# Patient Record
Sex: FEMALE | Race: WHITE | Employment: FULL TIME | URBAN - NONMETROPOLITAN AREA
[De-identification: names, ages, dates, MRNs, and addresses within clinical notes are randomized per-mention and may not be internally consistent; named-entity substitution may affect disease eponyms.]

---

## 2017-01-02 ENCOUNTER — OFFICE VISIT (OUTPATIENT)
Dept: FAMILY MEDICINE | Facility: CLINIC | Age: 32
End: 2017-01-02
Payer: COMMERCIAL

## 2017-01-02 ENCOUNTER — TELEPHONE (OUTPATIENT)
Dept: FAMILY MEDICINE | Facility: CLINIC | Age: 32
End: 2017-01-02

## 2017-01-02 VITALS
SYSTOLIC BLOOD PRESSURE: 114 MMHG | WEIGHT: 188 LBS | HEIGHT: 66 IN | HEART RATE: 95 BPM | DIASTOLIC BLOOD PRESSURE: 66 MMHG | RESPIRATION RATE: 18 BRPM | OXYGEN SATURATION: 97 % | TEMPERATURE: 97.3 F | BODY MASS INDEX: 30.22 KG/M2

## 2017-01-02 DIAGNOSIS — R30.0 DYSURIA: Primary | ICD-10-CM

## 2017-01-02 LAB
ALBUMIN UR-MCNC: NEGATIVE MG/DL
APPEARANCE UR: CLEAR
BACTERIA #/AREA URNS HPF: ABNORMAL /HPF
BILIRUB UR QL STRIP: NEGATIVE
COLOR UR AUTO: YELLOW
GLUCOSE UR STRIP-MCNC: NEGATIVE MG/DL
HGB UR QL STRIP: ABNORMAL
KETONES UR STRIP-MCNC: 15 MG/DL
LEUKOCYTE ESTERASE UR QL STRIP: NEGATIVE
NITRATE UR QL: NEGATIVE
NON-SQ EPI CELLS #/AREA URNS LPF: ABNORMAL /LPF
PH UR STRIP: 6.5 PH (ref 5–7)
RBC #/AREA URNS AUTO: ABNORMAL /HPF (ref 0–2)
SP GR UR STRIP: 1.02 (ref 1–1.03)
URN SPEC COLLECT METH UR: ABNORMAL
UROBILINOGEN UR STRIP-ACNC: 0.2 EU/DL (ref 0.2–1)
WBC #/AREA URNS AUTO: ABNORMAL /HPF (ref 0–2)

## 2017-01-02 PROCEDURE — 99213 OFFICE O/P EST LOW 20 MIN: CPT | Performed by: NURSE PRACTITIONER

## 2017-01-02 PROCEDURE — 81001 URINALYSIS AUTO W/SCOPE: CPT | Performed by: NURSE PRACTITIONER

## 2017-01-02 RX ORDER — CIPROFLOXACIN 250 MG/1
250 TABLET, FILM COATED ORAL 2 TIMES DAILY
Qty: 6 TABLET | Refills: 0 | Status: SHIPPED | OUTPATIENT
Start: 2017-01-02 | End: 2017-01-11

## 2017-01-02 ASSESSMENT — PAIN SCALES - GENERAL: PAINLEVEL: SEVERE PAIN (6)

## 2017-01-02 NOTE — PROGRESS NOTES
"  SUBJECTIVE:                                                    Sirena Dietrich is a 31 year old female who presents to clinic today for the following health issues:      URINARY TRACT SYMPTOMS      Duration: 3 weeks    Description  Dysuria, frequency    Intensity:  moderate    Accompanying signs and symptoms:  Fever/chills: no   Flank pain no   Nausea and vomiting: no   Vaginal symptoms: burning, patient recently treated with Azo 3 days and also took diflucan  Abdominal/Pelvic Pain: YES    History  History of frequent UTI's: no   History of kidney stones: YES  Sexually Active: no   Possibility of pregnancy: No    Precipitating or alleviating factors: None    Therapies tried and outcome: Azo and increased fluids    Outcome: not helpful   Intermittently has similar symptoms.  History of chronic pelvic pain but feels that this is totally unrelated.  Not sexually active currently      Problem list and histories reviewed & adjusted, as indicated.  Additional history: as documented    Problem list, Medication list, Allergies, and Medical/Social/Surgical histories reviewed in [facility] and updated as appropriate.    ROS:  Constitutional, HEENT, cardiovascular, pulmonary, gi and gu systems are negative, except as otherwise noted.    OBJECTIVE:                                                    /66 mmHg  Pulse 95  Temp(Src) 97.3  F (36.3  C) (Tympanic)  Resp 18  Ht 5' 6.25\" (1.683 m)  Wt 188 lb (85.276 kg)  BMI 30.11 kg/m2  SpO2 97%  LMP  (LMP Unknown)  Breastfeeding? No  Body mass index is 30.11 kg/(m^2).  GENERAL: healthy, alert and no distress  EYES: Eyes grossly normal to inspection and conjunctivae and sclerae normal  HENT: ear canals and TM's normal, nose and mouth without ulcers or lesions  MS: no gross musculoskeletal defects noted, no edema    Diagnostic Test Results:  Results for orders placed or performed in visit on 01/02/17   *UA reflex to Microscopic and Culture ([facility] " (except [place] and [place])   Result Value Ref Range    Color Urine Yellow     Appearance Urine Clear     Glucose Urine Negative NEG mg/dL    Bilirubin Urine Negative NEG    Ketones Urine 15 (A) NEG mg/dL    Specific Gravity Urine 1.025 1.003 - 1.035    Blood Urine Trace (A) NEG    pH Urine 6.5 5.0 - 7.0 pH    Protein Albumin Urine Negative NEG mg/dL    Urobilinogen Urine 0.2 0.2 - 1.0 EU/dL    Nitrite Urine Negative NEG    Leukocyte Esterase Urine Negative NEG    Source Midstream Urine    Urine Microscopic   Result Value Ref Range    WBC Urine O - 2 0 - 2 /HPF    RBC Urine 2-5 (A) 0 - 2 /HPF    Squamous Epithelial /LPF Urine Many (A) FEW /LPF    Bacteria Urine Few (A) NEG /HPF     *Note: Due to a large number of results and/or encounters for the requested time period, some results have not been displayed. A complete set of results can be found in Results Review.        ASSESSMENT/PLAN:                                                      1. Dysuria  Will wait for UC results  - *UA reflex to Microscopic and Culture ([facility] (except [place] and [place])  - Urine Microscopic  - Cipro 250 mg bid for 3 days    Patient Instructions   Your clinic record indicates that you are due for:   Pap and physical exam      Start taking the Cipro 250 mg twice a day for 3 days.    We'll contact you with the results of your urine culture.    Increase your fluids.    Continue taking the Azo        Yeny Amor, MALDONADO, APRN [facility]

## 2017-01-02 NOTE — MR AVS SNAPSHOT
After Visit Summary   1/2/2017    Sirena Dietrich    MRN: [id number]           Patient Information     Date Of Birth          1985        Visit Information        Provider Department      1/2/2017 1:40 PM Yeny Amor APRN CNP [facility]        Today's Diagnoses     Dysuria    -  1       Care Instructions    Your clinic record indicates that you are due for:   Pap and physical exam      Start taking the Cipro 250 mg twice a day for 3 days.    We'll contact you with the results of your urine culture.    Increase your fluids.    Continue taking the Azo        Follow-ups after your visit        Your next 10 appointments already scheduled     Jan 03, 2017  9:00 AM   New Visit with Chrissie Hyde PA-C   [facility] ([facility]    [address] [facility] [address]   [phone]            Jan 30, 2017  1:00 PM   New Visit with Tay Watts MD   [facility] ([facility])    [address] [facility] [address] [facility] [address]   [phone]            Feb 01, 2017 12:45 PM   Return Visit with Lisa Helton MD   [facility] ([facility])    [facility] [address]  [address]  [facility] [address] [phone]              Who to contact     If you have questions or need follow up information about today's clinic visit or your schedule please contact [facility] directly at [phone].  Normal or non-critical lab and imaging results will be communicated to you by MyChart, letter or phone within 4 business days after the clinic has received the results. If you do not hear from us within 7 days, please contact the clinic through MyChart or phone. If you have a critical or abnormal lab result, we will notify you by phone as soon as possible.  Submit refill requests through [employer] or call  "your pharmacy and they will forward the refill request to us. Please allow 3 business days for your refill to be completed.          Additional Information About Your Visit        [email or web address] Medical Information     Local[employer] lets you send messages to your doctor, view your test results, renew your prescriptions, schedule appointments and more. To sign up, go to [email or web address] . Click on \"Log in\" on the left side of the screen, which will take you to the Welcome page. Then click on \"Sign up Now\" on the right side of the page.     You will be asked to enter the access code listed below, as well as some personal information. Please follow the directions to create your username and password.     Your access code is: [email or web address]  Expires: 2017  2:31 PM     Your access code will  in 90 days. If you need help or a new code, please call your [place] clinic or [phone].        Your Vitals Were     Pulse Temperature Respirations    95 97.3  F (36.3  C) (Tympanic) 18    Height BMI (Body Mass Index) Pulse Oximetry    5' 6.25\" (1.683 m) 30.11 kg/m2 97%    Last Period Breastfeeding?       (LMP Unknown) No        Blood Pressure from Last 3 Encounters:   17 114/66   16 138/88   12/15/16 136/75    Weight from Last 3 Encounters:   17 188 lb (85.276 kg)   16 188 lb 3.2 oz (85.367 kg)   12/15/16 187 lb (84.823 kg)              We Performed the Following     *UA reflex to Microscopic and Culture ([facility] (except [place] and [place])     Urine Microscopic          Today's Medication Changes          These changes are accurate as of: 17  2:31 PM.  If you have any questions, ask your nurse or doctor.               Start taking these medicines.        Dose/Directions    ciprofloxacin 250 MG tablet   Commonly known as:  CIPRO   Used for:  Dysuria   Started by:  Yeny Amor APRN CNP        Dose:  250 mg   Take 1 tablet (250 mg) by mouth 2 times daily "   Quantity:  6 tablet   Refills:  0            Where to get your medicines      These medications [place] [facility] [place] [address] [facility] [address]     Phone:  [phone]    - ciprofloxacin 250 MG tablet             Primary Care Provider Office Phone # Fax #    DIPESH Marino [phone]       [facility] [address]  [facility] [address]        Goals        Patient-Stated    I will attend counseling appointment     Goal Comments - Note edited  11/25/2015  1:35 PM by Shi Armstrong LSW    As of today's date 11/25/2015 goal is met at 76 - 100%.   Goal Status:  Complete - will keep to maintain for  A few month  As of today's date 7/28/2015 goal is met at 26 - 50%.   Goal Status:  Active          Thank you!     Thank you for choosing [facility]  for your care. Our goal is always to provide you with excellent care. Hearing back from our patients is one way we can continue to improve our services. Please take a few minutes to complete the written survey that you may receive in the mail after your visit with us. Thank you!             Your Updated Medication List - Protect others around you: Learn how to safely use, store and throw away your medicines at www.disposemymeds.org.          This list is accurate as of: 1/2/17  2:31 PM.  Always use your most recent med list.                   Brand Name Dispense Instructions for use    ciprofloxacin 250 MG tablet    CIPRO    6 tablet    Take 1 tablet (250 mg) by mouth 2 times daily       fluconazole 150 MG tablet    DIFLUCAN    8 tablet    Take 1 tablet (150 mg) by mouth every 3 days X 4 doses then once a week x 1 month       fluticasone 50 MCG/ACT spray    FLONASE    1 g    Spray 1-2 sprays into both nostrils daily       gabapentin 600 MG tablet    NEURONTIN    150 tablet    Take 1 tablet (600 mg) by mouth 3 times daily Take 1200 mg three times a day-  morning, mid day and evening.       lamoTRIgine 200 MG tablet    LaMICtal    31 tablet    Take 1 tablet (200 mg) by mouth daily       LATUDA 20 MG Tabs tablet   Generic drug:  lurasidone      Take 10 mg by mouth daily       levonorgestrel-ethinyl estradiol 0.1-20 MG-MCG per tablet    EDWARD HARTMAN LESSINA    28 tablet    Take 1 tablet by mouth daily       lidocaine (Anorectal) 5 % Crea     45 g    NEEDS 5% TOPICAL LIDOCAINE OINTMENT 35.44 gm tube.. Apply TID as needed for comfort. Disp #1 Refill X 1 year.       lidocaine 5 % Patch    LIDODERM    30 patch    Apply up to 3 patches to painful area at once for up to 12 h within a 24 h period.  Remove after 12 hours.       lidocaine HCl 3 % cream     453.6 g    Apply topically 3 times daily       LORazepam 1 MG tablet    ATIVAN    20 tablet    Take 1.5 tablets (1.5 mg) by mouth daily as needed for anxiety       omeprazole 40 MG capsule    priLOSEC    90 capsule    Take 1 capsule (40 mg) by mouth daily Take 30-60 minutes before a meal.       ondansetron 4 MG ODT tab    ZOFRAN ODT    7 tablet    Take 1 tablet (4 mg) by mouth every 8 hours as needed for nausea       oxyCODONE-acetaminophen 5-325 MG per tablet    PERCOCET    15 tablet    1-2 tabs twice daily as needed for pelvic pain.       phenylephrine-cocoa butter 0.25-88.44 % per suppository    PREPARATION H    24 suppository    Place 1 suppository rectally 4 times daily as needed for hemorrhoids or itching       * SEROQUEL 200 MG tablet   Generic drug:  QUEtiapine      Take by mouth daily       * QUEtiapine 50 MG tablet    SEROQUEL    30 tablet    Take 1 tablet (50 mg) by mouth nightly as needed       SUMAtriptan 6 MG/0.5ML pen injector kit    IMITREX STATDOSE    3 kit    Inject 0.5 mLs (6 mg) Subcutaneous at onset of headache for migraine       * Notice:  This list has 2 medication(s) that are the same as other medications prescribed for you. Read the directions carefully, and ask your doctor or other care provider to  review them with you.

## 2017-01-02 NOTE — PATIENT INSTRUCTIONS
Your clinic record indicates that you are due for:   Pap and physical exam      Start taking the Cipro 250 mg twice a day for 3 days.    We'll contact you with the results of your urine culture.    Increase your fluids.    Continue taking the Azo

## 2017-01-02 NOTE — TELEPHONE ENCOUNTER
Reason for call:  Patient reporting a symptom    Symptom or request: Pt has Bacteria in her Urine. Continues to have pain when voiding    Duration (how long have symptoms been present): 2 weeks    Have you been treated for this before? Yes    Phone Number patient can be reached at:  Home number on file [phone] (home)    Best Time:  Any Time    Can we leave a detailed message on this number:  YES    Call taken on 1/2/2017 at 9:59 AM by Monse Shafer

## 2017-01-02 NOTE — NURSING NOTE
"Chief Complaint   Patient presents with     UTI       Initial /66 mmHg  Pulse 95  Temp(Src) 97.3  F (36.3  C) (Tympanic)  Resp 18  Ht 5' 6.25\" (1.683 m)  Wt 188 lb (85.276 kg)  BMI 30.11 kg/m2  SpO2 97%  LMP  (LMP Unknown)  Breastfeeding? No Estimated body mass index is 30.11 kg/(m^2) as calculated from the following:    Height as of this encounter: 5' 6.25\" (1.683 m).    Weight as of this encounter: 188 lb (85.276 kg).  BP completed using cuff size: regular  "

## 2017-01-02 NOTE — TELEPHONE ENCOUNTER
S-(situation): Pt still having ongoing painful urination.    B-(background): Pt seen on 12-15-16 for dysuria and also again on 12-20-16, prescribed Diflucan on 12-15-16. Did not complete the UA as instructed at visit on 12-20-16.     A-(assessment): Pt describes painful urination, no fever, no blood in urine, frequency. No flank pain but having difficulty relaxing  pelvic muscles to void. Used a OTC yeast infection treatment as she is red in the groin area.     R-(recommendations): Instructed to be seen, pt scheduled appointment with Paulina Amor today at 1:40 pm for evaluation. Adithya

## 2017-01-11 ENCOUNTER — OFFICE VISIT (OUTPATIENT)
Dept: FAMILY MEDICINE | Facility: CLINIC | Age: 32
End: 2017-01-11
Payer: COMMERCIAL

## 2017-01-11 VITALS
WEIGHT: 186 LBS | TEMPERATURE: 97.3 F | DIASTOLIC BLOOD PRESSURE: 78 MMHG | BODY MASS INDEX: 29.79 KG/M2 | SYSTOLIC BLOOD PRESSURE: 139 MMHG | HEART RATE: 110 BPM

## 2017-01-11 DIAGNOSIS — M25.551 HIP PAIN, RIGHT: ICD-10-CM

## 2017-01-11 DIAGNOSIS — F31.64 SEVERE BIPOLAR I DISORDER, MOST RECENT EPISODE MIXED, WITH PSYCHOTIC FEATURES (H): Primary | ICD-10-CM

## 2017-01-11 DIAGNOSIS — R11.0 NAUSEA: ICD-10-CM

## 2017-01-11 PROCEDURE — 99214 OFFICE O/P EST MOD 30 MIN: CPT | Performed by: NURSE PRACTITIONER

## 2017-01-11 RX ORDER — ONDANSETRON 4 MG/1
4 TABLET, ORALLY DISINTEGRATING ORAL EVERY 8 HOURS PRN
Qty: 7 TABLET | Refills: 0 | Status: SHIPPED | OUTPATIENT
Start: 2017-01-11 | End: 2017-01-27

## 2017-01-11 RX ORDER — LAMOTRIGINE 200 MG/1
200 TABLET ORAL DAILY
Qty: 31 TABLET | Refills: 0 | Status: SHIPPED | OUTPATIENT
Start: 2017-01-11 | End: 2017-07-27

## 2017-01-11 RX ORDER — QUETIAPINE FUMARATE 50 MG/1
50-150 TABLET, FILM COATED ORAL AT BEDTIME
Qty: 90 TABLET | Refills: 0 | Status: SHIPPED | OUTPATIENT
Start: 2017-01-11 | End: 2017-02-06

## 2017-01-11 RX ORDER — LORAZEPAM 1 MG/1
1.5 TABLET ORAL DAILY PRN
Qty: 20 TABLET | Refills: 0 | Status: SHIPPED | OUTPATIENT
Start: 2017-01-11 | End: 2017-09-21

## 2017-01-11 RX ORDER — GABAPENTIN 600 MG/1
TABLET ORAL
Qty: 150 TABLET | Refills: 0 | COMMUNITY
Start: 2017-01-11 | End: 2017-03-22
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
## 2017-01-11 NOTE — Clinical Note
[facility]  [address] [facility] [address]  Phone: [phone]    January 11, 2017    Sirena Dietrich  [address]     [facility] [address]          TO WHOM IT MAY CONCERNS,    Appointment were as followed:     Sirena Dietrich:   11/23/2016  12/06/2016  12/15/2016  12/20/2016  01/02/2017    Nelson Dietrich:   11/26/2016 12/08/2016 12/13/2016 12/21/2016    Yehuda Purvis:   12/05/2016 12/20/2016    Aga Dietrich:   12/20/2016        Sincerely,      Shi TRIPLETTP-BC / sb

## 2017-01-11 NOTE — NURSING NOTE
"Initial /78 mmHg  Pulse 110  Temp(Src) 97.3  F (36.3  C) (Tympanic)  Wt 186 lb (84.369 kg)  LMP  (LMP Unknown) Estimated body mass index is 29.79 kg/(m^2) as calculated from the following:    Height as of 1/2/17: 5' 6.25\" (1.683 m).    Weight as of this encounter: 186 lb (84.369 kg). .      "

## 2017-01-11 NOTE — Clinical Note
[facility]  [address] [place] [address]  Phone: [phone]     January 11, 2017      Sirena Dietrich  [address]   [address] [place] [address]              To whom it may concern,        Sirena Dietrich was seen in our clinic today for medical illness.   At this point contact with psychiatry team is difficult.   This note is to serve as a temporary guidance in regard to Sirena Dietrich working.  At this point with ongoing mental health issues and treatment plan .. ( DBT once a week, DBT therapy once a week, psychotherapy weekly and monthly case management meetings) would recommend that Sirena Dietrich is not able to work.   Anticipate return to work or school as recommended by her psychiatry team.         Sincerely,        Shi Martinez [facility]  [phone]  [address] [place] [id number]

## 2017-01-20 ENCOUNTER — HOSPITAL ENCOUNTER (EMERGENCY)
Facility: CLINIC | Age: 32
Discharge: HOME | End: 2017-01-20
Attending: EMERGENCY MEDICINE | Admitting: EMERGENCY MEDICINE
Payer: COMMERCIAL

## 2017-01-20 VITALS
OXYGEN SATURATION: 96 % | BODY MASS INDEX: 28.93 KG/M2 | SYSTOLIC BLOOD PRESSURE: 126 MMHG | DIASTOLIC BLOOD PRESSURE: 83 MMHG | HEIGHT: 66 IN | WEIGHT: 180 LBS | HEART RATE: 115 BPM | TEMPERATURE: 97.9 F | RESPIRATION RATE: 16 BRPM

## 2017-01-20 DIAGNOSIS — R11.2 NON-INTRACTABLE VOMITING WITH NAUSEA, UNSPECIFIED VOMITING TYPE: ICD-10-CM

## 2017-01-20 DIAGNOSIS — K52.9 GASTROENTERITIS: ICD-10-CM

## 2017-01-20 DIAGNOSIS — E86.0 DEHYDRATION: ICD-10-CM

## 2017-01-20 LAB
ALBUMIN SERPL-MCNC: 3.7 G/DL (ref 3.4–5)
ALP SERPL-CCNC: 67 U/L (ref 40–150)
ALT SERPL W P-5'-P-CCNC: 57 U/L (ref 0–50)
ANION GAP SERPL CALCULATED.3IONS-SCNC: 8 MMOL/L (ref 3–14)
AST SERPL W P-5'-P-CCNC: 34 U/L (ref 0–45)
BASOPHILS # BLD AUTO: 0 10E9/L (ref 0–0.2)
BASOPHILS NFR BLD AUTO: 0.7 %
BILIRUB SERPL-MCNC: 0.3 MG/DL (ref 0.2–1.3)
BUN SERPL-MCNC: 9 MG/DL (ref 7–30)
CALCIUM SERPL-MCNC: 8.3 MG/DL (ref 8.5–10.1)
CHLORIDE SERPL-SCNC: 106 MMOL/L (ref 94–109)
CO2 SERPL-SCNC: 27 MMOL/L (ref 20–32)
CREAT SERPL-MCNC: 0.71 MG/DL (ref 0.52–1.04)
DIFFERENTIAL METHOD BLD: NORMAL
EOSINOPHIL # BLD AUTO: 0.3 10E9/L (ref 0–0.7)
EOSINOPHIL NFR BLD AUTO: 4.9 %
ERYTHROCYTE [DISTWIDTH] IN BLOOD BY AUTOMATED COUNT: 11.6 % (ref 10–15)
GFR SERPL CREATININE-BSD FRML MDRD: ABNORMAL ML/MIN/1.7M2
GLUCOSE SERPL-MCNC: 98 MG/DL (ref 70–99)
HCG SERPL QL: NEGATIVE
HCT VFR BLD AUTO: 42 % (ref 35–47)
HGB BLD-MCNC: 14.5 G/DL (ref 11.7–15.7)
IMM GRANULOCYTES # BLD: 0 10E9/L (ref 0–0.4)
IMM GRANULOCYTES NFR BLD: 0.2 %
LYMPHOCYTES # BLD AUTO: 2.6 10E9/L (ref 0.8–5.3)
LYMPHOCYTES NFR BLD AUTO: 45.6 %
MCH RBC QN AUTO: 30.3 PG (ref 26.5–33)
MCHC RBC AUTO-ENTMCNC: 34.5 G/DL (ref 31.5–36.5)
MCV RBC AUTO: 88 FL (ref 78–100)
MONOCYTES # BLD AUTO: 0.7 10E9/L (ref 0–1.3)
MONOCYTES NFR BLD AUTO: 12.4 %
NEUTROPHILS # BLD AUTO: 2.1 10E9/L (ref 1.6–8.3)
NEUTROPHILS NFR BLD AUTO: 36.2 %
PLATELET # BLD AUTO: 289 10E9/L (ref 150–450)
POTASSIUM SERPL-SCNC: 3.3 MMOL/L (ref 3.4–5.3)
PROT SERPL-MCNC: 7.2 G/DL (ref 6.8–8.8)
RBC # BLD AUTO: 4.78 10E12/L (ref 3.8–5.2)
SODIUM SERPL-SCNC: 141 MMOL/L (ref 133–144)
WBC # BLD AUTO: 5.7 10E9/L (ref 4–11)

## 2017-01-20 PROCEDURE — 85025 COMPLETE CBC W/AUTO DIFF WBC: CPT | Performed by: EMERGENCY MEDICINE

## 2017-01-20 PROCEDURE — 99284 EMERGENCY DEPT VISIT MOD MDM: CPT | Mod: 25

## 2017-01-20 PROCEDURE — 80053 COMPREHEN METABOLIC PANEL: CPT | Performed by: EMERGENCY MEDICINE

## 2017-01-20 PROCEDURE — 25000132 ZZH RX MED GY IP 250 OP 250 PS 637: Performed by: EMERGENCY MEDICINE

## 2017-01-20 PROCEDURE — 25800025 ZZH RX 258: Performed by: EMERGENCY MEDICINE

## 2017-01-20 PROCEDURE — 84703 CHORIONIC GONADOTROPIN ASSAY: CPT | Performed by: EMERGENCY MEDICINE

## 2017-01-20 PROCEDURE — 96361 HYDRATE IV INFUSION ADD-ON: CPT

## 2017-01-20 PROCEDURE — 25000125 ZZHC RX 250: Performed by: EMERGENCY MEDICINE

## 2017-01-20 PROCEDURE — 99284 EMERGENCY DEPT VISIT MOD MDM: CPT | Performed by: EMERGENCY MEDICINE

## 2017-01-20 PROCEDURE — 96374 THER/PROPH/DIAG INJ IV PUSH: CPT

## 2017-01-20 PROCEDURE — 25000128 H RX IP 250 OP 636: Performed by: EMERGENCY MEDICINE

## 2017-01-20 RX ORDER — SODIUM CHLORIDE 9 MG/ML
1000 INJECTION, SOLUTION INTRAVENOUS CONTINUOUS
Status: DISCONTINUED | OUTPATIENT
Start: 2017-01-20 | End: 2017-01-20 | Stop reason: HOSPADM

## 2017-01-20 RX ORDER — ONDANSETRON 4 MG/1
4 TABLET, ORALLY DISINTEGRATING ORAL EVERY 8 HOURS PRN
Qty: 10 TABLET | Refills: 0 | Status: SHIPPED | OUTPATIENT
Start: 2017-01-20 | End: 2017-01-27

## 2017-01-20 RX ORDER — ONDANSETRON 2 MG/ML
4 INJECTION INTRAMUSCULAR; INTRAVENOUS ONCE
Status: COMPLETED | OUTPATIENT
Start: 2017-01-20 | End: 2017-01-20

## 2017-01-20 RX ADMIN — ONDANSETRON 4 MG: 2 INJECTION INTRAMUSCULAR; INTRAVENOUS at 17:34

## 2017-01-20 RX ADMIN — SODIUM CHLORIDE, POTASSIUM CHLORIDE, SODIUM LACTATE AND CALCIUM CHLORIDE 1000 ML: 600; 310; 30; 20 INJECTION, SOLUTION INTRAVENOUS at 18:26

## 2017-01-20 RX ADMIN — LIDOCAINE HYDROCHLORIDE 30 ML: 20 SOLUTION ORAL; TOPICAL at 18:25

## 2017-01-20 RX ADMIN — SODIUM CHLORIDE 1000 ML: 9 INJECTION, SOLUTION INTRAVENOUS at 17:34

## 2017-01-20 NOTE — ED AVS SNAPSHOT
" [facility]    [address] [facility] [address]    Phone:  [phone]    Fax:  [phone]                                       Sirena Dietrich   MRN: [id number]    Department:  [facility]   Date of Visit:  1/20/2017           Patient Information     Date Of Birth          1985        Your diagnoses for this visit were:     Gastroenteritis     Non-intractable vomiting with nausea, unspecified vomiting type     Dehydration        You were seen by Ivan Bradshaw MD.      Follow-up Information     Schedule an appointment as soon as possible for a visit with Shi Martinez APRN CNP.    Specialty:  Family Practice    Why:  For re-evaluation if symptoms not resolving over the next 2 days.    Contact information:    [facility]  [address] [facility] [id number] [phone]          Follow up with [facility].    Specialty:  EMERGENCY MEDICINE    Why:  If symptoms worsen, or new problems or concerns.    Contact information:    [address] [place] [id number] [phone]    Additional information:    The medical center is located at   [address] [place] [address] (between [facility] and   [place] [address] in [place], [place] north   of [place]).        Discharge Instructions          * FOOD POISONING or VIRAL GASTROENTERITIS (6yr-Adult)  FOOD POISONING may occur from 6 to 24 hours after eating food that has spoiled and lasts up to1-2 days. VIRAL GASTRO-ENTERITIS is commonly known as the \"stomach flu\" and may last 2-7 days. Symptoms of both illnesses may include vomiting, diarrhea, fever, abdominal cramping. Antibiotics are not effective, but simple home treatment will be helpful.  HOME CARE:    If symptoms are severe, rest at home for the next 24 hours.    Avoid tobacco and alcohol. These may worsen your symptoms.    If medicines for diarrhea (low dose of Immodium: one tablet a day for an adult) or " vomiting were prescribed, take only as directed.   During the first 12 to 24 hours follow the diet below:    DRINKS: Sport drinks like Gatorade, soft drinks without caffeine; ginger ale, mineral water (plain or flavored), decaffeinated tea and coffee.    SOUPS: Clear broth, consommé and bouillon    DESSERTS: Plain gelatin (Jell-O), popsicles and fruit juice bars.  During the next 24 hours you may add the following to the above:    Hot cereal, plain toast, bread, rolls, crackers    Plain noodles, rice, mashed potatoes, chicken noodle or rice soup    Unsweetened canned fruit (avoid pineapple), bananas    Limit fat intake to less than 15 grams per day by avoiding margarine, butter, oils, mayonnaise, sauces, gravies, fried foods, peanut butter, meat, poultry and fish.    Limit fiber; avoid raw or cooked vegetables, fresh fruits (except bananas) and bran cereals.    Limit caffeine and chocolate. No spices or seasonings except salt.  Slowly go back to a normal diet as you feel better and your symptoms lessen.  FOLLOW UP with your doctor as advised if you are not better in 2 days. If a stool (diarrhea) sample was taken, you may call in 2 days (or as directed) for the results.  GET PROMPT MEDICAL ATTENTION if any of the following occur:    Increasing abdominal pain or constant pain in one spot    Continued vomiting (unable to keep liquids down)    Frequent diarrhea (more than 5 times a day)    Blood in vomit or stool (black or red color)    Unable to take in fluids at all    No urine output for 12 hours or extreme thirst    Weakness, dizziness, fainting    Drowsiness, confusion, stiff neck or seizure    Fever over 101.0  F (38.3  C) for more than 3 days    New rash    1010-1535 [facility], [address] [place] [address], [place] [address]. All rights reserved. This information is not intended as a substitute for professional medical care. Always follow your healthcare professional's instructions.      Diet for Vomiting or  Diarrhea (Adult)    If your symptoms return or get worse after eating certain foods listed below, you should stop eating them until your symptoms ease and you feel better.  Once the vomiting stops, then follow the steps below.   During the first 12 to 24 hours  During the first 12 to 24 hours, follow this diet:    Beverages. Plain water, sport drinks like electrolyte solutions, soft drinks without caffeine, mineral water (plain or flavored), clear fruit juices, and decaffeinated tea and coffee.    Soups. Clear broth, consommé, and bouillon.    Desserts. Plain gelatin, popsicles, and fruit juice bars. As you feel better, you may add 6 to 8 ounces of yogurt per day. If you have diarrhea, don't have foods or beverages that contain sugar, high-fructose corn syrup, or sugar alcohols.  During the next 24 hours  During the next 24 hours you may add the following to the above:    Hot cereal, plain toast, bread, rolls, and crackers    Plain noodles, rice, mashed potatoes, and chicken noodle or rice soup    Unsweetened canned fruit (but not pineapple) and bananas  Don't have more than 15 grams of fat a day. Do this by staying away from margarine, butter, oils, mayonnaise, sauces, gravies, fried foods, peanut butter, meat, poultry, and fish.  Don't eat much fiber. Stay away from raw or cooked vegetables, fresh fruits (except bananas), and bran cereals.  Limit how much caffeine and chocolate you have. Do not use any spices or seasonings except salt.  During the next 24 hours  Gradually go back to your normal diet, as you feel better and your symptoms ease.    9507-8833 [employer]. [address] [place] [address], [place] [address]. All rights reserved. This information is not intended as a substitute for professional medical care. Always follow your healthcare professional's instructions.          Treating Diarrhea  Diarrhea occurs when you have loose, watery, or frequent bowel movements. It is a common problem with many  causes. Most cases of diarrhea clear up on their own. But certain cases may need treatment. Be sure to see your health care provider if your symptoms do not improve within a few days.    Getting Relief  Treatment of diarrhea depends on its cause. Diarrhea caused by bacterial or parasite infection is often treated with antibiotics. Diarrhea caused by other factors, such as a stomach virus, often improves with simple home treatment. The tips below may also help relieve your symptoms.    Drink plenty of fluids. This helps prevent too much fluid loss (dehydration). Water, clear soups, and electrolyte solutions are good choices. Avoid alcohol, coffee, tea, and milk. These can make symptoms worse.    Suck on ice chips if drinking makes you queasy.    Return to your normal diet slowly. You may want to eat bland foods at first, such as rice and toast. Also, you may need to avoid certain foods for a while, such as dairy products. These can make symptoms worse. Ask your health care provider if there are any other foods you should avoid.    If you were prescribed antibiotics, take them as directed.    Do not take anti-diarrhea medications without asking your health care provider first.  Call Your Health Care Provider If You Have:    Fever of 102 F (38.0 C) or higher    Severe pain    Worsening diarrhea or diarrhea for more than 2 days    Bloody vomit or stool    Signs of dehydration (dizziness, dry mouth and tongue, rapid pulse, dark urine)    6603-6616 [employer]. [address] [place] [address]. All rights reserved. This information is not intended as a substitute for professional medical care. Always follow your healthcare professional's instructions.          Future Appointments        Provider Department Dept Phone Center    1/30/2017 1:00 PM Tay Watts MD [facility] PHYSICIAN HEART AT [facility] [phone] [facility]    2/1/2017 12:45 PM Lisa Helton  MD [facility] [phone] [facility]      24 Hour Appointment Hotline       To make an appointment at any [facility], call [phone] ([phone]). If you don't have a family doctor or clinic, we will help you find one. [facility] are conveniently located to serve the needs of you and your family.          ED Discharge Orders     Clostridium difficile toxin B PCR       Stool Specimen            Enteric Bacteria and Virus Panel by CE Stool                    Review of your medicines      Our records show that you are taking the medicines listed below. If these are incorrect, please call your family doctor or clinic.        Dose / Directions Last dose taken    fluticasone 50 MCG/ACT spray   Commonly known as:  FLONASE   Dose:  1-2 spray   Quantity:  1 g        Spray 1-2 sprays into both nostrils daily   Refills:  5        gabapentin 600 MG tablet   Commonly known as:  NEURONTIN   Quantity:  150 tablet        600 mg in Am, Take 1200 mg mid day and evening.   Refills:  0        lamoTRIgine 200 MG tablet   Commonly known as:  LaMICtal   Dose:  200 mg   Quantity:  31 tablet        Take 1 tablet (200 mg) by mouth daily   Refills:  0        LATUDA 20 MG Tabs tablet   Dose:  10 mg   Generic drug:  lurasidone        Take 10 mg by mouth daily   Refills:  0        lidocaine (Anorectal) 5 % Crea   Quantity:  45 g        NEEDS 5% TOPICAL LIDOCAINE OINTMENT 35.44 gm tube.. Apply TID as needed for comfort. Disp #1 Refill X 1 year.   Refills:  1        lidocaine 5 % Patch   Commonly known as:  LIDODERM   Quantity:  30 patch        Apply up to 3 patches to painful area at once for up to 12 h within a 24 h period.  Remove after 12 hours.   Refills:  0        lidocaine HCl 3 % cream   Quantity:  453.6 g        Apply topically 3 times daily   Refills:  0        LORazepam 1 MG tablet   Commonly known as:  ATIVAN   Dose:  1.5 mg   Quantity:  20 tablet        Take 1.5 tablets (1.5 mg) by mouth  daily as needed for anxiety   Refills:  0        omeprazole 40 MG capsule   Commonly known as:  priLOSEC   Dose:  40 mg   Quantity:  90 capsule        Take 1 capsule (40 mg) by mouth daily Take 30-60 minutes before a meal.   Refills:  0        phenylephrine-cocoa butter 0.25-88.44 % per suppository   Commonly known as:  PREPARATION H   Dose:  1 suppository   Quantity:  24 suppository        Place 1 suppository rectally 4 times daily as needed for hemorrhoids or itching   Refills:  0        QUEtiapine 50 MG tablet   Commonly known as:  SEROQUEL   Dose:   mg   Quantity:  90 tablet        Take 1-3 tablets ( mg) by mouth At Bedtime   Refills:  0        SUMAtriptan 6 MG/0.5ML pen injector kit   Commonly known as:  IMITREX STATDOSE   Dose:  6 mg   Quantity:  3 kit        Inject 0.5 mLs (6 mg) Subcutaneous at onset of headache for migraine   Refills:  3          ASK your doctor about these medications        Dose / Directions Last dose taken    * ondansetron 4 MG ODT tab   Commonly known as:  ZOFRAN ODT   Dose:  4 mg   What changed:  Another medication with the same name was added. Make sure you understand how and when to take each.   Quantity:  7 tablet   Ask about: Which instructions should I use?        Take 1 tablet (4 mg) by mouth every 8 hours as needed for nausea   Refills:  0        * ondansetron 4 MG ODT tab   Commonly known as:  ZOFRAN ODT   Dose:  4 mg   What changed:  You were already taking a medication with the same name, and this prescription was added. Make sure you understand how and when to take each.   Quantity:  10 tablet   Ask about: Which instructions should I use?        Take 1 tablet (4 mg) by mouth every 8 hours as needed for nausea   Refills:  0        * Notice:  This list has 2 medication(s) that are the same as other medications prescribed for you. Read the directions carefully, and ask your doctor or other care provider to review them with you.            Prescriptions were sent or  printed at these locations (1 Prescription)                   [facility] [place] - [address] [facility]   [address] [facility], [place] [address]    Telephone:  [phone]   Fax:  [phone]   Hours:                  E-Prescribed (1 of 1)         ondansetron (ZOFRAN ODT) 4 MG ODT tab                Procedures and tests performed during your visit     CBC with platelets differential    Comprehensive metabolic panel    HCG qualitative pregnancy (blood)      Orders Needing Specimen Collection     None      Pending Results     No orders found from 1/19/2017 to 1/21/2017.            Pending Culture Results     No orders found from 1/19/2017 to 1/21/2017.       Test Results from your hospital stay           1/20/2017  6:01 PM - Interface, Flexilab Results      Component Results     Component Value Ref Range & Units Status    Sodium 141 133 - 144 mmol/L Final    Potassium 3.3 (L) 3.4 - 5.3 mmol/L Final    Chloride 106 94 - 109 mmol/L Final    Carbon Dioxide 27 20 - 32 mmol/L Final    Anion Gap 8 3 - 14 mmol/L Final    Glucose 98 70 - 99 mg/dL Final    Urea Nitrogen 9 7 - 30 mg/dL Final    Creatinine 0.71 0.52 - 1.04 mg/dL Final    GFR Estimate >90  Non  GFR Calc   >60 mL/min/1.7m2 Final    GFR Estimate If Black >90   GFR Calc   >60 mL/min/1.7m2 Final    Calcium 8.3 (L) 8.5 - 10.1 mg/dL Final    Bilirubin Total 0.3 0.2 - 1.3 mg/dL Final    Albumin 3.7 3.4 - 5.0 g/dL Final    Protein Total 7.2 6.8 - 8.8 g/dL Final    Alkaline Phosphatase 67 40 - 150 U/L Final    ALT 57 (H) 0 - 50 U/L Final    AST 34 0 - 45 U/L Final         1/20/2017  5:45 PM - Interface, Flexilab Results      Component Results     Component Value Ref Range & Units Status    WBC 5.7 4.0 - 11.0 10e9/L Final    RBC Count 4.78 3.8 - 5.2 10e12/L Final    Hemoglobin 14.5 11.7 - 15.7 g/dL Final    Hematocrit 42.0 35.0 - 47.0 % Final    MCV 88 78 - 100 fl Final    MCH 30.3 26.5 - 33.0 pg Final    MCHC 34.5 31.5 -  "36.5 g/dL Final    RDW 11.6 10.0 - 15.0 % Final    Platelet Count 289 150 - 450 10e9/L Final    Diff Method Automated Method  Final    % Neutrophils 36.2 % Final    % Lymphocytes 45.6 % Final    % Monocytes 12.4 % Final    % Eosinophils 4.9 % Final    % Basophils 0.7 % Final    % Immature Granulocytes 0.2 % Final    Absolute Neutrophil 2.1 1.6 - 8.3 10e9/L Final    Absolute Lymphocytes 2.6 0.8 - 5.3 10e9/L Final    Absolute Monocytes 0.7 0.0 - 1.3 10e9/L Final    Absolute Eosinophils 0.3 0.0 - 0.7 10e9/L Final    Absolute Basophils 0.0 0.0 - 0.2 10e9/L Final    Abs Immature Granulocytes 0.0 0 - 0.4 10e9/L Final         2017  6:28 PM - Interface, Flexilab Results      Component Results     Component Value Ref Range & Units Status    HCG Qualitative Serum Negative NEG Final                Thank you for choosing [facility]       Thank you for choosing [facility] for your care. Our goal is always to provide you with excellent care. Hearing back from our patients is one way we can continue to improve our services. Please take a few minutes to complete the written survey that you may receive in the mail after you visit with us. Thank you!        [employer] Information     [employer] lets you send messages to your doctor, view your test results, renew your prescriptions, schedule appointments and more. To sign up, go to [email or web address] . Click on \"Log in\" on the left side of the screen, which will take you to the Welcome page. Then click on \"Sign up Now\" on the right side of the page.     You will be asked to enter the access code listed below, as well as some personal information. Please follow the directions to create your username and password.     Your access code is: [email or web address]  Expires: 2017  2:31 PM     Your access code will  in 90 days. If you need help or a new code, please call your [facility] clinic or [phone].        Care EveryWhere ID     This is your Care EveryWhere ID. This could be used by " other organizations to access your [facility] medical records  [id number]        After Visit Summary       This is your record. Keep this with you and show to your community pharmacist(s) and doctor(s) at your next visit.

## 2017-01-20 NOTE — ED AVS SNAPSHOT
[facility] Department    [address] [facility] [address]    Phone:  [phone]    Fax:  [phone]                                       Sirena Dietrich   MRN: [id number]    Department:  [facility] Department   Date of Visit:  1/20/2017           After Visit Summary Signature Page     I have received my discharge instructions, and my questions have been answered. I have discussed any challenges I see with this plan with the nurse or doctor.    ..........................................................................................................................................  Patient/Patient Representative Signature      ..........................................................................................................................................  Patient Representative Print Name and Relationship to Patient    ..................................................               ................................................  Date                                            Time    ..........................................................................................................................................  Reviewed by Signature/Title    ...................................................              ..............................................  Date                                                            Time
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
## 2017-01-20 NOTE — ED NOTES
patient reports n/v/d for 3 days. Vomiting up to 7 times per day and diarrhea up to 10 times per day. Frequency of v/d is slowing down today. Pt currently nauseated. Denies bloody stools or abd pain. Bowel sounds normoactive. Pt recently on cipro for UTI 2 weeks ago

## 2017-01-21 NOTE — ED NOTES
Pt up to bathroom, given water to try for oral intake. Reports feeling better, nausea improved, heartburn gone

## 2017-01-21 NOTE — DISCHARGE INSTRUCTIONS
"   * FOOD POISONING or VIRAL GASTROENTERITIS (6yr-Adult)  FOOD POISONING may occur from 6 to 24 hours after eating food that has spoiled and lasts up to1-2 days. VIRAL GASTRO-ENTERITIS is commonly known as the \"stomach flu\" and may last 2-7 days. Symptoms of both illnesses may include vomiting, diarrhea, fever, abdominal cramping. Antibiotics are not effective, but simple home treatment will be helpful.  HOME CARE:    If symptoms are severe, rest at home for the next 24 hours.    Avoid tobacco and alcohol. These may worsen your symptoms.    If medicines for diarrhea (low dose of Immodium: one tablet a day for an adult) or vomiting were prescribed, take only as directed.   During the first 12 to 24 hours follow the diet below:    DRINKS: Sport drinks like Gatorade, soft drinks without caffeine; ginger ale, mineral water (plain or flavored), decaffeinated tea and coffee.    SOUPS: Clear broth, consommé and bouillon    DESSERTS: Plain gelatin (Jell-O), popsicles and fruit juice bars.  During the next 24 hours you may add the following to the above:    Hot cereal, plain toast, bread, rolls, crackers    Plain noodles, rice, mashed potatoes, chicken noodle or rice soup    Unsweetened canned fruit (avoid pineapple), bananas    Limit fat intake to less than 15 grams per day by avoiding margarine, butter, oils, mayonnaise, sauces, gravies, fried foods, peanut butter, meat, poultry and fish.    Limit fiber; avoid raw or cooked vegetables, fresh fruits (except bananas) and bran cereals.    Limit caffeine and chocolate. No spices or seasonings except salt.  Slowly go back to a normal diet as you feel better and your symptoms lessen.  FOLLOW UP with your doctor as advised if you are not better in 2 days. If a stool (diarrhea) sample was taken, you may call in 2 days (or as directed) for the results.  GET PROMPT MEDICAL ATTENTION if any of the following occur:    Increasing abdominal pain or constant pain in one spot    Continued " vomiting (unable to keep liquids down)    Frequent diarrhea (more than 5 times a day)    Blood in vomit or stool (black or red color)    Unable to take in fluids at all    No urine output for 12 hours or extreme thirst    Weakness, dizziness, fainting    Drowsiness, confusion, stiff neck or seizure    Fever over 101.0  F (38.3  C) for more than 3 days    New rash    7422-8728 [employer] [address], [place] [address]. All rights reserved. This information is not intended as a substitute for professional medical care. Always follow your healthcare professional's instructions.      Diet for Vomiting or Diarrhea (Adult)    If your symptoms return or get worse after eating certain foods listed below, you should stop eating them until your symptoms ease and you feel better.  Once the vomiting stops, then follow the steps below.   During the first 12 to 24 hours  During the first 12 to 24 hours, follow this diet:    Beverages. Plain water, sport drinks like electrolyte solutions, soft drinks without caffeine, mineral water (plain or flavored), clear fruit juices, and decaffeinated tea and coffee.    Soups. Clear broth, consommé, and bouillon.    Desserts. Plain gelatin, popsicles, and fruit juice bars. As you feel better, you may add 6 to 8 ounces of yogurt per day. If you have diarrhea, don't have foods or beverages that contain sugar, high-fructose corn syrup, or sugar alcohols.  During the next 24 hours  During the next 24 hours you may add the following to the above:    Hot cereal, plain toast, bread, rolls, and crackers    Plain noodles, rice, mashed potatoes, and chicken noodle or rice soup    Unsweetened canned fruit (but not pineapple) and bananas  Don't have more than 15 grams of fat a day. Do this by staying away from margarine, butter, oils, mayonnaise, sauces, gravies, fried foods, peanut butter, meat, poultry, and fish.  Don't eat much fiber. Stay away from raw or cooked vegetables, fresh  fruits (except bananas), and bran cereals.  Limit how much caffeine and chocolate you have. Do not use any spices or seasonings except salt.  During the next 24 hours  Gradually go back to your normal diet, as you feel better and your symptoms ease.    7365-7740 [employer]. [address] [place] [id number]. All rights reserved. This information is not intended as a substitute for professional medical care. Always follow your healthcare professional's instructions.          Treating Diarrhea  Diarrhea occurs when you have loose, watery, or frequent bowel movements. It is a common problem with many causes. Most cases of diarrhea clear up on their own. But certain cases may need treatment. Be sure to see your health care provider if your symptoms do not improve within a few days.    Getting Relief  Treatment of diarrhea depends on its cause. Diarrhea caused by bacterial or parasite infection is often treated with antibiotics. Diarrhea caused by other factors, such as a stomach virus, often improves with simple home treatment. The tips below may also help relieve your symptoms.    Drink plenty of fluids. This helps prevent too much fluid loss (dehydration). Water, clear soups, and electrolyte solutions are good choices. Avoid alcohol, coffee, tea, and milk. These can make symptoms worse.    Suck on ice chips if drinking makes you queasy.    Return to your normal diet slowly. You may want to eat bland foods at first, such as rice and toast. Also, you may need to avoid certain foods for a while, such as dairy products. These can make symptoms worse. Ask your health care provider if there are any other foods you should avoid.    If you were prescribed antibiotics, take them as directed.    Do not take anti-diarrhea medications without asking your health care provider first.  Call Your Health Care Provider If You Have:    Fever of 102 F (38.0 C) or higher    Severe pain    Worsening diarrhea or  diarrhea for more than 2 days    Bloody vomit or stool    Signs of dehydration (dizziness, dry mouth and tongue, rapid pulse, dark urine)    [id number] [employer]. [address] [place] [address], [place] [address]. All rights reserved. This information is not intended as a substitute for professional medical care. Always follow your healthcare professional's instructions.

## 2017-01-27 ENCOUNTER — OFFICE VISIT (OUTPATIENT)
Dept: FAMILY MEDICINE | Facility: CLINIC | Age: 32
End: 2017-01-27
Payer: COMMERCIAL

## 2017-01-27 VITALS
TEMPERATURE: 97 F | BODY MASS INDEX: 30.2 KG/M2 | DIASTOLIC BLOOD PRESSURE: 80 MMHG | SYSTOLIC BLOOD PRESSURE: 120 MMHG | RESPIRATION RATE: 18 BRPM | WEIGHT: 187 LBS | HEART RATE: 95 BPM

## 2017-01-27 DIAGNOSIS — R11.0 NAUSEA: Primary | ICD-10-CM

## 2017-01-27 LAB
ALBUMIN SERPL-MCNC: 4 G/DL (ref 3.4–5)
ALP SERPL-CCNC: 64 U/L (ref 40–150)
ALT SERPL W P-5'-P-CCNC: 57 U/L (ref 0–50)
ANION GAP SERPL CALCULATED.3IONS-SCNC: 8 MMOL/L (ref 3–14)
AST SERPL W P-5'-P-CCNC: 22 U/L (ref 0–45)
BASOPHILS # BLD AUTO: 0 10E9/L (ref 0–0.2)
BASOPHILS NFR BLD AUTO: 0.4 %
BILIRUB SERPL-MCNC: 0.3 MG/DL (ref 0.2–1.3)
BUN SERPL-MCNC: 12 MG/DL (ref 7–30)
CALCIUM SERPL-MCNC: 8.7 MG/DL (ref 8.5–10.1)
CHLORIDE SERPL-SCNC: 101 MMOL/L (ref 94–109)
CO2 SERPL-SCNC: 26 MMOL/L (ref 20–32)
CREAT SERPL-MCNC: 0.7 MG/DL (ref 0.52–1.04)
DIFFERENTIAL METHOD BLD: ABNORMAL
EOSINOPHIL # BLD AUTO: 0.4 10E9/L (ref 0–0.7)
EOSINOPHIL NFR BLD AUTO: 3.3 %
ERYTHROCYTE [DISTWIDTH] IN BLOOD BY AUTOMATED COUNT: 11.5 % (ref 10–15)
GFR SERPL CREATININE-BSD FRML MDRD: ABNORMAL ML/MIN/1.7M2
GLUCOSE SERPL-MCNC: 79 MG/DL (ref 70–99)
HCT VFR BLD AUTO: 42 % (ref 35–47)
HGB BLD-MCNC: 14.5 G/DL (ref 11.7–15.7)
LYMPHOCYTES # BLD AUTO: 4.1 10E9/L (ref 0.8–5.3)
LYMPHOCYTES NFR BLD AUTO: 37.2 %
MCH RBC QN AUTO: 30.6 PG (ref 26.5–33)
MCHC RBC AUTO-ENTMCNC: 34.5 G/DL (ref 31.5–36.5)
MCV RBC AUTO: 89 FL (ref 78–100)
MONOCYTES # BLD AUTO: 0.6 10E9/L (ref 0–1.3)
MONOCYTES NFR BLD AUTO: 5 %
NEUTROPHILS # BLD AUTO: 6 10E9/L (ref 1.6–8.3)
NEUTROPHILS NFR BLD AUTO: 54.1 %
PLATELET # BLD AUTO: 376 10E9/L (ref 150–450)
POTASSIUM SERPL-SCNC: 4.2 MMOL/L (ref 3.4–5.3)
PROT SERPL-MCNC: 7.1 G/DL (ref 6.8–8.8)
RBC # BLD AUTO: 4.74 10E12/L (ref 3.8–5.2)
SODIUM SERPL-SCNC: 135 MMOL/L (ref 133–144)
WBC # BLD AUTO: 11.1 10E9/L (ref 4–11)

## 2017-01-27 PROCEDURE — 80053 COMPREHEN METABOLIC PANEL: CPT | Performed by: NURSE PRACTITIONER

## 2017-01-27 PROCEDURE — 85025 COMPLETE CBC W/AUTO DIFF WBC: CPT | Performed by: NURSE PRACTITIONER

## 2017-01-27 PROCEDURE — 36415 COLL VENOUS BLD VENIPUNCTURE: CPT | Performed by: NURSE PRACTITIONER

## 2017-01-27 PROCEDURE — 99213 OFFICE O/P EST LOW 20 MIN: CPT | Performed by: NURSE PRACTITIONER

## 2017-01-27 RX ORDER — PROMETHAZINE HYDROCHLORIDE 12.5 MG/1
12.5-25 TABLET ORAL EVERY 6 HOURS PRN
Qty: 56 TABLET | Refills: 0 | Status: SHIPPED | OUTPATIENT
Start: 2017-01-27 | End: 2017-08-23

## 2017-01-27 ASSESSMENT — PAIN SCALES - GENERAL: PAINLEVEL: EXTREME PAIN (8)

## 2017-01-27 NOTE — MR AVS SNAPSHOT
After Visit Summary   1/27/2017    Sirena Dietrich    MRN: [id number]           Patient Information     Date Of Birth          1985        Visit Information        Provider Department      1/27/2017 11:20 AM Yeny Amor APRN CNP [facility]        Today's Diagnoses     Nausea    -  1       Care Instructions    Your clinic record indicates that you are due for:   Pap and physical exam              Follow-ups after your visit        Your next 10 appointments already scheduled     Jan 30, 2017  1:00 PM   New Visit with Tay Watts MD   [facility] ([facility])    [address] [facility] [address]   [phone]            Feb 01, 2017 12:45 PM   Return Visit with Lisa Helton MD   [facility] ([facility])    [facility] [address]  [address]  [facility] [id number] [phone]              Who to contact     If you have questions or need follow up information about today's clinic visit or your schedule please contact [facility] directly at [phone].  Normal or non-critical lab and imaging results will be communicated to you by MyChart, letter or phone within 4 business days after the clinic has received the results. If you do not hear from us within 7 days, please contact the clinic through MyChart or phone. If you have a critical or abnormal lab result, we will notify you by phone as soon as possible.  Submit refill requests through [employer] or call your pharmacy and they will forward the refill request to us. Please allow 3 business days for your refill to be completed.          Additional Information About Your Visit        MyChart Information     [employer] lets you send messages to your doctor, view your test results, renew your prescriptions, schedule appointments and more. To sign up, go to  "[email or web address] . Click on \"Log in\" on the left side of the screen, which will take you to the Welcome page. Then click on \"Sign up Now\" on the right side of the page.     You will be asked to enter the access code listed below, as well as some personal information. Please follow the directions to create your username and password.     Your access code is: [email or web address]  Expires: 2017  2:31 PM     Your access code will  in 90 days. If you need help or a new code, please call your [place] clinic or [phone].        Care EveryWhere ID     This is your Care EveryWhere ID. This could be used by other organizations to access your [place] medical records  [id number]        Your Vitals Were     Last Period Breastfeeding?                (LMP Unknown) No           Blood Pressure from Last 3 Encounters:   17 126/83   17 139/78   17 114/66    Weight from Last 3 Encounters:   17 180 lb (81.647 kg)   17 186 lb (84.369 kg)   17 188 lb (85.276 kg)              We Performed the Following     CBC with platelets differential     Comprehensive metabolic panel (BMP + Alb, Alk Phos, ALT, AST, Total. Bili, TP)          Today's Medication Changes          These changes are accurate as of: 17 12:38 PM.  If you have any questions, ask your nurse or doctor.               Start taking these medicines.        Dose/Directions    promethazine 12.5 MG tablet   Commonly known as:  PHENERGAN   Used for:  Nausea   Started by:  Yeny Amor APRN CNP        Dose:  12.5-25 mg   Take 1-2 tablets (12.5-25 mg) by mouth every 6 hours as needed for nausea   Quantity:  56 tablet   Refills:  0         Stop taking these medicines if you haven't already. Please contact your care team if you have questions.     ondansetron 4 MG ODT tab   Commonly known as:  ZOFRAN ODT   Stopped by:  Yeny Amor APRN CNP                Where to get your medicines      These medications were sent to " [facility] [place] - [place], [place] - [address]  [address] [address]     Phone:  [phone]    - promethazine 12.5 MG tablet             Primary Care Provider Office Phone # Fax #    Shi Cece JuanDIPESH SHERRELL [phone]       [facility] [address] [facility] [address]        Goals        Patient-Stated    I will attend counseling appointment     Goal Comments - Note edited  11/25/2015  1:35 PM by Shi Armstrong LSW    As of today's date 11/25/2015 goal is met at 76 - 100%.   Goal Status:  Complete - will keep to maintain for  A few month  As of today's date 7/28/2015 goal is met at 26 - 50%.   Goal Status:  Active          Thank you!     Thank you for choosing [facility]  for your care. Our goal is always to provide you with excellent care. Hearing back from our patients is one way we can continue to improve our services. Please take a few minutes to complete the written survey that you may receive in the mail after your visit with us. Thank you!             Your Updated Medication List - Protect others around you: Learn how to safely use, store and throw away your medicines at www.disposemymeds.org.          This list is accurate as of: 1/27/17 12:38 PM.  Always use your most recent med list.                   Brand Name Dispense Instructions for use    fluticasone 50 MCG/ACT spray    FLONASE    1 g    Spray 1-2 sprays into both nostrils daily       gabapentin 600 MG tablet    NEURONTIN    150 tablet    600 mg in Am, Take 1200 mg mid day and evening.       lamoTRIgine 200 MG tablet    LaMICtal    31 tablet    Take 1 tablet (200 mg) by mouth daily       LATUDA 20 MG Tabs tablet   Generic drug:  lurasidone      Take 10 mg by mouth daily       lidocaine (Anorectal) 5 % Crea     45 g    NEEDS 5% TOPICAL LIDOCAINE OINTMENT 35.44 gm tube.. Apply TID as needed for comfort. Disp #1 Refill X 1 year.       lidocaine 5 % Patch    LIDODERM     30 patch    Apply up to 3 patches to painful area at once for up to 12 h within a 24 h period.  Remove after 12 hours.       lidocaine HCl 3 % cream     453.6 g    Apply topically 3 times daily       LORazepam 1 MG tablet    ATIVAN    20 tablet    Take 1.5 tablets (1.5 mg) by mouth daily as needed for anxiety       omeprazole 40 MG capsule    priLOSEC    90 capsule    Take 1 capsule (40 mg) by mouth daily Take 30-60 minutes before a meal.       phenylephrine-cocoa butter 0.25-88.44 % per suppository    PREPARATION H    24 suppository    Place 1 suppository rectally 4 times daily as needed for hemorrhoids or itching       promethazine 12.5 MG tablet    PHENERGAN    56 tablet    Take 1-2 tablets (12.5-25 mg) by mouth every 6 hours as needed for nausea       QUEtiapine 50 MG tablet    SEROQUEL    90 tablet    Take 1-3 tablets ( mg) by mouth At Bedtime       SUMAtriptan 6 MG/0.5ML pen injector kit    IMITREX STATDOSE    3 kit    Inject 0.5 mLs (6 mg) Subcutaneous at onset of headache for migraine

## 2017-01-27 NOTE — PROGRESS NOTES
SUBJECTIVE:                                                    Sirena Dietrich is a 31 year old female who presents to clinic today for the following health issues:  ED/UC Followup:    Facility:  [facility]  Date of visit: 1/20/2017  Reason for visit: gastroenteritis, dehydration  ALT slightly elevated  2 weeks ago had flu sx of diarrhea and vomiting x 5 days, the last week ha and nausea  Current Status: continued headache and nausea  Zofran has not helped        HA, no fever, nausea present contin but at times spikes worse.   Sleeps with side and supports abd with pillew  Vomiting and diarrhea before ED visit with [facility].  Able to keep meds down.     Problem list and histories reviewed & adjusted, as indicated.  Additional history: as documented    Labs reviewed in EPIC  Problem list, Medication list, Allergies, and Medical/Social/Surgical histories reviewed in [facility] and updated as appropriate.    ROS:  Constitutional, HEENT, cardiovascular, pulmonary, gi and gu systems are negative, except as otherwise noted.    OBJECTIVE:                                                    /80 mmHg  Pulse 95  Temp(Src) 97  F (36.1  C) (Tympanic)  Resp 18  Wt 187 lb (84.823 kg)  LMP  (LMP Unknown)  Breastfeeding? No  Body mass index is 30.2 kg/(m^2).  GENERAL: healthy, alert and no distress  NECK: no adenopathy, no asymmetry, masses, or scars and thyroid normal to palpation  RESP: lungs clear to auscultation - no rales, rhonchi or wheezes  CV: regular rate and rhythm, normal S1 S2, no S3 or S4, no murmur, click or rub, no peripheral edema and peripheral pulses strong  ABDOMEN: soft, nontender, no hepatosplenomegaly, no masses and bowel sounds normal  MS: no gross musculoskeletal defects noted, no edema    Diagnostic Test Results:  Results for orders placed or performed in visit on 01/27/17   Comprehensive metabolic panel (BMP + Alb, Alk Phos, ALT, AST, Total. Bili, TP)   Result Value Ref Range    Sodium 135 133 - 144 mmol/L     Potassium 4.2 3.4 - 5.3 mmol/L    Chloride 101 94 - 109 mmol/L    Carbon Dioxide 26 20 - 32 mmol/L    Anion Gap 8 3 - 14 mmol/L    Glucose 79 70 - 99 mg/dL    Urea Nitrogen 12 7 - 30 mg/dL    Creatinine 0.70 0.52 - 1.04 mg/dL    GFR Estimate >90  Non  GFR Calc   >60 mL/min/1.7m2    GFR Estimate If Black >90   GFR Calc   >60 mL/min/1.7m2    Calcium 8.7 8.5 - 10.1 mg/dL    Bilirubin Total 0.3 0.2 - 1.3 mg/dL    Albumin 4.0 3.4 - 5.0 g/dL    Protein Total 7.1 6.8 - 8.8 g/dL    Alkaline Phosphatase 64 40 - 150 U/L    ALT 57 (H) 0 - 50 U/L    AST 22 0 - 45 U/L   CBC with platelets differential   Result Value Ref Range    WBC 11.1 (H) 4.0 - 11.0 10e9/L    RBC Count 4.74 3.8 - 5.2 10e12/L    Hemoglobin 14.5 11.7 - 15.7 g/dL    Hematocrit 42.0 35.0 - 47.0 %    MCV 89 78 - 100 fl    MCH 30.6 26.5 - 33.0 pg    MCHC 34.5 31.5 - 36.5 g/dL    RDW 11.5 10.0 - 15.0 %    Platelet Count 376 150 - 450 10e9/L    Diff Method Automated Method     % Neutrophils 54.1 %    % Lymphocytes 37.2 %    % Monocytes 5.0 %    % Eosinophils 3.3 %    % Basophils 0.4 %    Absolute Neutrophil 6.0 1.6 - 8.3 10e9/L    Absolute Lymphocytes 4.1 0.8 - 5.3 10e9/L    Absolute Monocytes 0.6 0.0 - 1.3 10e9/L    Absolute Eosinophils 0.4 0.0 - 0.7 10e9/L    Absolute Basophils 0.0 0.0 - 0.2 10e9/L     *Note: Due to a large number of results and/or encounters for the requested time period, some results have not been displayed. A complete set of results can be found in Results Review.        ASSESSMENT/PLAN:                                                        1. Nausea  Will check for dehydration, change antiemetic  - promethazine (PHENERGAN) 12.5 MG tablet; Take 1-2 tablets (12.5-25 mg) by mouth every 6 hours as needed for nausea  Dispense: 56 tablet; Refill: 0  - Comprehensive metabolic panel (BMP + Alb, Alk Phos, ALT, AST, Total. Bili, TP)  - CBC with platelets differential    Patient Instructions   We'll call you with the  results of your labs.    Stop the Zofran and start Phenergan 12.5 - 25 mg every 6 hours as needed.    Push fluids to 64 ounces a day        Your clinic record indicates that you are due for:   Pap and physical exam              Yeny Amor NP, APRN [facility]

## 2017-01-27 NOTE — NURSING NOTE
"Chief Complaint   Patient presents with     Headache     Nausea       Initial /80 mmHg  Pulse 95  Temp(Src) 97  F (36.1  C) (Tympanic)  Resp 18  Wt 187 lb (84.823 kg)  LMP  (LMP Unknown)  Breastfeeding? No Estimated body mass index is 30.2 kg/(m^2) as calculated from the following:    Height as of 1/20/17: 5' 6\" (1.676 m).    Weight as of this encounter: 187 lb (84.823 kg).  BP completed using cuff size: regular  "

## 2017-01-27 NOTE — PATIENT INSTRUCTIONS
We'll call you with the results of your labs.    Stop the Zofran and start Phenergan 12.5 - 25 mg every 6 hours as needed.    Push fluids to 64 ounces a day        Your clinic record indicates that you are due for:   Pap and physical exam

## 2017-01-30 ENCOUNTER — OFFICE VISIT (OUTPATIENT)
Dept: CARDIOLOGY | Facility: CLINIC | Age: 32
End: 2017-01-30
Payer: COMMERCIAL

## 2017-01-30 VITALS
WEIGHT: 188.2 LBS | DIASTOLIC BLOOD PRESSURE: 82 MMHG | BODY MASS INDEX: 30.39 KG/M2 | SYSTOLIC BLOOD PRESSURE: 139 MMHG | OXYGEN SATURATION: 96 % | HEART RATE: 109 BPM

## 2017-01-30 DIAGNOSIS — R00.0 SINUS TACHYCARDIA: Primary | ICD-10-CM

## 2017-01-30 PROCEDURE — 99203 OFFICE O/P NEW LOW 30 MIN: CPT | Performed by: INTERNAL MEDICINE

## 2017-01-30 NOTE — Clinical Note
1/30/2017    DIPESH Marino [facility]   [address] [place] [facility] [address]    RE: Sirena Dietrich       Dear Colleague,    I had the pleasure of seeing Sirena Dietrich in the [facility].    CURRENT MEDICATIONS:  Current Outpatient Prescriptions   Medication Sig Dispense Refill     promethazine (PHENERGAN) 12.5 MG tablet Take 1-2 tablets (12.5-25 mg) by mouth every 6 hours as needed for nausea 56 tablet 0     lamoTRIgine (LAMICTAL) 200 MG tablet Take 1 tablet (200 mg) by mouth daily 31 tablet 0     LORazepam (ATIVAN) 1 MG tablet Take 1.5 tablets (1.5 mg) by mouth daily as needed for anxiety 20 tablet 0     QUEtiapine (SEROQUEL) 50 MG tablet Take 1-3 tablets ( mg) by mouth At Bedtime 90 tablet 0     gabapentin (NEURONTIN) 600 MG tablet 600 mg in Am, Take 1200 mg mid day and evening. 150 tablet 0     lidocaine HCl 3 % cream Apply topically 3 times daily 453.6 g 0     fluticasone (FLONASE) 50 MCG/ACT nasal spray Spray 1-2 sprays into both nostrils daily 1 g 5     phenylephrine-cocoa butter (PREPARATION H) 0.25-88.44 % suppository Place 1 suppository rectally 4 times daily as needed for hemorrhoids or itching 24 suppository 0     lurasidone (LATUDA) 20 MG TABS tablet Take 10 mg by mouth daily       SUMAtriptan (IMITREX STATDOSE) 6 MG/0.5ML pen injector kit Inject 0.5 mLs (6 mg) Subcutaneous at onset of headache for migraine 3 kit 3     omeprazole (PRILOSEC) 40 MG capsule Take 1 capsule (40 mg) by mouth daily Take 30-60 minutes before a meal. 90 capsule 0     lidocaine (LIDODERM) 5 % patch Apply up to 3 patches to painful area at once for up to 12 h within a 24 h period.  Remove after 12 hours. 30 patch 0       ALLERGIES     Allergies   Allergen Reactions     Vicodin [Hydrocodone-Acetaminophen] Other (See Comments)     Severe irritability.  Neither vicodin nor percocet seem to provide relief     Amoxicillin Swelling     As a child--facial swelling and  redness     Bactrim Itching and Rash     Erythro [Erythromycin] Other (See Comments) and Swelling     As a child--facial swelling       PAST MEDICAL HISTORY:  Past Medical History   Diagnosis Date     Other abnormal heart sounds      as a child     Papanicolaou smear of cervix with low grade squamous intraepithelial lesion (LGSIL) 3/2008     colpo negative     Hypothyroidism 3/5/2010     3/10: pt reports fatigue, TSH wnl however Free T4 low. Started on levothyroxine 50mcg 6/10: TSH and FT4 normal off medications.  Remained normal       PTSD (post-traumatic stress disorder) 2013     Related to       Urinary incontinence 2012     kegels-cough/sneeze and urgency.  Nocturia-a few.        Migraine headache 2012     Bipolar I disorder, most recent episode (or current) mixed, severe, specified as with psychotic behavior 2013     ATTN DEFICIT W HYPERACT 12/15/2005     Off medication (strattera)      Agoraphobia with panic disorder 2013     Uterus, adenomyosis 12/15/2014     Hysterectomy 2014      Pelvic abscess in female 1/10/2015       PAST SURGICAL HISTORY:  Past Surgical History   Procedure Laterality Date     Tonsillectomy       C induced abortn by d&c       C induced abortn by d&c  3/2009     C induced abortn by d&c  10/2008     Laparoscopy diagnostic (gyn)  10/16/2012     Procedure: LAPAROSCOPY DIAGNOSTIC (GYN);  Diagnostic Laparoscopy, Excision of Bilateral Paratubal Cysts;  Surgeon: La Guzmán MD;  Location: [facility]     Cystoscopy       Laparoscopic salpingectomy  2014     Procedure: LAPAROSCOPIC SALPINGECTOMY;  Laparoscopic Bilateral Salpingectomy, Removal Of Perineal Skin Tag;  Surgeon: Lisa Helton MD;  [facility]     Excise lesion perineal  2014     Procedure: EXCISE LESION PERINEAL;;  Surgeon: Lisa Helton MD;  [facility]     Laparoscopic hysterectomy total N/A 12/3/2014     Procedure: LAPAROSCOPIC  HYSTERECTOMY TOTAL;  Surgeon: Caroline Grossman MD;  [facility]: [facility]     Cystoscopy N/A 12/3/2014     Procedure: CYSTOSCOPY;  Surgeon: Caroline Grossman MD;  [facility]     Dilation and curettage N/A 1/5/2015     Procedure: DILATION AND CURETTAGE;  Surgeon: Caroline Grossman MD;  [facility]     Anesthesia out of or mri N/A 1/12/2015     Procedure: ANESTHESIA OUT OF OR MRI;  Surgeon: Generic Anesthesia Provider;  [facility]: [facility]     Esophagoscopy, gastroscopy, duodenoscopy (egd), combined N/A 8/25/2016     Procedure: COMBINED ESOPHAGOSCOPY, GASTROSCOPY, DUODENOSCOPY (EGD);  Surgeon: Tommie Clancy MD;  Location: [facility]       Social History:  Social History   Substance Use Topics     Smoking status: Former Smoker -- 0.50 packs/day     Types: Cigarettes, Cigars     Smokeless tobacco: Never Used      Comment: rare use     Alcohol Use: No       FAMILY HISTORY:  Family History   Problem Relation Age of Onset     Alcohol/Drug Mother      drugs     Depression Mother      HEART DISEASE Mother      ?     Alcohol/Drug Father      drugs     Depression Father      Hypertension Maternal Grandmother      CANCER Maternal Grandmother      cervical     Depression Maternal Grandmother      HEART DISEASE Maternal Grandmother      Hypertension Brother      Bipolar Disorder Other      Bipolar Disorder Other      HEART DISEASE Other      stents, clogged arteries       Review of Systems:  Skin:  Negative       Eyes:  Negative      ENT:  Negative      Respiratory:  Negative       Cardiovascular:  Negative      Gastroenterology: Positive for heartburn    Genitourinary:  Negative      Musculoskeletal:  Negative      Neurologic:  Negative      Psychiatric:  Positive for anxiety;depression;sleep disturbances    Heme/Lymph/Imm:  Negative      Endocrine:  Negative              CARDIOLOGY CONSULT    REASON FOR CONSULT: tachycardia    PRIMARY CARE PHYSICIAN:  Shi Martinez    HISTORY OF PRESENT ILLNESS:  31-year-old female seen  for follow-up of chest pain and tachycardia.  She has a history of bipolar disorder and chronic pelvic pain, for which she was placed on Lupron in .  She subsequently developed chest discomfort and palpitations.  Holter monitor showed average heart rate of 106 with sinus tachycardia.  Stress test was ordered, but never performed.  She now presents with similar complaints.  She was started on Seroquel about 6 months ago and has gained about 20 pounds.  She states since then she has noted her heart beating faster.  She will check her pulse and it is usually above 100, even while at rest.  She also has an occasional pain or heaviness in her chest that usually comes on with exertion, but sometimes comes on at rest.  She denies any lightheadedness, dizziness, or syncope.  She has no lower extremity edema.   She denies any excessive caffeine or other stimulants.  There is no family history of any cardiac issues.    PAST MEDICAL HISTORY:  Past Medical History   Diagnosis Date     Other abnormal heart sounds      as a child     Papanicolaou smear of cervix with low grade squamous intraepithelial lesion (LGSIL) 3/2008     colpo negative     Hypothyroidism 3/5/2010     3/10: pt reports fatigue, TSH wnl however Free T4 low. Started on levothyroxine 50mcg 6/10: TSH and FT4 normal off medications.  Remained normal       PTSD (post-traumatic stress disorder) 2013     Related to       Urinary incontinence 2012     kegels-cough/sneeze and urgency.  Nocturia-a few.        Migraine headache 2012     Bipolar I disorder, most recent episode (or current) mixed, severe, specified as with psychotic behavior 2013     ATTN DEFICIT W HYPERACT 12/15/2005     Off medication (strattera)      Agoraphobia with panic disorder 2013     Uterus, adenomyosis 12/15/2014     Hysterectomy 2014      Pelvic abscess in female 1/10/2015       MEDICATIONS:  Current Outpatient Prescriptions   Medication      promethazine (PHENERGAN) 12.5 MG tablet     lamoTRIgine (LAMICTAL) 200 MG tablet     LORazepam (ATIVAN) 1 MG tablet     QUEtiapine (SEROQUEL) 50 MG tablet     gabapentin (NEURONTIN) 600 MG tablet     lidocaine HCl 3 % cream     fluticasone (FLONASE) 50 MCG/ACT nasal spray     phenylephrine-cocoa butter (PREPARATION H) 0.25-88.44 % suppository     lurasidone (LATUDA) 20 MG TABS tablet     SUMAtriptan (IMITREX STATDOSE) 6 MG/0.5ML pen injector kit     omeprazole (PRILOSEC) 40 MG capsule     lidocaine (LIDODERM) 5 % patch     No current facility-administered medications for this visit.       ALLERGIES:  Allergies   Allergen Reactions     Vicodin [Hydrocodone-Acetaminophen] Other (See Comments)     Severe irritability.  Neither vicodin nor percocet seem to provide relief     Amoxicillin Swelling     As a child--facial swelling and redness     Bactrim Itching and Rash     Erythro [Erythromycin] Other (See Comments) and Swelling     As a child--facial swelling       SOCIAL HISTORY:  I have reviewed this patient's social history and updated it with pertinent information if needed. Sirena Dietrich  reports that she has quit smoking. Her smoking use included Cigarettes and Cigars. She smoked 0.50 packs per day. She has never used smokeless tobacco. She reports that she does not drink alcohol or use illicit drugs.    FAMILY HISTORY:  I have reviewed this patient's family history and updated it with pertinent information if needed.   Family History   Problem Relation Age of Onset     Alcohol/Drug Mother      drugs     Depression Mother      HEART DISEASE Mother      ?     Alcohol/Drug Father      drugs     Depression Father      Hypertension Maternal Grandmother      CANCER Maternal Grandmother      cervical     Depression Maternal Grandmother      HEART DISEASE Maternal Grandmother      Hypertension Brother      Bipolar Disorder Other      Bipolar Disorder Other      HEART DISEASE Other      stents, clogged arteries        REVIEW OF SYSTEMS:  Constitutional:  No weight loss, fever, chills, weakness or fatigue.  HEENT:  Eyes:  No visual loss, blurred vision, double vision or yellow sclerae. No hearing loss, sneezing, congestion, runny nose or sore throat.  Skin:  No rash or itching.  Cardiovascular: per HPI  Respiratory: per HPI  GI:  No anorexia, nausea, vomiting or diarrhea. No abdominal pain or blood.  :  No dysurea, hematuria  Neurologic:  No headache, dizziness, syncope, paralysis, ataxia, numbness or tingling in the extremities. No change in bowel or bladder control.  Musculoskeletal:  No muscle, back pain, joint pain or stiffness.  Hematologic:  No anemia, bleeding or bruising.  Lymphatics:  No enlarged nodes. No history of splenectomy.  Psychiatric:  No history of depression or anxiety.  Endocrine:  No reports of sweating, cold or heat intolerance. No polyuria or polydipsia.  Allergies:  No history of asthma, hives, eczema or rhinitis.    PHYSICAL EXAM:      BP: 139/82 mmHg Pulse: 109     SpO2: 96 %      Vital Signs with Ranges  Pulse:  [109] 109  BP: (139)/(82) 139/82 mmHg  SpO2:  [96 %] 96 %  188 lbs 3.2 oz    Constitutional: awake, alert, no distress  Eyes: PERRL, sclera nonicteric  ENT: trachea midline  Respiratory: Lungs clear bilaterally  Cardiovascular: Regular rate and rhythm, no murmurs, heart rate about 90 during exam  GI: nondistended, nontender, bowel sounds present  Lymph/Hematologic: no lymphadenopathy  Skin: dry, no rash  Musculoskeletal: good muscle tone, strength 5/5 in upper and lower extremities  Neurologic: no focal deficits  Neuropsychiatric: appropriate affact    DATA:  Labs:   January 27, 2017: Potassium 4.2, creatinine 0.7, hemoglobin 14.5    ASSESSMENT:  31-year-old female seen for evaluation of tachycardia.  Her symptoms are almost identical to those in 2014 when she was found to have a mild sinus tachycardia on Holter monitor, thought to be medication related.  There is a 1-11% incidence of  tachycardia while taking Seroquel.  Suspect that this may be the culprit.  In review of her chart, heart rate has typically ranged from  in 2015 in 2016, this seems to be an increase more recently.    Echocardiogram will be done to rule out any underlying cardiomyopathy.  However suspect this is more medication related.  She will discuss this with her physician later this week.    Otherwise there is no evidence of anemia, infection, or dehydration.  Usually sinus tachycardia is provoked by some noncardiac cause.    RECOMMENDATIONS:  1.  Sinus tachycardia, suspect medication induced from Seroquel  - Echocardiogram to rule out cardiomyopathy  - Discussed medications with her primary physician later this week    No further workup if echo is normal.  Follow-up as needed if symptoms worsen.    Tay Watts MD  Cardiology - [facility]  Pager:  [phone]  Text Page  January 30, 2017    Thank you for allowing me to participate in the care of your patient.    Sincerely,     Tay Watts MD     [facility]

## 2017-01-30 NOTE — PROGRESS NOTES
CURRENT MEDICATIONS:  Current Outpatient Prescriptions   Medication Sig Dispense Refill     promethazine (PHENERGAN) 12.5 MG tablet Take 1-2 tablets (12.5-25 mg) by mouth every 6 hours as needed for nausea 56 tablet 0     lamoTRIgine (LAMICTAL) 200 MG tablet Take 1 tablet (200 mg) by mouth daily 31 tablet 0     LORazepam (ATIVAN) 1 MG tablet Take 1.5 tablets (1.5 mg) by mouth daily as needed for anxiety 20 tablet 0     QUEtiapine (SEROQUEL) 50 MG tablet Take 1-3 tablets ( mg) by mouth At Bedtime 90 tablet 0     gabapentin (NEURONTIN) 600 MG tablet 600 mg in Am, Take 1200 mg mid day and evening. 150 tablet 0     lidocaine HCl 3 % cream Apply topically 3 times daily 453.6 g 0     fluticasone (FLONASE) 50 MCG/ACT nasal spray Spray 1-2 sprays into both nostrils daily 1 g 5     phenylephrine-cocoa butter (PREPARATION H) 0.25-88.44 % suppository Place 1 suppository rectally 4 times daily as needed for hemorrhoids or itching 24 suppository 0     lurasidone (LATUDA) 20 MG TABS tablet Take 10 mg by mouth daily       SUMAtriptan (IMITREX STATDOSE) 6 MG/0.5ML pen injector kit Inject 0.5 mLs (6 mg) Subcutaneous at onset of headache for migraine 3 kit 3     omeprazole (PRILOSEC) 40 MG capsule Take 1 capsule (40 mg) by mouth daily Take 30-60 minutes before a meal. 90 capsule 0     lidocaine (LIDODERM) 5 % patch Apply up to 3 patches to painful area at once for up to 12 h within a 24 h period.  Remove after 12 hours. 30 patch 0       ALLERGIES     Allergies   Allergen Reactions     Vicodin [Hydrocodone-Acetaminophen] Other (See Comments)     Severe irritability.  Neither vicodin nor percocet seem to provide relief     Amoxicillin Swelling     As a child--facial swelling and redness     Bactrim Itching and Rash     Erythro [Erythromycin] Other (See Comments) and Swelling     As a child--facial swelling       PAST MEDICAL HISTORY:  Past Medical History   Diagnosis Date     Other abnormal heart sounds      as a child      Papanicolaou smear of cervix with low grade squamous intraepithelial lesion (LGSIL) 3/2008     colpo negative     Hypothyroidism 3/5/2010     3/10: pt reports fatigue, TSH wnl however Free T4 low. Started on levothyroxine 50mcg 6/10: TSH and FT4 normal off medications.  Remained normal       PTSD (post-traumatic stress disorder) 2013     Related to       Urinary incontinence 2012     kegels-cough/sneeze and urgency.  Nocturia-a few.        Migraine headache 2012     Bipolar I disorder, most recent episode (or current) mixed, severe, specified as with psychotic behavior 2013     ATTN DEFICIT W HYPERACT 12/15/2005     Off medication (strattera)      Agoraphobia with panic disorder 2013     Uterus, adenomyosis 12/15/2014     Hysterectomy 2014      Pelvic abscess in female 1/10/2015       PAST SURGICAL HISTORY:  Past Surgical History   Procedure Laterality Date     Tonsillectomy       C induced abortn by d&c       C induced abortn by d&c  3/2009     C induced abortn by d&c  10/2008     Laparoscopy diagnostic (gyn)  10/16/2012     Procedure: LAPAROSCOPY DIAGNOSTIC (GYN);  Diagnostic Laparoscopy, Excision of Bilateral Paratubal Cysts;  Surgeon: La Guzmán MD;  Location: [facility]     Cystoscopy       Laparoscopic salpingectomy  2014     Procedure: LAPAROSCOPIC SALPINGECTOMY;  Laparoscopic Bilateral Salpingectomy, Removal Of Perineal Skin Tag;  Surgeon: Lisa Helton MD;  [facility]     Excise lesion perineal  2014     Procedure: EXCISE LESION PERINEAL;;  Surgeon: Lisa Helton MD;  [facility]     Laparoscopic hysterectomy total N/A 12/3/2014     Procedure: LAPAROSCOPIC HYSTERECTOMY TOTAL;  Surgeon: Caroline Grossman MD;  [facility]: [facility]     Cystoscopy N/A 12/3/2014     Procedure: CYSTOSCOPY;  Surgeon: Caroline Grossman MD;  [facility]: [facility]     Dilation and curettage N/A 2015     Procedure: DILATION AND CURETTAGE;   Surgeon: Caroline Grossman MD;  [facility]     Anesthesia out of or mri N/A 1/12/2015     Procedure: ANESTHESIA OUT OF OR MRI;  Surgeon: Generic Anesthesia Provider;  [facility]: [facility]     Esophagoscopy, gastroscopy, duodenoscopy (egd), combined N/A 8/25/2016     Procedure: COMBINED ESOPHAGOSCOPY, GASTROSCOPY, DUODENOSCOPY (EGD);  Surgeon: Tommie Clancy MD;  [facility]       Social History:  Social History   Substance Use Topics     Smoking status: Former Smoker -- 0.50 packs/day     Types: Cigarettes, Cigars     Smokeless tobacco: Never Used      Comment: rare use     Alcohol Use: No       FAMILY HISTORY:  Family History   Problem Relation Age of Onset     Alcohol/Drug Mother      drugs     Depression Mother      HEART DISEASE Mother      ?     Alcohol/Drug Father      drugs     Depression Father      Hypertension Maternal Grandmother      CANCER Maternal Grandmother      cervical     Depression Maternal Grandmother      HEART DISEASE Maternal Grandmother      Hypertension Brother      Bipolar Disorder Other      Bipolar Disorder Other      HEART DISEASE Other      stents, clogged arteries       Review of Systems:  Skin:  Negative       Eyes:  Negative      ENT:  Negative      Respiratory:  Negative       Cardiovascular:  Negative      Gastroenterology: Positive for heartburn    Genitourinary:  Negative      Musculoskeletal:  Negative      Neurologic:  Negative      Psychiatric:  Positive for anxiety;depression;sleep disturbances    Heme/Lymph/Imm:  Negative      Endocrine:  Negative

## 2017-01-30 NOTE — PROGRESS NOTES
CARDIOLOGY CONSULT    REASON FOR CONSULT: tachycardia    PRIMARY CARE PHYSICIAN:  Shi Martinez    HISTORY OF PRESENT ILLNESS:  31-year-old female seen for follow-up of chest pain and tachycardia.  She has a history of bipolar disorder and chronic pelvic pain, for which she was placed on Lupron in .  She subsequently developed chest discomfort and palpitations.  Holter monitor showed average heart rate of 106 with sinus tachycardia.  Stress test was ordered, but never performed.  She now presents with similar complaints.  She was started on Seroquel about 6 months ago and has gained about 20 pounds.  She states since then she has noted her heart beating faster.  She will check her pulse and it is usually above 100, even while at rest.  She also has an occasional pain or heaviness in her chest that usually comes on with exertion, but sometimes comes on at rest.  She denies any lightheadedness, dizziness, or syncope.  She has no lower extremity edema.   She denies any excessive caffeine or other stimulants.  There is no family history of any cardiac issues.    PAST MEDICAL HISTORY:  Past Medical History   Diagnosis Date     Other abnormal heart sounds      as a child     Papanicolaou smear of cervix with low grade squamous intraepithelial lesion (LGSIL) 3/2008     colpo negative     Hypothyroidism 3/5/2010     3/10: pt reports fatigue, TSH wnl however Free T4 low. Started on levothyroxine 50mcg 6/10: TSH and FT4 normal off medications.  Remained normal       PTSD (post-traumatic stress disorder) 2013     Related to       Urinary incontinence 2012     kegels-cough/sneeze and urgency.  Nocturia-a few.        Migraine headache 2012     Bipolar I disorder, most recent episode (or current) mixed, severe, specified as with psychotic behavior 2013     ATTN DEFICIT W HYPERACT 12/15/2005     Off medication (strattera)      Agoraphobia with panic disorder 2013     Uterus,  adenomyosis 12/15/2014     Hysterectomy 12/2014      Pelvic abscess in female 1/10/2015       MEDICATIONS:  Current Outpatient Prescriptions   Medication     promethazine (PHENERGAN) 12.5 MG tablet     lamoTRIgine (LAMICTAL) 200 MG tablet     LORazepam (ATIVAN) 1 MG tablet     QUEtiapine (SEROQUEL) 50 MG tablet     gabapentin (NEURONTIN) 600 MG tablet     lidocaine HCl 3 % cream     fluticasone (FLONASE) 50 MCG/ACT nasal spray     phenylephrine-cocoa butter (PREPARATION H) 0.25-88.44 % suppository     lurasidone (LATUDA) 20 MG TABS tablet     SUMAtriptan (IMITREX STATDOSE) 6 MG/0.5ML pen injector kit     omeprazole (PRILOSEC) 40 MG capsule     lidocaine (LIDODERM) 5 % patch     No current facility-administered medications for this visit.       ALLERGIES:  Allergies   Allergen Reactions     Vicodin [Hydrocodone-Acetaminophen] Other (See Comments)     Severe irritability.  Neither vicodin nor percocet seem to provide relief     Amoxicillin Swelling     As a child--facial swelling and redness     Bactrim Itching and Rash     Erythro [Erythromycin] Other (See Comments) and Swelling     As a child--facial swelling       SOCIAL HISTORY:  I have reviewed this patient's social history and updated it with pertinent information if needed. Sirena Dietrich  reports that she has quit smoking. Her smoking use included Cigarettes and Cigars. She smoked 0.50 packs per day. She has never used smokeless tobacco. She reports that she does not drink alcohol or use illicit drugs.    FAMILY HISTORY:  I have reviewed this patient's family history and updated it with pertinent information if needed.   Family History   Problem Relation Age of Onset     Alcohol/Drug Mother      drugs     Depression Mother      HEART DISEASE Mother      ?     Alcohol/Drug Father      drugs     Depression Father      Hypertension Maternal Grandmother      CANCER Maternal Grandmother      cervical     Depression Maternal Grandmother      HEART DISEASE Maternal  Grandmother      Hypertension Brother      Bipolar Disorder Other      Bipolar Disorder Other      HEART DISEASE Other      stents, clogged arteries       REVIEW OF SYSTEMS:  Constitutional:  No weight loss, fever, chills, weakness or fatigue.  HEENT:  Eyes:  No visual loss, blurred vision, double vision or yellow sclerae. No hearing loss, sneezing, congestion, runny nose or sore throat.  Skin:  No rash or itching.  Cardiovascular: per HPI  Respiratory: per HPI  GI:  No anorexia, nausea, vomiting or diarrhea. No abdominal pain or blood.  :  No dysurea, hematuria  Neurologic:  No headache, dizziness, syncope, paralysis, ataxia, numbness or tingling in the extremities. No change in bowel or bladder control.  Musculoskeletal:  No muscle, back pain, joint pain or stiffness.  Hematologic:  No anemia, bleeding or bruising.  Lymphatics:  No enlarged nodes. No history of splenectomy.  Psychiatric:  No history of depression or anxiety.  Endocrine:  No reports of sweating, cold or heat intolerance. No polyuria or polydipsia.  Allergies:  No history of asthma, hives, eczema or rhinitis.    PHYSICAL EXAM:      BP: 139/82 mmHg Pulse: 109     SpO2: 96 %      Vital Signs with Ranges  Pulse:  [109] 109  BP: (139)/(82) 139/82 mmHg  SpO2:  [96 %] 96 %  188 lbs 3.2 oz    Constitutional: awake, alert, no distress  Eyes: PERRL, sclera nonicteric  ENT: trachea midline  Respiratory: Lungs clear bilaterally  Cardiovascular: Regular rate and rhythm, no murmurs, heart rate about 90 during exam  GI: nondistended, nontender, bowel sounds present  Lymph/Hematologic: no lymphadenopathy  Skin: dry, no rash  Musculoskeletal: good muscle tone, strength 5/5 in upper and lower extremities  Neurologic: no focal deficits  Neuropsychiatric: appropriate affact    DATA:  Labs:   January 27, 2017: Potassium 4.2, creatinine 0.7, hemoglobin 14.5    ASSESSMENT:  31-year-old female seen for evaluation of tachycardia.  Her symptoms are almost identical to  those in 2014 when she was found to have a mild sinus tachycardia on Holter monitor, thought to be medication related.  There is a 1-11% incidence of tachycardia while taking Seroquel.  Suspect that this may be the culprit.  In review of her chart, heart rate has typically ranged from  in 2015 in 2016, this seems to be an increase more recently.    Echocardiogram will be done to rule out any underlying cardiomyopathy.  However suspect this is more medication related.  She will discuss this with her physician later this week.    Otherwise there is no evidence of anemia, infection, or dehydration.  Usually sinus tachycardia is provoked by some noncardiac cause.    RECOMMENDATIONS:  1.  Sinus tachycardia, suspect medication induced from Seroquel  - Echocardiogram to rule out cardiomyopathy  - Discussed medications with her primary physician later this week    No further workup if echo is normal.  Follow-up as needed if symptoms worsen.    Tay Watts MD  Cardiology - [facility]  Pager:  [phone]  Text Page  January 30, 2017

## 2017-02-04 ENCOUNTER — OFFICE VISIT (OUTPATIENT)
Dept: URGENT CARE | Facility: URGENT CARE | Age: 32
End: 2017-02-04
Payer: COMMERCIAL

## 2017-02-04 VITALS
OXYGEN SATURATION: 97 % | SYSTOLIC BLOOD PRESSURE: 140 MMHG | TEMPERATURE: 100 F | DIASTOLIC BLOOD PRESSURE: 88 MMHG | HEART RATE: 133 BPM

## 2017-02-04 DIAGNOSIS — R50.9 FEVER, UNSPECIFIED FEVER CAUSE: ICD-10-CM

## 2017-02-04 DIAGNOSIS — B34.9 VIRAL SYNDROME: Primary | ICD-10-CM

## 2017-02-04 DIAGNOSIS — R05.9 COUGH: ICD-10-CM

## 2017-02-04 LAB
FLUAV+FLUBV AG SPEC QL: NORMAL
FLUAV+FLUBV AG SPEC QL: NORMAL
SPECIMEN SOURCE: NORMAL

## 2017-02-04 PROCEDURE — 87804 INFLUENZA ASSAY W/OPTIC: CPT | Performed by: NURSE PRACTITIONER

## 2017-02-04 PROCEDURE — 99213 OFFICE O/P EST LOW 20 MIN: CPT

## 2017-02-04 RX ORDER — BENZONATATE 200 MG/1
200 CAPSULE ORAL 3 TIMES DAILY PRN
Qty: 30 CAPSULE | Refills: 0 | Status: SHIPPED | OUTPATIENT
Start: 2017-02-04 | End: 2017-03-08
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
## 2017-02-04 NOTE — MR AVS SNAPSHOT
"              After Visit Summary   2/4/2017    Sirena Dietrich    MRN: [id number]           Patient Information     Date Of Birth          1985        Visit Information        Provider Department      2/4/2017 9:20 AM SHYANN SAME DAY PROVIDER [facility]        Today's Diagnoses     Viral syndrome    -  1     Cough         Fever, unspecified fever cause           Care Instructions    Increase rest and fluids. Tylenol and/or Ibuprofen for comfort. Cool mist vaporizer. If your symptoms worsen or do not resolve follow up with your primary care provider in 1 week and sooner if needed.      Mucinex and Simply Saline nasal spray can help.  Indications for emergent return to emergency department discussed with patient, who verbalized good understanding and agreement.  Patient understands the limitations of today's evaluation.           Viral Syndrome (Adult)  A viral illness may cause a number of symptoms. The symptoms depend on the part of the body that the virus affects. If it settles in the nose, throat, and lungs, it may cause cough, sore throat, congestion, and sometimes headache. If it settles in the stomach and intestinal tract, it may cause vomiting and diarrhea. Sometimes it causes vague symptoms like \"aching all over,\" feeling tired, loss of appetite, or fever.  A viral illness usually lasts 1 to 2 weeks, but sometimes it lasts longer. In some cases, a more serious infection can look like a viral syndrome in the first few days of the illness. You may need another exam and additional tests to know the difference. Watch for the warning signs listed below.  Home care  Follow these guidelines for taking care of yourself at home:    If symptoms are severe, rest at home for the first 2 to 3 days.    Stay away from cigarette smoke - both your smoke and the smoke from others.    You may use over-the-counter medication acetaminophen or ibuprofen for fever, muscle aching, and headache, " unless another medicine was prescribed for this. If you have chronic liver or kidney disease or ever had a stomach ulcer or GI bleeding, talk with your doctor before using these medicines . No one who is younger than 18 and ill with a fever should take aspirin. It may cause severe disease or death liver damage .    Your appetite may be poor, so a light diet is fine. Avoid dehydration by drinking 8 to 12 8-ounce glasses of fluids each day. This may include water; orange juice; lemonade; apple, grape, and cranberry juice; clear fruit drinks; electrolyte replacement and sports drinks; and decaffeinated teas and coffee. If you have been diagnosed with a kidney disease, ask your doctor how much and what types of fluids you should drink to prevent dehydration. If you have kidney disease, drinking too much fluid can cause it build up in the your body and be dangerous to your health.    Over-the-counter remedies won't shorten the length of the illness but may be helpful for cough, sore throat; and nasal and sinus congestion. Don't use decongestants if you have high blood pressure.  Follow-up care  Follow up with your health care provider if you do not improve over the next week.  When to seek medical advice  Call your healthcare provider right away if any of these occur:    Cough with lots of colored sputum (mucus) or blood in your sputum    Chest pain, shortness of breath, wheezing, or difficulty breathing    Severe headache; face, neck, or ear pain    Severe, constant pain in the lower right side of your belly (abdominal)    Continued vomiting (can t keep liquids down)    Frequent diarrhea (more than 5 times a day); blood (red or black color) or mucus in diarrhea    Feeling weak, dizzy, or like you are going to faint    Extreme thirst    Fever of 100.4 F (38 C) or higher, or as directed by your healthcare provider  Call 911  Get emergency medical care if any of the following occur:    Convulsion    Feeling weak, dizzy, or  like you are going to faint    Chest pain, shortness of breath, wheezing, or difficulty breathing    8560-7695 [employer]. [address] [place] [address], [place] [address]. All rights reserved. This information is not intended as a substitute for professional medical care. Always follow your healthcare professional's instructions.              Follow-ups after your visit        Follow-up notes from your care team     See patient instructions section of the AVS Return in about 1 week (around 2/11/2017), or if symptoms worsen or fail to improve, for Follow up with your primary care provider.      Your next 10 appointments already scheduled     Feb 06, 2017  9:45 AM   Ech Complete with CRISTY   [facility] ([facility])    [address] [facility] [address] [phone]           1.  Please bring or wear a comfortable two-piece outfit. 2.  You may eat, drink and take your normal medicines. 3.  For any questions that cannot be answered, please contact the ordering physician              Who to contact     If you have questions or need follow up information about today's clinic visit or your schedule please contact [facility] directly at [phone].  Normal or non-critical lab and imaging results will be communicated to you by MyChart, letter or phone within 4 business days after the clinic has received the results. If you do not hear from us within 7 days, please contact the clinic through MyChart or phone. If you have a critical or abnormal lab result, we will notify you by phone as soon as possible.  Submit refill requests through [employer] or call your pharmacy and they will forward the refill request to us. Please allow 3 business days for your refill to be completed.          Additional Information About Your Visit        [address] Information     [employer] lets you send messages to your doctor, view your test results, renew your  "prescriptions, schedule appointments and more. To sign up, go to [email or web address] . Click on \"Log in\" on the left side of the screen, which will take you to the Welcome page. Then click on \"Sign up Now\" on the right side of the page.     You will be asked to enter the access code listed below, as well as some personal information. Please follow the directions to create your username and password.     Your access code is: [email or web address]  Expires: 2017  2:31 PM     Your access code will  in 90 days. If you need help or a new code, please call your [facility] clinic or [phone].        Care EveryWhere ID     This is your Care EveryWhere ID. This could be used by other organizations to access your [facility] medical records  [id number]        Your Vitals Were     Pulse Temperature Pulse Oximetry Last Period          133 100  F (37.8  C) (Tympanic) 97% (LMP Unknown)         Blood Pressure from Last 3 Encounters:   17 140/88   17 139/82   17 120/80    Weight from Last 3 Encounters:   17 188 lb 3.2 oz (85.367 kg)   17 187 lb (84.823 kg)   17 180 lb (81.647 kg)              We Performed the Following     Influenza A/B antigen          Today's Medication Changes          These changes are accurate as of: 17 10:54 AM.  If you have any questions, ask your nurse or doctor.               Start taking these medicines.        Dose/Directions    benzonatate 200 MG capsule   Commonly known as:  TESSALON   Used for:  Cough        Dose:  200 mg   Take 1 capsule (200 mg) by mouth 3 times daily as needed for cough   Quantity:  30 capsule   Refills:  0            Where to get your medicines      These medications were sent to [facility] [address] [address]     Phone:  [phone]    - benzonatate 200 MG capsule             Primary Care Provider Office Phone # Fax #    DIPESH Marino CNP " [phone]       [facility] [address] [place] [facility] [address]        Goals        Patient-Stated    I will attend counseling appointment     Goal Comments - Note edited  11/25/2015  1:35 PM by Shi Armstrong LSW    As of today's date 11/25/2015 goal is met at 76 - 100%.   Goal Status:  Complete - will keep to maintain for  A few month  As of today's date 7/28/2015 goal is met at 26 - 50%.   Goal Status:  Active          Thank you!     Thank you for choosing [facility]  for your care. Our goal is always to provide you with excellent care. Hearing back from our patients is one way we can continue to improve our services. Please take a few minutes to complete the written survey that you may receive in the mail after your visit with us. Thank you!             Your Updated Medication List - Protect others around you: Learn how to safely use, store and throw away your medicines at www.disposemymeds.org.          This list is accurate as of: 2/4/17 10:54 AM.  Always use your most recent med list.                   Brand Name Dispense Instructions for use    benzonatate 200 MG capsule    TESSALON    30 capsule    Take 1 capsule (200 mg) by mouth 3 times daily as needed for cough       fluticasone 50 MCG/ACT spray    FLONASE    1 g    Spray 1-2 sprays into both nostrils daily       gabapentin 600 MG tablet    NEURONTIN    150 tablet    600 mg in Am, Take 1200 mg mid day and evening.       lamoTRIgine 200 MG tablet    LaMICtal    31 tablet    Take 1 tablet (200 mg) by mouth daily       LATUDA 20 MG Tabs tablet   Generic drug:  lurasidone      Take 10 mg by mouth daily       lidocaine 5 % Patch    LIDODERM    30 patch    Apply up to 3 patches to painful area at once for up to 12 h within a 24 h period.  Remove after 12 hours.       lidocaine HCl 3 % cream     453.6 g    Apply topically 3 times daily       LORazepam 1 MG tablet    ATIVAN    20 tablet    Take 1.5  tablets (1.5 mg) by mouth daily as needed for anxiety       omeprazole 40 MG capsule    priLOSEC    90 capsule    Take 1 capsule (40 mg) by mouth daily Take 30-60 minutes before a meal.       phenylephrine-cocoa butter 0.25-88.44 % per suppository    PREPARATION H    24 suppository    Place 1 suppository rectally 4 times daily as needed for hemorrhoids or itching       promethazine 12.5 MG tablet    PHENERGAN    56 tablet    Take 1-2 tablets (12.5-25 mg) by mouth every 6 hours as needed for nausea       QUEtiapine 50 MG tablet    SEROQUEL    90 tablet    Take 1-3 tablets ( mg) by mouth At Bedtime       SUMAtriptan 6 MG/0.5ML pen injector kit    IMITREX STATDOSE    3 kit    Inject 0.5 mLs (6 mg) Subcutaneous at onset of headache for migraine

## 2017-02-04 NOTE — NURSING NOTE
"Chief Complaint   Patient presents with     Fever     /88 mmHg  Pulse 133  Temp(Src) 100  F (37.8  C) (Tympanic)  SpO2 97%  LMP  (LMP Unknown) Estimated body mass index is 30.39 kg/(m^2) as calculated from the following:    Height as of 1/20/17: 5' 6\" (1.676 m).    Weight as of 1/30/17: 188 lb 3.2 oz (85.367 kg).  bp completed using cuff size: regular      "

## 2017-02-04 NOTE — PATIENT INSTRUCTIONS
"Increase rest and fluids. Tylenol and/or Ibuprofen for comfort. Cool mist vaporizer. If your symptoms worsen or do not resolve follow up with your primary care provider in 1 week and sooner if needed.      Mucinex and Simply Saline nasal spray can help.  Indications for emergent return to emergency department discussed with patient, who verbalized good understanding and agreement.  Patient understands the limitations of today's evaluation.           Viral Syndrome (Adult)  A viral illness may cause a number of symptoms. The symptoms depend on the part of the body that the virus affects. If it settles in the nose, throat, and lungs, it may cause cough, sore throat, congestion, and sometimes headache. If it settles in the stomach and intestinal tract, it may cause vomiting and diarrhea. Sometimes it causes vague symptoms like \"aching all over,\" feeling tired, loss of appetite, or fever.  A viral illness usually lasts 1 to 2 weeks, but sometimes it lasts longer. In some cases, a more serious infection can look like a viral syndrome in the first few days of the illness. You may need another exam and additional tests to know the difference. Watch for the warning signs listed below.  Home care  Follow these guidelines for taking care of yourself at home:    If symptoms are severe, rest at home for the first 2 to 3 days.    Stay away from cigarette smoke - both your smoke and the smoke from others.    You may use over-the-counter medication acetaminophen or ibuprofen for fever, muscle aching, and headache, unless another medicine was prescribed for this. If you have chronic liver or kidney disease or ever had a stomach ulcer or GI bleeding, talk with your doctor before using these medicines . No one who is younger than 18 and ill with a fever should take aspirin. It may cause severe disease or death liver damage .    Your appetite may be poor, so a light diet is fine. Avoid dehydration by drinking 8 to 12 8-ounce glasses " of fluids each day. This may include water; orange juice; lemonade; apple, grape, and cranberry juice; clear fruit drinks; electrolyte replacement and sports drinks; and decaffeinated teas and coffee. If you have been diagnosed with a kidney disease, ask your doctor how much and what types of fluids you should drink to prevent dehydration. If you have kidney disease, drinking too much fluid can cause it build up in the your body and be dangerous to your health.    Over-the-counter remedies won't shorten the length of the illness but may be helpful for cough, sore throat; and nasal and sinus congestion. Don't use decongestants if you have high blood pressure.  Follow-up care  Follow up with your health care provider if you do not improve over the next week.  When to seek medical advice  Call your healthcare provider right away if any of these occur:    Cough with lots of colored sputum (mucus) or blood in your sputum    Chest pain, shortness of breath, wheezing, or difficulty breathing    Severe headache; face, neck, or ear pain    Severe, constant pain in the lower right side of your belly (abdominal)    Continued vomiting (can t keep liquids down)    Frequent diarrhea (more than 5 times a day); blood (red or black color) or mucus in diarrhea    Feeling weak, dizzy, or like you are going to faint    Extreme thirst    Fever of 100.4 F (38 C) or higher, or as directed by your healthcare provider  Call 911  Get emergency medical care if any of the following occur:    Convulsion    Feeling weak, dizzy, or like you are going to faint    Chest pain, shortness of breath, wheezing, or difficulty breathing    3022-8033 [employer]. [address] [place] [address], [place] [address]. All rights reserved. This information is not intended as a substitute for professional medical care. Always follow your healthcare professional's instructions.

## 2017-02-06 ENCOUNTER — TELEPHONE (OUTPATIENT)
Dept: FAMILY MEDICINE | Facility: CLINIC | Age: 32
End: 2017-02-06

## 2017-02-06 DIAGNOSIS — F31.64 SEVERE BIPOLAR I DISORDER, MOST RECENT EPISODE MIXED, WITH PSYCHOTIC FEATURES (H): Primary | ICD-10-CM

## 2017-02-06 RX ORDER — QUETIAPINE FUMARATE 50 MG/1
50-150 TABLET, FILM COATED ORAL AT BEDTIME
Qty: 21 TABLET | Refills: 0 | Status: SHIPPED
Start: 2017-02-06 | End: 2017-11-14

## 2017-02-06 NOTE — TELEPHONE ENCOUNTER
Out of her Medication Seroquel. Pt is taking 3-4 at night vs 1-3  Pt does have appt with Mental Health Wed 2/8/17 at [facility] and will be out. They are not responding to her request.  Monse [place] Sec

## 2017-02-06 NOTE — TELEPHONE ENCOUNTER
Quetiapine 50mg      Last Written Prescription Date: 1/11/17  Last Fill Quantity: 90,  # refills: 0   Last Office Visit with [facility], [facility] or [facility] prescribing provider: 2/04/17    [facility]  [phone] OR [place] [phone]  Fax [phone]

## 2017-02-06 NOTE — LETTER
[facility]  [address]  [facility] [address]  Phone: [phone]        February 13, 2017      Sirena S Karlo                                                                                                                                [address]     [facility] [address]            Dear Ms. Dietrich,    We are concerned about your health care.  We recently provided you with a medication refill.  Many medications require routine follow-up with your Doctor.      Your Last RX for SeroqueI we have on file was for 1-3 tablets a night and should get her through to the appt with psychiatry however if she is taking more then refill needed.   Ok to refill 1 time for 1 week. #21 sent in RX.   Further refills to come from psychiatry team.      Thanks hSi Martinez [facility]

## 2017-02-06 NOTE — TELEPHONE ENCOUNTER
Pt called, states she has been taking 4 Seroquel nightly for the past 2 months. Pt needing refills. Please advise. Salima Lawrence RN

## 2017-02-07 NOTE — TELEPHONE ENCOUNTER
Last RX I have on file was for 1-3 a night and should get her through to the appt with psychiatry however if she is taking more then refill needed.   Ok to refill x 1 week. #21 sent in RX.   Further refills to come from psychiatry team.  Thanks Shi Martinez [facility]

## 2017-02-22 ENCOUNTER — OFFICE VISIT (OUTPATIENT)
Dept: FAMILY MEDICINE | Facility: CLINIC | Age: 32
End: 2017-02-22
Payer: COMMERCIAL

## 2017-02-22 ENCOUNTER — TELEPHONE (OUTPATIENT)
Dept: FAMILY MEDICINE | Facility: CLINIC | Age: 32
End: 2017-02-22

## 2017-02-22 VITALS
HEART RATE: 97 BPM | DIASTOLIC BLOOD PRESSURE: 80 MMHG | WEIGHT: 180 LBS | SYSTOLIC BLOOD PRESSURE: 122 MMHG | TEMPERATURE: 99.3 F | BODY MASS INDEX: 29.05 KG/M2 | OXYGEN SATURATION: 98 %

## 2017-02-22 DIAGNOSIS — Z11.3 SCREEN FOR STD (SEXUALLY TRANSMITTED DISEASE): Primary | ICD-10-CM

## 2017-02-22 DIAGNOSIS — B37.31 YEAST INFECTION OF THE VAGINA: ICD-10-CM

## 2017-02-22 LAB
MICRO REPORT STATUS: ABNORMAL
SPECIMEN SOURCE: ABNORMAL
WET PREP SPEC: ABNORMAL

## 2017-02-22 PROCEDURE — 99212 OFFICE O/P EST SF 10 MIN: CPT | Performed by: NURSE PRACTITIONER

## 2017-02-22 PROCEDURE — 87491 CHLMYD TRACH DNA AMP PROBE: CPT | Performed by: NURSE PRACTITIONER

## 2017-02-22 PROCEDURE — 87210 SMEAR WET MOUNT SALINE/INK: CPT | Performed by: NURSE PRACTITIONER

## 2017-02-22 PROCEDURE — 87102 FUNGUS ISOLATION CULTURE: CPT | Performed by: NURSE PRACTITIONER

## 2017-02-22 PROCEDURE — 87591 N.GONORRHOEAE DNA AMP PROB: CPT | Performed by: NURSE PRACTITIONER

## 2017-02-22 RX ORDER — FLUCONAZOLE 150 MG/1
TABLET ORAL
Qty: 8 TABLET | Refills: 0 | Status: SHIPPED | OUTPATIENT
Start: 2017-02-22 | End: 2017-03-22

## 2017-02-22 NOTE — TELEPHONE ENCOUNTER
Kelly Melendez from [facility]   Calling to discuss with Shi Martinez NP to add Propranolol 10 mg to  As pt is having Anxiety and increased Pulse.   Please Advise.

## 2017-02-22 NOTE — MR AVS SNAPSHOT
After Visit Summary   2/22/2017    Sirena Dietrich    MRN: [id number]           Patient Information     Date Of Birth          1985        Visit Information        Provider Department      2/22/2017 2:20 PM Yeny Amor APRN CNP [facility]        Today's Diagnoses     Screen for STD (sexually transmitted disease)    -  1    Yeast infection of the vagina          Care Instructions    We will call you with the We will call you with the results of your labs.        Follow-ups after your visit        Who to contact     If you have questions or need follow up information about today's clinic visit or your schedule please contact [facility] directly at [phone].  Normal or non-critical lab and imaging results will be communicated to you by MyChart, letter or phone within 4 business days after the clinic has received the results. If you do not hear from us within 7 days, please contact the clinic through MyChart or phone. If you have a critical or abnormal lab result, we will notify you by phone as soon as possible.  Submit refill requests through [employer] or call your pharmacy and they will forward the refill request to us. Please allow 3 business days for your refill to be completed.          Additional Information About Your Visit        MyChart Information     [employer] gives you secure access to your electronic health record. If you see a primary care provider, you can also send messages to your care team and make appointments. If you have questions, please call your primary care clinic.  If you do not have a primary care provider, please call [phone] and they will assist you.        Care EveryWhere ID     This is your Care EveryWhere ID. This could be used by other organizations to access your [facility] medical records  [id number]        Your Vitals Were     Pulse Temperature Last Period Pulse Oximetry BMI (Body Mass Index)       97 99.3  F (37.4  C)  (Tympanic) (LMP Unknown) 98% 29.05 kg/m2        Blood Pressure from Last 3 Encounters:   02/22/17 122/80   02/04/17 140/88   01/30/17 139/82    Weight from Last 3 Encounters:   02/22/17 180 lb (81.6 kg)   01/30/17 188 lb 3.2 oz (85.4 kg)   01/27/17 187 lb (84.8 kg)              We Performed the Following     Chlamydia trachomatis PCR     Neisseria gonorrhoeae PCR     Wet prep          Today's Medication Changes          These changes are accurate as of: 2/22/17  3:03 PM.  If you have any questions, ask your nurse or doctor.               Start taking these medicines.        Dose/Directions    fluconazole 150 MG tablet   Commonly known as:  DIFLUCAN   Used for:  Yeast infection of the vagina   Started by:  Yeny Amro APRN CNP        Take 150 mg every 3 days for 4 doses then one tab every week for a month   Quantity:  8 tablet   Refills:  0            Where to get your medicines      These medications were sent to [address] [address]     Phone:  [phone]     fluconazole 150 MG tablet                Primary Care Provider Office Phone # Fax #    DIPESH Marino [phone]       [facility] [address] [place] [address]        Goals        General    I will attend counseling appointment (pt-stated)     Notes - Note edited  11/25/2015  1:35 PM by Shi Armstrong LSW    As of today's date 11/25/2015 goal is met at 76 - 100%.   Goal Status:  Complete - will keep to maintain for  A few month  As of today's date 7/28/2015 goal is met at 26 - 50%.   Goal Status:  Active          Thank you!     Thank you for choosing [facility]  for your care. Our goal is always to provide you with excellent care. Hearing back from our patients is one way we can continue to improve our services. Please take a few minutes to complete the written survey that you may receive in the mail  after your visit with us. Thank you!             Your Updated Medication List - Protect others around you: Learn how to safely use, store and throw away your medicines at www.disposemymeds.org.          This list is accurate as of: 2/22/17  3:03 PM.  Always use your most recent med list.                   Brand Name Dispense Instructions for use    benzonatate 200 MG capsule    TESSALON    30 capsule    Take 1 capsule (200 mg) by mouth 3 times daily as needed for cough       fluconazole 150 MG tablet    DIFLUCAN    8 tablet    Take 150 mg every 3 days for 4 doses then one tab every week for a month       fluticasone 50 MCG/ACT spray    FLONASE    1 g    Spray 1-2 sprays into both nostrils daily       gabapentin 600 MG tablet    NEURONTIN    150 tablet    600 mg in Am, Take 1200 mg mid day and evening.       lamoTRIgine 200 MG tablet    LaMICtal    31 tablet    Take 1 tablet (200 mg) by mouth daily       LATUDA 20 MG Tabs tablet   Generic drug:  lurasidone      Take 10 mg by mouth daily       lidocaine 5 % Patch    LIDODERM    30 patch    Apply up to 3 patches to painful area at once for up to 12 h within a 24 h period.  Remove after 12 hours.       lidocaine HCl 3 % cream     453.6 g    Apply topically 3 times daily       LORazepam 1 MG tablet    ATIVAN    20 tablet    Take 1.5 tablets (1.5 mg) by mouth daily as needed for anxiety       omeprazole 40 MG capsule    priLOSEC    90 capsule    Take 1 capsule (40 mg) by mouth daily Take 30-60 minutes before a meal.       phenylephrine-cocoa butter 0.25-88.44 % per suppository    PREPARATION H    24 suppository    Place 1 suppository rectally 4 times daily as needed for hemorrhoids or itching       promethazine 12.5 MG tablet    PHENERGAN    56 tablet    Take 1-2 tablets (12.5-25 mg) by mouth every 6 hours as needed for nausea       QUEtiapine 50 MG tablet    SEROQUEL    21 tablet    Take 1-3 tablets ( mg) by mouth At Bedtime       SUMAtriptan 6 MG/0.5ML pen  injector kit    IMITREX STATDOSE    3 kit    Inject 0.5 mLs (6 mg) Subcutaneous at onset of headache for migraine

## 2017-02-22 NOTE — PROGRESS NOTES
SUBJECTIVE:                                                    Sirena Dietrich is a 31 year old female who presents to clinic today for the following health issues:      Vaginal Symptoms      Duration: 2 days    Description  vaginal discharge - creamy/clear and odor    Intensity:  moderate    Accompanying signs and symptoms (fever/dysuria/abdominal or back pain): None    History  Sexually active: yes, single partner, contraception - none - hysterectomy. New partner.  Possibility of pregnancy: No  Recent antibiotic use: no     Precipitating or alleviating factors: None    Therapies tried and outcome: none   Outcome:      Malodorous and creamy discharge. Not curdled. No recent bladder infections. New boyfriend, is sexually active. Wants to make sure she doesn't have an STD. One male partner.      Problem list and histories reviewed & adjusted, as indicated.  Additional history: as documented      ROS:  Constitutional, HEENT, cardiovascular, pulmonary, gi and gu systems are negative, except as otherwise noted.    OBJECTIVE:                                                    /80  Pulse 97  Temp 99.3  F (37.4  C) (Tympanic)  Wt 180 lb (81.6 kg)  LMP  (LMP Unknown)  SpO2 98%  BMI 29.05 kg/m2  Body mass index is 29.05 kg/(m^2).  GENERAL: healthy, alert and no distress   (female): deferred    Diagnostic Test Results:  Results for orders placed or performed in visit on 02/22/17   Wet prep   Result Value Ref Range    Specimen Description Vagina     Wet Prep (A)      No Trichomonas seen No clue cells seen Rare Yeast seen    Micro Report Status FINAL 02/22/2017    Chlamydia trachomatis PCR   Result Value Ref Range    Specimen Description Vagina     Chlamydia Trachomatis PCR  NEG     Negative   Negative for C. trachomatis rRNA by transcription mediated amplification.   A negative result by transcription mediated amplification does not preclude the   presence of C. trachomatis infection because results are dependent on  proper   and adequate collection, absence of inhibitors, and sufficient rRNA to be   detected.     Neisseria gonorrhoeae PCR   Result Value Ref Range    Specimen Descrip Vagina     N Gonorrhea PCR  NEG     Negative   Negative for N. gonorrhoeae rRNA by transcription mediated amplification.   A negative result by transcription mediated amplification does not preclude the   presence of N. gonorrhoeae infection because results are dependent on proper   and adequate collection, absence of inhibitors, and sufficient rRNA to be   detected.     Yeast culture   Result Value Ref Range    Specimen Description Vagina     Culture Micro No yeast isolated     Micro Report Status FINAL 02/27/2017      *Note: Due to a large number of results and/or encounters for the requested time period, some results have not been displayed. A complete set of results can be found in Results Review.        ASSESSMENT/PLAN:                                                        1. Screen for STD (sexually transmitted disease)  New sexual partner  - Chlamydia trachomatis PCR  - Neisseria gonorrhoeae PCR    2. Yeast infection of the vagina  Symptomatic. Rare yeast seen. Will treat  - wet prep  - fluconazole (DIFLUCAN) 150 MG tablet; Take 150 mg every 3 days for 4 doses then one tab every week for a month  Dispense: 8 tablet; Refill: 0    Patient Instructions   We will call you with the results of your labs.      Yeny Amor, MALDONADO, APRN [facility]

## 2017-02-22 NOTE — TELEPHONE ENCOUNTER
Needs to get ECHO done as directed by Cardiology  Happy to see her to discuss when this is completed.  Thanks Shi Martinez P-BC

## 2017-02-22 NOTE — NURSING NOTE
"Chief Complaint   Patient presents with     Vaginal Problem       Initial /80  Pulse 97  Temp 99.3  F (37.4  C) (Tympanic)  Wt 180 lb (81.6 kg)  LMP  (LMP Unknown)  SpO2 98%  BMI 29.05 kg/m2 Estimated body mass index is 29.05 kg/(m^2) as calculated from the following:    Height as of 1/20/17: 5' 6\" (1.676 m).    Weight as of this encounter: 180 lb (81.6 kg).  Medication Reconciliation: complete    Health Maintenance that is potentially due pending provider review:  Pap Smear    n/a      "

## 2017-02-23 LAB
C TRACH DNA SPEC QL NAA+PROBE: NORMAL
N GONORRHOEA DNA SPEC QL NAA+PROBE: NORMAL
SPECIMEN SOURCE: NORMAL
SPECIMEN SOURCE: NORMAL

## 2017-02-23 NOTE — TELEPHONE ENCOUNTER
Counseling services and pt updated. Pt states she will be uninsured as of March 1. Advised to call and schedule echo asap. Salima Lawrence RN

## 2017-02-27 LAB
MICRO REPORT STATUS: NORMAL
SPECIMEN SOURCE: NORMAL
YEAST SPEC QL CULT: NORMAL

## 2017-03-08 ENCOUNTER — OFFICE VISIT (OUTPATIENT)
Dept: FAMILY MEDICINE | Facility: CLINIC | Age: 32
End: 2017-03-08
Payer: MEDICAID

## 2017-03-08 VITALS
SYSTOLIC BLOOD PRESSURE: 138 MMHG | HEART RATE: 88 BPM | WEIGHT: 176 LBS | BODY MASS INDEX: 28.28 KG/M2 | TEMPERATURE: 98.6 F | HEIGHT: 66 IN | DIASTOLIC BLOOD PRESSURE: 78 MMHG

## 2017-03-08 DIAGNOSIS — R82.998 URINARY CRYSTALS: ICD-10-CM

## 2017-03-08 DIAGNOSIS — N76.0 BACTERIAL VAGINOSIS: ICD-10-CM

## 2017-03-08 DIAGNOSIS — R30.0 DYSURIA: Primary | ICD-10-CM

## 2017-03-08 DIAGNOSIS — B96.89 BACTERIAL VAGINOSIS: ICD-10-CM

## 2017-03-08 DIAGNOSIS — N89.8 VAGINAL DISCHARGE: ICD-10-CM

## 2017-03-08 LAB
ALBUMIN UR-MCNC: NEGATIVE MG/DL
AMORPH CRY #/AREA URNS HPF: ABNORMAL /HPF
APPEARANCE UR: CLEAR
BACTERIA #/AREA URNS HPF: ABNORMAL /HPF
BILIRUB UR QL STRIP: NEGATIVE
COLOR UR AUTO: YELLOW
GLUCOSE UR STRIP-MCNC: NEGATIVE MG/DL
HGB UR QL STRIP: ABNORMAL
KETONES UR STRIP-MCNC: NEGATIVE MG/DL
LEUKOCYTE ESTERASE UR QL STRIP: NEGATIVE
MICRO REPORT STATUS: ABNORMAL
NITRATE UR QL: NEGATIVE
NON-SQ EPI CELLS #/AREA URNS LPF: ABNORMAL /LPF
PH UR STRIP: 6.5 PH (ref 5–7)
RBC #/AREA URNS AUTO: ABNORMAL /HPF (ref 0–2)
SP GR UR STRIP: 1.02 (ref 1–1.03)
SPECIMEN SOURCE: ABNORMAL
URN SPEC COLLECT METH UR: ABNORMAL
UROBILINOGEN UR STRIP-ACNC: 0.2 EU/DL (ref 0.2–1)
WBC #/AREA URNS AUTO: ABNORMAL /HPF (ref 0–2)
WET PREP SPEC: ABNORMAL

## 2017-03-08 PROCEDURE — 87086 URINE CULTURE/COLONY COUNT: CPT | Performed by: NURSE PRACTITIONER

## 2017-03-08 PROCEDURE — 81001 URINALYSIS AUTO W/SCOPE: CPT | Performed by: NURSE PRACTITIONER

## 2017-03-08 PROCEDURE — 87210 SMEAR WET MOUNT SALINE/INK: CPT | Performed by: NURSE PRACTITIONER

## 2017-03-08 PROCEDURE — 99214 OFFICE O/P EST MOD 30 MIN: CPT | Performed by: NURSE PRACTITIONER

## 2017-03-08 RX ORDER — METRONIDAZOLE 7.5 MG/G
1 GEL VAGINAL AT BEDTIME
Qty: 70 G | Refills: 0 | Status: SHIPPED | OUTPATIENT
Start: 2017-03-08 | End: 2017-03-13

## 2017-03-08 NOTE — PROGRESS NOTES
These results were discussed with patient at office visit.   BV- Metronidazole  Urinalysis- crystals  MICHAEL Hernandez

## 2017-03-08 NOTE — MR AVS SNAPSHOT
After Visit Summary   3/8/2017    Sirena Dietrich    MRN: [id number]           Patient Information     Date Of Birth          1985        Visit Information        Provider Department      3/8/2017 4:20 PM Lindsey Loomis, CNP [facility]        Today's Diagnoses     Dysuria    -  1    Vaginal discharge        Bacterial vaginosis        Urinary crystals          Care Instructions    Results for orders placed or performed in visit on 03/08/17   *UA reflex to Microscopic and Culture ([facility] and [facility] (except [place] and [place])   Result Value Ref Range    Color Urine Yellow     Appearance Urine Clear     Glucose Urine Negative NEG mg/dL    Bilirubin Urine Negative NEG    Ketones Urine Negative NEG mg/dL    Specific Gravity Urine 1.025 1.003 - 1.035    Blood Urine Trace (A) NEG    pH Urine 6.5 5.0 - 7.0 pH    Protein Albumin Urine Negative NEG mg/dL    Urobilinogen Urine 0.2 0.2 - 1.0 EU/dL    Nitrite Urine Negative NEG    Leukocyte Esterase Urine Negative NEG    Source Midstream Urine    Urine Microscopic   Result Value Ref Range    WBC Urine O - 2 0 - 2 /HPF    RBC Urine 2-5 (A) 0 - 2 /HPF    Squamous Epithelial /LPF Urine Few FEW /LPF    Bacteria Urine Few (A) NEG /HPF    Amorphous Crystals Moderate (A) NEG /HPF   Wet prep   Result Value Ref Range    Specimen Description Vagina     Wet Prep (A)      No Trichomonas seen  No yeast seen  Many Clue cells seen      Micro Report Status FINAL 03/08/2017      *Note: Due to a large number of results and/or encounters for the requested time period, some results have not been displayed. A complete set of results can be found in Results Review.       BV   Take Metronidazole vaginally for 5 days  Refrain from sex for the next 7 days    Urine  No bacterial. Crystals noted. This could be an indication of kidney stones.  Strain your urine and bring in any crystals found for lab to identify  Push fluids  No pop,  caffeine    Follow-up with your PCP next week    Follow-up with OBGYN this month- discuss urinary incontinence, chronic yeast infections      Bacterial Vaginosis    You have a vaginal infection called bacterial vaginosis (BV). Both good and bad bacteria are present in a healthy vagina. BV occurs when these bacteria get out of balance. The number of bad bacteria increase. And the number of good bacteria decrease.  BV may or may not cause symptoms. If symptoms do occur, they can include:    Thin, gray, milky-white, or sometimes green discharge    Unpleasant odor or  fishy  smell    Itching, burning, or pain in or around the vagina  It is not known what causes BV, but certain factors can make the problem more likely. This can include:    Douching    Having sex with a new partner    Having sex with more than one partner  BV will sometimes go away on its own. But treatment is usually recommended. This is because untreated BV can increase the risk of more serious health problems such as:    Pelvic inflammatory disease (PID)     delivery (giving birth to a baby early if you re pregnant)    HIV and certain other sexually transmitted diseases (STDs)    Infection after surgery on the reproductive organs  Home care  General care    BV is most often treated with medicines called antibiotics. These may be given as pills or as a vaginal cream. If antibiotics are prescribed, be sure to use them exactly as directed. Also, be sure to complete all of the medicine, even if your symptoms go away.    Avoid douching or having sex during treatment.    If you have sex with a female partner, ask your healthcare provider if she should also be treated.  Prevention    Limit or avoid douching.    Avoid having sex. If you do have sex, then take steps to lower your risk:    Use condoms when having sex.    Limit the number of partners you have sex with.  Follow-up care  Follow up with your healthcare provider, or as advised.  When to seek  medical advice  Call your healthcare provider right away if:    You have a fever of 100.4 F (38 C) or higher, or as directed by your provider.    Your symptoms worsen, or they don t go away within a few days of starting treatment.    You have new pain in the lower belly or pelvic region.    You have side effects that bother you or a reaction to the pills or cream you re prescribed.    You or any partners you have sex with have new symptoms, such as a rash, joint pain, or sores.    8523-5975 [employer]. [address] [place] [address]. All rights reserved. This information is not intended as a substitute for professional medical care. Always follow your healthcare professional's instructions.        Kidney Stones: Your Evaluation  A medical evaluation helps your doctor find out what s causing your symptoms. A health history and physical exam may reveal signs of a stone and perhaps why it formed. Diagnostic studies can confirm the presence of a stone and locate it. Detailed metabolic tests of your blood, urine, and the stone may also be done. These test results help your doctor recommend treatment.    Diagnostic tests  These tests confirm your doctor s diagnosis. They can detect infection or reveal the image of a stone. You may not need all of these tests.    Urinalysis looks for blood in the urine (hematuria), one sign of a stone. Pus in the urine (pyuria) suggests a stone or infection. This pH of the urine is also tested, which indicates if your urine is acidic or alkaline. Alkaline urine promotes formation of struvite (infection) stones.    A urine culture can reveal a urinary tract infection. Test results appear 24 hours to 48 hours after the urine sample is collected.    Blood tests can confirm a kidney infection. They show any abnormalities in the white cell count of your blood.    A KUB (kidney, ureter, bladder) X-ray provides a view of your kidneys, ureters, and bladder. Some stones  "are dense enough to show up on X-rays, and some stones can't be seen. An IVP (intravenous pyelogram) X-ray uses dye to locate your stone. It also shows whether there is a blockage in that kidney. This test may not be used if you have kidney failure.    Ultrasound uses sound waves to create a picture of the kidney. This helps show stones and reveal blockages in the urinary tract.    A CT (computed tomography) scan takes multiple X-rays of the urinary tract. This helps locate and identify a stone. A CT scan can be done without dye. This makes it safe for people who have dye allergies or kidney disease. The CT scan with dye is now the \"gold standard\" test to diagnose kidney stones.   Metabolic tests  If you have a stone, your doctor may order 1 or more metabolic tests. These tests may show why you formed a stone. And they show what kind of stone you may form in the future. This helps your doctor plan your treatment and prevention program.    Stone analysis shows your stone s chemical makeup, if the stone is retrieved. This may suggest the cause of your stone.    Blood tests measure your levels of stone-related chemicals, such as calcium. They may also find potential causes, such as gout or hyperparathyroidism.     A 24-hour urine sample shows the levels of stone-related chemicals in your urine. You collect all your urine for 24 hours in a special container.    Provocative tests show how your kidneys handle chemicals, such as calcium. The tests monitor your response to low- or high-calcium diets.    8938-5395 [employer]. [address] [place] [address], [place] [address]. All rights reserved. This information is not intended as a substitute for professional medical care. Always follow your healthcare professional's instructions.        Understanding Kidney Stones  Your kidneys are bean-shaped organs. They help filter extra salts, waste, and water from your body. You need to drink enough water every day to help " flush the extra salts into your urine.     What are kidney stones?  Kidney stones are made up of chemical crystals that separate out from urine. These crystals clump together to make  stones.  They form in the calyx of the kidney. They may stay in the kidney or move into the urinary tract.   Why kidney stones form  Kidneys form stones for many reasons. If you don t drink enough water, for instance, you won t have enough urine to dilute chemicals. Then the chemicals may form crystals, which can develop into stones:    Fluid loss (dehydration) can concentrate urine, causing stones to form.    Certain foods contain large amounts of the chemicals that sometimes crystallize into stones. Eating foods that contain a lot of meat or salt can lead to more kidney stones.    Kidney infections foster stones by slowing urine flow or changing the acid balance of your urine.    Family history. If family members have had kidney stones, you re more likely to have them, too.    Deficiencies of certain substances in the urine that help protect you from forming stones can also increase the formation of stones.  Where stones form  Stones begin in the cup-shaped part of the kidney (calyx). Some stay in the calyx and grow. Others move into the kidney pelvis or into the ureter. There they can lodge, block the flow of urine, and cause pain.  Symptoms  Many stones cause sudden and severe pain and bloody urine. Others cause nausea or frequent, burning urination. Symptoms often depend on your stone s size and location. Fever may indicate a serious infection. Call your doctor right away if you develop a fever.    7234-8896 [employer]. [address] [place] [address], [place] [address]. All rights reserved. This information is not intended as a substitute for professional medical care. Always follow your healthcare professional's instructions.              Follow-ups after your visit        Your next 10 appointments already scheduled      Apr 03, 2017  3:00 PM CDT   Ech Complete with CRISTY   [facility] ([facility])    [address] [facility] [address]  [facility] [address]   [phone]           1.  Please bring or wear a comfortable two-piece outfit. 2.  You may eat, drink and take your normal medicines. 3.  For any questions that cannot be answered, please contact the ordering physician            Apr 20, 2017 10:00 AM CDT   PHYSICAL with La Guzmán MD   [facility]    [address] [facility] [address]   [phone]              Who to contact     If you have questions or need follow up information about today's clinic visit or your schedule please contact [facility] directly at [phone].  Normal or non-critical lab and imaging results will be communicated to you by MyChart, letter or phone within 4 business days after the clinic has received the results. If you do not hear from us within 7 days, please contact the clinic through [employer] or phone. If you have a critical or abnormal lab result, we will notify you by phone as soon as possible.  Submit refill requests through [employer] or call your pharmacy and they will forward the refill request to us. Please allow 3 business days for your refill to be completed.          Additional Information About Your Visit        MyChart Information     [employer] gives you secure access to your electronic health record. If you see a primary care provider, you can also send messages to your care team and make appointments. If you have questions, please call your primary care clinic.  If you do not have a primary care provider, please call [phone] and they will assist you.        Care EveryWhere ID     This is your Care EveryWhere ID. This could be used by other organizations to access your [place] medical records  [id number]        Your Vitals Were     Pulse Temperature Height Last  "Period BMI (Body Mass Index)       88 98.6  F (37  C) (Tympanic) 5' 6\" (1.676 m) (LMP Unknown) 28.41 kg/m2        Blood Pressure from Last 3 Encounters:   03/08/17 148/84   02/22/17 122/80   02/04/17 140/88    Weight from Last 3 Encounters:   03/08/17 176 lb (79.8 kg)   02/22/17 180 lb (81.6 kg)   01/30/17 188 lb 3.2 oz (85.4 kg)              We Performed the Following     *UA reflex to Microscopic and Culture ([facility] and [facility] (except [place] and [place])     Urine Culture Aerobic Bacterial     Urine Microscopic     Wet prep          Today's Medication Changes          These changes are accurate as of: 3/8/17  5:15 PM.  If you have any questions, ask your nurse or doctor.               Start taking these medicines.        Dose/Directions    metroNIDAZOLE 0.75 % vaginal gel   Commonly known as:  METROGEL   Used for:  Bacterial vaginosis   Started by:  Lindsey Loomis CNP        Dose:  1 applicator   Place 1 applicator (5 g) vaginally At Bedtime for 5 days   Quantity:  70 g   Refills:  0            Where to get your medicines      These medications were sent to [facility] [address] [place] [address] [address]  [address] [place] [address]     Phone:  [phone]     metroNIDAZOLE 0.75 % vaginal gel                Primary Care Provider Office Phone # Fax #    DIPSEH Marino [phone]       [facility] [address] [place] [address]        Goals        General    I will attend counseling appointment (pt-stated)     Notes - Note edited  11/25/2015  1:35 PM by Shi Armstrong LSW    As of today's date 11/25/2015 goal is met at 76 - 100%.   Goal Status:  Complete - will keep to maintain for  A few month  As of today's date 7/28/2015 goal is met at 26 - 50%.   Goal Status:  Active          Thank you!     Thank you for choosing [facility]  for your care. Our goal is always to provide you with " excellent care. Hearing back from our patients is one way we can continue to improve our services. Please take a few minutes to complete the written survey that you may receive in the mail after your visit with us. Thank you!             Your Updated Medication List - Protect others around you: Learn how to safely use, store and throw away your medicines at www.disposemymeds.org.          This list is accurate as of: 3/8/17  5:15 PM.  Always use your most recent med list.                   Brand Name Dispense Instructions for use    fluconazole 150 MG tablet    DIFLUCAN    8 tablet    Take 150 mg every 3 days for 4 doses then one tab every week for a month       fluticasone 50 MCG/ACT spray    FLONASE    1 g    Spray 1-2 sprays into both nostrils daily       gabapentin 600 MG tablet    NEURONTIN    150 tablet    600 mg in Am, Take 1200 mg mid day and evening.       lamoTRIgine 200 MG tablet    LaMICtal    31 tablet    Take 1 tablet (200 mg) by mouth daily       LATUDA 20 MG Tabs tablet   Generic drug:  lurasidone      Take 10 mg by mouth daily       lidocaine 5 % Patch    LIDODERM    30 patch    Apply up to 3 patches to painful area at once for up to 12 h within a 24 h period.  Remove after 12 hours.       lidocaine HCl 3 % cream     453.6 g    Apply topically 3 times daily       LORazepam 1 MG tablet    ATIVAN    20 tablet    Take 1.5 tablets (1.5 mg) by mouth daily as needed for anxiety       metroNIDAZOLE 0.75 % vaginal gel    METROGEL    70 g    Place 1 applicator (5 g) vaginally At Bedtime for 5 days       omeprazole 40 MG capsule    priLOSEC    90 capsule    Take 1 capsule (40 mg) by mouth daily Take 30-60 minutes before a meal.       phenylephrine-cocoa butter 0.25-88.44 % per suppository    PREPARATION H    24 suppository    Place 1 suppository rectally 4 times daily as needed for hemorrhoids or itching       promethazine 12.5 MG tablet    PHENERGAN    56 tablet    Take 1-2 tablets (12.5-25 mg) by mouth  every 6 hours as needed for nausea       QUEtiapine 50 MG tablet    SEROQUEL    21 tablet    Take 1-3 tablets ( mg) by mouth At Bedtime       SUMAtriptan 6 MG/0.5ML pen injector kit    IMITREX STATDOSE    3 kit    Inject 0.5 mLs (6 mg) Subcutaneous at onset of headache for migraine

## 2017-03-08 NOTE — PATIENT INSTRUCTIONS
Results for orders placed or performed in visit on 03/08/17   *UA reflex to Microscopic and Culture ([facility], [place] and [facility] (except [place] and [place])   Result Value Ref Range    Color Urine Yellow     Appearance Urine Clear     Glucose Urine Negative NEG mg/dL    Bilirubin Urine Negative NEG    Ketones Urine Negative NEG mg/dL    Specific Gravity Urine 1.025 1.003 - 1.035    Blood Urine Trace (A) NEG    pH Urine 6.5 5.0 - 7.0 pH    Protein Albumin Urine Negative NEG mg/dL    Urobilinogen Urine 0.2 0.2 - 1.0 EU/dL    Nitrite Urine Negative NEG    Leukocyte Esterase Urine Negative NEG    Source Midstream Urine    Urine Microscopic   Result Value Ref Range    WBC Urine O - 2 0 - 2 /HPF    RBC Urine 2-5 (A) 0 - 2 /HPF    Squamous Epithelial /LPF Urine Few FEW /LPF    Bacteria Urine Few (A) NEG /HPF    Amorphous Crystals Moderate (A) NEG /HPF   Wet prep   Result Value Ref Range    Specimen Description Vagina     Wet Prep (A)      No Trichomonas seen  No yeast seen  Many Clue cells seen      Micro Report Status FINAL 03/08/2017      *Note: Due to a large number of results and/or encounters for the requested time period, some results have not been displayed. A complete set of results can be found in Results Review.       BV   Take Metronidazole vaginally for 5 days  Refrain from sex for the next 7 days    Urine  No bacterial. Crystals noted. This could be an indication of kidney stones.  Strain your urine and bring in any crystals found for lab to identify  Push fluids  No pop, caffeine    Follow-up with your PCP next week    Follow-up with OBGYN this month- discuss urinary incontinence, chronic yeast infections      Bacterial Vaginosis    You have a vaginal infection called bacterial vaginosis (BV). Both good and bad bacteria are present in a healthy vagina. BV occurs when these bacteria get out of balance. The number of bad bacteria increase. And the number of good bacteria decrease.  BV may or may  not cause symptoms. If symptoms do occur, they can include:    Thin, gray, milky-white, or sometimes green discharge    Unpleasant odor or  fishy  smell    Itching, burning, or pain in or around the vagina  It is not known what causes BV, but certain factors can make the problem more likely. This can include:    Douching    Having sex with a new partner    Having sex with more than one partner  BV will sometimes go away on its own. But treatment is usually recommended. This is because untreated BV can increase the risk of more serious health problems such as:    Pelvic inflammatory disease (PID)     delivery (giving birth to a baby early if you re pregnant)    HIV and certain other sexually transmitted diseases (STDs)    Infection after surgery on the reproductive organs  Home care  General care    BV is most often treated with medicines called antibiotics. These may be given as pills or as a vaginal cream. If antibiotics are prescribed, be sure to use them exactly as directed. Also, be sure to complete all of the medicine, even if your symptoms go away.    Avoid douching or having sex during treatment.    If you have sex with a female partner, ask your healthcare provider if she should also be treated.  Prevention    Limit or avoid douching.    Avoid having sex. If you do have sex, then take steps to lower your risk:    Use condoms when having sex.    Limit the number of partners you have sex with.  Follow-up care  Follow up with your healthcare provider, or as advised.  When to seek medical advice  Call your healthcare provider right away if:    You have a fever of 100.4 F (38 C) or higher, or as directed by your provider.    Your symptoms worsen, or they don t go away within a few days of starting treatment.    You have new pain in the lower belly or pelvic region.    You have side effects that bother you or a reaction to the pills or cream you re prescribed.    You or any partners you have sex with have  new symptoms, such as a rash, joint pain, or sores.    4880-0267 [employer]. [address] [place] [address], [place] [address]. All rights reserved. This information is not intended as a substitute for professional medical care. Always follow your healthcare professional's instructions.        Kidney Stones: Your Evaluation  A medical evaluation helps your doctor find out what s causing your symptoms. A health history and physical exam may reveal signs of a stone and perhaps why it formed. Diagnostic studies can confirm the presence of a stone and locate it. Detailed metabolic tests of your blood, urine, and the stone may also be done. These test results help your doctor recommend treatment.    Diagnostic tests  These tests confirm your doctor s diagnosis. They can detect infection or reveal the image of a stone. You may not need all of these tests.    Urinalysis looks for blood in the urine (hematuria), one sign of a stone. Pus in the urine (pyuria) suggests a stone or infection. This pH of the urine is also tested, which indicates if your urine is acidic or alkaline. Alkaline urine promotes formation of struvite (infection) stones.    A urine culture can reveal a urinary tract infection. Test results appear 24 hours to 48 hours after the urine sample is collected.    Blood tests can confirm a kidney infection. They show any abnormalities in the white cell count of your blood.    A KUB (kidney, ureter, bladder) X-ray provides a view of your kidneys, ureters, and bladder. Some stones are dense enough to show up on X-rays, and some stones can't be seen. An IVP (intravenous pyelogram) X-ray uses dye to locate your stone. It also shows whether there is a blockage in that kidney. This test may not be used if you have kidney failure.    Ultrasound uses sound waves to create a picture of the kidney. This helps show stones and reveal blockages in the urinary tract.    A CT (computed tomography) scan takes multiple  "X-rays of the urinary tract. This helps locate and identify a stone. A CT scan can be done without dye. This makes it safe for people who have dye allergies or kidney disease. The CT scan with dye is now the \"gold standard\" test to diagnose kidney stones.   Metabolic tests  If you have a stone, your doctor may order 1 or more metabolic tests. These tests may show why you formed a stone. And they show what kind of stone you may form in the future. This helps your doctor plan your treatment and prevention program.    Stone analysis shows your stone s chemical makeup, if the stone is retrieved. This may suggest the cause of your stone.    Blood tests measure your levels of stone-related chemicals, such as calcium. They may also find potential causes, such as gout or hyperparathyroidism.     A 24-hour urine sample shows the levels of stone-related chemicals in your urine. You collect all your urine for 24 hours in a special container.    Provocative tests show how your kidneys handle chemicals, such as calcium. The tests monitor your response to low- or high-calcium diets.    5598-8406 [employer]. [address] [place] [address]. All rights reserved. This information is not intended as a substitute for professional medical care. Always follow your healthcare professional's instructions.        Understanding Kidney Stones  Your kidneys are bean-shaped organs. They help filter extra salts, waste, and water from your body. You need to drink enough water every day to help flush the extra salts into your urine.     What are kidney stones?  Kidney stones are made up of chemical crystals that separate out from urine. These crystals clump together to make  stones.  They form in the calyx of the kidney. They may stay in the kidney or move into the urinary tract.   Why kidney stones form  Kidneys form stones for many reasons. If you don t drink enough water, for instance, you won t have enough urine to " dilute chemicals. Then the chemicals may form crystals, which can develop into stones:    Fluid loss (dehydration) can concentrate urine, causing stones to form.    Certain foods contain large amounts of the chemicals that sometimes crystallize into stones. Eating foods that contain a lot of meat or salt can lead to more kidney stones.    Kidney infections foster stones by slowing urine flow or changing the acid balance of your urine.    Family history. If family members have had kidney stones, you re more likely to have them, too.    Deficiencies of certain substances in the urine that help protect you from forming stones can also increase the formation of stones.  Where stones form  Stones begin in the cup-shaped part of the kidney (calyx). Some stay in the calyx and grow. Others move into the kidney pelvis or into the ureter. There they can lodge, block the flow of urine, and cause pain.  Symptoms  Many stones cause sudden and severe pain and bloody urine. Others cause nausea or frequent, burning urination. Symptoms often depend on your stone s size and location. Fever may indicate a serious infection. Call your doctor right away if you develop a fever.    [id number] [employer]. [address] [place] [address]. All rights reserved. This information is not intended as a substitute for professional medical care. Always follow your healthcare professional's instructions.
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
## 2017-03-08 NOTE — NURSING NOTE
"Chief Complaint   Patient presents with     Urinary Problem       Initial /84  Pulse 88  Temp 98.6  F (37  C) (Tympanic)  Wt 176 lb (79.8 kg)  LMP  (LMP Unknown)  BMI 28.41 kg/m2 Estimated body mass index is 28.41 kg/(m^2) as calculated from the following:    Height as of 1/20/17: 5' 6\" (1.676 m).    Weight as of this encounter: 176 lb (79.8 kg).  Medication Reconciliation: complete  "

## 2017-03-10 LAB
BACTERIA SPEC CULT: NORMAL
MICRO REPORT STATUS: NORMAL
SPECIMEN SOURCE: NORMAL

## 2017-03-10 NOTE — PROGRESS NOTES
Dear Sirena,  Urine culture shows mixed urogenital raina- dirty catch. No treatment.    Please contact our clinic via phone or My Chart if you have any questions or concerns.  Thanks,  MICHAEL Hernandez

## 2017-03-22 ENCOUNTER — OFFICE VISIT (OUTPATIENT)
Dept: FAMILY MEDICINE | Facility: CLINIC | Age: 32
End: 2017-03-22
Payer: MEDICAID

## 2017-03-22 VITALS
BODY MASS INDEX: 27.92 KG/M2 | WEIGHT: 173 LBS | DIASTOLIC BLOOD PRESSURE: 61 MMHG | TEMPERATURE: 98.1 F | SYSTOLIC BLOOD PRESSURE: 110 MMHG | HEART RATE: 90 BPM

## 2017-03-22 DIAGNOSIS — R10.2 PELVIC PAIN IN FEMALE: ICD-10-CM

## 2017-03-22 DIAGNOSIS — T78.40XS: ICD-10-CM

## 2017-03-22 DIAGNOSIS — K59.03 DRUG-INDUCED CONSTIPATION: ICD-10-CM

## 2017-03-22 DIAGNOSIS — G89.4 CHRONIC PAIN SYNDROME: Primary | ICD-10-CM
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
## 2017-03-22 PROCEDURE — 99214 OFFICE O/P EST MOD 30 MIN: CPT | Performed by: NURSE PRACTITIONER

## 2017-03-22 RX ORDER — POLYETHYLENE GLYCOL 3350 17 G/17G
1 POWDER, FOR SOLUTION ORAL DAILY
Qty: 510 G | Refills: 1 | COMMUNITY
Start: 2017-03-22 | End: 2017-08-23

## 2017-03-22 RX ORDER — ATROPA BELLADONNA AND OPIUM 16.2; 3 MG/1; MG/1
30 SUPPOSITORY RECTAL EVERY 8 HOURS PRN
Refills: 0 | COMMUNITY
Start: 2017-03-22 | End: 2017-05-10

## 2017-03-22 RX ORDER — CIPROFLOXACIN 500 MG/1
500 TABLET, FILM COATED ORAL 2 TIMES DAILY
Qty: 14 TABLET | Refills: 0 | Status: SHIPPED | OUTPATIENT
Start: 2017-03-22 | End: 2017-05-10

## 2017-03-22 RX ORDER — GABAPENTIN 600 MG/1
TABLET ORAL
Qty: 90 TABLET | Refills: 0 | COMMUNITY
Start: 2017-03-22 | End: 2017-09-21

## 2017-03-22 NOTE — PATIENT INSTRUCTIONS
Abdominal Pain  Abdominal pain is pain in the stomach or intestinal area. Everyone has this pain from time to time. In many cases it goes away on its own. But abdominal pain can sometimes be due to a serious problem, such as appendicitis. So it s important to know when to seek help.  Causes of abdominal pain  There are many possible causes of abdominal pain. Common causes in adults include:    Constipation, diarrhea, or gas    GERD (gastroesophageal reflux disease) movement of stomach acid into the esophagus, also known as acid reflux or heartburn    Peptic ulcer (a sore in the lining of the stomach or small intestine)    Inflammation of the gallbladder, liver, or pancreas    Gallstones or kidney stones    Appendicitis     Obstruction of the intestines     Hernia (bulging of an internal organ through a muscle or other tissue)    Urinary tract infections    In women, menstrual cramps, fibroids, or endometriosis of the uterus    Inflammation or infection of the intestines  Diagnosing the cause of abdominal pain  Your health care provider will examine you to help find the cause of your pain. If needed, tests will be ordered. Because abdominal pain has so many possible causes, it can be hard to discover the reason for the pain. Giving details about your pain can help. Be ready to tell your health care provider where and when you feel the pain and what makes it better or worse. Also mention whether you have other symptoms such as fever, tiredness, nausea, vomiting, or changes in bathroom habits.  Treating abdominal pain  Certain causes of pain, such as appendicitis or a bowel obstruction, need emergency treatment. Other problems can be treated with rest, fluids, or medications. Your health care provider can give you specific instructions for treatment or self-care based on the cause of your pain.  If you have vomiting or diarrhea, sip water or other clear fluids. When you are ready to eat solid foods again, start with  small amounts of easy-to-digest, low-fat foods, such as applesauce, toast, or crackers.   When to call the doctor  Call 911 or go to the hospital right away if you:    Can t pass stool and are vomiting    Are vomiting blood or have black, tarry diarrhea    Also have chest, neck, or shoulder pain    Feel like you are about to pass out    Have pain in your shoulder blades with nausea    Have sudden, excruciating abdominal pain    Have new, severe pain unlike any you have felt before    Have a belly that is rigid, hard, and tender to touch  Call your doctor if you have:    Pain for more than 5 days    Bloating for more than 2 days    Diarrhea for more than 5 days    Fever of 101 F (38.3 C) or higher    Pain that continues to worsen    Unexplained weight loss    Continued lack of appetite    Blood in the stool  How to prevent abdominal pain  Here are some tips to help prevent abdominal pain:    Eat smaller amounts of food at one time.    Avoid greasy, fried, or other high-fat foods.    Avoid foods that give you gas.    Exercise regularly.    Drink plenty of fluids.  To help prevent symptoms of gastroesophageal reflux disease (GERD):    Quit smoking.    Reduce alcohol and certain foods that increase stomach acid.     Lose excess weight.    Finish eating at least 2 hours before you go to bed or lie down.    Elevate the head of your bed.    5065-1862 [employer]. [address] [place] [address], [place] [id number]. All rights reserved. This information is not intended as a substitute for professional medical care. Always follow your healthcare professional's instructions.        Chronic Pain  Pain serves an important role. It lets you know something is wrong that needs your attention. When the body heals, pain normally goes away.  When pain lasts longer than six months, it is called  chronic  pain. This is pain that is present even after the body has healed. Chronic pain can cause mood problems and get in the way of  your relationships and your daily life.  A number of conditions can cause chronic pain. Some of the more common include:    Previous surgery    An old injury    Infection    Diseases such as diabetes    Nerve damage    Back injury    Arthritis    Migraine or other headaches    Fibromyalgia    Cancer  Depression and stress can make chronic pain symptoms worse. In some cases, a cause for the pain cannot be found.   Treatment  Treatment can greatly reduce pain. In many cases, pain can become less severe, occur less often, and interfere less with your daily life. Chronic pain is often treated with a combination of medicines, therapies, and lifestyle changes. You will work closely with your healthcare provider to find a treatment plan that works best for you.    Ask your healthcare provider for a referral to a pain management specialty center. These can provide the most recent and proven pain management strategies, along with emotional support and comprehensive services.    Several different types of medicines may be prescribed for chronic pain. Work with your healthcare provider to develop a medicine plan that helps manage your pain.    Physical therapy can be very effective in helping reduce certain types of chronic pain.    Occupational therapy teaches you how to do routine tasks of daily living in ways that lessen your discomfort.    Psychological therapy can help you cope better with stress and pain.    Other therapies such as meditation, yoga, biofeedback, massage, and acupuncture can also help manage chronic pain.    Changing certain habits can help reduce chronic pain. They include:    Eating healthy    Developing an exercise routine    Getting enough sleep at night    Stopping smoking and limiting alcohol use    Losing excess weight  Follow-up care  Follow up with your healthcare provider as advised. Let your healthcare provider know if your current treatment plan is working or if changes are  needed.  Resources  For more information, contact:    [employer] [employer] www.achenet.org    American Chronic [employer] Association www.theacpa.org 400-063-3362    8588-7212 [employer]. [address] [place] [address], [place] [address]. All rights reserved. This information is not intended as a substitute for professional medical care. Always follow your healthcare professional's instructions.        Pelvic Pain, Uncertain Cause    Pelvic pain is pain felt in the lowest part of the belly (abdomen) and between the hipbones. The pain may be acute. This means it occurred suddenly and recently. Or the pain may be chronic. This means it has occurred for 6 months or longer.  There are many possible causes of pelvic pain. The pain may be due to a problem in the female reproductive system (pictured here). Or, it may be due to a problem in the digestive, urinary, or musculoskeletal systems.  Based on your visit today, the exact cause of your pelvic pain is not certain. Your condition does not appear to be serious at this time. But it is important for you to keep watching for any new symptoms or worsening of your condition.  General care  Your healthcare provider may advise a number of ways to help manage your pain. These can include:    Taking over-the-counter pain medicine. Stronger pain medicine may also be prescribed, if needed.    Applying heat to the pelvic area. Use a heating pad or a hot pack. Taking a hot bath may also help.    Getting plenty of rest.    Making certain lifestyle changes. These can include practicing good posture and getting regular exercise. (Studies have shown that these changes help reduce pelvic pain in some women.)    Seeing a physical therapist or pain specialist. These healthcare providers can discuss other ways to manage pain with you.  Follow-up care  Follow up with your healthcare provider, or as advised.   When to seek medical advice  Call your healthcare provider right  away if any of the following occur:    Fever of 100.4 F or higher, or as directed by your healthcare provider    Pain worsens or you have sudden, severe pain or new pain    Nausea, vomiting, sweating, or restlessness    Dizziness or fainting    Unusual vaginal discharge    Abnormal vaginal bleeding (especially bleeding after menopause)    0812-2556 [employer]. [address] [place] [address]. All rights reserved. This information is not intended as a substitute for professional medical care. Always follow your healthcare professional's instructions.

## 2017-03-22 NOTE — MR AVS SNAPSHOT
After Visit Summary   3/22/2017    Sirena Dietrich    MRN: [id number]           Patient Information     Date Of Birth          1985        Visit Information        Provider Department      3/22/2017 10:00 AM Shi Martinez APRN [facility]        Today's Diagnoses     Chronic pain syndrome    -  1    Drug-induced constipation        Pelvic pain in female        Scratched by cat, initial encounter        Allergic drug reaction, sequela          Care Instructions      Abdominal Pain  Abdominal pain is pain in the stomach or intestinal area. Everyone has this pain from time to time. In many cases it goes away on its own. But abdominal pain can sometimes be due to a serious problem, such as appendicitis. So it s important to know when to seek help.  Causes of abdominal pain  There are many possible causes of abdominal pain. Common causes in adults include:    Constipation, diarrhea, or gas    GERD (gastroesophageal reflux disease) movement of stomach acid into the esophagus, also known as acid reflux or heartburn    Peptic ulcer (a sore in the lining of the stomach or small intestine)    Inflammation of the gallbladder, liver, or pancreas    Gallstones or kidney stones    Appendicitis     Obstruction of the intestines     Hernia (bulging of an internal organ through a muscle or other tissue)    Urinary tract infections    In women, menstrual cramps, fibroids, or endometriosis of the uterus    Inflammation or infection of the intestines  Diagnosing the cause of abdominal pain  Your health care provider will examine you to help find the cause of your pain. If needed, tests will be ordered. Because abdominal pain has so many possible causes, it can be hard to discover the reason for the pain. Giving details about your pain can help. Be ready to tell your health care provider where and when you feel the pain and what makes it better or worse. Also mention whether you have other  symptoms such as fever, tiredness, nausea, vomiting, or changes in bathroom habits.  Treating abdominal pain  Certain causes of pain, such as appendicitis or a bowel obstruction, need emergency treatment. Other problems can be treated with rest, fluids, or medications. Your health care provider can give you specific instructions for treatment or self-care based on the cause of your pain.  If you have vomiting or diarrhea, sip water or other clear fluids. When you are ready to eat solid foods again, start with small amounts of easy-to-digest, low-fat foods, such as applesauce, toast, or crackers.   When to call the doctor  Call 911 or go to the hospital right away if you:    Can t pass stool and are vomiting    Are vomiting blood or have black, tarry diarrhea    Also have chest, neck, or shoulder pain    Feel like you are about to pass out    Have pain in your shoulder blades with nausea    Have sudden, excruciating abdominal pain    Have new, severe pain unlike any you have felt before    Have a belly that is rigid, hard, and tender to touch  Call your doctor if you have:    Pain for more than 5 days    Bloating for more than 2 days    Diarrhea for more than 5 days    Fever of 101 F (38.3 C) or higher    Pain that continues to worsen    Unexplained weight loss    Continued lack of appetite    Blood in the stool  How to prevent abdominal pain  Here are some tips to help prevent abdominal pain:    Eat smaller amounts of food at one time.    Avoid greasy, fried, or other high-fat foods.    Avoid foods that give you gas.    Exercise regularly.    Drink plenty of fluids.  To help prevent symptoms of gastroesophageal reflux disease (GERD):    Quit smoking.    Reduce alcohol and certain foods that increase stomach acid.     Lose excess weight.    Finish eating at least 2 hours before you go to bed or lie down.    Elevate the head of your bed.    6294-1742 [employer]. [address] [place] [address], [place]  [address]. All rights reserved. This information is not intended as a substitute for professional medical care. Always follow your healthcare professional's instructions.        Chronic Pain  Pain serves an important role. It lets you know something is wrong that needs your attention. When the body heals, pain normally goes away.  When pain lasts longer than six months, it is called  chronic  pain. This is pain that is present even after the body has healed. Chronic pain can cause mood problems and get in the way of your relationships and your daily life.  A number of conditions can cause chronic pain. Some of the more common include:    Previous surgery    An old injury    Infection    Diseases such as diabetes    Nerve damage    Back injury    Arthritis    Migraine or other headaches    Fibromyalgia    Cancer  Depression and stress can make chronic pain symptoms worse. In some cases, a cause for the pain cannot be found.   Treatment  Treatment can greatly reduce pain. In many cases, pain can become less severe, occur less often, and interfere less with your daily life. Chronic pain is often treated with a combination of medicines, therapies, and lifestyle changes. You will work closely with your healthcare provider to find a treatment plan that works best for you.    Ask your healthcare provider for a referral to a pain management specialty center. These can provide the most recent and proven pain management strategies, along with emotional support and comprehensive services.    Several different types of medicines may be prescribed for chronic pain. Work with your healthcare provider to develop a medicine plan that helps manage your pain.    Physical therapy can be very effective in helping reduce certain types of chronic pain.    Occupational therapy teaches you how to do routine tasks of daily living in ways that lessen your discomfort.    Psychological therapy can help you cope better with stress and  pain.    Other therapies such as meditation, yoga, biofeedback, massage, and acupuncture can also help manage chronic pain.    Changing certain habits can help reduce chronic pain. They include:    Eating healthy    Developing an exercise routine    Getting enough sleep at night    Stopping smoking and limiting alcohol use    Losing excess weight  Follow-up care  Follow up with your healthcare provider as advised. Let your healthcare provider know if your current treatment plan is working or if changes are needed.  Resources  For more information, contact:    American [place] for [employer] Society www.achenet.org    American Chronic Pain Association www.theacpa.org 811-459-2424    7275-3063 [employer]. [address] [place] [address]. All rights reserved. This information is not intended as a substitute for professional medical care. Always follow your healthcare professional's instructions.        Pelvic Pain, Uncertain Cause    Pelvic pain is pain felt in the lowest part of the belly (abdomen) and between the hipbones. The pain may be acute. This means it occurred suddenly and recently. Or the pain may be chronic. This means it has occurred for 6 months or longer.  There are many possible causes of pelvic pain. The pain may be due to a problem in the female reproductive system (pictured here). Or, it may be due to a problem in the digestive, urinary, or musculoskeletal systems.  Based on your visit today, the exact cause of your pelvic pain is not certain. Your condition does not appear to be serious at this time. But it is important for you to keep watching for any new symptoms or worsening of your condition.  General care  Your healthcare provider may advise a number of ways to help manage your pain. These can include:    Taking over-the-counter pain medicine. Stronger pain medicine may also be prescribed, if needed.    Applying heat to the pelvic area. Use a heating pad or a hot pack.  Taking a hot bath may also help.    Getting plenty of rest.    Making certain lifestyle changes. These can include practicing good posture and getting regular exercise. (Studies have shown that these changes help reduce pelvic pain in some women.)    Seeing a physical therapist or pain specialist. These healthcare providers can discuss other ways to manage pain with you.  Follow-up care  Follow up with your healthcare provider, or as advised.   When to seek medical advice  Call your healthcare provider right away if any of the following occur:    Fever of 100.4 F or higher, or as directed by your healthcare provider    Pain worsens or you have sudden, severe pain or new pain    Nausea, vomiting, sweating, or restlessness    Dizziness or fainting    Unusual vaginal discharge    Abnormal vaginal bleeding (especially bleeding after menopause)    2748-7053 [employer]. [address] [place] [address]. All rights reserved. This information is not intended as a substitute for professional medical care. Always follow your healthcare professional's instructions.              Follow-ups after your visit        Additional Services     ALLERGY/ASTHMA ADULT REFERRAL       Your provider has referred you to: LAUREN: [facility] [phone] [email or web address]    Please be aware that coverage of these services is subject to the terms and limitations of your health insurance plan.  Call member services at your health plan with any benefit or coverage questions.      Please bring the following with you to your appointment:    (1) Any X-Rays, CTs or MRIs which have been performed.  Contact the facility where they were done to arrange for  prior to your scheduled appointment.    (2) List of current medications  (3) This referral request   (4) Any documents/labs given to you for this referral                  Your next 10 appointments already scheduled     Apr 03,  2017  3:00 PM CDT   Ech Complete with CRISTY   [facility] ([facility])    [address]  [facility] [address] [phone]           1.  Please bring or wear a comfortable two-piece outfit. 2.  You may eat, drink and take your normal medicines. 3.  For any questions that cannot be answered, please contact the ordering physician            Apr 20, 2017 10:00 AM CDT   PHYSICAL with La Guzmán MD   [facility] ([facility]    [address] [facility] [address]   [phone]              Who to contact     If you have questions or need follow up information about today's clinic visit or your schedule please contact [facility] directly at [phone].  Normal or non-critical lab and imaging results will be communicated to you by MyChart, letter or phone within 4 business days after the clinic has received the results. If you do not hear from us within 7 days, please contact the clinic through [employer] or phone. If you have a critical or abnormal lab result, we will notify you by phone as soon as possible.  Submit refill requests through [employer] or call your pharmacy and they will forward the refill request to us. Please allow 3 business days for your refill to be completed.          Additional Information About Your Visit        MyChart Information     [employer] gives you secure access to your electronic health record. If you see a primary care provider, you can also send messages to your care team and make appointments. If you have questions, please call your primary care clinic.  If you do not have a primary care provider, please call [phone] and they will assist you.        Care EveryWhere ID     This is your Care EveryWhere ID. This could be used by other organizations to access your [place] medical records  [id number]        Your Vitals Were     Pulse Temperature Last Period BMI (Body  Mass Index)          90 98.1  F (36.7  C) (Tympanic) (LMP Unknown) 27.92 kg/m2         Blood Pressure from Last 3 Encounters:   03/22/17 110/61   03/08/17 138/78   02/22/17 122/80    Weight from Last 3 Encounters:   03/22/17 173 lb (78.5 kg)   03/08/17 176 lb (79.8 kg)   02/22/17 180 lb (81.6 kg)              We Performed the Following     ALLERGY/ASTHMA ADULT REFERRAL          Today's Medication Changes          These changes are accurate as of: 3/22/17 11:02 AM.  If you have any questions, ask your nurse or doctor.               Start taking these medicines.        Dose/Directions    ciprofloxacin 500 MG tablet   Commonly known as:  CIPRO   Used for:  Scratched by cat, initial encounter   Started by:  Shi Martinez APRN CNP        Dose:  500 mg   Take 1 tablet (500 mg) by mouth 2 times daily   Quantity:  14 tablet   Refills:  0         These medicines have changed or have updated prescriptions.        Dose/Directions    gabapentin 600 MG tablet   Commonly known as:  NEURONTIN   This may have changed:  additional instructions   Changed by:  Shi Martinez APRN CNP        600 mg in evening.   Quantity:  90 tablet   Refills:  0            Where to get your medicines      These medications were sent to [facility] [address] [address]     Phone:  [phone]     ciprofloxacin 500 MG tablet                Primary Care Provider Office Phone # Fax #    DIPESH Marino [phone]       [facility] [address] [address]        Goals        General    I will attend counseling appointment (pt-stated)     Notes - Note edited  11/25/2015  1:35 PM by Shi Armstrong LSW    As of today's date 11/25/2015 goal is met at 76 - 100%.   Goal Status:  Complete - will keep to maintain for  A few month  As of today's date 7/28/2015 goal is met at 26 - 50%.   Goal Status:  Active          Thank  you!     Thank you for choosing [facility]  for your care. Our goal is always to provide you with excellent care. Hearing back from our patients is one way we can continue to improve our services. Please take a few minutes to complete the written survey that you may receive in the mail after your visit with us. Thank you!             Your Updated Medication List - Protect others around you: Learn how to safely use, store and throw away your medicines at www.disposemymeds.org.          This list is accurate as of: 3/22/17 11:02 AM.  Always use your most recent med list.                   Brand Name Dispense Instructions for use    belladonna-opium 16.2-30 MG per suppository    B&O SUPPRETTES     Place 1 suppository (30 mg) rectally every 8 hours as needed for moderate pain       ciprofloxacin 500 MG tablet    CIPRO    14 tablet    Take 1 tablet (500 mg) by mouth 2 times daily       fluticasone 50 MCG/ACT spray    FLONASE    1 g    Spray 1-2 sprays into both nostrils daily       gabapentin 600 MG tablet    NEURONTIN    90 tablet    600 mg in evening.       lamoTRIgine 200 MG tablet    LaMICtal    31 tablet    Take 1 tablet (200 mg) by mouth daily       lidocaine 5 % Patch    LIDODERM    30 patch    Apply up to 3 patches to painful area at once for up to 12 h within a 24 h period.  Remove after 12 hours.       lidocaine HCl 3 % cream     453.6 g    Apply topically 3 times daily       LORazepam 1 MG tablet    ATIVAN    20 tablet    Take 1.5 tablets (1.5 mg) by mouth daily as needed for anxiety       MIRALAX powder   Generic drug:  polyethylene glycol     510 g    Take 17 g (1 capful) by mouth daily       omeprazole 40 MG capsule    priLOSEC    90 capsule    Take 1 capsule (40 mg) by mouth daily Take 30-60 minutes before a meal.       phenylephrine-cocoa butter 0.25-88.44 % per suppository    PREPARATION H    24 suppository    Place 1 suppository rectally 4 times daily as needed for hemorrhoids or itching        promethazine 12.5 MG tablet    PHENERGAN    56 tablet    Take 1-2 tablets (12.5-25 mg) by mouth every 6 hours as needed for nausea       QUEtiapine 50 MG tablet    SEROQUEL    21 tablet    Take 1-3 tablets ( mg) by mouth At Bedtime       SUMAtriptan 6 MG/0.5ML pen injector kit    IMITREX STATDOSE    3 kit    Inject 0.5 mLs (6 mg) Subcutaneous at onset of headache for migraine

## 2017-03-22 NOTE — LETTER
[facility]  [address] [place] [address]  Phone: [phone]     March 22, 2017      Sirena Dietrich  [address]   [address] [address]              To whom it may concern,        Sirena Dietrich was seen in our clinic today for medical illness.  Anticipate return to work or school by 3/23/2017.       Sincerely,        Shi Martinez [facility]  [phone]  [address] [place] [address]

## 2017-03-22 NOTE — NURSING NOTE
"Chief Complaint   Patient presents with     Abdominal Pain     Pelvic Pain       Initial /61 (BP Location: Right arm, Cuff Size: Adult Regular)  Pulse 90  Temp 98.1  F (36.7  C) (Tympanic)  Wt 173 lb (78.5 kg)  LMP  (LMP Unknown)  BMI 27.92 kg/m2 Estimated body mass index is 27.92 kg/(m^2) as calculated from the following:    Height as of 3/8/17: 5' 6\" (1.676 m).    Weight as of this encounter: 173 lb (78.5 kg).  Medication Reconciliation: complete    Health Maintenance that is potentially due pending provider review:  Pap Smear- done yesterday    N/a  Josue Tobin M.A.        "

## 2017-03-22 NOTE — PROGRESS NOTES
SUBJECTIVE:                                                    Sirena Dietrich is a 31 year old female who presents to clinic today for the following health issues:    ABDOMINAL PAIN     Onset: couple weeks     Description:   Character: Sharp, Dull ache, Cramping and throbbing   Location: pelvic region  Radiation: Back    Intensity: moderate, 6/10    Progression of Symptoms:  same    Accompanying Signs & Symptoms:  Fever/Chills?: no   Gas/Bloating: no   Nausea: no   Vomitting: no   Diarrhea?: no   Constipation:no   Dysuria or Hematuria: no    History:   Trauma: YES- surgery that went wrong. Some nerve damage  Previous similar pain: no    Previous tests done: none    Precipitating factors:   Does the pain change with:     Food: no      BM: no     Urination: no     Alleviating factors:none       Therapies Tried and outcome: heating pad, ice, rest.  Takes Gabapentin daily, lidocaine patches    LMP:  not applicable     [facility] [employer] [facility] [place]  Records reviewed.  Wet prep negative UA negative.   Abdomen tender.  Pelvic pain   Need doctors note for missing work today.  Working at a laundry    Cat scratch left thumb yesterday. Red today  Patient states her mental health and been very stable. She has not been able to follow through with therapy or psychiatry appointments this month due to no insurance. She continues to take her medications as prescribed. Her medication list is reviewed and updated today.      -------------------------------------    Problem list and histories reviewed & adjusted, as indicated.  Additional history: as documented    Labs reviewed in EPIC    Reviewed and updated as needed this visit by clinical staff  Tobacco  Allergies  Med Hx  Surg Hx  Fam Hx  Soc Hx      Reviewed and updated as needed this visit by Provider         ROS:   ROS: 10 point ROS neg other than the symptoms noted above in the HPI.      OBJECTIVE:                                                    /61 (BP Location:  Right arm, Cuff Size: Adult Regular)  Pulse 90  Temp 98.1  F (36.7  C) (Tympanic)  Wt 173 lb (78.5 kg)  LMP  (LMP Unknown)  BMI 27.92 kg/m2  Body mass index is 27.92 kg/(m^2).   GENERAL: healthy, alert, well nourished, well hydrated, no distress  HENT: ear canals- normal; TMs- normal; Nose- normal; Mouth- no ulcers, no lesions  NECK: no tenderness, no adenopathy, no asymmetry, no masses, no stiffness; thyroid- normal to palpation  RESP: lungs clear to auscultation - no rales, no rhonchi, no wheezes  CV: regular rates and rhythm, normal S1 S2, no S3 or S4 and no murmur, no click or rub -  ABDOMEN: soft, mild diffuse lower abdominal tenderness, no  hepatosplenomegaly, no masses, normal bowel sounds    Diagnostic test results:  Results for orders placed or performed in visit on 03/08/17   *UA reflex to Microscopic and Culture ([facility] and [facility] (except [place] and [place])   Result Value Ref Range    Color Urine Yellow     Appearance Urine Clear     Glucose Urine Negative NEG mg/dL    Bilirubin Urine Negative NEG    Ketones Urine Negative NEG mg/dL    Specific Gravity Urine 1.025 1.003 - 1.035    Blood Urine Trace (A) NEG    pH Urine 6.5 5.0 - 7.0 pH    Protein Albumin Urine Negative NEG mg/dL    Urobilinogen Urine 0.2 0.2 - 1.0 EU/dL    Nitrite Urine Negative NEG    Leukocyte Esterase Urine Negative NEG    Source Midstream Urine    Urine Microscopic   Result Value Ref Range    WBC Urine O - 2 0 - 2 /HPF    RBC Urine 2-5 (A) 0 - 2 /HPF    Squamous Epithelial /LPF Urine Few FEW /LPF    Bacteria Urine Few (A) NEG /HPF    Amorphous Crystals Moderate (A) NEG /HPF   Wet prep   Result Value Ref Range    Specimen Description Vagina     Wet Prep (A)      No Trichomonas seen  No yeast seen  Many Clue cells seen      Micro Report Status FINAL 03/08/2017    Urine Culture Aerobic Bacterial   Result Value Ref Range    Specimen Description Midstream Urine     Culture Micro       50,000 to 100,000  colonies/mL mixed urogenital raina    Micro Report Status FINAL 03/10/2017      *Note: Due to a large number of results and/or encounters for the requested time period, some results have not been displayed. A complete set of results can be found in Results Review.        ASSESSMENT/PLAN:                                                    1. Drug-induced constipation  Improved  Continue  - polyethylene glycol (MIRALAX) powder; Take 17 g (1 capful) by mouth daily  Dispense: 510 g; Refill: 1    2. Chronic pain syndrome  3. Pelvic pain in female  Plan of care as outlined by her GYN  Dayami p.r.n.  Continue probiotics. Continue gluten-free  Continue constipation prevention strategies    4. Scratched by cat, initial encounter  Keep the wound clean and dry. Watch for ongoing worsening of infection  Begin oral antibiotics  - ciprofloxacin (CIPRO) 500 MG tablet; Take 1 tablet (500 mg) by mouth 2 times daily  Dispense: 14 tablet; Refill: 0    5. Allergic drug reaction, sequela  Multiple antibiotic allergies questionable  Allergy consult recommended for testing  - ALLERGY/ASTHMA ADULT REFERRAL      Follow up with Provider - psychiatry and psychotherapy as previously directed  GI consultation with ongoing abdominal pain  Call or return to the clinic with any worsening of symptoms or no resolution. Patient/Parent verbalized understanding and is in agreement. Medication side effects reviewed.   Current Outpatient Prescriptions   Medication Sig Dispense Refill     gabapentin (NEURONTIN) 600 MG tablet 600 mg in evening. 90 tablet 0     belladonna-opium (B&O SUPPRETTES) 16.2-30 MG per suppository Place 1 suppository (30 mg) rectally every 8 hours as needed for moderate pain  0     polyethylene glycol (MIRALAX) powder Take 17 g (1 capful) by mouth daily 510 g 1     ciprofloxacin (CIPRO) 500 MG tablet Take 1 tablet (500 mg) by mouth 2 times daily 14 tablet 0     QUEtiapine (SEROQUEL) 50 MG tablet Take 1-3 tablets ( mg) by  mouth At Bedtime 21 tablet 0     promethazine (PHENERGAN) 12.5 MG tablet Take 1-2 tablets (12.5-25 mg) by mouth every 6 hours as needed for nausea 56 tablet 0     lamoTRIgine (LAMICTAL) 200 MG tablet Take 1 tablet (200 mg) by mouth daily 31 tablet 0     LORazepam (ATIVAN) 1 MG tablet Take 1.5 tablets (1.5 mg) by mouth daily as needed for anxiety 20 tablet 0     lidocaine HCl 3 % cream Apply topically 3 times daily 453.6 g 0     fluticasone (FLONASE) 50 MCG/ACT nasal spray Spray 1-2 sprays into both nostrils daily 1 g 5     phenylephrine-cocoa butter (PREPARATION H) 0.25-88.44 % suppository Place 1 suppository rectally 4 times daily as needed for hemorrhoids or itching 24 suppository 0     SUMAtriptan (IMITREX STATDOSE) 6 MG/0.5ML pen injector kit Inject 0.5 mLs (6 mg) Subcutaneous at onset of headache for migraine 3 kit 3     omeprazole (PRILOSEC) 40 MG capsule Take 1 capsule (40 mg) by mouth daily Take 30-60 minutes before a meal. 90 capsule 0     lidocaine (LIDODERM) 5 % patch Apply up to 3 patches to painful area at once for up to 12 h within a 24 h period.  Remove after 12 hours. 30 patch 0     [DISCONTINUED] gabapentin (NEURONTIN) 600 MG tablet 600 mg in Am, Take 1200 mg mid day and evening. 150 tablet 0        See Patient Instructions    DIPESH Marino [facility]

## 2017-05-01 ENCOUNTER — OFFICE VISIT (OUTPATIENT)
Dept: FAMILY MEDICINE | Facility: CLINIC | Age: 32
End: 2017-05-01
Payer: COMMERCIAL

## 2017-05-01 VITALS
WEIGHT: 164.8 LBS | BODY MASS INDEX: 26.48 KG/M2 | TEMPERATURE: 98.5 F | HEART RATE: 78 BPM | HEIGHT: 66 IN | SYSTOLIC BLOOD PRESSURE: 118 MMHG | DIASTOLIC BLOOD PRESSURE: 76 MMHG

## 2017-05-01 DIAGNOSIS — G89.29 OTHER CHRONIC PAIN: Primary | ICD-10-CM

## 2017-05-01 DIAGNOSIS — N89.8 VAGINAL DISCHARGE: ICD-10-CM

## 2017-05-01 LAB
MICRO REPORT STATUS: ABNORMAL
SPECIMEN SOURCE: ABNORMAL
WET PREP SPEC: ABNORMAL

## 2017-05-01 PROCEDURE — 99213 OFFICE O/P EST LOW 20 MIN: CPT | Performed by: FAMILY MEDICINE

## 2017-05-01 PROCEDURE — 87210 SMEAR WET MOUNT SALINE/INK: CPT | Performed by: FAMILY MEDICINE

## 2017-05-01 RX ORDER — HYDROCODONE BITARTRATE AND ACETAMINOPHEN 5; 325 MG/1; MG/1
TABLET ORAL
Qty: 15 TABLET | Refills: 0 | Status: SHIPPED | OUTPATIENT
Start: 2017-05-01 | End: 2017-08-23

## 2017-05-01 NOTE — NURSING NOTE
"Chief Complaint   Patient presents with     Pelvic Pain       Initial /76  Pulse 78  Temp 98.5  F (36.9  C) (Tympanic)  Ht 5' 6\" (1.676 m)  Wt 164 lb 12.8 oz (74.8 kg)  LMP  (LMP Unknown)  BMI 26.6 kg/m2 Estimated body mass index is 26.6 kg/(m^2) as calculated from the following:    Height as of this encounter: 5' 6\" (1.676 m).    Weight as of this encounter: 164 lb 12.8 oz (74.8 kg).  Medication Reconciliation: complete   Phyllis Guerrero CMA      "

## 2017-05-01 NOTE — MR AVS SNAPSHOT
After Visit Summary   5/1/2017    Sirena Dietrich    MRN: [id number]           Patient Information     Date Of Birth          1985        Visit Information        Provider Department      5/1/2017 4:20 PM Fantasma Kirkland MD [facility]        Today's Diagnoses     Other chronic pain    -  1    Vaginal discharge           Follow-ups after your visit        Your next 10 appointments already scheduled     May 10, 2017  3:40 PM CDT   Office Visit with DIPESH Marino CNP   [facility] ([facility])    [address] [facility] [address]   [phone]           Bring a current list of meds and any records pertaining to this visit.  For Physicals, please bring immunization records and any forms needing to be filled out.  Please arrive 10 minutes early to complete paperwork.            May 31, 2017  3:00 PM CDT   Office Visit with La Guzmán MD   [facility]    [address] [facility] [address]   [phone]           Bring a current list of meds and any records pertaining to this visit.  For Physicals, please bring immunization records and any forms needing to be filled out.  Please arrive 10 minutes early to complete paperwork.              Who to contact     If you have questions or need follow up information about today's clinic visit or your schedule please contact [facility] directly at [phone].  Normal or non-critical lab and imaging results will be communicated to you by MyChart, letter or phone within 4 business days after the clinic has received the results. If you do not hear from us within 7 days, please contact the clinic through MyChart or phone. If you have a critical or abnormal lab result, we will notify you by phone as soon as possible.  Submit refill requests through [facility] or call your pharmacy and they will forward the refill  "request to us. Please allow 3 business days for your refill to be completed.          Additional Information About Your Visit        CE Info Systemshart Information     [facility] gives you secure access to your electronic health record. If you see a primary care provider, you can also send messages to your care team and make appointments. If you have questions, please call your primary care clinic.  If you do not have a primary care provider, please call [phone] and they will assist you.        Care EveryWhere ID     This is your Care EveryWhere ID. This could be used by other organizations to access your [place] medical records  [id number]        Your Vitals Were     Pulse Temperature Height Last Period BMI (Body Mass Index)       78 98.5  F (36.9  C) (Tympanic) 5' 6\" (1.676 m) (LMP Unknown) 26.6 kg/m2        Blood Pressure from Last 3 Encounters:   05/01/17 118/76   03/22/17 110/61   03/08/17 138/78    Weight from Last 3 Encounters:   05/01/17 164 lb 12.8 oz (74.8 kg)   03/22/17 173 lb (78.5 kg)   03/08/17 176 lb (79.8 kg)              We Performed the Following     Wet prep          Today's Medication Changes          These changes are accurate as of: 5/1/17  5:05 PM.  If you have any questions, ask your nurse or doctor.               Start taking these medicines.        Dose/Directions    HYDROcodone-acetaminophen 5-325 MG per tablet   Commonly known as:  NORCO   Used for:  Other chronic pain   Started by:  Fantasma Kirkland MD        1-2 TABS TWICE DAILY IF NEEDED   Quantity:  15 tablet   Refills:  0            Where to get your medicines      Some of these will need a paper prescription and others can be bought over the counter.  Ask your nurse if you have questions.     Bring a paper prescription for each of these medications     HYDROcodone-acetaminophen 5-325 MG per tablet                Primary Care Provider Office Phone # Fax #    DIPESH Marino [facility] [phone]       [facility] " [facility] [address] [place] [address]        Goals        General    I will attend counseling appointment (pt-stated)     Notes - Note edited  11/25/2015  1:35 PM by Shi Armstrong LSW    As of today's date 11/25/2015 goal is met at 76 - 100%.   Goal Status:  Complete - will keep to maintain for  A few month  As of today's date 7/28/2015 goal is met at 26 - 50%.   Goal Status:  Active          Thank you!     Thank you for choosing [facility]  for your care. Our goal is always to provide you with excellent care. Hearing back from our patients is one way we can continue to improve our services. Please take a few minutes to complete the written survey that you may receive in the mail after your visit with us. Thank you!             Your Updated Medication List - Protect others around you: Learn how to safely use, store and throw away your medicines at www.disposemymeds.org.          This list is accurate as of: 5/1/17  5:05 PM.  Always use your most recent med list.                   Brand Name Dispense Instructions for use    belladonna-opium 16.2-30 MG per suppository    B&O SUPPRETTES     Place 1 suppository (30 mg) rectally every 8 hours as needed for moderate pain       ciprofloxacin 500 MG tablet    CIPRO    14 tablet    Take 1 tablet (500 mg) by mouth 2 times daily       fluticasone 50 MCG/ACT spray    FLONASE    1 g    Spray 1-2 sprays into both nostrils daily       gabapentin 600 MG tablet    NEURONTIN    90 tablet    600 mg in evening.       HYDROcodone-acetaminophen 5-325 MG per tablet    NORCO    15 tablet    1-2 TABS TWICE DAILY IF NEEDED       lamoTRIgine 200 MG tablet    LaMICtal    31 tablet    Take 1 tablet (200 mg) by mouth daily       lidocaine 5 % Patch    LIDODERM    30 patch    Apply up to 3 patches to painful area at once for up to 12 h within a 24 h period.  Remove after 12 hours.       lidocaine HCl 3 % cream     453.6 g    Apply topically 3 times daily       LORazepam 1 MG  tablet    ATIVAN    20 tablet    Take 1.5 tablets (1.5 mg) by mouth daily as needed for anxiety       MIRALAX powder   Generic drug:  polyethylene glycol     510 g    Take 17 g (1 capful) by mouth daily       omeprazole 40 MG capsule    priLOSEC    90 capsule    Take 1 capsule (40 mg) by mouth daily Take 30-60 minutes before a meal.       phenylephrine-cocoa butter 0.25-88.44 % per suppository    PREPARATION H    24 suppository    Place 1 suppository rectally 4 times daily as needed for hemorrhoids or itching       promethazine 12.5 MG tablet    PHENERGAN    56 tablet    Take 1-2 tablets (12.5-25 mg) by mouth every 6 hours as needed for nausea       QUEtiapine 50 MG tablet    SEROQUEL    21 tablet    Take 1-3 tablets ( mg) by mouth At Bedtime       SUMAtriptan 6 MG/0.5ML pen injector kit    IMITREX STATDOSE    3 kit    Inject 0.5 mLs (6 mg) Subcutaneous at onset of headache for migraine

## 2017-05-01 NOTE — PROGRESS NOTES
"  SUBJECTIVE:                                                    Sirena Dietrich is a 31 year old female who presents to clinic today for the following health issues:    ABDOMINAL PAIN     Onset: 2 WEEKS AGO    Description:   Character: Dull ache and Cramping  Location: pelvic region  Radiation: TO VAGINAL AREA    Intensity: 8/10    Progression of Symptoms:  worsening    Accompanying Signs & Symptoms:  Fever/Chills?: no   Gas/Bloating: no   Nausea: no   Vomitting: no   Diarrhea?: no   Constipation:no   Dysuria or Hematuria: no    History:   Trauma: no   Previous similar pain: YES- PATIENT HAS HISTORY OF PELVIC PAIN   Previous tests done: x-ray    Precipitating factors:   Does the pain change with:     Food: no      BM: no     Urination: no     Alleviating factors:  NONE    Therapies Tried and outcome: IBUPROFEN, VICODIN FROM WHEN SHE HAD HERE TEETH PULLED    LMP:  not applicable        S: Sirena Dietrich is a 31 year old female with recurrent pelvic pain.  Same as before.  Longstanding issue.      Occasional opioids have helped.      Some discharge too.  Frequent yeast/BV in past.  No new sexual contacts or new behaviors.  \"just happened to worsen\"   A few days of it.  Would like to do wet prep    Problem list, med list, additional histories reviewed and updated, as indicated.      O:/76  Pulse 78  Temp 98.5  F (36.9  C) (Tympanic)  Ht 5' 6\" (1.676 m)  Wt 164 lb 12.8 oz (74.8 kg)  LMP  (LMP Unknown)  BMI 26.6 kg/m2  GEN: Alert and oriented, in no acute distress    Wet prep rare yeast.  O/w neg.      A: pelvic pain, chronic with flare       Vaginal discharge    P: 15 vicodin given.  Ran plan by her primary, Shi Martinez, she agreed occasional small amount of opioids is reasonable here.     She has a diflucan at home.  Will take one.        "

## 2017-05-01 NOTE — LETTER
[facility]  [address] [facility] [address]  [phone]    May 1, 2017        Sirena Dietrich  [address]     [facility] [address]          To whom it may concern:    This patient missed school 5/1/2017 due to a clinic visit.     Please contact me for questions or concerns.        Sincerely,        Fantasma Kirkland MD/avril

## 2017-05-02 ENCOUNTER — TELEPHONE (OUTPATIENT)
Dept: FAMILY MEDICINE | Facility: CLINIC | Age: 32
End: 2017-05-02

## 2017-05-02 DIAGNOSIS — B37.31 YEAST INFECTION OF THE VAGINA: Primary | ICD-10-CM

## 2017-05-02 RX ORDER — FLUCONAZOLE 150 MG/1
150 TABLET ORAL ONCE
Qty: 2 TABLET | Refills: 0 | Status: SHIPPED | OUTPATIENT
Start: 2017-05-02 | End: 2017-05-02

## 2017-05-02 NOTE — TELEPHONE ENCOUNTER
See note below, wet prep was done yesterday at [facility], informed Dr Kirkland yesterday she has diflucan at home.  Dipti Cruz RN    Wet Prep (Abnormal) 05/01/2017  5:07 PM 63   No Trichomonas seen No clue cells seen Rare Yeast seen   Micro Report Status 05/01/2017  5:07 PM 63   FINAL 05/01/2017

## 2017-05-02 NOTE — TELEPHONE ENCOUNTER
Pt was into see ABY Kirkland 5/1/17 for Vaginal / Yeast Infection  Pt said she would like to be treated as she has a Vaginal Odor. Please Advise.  Monse [facility]

## 2017-05-10 ENCOUNTER — OFFICE VISIT (OUTPATIENT)
Dept: FAMILY MEDICINE | Facility: CLINIC | Age: 32
End: 2017-05-10
Payer: COMMERCIAL

## 2017-05-10 VITALS
TEMPERATURE: 97.3 F | HEART RATE: 90 BPM | BODY MASS INDEX: 26.47 KG/M2 | SYSTOLIC BLOOD PRESSURE: 126 MMHG | DIASTOLIC BLOOD PRESSURE: 72 MMHG | OXYGEN SATURATION: 99 % | WEIGHT: 164 LBS

## 2017-05-10 DIAGNOSIS — B37.2 CUTANEOUS CANDIDIASIS: ICD-10-CM

## 2017-05-10 DIAGNOSIS — K59.09 CHRONIC CONSTIPATION: ICD-10-CM

## 2017-05-10 DIAGNOSIS — N89.8 VAGINAL ITCHING: Primary | ICD-10-CM

## 2017-05-10 DIAGNOSIS — N64.4 BREAST PAIN, LEFT: ICD-10-CM

## 2017-05-10 LAB
MICRO REPORT STATUS: NORMAL
SPECIMEN SOURCE: NORMAL
WET PREP SPEC: NORMAL

## 2017-05-10 PROCEDURE — 87210 SMEAR WET MOUNT SALINE/INK: CPT | Performed by: NURSE PRACTITIONER

## 2017-05-10 PROCEDURE — 99214 OFFICE O/P EST MOD 30 MIN: CPT | Performed by: NURSE PRACTITIONER

## 2017-05-10 RX ORDER — POLYETHYLENE GLYCOL 3350 17 G/17G
1 POWDER, FOR SOLUTION ORAL DAILY
Qty: 510 G | Refills: 1 | Status: SHIPPED | OUTPATIENT
Start: 2017-05-10 | End: 2017-08-23

## 2017-05-10 RX ORDER — CALCIUM POLYCARBOPHIL 625 MG 625 MG/1
2 TABLET ORAL DAILY
Qty: 60 TABLET | Refills: 2 | Status: SHIPPED | OUTPATIENT
Start: 2017-05-10 | End: 2017-08-23

## 2017-05-10 RX ORDER — NYSTATIN 100000 [USP'U]/G
POWDER TOPICAL 3 TIMES DAILY PRN
Qty: 30 G | Refills: 1 | Status: SHIPPED | OUTPATIENT
Start: 2017-05-10 | End: 2017-11-14

## 2017-05-10 NOTE — MR AVS SNAPSHOT
After Visit Summary   5/10/2017    Sirena Dietrich    MRN: [id number]           Patient Information     Date Of Birth          1985        Visit Information        Provider Department      5/10/2017 3:40 PM Shi Martinez APRN [facility]        Today's Diagnoses     Vaginal itching    -  1    Breast pain, left        Cutaneous candidiasis        Chronic constipation          Care Instructions      Constipation (Adult)  Constipation means that you have bowel movements that are less frequent than usual. Stools often become very hard and difficult to pass.  Constipation is very common. At some point in life it affects almost everyone. Since everyone's bowel habits are different, what is constipation to one person may not be to another. Your healthcare provider may do tests to diagnose constipation. It depends on what he or she finds when evaluating you.    Symptoms of constipation include:    Abdominal pain    Bloating    Vomiting    Painful bowel movements    Itching, swelling, bleeding, or pain around the anus  Causes  Constipation can have many causes. These include:    Diet low in fiber    Too much dairy    Not drinking enough liquids    Lack of exercise or physical activity. This is especially true for older adults.    Changes in lifestyle or daily routine, including pregnancy, aging, work, and travel    Frequent use or misuse of laxatives    Ignoring the urge to have a bowel movement or delaying it until later    Medicines, such as certain prescription pain medicines, iron supplements, antacids, certain antidepressants, and calcium supplements    Diseases like irritable bowel syndrome, bowel obstructions, stroke, diabetes, thyroid disease, Parkinson disease, hemorrhoids, and colon cancer  Complications  Potential complications of constipation can include:    Hemorrhoids    Rectal bleeding from hemorrhoids or anal fissures (skin tears)    Hernias    Dependency on  laxatives    Chronic constipation    Fecal impaction    Bowel obstruction or perforation  Home care  All treatment should be done after talking with your healthcare provider. This is especially true if you have another medical problems, are taking prescription medicines, or are an older adult. Treatment most often involves lifestyle changes. You may also need medicines. Your healthcare provider will tell you which will work best for you. Follow the advice below to help avoid this problem in the future.  Lifestyle changes  These lifestyle changes can help prevent constipation:    Diet. Eat a high-fiber diet, with fresh fruit and vegetables, and reduce dairy intake, meats, and processed foods    Fluids. It's important to get enough fluids each day. Drink plenty of water when you eat more fiber. If you are on diet that limits the amount of fluid you can have, talk about this with your healthcare provider.    Regular exercise. Check with your healthcare provider first.  Medications  Take any medicines as directed. Some laxatives are safe to use only every now and then. Others can be taken on a regular basis. Talk with your doctor or pharmacist if you have questions.  Prescription pain medicines can cause constipation. If you are taking this kind of medicine, ask your healthcare provider if you should also take a stool softener.  Medicines you may take to treat constipation include:    Fiber supplements    Stool softeners    Laxatives    Enemas    Rectal suppositories  Follow-up care  Follow up with your healthcare provider if symptoms don't get better in the next few days. You may need to have more tests or see a specialist.  Call 911  Call 911 if any of these occur:    Trouble breathing    Stiff, rigid abdomen that is severely painful to touch    Confusion    Fainting or loss of consciousness    Rapid heart rate    Chest pain  When to seek medical advice  Call your healthcare provider right away if any of these  occur:    Fever over 100.4 F (38 C)    Failure to resume normal bowel movements    Pain in your abdomen or back gets worse    Nausea or vomiting    Swelling in your abdomen    Blood in the stool    Black, tarry stool    Involuntary weight loss    Weakness    [id number] [employer]. [address] [place] [address]78. All rights reserved. This information is not intended as a substitute for professional medical care. Always follow your healthcare professional's instructions.              Follow-ups after your visit        Your next 10 appointments already scheduled     May 31, 2017  3:00 PM CDT   Office Visit with La Guzmán MD   [facility] ([facility]    [address] [place] [address] [phone]           Bring a current list of meds and any records pertaining to this visit.  For Physicals, please bring immunization records and any forms needing to be filled out.  Please arrive 10 minutes early to complete paperwork.              Future tests that were ordered for you today     Open Future Orders        Priority Expected Expires Ordered    US Breast Left Complete 4 Quadrants Routine  5/10/2018 5/10/2017    MA Diagnostic Digital Bilateral Routine  5/10/2018 5/10/2017            Who to contact     If you have questions or need follow up information about today's clinic visit or your schedule please contact [facility] directly at [phone].  Normal or non-critical lab and imaging results will be communicated to you by MyChart, letter or phone within 4 business days after the clinic has received the results. If you do not hear from us within 7 days, please contact the clinic through MyChart or phone. If you have a critical or abnormal lab result, we will notify you by phone as soon as possible.  Submit refill requests through [employer] or call your pharmacy and they will forward the refill request to us. Please  allow 3 business days for your refill to be completed.          Additional Information About Your Visit        MyChart Information     [employer] gives you secure access to your electronic health record. If you see a primary care provider, you can also send messages to your care team and make appointments. If you have questions, please call your primary care clinic.  If you do not have a primary care provider, please call [phone] and they will assist you.        Care EveryWhere ID     This is your Care EveryWhere ID. This could be used by other organizations to access your [facility] medical records  [id number]        Your Vitals Were     Pulse Temperature Last Period Pulse Oximetry BMI (Body Mass Index)       90 97.3  F (36.3  C) (Tympanic) (LMP Unknown) 99% 26.47 kg/m2        Blood Pressure from Last 3 Encounters:   05/10/17 126/72   05/01/17 118/76   03/22/17 110/61    Weight from Last 3 Encounters:   05/10/17 164 lb (74.4 kg)   05/01/17 164 lb 12.8 oz (74.8 kg)   03/22/17 173 lb (78.5 kg)              We Performed the Following     Wet prep          Today's Medication Changes          These changes are accurate as of: 5/10/17  4:40 PM.  If you have any questions, ask your nurse or doctor.               Start taking these medicines.        Dose/Directions    calcium polycarbophil 625 MG tablet   Commonly known as:  FIBERCON   Used for:  Chronic constipation   Started by:  Shi Martinez APRN CNP        Dose:  2 tablet   Take 2 tablets (1,250 mg) by mouth daily   Quantity:  60 tablet   Refills:  2       nystatin 122083 UNIT/GM Powd   Commonly known as:  MYCOSTATIN   Used for:  Cutaneous candidiasis   Started by:  Shi Martinez APRN CNP        Apply topically 3 times daily as needed   Quantity:  30 g   Refills:  1         These medicines have changed or have updated prescriptions.        Dose/Directions    * MIRALAX powder   This may have changed:  Another medication with the same name was  added. Make sure you understand how and when to take each.   Used for:  Drug-induced constipation   Generic drug:  polyethylene glycol   Changed by:  Shi Martinez APRN CNP        Dose:  1 capful   Take 17 g (1 capful) by mouth daily   Quantity:  510 g   Refills:  1       * polyethylene glycol powder   Commonly known as:  MIRALAX   This may have changed:  You were already taking a medication with the same name, and this prescription was added. Make sure you understand how and when to take each.   Used for:  Chronic constipation   Changed by:  Shi Martinez APRN CNP        Dose:  1 capful   Take 17 g (1 capful) by mouth daily   Quantity:  510 g   Refills:  1       * Notice:  This list has 2 medication(s) that are the same as other medications prescribed for you. Read the directions carefully, and ask your doctor or other care provider to review them with you.      Stop taking these medicines if you haven't already. Please contact your care team if you have questions.     belladonna-opium 16.2-30 MG per suppository   Commonly known as:  B&O SUPPRETTES   Stopped by:  Shi Martinez APRN CNP           ciprofloxacin 500 MG tablet   Commonly known as:  CIPRO   Stopped by:  Shi Martinez APRN CNP                Where to get your medicines      These medications were sent to [facility] [address] [address]     Phone:  [phone]     calcium polycarbophil 625 MG tablet    nystatin 161791 UNIT/GM Powd    polyethylene glycol powder                Primary Care Provider Office Phone # Fax #    DIPESH Marino [phone]       [facility] [address] [place] [address]        Goals        General    I will attend counseling appointment (pt-stated)     Notes - Note edited  11/25/2015  1:35 PM by Shi Armstrong LSW    As of today's date 11/25/2015 goal is met at 76 - 100%.    Goal Status:  Complete - will keep to maintain for  A few month  As of today's date 7/28/2015 goal is met at 26 - 50%.   Goal Status:  Active          Thank you!     Thank you for choosing [facility]  for your care. Our goal is always to provide you with excellent care. Hearing back from our patients is one way we can continue to improve our services. Please take a few minutes to complete the written survey that you may receive in the mail after your visit with us. Thank you!             Your Updated Medication List - Protect others around you: Learn how to safely use, store and throw away your medicines at www.disposemymeds.org.          This list is accurate as of: 5/10/17  4:40 PM.  Always use your most recent med list.                   Brand Name Dispense Instructions for use    calcium polycarbophil 625 MG tablet    FIBERCON    60 tablet    Take 2 tablets (1,250 mg) by mouth daily       fluticasone 50 MCG/ACT spray    FLONASE    1 g    Spray 1-2 sprays into both nostrils daily       gabapentin 600 MG tablet    NEURONTIN    90 tablet    600 mg in evening.       HYDROcodone-acetaminophen 5-325 MG per tablet    NORCO    15 tablet    1-2 TABS TWICE DAILY IF NEEDED       lamoTRIgine 200 MG tablet    LaMICtal    31 tablet    Take 1 tablet (200 mg) by mouth daily       lidocaine 5 % Patch    LIDODERM    30 patch    Apply up to 3 patches to painful area at once for up to 12 h within a 24 h period.  Remove after 12 hours.       lidocaine HCl 3 % cream     453.6 g    Apply topically 3 times daily       LORazepam 1 MG tablet    ATIVAN    20 tablet    Take 1.5 tablets (1.5 mg) by mouth daily as needed for anxiety       * MIRALAX powder   Generic drug:  polyethylene glycol     510 g    Take 17 g (1 capful) by mouth daily       * polyethylene glycol powder    MIRALAX    510 g    Take 17 g (1 capful) by mouth daily       nystatin 795708 UNIT/GM Powd    MYCOSTATIN    30 g    Apply topically 3 times daily as  needed       omeprazole 40 MG capsule    priLOSEC    90 capsule    Take 1 capsule (40 mg) by mouth daily Take 30-60 minutes before a meal.       phenylephrine-cocoa butter 0.25-88.44 % per suppository    PREPARATION H    24 suppository    Place 1 suppository rectally 4 times daily as needed for hemorrhoids or itching       promethazine 12.5 MG tablet    PHENERGAN    56 tablet    Take 1-2 tablets (12.5-25 mg) by mouth every 6 hours as needed for nausea       QUEtiapine 50 MG tablet    SEROQUEL    21 tablet    Take 1-3 tablets ( mg) by mouth At Bedtime       SUMAtriptan 6 MG/0.5ML pen injector kit    IMITREX STATDOSE    3 kit    Inject 0.5 mLs (6 mg) Subcutaneous at onset of headache for migraine       * Notice:  This list has 2 medication(s) that are the same as other medications prescribed for you. Read the directions carefully, and ask your doctor or other care provider to review them with you.

## 2017-05-10 NOTE — PROGRESS NOTES
SUBJECTIVE:                                                    Sirena Dietrich is a 31 year old female who presents to clinic today for the following health issues:  Patient comes into the clinic today for left-sided breast pain. She is having some itching and drainage in her left inverted nipple area  Intermittent problems with vaginal itching and vaginal pain. Intermittent chronic pelvic pain  Chronic ongoing problems with constipation  No recent migraines per patient report  Working full-time.  States mental health is more stable. Tolerating current medications without difficulty    Depression Followup    Status since last visit: Stable     See PHQ-9 for current symptoms.  Other associated symptoms: None    Complicating factors:   Significant life event:  New job going great    Current substance abuse:  None  Anxiety or Panic symptoms:  Yes     PHQ-9  English PHQ-9   Any Language            Amount of exercise or physical activity: None    Problems taking medications regularly: No    Medication side effects: none    Diet: regular (no restrictions)      -------------------------------------    Problem list and histories reviewed & adjusted, as indicated.  Additional history: as documented    Labs reviewed in EPIC    Reviewed and updated as needed this visit by clinical staff  Tobacco  Allergies       Reviewed and updated as needed this visit by Provider  Tobacco  Allergies         ROS:   ROS: 10 point ROS neg other than the symptoms noted above in the HPI.      OBJECTIVE:                                                    /72  Pulse 90  Temp 97.3  F (36.3  C) (Tympanic)  Wt 164 lb (74.4 kg)  LMP  (LMP Unknown)  SpO2 99%  BMI 26.47 kg/m2  Body mass index is 26.47 kg/(m^2).   GENERAL: healthy, alert, well nourished, well hydrated, no distress  HENT: ear canals- normal; TMs- normal; Nose- normal; Mouth- no ulcers, no lesions  NECK: no tenderness, no adenopathy, no asymmetry, no masses, no stiffness;  thyroid- normal to palpation  Breasts: Fibrocystic changes left upper outer quadrant. Otherwise breasts are normal without suspicious masses, skin changes or axillary nodes, inverted nipple on the left. No drainage today.   RESP: lungs clear to auscultation - no rales, no rhonchi, no wheezes  CV: regular rates and rhythm, normal S1 S2, no S3 or S4 and no murmur, no click or rub -  ABDOMEN: soft, no tenderness, no  hepatosplenomegaly, no masses, normal bowel sounds  MS: extremities- no gross deformities noted, no edema  SKIN: no suspicious lesions, no rashes  NEURO: strength and tone- normal, sensory exam- grossly normal, mentation- intact, speech- normal, reflexes- symmetric    Diagnostic test results:  Results for orders placed or performed in visit on 05/10/17   Wet prep   Result Value Ref Range    Specimen Description Vagina     Wet Prep       No Trichomonas seen No clue cells seen No yeast seen    Micro Report Status FINAL 05/10/2017      *Note: Due to a large number of results and/or encounters for the requested time period, some results have not been displayed. A complete set of results can be found in Results Review.        ASSESSMENT/PLAN:                                                    1. Vaginal itching  Wet prep negative vaginal as needed  - Wet prep    2. Breast pain, left  - US Breast Left Complete 4 Quadrants; Future  - MA Diagnostic Digital Bilateral; Future    3. Cutaneous candidiasis  - nystatin (MYCOSTATIN) 214660 UNIT/GM POWD; Apply topically 3 times daily as needed  Dispense: 30 g; Refill: 1    4. Chronic constipation  - polyethylene glycol (MIRALAX) powder; Take 17 g (1 capful) by mouth daily  Dispense: 510 g; Refill: 1  - calcium polycarbophil (FIBERCON) 625 MG tablet; Take 2 tablets (1,250 mg) by mouth daily  Dispense: 60 tablet; Refill: 2      Follow up with Provider - Call or return to the clinic with any worsening of symptoms or no resolution. Patient/Parent verbalized understanding and  is in agreement. Medication side effects reviewed.   Current Outpatient Prescriptions   Medication Sig Dispense Refill     nystatin (MYCOSTATIN) 433233 UNIT/GM POWD Apply topically 3 times daily as needed 30 g 1     polyethylene glycol (MIRALAX) powder Take 17 g (1 capful) by mouth daily 510 g 1     calcium polycarbophil (FIBERCON) 625 MG tablet Take 2 tablets (1,250 mg) by mouth daily 60 tablet 2     HYDROcodone-acetaminophen (NORCO) 5-325 MG per tablet 1-2 TABS TWICE DAILY IF NEEDED 15 tablet 0     gabapentin (NEURONTIN) 600 MG tablet 600 mg in evening. 90 tablet 0     polyethylene glycol (MIRALAX) powder Take 17 g (1 capful) by mouth daily 510 g 1     QUEtiapine (SEROQUEL) 50 MG tablet Take 1-3 tablets ( mg) by mouth At Bedtime 21 tablet 0     promethazine (PHENERGAN) 12.5 MG tablet Take 1-2 tablets (12.5-25 mg) by mouth every 6 hours as needed for nausea 56 tablet 0     lamoTRIgine (LAMICTAL) 200 MG tablet Take 1 tablet (200 mg) by mouth daily 31 tablet 0     LORazepam (ATIVAN) 1 MG tablet Take 1.5 tablets (1.5 mg) by mouth daily as needed for anxiety 20 tablet 0     lidocaine HCl 3 % cream Apply topically 3 times daily 453.6 g 0     fluticasone (FLONASE) 50 MCG/ACT nasal spray Spray 1-2 sprays into both nostrils daily 1 g 5     phenylephrine-cocoa butter (PREPARATION H) 0.25-88.44 % suppository Place 1 suppository rectally 4 times daily as needed for hemorrhoids or itching 24 suppository 0     SUMAtriptan (IMITREX STATDOSE) 6 MG/0.5ML pen injector kit Inject 0.5 mLs (6 mg) Subcutaneous at onset of headache for migraine 3 kit 3     omeprazole (PRILOSEC) 40 MG capsule Take 1 capsule (40 mg) by mouth daily Take 30-60 minutes before a meal. 90 capsule 0     lidocaine (LIDODERM) 5 % patch Apply up to 3 patches to painful area at once for up to 12 h within a 24 h period.  Remove after 12 hours. 30 patch 0        See Patient Instructions    [facility]

## 2017-05-10 NOTE — PATIENT INSTRUCTIONS

## 2017-05-16 ENCOUNTER — OFFICE VISIT (OUTPATIENT)
Dept: URGENT CARE | Facility: URGENT CARE | Age: 32
End: 2017-05-16
Payer: COMMERCIAL

## 2017-05-16 VITALS
TEMPERATURE: 98.2 F | RESPIRATION RATE: 20 BRPM | DIASTOLIC BLOOD PRESSURE: 86 MMHG | OXYGEN SATURATION: 98 % | SYSTOLIC BLOOD PRESSURE: 131 MMHG | HEART RATE: 88 BPM

## 2017-05-16 DIAGNOSIS — B96.89 BV (BACTERIAL VAGINOSIS): ICD-10-CM

## 2017-05-16 DIAGNOSIS — R10.2 VAGINAL PAIN: Primary | ICD-10-CM

## 2017-05-16 DIAGNOSIS — N76.0 BV (BACTERIAL VAGINOSIS): ICD-10-CM

## 2017-05-16 LAB
MICRO REPORT STATUS: ABNORMAL
SPECIMEN SOURCE: ABNORMAL
WET PREP SPEC: ABNORMAL

## 2017-05-16 PROCEDURE — 87591 N.GONORRHOEAE DNA AMP PROB: CPT | Performed by: NURSE PRACTITIONER

## 2017-05-16 PROCEDURE — 87210 SMEAR WET MOUNT SALINE/INK: CPT | Performed by: NURSE PRACTITIONER

## 2017-05-16 PROCEDURE — 87491 CHLMYD TRACH DNA AMP PROBE: CPT | Performed by: NURSE PRACTITIONER

## 2017-05-16 PROCEDURE — 99213 OFFICE O/P EST LOW 20 MIN: CPT | Performed by: NURSE PRACTITIONER

## 2017-05-16 RX ORDER — METRONIDAZOLE 500 MG/1
500 TABLET ORAL 2 TIMES DAILY
Qty: 14 TABLET | Refills: 0 | Status: SHIPPED | OUTPATIENT
Start: 2017-05-16 | End: 2017-05-23

## 2017-05-16 NOTE — MR AVS SNAPSHOT
After Visit Summary   2017    Sirena Dietrich    MRN: [id number]           Patient Information     Date Of Birth          1985        Visit Information        Provider Department      2017 7:10 PM Cynthia Martin APRN [facility]        Today's Diagnoses     Vaginal pain    -  1    BV (bacterial vaginosis)          Care Instructions    Take meds as directed.    Follow up with your primary care provider as needed should symptoms worsen or not resolve.        Indications for emergent return to emergency department discussed with patient, who verbalized good understanding and agreement.  Patient understands the limitations of today's evaluation.         Bacterial Vaginosis    You have a vaginal infection called bacterial vaginosis (BV). Both good and bad bacteria are present in a healthy vagina. BV occurs when these bacteria get out of balance. The number of bad bacteria increase. And the number of good bacteria decrease.  BV may or may not cause symptoms. If symptoms do occur, they can include:    Thin, gray, milky-white, or sometimes green discharge    Unpleasant odor or  fishy  smell    Itching, burning, or pain in or around the vagina  It is not known what causes BV, but certain factors can make the problem more likely. This can include:    Douching    Having sex with a new partner    Having sex with more than one partner  BV will sometimes go away on its own. But treatment is usually recommended. This is because untreated BV can increase the risk of more serious health problems such as:    Pelvic inflammatory disease (PID)     delivery (giving birth to a baby early if you re pregnant)    HIV and certain other sexually transmitted diseases (STDs)    Infection after surgery on the reproductive organs  Home care  General care    BV is most often treated with medicines called antibiotics. These may be given as pills or as a vaginal  cream. If antibiotics are prescribed, be sure to use them exactly as directed. Also, be sure to complete all of the medicine, even if your symptoms go away.    Avoid douching or having sex during treatment.    If you have sex with a female partner, ask your healthcare provider if she should also be treated.  Prevention    Limit or avoid douching.    Avoid having sex. If you do have sex, then take steps to lower your risk:    Use condoms when having sex.    Limit the number of partners you have sex with.  Follow-up care  Follow up with your healthcare provider, or as advised.  When to seek medical advice  Call your healthcare provider right away if:    You have a fever of 100.4 F (38 C) or higher, or as directed by your provider.    Your symptoms worsen, or they don t go away within a few days of starting treatment.    You have new pain in the lower belly or pelvic region.    You have side effects that bother you or a reaction to the pills or cream you re prescribed.    You or any partners you have sex with have new symptoms, such as a rash, joint pain, or sores.    9738-9235 [employer]. [address] [place] [address]. All rights reserved. This information is not intended as a substitute for professional medical care. Always follow your healthcare professional's instructions.          Vaginal Infection: Bacterial Vaginosis  Both good and bad bacteria are present in a healthy vagina. Bacterial vaginosis (BV) occurs when these bacteria get out of balance. The numbers of good bacteria decrease. This allows the numbers of bad bacteria to increase and cause BV. In most cases, BV is not a serious problem. This sheet tells you more about BV and how it can be treated.  Causes of Bacterial Vaginosis  The cause of BV is not clear. Douching may lead to it. Having sex with a new partner or more than 1 partner makes it more likely.  Symptoms of Bacterial Vaginosis  Symptoms of BV vary for each woman.  Some women have few symptoms or none at all. If symptoms are present, they can include:    Thin, milky white or gray discharge    Unpleasant  fishy  odor    Irritation, itching, and burning at opening of vagina.    Burning or irritation with sex or urination  Diagnosing Bacterial Vaginosis  Your health care provider will ask about your symptoms and health history. He or she will also perform a pelvic exam. This is an exam of your vagina and cervix. A sample of vaginal fluid or discharge may be taken. This sample is checked for signs of BV.  Treating Bacterial Vaginosis  BV is often treated with antibiotics. They may be given in oral pill form or as a vaginal cream. To use these medications:    Be sure to take all of your medication, even if your symptoms go away.    If you re taking antibiotic pills, do not drink alcohol until you re finished with all of your medication.    If you re using vaginal cream, apply it as directed. Be aware that the cream may make condoms and diaphragms less effective.    Call your health care provider if symptoms do not go away within 4 days of starting treatment. Also call if you have a reaction to the medication.  Why Treatment Matters  Even if you have no symptoms or your symptoms are mild, BV should be treated. Untreated BV can lead to health problems such as:    Increased risk of  delivery if you re pregnant.    Increased risk of complications after surgery on the reproductive organs.    Possible increased risk of pelvic inflammatory disease (PID).     6064-0735 [employer]. [address] [place] [address]. All rights reserved. This information is not intended as a substitute for professional medical care. Always follow your healthcare professional's instructions.              Follow-ups after your visit        Follow-up notes from your care team     See patient instructions section of the AVS Return if symptoms worsen or fail to improve, for Follow up  with your primary care provider.      Your next 10 appointments already scheduled     May 31, 2017  3:00 PM CDT   [facility]    [address] [facility] [place] [id number] [phone]           Bring a current list of meds and any records pertaining to this visit.  For Physicals, please bring immunization records and any forms needing to be filled out.  Please arrive 10 minutes early to complete paperwork.              Who to contact     If you have questions or need follow up information about today's clinic visit or your schedule please contact [facility] directly at [phone].  Normal or non-critical lab and imaging results will be communicated to you by Custorahart, letter or phone within 4 business days after the clinic has received the results. If you do not hear from us within 7 days, please contact the clinic through [employer] Factory[employer] or phone. If you have a critical or abnormal lab result, we will notify you by phone as soon as possible.  Submit refill requests through [employer] or call your pharmacy and they will forward the refill request to us. Please allow 3 business days for your refill to be completed.          Additional Information About Your Visit        CustoraharUrbanSitter Information     [employer] gives you secure access to your electronic health record. If you see a primary care provider, you can also send messages to your care team and make appointments. If you have questions, please call your primary care clinic.  If you do not have a primary care provider, please call [phone] and they will assist you.        Care EveryWhere ID     This is your Care EveryWhere ID. This could be used by other organizations to access your [place] medical records  [id number]        Your Vitals Were     Pulse Temperature Respirations Last Period Pulse Oximetry       88 98.2  F (36.8  C) (Tympanic) 20  (LMP Unknown) 98%        Blood Pressure from Last 3 Encounters:   05/16/17 131/86   05/10/17 126/72   05/01/17 118/76    Weight from Last 3 Encounters:   05/10/17 164 lb (74.4 kg)   05/01/17 164 lb 12.8 oz (74.8 kg)   03/22/17 173 lb (78.5 kg)              We Performed the Following     Chlamydia trachomatis PCR     Neisseria gonorrhoeae PCR     Wet prep          Today's Medication Changes          These changes are accurate as of: 5/16/17  8:43 PM.  If you have any questions, ask your nurse or doctor.               Start taking these medicines.        Dose/Directions    metroNIDAZOLE 500 MG tablet   Commonly known as:  FLAGYL   Used for:  BV (bacterial vaginosis)   Started by:  Cynthia Martin APRN CNP        Dose:  500 mg   Take 1 tablet (500 mg) by mouth 2 times daily for 7 days   Quantity:  14 tablet   Refills:  0            Where to get your medicines      These medications were sent to [facility] [address] [address]     Phone:  [phone]     metroNIDAZOLE 500 MG tablet                Primary Care Provider Office Phone # Fax #    DIPESH Marino [phone]       [address] [address]        Goals        General    I will attend counseling appointment (pt-stated)     Notes - Note edited  11/25/2015  1:35 PM by Shi Armstrong LSW    As of today's date 11/25/2015 goal is met at 76 - 100%.   Goal Status:  Complete - will keep to maintain for  A few month  As of today's date 7/28/2015 goal is met at 26 - 50%.   Goal Status:  Active          Thank you!     Thank you for choosing [facility]  for your care. Our goal is always to provide you with excellent care. Hearing back from our patients is one way we can continue to improve our services. Please take a few minutes to complete the written survey that you may receive in the mail after  your visit with us. Thank you!             Your Updated Medication List - Protect others around you: Learn how to safely use, store and throw away your medicines at www.disposemymeds.org.          This list is accurate as of: 5/16/17  8:43 PM.  Always use your most recent med list.                   Brand Name Dispense Instructions for use    calcium polycarbophil 625 MG tablet    FIBERCON    60 tablet    Take 2 tablets (1,250 mg) by mouth daily       fluticasone 50 MCG/ACT spray    FLONASE    1 g    Spray 1-2 sprays into both nostrils daily       gabapentin 600 MG tablet    NEURONTIN    90 tablet    600 mg in evening.       HYDROcodone-acetaminophen 5-325 MG per tablet    NORCO    15 tablet    1-2 TABS TWICE DAILY IF NEEDED       lamoTRIgine 200 MG tablet    LaMICtal    31 tablet    Take 1 tablet (200 mg) by mouth daily       lidocaine 5 % Patch    LIDODERM    30 patch    Apply up to 3 patches to painful area at once for up to 12 h within a 24 h period.  Remove after 12 hours.       lidocaine HCl 3 % cream     453.6 g    Apply topically 3 times daily       LORazepam 1 MG tablet    ATIVAN    20 tablet    Take 1.5 tablets (1.5 mg) by mouth daily as needed for anxiety       metroNIDAZOLE 500 MG tablet    FLAGYL    14 tablet    Take 1 tablet (500 mg) by mouth 2 times daily for 7 days       * MIRALAX powder   Generic drug:  polyethylene glycol     510 g    Take 17 g (1 capful) by mouth daily       * polyethylene glycol powder    MIRALAX    510 g    Take 17 g (1 capful) by mouth daily       nystatin 829129 UNIT/GM Powd    MYCOSTATIN    30 g    Apply topically 3 times daily as needed       omeprazole 40 MG capsule    priLOSEC    90 capsule    Take 1 capsule (40 mg) by mouth daily Take 30-60 minutes before a meal.       phenylephrine-cocoa butter 0.25-88.44 % per suppository    PREPARATION H    24 suppository    Place 1 suppository rectally 4 times daily as needed for hemorrhoids or itching       promethazine 12.5 MG  tablet    PHENERGAN    56 tablet    Take 1-2 tablets (12.5-25 mg) by mouth every 6 hours as needed for nausea       QUEtiapine 50 MG tablet    SEROQUEL    21 tablet    Take 1-3 tablets ( mg) by mouth At Bedtime       SUMAtriptan 6 MG/0.5ML pen injector kit    IMITREX STATDOSE    3 kit    Inject 0.5 mLs (6 mg) Subcutaneous at onset of headache for migraine       * Notice:  This list has 2 medication(s) that are the same as other medications prescribed for you. Read the directions carefully, and ask your doctor or other care provider to review them with you.
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
## 2017-05-17 NOTE — PATIENT INSTRUCTIONS
Take meds as directed.    Follow up with your primary care provider as needed should symptoms worsen or not resolve.        Indications for emergent return to emergency department discussed with patient, who verbalized good understanding and agreement.  Patient understands the limitations of today's evaluation.         Bacterial Vaginosis    You have a vaginal infection called bacterial vaginosis (BV). Both good and bad bacteria are present in a healthy vagina. BV occurs when these bacteria get out of balance. The number of bad bacteria increase. And the number of good bacteria decrease.  BV may or may not cause symptoms. If symptoms do occur, they can include:    Thin, gray, milky-white, or sometimes green discharge    Unpleasant odor or  fishy  smell    Itching, burning, or pain in or around the vagina  It is not known what causes BV, but certain factors can make the problem more likely. This can include:    Douching    Having sex with a new partner    Having sex with more than one partner  BV will sometimes go away on its own. But treatment is usually recommended. This is because untreated BV can increase the risk of more serious health problems such as:    Pelvic inflammatory disease (PID)     delivery (giving birth to a baby early if you re pregnant)    HIV and certain other sexually transmitted diseases (STDs)    Infection after surgery on the reproductive organs  Home care  General care    BV is most often treated with medicines called antibiotics. These may be given as pills or as a vaginal cream. If antibiotics are prescribed, be sure to use them exactly as directed. Also, be sure to complete all of the medicine, even if your symptoms go away.    Avoid douching or having sex during treatment.    If you have sex with a female partner, ask your healthcare provider if she should also be treated.  Prevention    Limit or avoid douching.    Avoid having sex. If you do have sex, then take steps to lower  your risk:    Use condoms when having sex.    Limit the number of partners you have sex with.  Follow-up care  Follow up with your healthcare provider, or as advised.  When to seek medical advice  Call your healthcare provider right away if:    You have a fever of 100.4 F (38 C) or higher, or as directed by your provider.    Your symptoms worsen, or they don t go away within a few days of starting treatment.    You have new pain in the lower belly or pelvic region.    You have side effects that bother you or a reaction to the pills or cream you re prescribed.    You or any partners you have sex with have new symptoms, such as a rash, joint pain, or sores.    8712-1004 [employer]. [address] [place] [address], [place] [address]. All rights reserved. This information is not intended as a substitute for professional medical care. Always follow your healthcare professional's instructions.          Vaginal Infection: Bacterial Vaginosis  Both good and bad bacteria are present in a healthy vagina. Bacterial vaginosis (BV) occurs when these bacteria get out of balance. The numbers of good bacteria decrease. This allows the numbers of bad bacteria to increase and cause BV. In most cases, BV is not a serious problem. This sheet tells you more about BV and how it can be treated.  Causes of Bacterial Vaginosis  The cause of BV is not clear. Douching may lead to it. Having sex with a new partner or more than 1 partner makes it more likely.  Symptoms of Bacterial Vaginosis  Symptoms of BV vary for each woman. Some women have few symptoms or none at all. If symptoms are present, they can include:    Thin, milky white or gray discharge    Unpleasant  fishy  odor    Irritation, itching, and burning at opening of vagina.    Burning or irritation with sex or urination  Diagnosing Bacterial Vaginosis  Your health care provider will ask about your symptoms and health history. He or she will also perform a pelvic exam. This is  an exam of your vagina and cervix. A sample of vaginal fluid or discharge may be taken. This sample is checked for signs of BV.  Treating Bacterial Vaginosis  BV is often treated with antibiotics. They may be given in oral pill form or as a vaginal cream. To use these medications:    Be sure to take all of your medication, even if your symptoms go away.    If you re taking antibiotic pills, do not drink alcohol until you re finished with all of your medication.    If you re using vaginal cream, apply it as directed. Be aware that the cream may make condoms and diaphragms less effective.    Call your health care provider if symptoms do not go away within 4 days of starting treatment. Also call if you have a reaction to the medication.  Why Treatment Matters  Even if you have no symptoms or your symptoms are mild, BV should be treated. Untreated BV can lead to health problems such as:    Increased risk of  delivery if you re pregnant.    Increased risk of complications after surgery on the reproductive organs.    Possible increased risk of pelvic inflammatory disease (PID).     1208-3297 [employer]. [address] [place] [address], [place] [address]. All rights reserved. This information is not intended as a substitute for professional medical care. Always follow your healthcare professional's instructions.

## 2017-06-09 ENCOUNTER — HOSPITAL ENCOUNTER (EMERGENCY)
Facility: CLINIC | Age: 32
Discharge: HOME | End: 2017-06-09
Attending: EMERGENCY MEDICINE | Admitting: EMERGENCY MEDICINE
Payer: COMMERCIAL

## 2017-06-09 VITALS
OXYGEN SATURATION: 100 % | RESPIRATION RATE: 16 BRPM | DIASTOLIC BLOOD PRESSURE: 88 MMHG | HEIGHT: 66 IN | WEIGHT: 154 LBS | HEART RATE: 89 BPM | SYSTOLIC BLOOD PRESSURE: 134 MMHG | TEMPERATURE: 97.9 F | BODY MASS INDEX: 24.75 KG/M2

## 2017-06-09 DIAGNOSIS — R42 DIZZINESS: ICD-10-CM

## 2017-06-09 LAB
ALBUMIN UR-MCNC: 10 MG/DL
ANION GAP SERPL CALCULATED.3IONS-SCNC: 8 MMOL/L (ref 3–14)
APPEARANCE UR: ABNORMAL
BACTERIA #/AREA URNS HPF: ABNORMAL /HPF
BASOPHILS # BLD AUTO: 0 10E9/L (ref 0–0.2)
BASOPHILS NFR BLD AUTO: 0.5 %
BILIRUB UR QL STRIP: NEGATIVE
BUN SERPL-MCNC: 13 MG/DL (ref 7–30)
CALCIUM SERPL-MCNC: 8.9 MG/DL (ref 8.5–10.1)
CHLORIDE SERPL-SCNC: 105 MMOL/L (ref 94–109)
CO2 SERPL-SCNC: 26 MMOL/L (ref 20–32)
COLOR UR AUTO: YELLOW
CREAT SERPL-MCNC: 0.72 MG/DL (ref 0.52–1.04)
DIFFERENTIAL METHOD BLD: NORMAL
EOSINOPHIL # BLD AUTO: 0.2 10E9/L (ref 0–0.7)
EOSINOPHIL NFR BLD AUTO: 2.4 %
ERYTHROCYTE [DISTWIDTH] IN BLOOD BY AUTOMATED COUNT: 11.9 % (ref 10–15)
GFR SERPL CREATININE-BSD FRML MDRD: NORMAL ML/MIN/1.7M2
GLUCOSE SERPL-MCNC: 95 MG/DL (ref 70–99)
GLUCOSE UR STRIP-MCNC: NEGATIVE MG/DL
HCT VFR BLD AUTO: 39.2 % (ref 35–47)
HGB BLD-MCNC: 13.6 G/DL (ref 11.7–15.7)
HGB UR QL STRIP: ABNORMAL
IMM GRANULOCYTES # BLD: 0 10E9/L (ref 0–0.4)
IMM GRANULOCYTES NFR BLD: 0.1 %
KETONES UR STRIP-MCNC: NEGATIVE MG/DL
LEUKOCYTE ESTERASE UR QL STRIP: NEGATIVE
LYMPHOCYTES # BLD AUTO: 2.9 10E9/L (ref 0.8–5.3)
LYMPHOCYTES NFR BLD AUTO: 35.4 %
MCH RBC QN AUTO: 30.6 PG (ref 26.5–33)
MCHC RBC AUTO-ENTMCNC: 34.7 G/DL (ref 31.5–36.5)
MCV RBC AUTO: 88 FL (ref 78–100)
MONOCYTES # BLD AUTO: 0.6 10E9/L (ref 0–1.3)
MONOCYTES NFR BLD AUTO: 7.6 %
MUCOUS THREADS #/AREA URNS LPF: PRESENT /LPF
NEUTROPHILS # BLD AUTO: 4.5 10E9/L (ref 1.6–8.3)
NEUTROPHILS NFR BLD AUTO: 54 %
NITRATE UR QL: NEGATIVE
PH UR STRIP: 6 PH (ref 5–7)
PLATELET # BLD AUTO: 311 10E9/L (ref 150–450)
POTASSIUM SERPL-SCNC: 3.5 MMOL/L (ref 3.4–5.3)
RBC # BLD AUTO: 4.45 10E12/L (ref 3.8–5.2)
RBC #/AREA URNS AUTO: 4 /HPF (ref 0–2)
SODIUM SERPL-SCNC: 139 MMOL/L (ref 133–144)
SP GR UR STRIP: 1.02 (ref 1–1.03)
SQUAMOUS #/AREA URNS AUTO: 10 /HPF (ref 0–1)
URN SPEC COLLECT METH UR: ABNORMAL
UROBILINOGEN UR STRIP-MCNC: NORMAL MG/DL (ref 0–2)
WBC # BLD AUTO: 8.3 10E9/L (ref 4–11)
WBC #/AREA URNS AUTO: 2 /HPF (ref 0–2)

## 2017-06-09 PROCEDURE — 93010 ELECTROCARDIOGRAM REPORT: CPT | Performed by: EMERGENCY MEDICINE

## 2017-06-09 PROCEDURE — 80048 BASIC METABOLIC PNL TOTAL CA: CPT | Performed by: EMERGENCY MEDICINE

## 2017-06-09 PROCEDURE — 25000128 H RX IP 250 OP 636: Performed by: EMERGENCY MEDICINE

## 2017-06-09 PROCEDURE — 93005 ELECTROCARDIOGRAM TRACING: CPT

## 2017-06-09 PROCEDURE — 81001 URINALYSIS AUTO W/SCOPE: CPT | Performed by: EMERGENCY MEDICINE

## 2017-06-09 PROCEDURE — 96374 THER/PROPH/DIAG INJ IV PUSH: CPT

## 2017-06-09 PROCEDURE — 85025 COMPLETE CBC W/AUTO DIFF WBC: CPT | Performed by: EMERGENCY MEDICINE

## 2017-06-09 PROCEDURE — 96361 HYDRATE IV INFUSION ADD-ON: CPT | Mod: 59

## 2017-06-09 PROCEDURE — 99284 EMERGENCY DEPT VISIT MOD MDM: CPT | Mod: 25 | Performed by: EMERGENCY MEDICINE

## 2017-06-09 PROCEDURE — 96375 TX/PRO/DX INJ NEW DRUG ADDON: CPT

## 2017-06-09 PROCEDURE — 99284 EMERGENCY DEPT VISIT MOD MDM: CPT | Mod: 25

## 2017-06-09 RX ORDER — SODIUM CHLORIDE 9 MG/ML
1000 INJECTION, SOLUTION INTRAVENOUS CONTINUOUS
Status: DISCONTINUED | OUTPATIENT
Start: 2017-06-09 | End: 2017-06-09 | Stop reason: HOSPADM

## 2017-06-09 RX ORDER — ONDANSETRON 2 MG/ML
4 INJECTION INTRAMUSCULAR; INTRAVENOUS ONCE
Status: COMPLETED | OUTPATIENT
Start: 2017-06-09 | End: 2017-06-09

## 2017-06-09 RX ORDER — KETOROLAC TROMETHAMINE 30 MG/ML
30 INJECTION, SOLUTION INTRAMUSCULAR; INTRAVENOUS ONCE
Status: COMPLETED | OUTPATIENT
Start: 2017-06-09 | End: 2017-06-09

## 2017-06-09 RX ORDER — ONDANSETRON 4 MG/1
8 TABLET, ORALLY DISINTEGRATING ORAL EVERY 8 HOURS PRN
Qty: 10 TABLET | Refills: 0 | Status: SHIPPED | OUTPATIENT
Start: 2017-06-09 | End: 2017-06-12

## 2017-06-09 RX ADMIN — SODIUM CHLORIDE 1000 ML: 9 INJECTION, SOLUTION INTRAVENOUS at 17:57

## 2017-06-09 RX ADMIN — ONDANSETRON 4 MG: 2 INJECTION INTRAMUSCULAR; INTRAVENOUS at 17:59

## 2017-06-09 RX ADMIN — KETOROLAC TROMETHAMINE 30 MG: 30 INJECTION, SOLUTION INTRAMUSCULAR at 19:58

## 2017-06-09 ASSESSMENT — ENCOUNTER SYMPTOMS
FACIAL ASYMMETRY: 0
SORE THROAT: 0
CHILLS: 0
PALPITATIONS: 0
NAUSEA: 0
CONFUSION: 0
FATIGUE: 1
CHEST TIGHTNESS: 0
SHORTNESS OF BREATH: 0
FACIAL SWELLING: 0
WEAKNESS: 0
LIGHT-HEADEDNESS: 1
BACK PAIN: 0
WHEEZING: 0
FEVER: 0
STRIDOR: 0
ABDOMINAL PAIN: 0
NUMBNESS: 0
ACTIVITY CHANGE: 1
DYSURIA: 0
DIZZINESS: 1
HEADACHES: 0
NECK PAIN: 0
VOMITING: 0
APPETITE CHANGE: 1

## 2017-06-09 NOTE — ED NOTES
Pt presents to ED with concerns of dehydration. Pt notes feeling nauseated, no vomiting. Pt denies pain. Pt feels light headed. Pt reports this has been getting worse for a few days now. She works in a hot environment (laundry).

## 2017-06-09 NOTE — ED AVS SNAPSHOT
[facility]    [address] [facility] [address]    Phone:  [phone]    Fax:  [phone]                                       Sirena Dietrich   MRN: [id number]    Department:  [facility]   Date of Visit:  6/9/2017           Patient Information     Date Of Birth          1985        Your diagnoses for this visit were:     Dizziness probable heat exposure       You were seen by Regino Ozuna MD.      Follow-up Information     Follow up with Shi Martinez APRN CNP.    Specialty:  Family Practice    Why:  As needed    Contact information:    [facility]  [address] [facility] [id number] [phone]          Follow up with [facility].    Specialty:  EMERGENCY MEDICINE    Why:  If symptoms worsen    Contact information:    [address] [place] [phone]    Additional information:    The medical center is located at   [address] [facility] (between [address] in [place], [place] north   of [place]).        Discharge Instructions       Return if symptoms worsen or new symptoms develop.  Drink plenty of fluids.  Follow up with her primary care physician next available.  If increased fevers chills, nausea vomiting chest pain or other symptoms present please return for further evaluation and care.    24 Hour Appointment Hotline       To make an appointment at any [facility], call [phone] ([phone]). If you don't have a family doctor or clinic, we will help you find one. [facility] clinics are conveniently located to serve the needs of you and your family.             Review of your medicines      START taking        Dose / Directions Last dose taken    ondansetron 4 MG ODT tab   Commonly known as:  ZOFRAN ODT   Dose:  8 mg   Quantity:  10 tablet        Take 2 tablets (8 mg) by mouth every 8 hours as needed   Refills:  0          Our  records show that you are taking the medicines listed below. If these are incorrect, please call your family doctor or clinic.        Dose / Directions Last dose taken    calcium polycarbophil 625 MG tablet   Commonly known as:  FIBERCON   Dose:  2 tablet   Quantity:  60 tablet        Take 2 tablets (1,250 mg) by mouth daily   Refills:  2        fluticasone 50 MCG/ACT spray   Commonly known as:  FLONASE   Dose:  1-2 spray   Quantity:  1 g        Spray 1-2 sprays into both nostrils daily   Refills:  5        gabapentin 600 MG tablet   Commonly known as:  NEURONTIN   Quantity:  90 tablet        600 mg in evening.   Refills:  0        HYDROcodone-acetaminophen 5-325 MG per tablet   Commonly known as:  NORCO   Quantity:  15 tablet        1-2 TABS TWICE DAILY IF NEEDED   Refills:  0        lamoTRIgine 200 MG tablet   Commonly known as:  LaMICtal   Dose:  200 mg   Quantity:  31 tablet        Take 1 tablet (200 mg) by mouth daily   Refills:  0        lidocaine 5 % Patch   Commonly known as:  LIDODERM   Quantity:  30 patch        Apply up to 3 patches to painful area at once for up to 12 h within a 24 h period.  Remove after 12 hours.   Refills:  0        lidocaine HCl 3 % cream   Quantity:  453.6 g        Apply topically 3 times daily   Refills:  0        LORazepam 1 MG tablet   Commonly known as:  ATIVAN   Dose:  1.5 mg   Quantity:  20 tablet        Take 1.5 tablets (1.5 mg) by mouth daily as needed for anxiety   Refills:  0        * MIRALAX powder   Dose:  1 capful   Quantity:  510 g   Generic drug:  polyethylene glycol        Take 17 g (1 capful) by mouth daily   Refills:  1        * polyethylene glycol powder   Commonly known as:  MIRALAX   Dose:  1 capful   Quantity:  510 g        Take 17 g (1 capful) by mouth daily   Refills:  1        nystatin 013426 UNIT/GM Powd   Commonly known as:  MYCOSTATIN   Quantity:  30 g        Apply topically 3 times daily as needed   Refills:  1        omeprazole 40 MG capsule   Commonly  known as:  priLOSEC   Dose:  40 mg   Quantity:  90 capsule        Take 1 capsule (40 mg) by mouth daily Take 30-60 minutes before a meal.   Refills:  0        phenylephrine-cocoa butter 0.25-88.44 % per suppository   Commonly known as:  PREPARATION H   Dose:  1 suppository   Quantity:  24 suppository        Place 1 suppository rectally 4 times daily as needed for hemorrhoids or itching   Refills:  0        promethazine 12.5 MG tablet   Commonly known as:  PHENERGAN   Dose:  12.5-25 mg   Quantity:  56 tablet        Take 1-2 tablets (12.5-25 mg) by mouth every 6 hours as needed for nausea   Refills:  0        QUEtiapine 50 MG tablet   Commonly known as:  SEROQUEL   Dose:   mg   Quantity:  21 tablet        Take 1-3 tablets ( mg) by mouth At Bedtime   Refills:  0        SUMAtriptan 6 MG/0.5ML pen injector kit   Commonly known as:  IMITREX STATDOSE   Dose:  6 mg   Quantity:  3 kit        Inject 0.5 mLs (6 mg) Subcutaneous at onset of headache for migraine   Refills:  3        * Notice:  This list has 2 medication(s) that are the same as other medications prescribed for you. Read the directions carefully, and ask your doctor or other care provider to review them with you.            Prescriptions were sent or printed at these locations (1 Prescription)                   Other Prescriptions                Printed at Department/Unit printer (1 of 1)         ondansetron (ZOFRAN ODT) 4 MG ODT tab                Procedures and tests performed during your visit     Basic metabolic panel    CBC with platelets, differential    EKG 12 lead    UA reflex to Microscopic      Orders Needing Specimen Collection     None      Pending Results     No orders found from 6/7/2017 to 6/10/2017.            Pending Culture Results     No orders found from 6/7/2017 to 6/10/2017.            Pending Results Instructions     If you had any lab results that were not finalized at the time of your Discharge, you can call the ED Lab Result  RN at [phone]. You will be contacted by this team for any positive Lab results or changes in treatment. The nurses are available 7 days a week from 10A to 6:30P.  You can leave a message 24 hours per day and they will return your call.        Test Results From Your Hospital Stay        6/9/2017  6:06 PM      Component Results     Component Value Ref Range & Units Status    WBC 8.3 4.0 - 11.0 10e9/L Final    RBC Count 4.45 3.8 - 5.2 10e12/L Final    Hemoglobin 13.6 11.7 - 15.7 g/dL Final    Hematocrit 39.2 35.0 - 47.0 % Final    MCV 88 78 - 100 fl Final    MCH 30.6 26.5 - 33.0 pg Final    MCHC 34.7 31.5 - 36.5 g/dL Final    RDW 11.9 10.0 - 15.0 % Final    Platelet Count 311 150 - 450 10e9/L Final    Diff Method Automated Method  Final    % Neutrophils 54.0 % Final    % Lymphocytes 35.4 % Final    % Monocytes 7.6 % Final    % Eosinophils 2.4 % Final    % Basophils 0.5 % Final    % Immature Granulocytes 0.1 % Final    Absolute Neutrophil 4.5 1.6 - 8.3 10e9/L Final    Absolute Lymphocytes 2.9 0.8 - 5.3 10e9/L Final    Absolute Monocytes 0.6 0.0 - 1.3 10e9/L Final    Absolute Eosinophils 0.2 0.0 - 0.7 10e9/L Final    Absolute Basophils 0.0 0.0 - 0.2 10e9/L Final    Abs Immature Granulocytes 0.0 0 - 0.4 10e9/L Final         6/9/2017  6:25 PM      Component Results     Component Value Ref Range & Units Status    Sodium 139 133 - 144 mmol/L Final    Potassium 3.5 3.4 - 5.3 mmol/L Final    Chloride 105 94 - 109 mmol/L Final    Carbon Dioxide 26 20 - 32 mmol/L Final    Anion Gap 8 3 - 14 mmol/L Final    Glucose 95 70 - 99 mg/dL Final    Urea Nitrogen 13 7 - 30 mg/dL Final    Creatinine 0.72 0.52 - 1.04 mg/dL Final    GFR Estimate >90  Non  GFR Calc   >60 mL/min/1.7m2 Final    GFR Estimate If Black >90   GFR Calc   >60 mL/min/1.7m2 Final    Calcium 8.9 8.5 - 10.1 mg/dL Final         6/9/2017  8:09 PM      Component Results     Component Value Ref Range & Units Status    Color Urine Yellow   Final    Appearance Urine Slightly Cloudy  Final    Glucose Urine Negative NEG mg/dL Final    Bilirubin Urine Negative NEG Final    Ketones Urine Negative NEG mg/dL Final    Specific Gravity Urine 1.020 1.003 - 1.035 Final    Blood Urine Trace (A) NEG Final    pH Urine 6.0 5.0 - 7.0 pH Final    Protein Albumin Urine 10 (A) NEG mg/dL Final    Urobilinogen mg/dL Normal 0.0 - 2.0 mg/dL Final    Nitrite Urine Negative NEG Final    Leukocyte Esterase Urine Negative NEG Final    Source Midstream Urine  Final    RBC Urine 4 (H) 0 - 2 /HPF Final    WBC Urine 2 0 - 2 /HPF Final    Bacteria Urine Few (A) NEG /HPF Final    Squamous Epithelial /HPF Urine 10 (H) 0 - 1 /HPF Final    Mucous Urine Present (A) NEG /LPF Final                Thank you for choosing [facility]       Thank you for choosing [facility] for your care. Our goal is always to provide you with excellent care. Hearing back from our patients is one way we can continue to improve our services. Please take a few minutes to complete the written survey that you may receive in the mail after you visit with us. Thank you!        [employer] Information     [employer] gives you secure access to your electronic health record. If you see a primary care provider, you can also send messages to your care team and make appointments. If you have questions, please call your primary care clinic.  If you do not have a primary care provider, please call [phone] and they will assist you.        Care EveryWhere ID     This is your Care EveryWhere ID. This could be used by other organizations to access your [facility] medical records  [id number]        After Visit Summary       This is your record. Keep this with you and show to your community pharmacist(s) and doctor(s) at your next visit.

## 2017-06-09 NOTE — ED AVS SNAPSHOT
[facility]    [address] [facility] [address]    Phone:  [phone]    Fax:  [phone]                                       Sirena Dietrich   MRN: [id number]    Department:  [facility]   Date of Visit:  6/9/2017           After Visit Summary Signature Page     I have received my discharge instructions, and my questions have been answered. I have discussed any challenges I see with this plan with the nurse or doctor.    ..........................................................................................................................................  Patient/Patient Representative Signature      ..........................................................................................................................................  Patient Representative Print Name and Relationship to Patient    ..................................................               ................................................  Date                                            Time    ..........................................................................................................................................  Reviewed by Signature/Title    ...................................................              ..............................................  Date                                                            Time

## 2017-06-09 NOTE — ED PROVIDER NOTES
"  History     Chief Complaint   Patient presents with     Dizziness     Pt here with children - states, \"I work inside and it was hot\" - c/o feeling dizzy - denies any n/v/d     HPI  Sirena Dietrich is a 31 year old female who presents to emergency department complaining of lightheadedness and dizziness.  Patient's symptoms have been going on for a couple of days.  She works in a [employer] and is very hot in the rooms.  She has been nauseated over the past couple days and has not been drinking much.  She states neither she or the room is spinning but she just feels a little off balance.  When she closes her eyes it worsens his symptoms.  She has not vomited.  Denies any abdominal pain or diarrhea.  She has not had any chest pain or shortness of breath.  She denies any fevers or chills.  She has not had any focal numbness weakness in any extremity.  She denies any urinary symptoms.  Patient has had a hysterectomy and there is no chance of pregnancy.  She has not fallen.  She denies a headache or visual changes at this time.  Past Medical History:   Diagnosis Date     Agoraphobia with panic disorder 2013     ATTN DEFICIT W HYPERACT 12/15/2005    Off medication (strattera)      Bipolar I disorder, most recent episode (or current) mixed, severe, specified as with psychotic behavior 2013     Hypothyroidism 3/5/2010    3/10: pt reports fatigue, TSH wnl however Free T4 low. Started on levothyroxine 50mcg 6/10: TSH and FT4 normal off medications.  Remained normal       Migraine headache 2012     Other abnormal heart sounds     as a child     Papanicolaou smear of cervix with low grade squamous intraepithelial lesion (LGSIL) 3/2008    colpo negative     Pelvic abscess in female 1/10/2015     PTSD (post-traumatic stress disorder) 2013    Related to       Urinary incontinence 2012    kegels-cough/sneeze and urgency.  Nocturia-a few.        Uterus, adenomyosis 12/15/2014    Hysterectomy " 12/2014      Social History     Social History     Marital status:      Spouse name: N/A     Number of children: 2     Years of education: N/A     Occupational History      Unemployed     Social History Main Topics     Smoking status: Current Every Day Smoker     Packs/day: 0.50     Types: Cigarettes     Smokeless tobacco: Never Used      Comment: rare use     Alcohol use Yes      Comment: rare      Drug use: Not on file     Sexual activity: Yes     Partners: Male     Birth control/ protection: Surgical, Female Surgical      Comment: salpingectomy     Other Topics Concern      Service No     Blood Transfusions No     Caffeine Concern No     Occupational Exposure No     Hobby Hazards No     Sleep Concern No     Stress Concern No     Weight Concern No     Special Diet No     Back Care No     Exercise No     Bike Helmet No     Seat Belt Yes     Self-Exams No     Parent/Sibling W/ Cabg, Mi Or Angioplasty Before 65f 55m? No     Social History Narrative       No current facility-administered medications on file prior to encounter.   Current Outpatient Prescriptions on File Prior to Encounter:  nystatin (MYCOSTATIN) 609991 UNIT/GM POWD Apply topically 3 times daily as needed   polyethylene glycol (MIRALAX) powder Take 17 g (1 capful) by mouth daily   calcium polycarbophil (FIBERCON) 625 MG tablet Take 2 tablets (1,250 mg) by mouth daily   HYDROcodone-acetaminophen (NORCO) 5-325 MG per tablet 1-2 TABS TWICE DAILY IF NEEDED   gabapentin (NEURONTIN) 600 MG tablet 600 mg in evening.   polyethylene glycol (MIRALAX) powder Take 17 g (1 capful) by mouth daily   QUEtiapine (SEROQUEL) 50 MG tablet Take 1-3 tablets ( mg) by mouth At Bedtime   promethazine (PHENERGAN) 12.5 MG tablet Take 1-2 tablets (12.5-25 mg) by mouth every 6 hours as needed for nausea   lamoTRIgine (LAMICTAL) 200 MG tablet Take 1 tablet (200 mg) by mouth daily   LORazepam (ATIVAN) 1 MG tablet Take 1.5 tablets (1.5 mg) by mouth daily as  "needed for anxiety   lidocaine HCl 3 % cream Apply topically 3 times daily   fluticasone (FLONASE) 50 MCG/ACT nasal spray Spray 1-2 sprays into both nostrils daily   phenylephrine-cocoa butter (PREPARATION H) 0.25-88.44 % suppository Place 1 suppository rectally 4 times daily as needed for hemorrhoids or itching   SUMAtriptan (IMITREX STATDOSE) 6 MG/0.5ML pen injector kit Inject 0.5 mLs (6 mg) Subcutaneous at onset of headache for migraine   omeprazole (PRILOSEC) 40 MG capsule Take 1 capsule (40 mg) by mouth daily Take 30-60 minutes before a meal.   lidocaine (LIDODERM) 5 % patch Apply up to 3 patches to painful area at once for up to 12 h within a 24 h period.  Remove after 12 hours.     I have reviewed the Medications, Allergies, Past Medical and Surgical History, and Social History in the Epic system.      Review of Systems   Constitutional: Positive for activity change, appetite change and fatigue. Negative for chills and fever.   HENT: Negative for congestion, facial swelling and sore throat.    Eyes: Negative for visual disturbance.   Respiratory: Negative for chest tightness, shortness of breath, wheezing and stridor.    Cardiovascular: Negative for chest pain, palpitations and leg swelling.   Gastrointestinal: Negative for abdominal pain, nausea and vomiting.   Genitourinary: Negative for decreased urine volume and dysuria.   Musculoskeletal: Negative for back pain and neck pain.   Skin: Negative for rash.   Neurological: Positive for dizziness and light-headedness. Negative for syncope, facial asymmetry, weakness, numbness and headaches.   Psychiatric/Behavioral: Negative for confusion.       Physical Exam   BP: 130/88  Pulse: 89  Temp: 97.9  F (36.6  C)  Resp: 16  Height: 167.6 cm (5' 6\")  Weight: 69.9 kg (154 lb)  SpO2: 98 %  Physical Exam   Constitutional: She appears well-nourished. No distress.   HENT:   Head: Normocephalic.   Mouth/Throat: Oropharynx is clear and moist.   Eyes: Conjunctivae are " normal.   Neck: Normal range of motion. Neck supple. No JVD present.   Cardiovascular: Normal rate, regular rhythm, normal heart sounds and intact distal pulses.    No murmur heard.  Pulmonary/Chest: Effort normal and breath sounds normal. She has no wheezes. She has no rales.   Abdominal: Soft. Bowel sounds are normal. There is no tenderness.   Musculoskeletal: Normal range of motion. She exhibits no tenderness.   Neurological: She is alert. She exhibits normal muscle tone.   Skin: Skin is warm and dry. No rash noted.   Nursing note and vitals reviewed.      ED Course     ED Course     Procedures             EKG Interpretation:      Interpreted by Regino Ozuna  Rhythm: normal sinus   Rate: normal  Axis: normal  Ectopy: none  Conduction: normal  ST Segments/ T Waves: No ST-T wave changes  Q Waves: none  Comparison to prior: Unchanged from 6/8/15    Clinical Impression: normal EKG          Critical Care time:  none               Labs Ordered and Resulted from Time of ED Arrival Up to the Time of Departure from the ED   URINE MACROSCOPIC WITH REFLEX TO MICRO - Abnormal; Notable for the following:        Result Value    Blood Urine Trace (*)     Protein Albumin Urine 10 (*)     RBC Urine 4 (*)     Bacteria Urine Few (*)     Squamous Epithelial /HPF Urine 10 (*)     Mucous Urine Present (*)     All other components within normal limits   CBC WITH PLATELETS DIFFERENTIAL   BASIC METABOLIC PANEL     Medications   ondansetron (ZOFRAN) injection 4 mg (4 mg Intravenous Given 6/9/17 1759)   0.9% sodium chloride BOLUS (0 mLs Intravenous Stopped 6/9/17 1958)   ketorolac (TORADOL) injection 30 mg (30 mg Intravenous Given 6/9/17 1958)       Assessments & Plan (with Medical Decision Making) records were reviewed.  Labs were obtained.  Patient was given Zofran for nausea along with a fluid bolus of normal saline.  An EKG was obtained.  This revealed no obvious acute abnormality.  White count was within normal limits without  left shift and basic metabolic panel was unremarkable.  On recheck patient was feeling much better after the fluids.  A urinalysis had been ordered.  This was unremarkable. Orthostatics are unremarkable. She is able to ambulate without dizziness. She feels comfortable going home at this time. She will follow up with primary next week and return is symptoms worsen or new symptoms develop. She will drink plenty of fluids.      I have reviewed the nursing notes.    I have reviewed the findings, diagnosis, plan and need for follow up with the patient.       Discharge Medication List as of 6/9/2017  9:23 PM      START taking these medications    Details   ondansetron (ZOFRAN ODT) 4 MG ODT tab Take 2 tablets (8 mg) by mouth every 8 hours as needed, Disp-10 tablet, R-0, Local Print             Final diagnoses:   Dizziness - probable heat exposure       6/9/2017   [facility] [place] EMERGENCY DEPARTMENT     Regino Ozuna MD  06/11/17 [id number]

## 2017-06-10 NOTE — DISCHARGE INSTRUCTIONS
Return if symptoms worsen or new symptoms develop.  Drink plenty of fluids.  Follow up with her primary care physician next available.  If increased fevers chills, nausea vomiting chest pain or other symptoms present please return for further evaluation and care.

## 2017-07-15 ENCOUNTER — HEALTH MAINTENANCE LETTER (OUTPATIENT)
Age: 32
End: 2017-07-15

## 2017-07-26 ENCOUNTER — CARE COORDINATION (OUTPATIENT)
Dept: CARE COORDINATION | Facility: CLINIC | Age: 32
End: 2017-07-26
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
## 2017-07-26 NOTE — LETTER
[facility]               [address]  [facility] [address]  [phone]      7/26/2017      Sirena Dietrich  [address]     [facility] [address]        Dear  Sirena,    I am the Clinic Care Coordinator that works with your primary care provider's clinic. I have been trying to reach you recently to introduce Clinic Care Coordination and to see if there was anything I could assist you with.  Below is a description of what Clinic Care Coordination is and how I can further assist you.     The Clinic Care Coordinator role is a Registered Nurse and/or  who understands the health care system. The goal of Clinic Care Coordination is to help you manage your health and improve access to the [facility] in the most efficient manner.  The Registered Nurse can assist you in meeting your health care goals by providing education, coordinating services, and strengthening the communication among your providers. The  can assist you with financial, behavioral, psychosocial, and chemical dependency and counseling/psychiatric resources.    Please feel free to keep this letter and contact information to contact me at [phone] with any further questions or concerns that may arise. We at [facility] are focused on providing you with the highest-quality healthcare experience possible and that all starts with you.       Sincerely,       Shi Armstrong, [facility]  Clinic Care Coordination  [phone]      Enclosed: I have enclosed a copy of a 24 Hour Access Plan. This has helpful phone numbers for you to call when needed. Please keep this in an easy to access place to use as needed.

## 2017-07-26 NOTE — PROGRESS NOTES
Clinic Care Coordination Contact  [facility]/Voicemail    Referral Source: PCP  Clinical Data: Care Coordinator Outreach  Outreach attempted x 1.  Left message on voicemail with call back information and requested return call.  Plan: Care Coordinator will mail out care coordination introduction letter with care coordinator contact information and explanation of care coordination services. Care Coordinator will try to reach patient again in 1-2 business days.  CORRY Keller, Clinic Care Coordinator 7/26/2017   1:14 PM  [phone]

## 2017-08-01 NOTE — PROGRESS NOTES
Clinic Care Coordination Contact  [facility]/Voicemail    Referral Source: [employer] Outreach  Clinical Data: Care Coordinator Outreach  Outreach attempted x 2.  Left message on voicemail with call back information and requested return call.  Plan: Care Coordinator mailed out care coordination introduction letter on 7-26-17. Care Coordinator will do no further outreaches at this time.  CORRY Keller, Clinic Care Coordinator 8/1/2017   8:59 AM  [phone]

## 2017-08-23 ENCOUNTER — OFFICE VISIT (OUTPATIENT)
Dept: FAMILY MEDICINE | Facility: CLINIC | Age: 32
End: 2017-08-23
Payer: COMMERCIAL

## 2017-08-23 VITALS
WEIGHT: 146.4 LBS | RESPIRATION RATE: 20 BRPM | BODY MASS INDEX: 23.63 KG/M2 | SYSTOLIC BLOOD PRESSURE: 118 MMHG | TEMPERATURE: 97.5 F | DIASTOLIC BLOOD PRESSURE: 74 MMHG | HEART RATE: 80 BPM

## 2017-08-23 DIAGNOSIS — R30.0 DYSURIA: Primary | ICD-10-CM

## 2017-08-23 DIAGNOSIS — N89.8 VAGINAL ODOR: ICD-10-CM

## 2017-08-23 DIAGNOSIS — N76.0 BV (BACTERIAL VAGINOSIS): ICD-10-CM

## 2017-08-23 DIAGNOSIS — B96.89 BV (BACTERIAL VAGINOSIS): ICD-10-CM

## 2017-08-23 LAB
ALBUMIN UR-MCNC: NEGATIVE MG/DL
APPEARANCE UR: CLEAR
BACTERIA #/AREA URNS HPF: ABNORMAL /HPF
BILIRUB UR QL STRIP: NEGATIVE
COLOR UR AUTO: YELLOW
GLUCOSE UR STRIP-MCNC: NEGATIVE MG/DL
HGB UR QL STRIP: ABNORMAL
KETONES UR STRIP-MCNC: NEGATIVE MG/DL
LEUKOCYTE ESTERASE UR QL STRIP: NEGATIVE
MUCOUS THREADS #/AREA URNS LPF: PRESENT /LPF
NITRATE UR QL: NEGATIVE
PH UR STRIP: 6.5 PH (ref 5–7)
RBC #/AREA URNS AUTO: ABNORMAL /HPF
SOURCE: ABNORMAL
SP GR UR STRIP: 1.02 (ref 1–1.03)
SPECIMEN SOURCE: ABNORMAL
UROBILINOGEN UR STRIP-ACNC: 0.2 EU/DL (ref 0.2–1)
WBC #/AREA URNS AUTO: ABNORMAL /HPF
WET PREP SPEC: ABNORMAL

## 2017-08-23 PROCEDURE — 87210 SMEAR WET MOUNT SALINE/INK: CPT | Performed by: FAMILY MEDICINE

## 2017-08-23 PROCEDURE — 81001 URINALYSIS AUTO W/SCOPE: CPT | Performed by: FAMILY MEDICINE

## 2017-08-23 PROCEDURE — 99213 OFFICE O/P EST LOW 20 MIN: CPT | Performed by: FAMILY MEDICINE

## 2017-08-23 RX ORDER — FLUCONAZOLE 150 MG/1
150 TABLET ORAL ONCE
Qty: 2 TABLET | Refills: 1 | Status: SHIPPED | OUTPATIENT
Start: 2017-08-23 | End: 2017-09-21

## 2017-08-23 RX ORDER — METRONIDAZOLE 500 MG/1
500 TABLET ORAL 2 TIMES DAILY
Qty: 14 TABLET | Refills: 0 | Status: SHIPPED | OUTPATIENT
Start: 2017-08-23 | End: 2017-09-21

## 2017-08-23 NOTE — MR AVS SNAPSHOT
After Visit Summary   8/23/2017    Sirena Dietrich    MRN: [id number]           Patient Information     Date Of Birth          1985        Visit Information        Provider Department      8/23/2017 9:40 AM Ramana Baca MD [facility]        Today's Diagnoses     Dysuria    -  1    Vaginal odor        BV (bacterial vaginosis)           Follow-ups after your visit        Your next 10 appointments already scheduled     Sep 06, 2017  4:20 PM CDT   Office Visit with DIPESH Marino CNP   [facility]    [address] [facility] [id number] [phone]           Bring a current list of meds and any records pertaining to this visit. For Physicals, please bring immunization records and any forms needing to be filled out. Please arrive 10 minutes early to complete paperwork.              Who to contact     If you have questions or need follow up information about today's clinic visit or your schedule please contact [facility] directly at [phone].  Normal or non-critical lab and imaging results will be communicated to you by Bukupehart, letter or phone within 4 business days after the clinic has received the results. If you do not hear from us within 7 days, please contact the clinic through Flexusp[employer] or phone. If you have a critical or abnormal lab result, we will notify you by phone as soon as possible.  Submit refill requests through [employer] or call your pharmacy and they will forward the refill request to us. Please allow 3 business days for your refill to be completed.          Additional Information About Your Visit        Bukupehart Information     [employer] gives you secure access to your electronic health record. If you see a primary care provider, you can also send messages to your care team and make appointments. If you have questions, please call your primary care clinic.  If you do not  have a primary care provider, please call [phone] and they will assist you.        Care EveryWhere ID     This is your Care EveryWhere ID. This could be used by other organizations to access your [facility] medical records  [id number]        Your Vitals Were     Pulse Temperature Respirations Last Period BMI (Body Mass Index)       80 97.5  F (36.4  C) (Tympanic) 20 (LMP Unknown) 23.63 kg/m2        Blood Pressure from Last 3 Encounters:   08/23/17 118/74   06/09/17 134/88   05/16/17 131/86    Weight from Last 3 Encounters:   08/23/17 146 lb 6.4 oz (66.4 kg)   06/09/17 154 lb (69.9 kg)   05/10/17 164 lb (74.4 kg)              We Performed the Following     UA reflex to Microscopic and Culture     Urine Microscopic     Wet prep          Today's Medication Changes          These changes are accurate as of: 8/23/17 10:57 AM.  If you have any questions, ask your nurse or doctor.               Start taking these medicines.        Dose/Directions    fluconazole 150 MG tablet   Commonly known as:  DIFLUCAN   Used for:  Vaginal odor   Started by:  Ramana Baca MD        Dose:  150 mg   Take 1 tablet (150 mg) by mouth once for 1 dose   Quantity:  2 tablet   Refills:  1       metroNIDAZOLE 500 MG tablet   Commonly known as:  FLAGYL   Used for:  Vaginal odor   Started by:  Ramana Baca MD        Dose:  500 mg   Take 1 tablet (500 mg) by mouth 2 times daily   Quantity:  14 tablet   Refills:  0            Where to get your medicines      These medications were sent to [facility] [address] [address]     Phone:  [phone]     fluconazole 150 MG tablet    metroNIDAZOLE 500 MG tablet                Primary Care Provider Office Phone # Fax #    DIPESH Marino [phone]       [address] [place] [address]        Goals        General    I will attend counseling appointment (pt-stated)     Notes  - Note edited  11/25/2015  1:35 PM by Shi Armstrong LSW    As of today's date 11/25/2015 goal is met at 76 - 100%.   Goal Status:  Complete - will keep to maintain for  A few month  As of today's date 7/28/2015 goal is met at 26 - 50%.   Goal Status:  Active          Equal Access to Services     [facility] Center[facility] : Hadii aad ku hadasho Soomaali, waaxda luqadaha, qaybta kaalmada adeegyada, raheem newsome hayalisian khadijah brambilavalerianobri day . So [facility] [phone].    ATENCIÓN: Si habla español, tiene a borja disposición servicios gratuitos de asistencia lingüística. Llame al [phone].    We comply with applicable federal civil rights laws and [place] laws. We do not discriminate on the basis of race, color, national origin, age, disability sex, sexual orientation or gender identity.            Thank you!     Thank you for choosing [facility]  for your care. Our goal is always to provide you with excellent care. Hearing back from our patients is one way we can continue to improve our services. Please take a few minutes to complete the written survey that you may receive in the mail after your visit with us. Thank you!             Your Updated Medication List - Protect others around you: Learn how to safely use, store and throw away your medicines at www.disposemymeds.org.          This list is accurate as of: 8/23/17 10:57 AM.  Always use your most recent med list.                   Brand Name Dispense Instructions for use Diagnosis    fluconazole 150 MG tablet    DIFLUCAN    2 tablet    Take 1 tablet (150 mg) by mouth once for 1 dose    Vaginal odor       fluticasone 50 MCG/ACT spray    FLONASE    1 g    Spray 1-2 sprays into both nostrils daily    Vaginal discharge       gabapentin 600 MG tablet    NEURONTIN    90 tablet    600 mg in evening.        lamoTRIgine 200 MG tablet    LaMICtal    31 tablet    TAKE ONE TABLET BY MOUTH EVERY DAY    Severe bipolar I disorder, most recent episode mixed, with  psychotic features (H)       lidocaine HCl 3 % cream     453.6 g    Apply topically 3 times daily    Yeast infection of the vagina       LORazepam 1 MG tablet    ATIVAN    20 tablet    Take 1.5 tablets (1.5 mg) by mouth daily as needed for anxiety    Nausea       metroNIDAZOLE 500 MG tablet    FLAGYL    14 tablet    Take 1 tablet (500 mg) by mouth 2 times daily    Vaginal odor       nystatin 203747 UNIT/GM Powd    MYCOSTATIN    30 g    Apply topically 3 times daily as needed    Cutaneous candidiasis       QUEtiapine 50 MG tablet    SEROQUEL    21 tablet    Take 1-3 tablets ( mg) by mouth At Bedtime    Severe bipolar I disorder, most recent episode mixed, with psychotic features (H)       SUMAtriptan 6 MG/0.5ML pen injector kit    IMITREX STATDOSE    3 kit    Inject 0.5 mLs (6 mg) Subcutaneous at onset of headache for migraine    Migraine without aura and with status migrainosus, not intractable

## 2017-08-23 NOTE — NURSING NOTE
"Chief Complaint   Patient presents with     UTI       Initial /74 (BP Location: Right arm, Patient Position: Chair, Cuff Size: Adult Regular)  Pulse 80  Temp 97.5  F (36.4  C) (Tympanic)  Resp 20  Wt 146 lb 6.4 oz (66.4 kg)  LMP  (LMP Unknown)  BMI 23.63 kg/m2 Estimated body mass index is 23.63 kg/(m^2) as calculated from the following:    Height as of 6/9/17: 5' 6\" (1.676 m).    Weight as of this encounter: 146 lb 6.4 oz (66.4 kg).  Medication Reconciliation: complete    Health Maintenance that is potentially due pending provider review:  Pap Smear    Pt will schedule physical appt.    Is there anyone who you would like to be able to receive your results? No  If yes have patient fill out JOSÉ    Lilly UMAÑA CMA    "
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
## 2017-08-30 ENCOUNTER — TELEPHONE (OUTPATIENT)
Dept: FAMILY MEDICINE | Facility: CLINIC | Age: 32
End: 2017-08-30

## 2017-08-30 DIAGNOSIS — R30.0 DYSURIA: Primary | ICD-10-CM

## 2017-08-30 NOTE — TELEPHONE ENCOUNTER
I have attempted to contact this patient by phone with the following results: no answer and voicemail not set up yet.    Delmis Machado RN

## 2017-08-30 NOTE — TELEPHONE ENCOUNTER
Patient states she is having burning with urination as well as frequency and urgency and some flank pain. She denies a fever or chills but had that last week. Denies any blood in her urine. She is on her last day of the Flagyl today for the bacterial infection. Please advise. Thank you!    Delmis Machado RN

## 2017-08-30 NOTE — TELEPHONE ENCOUNTER
Reason for Call:  Other     Detailed comments: Patient is on a antibiotic but now has lots off burning - please call pt    Phone Number Patient can be reached at: [phone]    Best Time:     Can we leave a detailed message on this number? yes    Call taken on 8/30/2017 at 11:36 AM by Caroline Santana

## 2017-09-05 ENCOUNTER — OFFICE VISIT (OUTPATIENT)
Dept: FAMILY MEDICINE | Facility: CLINIC | Age: 32
End: 2017-09-05
Payer: COMMERCIAL

## 2017-09-05 VITALS
SYSTOLIC BLOOD PRESSURE: 108 MMHG | TEMPERATURE: 98.7 F | DIASTOLIC BLOOD PRESSURE: 74 MMHG | OXYGEN SATURATION: 98 % | BODY MASS INDEX: 23.24 KG/M2 | RESPIRATION RATE: 18 BRPM | WEIGHT: 144 LBS | HEART RATE: 98 BPM

## 2017-09-05 DIAGNOSIS — J01.10 ACUTE NON-RECURRENT FRONTAL SINUSITIS: Primary | ICD-10-CM

## 2017-09-05 DIAGNOSIS — R05.9 COUGH: ICD-10-CM

## 2017-09-05 DIAGNOSIS — F31.64 SEVERE BIPOLAR I DISORDER, MOST RECENT EPISODE MIXED, WITH PSYCHOTIC FEATURES (H): ICD-10-CM

## 2017-09-05 DIAGNOSIS — Z72.0 TOBACCO ABUSE: ICD-10-CM

## 2017-09-05 PROCEDURE — 99214 OFFICE O/P EST MOD 30 MIN: CPT | Performed by: NURSE PRACTITIONER

## 2017-09-05 RX ORDER — NICOTINE 21 MG/24HR
1 PATCH, TRANSDERMAL 24 HOURS TRANSDERMAL EVERY 24 HOURS
Qty: 42 PATCH | Refills: 0 | Status: SHIPPED | OUTPATIENT
Start: 2017-09-05 | End: 2017-10-17

## 2017-09-05 RX ORDER — CODEINE PHOSPHATE AND GUAIFENESIN 10; 100 MG/5ML; MG/5ML
1 SOLUTION ORAL EVERY 6 HOURS PRN
Qty: 120 ML | Refills: 0 | Status: SHIPPED | OUTPATIENT
Start: 2017-09-05 | End: 2017-09-21

## 2017-09-05 RX ORDER — NICOTINE 21 MG/24HR
1 PATCH, TRANSDERMAL 24 HOURS TRANSDERMAL EVERY 24 HOURS
Qty: 14 PATCH | Refills: 0 | Status: SHIPPED | OUTPATIENT
Start: 2017-09-05 | End: 2017-09-19

## 2017-09-05 RX ORDER — DOXYCYCLINE 100 MG/1
100 CAPSULE ORAL 2 TIMES DAILY
Qty: 20 CAPSULE | Refills: 0 | Status: SHIPPED | OUTPATIENT
Start: 2017-09-05 | End: 2017-09-21

## 2017-09-05 NOTE — PATIENT INSTRUCTIONS
Take antibiotics twice a day for 10 days.    Complete full course of antibiotics even if you start to feel better.    Increase your fluids and rest and eat a well balanced diet.    Would be good to humidify the air in your home, especially in the bedroom.     Tylenol or Ibuprofen if able to take for fevers and discomfort. Do not exceed 4 grams of Tylenol in a 24 hour period. Take Ibuprofen with food.   Follow up in 3-5 days if not improving or return sooner if worsening or fail to improve as anticipated.    You can take the Robitussin with Codeine every 6 hours as needed.    Start the Nicotine 24 hour patch as follows:  Apply a 21 mg patch daily for 6 weeks, then   Apply a 14 mg patch daily for 6 weeks, then  Apply a 7 mg patch daily for 6 weeks then stop

## 2017-09-05 NOTE — MR AVS SNAPSHOT
After Visit Summary   9/5/2017    Sirena Dietrich    MRN: [id number]           Patient Information     Date Of Birth          1985        Visit Information        Provider Department      9/5/2017 8:00 AM Yeny Amor APRN CNP [facility]        Today's Diagnoses     Tobacco abuse    -  1    Acute non-recurrent frontal sinusitis        Cough          Care Instructions    Take antibiotics twice a day for 10 days.    Complete full course of antibiotics even if you start to feel better.    Increase your fluids and rest and eat a well balanced diet.    Would be good to humidify the air in your home, especially in the bedroom.     Tylenol or Ibuprofen if able to take for fevers and discomfort. Do not exceed 4 grams of Tylenol in a 24 hour period. Take Ibuprofen with food.   Follow up in 3-5 days if not improving or return sooner if worsening or fail to improve as anticipated.    You can take the Robitussin with Codeine every 6 hours as needed.    Start the Nicotine 24 hour patch as follows:  Apply a 21 mg patch daily for 6 weeks, then   Apply a 14 mg patch daily for 6 weeks, then  Apply a 7 mg patch daily for 6 weeks then stop                  Follow-ups after your visit        Your next 10 appointments already scheduled     Sep 06, 2017  4:20 PM CDT   Office Visit with DIPESH Marino CNP   [facility] ([facility])    [address] [place] [id number] [phone]           Bring a current list of meds and any records pertaining to this visit. For Physicals, please bring immunization records and any forms needing to be filled out. Please arrive 10 minutes early to complete paperwork.              Who to contact     If you have questions or need follow up information about today's clinic visit or your schedule please contact [facility] directly at [phone].  Normal or non-critical lab and imaging results will be  communicated to you by [facility], letter or phone within 4 business days after the clinic has received the results. If you do not hear from us within 7 days, please contact the clinic through [employer] or phone. If you have a critical or abnormal lab result, we will notify you by phone as soon as possible.  Submit refill requests through [employer] or call your pharmacy and they will forward the refill request to us. Please allow 3 business days for your refill to be completed.          Additional Information About Your Visit        [employer] Information     [employer] gives you secure access to your electronic health record. If you see a primary care provider, you can also send messages to your care team and make appointments. If you have questions, please call your primary care clinic.  If you do not have a primary care provider, please call [phone] and they will assist you.        Care EveryWhere ID     This is your Care EveryWhere ID. This could be used by other organizations to access your [facility] medical records  [id number]        Your Vitals Were     Pulse Temperature Respirations Last Period Pulse Oximetry BMI (Body Mass Index)    98 98.7  F (37.1  C) (Tympanic) 18 (LMP Unknown) 98% 23.24 kg/m2       Blood Pressure from Last 3 Encounters:   09/05/17 108/74   08/23/17 118/74   06/09/17 134/88    Weight from Last 3 Encounters:   09/05/17 144 lb (65.3 kg)   08/23/17 146 lb 6.4 oz (66.4 kg)   06/09/17 154 lb (69.9 kg)              Today, you had the following     No orders found for display         Today's Medication Changes          These changes are accurate as of: 9/5/17  8:28 AM.  If you have any questions, ask your nurse or doctor.               Start taking these medicines.        Dose/Directions    doxycycline 100 MG capsule   Commonly known as:  VIBRAMYCIN   Used for:  Acute non-recurrent frontal sinusitis   Started by:  Yeny Amor APRN CNP        Dose:  100 mg   Take 1 capsule (100 mg) by mouth 2  times daily   Quantity:  20 capsule   Refills:  0       guaiFENesin-codeine 100-10 MG/5ML Soln solution   Commonly known as:  ROBITUSSIN AC   Used for:  Cough   Started by:  Yeny Amor APRN CNP        Dose:  1 tsp.   Take 5 mLs by mouth every 6 hours as needed for cough   Quantity:  120 mL   Refills:  0       * nicotine 21 MG/24HR 24 hr patch   Commonly known as:  NICODERM CQ   Used for:  Tobacco abuse   Started by:  Yeny Amor APRN CNP        Dose:  1 patch   Place 1 patch onto the skin every 24 hours Use for 6 weeks   Quantity:  42 patch   Refills:  0       * nicotine 14 MG/24HR 24 hr patch   Commonly known as:  NICODERM CQ   Used for:  Tobacco abuse   Started by:  Yeny Amor APRN CNP        Dose:  1 patch   Place 1 patch onto the skin every 24 hours for 14 days   Quantity:  14 patch   Refills:  0       * nicotine 7 MG/24HR 24 hr patch   Commonly known as:  NICODERM CQ   Used for:  Tobacco abuse   Started by:  Yeny Amor APRN CNP        Dose:  1 patch   Place 1 patch onto the skin every 24 hours for 14 days   Quantity:  14 patch   Refills:  0       * Notice:  This list has 3 medication(s) that are the same as other medications prescribed for you. Read the directions carefully, and ask your doctor or other care provider to review them with you.         Where to get your medicines      These medications were sent to [facility] [address] [address]     Phone:  [phone]     doxycycline 100 MG capsule    nicotine 14 MG/24HR 24 hr patch    nicotine 21 MG/24HR 24 hr patch    nicotine 7 MG/24HR 24 hr patch         Some of these will need a paper prescription and others can be bought over the counter.  Ask your nurse if you have questions.     Bring a paper prescription for each of these medications     guaiFENesin-codeine 100-10 MG/5ML Soln solution                Primary Care Provider Office Phone # Fax #     Shi Martinez, DIPESH [phone]       [address] [facility] [address]        Goals        General    I will attend counseling appointment (pt-stated)     Notes - Note edited  11/25/2015  1:35 PM by Shi Armstrong LSW    As of today's date 11/25/2015 goal is met at 76 - 100%.   Goal Status:  Complete - will keep to maintain for  A few month  As of today's date 7/28/2015 goal is met at 26 - 50%.   Goal Status:  Active          Equal Access to Services     [facility]: Hadii aad ku hadasho Soomaali, waaxda luqadaha, qaybta kaalmada adeegyada, waxay jerod haymathew day . So [facility] [phone].    ATENCIÓN: Si habla español, tiene a borja disposición servicios gratuitos de asistencia lingüística. Llame al [phone].    We comply with applicable federal civil rights laws and [place] laws. We do not discriminate on the basis of race, color, national origin, age, disability sex, sexual orientation or gender identity.            Thank you!     Thank you for choosing [facility]  for your care. Our goal is always to provide you with excellent care. Hearing back from our patients is one way we can continue to improve our services. Please take a few minutes to complete the written survey that you may receive in the mail after your visit with us. Thank you!             Your Updated Medication List - Protect others around you: Learn how to safely use, store and throw away your medicines at www.disposemymeds.org.          This list is accurate as of: 9/5/17  8:28 AM.  Always use your most recent med list.                   Brand Name Dispense Instructions for use Diagnosis    doxycycline 100 MG capsule    VIBRAMYCIN    20 capsule    Take 1 capsule (100 mg) by mouth 2 times daily    Acute non-recurrent frontal sinusitis       fluticasone 50 MCG/ACT spray    FLONASE    1 g    Spray 1-2 sprays into both nostrils daily    Vaginal discharge       gabapentin 600 MG tablet     NEURONTIN    90 tablet    600 mg in evening.        guaiFENesin-codeine 100-10 MG/5ML Soln solution    ROBITUSSIN AC    120 mL    Take 5 mLs by mouth every 6 hours as needed for cough    Cough       lamoTRIgine 200 MG tablet    LaMICtal    31 tablet    TAKE ONE TABLET BY MOUTH EVERY DAY    Severe bipolar I disorder, most recent episode mixed, with psychotic features (H)       lidocaine HCl 3 % cream     453.6 g    Apply topically 3 times daily    Yeast infection of the vagina       LORazepam 1 MG tablet    ATIVAN    20 tablet    Take 1.5 tablets (1.5 mg) by mouth daily as needed for anxiety    Nausea       metroNIDAZOLE 500 MG tablet    FLAGYL    14 tablet    Take 1 tablet (500 mg) by mouth 2 times daily    Vaginal odor       * nicotine 21 MG/24HR 24 hr patch    NICODERM CQ    42 patch    Place 1 patch onto the skin every 24 hours Use for 6 weeks    Tobacco abuse       * nicotine 14 MG/24HR 24 hr patch    NICODERM CQ    14 patch    Place 1 patch onto the skin every 24 hours for 14 days    Tobacco abuse       * nicotine 7 MG/24HR 24 hr patch    NICODERM CQ    14 patch    Place 1 patch onto the skin every 24 hours for 14 days    Tobacco abuse       nystatin 652990 UNIT/GM Powd    MYCOSTATIN    30 g    Apply topically 3 times daily as needed    Cutaneous candidiasis       QUEtiapine 50 MG tablet    SEROQUEL    21 tablet    Take 1-3 tablets ( mg) by mouth At Bedtime    Severe bipolar I disorder, most recent episode mixed, with psychotic features (H)       SUMAtriptan 6 MG/0.5ML pen injector kit    IMITREX STATDOSE    3 kit    Inject 0.5 mLs (6 mg) Subcutaneous at onset of headache for migraine    Migraine without aura and with status migrainosus, not intractable       * Notice:  This list has 3 medication(s) that are the same as other medications prescribed for you. Read the directions carefully, and ask your doctor or other care provider to review them with you.

## 2017-09-05 NOTE — NURSING NOTE
"Chief Complaint   Patient presents with     URI       Initial /74 (BP Location: Right arm, Patient Position: Chair, Cuff Size: Adult Regular)  Pulse 98  Temp 98.7  F (37.1  C) (Tympanic)  Resp 18  Wt 144 lb (65.3 kg)  LMP  (LMP Unknown)  SpO2 98%  BMI 23.24 kg/m2 Estimated body mass index is 23.24 kg/(m^2) as calculated from the following:    Height as of 6/9/17: 5' 6\" (1.676 m).    Weight as of this encounter: 144 lb (65.3 kg).  Medication Reconciliation: complete    Health Maintenance that is potentially due pending provider review:  Has physical with primary on 9/6/17        Is there anyone who you would like to be able to receive your results? No  If yes have patient fill out JOSÉ      "

## 2017-09-05 NOTE — LETTER
September 5, 2017      Sirena Dietrich  [address]   [address] [place] [id number]        To Whom It May Concern:    Sirena Dietrich was seen in our clinic. She may return to work with the following: no restrictions  on or about 9/6/17.      Sincerely,        Yeny Amor NP, APRN CNP
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
## 2017-09-21 ENCOUNTER — OFFICE VISIT (OUTPATIENT)
Dept: FAMILY MEDICINE | Facility: CLINIC | Age: 32
End: 2017-09-21
Payer: COMMERCIAL

## 2017-09-21 VITALS
HEART RATE: 62 BPM | BODY MASS INDEX: 22.92 KG/M2 | OXYGEN SATURATION: 99 % | SYSTOLIC BLOOD PRESSURE: 126 MMHG | RESPIRATION RATE: 14 BRPM | WEIGHT: 142 LBS | DIASTOLIC BLOOD PRESSURE: 62 MMHG | TEMPERATURE: 97.3 F

## 2017-09-21 DIAGNOSIS — B00.1 RECURRENT COLD SORES: Primary | ICD-10-CM

## 2017-09-21 DIAGNOSIS — N89.8 VAGINAL ODOR: ICD-10-CM

## 2017-09-21 DIAGNOSIS — R11.0 NAUSEA: ICD-10-CM

## 2017-09-21 DIAGNOSIS — F31.64 SEVERE BIPOLAR I DISORDER, MOST RECENT EPISODE MIXED, WITH PSYCHOTIC FEATURES (H): ICD-10-CM

## 2017-09-21 DIAGNOSIS — L70.5 EXCORIATED ACNE: ICD-10-CM

## 2017-09-21 PROCEDURE — 99214 OFFICE O/P EST MOD 30 MIN: CPT | Performed by: NURSE PRACTITIONER

## 2017-09-21 RX ORDER — FLUCONAZOLE 150 MG/1
150 TABLET ORAL ONCE
Qty: 2 TABLET | Refills: 1 | Status: SHIPPED | OUTPATIENT
Start: 2017-09-21 | End: 2017-09-21

## 2017-09-21 RX ORDER — VALACYCLOVIR HYDROCHLORIDE 1 G/1
1000 TABLET, FILM COATED ORAL 3 TIMES DAILY
Qty: 21 TABLET | Refills: 0 | Status: SHIPPED | OUTPATIENT
Start: 2017-09-21 | End: 2017-10-11

## 2017-09-21 RX ORDER — GABAPENTIN 600 MG/1
600 TABLET ORAL EVERY EVENING
Qty: 90 TABLET | Refills: 0 | Status: SHIPPED | OUTPATIENT
Start: 2017-09-21 | End: 2017-11-10

## 2017-09-21 RX ORDER — LAMOTRIGINE 200 MG/1
200 TABLET ORAL DAILY
Qty: 90 TABLET | Refills: 0 | Status: SHIPPED | OUTPATIENT
Start: 2017-09-21 | End: 2018-02-20

## 2017-09-21 RX ORDER — MUPIROCIN 20 MG/G
OINTMENT TOPICAL 3 TIMES DAILY
Qty: 22 G | Refills: 1 | Status: SHIPPED | OUTPATIENT
Start: 2017-09-21 | End: 2017-09-26

## 2017-09-21 RX ORDER — LORAZEPAM 1 MG/1
0.5 TABLET ORAL EVERY 8 HOURS PRN
Qty: 47 TABLET | Refills: 0 | Status: SHIPPED | OUTPATIENT
Start: 2017-09-21 | End: 2017-11-14

## 2017-09-21 ASSESSMENT — PATIENT HEALTH QUESTIONNAIRE - PHQ9
SUM OF ALL RESPONSES TO PHQ QUESTIONS 1-9: 20
5. POOR APPETITE OR OVEREATING: NEARLY EVERY DAY

## 2017-09-21 ASSESSMENT — ANXIETY QUESTIONNAIRES
IF YOU CHECKED OFF ANY PROBLEMS ON THIS QUESTIONNAIRE, HOW DIFFICULT HAVE THESE PROBLEMS MADE IT FOR YOU TO DO YOUR WORK, TAKE CARE OF THINGS AT HOME, OR GET ALONG WITH OTHER PEOPLE: NOT DIFFICULT AT ALL
2. NOT BEING ABLE TO STOP OR CONTROL WORRYING: NEARLY EVERY DAY
1. FEELING NERVOUS, ANXIOUS, OR ON EDGE: NEARLY EVERY DAY
3. WORRYING TOO MUCH ABOUT DIFFERENT THINGS: NEARLY EVERY DAY
GAD7 TOTAL SCORE: 20
5. BEING SO RESTLESS THAT IT IS HARD TO SIT STILL: NEARLY EVERY DAY
6. BECOMING EASILY ANNOYED OR IRRITABLE: NEARLY EVERY DAY
7. FEELING AFRAID AS IF SOMETHING AWFUL MIGHT HAPPEN: MORE THAN HALF THE DAYS

## 2017-09-21 NOTE — MR AVS SNAPSHOT
After Visit Summary   9/21/2017    Sirena Dietrich    MRN: [id number]           Patient Information     Date Of Birth          1985        Visit Information        Provider Department      9/21/2017 10:00 AM Shi Martinez APRN CNP [facility]        Today's Diagnoses     Recurrent cold sores    -  1    Nausea        Severe bipolar I disorder, most recent episode mixed, with psychotic features        Vaginal odor        Excoriated acne           Follow-ups after your visit        Who to contact     If you have questions or need follow up information about today's clinic visit or your schedule please contact [facility] directly at [phone].  Normal or non-critical lab and imaging results will be communicated to you by [employer]hart, letter or phone within 4 business days after the clinic has received the results. If you do not hear from us within 7 days, please contact the clinic through [employer]hart or phone. If you have a critical or abnormal lab result, we will notify you by phone as soon as possible.  Submit refill requests through [email or web address] or call your pharmacy and they will forward the refill request to us. Please allow 3 business days for your refill to be completed.          Additional Information About Your Visit        MyChart Information     [email or web address] gives you secure access to your electronic health record. If you see a primary care provider, you can also send messages to your care team and make appointments. If you have questions, please call your primary care clinic.  If you do not have a primary care provider, please call [phone] and they will assist you.        Care EveryWhere ID     This is your Care EveryWhere ID. This could be used by other organizations to access your [facility] medical records  [id number]        Your Vitals Were     Pulse Temperature Respirations Last Period Pulse Oximetry BMI (Body Mass Index)    62 97.3  F (36.3  C)  (Tympanic) 14 (LMP Unknown) 99% 22.92 kg/m2       Blood Pressure from Last 3 Encounters:   09/21/17 126/62   09/05/17 108/74   08/23/17 118/74    Weight from Last 3 Encounters:   09/21/17 142 lb (64.4 kg)   09/05/17 144 lb (65.3 kg)   08/23/17 146 lb 6.4 oz (66.4 kg)              Today, you had the following     No orders found for display         Today's Medication Changes          These changes are accurate as of: 9/21/17 10:54 AM.  If you have any questions, ask your nurse or doctor.               Start taking these medicines.        Dose/Directions    fluconazole 150 MG tablet   Commonly known as:  DIFLUCAN   Used for:  Vaginal odor   Started by:  Shi Martinez APRN CNP        Dose:  150 mg   Take 1 tablet (150 mg) by mouth once for 1 dose   Quantity:  2 tablet   Refills:  1       mupirocin 2 % ointment   Commonly known as:  BACTROBAN   Used for:  Excoriated acne   Started by:  Shi Martinez APRN CNP        Apply topically 3 times daily for 5 days   Quantity:  22 g   Refills:  1       valACYclovir 1000 mg tablet   Commonly known as:  VALTREX   Used for:  Recurrent cold sores   Started by:  Shi Martinez APRN CNP        Dose:  1000 mg   Take 1 tablet (1,000 mg) by mouth 3 times daily   Quantity:  21 tablet   Refills:  0         These medicines have changed or have updated prescriptions.        Dose/Directions    gabapentin 600 MG tablet   Commonly known as:  NEURONTIN   This may have changed:    - how much to take  - how to take this  - when to take this   Used for:  Severe bipolar I disorder, most recent episode mixed, with psychotic features (H)   Changed by:  Shi Martinez APRN CNP        Dose:  600 mg   Take 1 tablet (600 mg) by mouth every evening 600 mg in evening.   Quantity:  90 tablet   Refills:  0       lamoTRIgine 200 MG tablet   Commonly known as:  LaMICtal   This may have changed:  See the new instructions.   Used for:  Severe bipolar I disorder, most recent  episode mixed, with psychotic features (H)   Changed by:  Shi Martinez APRN CNP        Dose:  200 mg   Take 1 tablet (200 mg) by mouth daily   Quantity:  90 tablet   Refills:  0       LORazepam 1 MG tablet   Commonly known as:  ATIVAN   This may have changed:    - how much to take  - when to take this   Used for:  Nausea   Changed by:  Shi Martinez APRN CNP        Dose:  0.5 mg   Take 0.5 tablets (0.5 mg) by mouth every 8 hours as needed for anxiety   Quantity:  47 tablet   Refills:  0         Stop taking these medicines if you haven't already. Please contact your care team if you have questions.     doxycycline 100 MG capsule   Commonly known as:  VIBRAMYCIN   Stopped by:  Shi Martinez APRN CNP           guaiFENesin-codeine 100-10 MG/5ML Soln solution   Commonly known as:  ROBITUSSIN AC   Stopped by:  Shi Martinez APRN CNP           metroNIDAZOLE 500 MG tablet   Commonly known as:  FLAGYL   Stopped by:  Shi Martinez APRN CNP                Where to get your medicines      These medications were sent to [facility] [address] [address]     Phone:  [phone]     fluconazole 150 MG tablet    gabapentin 600 MG tablet    lamoTRIgine 200 MG tablet    mupirocin 2 % ointment    valACYclovir 1000 mg tablet         Some of these will need a paper prescription and others can be bought over the counter.  Ask your nurse if you have questions.     Bring a paper prescription for each of these medications     LORazepam 1 MG tablet                Primary Care Provider Office Phone # Fax #    DIPESH Marino [phone]       [address] [place] [address]        Goals        General    I will attend counseling appointment (pt-stated)     Notes - Note edited  11/25/2015  1:35 PM by Shi Armstrong LSW    As of today's date 11/25/2015 goal is met at 76 - 100%.   Goal Status:   Complete - will keep to maintain for  A few month  As of today's date 7/28/2015 goal is met at 26 - 50%.   Goal Status:  Active          Equal Access to Services     SAMRA KAPLAN : Hadii aad ku hadellendavid Nazario, benjamin suazoenriqueha, sandie dominguez, raheem jean laJosefmathew evangelista. So [facility] [phone].    ATENCIÓN: Si habla español, tiene a borja disposición servicios gratuitos de asistencia lingüística. Llame al [phone].    We comply with applicable federal civil rights laws and [place] laws. We do not discriminate on the basis of race, color, national origin, age, disability sex, sexual orientation or gender identity.            Thank you!     Thank you for choosing [facility]  for your care. Our goal is always to provide you with excellent care. Hearing back from our patients is one way we can continue to improve our services. Please take a few minutes to complete the written survey that you may receive in the mail after your visit with us. Thank you!             Your Updated Medication List - Protect others around you: Learn how to safely use, store and throw away your medicines at www.disposemymeds.org.          This list is accurate as of: 9/21/17 10:54 AM.  Always use your most recent med list.                   Brand Name Dispense Instructions for use Diagnosis    fluconazole 150 MG tablet    DIFLUCAN    2 tablet    Take 1 tablet (150 mg) by mouth once for 1 dose    Vaginal odor       fluticasone 50 MCG/ACT spray    FLONASE    1 g    Spray 1-2 sprays into both nostrils daily    Vaginal discharge       gabapentin 600 MG tablet    NEURONTIN    90 tablet    Take 1 tablet (600 mg) by mouth every evening 600 mg in evening.    Severe bipolar I disorder, most recent episode mixed, with psychotic features (H)       lamoTRIgine 200 MG tablet    LaMICtal    90 tablet    Take 1 tablet (200 mg) by mouth daily    Severe bipolar I disorder, most recent episode mixed, with psychotic  features (H)       lidocaine HCl 3 % cream     453.6 g    Apply topically 3 times daily    Yeast infection of the vagina       LORazepam 1 MG tablet    ATIVAN    47 tablet    Take 0.5 tablets (0.5 mg) by mouth every 8 hours as needed for anxiety    Nausea       mupirocin 2 % ointment    BACTROBAN    22 g    Apply topically 3 times daily for 5 days    Excoriated acne       nicotine 21 MG/24HR 24 hr patch    NICODERM CQ    42 patch    Place 1 patch onto the skin every 24 hours Use for 6 weeks    Tobacco abuse       nystatin 944555 UNIT/GM Powd    MYCOSTATIN    30 g    Apply topically 3 times daily as needed    Cutaneous candidiasis       QUEtiapine 50 MG tablet    SEROQUEL    21 tablet    Take 1-3 tablets ( mg) by mouth At Bedtime    Severe bipolar I disorder, most recent episode mixed, with psychotic features (H)       SUMAtriptan 6 MG/0.5ML pen injector kit    IMITREX STATDOSE    3 kit    Inject 0.5 mLs (6 mg) Subcutaneous at onset of headache for migraine    Migraine without aura and with status migrainosus, not intractable       valACYclovir 1000 mg tablet    VALTREX    21 tablet    Take 1 tablet (1,000 mg) by mouth 3 times daily    Recurrent cold sores

## 2017-09-21 NOTE — PROGRESS NOTES
SUBJECTIVE:   Sirena Dietrich is a 31 year old female who presents to clinic today for the following health issues:      Depression and Anxiety Follow-Up    Status since last visit: No change    Other associated symptoms:None    Complicating factors:     Significant life event: No     Current substance abuse: None    Needs med's refill   Sees padmini good at [facility]     PHQ-9 SCORE 2/16/2016 9/8/2016 11/23/2016   Total Score - - -   Total Score 16 19 21     VICTOR M-7 SCORE 2/16/2016 9/8/2016 11/23/2016   Total Score - - -   Total Score 21 16 19   Total Score - - -       PHQ-9  English  PHQ-9   Any Language  GAD7      Amount of exercise or physical activity: None    Problems taking medications regularly: No    Medication side effects: none  Diet: regular (no restrictions)\    Padmini Weathers MENTAL HEALTH MEDICATION MANAGEMENT at [facility]  Last saw her July.   Lorazepam 1.5 mg total   0.5 mg - two - three times a day.   Seroquel 50 mg at HS a couple of times a week  lamictal 200 mg once daily .. No manic episodes in the past year.   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 No therapy right now.    . [employer].     No LMP recorded (lmp unknown). Patient has had a hysterectomy.  Ovaries remain.  Spots on face breaking out.. Wanting to go on ORAL BIRTH CONTROL to control hormones. This has worked for her in the past.   Neosporin and proactive no relief.   Burning sensation.     Abdominal pain resolved.   A lot of weight loss off the seroquel.   Constipation intermittent.     Work was really stressful. Work drama got so bad she decided she resigned on the 5th.   Was too much with her mental health and causing severe anxiety attacks.         -------------------------------------    Problem list and histories reviewed & adjusted, as indicated.  Additional history: as documented    Labs reviewed in EPIC    Reviewed and updated as needed this visit by clinical  staffAllergies       Reviewed and updated as needed this visit by Provider         ROS:   ROS: 10 point ROS neg other than the symptoms noted above in the HPI.      OBJECTIVE:                                                    /62  Pulse 62  Temp 97.3  F (36.3  C) (Tympanic)  Resp 14  Wt 142 lb (64.4 kg)  LMP  (LMP Unknown)  SpO2 99%  BMI 22.92 kg/m2  Body mass index is 22.92 kg/(m^2).   GENERAL: healthy, alert, well nourished, well hydrated, no distress  HENT: ear canals- normal; TMs- normal; Nose- normal; Mouth- no ulcers, no lesions  NECK: no tenderness, no adenopathy, no asymmetry, no masses, no stiffness; thyroid- normal to palpation  RESP: lungs clear to auscultation - no rales, no rhonchi, no wheezes  CV: regular rates and rhythm, normal S1 S2, no S3 or S4 and no murmur, no click or rub -  ABDOMEN: soft, no tenderness, no  hepatosplenomegaly, no masses, normal bowel sounds  MS: extremities- no gross deformities noted, no edema  SKIN FACIAL ACNE CYSTIC CHEEK AND CHIN    Diagnostic test results:  Results for orders placed or performed in visit on 08/23/17   UA reflex to Microscopic and Culture   Result Value Ref Range    Color Urine Yellow     Appearance Urine Clear     Glucose Urine Negative NEG^Negative mg/dL    Bilirubin Urine Negative NEG^Negative    Ketones Urine Negative NEG^Negative mg/dL    Specific Gravity Urine 1.020 1.003 - 1.035    Blood Urine Trace (A) NEG^Negative    pH Urine 6.5 5.0 - 7.0 pH    Protein Albumin Urine Negative NEG^Negative mg/dL    Urobilinogen Urine 0.2 0.2 - 1.0 EU/dL    Nitrite Urine Negative NEG^Negative    Leukocyte Esterase Urine Negative NEG^Negative    Source Midstream Urine    Urine Microscopic   Result Value Ref Range    WBC Urine O - 2 OTO2^O - 2 /HPF    RBC Urine O - 2 OTO2^O - 2 /HPF    Bacteria Urine Few (A) NEG^Negative /HPF    Mucous Urine Present (A) NEG^Negative /LPF   Wet prep   Result Value Ref Range    Specimen Description Vagina     Wet Prep No  Trichomonas seen     Wet Prep Clue cells seen (A)     Wet Prep No yeast seen      *Note: Due to a large number of results and/or encounters for the requested time period, some results have not been displayed. A complete set of results can be found in Results Review.          ASSESSMENT/PLAN:                                                    1. Nausea  Ongoing intermittent.   Use zofran if needed.       2. Severe bipolar I disorder, most recent episode mixed, with psychotic features  Refill meds until she can be seen by psychiatry.   - lamoTRIgine (LAMICTAL) 200 MG tablet; Take 1 tablet (200 mg) by mouth daily  Dispense: 90 tablet; Refill: 0  - gabapentin (NEURONTIN) 600 MG tablet; Take 1 tablet (600 mg) by mouth every evening 600 mg in evening.  Dispense: 90 tablet; Refill: 0    3. Recurrent cold sores  Refilled.   - valACYclovir (VALTREX) 1000 mg tablet; Take 1 tablet (1,000 mg) by mouth 3 times daily  Dispense: 21 tablet; Refill: 0    4. Vaginal odor  - fluconazole (DIFLUCAN) 150 MG tablet; Take 1 tablet (150 mg) by mouth once for 1 dose  Dispense: 2 tablet; Refill: 1    5. Excoriated acne  - mupirocin (BACTROBAN) 2 % ointment; Apply topically 3 times daily for 5 days  Dispense: 22 g; Refill: 1      Follow up with Provider - Call or return to the clinic with any worsening of symptoms or no resolution. Patient/Parent verbalized understanding and is in agreement. Medication side effects reviewed.   Current Outpatient Prescriptions   Medication Sig Dispense Refill     LORazepam (ATIVAN) 1 MG tablet Take 0.5 tablets (0.5 mg) by mouth every 8 hours as needed for anxiety 47 tablet 0     lamoTRIgine (LAMICTAL) 200 MG tablet Take 1 tablet (200 mg) by mouth daily 90 tablet 0     gabapentin (NEURONTIN) 600 MG tablet Take 1 tablet (600 mg) by mouth every evening 600 mg in evening. 90 tablet 0     valACYclovir (VALTREX) 1000 mg tablet Take 1 tablet (1,000 mg) by mouth 3 times daily 21 tablet 0     nicotine (NICODERM CQ) 21  MG/24HR 24 hr patch Place 1 patch onto the skin every 24 hours Use for 6 weeks 42 patch 0     nystatin (MYCOSTATIN) 589734 UNIT/GM POWD Apply topically 3 times daily as needed 30 g 1     lidocaine HCl 3 % cream Apply topically 3 times daily 453.6 g 0     fluticasone (FLONASE) 50 MCG/ACT nasal spray Spray 1-2 sprays into both nostrils daily 1 g 5     SUMAtriptan (IMITREX STATDOSE) 6 MG/0.5ML pen injector kit Inject 0.5 mLs (6 mg) Subcutaneous at onset of headache for migraine 3 kit 3     terconazole (TERAZOL 3) 0.8 % cream Place 1 applicator (1 g) vaginally At Bedtime for 5 days 5 g 1     QUEtiapine (SEROQUEL) 50 MG tablet Take 1-3 tablets ( mg) by mouth At Bedtime 21 tablet 0        See Patient Instructions    DIEPSH Marino [facility]

## 2017-09-22 ASSESSMENT — ANXIETY QUESTIONNAIRES: GAD7 TOTAL SCORE: 20

## 2017-10-02 ENCOUNTER — OFFICE VISIT (OUTPATIENT)
Dept: FAMILY MEDICINE | Facility: CLINIC | Age: 32
End: 2017-10-02
Payer: COMMERCIAL

## 2017-10-02 VITALS
HEART RATE: 79 BPM | WEIGHT: 148 LBS | BODY MASS INDEX: 23.89 KG/M2 | DIASTOLIC BLOOD PRESSURE: 62 MMHG | TEMPERATURE: 98 F | SYSTOLIC BLOOD PRESSURE: 118 MMHG | OXYGEN SATURATION: 98 %

## 2017-10-02 DIAGNOSIS — B37.31 YEAST INFECTION OF THE VAGINA: Primary | ICD-10-CM

## 2017-10-02 DIAGNOSIS — N89.8 VAGINAL ITCHING: ICD-10-CM

## 2017-10-02 LAB
SPECIMEN SOURCE: ABNORMAL
WET PREP SPEC: ABNORMAL

## 2017-10-02 PROCEDURE — 99213 OFFICE O/P EST LOW 20 MIN: CPT | Performed by: FAMILY MEDICINE

## 2017-10-02 PROCEDURE — 87210 SMEAR WET MOUNT SALINE/INK: CPT | Performed by: FAMILY MEDICINE

## 2017-10-02 RX ORDER — TERCONAZOLE 8 MG/G
1 CREAM VAGINAL AT BEDTIME
Qty: 5 G | Refills: 1 | Status: SHIPPED | OUTPATIENT
Start: 2017-10-02 | End: 2017-10-07

## 2017-10-02 NOTE — MR AVS SNAPSHOT
After Visit Summary   10/2/2017    Sirena Dietrich    MRN: [id number]           Patient Information     Date Of Birth          1985        Visit Information        Provider Department      10/2/2017 8:40 AM Caroline Morrison MD [facility]        Today's Diagnoses     Yeast infection of the vagina    -  1    Vaginal itching          Care Instructions    Use terconazole for yeast at night for 5 nights.   If you still feel like you have any symptoms in 1-2 weeks, please return to clinic to have Shi do an exam and take a culture.               Follow-ups after your visit        Who to contact     If you have questions or need follow up information about today's clinic visit or your schedule please contact [facility] directly at [phone].  Normal or non-critical lab and imaging results will be communicated to you by MyChart, letter or phone within 4 business days after the clinic has received the results. If you do not hear from us within 7 days, please contact the clinic through MyChart or phone. If you have a critical or abnormal lab result, we will notify you by phone as soon as possible.  Submit refill requests through [employer] or call your pharmacy and they will forward the refill request to us. Please allow 3 business days for your refill to be completed.          Additional Information About Your Visit        MyChart Information     [employer] gives you secure access to your electronic health record. If you see a primary care provider, you can also send messages to your care team and make appointments. If you have questions, please call your primary care clinic.  If you do not have a primary care provider, please call [phone] and they will assist you.        Care EveryWhere ID     This is your Care EveryWhere ID. This could be used by other organizations to access your [place] medical records  [id number]        Your Vitals Were     Pulse Temperature  Last Period Pulse Oximetry BMI (Body Mass Index)       79 98  F (36.7  C) (Tympanic) (LMP Unknown) 98% 23.89 kg/m2        Blood Pressure from Last 3 Encounters:   10/02/17 118/62   09/21/17 126/62   09/05/17 108/74    Weight from Last 3 Encounters:   10/02/17 148 lb (67.1 kg)   09/21/17 142 lb (64.4 kg)   09/05/17 144 lb (65.3 kg)              We Performed the Following     Wet prep          Today's Medication Changes          These changes are accurate as of: 10/2/17  9:10 AM.  If you have any questions, ask your nurse or doctor.               Start taking these medicines.        Dose/Directions    terconazole 0.8 % cream   Commonly known as:  TERAZOL 3   Used for:  Yeast infection of the vagina   Started by:  Caroline Morrison MD        Dose:  1 applicator   Place 1 applicator (1 g) vaginally At Bedtime for 5 days   Quantity:  5 g   Refills:  1            Where to get your medicines      These medications were sent to [facility] [address] [address]     Phone:  [phone]     terconazole 0.8 % cream                Primary Care Provider Office Phone # Fax #    DIPESH Marino [facility] [phone]       [address] [place] [address]        Goals        General    I will attend counseling appointment (pt-stated)     Notes - Note edited  11/25/2015  1:35 PM by Shi Armstrong LSW    As of today's date 11/25/2015 goal is met at 76 - 100%.   Goal Status:  Complete - will keep to maintain for  A few month  As of today's date 7/28/2015 goal is met at 26 - 50%.   Goal Status:  Active          Equal Access to Services     [facility] : Hadgabrielle Nazario, wapravinda lukamron, qaemily kaalmada angelica, raheem evangelista. [facility] [phone].    ATENCIÓN: Si habla español, tiene a borja disposición servicios gratuitos de asistencia lingüística. Llame al [phone].    We comply with applicable  federal civil rights laws and [place] laws. We do not discriminate on the basis of race, color, national origin, age, disability, sex, sexual orientation, or gender identity.            Thank you!     Thank you for choosing [facility]  for your care. Our goal is always to provide you with excellent care. Hearing back from our patients is one way we can continue to improve our services. Please take a few minutes to complete the written survey that you may receive in the mail after your visit with us. Thank you!             Your Updated Medication List - Protect others around you: Learn how to safely use, store and throw away your medicines at www.disposemymeds.org.          This list is accurate as of: 10/2/17  9:10 AM.  Always use your most recent med list.                   Brand Name Dispense Instructions for use Diagnosis    fluticasone 50 MCG/ACT spray    FLONASE    1 g    Spray 1-2 sprays into both nostrils daily    Vaginal discharge       gabapentin 600 MG tablet    NEURONTIN    90 tablet    Take 1 tablet (600 mg) by mouth every evening 600 mg in evening.    Severe bipolar I disorder, most recent episode mixed, with psychotic features (H)       lamoTRIgine 200 MG tablet    LaMICtal    90 tablet    Take 1 tablet (200 mg) by mouth daily    Severe bipolar I disorder, most recent episode mixed, with psychotic features (H)       lidocaine HCl 3 % cream     453.6 g    Apply topically 3 times daily    Yeast infection of the vagina       LORazepam 1 MG tablet    ATIVAN    47 tablet    Take 0.5 tablets (0.5 mg) by mouth every 8 hours as needed for anxiety    Nausea       nicotine 21 MG/24HR 24 hr patch    NICODERM CQ    42 patch    Place 1 patch onto the skin every 24 hours Use for 6 weeks    Tobacco abuse       nystatin 385824 UNIT/GM Powd    MYCOSTATIN    30 g    Apply topically 3 times daily as needed    Cutaneous candidiasis       QUEtiapine 50 MG tablet    SEROQUEL    21 tablet    Take 1-3  tablets ( mg) by mouth At Bedtime    Severe bipolar I disorder, most recent episode mixed, with psychotic features (H)       SUMAtriptan 6 MG/0.5ML pen injector kit    IMITREX STATDOSE    3 kit    Inject 0.5 mLs (6 mg) Subcutaneous at onset of headache for migraine    Migraine without aura and with status migrainosus, not intractable       terconazole 0.8 % cream    TERAZOL 3    5 g    Place 1 applicator (1 g) vaginally At Bedtime for 5 days    Yeast infection of the vagina       valACYclovir 1000 mg tablet    VALTREX    21 tablet    Take 1 tablet (1,000 mg) by mouth 3 times daily    Recurrent cold sores

## 2017-10-02 NOTE — PROGRESS NOTES
SUBJECTIVE:   Sirena Dietrich is a 31 year old female who presents to clinic today for the following health issues:      Vaginal Symptoms      Duration: 1 week    Description  vaginal discharge - creamy, odor and pelvic pain    Intensity:  moderate    Accompanying signs and symptoms (fever/dysuria/abdominal or back pain): None    History  Sexually active: yes, single partner, contraception not required  Possibility of pregnancy: No  Recent antibiotic use: YES    Precipitating or alleviating factors: None    Therapies tried and outcome: monistat  Outcome:     She has been on antibiotics for the past 3 weeks. She has taken diflucan once a week for at least 5 weeks, but still feels that she has an infection. She states that she is itchy, has a discharge, and a foul odor. She has not tried any OTC products for this. She denies having a diet full of sugar and carbohydrates. She has been eating yogurt as well.    She did have a wet prep 8/23/17 that showed clue cells. She was treated with metronidazole and diflucan.    Problem list and histories reviewed & adjusted, as indicated.  Additional history: as documented    BP Readings from Last 3 Encounters:   10/02/17 118/62   09/21/17 126/62   09/05/17 108/74    Wt Readings from Last 3 Encounters:   10/02/17 67.1 kg (148 lb)   09/21/17 64.4 kg (142 lb)   09/05/17 65.3 kg (144 lb)            Reviewed and updated as needed this visit by clinical staffTobacco  Allergies  Med Hx  Surg Hx  Fam Hx  Soc Hx      Reviewed and updated as needed this visit by Provider         ROS:  C: NEGATIVE for fever, chills, change in weight  R: NEGATIVE for significant cough or SOB  CV: NEGATIVE for chest pain, palpitations or peripheral edema  : POSITIVE for discharge, itching, odor    OBJECTIVE:   /62 (BP Location: Right arm)  Pulse 79  Temp 98  F (36.7  C) (Tympanic)  Wt 67.1 kg (148 lb)  LMP  (LMP Unknown)  SpO2 98%  BMI 23.89 kg/m2  Body mass index is 23.89  kg/(m^2).  GENERAL: healthy, alert and no distress  .No further exam today     Diagnostic Test Results:  Results for orders placed or performed in visit on 10/02/17 (from the past 24 hour(s))   Wet prep   Result Value Ref Range    Specimen Description Vagina     Wet Prep (A)      No Trichomonas seen  No clue cells seen  Few  Yeast seen       *Note: Due to a large number of results and/or encounters for the requested time period, some results have not been displayed. A complete set of results can be found in Results Review.       ASSESSMENT/PLAN:   Sirena was seen today for vaginal problem.    Diagnoses and all orders for this visit:    Yeast infection of the vagina  -     terconazole (TERAZOL 3) 0.8 % cream; Place 1 applicator (1 g) vaginally At Bedtime for 5 days    Vaginal itching  -     Wet prep        Patient Instructions   Use terconazole for yeast at night for 5 nights.   If you still feel like you have any symptoms in 1-2 weeks, please return to clinic to have Shi do an exam and take a culture.         This document serves as a record of the services and decisions personally performed and made by Caroline Morrison MD. It was created on her behalf by Prisca Mike, a trained medical scribe. The creation of this document is based the provider's statements to the medical scribe.  Prisca Mike 9:07 AM 10/2/2017    Provider:   The information in this document, created by the medical scribe for me, accurately reflects the services I personally performed and the decisions made by me. I have reviewed and approved this document for accuracy prior to leaving the patient care area.  Caroline Morrison MD 9:07 AM 10/2/2017    Caroline Morrison MD  [facility]

## 2017-10-02 NOTE — PATIENT INSTRUCTIONS
Use terconazole for yeast at night for 5 nights.   If you still feel like you have any symptoms in 1-2 weeks, please return to clinic to have Shi do an exam and take a culture.

## 2017-10-02 NOTE — NURSING NOTE
"Chief Complaint   Patient presents with     Vaginal Problem       Initial /62 (BP Location: Right arm)  Pulse 79  Temp 98  F (36.7  C) (Tympanic)  Wt 148 lb (67.1 kg)  LMP  (LMP Unknown)  SpO2 98%  BMI 23.89 kg/m2 Estimated body mass index is 23.89 kg/(m^2) as calculated from the following:    Height as of 6/9/17: 5' 6\" (1.676 m).    Weight as of this encounter: 148 lb (67.1 kg).  Medication Reconciliation: complete    Health Maintenance that is potentially due pending provider review:  NONE    n/a    Is there anyone who you would like to be able to receive your results? No  If yes have patient fill out JOSÉ    "

## 2017-10-03 NOTE — PATIENT INSTRUCTIONS
"  Acne  Acne is an inflammatory condition of the skin. During adolescence (especially in boys) there is an increase in production of skin oils on the face, neck, chest, upper back, and upper arms. These oils can block hair follicles and allow an overgrowth of normal bacteria. This results in acne.  Mild acne causes whiteheads, blackheads, and pimples. More severe acne causes cysts and scarring. Acne usually clears up by your mid-20 s.  These factors can make acne worse:    Oil-based cosmetics    Heavy sweating    Frequent or hard scrubbing of the skin can irritate the acne lesions    Skin rubbing against helmets, shoulder pads, turtlenecks, and bra straps    Certain types of birth control pills  Home care  Here are some tips to help care for acne:  1. Treatments may include topical products (creams, lotions, or gels), oral antibiotics, and other medicines.  2. Follow the treatment plan advised by your healthcare provider. It usually takes 2 to 3 months to see a result.  3. Switching from oil-based to water-based makeup may improve acne in girls.  Follow-up care  Follow up with your healthcare provider, or as advised. For more information:    American Academy of Dermatology  www.aad.org  When to seek medical advice  Call your healthcare provider right away if you have acne cysts that get larger or more painful.  Date Last Reviewed: 6/1/2016 2000-2017 [employer]. [address] [place] [id number]. All rights reserved. This information is not intended as a substitute for professional medical care. Always follow your healthcare professional's instructions.        Bipolar Disorder  Bipolar disorder is an illness that causes strong mood swings between depression and  nicole . It used to be called \"manic depression.\" The mood swings are different from the normal ups and downs we all normally experience in our lives. They are more severe, last longer, and can interfere with work and relationships. " These episodes are changes from our usual moods and behavior. Their severity can be mild, or drastic and explosive.    In a manic episode, you may think fast and do things quickly. It may seem like you are getting a lot done. At first, this may feel very good. But in the extreme this can lead to a lifestyle that is disorganized, chaotic, and includes risky behavior (spending sprees, sexual acting-out, or drug use). In later stages, it may affect eating (no interest in food) and sleeping (unable to sleep for days at a time). Speech may speed up and become difficult for others to understand. You may appear to others as if you are in your own world.    In a depressive episode, you may feel a lack of interest in normal activities. Sometimes there is sadness or guilt without any clear reason. Thinking may become slow and there can be a lack energy or feeling of hopelessness. Some people have thoughts of harming themselves at this stage. Thoughts can even turn to suicide.  Between these phases you may actually feel OK. This does not mean that the illness is gone. People with this disorder will usually have to treat it all of their life. Medicine and good care can greatly reduce the symptoms.  The exact cause of this illness is unknown. However, there is a genetic link that makes a person more likely to get this problem. Also, the use of drugs such as speed (amphetamine) and cocaine increase the chances of this illness appearing.  Home care    On-going care and support helps people manage this disease.  Find a healthcare provider and therapist who meet your needs.  Seek help when you feel like you may be heading into either a manic episode or a depressive state.    Be sure to take your medicine and get regular blood work to check your the levels of medicine in your body.  Take the medicine and get the follow-up lab work as prescribed, even if you think you don t need to do it.    Be certain to tell each of your healthcare  providers about all of the prescription drugs, over-the-counter medicines, and supplements you take. Certain supplements interact with medicines and result in dangerous side effects.  You can also use your pharmacist as a resource person when you have questions about drug interactions.    Talk with your family and trusted friends about your thoughts and feelings.  Ask them to help you recognize behavior changes early so you can get help and medicines can be adjusted.    Alcohol and drugs can bring on an episode, and also make them worse    If your life is severely impacted by this illness, the Americans with Disabilities Act (ADA) may provide help. The ADA protects people with chronic physical and mental health problems. If you are having trouble keeping jobs, managing workplace issues, or caring for yourself because of your bipolar disorder, contact your local ADA office to see if they can help. The [place] Department  of Justice operates a toll-free ADA information line at: 963.532.2646 (Voice); or 696-215-9908 (TTY).  They can help you locate a local office.    Follow-up care  Follow up with your doctor or therapist as advised. They can help you to find ways to improve your life.  Call 911  Call your healthcare provider right away if any of these occur:    You have suicidal thoughts, a plan, and the means to  harm yourself    Trouble breathing    Confusion    Drowsiness or trouble wakening    Fainting or loss of consciousness    Rapid heart rate, very low heart rate, or a new irregular heart rate    Seizure    New chest pain that becomes more severe, lasts longer, or spreads into your shoulder, arm, neck, jaw, or back  When to seek medical advice  Call your healthcare provider right away if any of these occur:    Feeling like your symptoms are getting worse (depression, agitation, excess energy)    Unable to eat or sleep for more than 48 hours    Feeling out of control (racing thoughts, poor concentration)    Feeling  like you want to harm yourself or another    Being unable to care for yourself  Date Last Reviewed: 2015-2017 [employer]. [address] [place] [address], [place] [address]. All rights reserved. This information is not intended as a substitute for professional medical care. Always follow your healthcare professional's instructions.        Understanding Cold Sores  Cold sores are small blisters or sores on the lip or sometimes inside the mouth. Many people get them from time to time. Cold sores usually are not serious, and they usually heal in a week or two. They are caused by 2 related viruses, herpes simplex type 1 and 2. These viruses spread very easily. Many people have one or both of these viruses in their body. More than 4 in every 5 people are infected with herpes simplex type 1. Once you have the virus that causes cold sores, it stays in your body for the rest of your life. But it can be inactive for long periods.  What causes a cold sore?  Cold sores are usually caused by herpes simplex virus type 1. Less often, they are caused by herpes simplex virus type 2. Herpes simplex virus type 2 is the more common cause of genital sores. The herpes viruses can enter the body through a break in the skin such as a scrape. Or they may enter through mucous membranes such as the lips or mouth. Some ways to get the viruses include:    Kissing someone who has a cold sore    Sharing a drinking glass, eating utensils, or lip balm with someone who has a cold sore    Having oral sex with someone who has a cold sore  A  baby can also get the infection at birth.  If you have a herpes virus, you can to pass it along even when you don t have a sore.  Cold sores flare up occasionally. Things that can cause an outbreak include:    Sun exposure    Fever    Stress or exhaustion    Menstruation    Skin irritation    Another unrelated Illness such as pneumonia, urinary infection, or cancer  What are the symptoms  of a cold sore?  Symptoms can include:    A blister-like sore or cluster of sores. These often occur at the edge of the lips but may appear inside the mouth.    Skin redness around the sores.    Pain or itching in the area of the outbreak. Often the pain or itching develops 12 to 24 hours before the sore become visible.    Flu-like symptoms, including swollen glands, headache, body ache, or fever. These typically occur only at the time of the first infection.  Cold sores may also occur on fingers. They may rarely infect the eyes, a serious possible complication.  Some people have symptoms a day or two before an outbreak. They may feel tenderness, burning, itching, or tingling before a cold sore appears. Cold sores tend to come back in the same area that they first appeared.  How are cold sores treated?  Treatment for cold sores focuses on relieving and shortening symptoms. For people with frequent outbreaks, treatment works to decrease how often future episodes.  Treatments may include:    Prescription or over-the-counter pain medicines. These can help with discomfort, especially if sores are inside the mouth.    Antiviral medicines. These may be pills that are taken by mouth or a cream to apply to sores. They may help shorten an outbreak and reduce the severity of symptoms.  They may be used to help prevent future outbreaks if you have disabling recurrent infections.    Self-care such as extra rest and drinking more fluids. These may help relieve the flu-like symptoms of a first outbreak.  How are cold sores diagnosed?  Your healthcare provider makes the diagnosis mainly by looking at the sores and doing a clinical exam.  This may be confirmed by swab tests or blood tests.  How can I prevent cold sores?  You can help reduce the spread of the herpes viruses that cause cold sores. This can help both you and others avoid getting cold sores. Follow these tips:    Do not kiss others if you have a cold sore. Also avoid  kissing someone with a cold sore.    Do not share eating utensils, lip balm, razors, or towels with someone who has a cold sore.    Wash your hands after touching the area of a cold sore. The herpes virus can be carried from your face to your hands when you touch the area of a cold sore. When this happens, wash your hands thoroughly, for at least 20 seconds. When you can t wash with soap and water, use an alcohol-based hand .    Disinfect things you touch often, such as phones and keyboards.      If you feel a cold sore coming on, do the same things you would do when a cold sore is present to avoid spreading the virus.    Use condoms to help prevent passing on the viruses through sex.  What are the possible complications of a cold sore?  Cold sores usually go away by themselves within 2 weeks. For most people cold sores are not serious. The viruses that cause cold sores can cause more serious illness, though. People who have a weak immune system may get more serious infections from herpes viruses. These include people being treated for cancer or who have HIV disease. Babies may also become very ill from a herpes infection.      When should I call my healthcare provider?  Call your healthcare provider right away if you have any of these:    Fever of 100.4 F (38 C) or higher, or as directed    Pain that gets worse    You cannot eat or drink because of painful sores    Symptoms don t get better within 5 to 7 days    Blisters spread beyond the mouth or lip to areas on the chest, arms, face, or legs   Date Last Reviewed: 3/28/2016    5261-4340 [employer]. [address] [place] [address], [place] [address]. All rights reserved. This information is not intended as a substitute for professional medical care. Always follow your healthcare professional's instructions.

## 2017-10-11 ENCOUNTER — VIRTUAL VISIT (OUTPATIENT)
Dept: FAMILY MEDICINE | Facility: CLINIC | Age: 32
End: 2017-10-11
Payer: COMMERCIAL

## 2017-10-11 ENCOUNTER — TELEPHONE (OUTPATIENT)
Dept: FAMILY MEDICINE | Facility: CLINIC | Age: 32
End: 2017-10-11

## 2017-10-11 DIAGNOSIS — B00.1 RECURRENT COLD SORES: ICD-10-CM

## 2017-10-11 DIAGNOSIS — G43.001 MIGRAINE WITHOUT AURA AND WITH STATUS MIGRAINOSUS, NOT INTRACTABLE: ICD-10-CM

## 2017-10-11 DIAGNOSIS — B37.31 YEAST INFECTION OF THE VAGINA: Primary | ICD-10-CM

## 2017-10-11 LAB
SPECIMEN SOURCE: NORMAL
WET PREP SPEC: NORMAL

## 2017-10-11 PROCEDURE — 98967 PH1 ASSMT&MGMT NQHP 11-20: CPT | Performed by: NURSE PRACTITIONER

## 2017-10-11 PROCEDURE — 87210 SMEAR WET MOUNT SALINE/INK: CPT | Performed by: NURSE PRACTITIONER

## 2017-10-11 RX ORDER — ACYCLOVIR 50 MG/G
CREAM TOPICAL
Qty: 5 G | Refills: 3 | Status: SHIPPED | OUTPATIENT
Start: 2017-10-11 | End: 2018-02-21

## 2017-10-11 RX ORDER — CEFUROXIME AXETIL 250 MG/1
6 TABLET ORAL
Qty: 3 KIT | Refills: 3 | Status: SHIPPED | OUTPATIENT
Start: 2017-10-11 | End: 2018-09-18

## 2017-10-11 RX ORDER — VALACYCLOVIR HYDROCHLORIDE 500 MG/1
500 TABLET, FILM COATED ORAL DAILY
Qty: 90 TABLET | Refills: 3 | Status: SHIPPED | OUTPATIENT
Start: 2017-10-11 | End: 2017-11-14

## 2017-10-11 NOTE — PROGRESS NOTES
"Sirena Dietrich is a 31 year old female who is being evaluated via a telephone visit.      The patient has been notified of following:     \"This telephone visit will be conducted via a call between you and your physician/provider. We have found that certain health care needs can be provided without the need for a physical exam.  This service lets us provide the care you need with a short phone conversation.  If a prescription is necessary we can send it directly to your pharmacy.  If lab work is needed we can place an order for that and you can then stop by our lab to have the test done at a later time.    We will bill your insurance company for this service.  Please check with your medical insurance if this type of visit is covered. You may be responsible for the cost of this type of visit if insurance coverage is denied.  The typical cost is $30 (10min), $59 (11-20min) and $85 (21-30min).  Most often these visits are shorter than 10 minutes.    If during the course of the call the physician/provider feels a telephone visit is not appropriate, you will not be charged for this service.\"       Consent has been obtained for this service by 2 care team members: yes. See the scanned image in the medical record.    Sirena Dietrich complains of  Telemedicine consult      I have reviewed and updated the patient's Past Medical History, Social History, Family History and Medication List.    ALLERGIES  Vicodin [hydrocodone-acetaminophen]; Amoxicillin; Bactrim; and Erythro [erythromycin]    pool ([place] signature)    Additional provider notes: Patient came into the clinic today with her son for his appointment. She has been having vaginal symptoms itching for the past 2 days. Recently     Assessment/Plan finished Diflucan. She did not take it as prescribed.  Results for orders placed or performed in visit on 10/11/17   Wet prep   Result Value Ref Range    Specimen Description Vagina     Wet Prep No Trichomonas seen     Wet Prep No " clue cells seen     Wet Prep No yeast seen     Wet Prep       SUSPECT PATIENT USED MONOSTAT TYPE PRODUCT  INTEFERING SUBSTANCE PLEASE INTERPRET RESULTS   WITH CAUTION       *Note: Due to a large number of results and/or encounters for the requested time period, some results have not been displayed. A complete set of results can be found in Results Review.       (B37.3) Yeast infection of the vagina  (primary encounter diagnosis)    Plan: Wet prep         recurrent problems with this. Repeat vaginal culture for yeast recommended       ongoing recurrent HSV  Daily Valtrex 500 mg  May use over-the-counter acyclovir cream     migraine headaches  Refilled Imitrex        I have reviewed the note as documented above.  This accurately captures the substance of my conversation with the patient,  Sirena GUILLEN Markel      Total time of call between patient and provider was 11 minutes     Call or return to the clinic with any worsening of symptoms or no resolution. Patient/Parent verbalized understanding and is in agreement. Medication side effects reviewed.   Current Outpatient Prescriptions   Medication Sig Dispense Refill     valACYclovir (VALTREX) 500 MG tablet Take 1 tablet (500 mg) by mouth daily 90 tablet 3     acyclovir (ZOVIRAX) 5 % cream Apply topically 5 times daily 5 g 3     SUMAtriptan (IMITREX STATDOSE) 6 MG/0.5ML pen injector kit Inject 0.5 mLs (6 mg) Subcutaneous at onset of headache for migraine 3 kit 3     LORazepam (ATIVAN) 1 MG tablet Take 0.5 tablets (0.5 mg) by mouth every 8 hours as needed for anxiety 47 tablet 0     lamoTRIgine (LAMICTAL) 200 MG tablet Take 1 tablet (200 mg) by mouth daily 90 tablet 0     gabapentin (NEURONTIN) 600 MG tablet Take 1 tablet (600 mg) by mouth every evening 600 mg in evening. 90 tablet 0     [DISCONTINUED] valACYclovir (VALTREX) 1000 mg tablet Take 1 tablet (1,000 mg) by mouth 3 times daily 21 tablet 0     nicotine (NICODERM CQ) 21 MG/24HR 24 hr patch Place 1 patch onto the skin  every 24 hours Use for 6 weeks 42 patch 0     nystatin (MYCOSTATIN) 579260 UNIT/GM POWD Apply topically 3 times daily as needed 30 g 1     QUEtiapine (SEROQUEL) 50 MG tablet Take 1-3 tablets ( mg) by mouth At Bedtime 21 tablet 0     lidocaine HCl 3 % cream Apply topically 3 times daily 453.6 g 0     fluticasone (FLONASE) 50 MCG/ACT nasal spray Spray 1-2 sprays into both nostrils daily 1 g 5     Shi Martinez [facility]  [phone]  [address] [address]

## 2017-10-11 NOTE — PROGRESS NOTES
Results for orders placed or performed in visit on 10/11/17   Wet prep   Result Value Ref Range    Specimen Description Vagina     Wet Prep No Trichomonas seen     Wet Prep No clue cells seen     Wet Prep No yeast seen     Wet Prep       SUSPECT PATIENT USED MONOSTAT TYPE PRODUCT  INTEFERING SUBSTANCE PLEASE INTERPRET RESULTS   WITH CAUTION       *Note: Due to a large number of results and/or encounters for the requested time period, some results have not been displayed. A complete set of results can be found in Results Review.     Repeat diflucan 150 mg today repeat in one week.   Recheck next week for vaginal culture.   Thanks Shi Martinez [facility]

## 2017-10-11 NOTE — MR AVS SNAPSHOT
After Visit Summary   10/11/2017    Sirena Dietrich    MRN: [id number]           Patient Information     Date Of Birth          1985        Visit Information        Provider Department      10/11/2017 4:00 PM Shi Martinez APRN CNP [facility]        Today's Diagnoses     Yeast infection of the vagina    -  1    Recurrent cold sores        Migraine without aura and with status migrainosus, not intractable           Follow-ups after your visit        Your next 10 appointments already scheduled     Oct 23, 2017  9:00 AM CDT   Office Visit with Caroline Grossman MD   [facility] ([facility]    [address] [address]   [phone]           Bring a current list of meds and any records pertaining to this visit. For Physicals, please bring immunization records and any forms needing to be filled out. Please arrive 10 minutes early to complete paperwork.              Who to contact     If you have questions or need follow up information about today's clinic visit or your schedule please contact [facility] directly at [phone].  Normal or non-critical lab and imaging results will be communicated to you by [employer], letter or phone within 4 business days after the clinic has received the results. If you do not hear from us within 7 days, please contact the clinic through [employer] or phone. If you have a critical or abnormal lab result, we will notify you by phone as soon as possible.  Submit refill requests through [employer] or call your pharmacy and they will forward the refill request to us. Please allow 3 business days for your refill to be completed.          Additional Information About Your Visit        MyChart Information     [employer] gives you secure access to your electronic health record. If you see a primary care provider, you can also send messages to your care team and make appointments. If you have  questions, please call your primary care clinic.  If you do not have a primary care provider, please call [phone] and they will assist you.        Care EveryWhere ID     This is your Care EveryWhere ID. This could be used by other organizations to access your [place] medical records  [id number]        Your Vitals Were     Last Period                   (LMP Unknown)            Blood Pressure from Last 3 Encounters:   10/02/17 118/62   09/21/17 126/62   09/05/17 108/74    Weight from Last 3 Encounters:   10/02/17 148 lb (67.1 kg)   09/21/17 142 lb (64.4 kg)   09/05/17 144 lb (65.3 kg)              We Performed the Following     Wet prep          Today's Medication Changes          These changes are accurate as of: 10/11/17 11:24 PM.  If you have any questions, ask your nurse or doctor.               Start taking these medicines.        Dose/Directions    acyclovir 5 % cream   Commonly known as:  ZOVIRAX   Used for:  Recurrent cold sores   Started by:  Shi Martinez APRN CNP        Apply topically 5 times daily   Quantity:  5 g   Refills:  3         These medicines have changed or have updated prescriptions.        Dose/Directions    valACYclovir 500 MG tablet   Commonly known as:  VALTREX   This may have changed:    - medication strength  - how much to take  - when to take this   Used for:  Recurrent cold sores   Changed by:  Shi Martinez APRN CNP        Dose:  500 mg   Take 1 tablet (500 mg) by mouth daily   Quantity:  90 tablet   Refills:  3            Where to get your medicines      These medications were sent to [facility] - [address] [address]     Phone:  [phone]     acyclovir 5 % cream    SUMAtriptan 6 MG/0.5ML pen injector kit    valACYclovir 500 MG tablet                Primary Care Provider Office Phone # Fax #    DIPESH Marino [phone]       [address] [place] [address]  [phone]        Goals        General    I will attend counseling appointment (pt-stated)     Notes - Note edited  11/25/2015  1:35 PM by Shi Armstrong LSW    As of today's date 11/25/2015 goal is met at 76 - 100%.   Goal Status:  Complete - will keep to maintain for  A few month  As of today's date 7/28/2015 goal is met at 26 - 50%.   Goal Status:  Active          Equal Access to Services     [facility]: Hadii aad ku hadasho Soomaali, waaxda luqadaha, qaybta kaalmada adeegyada, waxay marinain edeln adeeg khjazz lajamesn . So [facility] [phone].    ATENCIÓN: Si maikol prado, tiene a borja disposición servicios gratuitos de asistencia lingüística. Llame al [phone].    We comply with applicable federal civil rights laws and [place] laws. We do not discriminate on the basis of race, color, national origin, age, disability, sex, sexual orientation, or gender identity.            Thank you!     Thank you for choosing [facility]  for your care. Our goal is always to provide you with excellent care. Hearing back from our patients is one way we can continue to improve our services. Please take a few minutes to complete the written survey that you may receive in the mail after your visit with us. Thank you!             Your Updated Medication List - Protect others around you: Learn how to safely use, store and throw away your medicines at www.disposemymeds.org.          This list is accurate as of: 10/11/17 11:24 PM.  Always use your most recent med list.                   Brand Name Dispense Instructions for use Diagnosis    acyclovir 5 % cream    ZOVIRAX    5 g    Apply topically 5 times daily    Recurrent cold sores       fluticasone 50 MCG/ACT spray    FLONASE    1 g    Spray 1-2 sprays into both nostrils daily    Vaginal discharge       gabapentin 600 MG tablet    NEURONTIN    90 tablet    Take 1 tablet (600 mg) by mouth every evening 600 mg in evening.    Severe bipolar I disorder, most recent  episode mixed, with psychotic features (H)       lamoTRIgine 200 MG tablet    LaMICtal    90 tablet    Take 1 tablet (200 mg) by mouth daily    Severe bipolar I disorder, most recent episode mixed, with psychotic features (H)       lidocaine HCl 3 % cream     453.6 g    Apply topically 3 times daily    Yeast infection of the vagina       LORazepam 1 MG tablet    ATIVAN    47 tablet    Take 0.5 tablets (0.5 mg) by mouth every 8 hours as needed for anxiety    Nausea       nicotine 21 MG/24HR 24 hr patch    NICODERM CQ    42 patch    Place 1 patch onto the skin every 24 hours Use for 6 weeks    Tobacco abuse       nystatin 418064 UNIT/GM Powd    MYCOSTATIN    30 g    Apply topically 3 times daily as needed    Cutaneous candidiasis       QUEtiapine 50 MG tablet    SEROQUEL    21 tablet    Take 1-3 tablets ( mg) by mouth At Bedtime    Severe bipolar I disorder, most recent episode mixed, with psychotic features (H)       SUMAtriptan 6 MG/0.5ML pen injector kit    IMITREX STATDOSE    3 kit    Inject 0.5 mLs (6 mg) Subcutaneous at onset of headache for migraine    Migraine without aura and with status migrainosus, not intractable       valACYclovir 500 MG tablet    VALTREX    90 tablet    Take 1 tablet (500 mg) by mouth daily    Recurrent cold sores

## 2017-10-11 NOTE — TELEPHONE ENCOUNTER
Spoke with Sirena, she is still having itching and discomfort. She is still using the cream Dr. Riddle prescribed and she will take her last Diflucan today. She will call back next week if her symptoms are not resolved

## 2017-10-11 NOTE — TELEPHONE ENCOUNTER
Reason for Call:  Same Day Appointment, Requested Provider:  Shi Martinez    PCP: Shi Martinez    Reason for visit: Re-check yeast infection    Additional comments: Sirena would like to know if Shi Martinez would work her in along with her son's 1:20 appointment today    Can we leave a detailed message on this number? YES    Phone number patient can be reached at: Home number on file [phone] (home)    Best Time: anytime    Call taken on 10/11/2017 at 7:39 AM by Elba Lopez

## 2017-10-17 ENCOUNTER — HOSPITAL ENCOUNTER (EMERGENCY)
Facility: CLINIC | Age: 32
Discharge: HOME | End: 2017-10-17
Attending: FAMILY MEDICINE | Admitting: FAMILY MEDICINE
Payer: COMMERCIAL

## 2017-10-17 VITALS
TEMPERATURE: 98.2 F | BODY MASS INDEX: 24.11 KG/M2 | WEIGHT: 150 LBS | HEART RATE: 114 BPM | SYSTOLIC BLOOD PRESSURE: 127 MMHG | OXYGEN SATURATION: 97 % | RESPIRATION RATE: 16 BRPM | DIASTOLIC BLOOD PRESSURE: 86 MMHG | HEIGHT: 66 IN

## 2017-10-17 DIAGNOSIS — N76.0 ACUTE VAGINITIS: ICD-10-CM

## 2017-10-17 DIAGNOSIS — Z11.3 SCREEN FOR STD (SEXUALLY TRANSMITTED DISEASE): ICD-10-CM

## 2017-10-17 DIAGNOSIS — B96.89 BV (BACTERIAL VAGINOSIS): ICD-10-CM

## 2017-10-17 DIAGNOSIS — N76.0 BV (BACTERIAL VAGINOSIS): ICD-10-CM

## 2017-10-17 LAB
ALBUMIN UR-MCNC: 30 MG/DL
ANION GAP SERPL CALCULATED.3IONS-SCNC: 5 MMOL/L (ref 3–14)
APPEARANCE UR: ABNORMAL
BASOPHILS # BLD AUTO: 0.1 10E9/L (ref 0–0.2)
BASOPHILS NFR BLD AUTO: 0.4 %
BILIRUB UR QL STRIP: NEGATIVE
BUN SERPL-MCNC: 21 MG/DL (ref 7–30)
CALCIUM SERPL-MCNC: 9.6 MG/DL (ref 8.5–10.1)
CHLORIDE SERPL-SCNC: 104 MMOL/L (ref 94–109)
CO2 SERPL-SCNC: 28 MMOL/L (ref 20–32)
COLOR UR AUTO: YELLOW
CREAT SERPL-MCNC: 0.87 MG/DL (ref 0.52–1.04)
CRP SERPL-MCNC: <2.9 MG/L (ref 0–8)
DIFFERENTIAL METHOD BLD: ABNORMAL
EOSINOPHIL # BLD AUTO: 0.3 10E9/L (ref 0–0.7)
EOSINOPHIL NFR BLD AUTO: 2 %
ERYTHROCYTE [DISTWIDTH] IN BLOOD BY AUTOMATED COUNT: 12.8 % (ref 10–15)
GFR SERPL CREATININE-BSD FRML MDRD: 77 ML/MIN/1.7M2
GLUCOSE SERPL-MCNC: 109 MG/DL (ref 70–99)
GLUCOSE UR STRIP-MCNC: NEGATIVE MG/DL
HCG UR QL: NEGATIVE
HCT VFR BLD AUTO: 45.9 % (ref 35–47)
HGB BLD-MCNC: 16 G/DL (ref 11.7–15.7)
HGB UR QL STRIP: ABNORMAL
IMM GRANULOCYTES # BLD: 0 10E9/L (ref 0–0.4)
IMM GRANULOCYTES NFR BLD: 0.3 %
KETONES UR STRIP-MCNC: 5 MG/DL
LACTATE BLD-SCNC: 1.7 MMOL/L (ref 0.7–2)
LEUKOCYTE ESTERASE UR QL STRIP: NEGATIVE
LYMPHOCYTES # BLD AUTO: 3.2 10E9/L (ref 0.8–5.3)
LYMPHOCYTES NFR BLD AUTO: 23.1 %
MCH RBC QN AUTO: 31.3 PG (ref 26.5–33)
MCHC RBC AUTO-ENTMCNC: 34.9 G/DL (ref 31.5–36.5)
MCV RBC AUTO: 90 FL (ref 78–100)
MONOCYTES # BLD AUTO: 1.1 10E9/L (ref 0–1.3)
MONOCYTES NFR BLD AUTO: 7.6 %
MUCOUS THREADS #/AREA URNS LPF: PRESENT /LPF
NEUTROPHILS # BLD AUTO: 9.2 10E9/L (ref 1.6–8.3)
NEUTROPHILS NFR BLD AUTO: 66.6 %
NITRATE UR QL: NEGATIVE
PH UR STRIP: 6 PH (ref 5–7)
PLATELET # BLD AUTO: 370 10E9/L (ref 150–450)
POTASSIUM SERPL-SCNC: 3.3 MMOL/L (ref 3.4–5.3)
RBC # BLD AUTO: 5.12 10E12/L (ref 3.8–5.2)
RBC #/AREA URNS AUTO: 8 /HPF (ref 0–2)
SODIUM SERPL-SCNC: 137 MMOL/L (ref 133–144)
SOURCE: ABNORMAL
SP GR UR STRIP: 1.03 (ref 1–1.03)
SPECIMEN SOURCE: ABNORMAL
SQUAMOUS #/AREA URNS AUTO: 7 /HPF (ref 0–1)
UROBILINOGEN UR STRIP-MCNC: NORMAL MG/DL (ref 0–2)
WBC # BLD AUTO: 13.8 10E9/L (ref 4–11)
WBC #/AREA URNS AUTO: 3 /HPF (ref 0–2)
WET PREP SPEC: ABNORMAL

## 2017-10-17 PROCEDURE — 87491 CHLMYD TRACH DNA AMP PROBE: CPT | Performed by: FAMILY MEDICINE

## 2017-10-17 PROCEDURE — 87210 SMEAR WET MOUNT SALINE/INK: CPT | Performed by: FAMILY MEDICINE

## 2017-10-17 PROCEDURE — 85025 COMPLETE CBC W/AUTO DIFF WBC: CPT | Performed by: FAMILY MEDICINE

## 2017-10-17 PROCEDURE — 99284 EMERGENCY DEPT VISIT MOD MDM: CPT | Mod: Z6 | Performed by: FAMILY MEDICINE

## 2017-10-17 PROCEDURE — 25000128 H RX IP 250 OP 636: Performed by: FAMILY MEDICINE

## 2017-10-17 PROCEDURE — 96374 THER/PROPH/DIAG INJ IV PUSH: CPT | Performed by: FAMILY MEDICINE

## 2017-10-17 PROCEDURE — 81001 URINALYSIS AUTO W/SCOPE: CPT | Performed by: FAMILY MEDICINE

## 2017-10-17 PROCEDURE — 96372 THER/PROPH/DIAG INJ SC/IM: CPT | Performed by: FAMILY MEDICINE

## 2017-10-17 PROCEDURE — 25000125 ZZHC RX 250: Performed by: FAMILY MEDICINE

## 2017-10-17 PROCEDURE — 81025 URINE PREGNANCY TEST: CPT | Performed by: FAMILY MEDICINE

## 2017-10-17 PROCEDURE — 99284 EMERGENCY DEPT VISIT MOD MDM: CPT | Mod: 25 | Performed by: FAMILY MEDICINE

## 2017-10-17 PROCEDURE — 86140 C-REACTIVE PROTEIN: CPT | Performed by: FAMILY MEDICINE

## 2017-10-17 PROCEDURE — 83605 ASSAY OF LACTIC ACID: CPT | Performed by: FAMILY MEDICINE

## 2017-10-17 PROCEDURE — 25000132 ZZH RX MED GY IP 250 OP 250 PS 637: Performed by: FAMILY MEDICINE

## 2017-10-17 PROCEDURE — 87591 N.GONORRHOEAE DNA AMP PROB: CPT | Performed by: FAMILY MEDICINE

## 2017-10-17 PROCEDURE — 80048 BASIC METABOLIC PNL TOTAL CA: CPT | Performed by: FAMILY MEDICINE

## 2017-10-17 RX ORDER — CEFTRIAXONE SODIUM 1 G
250 VIAL (EA) INJECTION ONCE
Status: DISCONTINUED | OUTPATIENT
Start: 2017-10-17 | End: 2017-10-17 | Stop reason: CLARIF

## 2017-10-17 RX ORDER — HYDROMORPHONE HYDROCHLORIDE 1 MG/ML
0.5 INJECTION, SOLUTION INTRAMUSCULAR; INTRAVENOUS; SUBCUTANEOUS ONCE
Status: COMPLETED | OUTPATIENT
Start: 2017-10-17 | End: 2017-10-17

## 2017-10-17 RX ORDER — DICLOFENAC SODIUM 75 MG/1
75 TABLET, DELAYED RELEASE ORAL 2 TIMES DAILY PRN
Qty: 30 TABLET | Refills: 0 | Status: SHIPPED | OUTPATIENT
Start: 2017-10-17 | End: 2018-01-11

## 2017-10-17 RX ORDER — DOXYCYCLINE 100 MG/1
100 CAPSULE ORAL 2 TIMES DAILY
Qty: 14 CAPSULE | Refills: 0 | Status: SHIPPED | OUTPATIENT
Start: 2017-10-17 | End: 2017-10-24

## 2017-10-17 RX ORDER — METRONIDAZOLE 500 MG/1
500 TABLET ORAL 2 TIMES DAILY
Qty: 14 TABLET | Refills: 1 | Status: SHIPPED | OUTPATIENT
Start: 2017-10-17 | End: 2017-10-24

## 2017-10-17 RX ORDER — DOXYCYCLINE 100 MG/1
100 CAPSULE ORAL EVERY 12 HOURS SCHEDULED
Status: COMPLETED | OUTPATIENT
Start: 2017-10-17 | End: 2017-10-17

## 2017-10-17 RX ADMIN — HYDROMORPHONE HYDROCHLORIDE 0.5 MG: 1 INJECTION, SOLUTION INTRAMUSCULAR; INTRAVENOUS; SUBCUTANEOUS at 09:15

## 2017-10-17 RX ADMIN — CEFTRIAXONE SODIUM 250 MG: 250 INJECTION, POWDER, FOR SOLUTION INTRAMUSCULAR; INTRAVENOUS at 09:08

## 2017-10-17 RX ADMIN — DOXYCYCLINE HYCLATE 100 MG: 100 CAPSULE ORAL at 09:01

## 2017-10-17 ASSESSMENT — ENCOUNTER SYMPTOMS
DYSURIA: 1
EYES NEGATIVE: 1
CARDIOVASCULAR NEGATIVE: 1
HEMATOLOGIC/LYMPHATIC NEGATIVE: 1
ACTIVITY CHANGE: 1
CHILLS: 1
FREQUENCY: 1
ENDOCRINE NEGATIVE: 1
PSYCHIATRIC NEGATIVE: 1
FATIGUE: 1
GASTROINTESTINAL NEGATIVE: 1
FEVER: 1
ALLERGIC/IMMUNOLOGIC NEGATIVE: 1
FLANK PAIN: 1
NEUROLOGICAL NEGATIVE: 1
APPETITE CHANGE: 1
RESPIRATORY NEGATIVE: 1

## 2017-10-17 NOTE — ED NOTES
Pt brought to room 14 for pelvic bed. Pt agreeable to vaginal exam. Supplies at bedside for exam.

## 2017-10-17 NOTE — ED AVS SNAPSHOT
[facility] Emergency Department    [address] [address]    Phone:  [phone]    Fax:  [phone]                                       Sirena Dietrich   MRN: [id number]    Department:  [facility] Emergency Department   Date of Visit:  10/17/2017           Patient Information     Date Of Birth          1985        Your diagnoses for this visit were:     BV (bacterial vaginosis)     Screen for STD (sexually transmitted disease)        You were seen by Dorian Aguilar MD.      Follow-up Information     Follow up with Shi Martinez APRN CNP. Schedule an appointment as soon as possible for a visit in 1 week.    Specialty:  Family Practice    Contact information:    [address] [place] [address]  [phone]          Discharge Instructions         Bacterial Vaginosis    You have a vaginal infection called bacterial vaginosis (BV). Both good and bad bacteria are present in a healthy vagina. BV occurs when these bacteria get out of balance. The number of bad bacteria increase. And the number of good bacteria decrease.  BV may or may not cause symptoms. If symptoms do occur, they can include:    Thin, gray, milky-white, or sometimes green discharge    Unpleasant odor or  fishy  smell    Itching, burning, or pain in or around the vagina  It is not known what causes BV, but certain factors can make the problem more likely. This can include:    Douching    Having sex with a new partner    Having sex with more than one partner  BV will sometimes go away on its own. But treatment is usually recommended. This is because untreated BV can increase the risk of more serious health problems such as:    Pelvic inflammatory disease (PID)     delivery (giving birth to a baby early if you re pregnant)    HIV and certain other sexually transmitted diseases (STDs)    Infection after surgery on the reproductive organs  Home care  General care    BV is most often treated  with medicines called antibiotics. These may be given as pills or as a vaginal cream. If antibiotics are prescribed, be sure to use them exactly as directed. Also, be sure to complete all of the medicine, even if your symptoms go away.    Avoid douching or having sex during treatment.    If you have sex with a female partner, ask your healthcare provider if she should also be treated.  Prevention    Limit or avoid douching.    Avoid having sex. If you do have sex, then take steps to lower your risk:    Use condoms when having sex.    Limit the number of partners you have sex with.  Follow-up care  Follow up with your healthcare provider, or as advised.  When to seek medical advice  Call your healthcare provider right away if:    You have a fever of 100.4 F (38 C) or higher, or as directed by your provider.    Your symptoms worsen, or they don t go away within a few days of starting treatment.    You have new pain in the lower belly or pelvic region.    You have side effects that bother you or a reaction to the pills or cream you re prescribed.    You or any partners you have sex with have new symptoms, such as a rash, joint pain, or sores.  Date Last Reviewed: 7/30/2015 2000-2017 [employer]. [address] [place] [address]. All rights reserved. This information is not intended as a substitute for professional medical care. Always follow your healthcare professional's instructions.      Flagyl 500 mg 2 times a day ×7 days.  Diclofenac 75 mg 2 times daily as needed for pain.  Doxycycline 100 mg 2 times daily ×7 days.  Follow up in clinic in 7-10 days for recheck with her primary care provider.  If any of the swabs still pending are positive you will be contacted.    Future Appointments        Provider Department Dept Phone Center    10/23/2017 9:00 AM Caroline Grossman MD [facility] [phone] [facility]      24 Hour Appointment Hotline       To make an appointment at any [facility]  clinic, call [phone] ([phone]). If you don't have a family doctor or clinic, we will help you find one. [facility] clinics are conveniently located to serve the needs of you and your family.             Review of your medicines      START taking        Dose / Directions Last dose taken    diclofenac 75 MG EC tablet   Commonly known as:  VOLTAREN   Dose:  75 mg   Quantity:  30 tablet        Take 1 tablet (75 mg) by mouth 2 times daily as needed for moderate pain   Refills:  0        doxycycline 100 MG capsule   Commonly known as:  VIBRAMYCIN   Dose:  100 mg   Quantity:  14 capsule        Take 1 capsule (100 mg) by mouth 2 times daily for 7 days   Refills:  0        metroNIDAZOLE 500 MG tablet   Commonly known as:  FLAGYL   Dose:  500 mg   Quantity:  14 tablet        Take 1 tablet (500 mg) by mouth 2 times daily for 7 days   Refills:  1          Our records show that you are taking the medicines listed below. If these are incorrect, please call your family doctor or clinic.        Dose / Directions Last dose taken    acyclovir 5 % cream   Commonly known as:  ZOVIRAX   Quantity:  5 g        Apply topically 5 times daily   Refills:  3        fluticasone 50 MCG/ACT spray   Commonly known as:  FLONASE   Dose:  1-2 spray   Quantity:  1 g        Spray 1-2 sprays into both nostrils daily   Refills:  5        gabapentin 600 MG tablet   Commonly known as:  NEURONTIN   Dose:  600 mg   Quantity:  90 tablet        Take 1 tablet (600 mg) by mouth every evening 600 mg in evening.   Refills:  0        lamoTRIgine 200 MG tablet   Commonly known as:  LaMICtal   Dose:  200 mg   Quantity:  90 tablet        Take 1 tablet (200 mg) by mouth daily   Refills:  0        lidocaine HCl 3 % cream   Quantity:  453.6 g        Apply topically 3 times daily   Refills:  0        LORazepam 1 MG tablet   Commonly known as:  ATIVAN   Dose:  0.5 mg   Quantity:  47 tablet        Take 0.5 tablets (0.5 mg) by mouth every 8 hours as needed for  anxiety   Refills:  0        nicotine 21 MG/24HR 24 hr patch   Commonly known as:  NICODERM CQ   Dose:  1 patch   Quantity:  42 patch        Place 1 patch onto the skin every 24 hours Use for 6 weeks   Refills:  0        nystatin 064241 UNIT/GM Powd   Commonly known as:  MYCOSTATIN   Quantity:  30 g        Apply topically 3 times daily as needed   Refills:  1        QUEtiapine 50 MG tablet   Commonly known as:  SEROQUEL   Dose:   mg   Quantity:  21 tablet        Take 1-3 tablets ( mg) by mouth At Bedtime   Refills:  0        SUMAtriptan 6 MG/0.5ML pen injector kit   Commonly known as:  IMITREX STATDOSE   Dose:  6 mg   Quantity:  3 kit        Inject 0.5 mLs (6 mg) Subcutaneous at onset of headache for migraine   Refills:  3        valACYclovir 500 MG tablet   Commonly known as:  VALTREX   Dose:  500 mg   Quantity:  90 tablet        Take 1 tablet (500 mg) by mouth daily   Refills:  3                Prescriptions were sent or printed at these locations (3 Prescriptions)                   Other Prescriptions                Printed at Department/Unit printer (3 of 3)         metroNIDAZOLE (FLAGYL) 500 MG tablet               diclofenac (VOLTAREN) 75 MG EC tablet               doxycycline (VIBRAMYCIN) 100 MG capsule                Procedures and tests performed during your visit     Basic metabolic panel    CBC with platelets differential    CRP inflammation    Chlamydia trachomatis PCR    HCG qualitative urine    Lactic acid whole blood    Neisseria gonorrhoea PCR    UA reflex to Microscopic    Wet prep      Orders Needing Specimen Collection     None      Pending Results     Date and Time Order Name Status Description    10/17/2017 0640 Neisseria gonorrhoea PCR In process     10/17/2017 0640 Chlamydia trachomatis PCR In process             Pending Culture Results     Date and Time Order Name Status Description    10/17/2017 0640 Neisseria gonorrhoea PCR In process     10/17/2017 0640 Chlamydia trachomatis  PCR In process             Pending Results Instructions     If you had any lab results that were not finalized at the time of your Discharge, you can call the ED Lab Result RN at [phone]. You will be contacted by this team for any positive Lab results or changes in treatment. The nurses are available 7 days a week from 10A to 6:30P.  You can leave a message 24 hours per day and they will return your call.        Test Results From Your Hospital Stay        10/17/2017  7:20 AM         10/17/2017  7:53 AM      Component Results     Component    Specimen Description    Vagina    Wet Prep    No WBC's seen    Wet Prep    No Trichomonas seen    Wet Prep    No yeast seen    Wet Prep (Abnormal)    Moderate  Clue cells seen           10/17/2017  7:20 AM         10/17/2017  7:32 AM      Component Results     Component Value Ref Range & Units Status    Color Urine Yellow  Final    Appearance Urine Slightly Cloudy  Final    Glucose Urine Negative NEG^Negative mg/dL Final    Bilirubin Urine Negative NEG^Negative Final    Ketones Urine 5 (A) NEG^Negative mg/dL Final    Specific Gravity Urine 1.027 1.003 - 1.035 Final    Blood Urine Trace (A) NEG^Negative Final    pH Urine 6.0 5.0 - 7.0 pH Final    Protein Albumin Urine 30 (A) NEG^Negative mg/dL Final    Urobilinogen mg/dL Normal 0.0 - 2.0 mg/dL Final    Nitrite Urine Negative NEG^Negative Final    Leukocyte Esterase Urine Negative NEG^Negative Final    Source Midstream Urine  Final    RBC Urine 8 (H) 0 - 2 /HPF Final    WBC Urine 3 (H) 0 - 2 /HPF Final    Squamous Epithelial /HPF Urine 7 (H) 0 - 1 /HPF Final    Mucous Urine Present (A) NEG^Negative /LPF Final         10/17/2017  7:21 AM      Component Results     Component Value Ref Range & Units Status    HCG Qual Urine Negative NEG^Negative Final    This test is for screening purposes.  Results should be interpreted along with   the clinical picture.  Confirmation testing is available if warranted by   ordering TSN133, HCG  Quantitative Pregnancy.           10/17/2017  7:20 AM      Component Results     Component Value Ref Range & Units Status    WBC 13.8 (H) 4.0 - 11.0 10e9/L Final    RBC Count 5.12 3.8 - 5.2 10e12/L Final    Hemoglobin 16.0 (H) 11.7 - 15.7 g/dL Final    Hematocrit 45.9 35.0 - 47.0 % Final    MCV 90 78 - 100 fl Final    MCH 31.3 26.5 - 33.0 pg Final    MCHC 34.9 31.5 - 36.5 g/dL Final    RDW 12.8 10.0 - 15.0 % Final    Platelet Count 370 150 - 450 10e9/L Final    Diff Method Automated Method  Final    % Neutrophils 66.6 % Final    % Lymphocytes 23.1 % Final    % Monocytes 7.6 % Final    % Eosinophils 2.0 % Final    % Basophils 0.4 % Final    % Immature Granulocytes 0.3 % Final    Absolute Neutrophil 9.2 (H) 1.6 - 8.3 10e9/L Final    Absolute Lymphocytes 3.2 0.8 - 5.3 10e9/L Final    Absolute Monocytes 1.1 0.0 - 1.3 10e9/L Final    Absolute Eosinophils 0.3 0.0 - 0.7 10e9/L Final    Absolute Basophils 0.1 0.0 - 0.2 10e9/L Final    Abs Immature Granulocytes 0.0 0 - 0.4 10e9/L Final         10/17/2017  7:42 AM      Component Results     Component Value Ref Range & Units Status    CRP Inflammation <2.9 0.0 - 8.0 mg/L Final         10/17/2017  7:42 AM      Component Results     Component Value Ref Range & Units Status    Sodium 137 133 - 144 mmol/L Final    Potassium 3.3 (L) 3.4 - 5.3 mmol/L Final    Chloride 104 94 - 109 mmol/L Final    Carbon Dioxide 28 20 - 32 mmol/L Final    Anion Gap 5 3 - 14 mmol/L Final    Glucose 109 (H) 70 - 99 mg/dL Final    Urea Nitrogen 21 7 - 30 mg/dL Final    Creatinine 0.87 0.52 - 1.04 mg/dL Final    GFR Estimate 77 >60 mL/min/1.7m2 Final    Non  GFR Calc    GFR Estimate If Black >90 >60 mL/min/1.7m2 Final    African American GFR Calc    Calcium 9.6 8.5 - 10.1 mg/dL Final         10/17/2017  8:09 AM      Component Results     Component Value Ref Range & Units Status    Lactic Acid 1.7 0.7 - 2.0 mmol/L Final                Thank you for choosing [facility]       Thank you for  choosing [facility] for your care. Our goal is always to provide you with excellent care. Hearing back from our patients is one way we can continue to improve our services. Please take a few minutes to complete the written survey that you may receive in the mail after you visit with us. Thank you!        [employer] Information     [employer] gives you secure access to your electronic health record. If you see a primary care provider, you can also send messages to your care team and make appointments. If you have questions, please call your primary care clinic.  If you do not have a primary care provider, please call [phone] and they will assist you.        Care EveryWhere ID     This is your Care EveryWhere ID. This could be used by other organizations to access your [facility] medical records  [id number]        Equal Access to Services     SAMRA KAPLAN : Ian Nazario, benjamin douglas, sandie dominguez, raheem evangelista. So [facility] [phone].    ATENCIÓN: Si habla español, tiene a borja disposición servicios gratuitos de asistencia lingüística. Llame al [phone].    We comply with applicable federal civil rights laws and [place] laws. We do not discriminate on the basis of race, color, national origin, age, disability, sex, sexual orientation, or gender identity.            After Visit Summary       This is your record. Keep this with you and show to your community pharmacist(s) and doctor(s) at your next visit.

## 2017-10-17 NOTE — ED NOTES
Pt reports bacterial infection, vaginosis 2 weeks ago. Pt states starting Friday pain and swelling have worsened. Pt also reports right flank pain.

## 2017-10-17 NOTE — ED AVS SNAPSHOT
[facility] Emergency Department    [address] [address]    Phone:  [phone]    Fax:  [phone]                                       Sirena Dietrich   MRN: [id number]    Department:  [facility] Emergency Department   Date of Visit:  10/17/2017           After Visit Summary Signature Page     I have received my discharge instructions, and my questions have been answered. I have discussed any challenges I see with this plan with the nurse or doctor.    ..........................................................................................................................................  Patient/Patient Representative Signature      ..........................................................................................................................................  Patient Representative Print Name and Relationship to Patient    ..................................................               ................................................  Date                                            Time    ..........................................................................................................................................  Reviewed by Signature/Title    ...................................................              ..............................................  Date                                                            Time
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
## 2017-10-18 LAB
C TRACH DNA SPEC QL NAA+PROBE: NEGATIVE
N GONORRHOEA DNA SPEC QL NAA+PROBE: NEGATIVE
SPECIMEN SOURCE: NORMAL
SPECIMEN SOURCE: NORMAL

## 2017-10-24 ENCOUNTER — TELEPHONE (OUTPATIENT)
Dept: FAMILY MEDICINE | Facility: CLINIC | Age: 32
End: 2017-10-24

## 2017-10-24 DIAGNOSIS — B00.9 HSV-1 (HERPES SIMPLEX VIRUS 1) INFECTION: Primary | ICD-10-CM

## 2017-10-24 NOTE — TELEPHONE ENCOUNTER
PA required on: zovirax 5% cream  [facility]  Insurance phone number is: [phone]   ID#: [id number]    Please let us know when PA is granted or denied. Thank You      Jazmin Nickerson,  [place]: [place]

## 2017-10-24 NOTE — TELEPHONE ENCOUNTER
Please have her check with her insurance to see which topical antiviral if any is covered.  Thanks Shi [id number]

## 2017-10-26 RX ORDER — VALACYCLOVIR HYDROCHLORIDE 1 G/1
2000 TABLET, FILM COATED ORAL 2 TIMES DAILY
Qty: 4 TABLET | Refills: 3 | Status: SHIPPED | OUTPATIENT
Start: 2017-10-26 | End: 2017-11-14

## 2017-11-10 ENCOUNTER — OFFICE VISIT (OUTPATIENT)
Dept: FAMILY MEDICINE | Facility: CLINIC | Age: 32
End: 2017-11-10
Payer: COMMERCIAL

## 2017-11-10 ENCOUNTER — NURSE TRIAGE (OUTPATIENT)
Dept: NURSING | Facility: CLINIC | Age: 32
End: 2017-11-10

## 2017-11-10 VITALS
RESPIRATION RATE: 14 BRPM | HEART RATE: 80 BPM | WEIGHT: 136 LBS | TEMPERATURE: 97.3 F | DIASTOLIC BLOOD PRESSURE: 82 MMHG | OXYGEN SATURATION: 99 % | SYSTOLIC BLOOD PRESSURE: 132 MMHG | BODY MASS INDEX: 21.95 KG/M2

## 2017-11-10 DIAGNOSIS — F31.64 SEVERE BIPOLAR I DISORDER, MOST RECENT EPISODE MIXED, WITH PSYCHOTIC FEATURES (H): ICD-10-CM

## 2017-11-10 DIAGNOSIS — F43.10 PTSD (POST-TRAUMATIC STRESS DISORDER): ICD-10-CM

## 2017-11-10 DIAGNOSIS — Z51.81 ENCOUNTER FOR THERAPEUTIC DRUG MONITORING: Primary | ICD-10-CM

## 2017-11-10 PROCEDURE — 99214 OFFICE O/P EST MOD 30 MIN: CPT | Performed by: NURSE PRACTITIONER

## 2017-11-10 RX ORDER — GABAPENTIN 600 MG/1
1200 TABLET ORAL AT BEDTIME
Qty: 60 TABLET | Refills: 2 | Status: SHIPPED | OUTPATIENT
Start: 2017-11-10 | End: 2018-01-17

## 2017-11-10 RX ORDER — MIRTAZAPINE 15 MG/1
15 TABLET, FILM COATED ORAL AT BEDTIME
Qty: 31 TABLET | Refills: 2 | Status: SHIPPED
Start: 2017-11-10 | End: 2018-01-11
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
## 2017-11-10 NOTE — NURSING NOTE
"Chief Complaint   Patient presents with     Mental Health Problem       Initial LMP  (LMP Unknown) Estimated body mass index is 24.21 kg/(m^2) as calculated from the following:    Height as of 10/17/17: 5' 6\" (1.676 m).    Weight as of 10/17/17: 150 lb (68 kg).  Medication Reconciliation: complete    Health Maintenance that is potentially due pending provider review:  NONE    n/a    Is there anyone who you would like to be able to receive your results? No  If yes have patient fill out JOSÉ    "
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
## 2017-11-10 NOTE — MR AVS SNAPSHOT
After Visit Summary   11/10/2017    Sirena Dietrich    MRN: [id number]           Patient Information     Date Of Birth          1985        Visit Information        Provider Department      11/10/2017 2:00 PM Shi Martinez APRN [facility]        Today's Diagnoses     Encounter for therapeutic drug monitoring    -  1    Severe bipolar I disorder, most recent episode mixed, with psychotic features        PTSD (post-traumatic stress disorder)          Care Instructions    Follow up with Puja Weathers NP  Follow up with Halina Torres Psychologist.   Restart Remeron    Treating Post-Traumatic Stress Disorder (PTSD) with Medicine  Post-traumatic stress disorder (PTSD) can happen after you go through a severe trauma. This may include physical abuse, rape, a natural disaster, a car accident, the death of a loved one, or  combat. Symptoms of PTSD involve intense anxiety that keeps coming back. Nightmares, intrusive memories, and flashbacks (vivid memories that seem real) related to the trauma may also occur. But you don t have to suffer anymore. Treatment is available. Along with therapy (also called counseling), medicine may help manage your symptoms.    Medicines  Certain medicines may be prescribed to help relieve your symptoms. As a result, you may feel less anxious or depressed. You may also feel able to move forward with therapy. At first, medicines and dosages may need to be adjusted to find what works best for you. Try to be patient. Tell your doctor how a medicine makes you feel. This way, you can work together to find the treatment that s best for you. Keep in mind that medicines can have side effects. Talk to your doctor about any side effects that are bothering you. Changing the dose or type of medicine may help. Don t stop taking medicine on your own because it can cause symptoms to come back.    Anti-Anxiety medicine: This medicine relieves symptoms and helps  you relax. Your doctor and pharmacist will explain when and how to use it. It may be prescribed for use before entering situations that make you anxious. Or, you may be told to take it on a regular schedule. Anti-anxiety medicine may make you feel a little sleepy or  out of it.  Don t drive a car or operate machinery while on this medicine, until you know how it affects you.  Caution  Never use alcohol or other drugs with anti-anxiety medicine. This could result in sedation, lack of muscular control, coma or death. Also, use only the amount of medicine prescribed to you. If you think you may have taken too much, get emergency care right away.     Antidepressant medicine: This kind of medicine is often used to treat anxiety, even if you aren t depressed. An antidepressant alters the levels of brain chemicals. This helps keep anxiety under control. This medicine is taken on a schedule. It takes a few weeks to start working. If you don t notice a change at first, you may just need more time. But if you don t notice results after the first few weeks, tell your doctor.  Keep taking medicines as prescribed  Never change your dosage or stop taking your medicine without talking to your doctor first. Keep the following in mind:    Some medicine must be taken on a schedule. Make this part of your daily routine. For instance, always take your pill before brushing your teeth. A pillbox can help you remember if you ve taken your medicine each day.    Medicines are often taken for 6 to12 months. Your doctor will then decide if you need to keep taking them. Many people who have also had therapy may no longer require medicine to manage anxiety.    You may need to stop taking medicine slowly to give your body time to adjust. When it s time to stop, your doctor will tell you more.    If symptoms return, you may need to start taking medicine again. This isn t your fault. It s just the nature of your anxiety disorder. If symptoms  return, seek care as soon as possible. If symptoms return, seek care as soon as possible.  Special concerns    Side effects: Medicines may cause side effects. Ask your doctor or pharmacist what you can expect. They may have ideas for avoiding some side effects.    Sexual problems: Some antidepressants can affect your desire for sex or your ability to have an orgasm. A change in dosage or medicine often solves the problem. If you have a sexual side effect that concerns you, tell your doctor.    Addiction: If you have history of addiction, you may need to avoid certain medicines. Be honest with your provider about any past history of drug use. This will ensure that you receive the safest possible medicines for your PTSD symptoms. Anti-anxiety medicine carry a risk of addiction, and no matter the reason for medicine or the type of drug, your provider will want to prescribe medicines based on a complete knowledge of your medical history.   Resources    National [place] of Mental Health  www.nimh.nih.gov/topics/faqrb-ojsb-uppv.shtml    The American Academy of Experts in Traumatic Stress  688.776.8304  www.aaets.org  Date Last Reviewed: 2/1/2017 2000-2017 [employer]. [address] [place] [address]. All rights reserved. This information is not intended as a substitute for professional medical care. Always follow your healthcare professional's instructions.        Treating Posttraumatic Stress Disorder (PTSD) with Therapy  Posttraumatic stress disorder (PTSD) is a type of anxiety disorder. It can happen after you go through an extreme trauma, such as a car crash or combat. With PTSD, you constantly relive the trauma through nightmares, intrusive memories, and flashbacks. Therapy (also called counseling) is a very helpful treatment for PTSD. When done by a trained professional, therapy helps you face and learn to manage your problem. It may take some time before you notice how much therapy is  helping, but stick with it.     Cognitive behavioral therapy (CBT)  Cognitive behavioral therapy (CBT) teaches you to manage anxiety by helping you understand how you think and act when you're anxious. Research has shown that this treatment works very well for anxiety disorders including PTSD. CBT is run almost like a class, with homework and skill-building activities that teach you to cope with anxiety step by step. It can be done in a group or one-on-one, and often takes place for a set number of sessions. CBT has 2 main parts:    Cognitive therapy. This helps you identify the negative, irrational thoughts that occur with your anxiety. You'll learn to replace these with more positive, realistic thoughts.    Behavioral therapy. This helps you change how you react to anxiety. You'll learn coping skills and relaxation methods to help you deal with anxiety in a whole new way.  Other forms of therapy  Other therapy techniques may work better for you than CBT. Or you may move from CBT to another form of therapy as your treatment progresses. You may meet with a therapist by yourself or in a group, depending on your needs. Therapy can also help you work through problems in your life that may be making your anxiety disorder worse. This includes things such as drug or alcohol abuse.  Getting better with time  Therapy will help you feel better and teach you skills to help manage anxiety long-term. But change doesn't happen right away. It takes a commitment from you. And treatment only works if you learn to face the causes of your anxiety. So, you might feel worse before you feel better. This can sometimes make it hard to stick with it. Remember: Therapy is a very effective treatment. The results will be worth it.  Resources  Anxiety and Depression Association of [place] www.adaa.org  CDC www.cdc.gov  National [place] of Mental Health www.nimh.nih.gov  American Academy of Child and Adolescent Psychiatry www.aacap.org  Date  Last Reviewed: 5/1/2017 2000-2017 [employer]. [address] [place] [address], [place] [address]. All rights reserved. This information is not intended as a substitute for professional medical care. Always follow your healthcare professional's instructions.                Follow-ups after your visit        Future tests that were ordered for you today     Open Future Orders        Priority Expected Expires Ordered    TSH Routine  11/10/2018 11/10/2017            Who to contact     If you have questions or need follow up information about today's clinic visit or your schedule please contact [facility] directly at [phone].  Normal or non-critical lab and imaging results will be communicated to you by Vyattahart, letter or phone within 4 business days after the clinic has received the results. If you do not hear from us within 7 days, please contact the clinic through Vyattahart or phone. If you have a critical or abnormal lab result, we will notify you by phone as soon as possible.  Submit refill requests through Kleer or call your pharmacy and they will forward the refill request to us. Please allow 3 business days for your refill to be completed.          Additional Information About Your Visit        MyChart Information     [employer] gives you secure access to your electronic health record. If you see a primary care provider, you can also send messages to your care team and make appointments. If you have questions, please call your primary care clinic.  If you do not have a primary care provider, please call [phone] and they will assist you.        Care EveryWhere ID     This is your Care EveryWhere ID. This could be used by other organizations to access your [place] medical records  [id number]        Your Vitals Were     Pulse Temperature Respirations Last Period Pulse Oximetry BMI (Body Mass Index)    80 97.3  F (36.3  C) (Tympanic) 14 (LMP Unknown) 99% 21.95 kg/m2       Blood  Pressure from Last 3 Encounters:   11/10/17 132/82   10/17/17 127/86   10/02/17 118/62    Weight from Last 3 Encounters:   11/10/17 136 lb (61.7 kg)   10/17/17 150 lb (68 kg)   10/02/17 148 lb (67.1 kg)                 Today's Medication Changes          These changes are accurate as of: 11/10/17  2:31 PM.  If you have any questions, ask your nurse or doctor.               Start taking these medicines.        Dose/Directions    mirtazapine 15 MG tablet   Commonly known as:  REMERON   Used for:  Severe bipolar I disorder, most recent episode mixed, with psychotic features (H), PTSD (post-traumatic stress disorder)   Started by:  Shi Martinez APRN CNP        Dose:  15 mg   Take 1 tablet (15 mg) by mouth At Bedtime   Quantity:  31 tablet   Refills:  2         These medicines have changed or have updated prescriptions.        Dose/Directions    gabapentin 600 MG tablet   Commonly known as:  NEURONTIN   This may have changed:    - how much to take  - when to take this  - additional instructions   Used for:  Severe bipolar I disorder, most recent episode mixed, with psychotic features (H)   Changed by:  Shi Martinez APRN CNP        Dose:  1200 mg   Take 2 tablets (1,200 mg) by mouth At Bedtime   Quantity:  60 tablet   Refills:  2            Where to get your medicines      These medications were sent to [facility] [address] [address]     Phone:  [phone]     gabapentin 600 MG tablet    mirtazapine 15 MG tablet                Primary Care Provider Office Phone # Fax #    DIPESH Marino [phone]       [address] [place] [id number]        Goals        General    I will attend counseling appointment (pt-stated)     Notes - Note edited  11/25/2015  1:35 PM by Shi Armstrong LSW    As of today's date 11/25/2015 goal is met at 76 - 100%.   Goal Status:  Complete - will keep to maintain for  A few  month  As of today's date 7/28/2015 goal is met at 26 - 50%.   Goal Status:  Active          Equal Access to Services     SAMRA ROSAURA : Hadgabrielle jo yoder donna Nazario, wapravinda chelykamron, sandie kaviryda angelica, raheem garcia judithsherwin jean laJosefmathew evangelista. So [facility] [phone].    ATENCIÓN: Si habla español, tiene a borja disposición servicios gratuitos de asistencia lingüística. Llame al [phone].    We comply with applicable federal civil rights laws and [place] laws. We do not discriminate on the basis of race, color, national origin, age, disability, sex, sexual orientation, or gender identity.            Thank you!     Thank you for choosing [facility]  for your care. Our goal is always to provide you with excellent care. Hearing back from our patients is one way we can continue to improve our services. Please take a few minutes to complete the written survey that you may receive in the mail after your visit with us. Thank you!             Your Updated Medication List - Protect others around you: Learn how to safely use, store and throw away your medicines at www.disposemymeds.org.          This list is accurate as of: 11/10/17  2:31 PM.  Always use your most recent med list.                   Brand Name Dispense Instructions for use Diagnosis    acyclovir 5 % cream    ZOVIRAX    5 g    Apply topically 5 times daily    Recurrent cold sores       diclofenac 75 MG EC tablet    VOLTAREN    30 tablet    Take 1 tablet (75 mg) by mouth 2 times daily as needed for moderate pain        fluticasone 50 MCG/ACT spray    FLONASE    1 g    Spray 1-2 sprays into both nostrils daily    Vaginal discharge       gabapentin 600 MG tablet    NEURONTIN    60 tablet    Take 2 tablets (1,200 mg) by mouth At Bedtime    Severe bipolar I disorder, most recent episode mixed, with psychotic features (H)       lamoTRIgine 200 MG tablet    LaMICtal    90 tablet    Take 1 tablet (200 mg) by mouth daily    Severe bipolar I  disorder, most recent episode mixed, with psychotic features (H)       lidocaine HCl 3 % cream     453.6 g    Apply topically 3 times daily    Yeast infection of the vagina       LORazepam 1 MG tablet    ATIVAN    47 tablet    Take 0.5 tablets (0.5 mg) by mouth every 8 hours as needed for anxiety    Nausea       mirtazapine 15 MG tablet    REMERON    31 tablet    Take 1 tablet (15 mg) by mouth At Bedtime    Severe bipolar I disorder, most recent episode mixed, with psychotic features (H), PTSD (post-traumatic stress disorder)       nystatin [id number] UNIT/GM Powd    MYCOSTATIN    30 g    Apply topically 3 times daily as needed    Cutaneous candidiasis       QUEtiapine 50 MG tablet    SEROQUEL    21 tablet    Take 1-3 tablets ( mg) by mouth At Bedtime    Severe bipolar I disorder, most recent episode mixed, with psychotic features (H)       SUMAtriptan 6 MG/0.5ML pen injector kit    IMITREX STATDOSE    3 kit    Inject 0.5 mLs (6 mg) Subcutaneous at onset of headache for migraine    Migraine without aura and with status migrainosus, not intractable       * valACYclovir 500 MG tablet    VALTREX    90 tablet    Take 1 tablet (500 mg) by mouth daily    Recurrent cold sores       * valACYclovir 1000 mg tablet    VALTREX    4 tablet    Take 2 tablets (2,000 mg) by mouth 2 times daily    HSV-1 (herpes simplex virus 1) infection       * Notice:  This list has 2 medication(s) that are the same as other medications prescribed for you. Read the directions carefully, and ask your doctor or other care provider to review them with you.

## 2017-11-10 NOTE — PATIENT INSTRUCTIONS
Follow up with Puja Weathers NP  Follow up with Halina Torres Psychologist.   Restart Remeron    Treating Post-Traumatic Stress Disorder (PTSD) with Medicine  Post-traumatic stress disorder (PTSD) can happen after you go through a severe trauma. This may include physical abuse, rape, a natural disaster, a car accident, the death of a loved one, or  combat. Symptoms of PTSD involve intense anxiety that keeps coming back. Nightmares, intrusive memories, and flashbacks (vivid memories that seem real) related to the trauma may also occur. But you don t have to suffer anymore. Treatment is available. Along with therapy (also called counseling), medicine may help manage your symptoms.    Medicines  Certain medicines may be prescribed to help relieve your symptoms. As a result, you may feel less anxious or depressed. You may also feel able to move forward with therapy. At first, medicines and dosages may need to be adjusted to find what works best for you. Try to be patient. Tell your doctor how a medicine makes you feel. This way, you can work together to find the treatment that s best for you. Keep in mind that medicines can have side effects. Talk to your doctor about any side effects that are bothering you. Changing the dose or type of medicine may help. Don t stop taking medicine on your own because it can cause symptoms to come back.    Anti-Anxiety medicine: This medicine relieves symptoms and helps you relax. Your doctor and pharmacist will explain when and how to use it. It may be prescribed for use before entering situations that make you anxious. Or, you may be told to take it on a regular schedule. Anti-anxiety medicine may make you feel a little sleepy or  out of it.  Don t drive a car or operate machinery while on this medicine, until you know how it affects you.  Caution  Never use alcohol or other drugs with anti-anxiety medicine. This could result in sedation, lack of muscular control, coma or death.  Also, use only the amount of medicine prescribed to you. If you think you may have taken too much, get emergency care right away.     Antidepressant medicine: This kind of medicine is often used to treat anxiety, even if you aren t depressed. An antidepressant alters the levels of brain chemicals. This helps keep anxiety under control. This medicine is taken on a schedule. It takes a few weeks to start working. If you don t notice a change at first, you may just need more time. But if you don t notice results after the first few weeks, tell your doctor.  Keep taking medicines as prescribed  Never change your dosage or stop taking your medicine without talking to your doctor first. Keep the following in mind:    Some medicine must be taken on a schedule. Make this part of your daily routine. For instance, always take your pill before brushing your teeth. A pillbox can help you remember if you ve taken your medicine each day.    Medicines are often taken for 6 to12 months. Your doctor will then decide if you need to keep taking them. Many people who have also had therapy may no longer require medicine to manage anxiety.    You may need to stop taking medicine slowly to give your body time to adjust. When it s time to stop, your doctor will tell you more.    If symptoms return, you may need to start taking medicine again. This isn t your fault. It s just the nature of your anxiety disorder. If symptoms return, seek care as soon as possible. If symptoms return, seek care as soon as possible.  Special concerns    Side effects: Medicines may cause side effects. Ask your doctor or pharmacist what you can expect. They may have ideas for avoiding some side effects.    Sexual problems: Some antidepressants can affect your desire for sex or your ability to have an orgasm. A change in dosage or medicine often solves the problem. If you have a sexual side effect that concerns you, tell your doctor.    Addiction: If you have  history of addiction, you may need to avoid certain medicines. Be honest with your provider about any past history of drug use. This will ensure that you receive the safest possible medicines for your PTSD symptoms. Anti-anxiety medicine carry a risk of addiction, and no matter the reason for medicine or the type of drug, your provider will want to prescribe medicines based on a complete knowledge of your medical history.   Resources    National [place] of Mental Health  www.nimh.nih.gov/topics/jjpgr-unvo-rlva.shtml    The American Academy of Experts in Traumatic Stress  987.608.9816  www.aaets.org  Date Last Reviewed: 2/1/2017 2000-2017 [employer]. [address] [place] [address], [place] [address]. All rights reserved. This information is not intended as a substitute for professional medical care. Always follow your healthcare professional's instructions.        Treating Posttraumatic Stress Disorder (PTSD) with Therapy  Posttraumatic stress disorder (PTSD) is a type of anxiety disorder. It can happen after you go through an extreme trauma, such as a car crash or combat. With PTSD, you constantly relive the trauma through nightmares, intrusive memories, and flashbacks. Therapy (also called counseling) is a very helpful treatment for PTSD. When done by a trained professional, therapy helps you face and learn to manage your problem. It may take some time before you notice how much therapy is helping, but stick with it.     Cognitive behavioral therapy (CBT)  Cognitive behavioral therapy (CBT) teaches you to manage anxiety by helping you understand how you think and act when you're anxious. Research has shown that this treatment works very well for anxiety disorders including PTSD. CBT is run almost like a class, with homework and skill-building activities that teach you to cope with anxiety step by step. It can be done in a group or one-on-one, and often takes place for a set number of sessions. CBT has 2  main parts:    Cognitive therapy. This helps you identify the negative, irrational thoughts that occur with your anxiety. You'll learn to replace these with more positive, realistic thoughts.    Behavioral therapy. This helps you change how you react to anxiety. You'll learn coping skills and relaxation methods to help you deal with anxiety in a whole new way.  Other forms of therapy  Other therapy techniques may work better for you than CBT. Or you may move from CBT to another form of therapy as your treatment progresses. You may meet with a therapist by yourself or in a group, depending on your needs. Therapy can also help you work through problems in your life that may be making your anxiety disorder worse. This includes things such as drug or alcohol abuse.  Getting better with time  Therapy will help you feel better and teach you skills to help manage anxiety long-term. But change doesn't happen right away. It takes a commitment from you. And treatment only works if you learn to face the causes of your anxiety. So, you might feel worse before you feel better. This can sometimes make it hard to stick with it. Remember: Therapy is a very effective treatment. The results will be worth it.  Resources  Anxiety and Depression Association of [place] www.adaa.org  CDC www.cdc.gov  National [place] of Mental Health www.nimh.nih.gov  American Academy of Child and Adolescent Psychiatry www.aacap.org  Date Last Reviewed: 5/1/2017 2000-2017 [employer]. [address] [place] [address], [place] [address]. All rights reserved. This information is not intended as a substitute for professional medical care. Always follow your healthcare professional's instructions.

## 2017-11-12 ENCOUNTER — OFFICE VISIT (OUTPATIENT)
Dept: URGENT CARE | Facility: URGENT CARE | Age: 32
End: 2017-11-12
Payer: COMMERCIAL

## 2017-11-12 ENCOUNTER — HOSPITAL ENCOUNTER (OUTPATIENT)
Dept: CT IMAGING | Facility: CLINIC | Age: 32
Discharge: HOME | End: 2017-11-12
Attending: NURSE PRACTITIONER | Admitting: NURSE PRACTITIONER
Payer: COMMERCIAL

## 2017-11-12 VITALS
BODY MASS INDEX: 22.11 KG/M2 | RESPIRATION RATE: 16 BRPM | DIASTOLIC BLOOD PRESSURE: 91 MMHG | HEART RATE: 100 BPM | SYSTOLIC BLOOD PRESSURE: 141 MMHG | TEMPERATURE: 97.8 F | WEIGHT: 137 LBS

## 2017-11-12 DIAGNOSIS — R31.0 GROSS HEMATURIA: ICD-10-CM

## 2017-11-12 DIAGNOSIS — R30.0 DYSURIA: ICD-10-CM

## 2017-11-12 DIAGNOSIS — R10.9 FLANK PAIN: ICD-10-CM

## 2017-11-12 DIAGNOSIS — N30.01 ACUTE CYSTITIS WITH HEMATURIA: Primary | ICD-10-CM

## 2017-11-12 LAB
ALBUMIN UR-MCNC: >=300 MG/DL
APPEARANCE UR: ABNORMAL
BACTERIA #/AREA URNS HPF: ABNORMAL /HPF
BETA HCG QUAL IFA URINE: NEGATIVE
BILIRUB UR QL STRIP: ABNORMAL
COLOR UR AUTO: ABNORMAL
GLUCOSE UR STRIP-MCNC: NEGATIVE MG/DL
HGB UR QL STRIP: ABNORMAL
KETONES UR STRIP-MCNC: 15 MG/DL
LEUKOCYTE ESTERASE UR QL STRIP: ABNORMAL
NITRATE UR QL: NEGATIVE
NON-SQ EPI CELLS #/AREA URNS LPF: ABNORMAL /LPF
PH UR STRIP: 7 PH (ref 5–7)
RBC #/AREA URNS AUTO: ABNORMAL /HPF
SOURCE: ABNORMAL
SP GR UR STRIP: >1.03 (ref 1–1.03)
UROBILINOGEN UR STRIP-ACNC: 1 EU/DL (ref 0.2–1)
WBC #/AREA URNS AUTO: ABNORMAL /HPF

## 2017-11-12 PROCEDURE — 84703 CHORIONIC GONADOTROPIN ASSAY: CPT | Performed by: NURSE PRACTITIONER

## 2017-11-12 PROCEDURE — 99214 OFFICE O/P EST MOD 30 MIN: CPT | Performed by: NURSE PRACTITIONER

## 2017-11-12 PROCEDURE — 87086 URINE CULTURE/COLONY COUNT: CPT | Performed by: NURSE PRACTITIONER

## 2017-11-12 PROCEDURE — 74176 CT ABD & PELVIS W/O CONTRAST: CPT

## 2017-11-12 PROCEDURE — 81001 URINALYSIS AUTO W/SCOPE: CPT | Performed by: NURSE PRACTITIONER

## 2017-11-12 RX ORDER — CIPROFLOXACIN 500 MG/1
500 TABLET, FILM COATED ORAL 2 TIMES DAILY
Qty: 14 TABLET | Refills: 0 | Status: SHIPPED | OUTPATIENT
Start: 2017-11-12 | End: 2018-01-03

## 2017-11-12 RX ORDER — CIPROFLOXACIN 500 MG/1
500 TABLET, FILM COATED ORAL 2 TIMES DAILY
Qty: 14 TABLET | Refills: 0 | Status: CANCELLED | OUTPATIENT
Start: 2017-11-12

## 2017-11-12 NOTE — PROGRESS NOTES
SUBJECTIVE:   Sirena Dietrich is a 31 year old female who  presents today for a possible UTI. Symptoms of dysuria and frequency have been going on for 1day(s).  Hematuria yes.  sudden onset and moderate.  There is no history of fever, chills, nausea or vomiting.Flanlk pain bilateral. This patient does have a history of urinary tract infections. Patient denies vaginal symptoms, pregnancy or STD concerns.    Past Medical History:   Diagnosis Date     Agoraphobia with panic disorder 2013     ATTN DEFICIT W HYPERACT 12/15/2005    Off medication (strattera)      Bipolar I disorder, most recent episode (or current) mixed, severe, specified as with psychotic behavior 2013     Hypothyroidism 3/5/2010    3/10: pt reports fatigue, TSH wnl however Free T4 low. Started on levothyroxine 50mcg 6/10: TSH and FT4 normal off medications.  Remained normal       Migraine headache 2012     Other abnormal heart sounds     as a child     Papanicolaou smear of cervix with low grade squamous intraepithelial lesion (LGSIL) 3/2008    colpo negative     Pelvic abscess in female 1/10/2015     PTSD (post-traumatic stress disorder) 2013    Related to       Urinary incontinence 2012    kegels-cough/sneeze and urgency.  Nocturia-a few.        Uterus, adenomyosis 12/15/2014    Hysterectomy 2014      Current Outpatient Prescriptions   Medication Sig Dispense Refill     gabapentin (NEURONTIN) 600 MG tablet Take 2 tablets (1,200 mg) by mouth At Bedtime 60 tablet 2     SUMAtriptan (IMITREX STATDOSE) 6 MG/0.5ML pen injector kit Inject 0.5 mLs (6 mg) Subcutaneous at onset of headache for migraine 3 kit 3     lamoTRIgine (LAMICTAL) 200 MG tablet Take 1 tablet (200 mg) by mouth daily 90 tablet 0     nystatin (MYCOSTATIN) 753468 UNIT/GM POWD Apply topically 3 times daily as needed 30 g 1     QUEtiapine (SEROQUEL) 50 MG tablet Take 1-3 tablets ( mg) by mouth At Bedtime 21 tablet 0     lidocaine HCl 3 % cream Apply  topically 3 times daily 453.6 g 0     fluticasone (FLONASE) 50 MCG/ACT nasal spray Spray 1-2 sprays into both nostrils daily 1 g 5     mirtazapine (REMERON) 15 MG tablet Take 1 tablet (15 mg) by mouth At Bedtime (Patient not taking: Reported on 11/12/2017) 31 tablet 2     valACYclovir (VALTREX) 1000 mg tablet Take 2 tablets (2,000 mg) by mouth 2 times daily (Patient not taking: Reported on 11/12/2017) 4 tablet 3     diclofenac (VOLTAREN) 75 MG EC tablet Take 1 tablet (75 mg) by mouth 2 times daily as needed for moderate pain (Patient not taking: Reported on 11/12/2017) 30 tablet 0     valACYclovir (VALTREX) 500 MG tablet Take 1 tablet (500 mg) by mouth daily (Patient not taking: Reported on 11/12/2017) 90 tablet 3     acyclovir (ZOVIRAX) 5 % cream Apply topically 5 times daily (Patient not taking: Reported on 11/12/2017) 5 g 3     LORazepam (ATIVAN) 1 MG tablet Take 0.5 tablets (0.5 mg) by mouth every 8 hours as needed for anxiety (Patient not taking: Reported on 11/12/2017) 47 tablet 0         ROS:   CONSTITUTIONAL:NEGATIVE for fever, chills, change in weight  INTEGUMENTARY/SKIN: NEGATIVE for worrisome rashes, moles or lesions  EYES: NEGATIVE for vision changes or irritation  ENT/MOUTH: NEGATIVE for ear, mouth and throat problems  RESP:NEGATIVE for significant cough or SOB  CV: NEGATIVE for chest pain, palpitations or peripheral edema  GI: NEGATIVE for nausea, abdominal pain, heartburn, or change in bowel habits  : See above  MUSCULOSKELETAL: NEGATIVE for significant arthralgias or myalgia Flank pain bilateral   NEURO: NEGATIVE for weakness, dizziness or paresthesias    OBJECTIVE:  BP (!) 141/91 (BP Location: Right arm, Cuff Size: Adult Regular)  Pulse 100  Temp 97.8  F (36.6  C) (Tympanic)  Resp 16  Wt 137 lb (62.1 kg)  LMP  (LMP Unknown)  BMI 22.11 kg/m2  GENERAL APPEARANCE: healthy, alert and no distress  RESP: lungs clear to auscultation - no rales, rhonchi or wheezes  CV: regular rates and rhythm,  normal S1 S2, no murmur noted  ABDOMEN:  soft, nontender, no HSM or masses and bowel sounds normal  BACK: No CVA tenderness bilateral   SKIN: no suspicious lesions or rashes    UA:   Results for orders placed or performed in visit on 11/12/17   *UA reflex to Microscopic and Culture ([facility] (except [place] and [place])   Result Value Ref Range    Color Urine Red     Appearance Urine Slightly Cloudy     Glucose Urine Negative NEG^Negative mg/dL    Bilirubin Urine Small (A) NEG^Negative    Ketones Urine 15 (A) NEG^Negative mg/dL    Specific Gravity Urine >1.030 1.003 - 1.035    Blood Urine Large (A) NEG^Negative    pH Urine 7.0 5.0 - 7.0 pH    Protein Albumin Urine >=300 (A) NEG^Negative mg/dL    Urobilinogen Urine 1.0 0.2 - 1.0 EU/dL    Nitrite Urine Negative NEG^Negative    Leukocyte Esterase Urine Trace (A) NEG^Negative    Source Midstream Urine    Urine Microscopic   Result Value Ref Range    WBC Urine 2-5 (A) OTO2^O - 2 /HPF    RBC Urine  (A) OTO2^O - 2 /HPF    Squamous Epithelial /LPF Urine Moderate (A) FEW^Few /LPF    Bacteria Urine Few (A) NEG^Negative /HPF   Beta HCG qual IFA urine   Result Value Ref Range    Beta HCG Qual IFA Urine Negative NEG^Negative        *Note: Due to a large number of results and/or encounters for the requested time period, some results have not been displayed. A complete set of results can be found in Results Review.         ASSESSMENT:   ,    ICD-10-CM    1. Acute cystitis with hematuria N30.01 ciprofloxacin (CIPRO) 500 MG tablet     **UA reflex to Microscopic FUTURE anytime     Urine Culture Aerobic Bacterial   2. Gross hematuria R31.0 Beta HCG qual IFA urine     CT Abdomen Pelvis w/o Contrast   3. Flank pain R10.9 CT Abdomen Pelvis w/o Contrast   4. Dysuria R30.0 *UA reflex to Microscopic and Culture ([facility] (except [place] and [place])     Urine Microscopic         PLAN:    Patient Instructions   To [facility] for CT  scan  Antibiotics as directed   Urine culture is pending, will contact you if there is a change in treatment therapy.   Drink plenty of fluids. Prevention and treatment of UTI's discussed.   Signs and symptoms of pyelonephritis mentioned.   RTC if any worsening symptoms or if not improving as expected.   After completion of your antibiotic return for a repeat urinalysis.   You will just need to call the clinic to make a lab only appointment         Flank Pain, Uncertain Cause  The flank is the area between your upper abdomen and your back. Pain there is often caused by a problem with your kidneys. It might be a kidney infection or a kidney stone. Other causes of flank pain include spinal arthritis, a pinched nerve from a back injury, or a back muscle strain or spasm.  The cause of your flank pain is not certain. You may need other tests.  Home care  Follow these tips when caring for yourself at home:    You may use acetaminophen or ibuprofen to control pain, unless your health care provider prescribed another medicine. If you have chronic liver or kidney disease, talk with your provider before taking these medicines. Also talk with your provider first if you ve ever had a stomach ulcer or GI bleeding.    If the pain is coming from your muscles, you may get relief with ice or heat. During the first 2 days after the injury, put an ice pack on the painful area for 20 minutes every 2 to 4 hours. This will reduce swelling and pain. A hot shower, hot bath, or heating pad works well for a muscle spasm. You can start with ice, then switch to heat after 2 days. You might find that alternating ice and heat works well. Use the method that feels the best to you.  Follow-up care  Follow up with your healthcare provider if your symptoms don t get better over the next few days.  When to seek medical advice  Call your healthcare provider right away if any of these happen:    Repeated vomiting    Fever of 100.4 F (38 C) or higher,  "or as directed by your health care provider    Flank pain that gets worse    Pain that spreads to the front of your belly (abdomen)    Dizziness, weakness, or fainting    Blood in your urine    Burning feeling when you urinate or the need to urinate often    Pain in one of your legs that gets worse    Numbness or weakness in a leg  Date Last Reviewed: 10/1/2016    4491-2206 [employer]. [address] [place] [address]. All rights reserved. This information is not intended as a substitute for professional medical care. Always follow your healthcare professional's instructions.           * BLADDER INFECTION,Female (Adult)    A bladder infection (\"cystitis\" or \"UTI\") usually causes a constant urge to urinate and a burning when passing urine. Urine may be cloudy, smelly or dark. There may be pain in the lower abdomen. A bladder infection occurs when bacteria from the vaginal area enter the bladder opening (urethra). This can occur from sexual intercourse, wearing tight clothing, dehydration and other factors.  HOME CARE:  1. Drink lots of fluids (at least 6-8 glasses a day, unless you must restrict fluids for other medical reasons). This will force the medicine into your urinary system and flush the bacteria out of your body. Cranberry juice has been shown to help clear out the bacteria.  2. Avoid sexual intercourse until your symptoms are gone.  3. A bladder infection is treated with antibiotics. You may also be given Pyridium (generic = phenazopyridine) to reduce the burning sensation. This medicine will cause your urine to become a bright orange color. The orange urine may stain clothing. You may wear a pad or panty-liner to protect clothing.  PREVENTING FUTURE INFECTIONS:  1. Always wipe from front to back after a bowel movement.  2. Keep the genital area clean and dry.  3. Drink plenty of fluids each day to avoid dehydration.  4. Urinate right after intercourse to flush out the " bladder.  5. Wear cotton underwear and cotton-lined panty hose; avoid tight-fitting pants.  6. If you are on birth control pills and are having frequent bladder infections, discuss with your doctor.  FOLLOW UP: Return to this facility or see your doctor if ALL symptoms are not gone after three days of treatment.  GET PROMPT MEDICAL ATTENTION if any of the following occur:    Fever over 101 F (38.3 C)    No improvement by the third day of treatment    Increasing back or abdominal pain    Repeated vomiting; unable to keep medicine down    Weakness, dizziness or fainting    Vaginal discharge    Pain, redness or swelling in the labia (outer vaginal area)    7623-4865 [employer]. [address] [place] [address], [place] [address]. All rights reserved. This information is not intended as a substitute for professional medical care. Always follow your healthcare professional's instructions.  This information has been modified by your health care provider with permission from the publisher.          DIPESH Adams CNP

## 2017-11-12 NOTE — PATIENT INSTRUCTIONS
To [facility] for CT scan  Antibiotics as directed   Urine culture is pending, will contact you if there is a change in treatment therapy.   Drink plenty of fluids. Prevention and treatment of UTI's discussed.   Signs and symptoms of pyelonephritis mentioned.   RTC if any worsening symptoms or if not improving as expected.   After completion of your antibiotic return for a repeat urinalysis.   You will just need to call the clinic to make a lab only appointment         Flank Pain, Uncertain Cause  The flank is the area between your upper abdomen and your back. Pain there is often caused by a problem with your kidneys. It might be a kidney infection or a kidney stone. Other causes of flank pain include spinal arthritis, a pinched nerve from a back injury, or a back muscle strain or spasm.  The cause of your flank pain is not certain. You may need other tests.  Home care  Follow these tips when caring for yourself at home:    You may use acetaminophen or ibuprofen to control pain, unless your health care provider prescribed another medicine. If you have chronic liver or kidney disease, talk with your provider before taking these medicines. Also talk with your provider first if you ve ever had a stomach ulcer or GI bleeding.    If the pain is coming from your muscles, you may get relief with ice or heat. During the first 2 days after the injury, put an ice pack on the painful area for 20 minutes every 2 to 4 hours. This will reduce swelling and pain. A hot shower, hot bath, or heating pad works well for a muscle spasm. You can start with ice, then switch to heat after 2 days. You might find that alternating ice and heat works well. Use the method that feels the best to you.  Follow-up care  Follow up with your healthcare provider if your symptoms don t get better over the next few days.  When to seek medical advice  Call your healthcare provider right away if any of these happen:    Repeated vomiting    Fever of 100.4 F  "(38 C) or higher, or as directed by your health care provider    Flank pain that gets worse    Pain that spreads to the front of your belly (abdomen)    Dizziness, weakness, or fainting    Blood in your urine    Burning feeling when you urinate or the need to urinate often    Pain in one of your legs that gets worse    Numbness or weakness in a leg  Date Last Reviewed: 10/1/2016    9465-5553 [employer]. [address] [place] [address], [place] [address]. All rights reserved. This information is not intended as a substitute for professional medical care. Always follow your healthcare professional's instructions.           * BLADDER INFECTION,Female (Adult)    A bladder infection (\"cystitis\" or \"UTI\") usually causes a constant urge to urinate and a burning when passing urine. Urine may be cloudy, smelly or dark. There may be pain in the lower abdomen. A bladder infection occurs when bacteria from the vaginal area enter the bladder opening (urethra). This can occur from sexual intercourse, wearing tight clothing, dehydration and other factors.  HOME CARE:  1. Drink lots of fluids (at least 6-8 glasses a day, unless you must restrict fluids for other medical reasons). This will force the medicine into your urinary system and flush the bacteria out of your body. Cranberry juice has been shown to help clear out the bacteria.  2. Avoid sexual intercourse until your symptoms are gone.  3. A bladder infection is treated with antibiotics. You may also be given Pyridium (generic = phenazopyridine) to reduce the burning sensation. This medicine will cause your urine to become a bright orange color. The orange urine may stain clothing. You may wear a pad or panty-liner to protect clothing.  PREVENTING FUTURE INFECTIONS:  1. Always wipe from front to back after a bowel movement.  2. Keep the genital area clean and dry.  3. Drink plenty of fluids each day to avoid dehydration.  4. Urinate right after intercourse to flush out " the bladder.  5. Wear cotton underwear and cotton-lined panty hose; avoid tight-fitting pants.  6. If you are on birth control pills and are having frequent bladder infections, discuss with your doctor.  FOLLOW UP: Return to this facility or see your doctor if ALL symptoms are not gone after three days of treatment.  GET PROMPT MEDICAL ATTENTION if any of the following occur:    Fever over 101 F (38.3 C)    No improvement by the third day of treatment    Increasing back or abdominal pain    Repeated vomiting; unable to keep medicine down    Weakness, dizziness or fainting    Vaginal discharge    Pain, redness or swelling in the labia (outer vaginal area)    7932-8867 [employer]. [address] [place] [address], [place] [address]. All rights reserved. This information is not intended as a substitute for professional medical care. Always follow your healthcare professional's instructions.  This information has been modified by your health care provider with permission from the publisher.

## 2017-11-12 NOTE — MR AVS SNAPSHOT
After Visit Summary   11/12/2017    Sirena Dietrich    MRN: [id number]           Patient Information     Date Of Birth          1985        Visit Information        Provider Department      11/12/2017 10:45 AM Rocio Alonzo APRN CNP [facility]        Today's Diagnoses     Dysuria    -  1    Gross hematuria        Flank pain        Acute cystitis with hematuria          Care Instructions    To [place] for CT scan  Antibiotics as directed   Urine culture is pending, will contact you if there is a change in treatment therapy.   Drink plenty of fluids. Prevention and treatment of UTI's discussed.   Signs and symptoms of pyelonephritis mentioned.   RTC if any worsening symptoms or if not improving as expected.   After completion of your antibiotic return for a repeat urinalysis.   You will just need to call the clinic to make a lab only appointment         Flank Pain, Uncertain Cause  The flank is the area between your upper abdomen and your back. Pain there is often caused by a problem with your kidneys. It might be a kidney infection or a kidney stone. Other causes of flank pain include spinal arthritis, a pinched nerve from a back injury, or a back muscle strain or spasm.  The cause of your flank pain is not certain. You may need other tests.  Home care  Follow these tips when caring for yourself at home:    You may use acetaminophen or ibuprofen to control pain, unless your health care provider prescribed another medicine. If you have chronic liver or kidney disease, talk with your provider before taking these medicines. Also talk with your provider first if you ve ever had a stomach ulcer or GI bleeding.    If the pain is coming from your muscles, you may get relief with ice or heat. During the first 2 days after the injury, put an ice pack on the painful area for 20 minutes every 2 to 4 hours. This will reduce swelling and pain. A hot shower, hot bath, or  "heating pad works well for a muscle spasm. You can start with ice, then switch to heat after 2 days. You might find that alternating ice and heat works well. Use the method that feels the best to you.  Follow-up care  Follow up with your healthcare provider if your symptoms don t get better over the next few days.  When to seek medical advice  Call your healthcare provider right away if any of these happen:    Repeated vomiting    Fever of 100.4 F (38 C) or higher, or as directed by your health care provider    Flank pain that gets worse    Pain that spreads to the front of your belly (abdomen)    Dizziness, weakness, or fainting    Blood in your urine    Burning feeling when you urinate or the need to urinate often    Pain in one of your legs that gets worse    Numbness or weakness in a leg  Date Last Reviewed: 10/1/2016    7675-3020 [employer]. [address] [place] [address]. All rights reserved. This information is not intended as a substitute for professional medical care. Always follow your healthcare professional's instructions.           * BLADDER INFECTION,Female (Adult)    A bladder infection (\"cystitis\" or \"UTI\") usually causes a constant urge to urinate and a burning when passing urine. Urine may be cloudy, smelly or dark. There may be pain in the lower abdomen. A bladder infection occurs when bacteria from the vaginal area enter the bladder opening (urethra). This can occur from sexual intercourse, wearing tight clothing, dehydration and other factors.  HOME CARE:  1. Drink lots of fluids (at least 6-8 glasses a day, unless you must restrict fluids for other medical reasons). This will force the medicine into your urinary system and flush the bacteria out of your body. Cranberry juice has been shown to help clear out the bacteria.  2. Avoid sexual intercourse until your symptoms are gone.  3. A bladder infection is treated with antibiotics. You may also be given Pyridium " (generic = phenazopyridine) to reduce the burning sensation. This medicine will cause your urine to become a bright orange color. The orange urine may stain clothing. You may wear a pad or panty-liner to protect clothing.  PREVENTING FUTURE INFECTIONS:  1. Always wipe from front to back after a bowel movement.  2. Keep the genital area clean and dry.  3. Drink plenty of fluids each day to avoid dehydration.  4. Urinate right after intercourse to flush out the bladder.  5. Wear cotton underwear and cotton-lined panty hose; avoid tight-fitting pants.  6. If you are on birth control pills and are having frequent bladder infections, discuss with your doctor.  FOLLOW UP: Return to this facility or see your doctor if ALL symptoms are not gone after three days of treatment.  GET PROMPT MEDICAL ATTENTION if any of the following occur:    Fever over 101 F (38.3 C)    No improvement by the third day of treatment    Increasing back or abdominal pain    Repeated vomiting; unable to keep medicine down    Weakness, dizziness or fainting    Vaginal discharge    Pain, redness or swelling in the labia (outer vaginal area)    2903-3484 [employer]. [address] [place] [address]. All rights reserved. This information is not intended as a substitute for professional medical care. Always follow your healthcare professional's instructions.  This information has been modified by your health care provider with permission from the publisher.            Follow-ups after your visit        Future tests that were ordered for you today     Open Future Orders        Priority Expected Expires Ordered    **UA reflex to Microscopic FUTURE anytime Routine 11/12/2017 11/12/2018 11/12/2017    CT Abdomen Pelvis w/o Contrast STAT  11/12/2018 11/12/2017            Who to contact     If you have questions or need follow up information about today's clinic visit or your schedule please contact [facility] directly at [phone].  Normal or non-critical lab and imaging results will be communicated to you by Thirstyhart, letter or phone within 4 business days after the clinic has received the results. If you do not hear from us within 7 days, please contact the clinic through Thirstyhart or phone. If you have a critical or abnormal lab result, we will notify you by phone as soon as possible.  Submit refill requests through Wibki or call your pharmacy and they will forward the refill request to us. Please allow 3 business days for your refill to be completed.          Additional Information About Your Visit        ThirstyharImaCor Information     Wibki gives you secure access to your electronic health record. If you see a primary care provider, you can also send messages to your care team and make appointments. If you have questions, please call your primary care clinic.  If you do not have a primary care provider, please call [phone] and they will assist you.        Care EveryWhere ID     This is your Care EveryWhere ID. This could be used by other organizations to access your [facility] medical records  [id number]        Your Vitals Were     Pulse Temperature Respirations Last Period BMI (Body Mass Index)       100 97.8  F (36.6  C) (Tympanic) 16 (LMP Unknown) 22.11 kg/m2        Blood Pressure from Last 3 Encounters:   11/12/17 (!) 141/91   11/10/17 132/82   10/17/17 127/86    Weight from Last 3 Encounters:   11/12/17 137 lb (62.1 kg)   11/10/17 136 lb (61.7 kg)   10/17/17 150 lb (68 kg)              We Performed the Following     *UA reflex to Microscopic and Culture ([place] and [facility] (except [place] and [place])     Beta HCG qual IFA urine     Urine Culture Aerobic Bacterial     Urine Microscopic          Today's Medication Changes          These changes are accurate as of: 11/12/17 11:51 AM.  If you have any questions, ask your nurse or doctor.               Start taking these medicines.         Dose/Directions    ciprofloxacin 500 MG tablet   Commonly known as:  CIPRO   Used for:  Acute cystitis with hematuria   Started by:  Rocio Alonzo APRN CNP        Dose:  500 mg   Take 1 tablet (500 mg) by mouth 2 times daily   Quantity:  14 tablet   Refills:  0            Where to get your medicines      These medications were sent to [id number] - [place], [place] - [address]  [address] [address]    Hours:  Closed 10-16-08 business to [facility] Phone:  [phone]     ciprofloxacin 500 MG tablet                Primary Care Provider Office Phone # Fax #    DIPESH Marino [phone] [address] [facility] [address]        Goals        General    I will attend counseling appointment (pt-stated)     Notes - Note edited  11/25/2015  1:35 PM by Shi Armstrong LSW    As of today's date 11/25/2015 goal is met at 76 - 100%.   Goal Status:  Complete - will keep to maintain for  A few month  As of today's date 7/28/2015 goal is met at 26 - 50%.   Goal Status:  Active          Equal Access to Services     [facility] AH: Hadii jo yoder hadasho Sorian, waaxda luqadaha, qaybta kaalmada angelica, raheem day . So [facility] [phone].    ATENCIÓN: Si habla español, tiene a borja disposición servicios gratuitos de asistencia lingüística. [facility] [phone].    We comply with applicable federal civil rights laws and [place] laws. We do not discriminate on the basis of race, color, national origin, age, disability, sex, sexual orientation, or gender identity.            Thank you!     Thank you for choosing [facility]  for your care. Our goal is always to provide you with excellent care. Hearing back from our patients is one way we can continue to improve our services. Please take a few minutes to complete the written survey that you may receive in the mail after your visit with us.  Thank you!             Your Updated Medication List - Protect others around you: Learn how to safely use, store and throw away your medicines at www.disposemymeds.org.          This list is accurate as of: 11/12/17 11:51 AM.  Always use your most recent med list.                   Brand Name Dispense Instructions for use Diagnosis    acyclovir 5 % cream    ZOVIRAX    5 g    Apply topically 5 times daily    Recurrent cold sores       ciprofloxacin 500 MG tablet    CIPRO    14 tablet    Take 1 tablet (500 mg) by mouth 2 times daily    Acute cystitis with hematuria       diclofenac 75 MG EC tablet    VOLTAREN    30 tablet    Take 1 tablet (75 mg) by mouth 2 times daily as needed for moderate pain        fluticasone 50 MCG/ACT spray    FLONASE    1 g    Spray 1-2 sprays into both nostrils daily    Vaginal discharge       gabapentin 600 MG tablet    NEURONTIN    60 tablet    Take 2 tablets (1,200 mg) by mouth At Bedtime    Severe bipolar I disorder, most recent episode mixed, with psychotic features (H)       lamoTRIgine 200 MG tablet    LaMICtal    90 tablet    Take 1 tablet (200 mg) by mouth daily    Severe bipolar I disorder, most recent episode mixed, with psychotic features (H)       lidocaine HCl 3 % cream     453.6 g    Apply topically 3 times daily    Yeast infection of the vagina       LORazepam 1 MG tablet    ATIVAN    47 tablet    Take 0.5 tablets (0.5 mg) by mouth every 8 hours as needed for anxiety    Nausea       mirtazapine 15 MG tablet    REMERON    31 tablet    Take 1 tablet (15 mg) by mouth At Bedtime    Severe bipolar I disorder, most recent episode mixed, with psychotic features (H), PTSD (post-traumatic stress disorder)       nystatin 146317 UNIT/GM Powd    MYCOSTATIN    30 g    Apply topically 3 times daily as needed    Cutaneous candidiasis       QUEtiapine 50 MG tablet    SEROQUEL    21 tablet    Take 1-3 tablets ( mg) by mouth At Bedtime    Severe bipolar I disorder, most recent episode  mixed, with psychotic features (H)       SUMAtriptan 6 MG/0.5ML pen injector kit    IMITREX STATDOSE    3 kit    Inject 0.5 mLs (6 mg) Subcutaneous at onset of headache for migraine    Migraine without aura and with status migrainosus, not intractable       * valACYclovir 500 MG tablet    VALTREX    90 tablet    Take 1 tablet (500 mg) by mouth daily    Recurrent cold sores       * valACYclovir 1000 mg tablet    VALTREX    4 tablet    Take 2 tablets (2,000 mg) by mouth 2 times daily    HSV-1 (herpes simplex virus 1) infection       * Notice:  This list has 2 medication(s) that are the same as other medications prescribed for you. Read the directions carefully, and ask your doctor or other care provider to review them with you.

## 2017-11-12 NOTE — NURSING NOTE
"Chief Complaint   Patient presents with     UTI     8 pm was burning, by 10 am was urinating pure blood.  No back pain, some abdominal/ pelvic pain.  No fevers.  Increase water intake        Initial BP (!) 141/91 (BP Location: Right arm, Cuff Size: Adult Regular)  Pulse 100  Temp 97.8  F (36.6  C) (Tympanic)  Resp 16  Wt 137 lb (62.1 kg)  LMP  (LMP Unknown)  BMI 22.11 kg/m2 Estimated body mass index is 22.11 kg/(m^2) as calculated from the following:    Height as of 10/17/17: 5' 6\" (1.676 m).    Weight as of this encounter: 137 lb (62.1 kg).  Medication Reconciliation: complete    Health Maintenance that is potentially due pending provider review:  NONE    n/a    Is there anyone who you would like to be able to receive your results? Not Applicable  If yes have patient fill out JOSÉ  Josue Tobin M.A.      "

## 2017-11-13 LAB
BACTERIA SPEC CULT: NO GROWTH
Lab: NORMAL
SPECIMEN SOURCE: NORMAL

## 2017-11-14 ENCOUNTER — TELEPHONE (OUTPATIENT)
Dept: URGENT CARE | Facility: URGENT CARE | Age: 32
End: 2017-11-14

## 2017-11-14 NOTE — TELEPHONE ENCOUNTER
"[facility] Lab result notification:    [facility] ED lab result protocol used  Urine Culture Protcol    Reason for call  Notify of lab results, assess symptoms,  review ED providers recommendations/discharge instructions (if necessary) and advise per ED lab result f/u protocol    Lab Result  Final urine culture report shows \"NO GROWTH\" and is NEGATIVE.  [facility] ED discharge antibiotic: Ciprofloxacin (Cipro) 500 mg tablet, Take 1 tablet (500 mg) by mouth 2 times daily for 7 days  Patient was on any antibiotic's prior culture collection (Yes/No): unknown  RN verified ED provider Rx's antibiotic only for UTI (Yes/No): dx'd with uti, Flank pain, hematuria  Recommendations per [facility] ED Lab result protocol - Urine culture protocol.  Information table from ED Provider visit on 11/12/2017  Symptoms reported at ED visit (Chief complaint, HPI) a 31 year old female who  presents today for a possible UTI. Symptoms of dysuria and frequency have been going on for 1day(s).  Hematuria yes.  sudden onset and moderate.  There is no history of fever, chills, nausea or vomiting.Flanlk pain bilateral. This patient does have a history of urinary tract infections. Patient denies vaginal symptoms, pregnancy or STD concerns.   ED providers Impression and Plan (applicable information) To [place] for CT scan  Antibiotics as directed   Urine culture is pending, will contact you if there is a change in treatment therapy.   Drink plenty of fluids. Prevention and treatment of UTI's discussed.   Signs and symptoms of pyelonephritis mentioned.   RTC if any worsening symptoms or if not improving as expected.   After completion of your antibiotic return for a repeat urinalysis.   You will just need to call the clinic to make a lab only appointment      No message left unable to.    Carey Elias, RN  [facility] RN  Lung Nodule and ED Lab Result F/u RN  Epic pool (ED late result f/u RN): [id number]  FV INCIDENTAL RADIOLOGY F/U " NURSES: [id number]  # [phone]      Copy of Lab result   Exam Information   Exam Date Exam Time Accession # Results    11/12/17 10:53 AM [id number]    Component Results   Component Collected Lab   Specimen Description 11/12/2017 10:53    Midstream Urine   Special Requests 11/12/2017 10:53 AM 75   Specimen received in preservative   Culture Micro 11/12/2017 10:53    No growth

## 2018-01-03 ENCOUNTER — HOSPITAL ENCOUNTER (EMERGENCY)
Facility: CLINIC | Age: 33
Discharge: HOME | End: 2018-01-03
Attending: EMERGENCY MEDICINE | Admitting: EMERGENCY MEDICINE
Payer: COMMERCIAL

## 2018-01-03 VITALS
TEMPERATURE: 98.4 F | HEART RATE: 110 BPM | RESPIRATION RATE: 98 BRPM | SYSTOLIC BLOOD PRESSURE: 126 MMHG | OXYGEN SATURATION: 98 % | DIASTOLIC BLOOD PRESSURE: 86 MMHG

## 2018-01-03 DIAGNOSIS — N39.0 ACUTE UTI: ICD-10-CM

## 2018-01-03 PROCEDURE — 87088 URINE BACTERIA CULTURE: CPT | Performed by: EMERGENCY MEDICINE

## 2018-01-03 PROCEDURE — 99283 EMERGENCY DEPT VISIT LOW MDM: CPT | Performed by: EMERGENCY MEDICINE

## 2018-01-03 PROCEDURE — 87086 URINE CULTURE/COLONY COUNT: CPT | Performed by: EMERGENCY MEDICINE

## 2018-01-03 PROCEDURE — 99284 EMERGENCY DEPT VISIT MOD MDM: CPT | Mod: Z6 | Performed by: EMERGENCY MEDICINE

## 2018-01-03 PROCEDURE — 81001 URINALYSIS AUTO W/SCOPE: CPT | Performed by: EMERGENCY MEDICINE

## 2018-01-03 PROCEDURE — 87186 SC STD MICRODIL/AGAR DIL: CPT | Performed by: EMERGENCY MEDICINE

## 2018-01-03 RX ORDER — CIPROFLOXACIN 500 MG/1
500 TABLET, FILM COATED ORAL 2 TIMES DAILY
Qty: 14 TABLET | Refills: 0 | Status: SHIPPED | OUTPATIENT
Start: 2018-01-03 | End: 2018-01-11

## 2018-01-03 ASSESSMENT — ENCOUNTER SYMPTOMS
DIZZINESS: 0
NAUSEA: 0
ARTHRALGIAS: 0
SHORTNESS OF BREATH: 0
HEADACHES: 0
FEVER: 0
NUMBNESS: 0
CONFUSION: 0
BRUISES/BLEEDS EASILY: 0
ABDOMINAL PAIN: 0
SORE THROAT: 0
WEAKNESS: 0
VOMITING: 0
COUGH: 0
DIARRHEA: 0
DYSURIA: 1
NECK PAIN: 0
CHILLS: 0
LIGHT-HEADEDNESS: 0

## 2018-01-03 NOTE — ED AVS SNAPSHOT
[facility] Emergency Department    [address] [address]    Phone:  [phone]    Fax:  [phone]                                       Sirena Dietrich   MRN: [id number]    Department:  [facility] Emergency Department   Date of Visit:  1/3/2018           Patient Information     Date Of Birth          1985        Your diagnoses for this visit were:     Acute UTI        You were seen by Regino Ozuna MD.      Follow-up Information     Follow up with Shi Martinez APRN CNP.    Specialty:  Family Practice    Why:  As needed    Contact information:    [address] [facility] [address]  [phone]          Follow up with [facility] Emergency Department.    Specialty:  EMERGENCY MEDICINE    Why:  If symptoms worsen    Contact information:    [address] [place] [address] [phone]    Additional information:    The medical center is located at   [address] [facility] (between [address] in [place], [place] north   of [place]).        Discharge Instructions       Return if symptoms worsen or new symptoms develop.  Follow-up with primary care physician next available.  Drink plenty of fluids.  If increased pain, bleeding or decreased urine output please return for recheck.  Also return with any fevers or chills or nausea or vomiting.  Urinary Tract Infections in Women    Urinary tract infections (UTIs) are most often caused by bacteria (germs). These bacteria enter the urinary tract. The bacteria may come from outside the body. Or they may travel from the skin outside the rectum or vagina into the urethra. Female anatomy makes it easy for bacteria from the bowel to enter a woman s urinary tract, which is the most common source of UTI. This means women develop UTIs more often than men. Pain in or around the urinary tract is a common UTI symptom. But the only way to know for sure if you have a UTI for the healthcare provider  to test your urine. The two tests that may be done are the urinalysis and urine culture.  Types of UTIs    Cystitis: A bladder infection (cystitis) is the most common UTI in women. You may have urgent or frequent urination. You may also have pain, burning when you urinate, and bloody urine.    Urethritis: This is an inflamed urethra, which is the tube that carries urine from the bladder to outside the body. You may have lower stomach or back pain. You may also have urgent or frequent urination.    Pyelonephritis: This is a kidney infection. If not treated, it can be serious and damage your kidneys. In severe cases, you may be hospitalized. You may have a fever and lower back pain.  Medicines to treat a UTI  Most UTIs are treated with antibiotics. These kill the bacteria. The length of time you need to take them depends on the type of infection. It may be as short as 3 days. If you have repeated UTIs, a low-dose antibiotic may be needed for several months. Take antibiotics exactly as directed. Don t stop taking them until all of the medicine is gone. If you stop taking the antibiotic too soon, the infection may not go away, and you may develop a resistance to the antibiotic. This can make it much harder to treat.  Lifestyle changes to treat and prevent UTIs  The lifestyle changes below will help get rid of your UTI. They may also help prevent future UTIs.    Drink plenty of fluids. This includes water, juice, or other caffeine-free drinks. Fluids help flush bacteria out of your body.    Empty your bladder. Always empty your bladder when you feel the urge to urinate. And always urinate before going to sleep. Urine that stays in your bladder can lead to infection. Try to urinate before and after sex as well.    Practice good personal hygiene. Wipe yourself from front to back after using the toilet. This helps keep bacteria from getting into the urethra.    Use condoms during sex. These help prevent UTIs caused by  sexually transmitted bacteria. Also, avoid using spermicides during sex. These can increase the risk of UTIs. Choose other forms of birth control instead. For women who tend to get UTIs after sex, a low-dose of a preventive antibiotic may be used. Be sure to discuss this option with your healthcare provider.    Follow up with your healthcare provider as directed. He or she may test to make sure the infection has cleared. If needed, more treatment may be started.  Date Last Reviewed: 1/1/2017 2000-2017 [employer]. [address] [place] [address]. All rights reserved. This information is not intended as a substitute for professional medical care. Always follow your healthcare professional's instructions.          24 Hour Appointment Hotline       To make an appointment at any [facility], call [phone] ([phone]). If you don't have a family doctor or clinic, we will help you find one. [facility] are conveniently located to serve the needs of you and your family.             Review of your medicines      START taking        Dose / Directions Last dose taken    ciprofloxacin 500 MG tablet   Commonly known as:  CIPRO   Dose:  500 mg   Quantity:  14 tablet        Take 1 tablet (500 mg) by mouth 2 times daily   Refills:  0          Our records show that you are taking the medicines listed below. If these are incorrect, please call your family doctor or clinic.        Dose / Directions Last dose taken    acyclovir 5 % cream   Commonly known as:  ZOVIRAX   Quantity:  5 g        Apply topically 5 times daily   Refills:  3        diclofenac 75 MG EC tablet   Commonly known as:  VOLTAREN   Dose:  75 mg   Quantity:  30 tablet        Take 1 tablet (75 mg) by mouth 2 times daily as needed for moderate pain   Refills:  0        gabapentin 600 MG tablet   Commonly known as:  NEURONTIN   Dose:  1200 mg   Quantity:  60 tablet        Take 2 tablets (1,200 mg) by mouth At Bedtime    Refills:  2        lamoTRIgine 200 MG tablet   Commonly known as:  LaMICtal   Dose:  200 mg   Quantity:  90 tablet        Take 1 tablet (200 mg) by mouth daily   Refills:  0        lidocaine HCl 3 % cream   Quantity:  453.6 g        Apply topically 3 times daily   Refills:  0        mirtazapine 15 MG tablet   Commonly known as:  REMERON   Dose:  15 mg   Quantity:  31 tablet        Take 1 tablet (15 mg) by mouth At Bedtime   Refills:  2        SUMAtriptan 6 MG/0.5ML pen injector kit   Commonly known as:  IMITREX STATDOSE   Dose:  6 mg   Quantity:  3 kit        Inject 0.5 mLs (6 mg) Subcutaneous at onset of headache for migraine   Refills:  3                Prescriptions were sent or printed at these locations (1 Prescription)                   Other Prescriptions                Printed at Department/Unit printer (1 of 1)         ciprofloxacin (CIPRO) 500 MG tablet                Procedures and tests performed during your visit     UA with Microscopic    Urine Culture Aerobic Bacterial      Orders Needing Specimen Collection     None      Pending Results     Date and Time Order Name Status Description    1/3/2018 0937 Urine Culture Aerobic Bacterial In process             Pending Culture Results     Date and Time Order Name Status Description    1/3/2018 0937 Urine Culture Aerobic Bacterial In process             Pending Results Instructions     If you had any lab results that were not finalized at the time of your Discharge, you can call the ED Lab Result RN at [phone]. You will be contacted by this team for any positive Lab results or changes in treatment. The nurses are available 7 days a week from 10A to 6:30P.  You can leave a message 24 hours per day and they will return your call.        Test Results From Your Hospital Stay        1/3/2018  8:41 AM      Component Results     Component Value Ref Range & Units Status    Color Urine Orange  Final    Appearance Urine Slightly Cloudy  Final    Glucose Urine   NEG^Negative mg/dL Final    Unable to assay due to interfering substance (A)    Bilirubin Urine  NEG^Negative Final    Unable to assay due to interfering substance (A)    This is an unconfirmed screening test result. A positive result may be false.    Ketones Urine  NEG^Negative mg/dL Final    Unable to assay due to interfering substance (A)    Specific Gravity Urine  1.003 - 1.035 Final    Unable to assay due to interfering substance    Blood Urine  NEG^Negative Final    Unable to assay due to interfering substance (A)    pH Urine  5.0 - 7.0 pH Final    Unable to assay due to interfering substance    Protein Albumin Urine  NEG^Negative mg/dL Final    Unable to assay due to interfering substance (A)    Urobilinogen mg/dL  0.0 - 2.0 mg/dL Final    Unable to assay due to interfering substance    Nitrite Urine  NEG^Negative Final    Unable to assay due to interfering substance (A)    Leukocyte Esterase Urine  NEG^Negative Final    Unable to assay due to interfering substance (A)    Source Unspecified Urine  Final    WBC Urine 1999 (H) 0 - 2 /HPF Final    RBC Urine >182 (H) 0 - 2 /HPF Final    WBC Clumps Present (A) NEG^Negative /HPF Final    Transitional Epi 8 (H) 0 - 1 /HPF Final    Mucous Urine Present (A) NEG^Negative /LPF Final         1/3/2018  9:47 AM                Thank you for choosing [facility]       Thank you for choosing [facility] for your care. Our goal is always to provide you with excellent care. Hearing back from our patients is one way we can continue to improve our services. Please take a few minutes to complete the written survey that you may receive in the mail after you visit with us. Thank you!        [employer] Information     [employer] gives you secure access to your electronic health record. If you see a primary care provider, you can also send messages to your care team and make appointments. If you have questions, please call your primary care clinic.  If you do not have a primary care provider,  please call [phone] and they will assist you.        Care EveryWhere ID     This is your Care EveryWhere ID. This could be used by other organizations to access your [facility] medical records  [id number]        Equal Access to Services     SAMRA KAPLAN : Ian Nazario, walisa douglas, qanoelta kaalmavicky dominguez, raheem evangelista. So [facility] [phone].    ATENCIÓN: Si habla español, tiene a borja disposición servicios gratuitos de asistencia lingüística. Llame al [phone].    We comply with applicable federal civil rights laws and [place] laws. We do not discriminate on the basis of race, color, national origin, age, disability, sex, sexual orientation, or gender identity.            After Visit Summary       This is your record. Keep this with you and show to your community pharmacist(s) and doctor(s) at your next visit.

## 2018-01-03 NOTE — DISCHARGE INSTRUCTIONS
Return if symptoms worsen or new symptoms develop.  Follow-up with primary care physician next available.  Drink plenty of fluids.  If increased pain, bleeding or decreased urine output please return for recheck.  Also return with any fevers or chills or nausea or vomiting.  Urinary Tract Infections in Women    Urinary tract infections (UTIs) are most often caused by bacteria (germs). These bacteria enter the urinary tract. The bacteria may come from outside the body. Or they may travel from the skin outside the rectum or vagina into the urethra. Female anatomy makes it easy for bacteria from the bowel to enter a woman s urinary tract, which is the most common source of UTI. This means women develop UTIs more often than men. Pain in or around the urinary tract is a common UTI symptom. But the only way to know for sure if you have a UTI for the healthcare provider to test your urine. The two tests that may be done are the urinalysis and urine culture.  Types of UTIs    Cystitis: A bladder infection (cystitis) is the most common UTI in women. You may have urgent or frequent urination. You may also have pain, burning when you urinate, and bloody urine.    Urethritis: This is an inflamed urethra, which is the tube that carries urine from the bladder to outside the body. You may have lower stomach or back pain. You may also have urgent or frequent urination.    Pyelonephritis: This is a kidney infection. If not treated, it can be serious and damage your kidneys. In severe cases, you may be hospitalized. You may have a fever and lower back pain.  Medicines to treat a UTI  Most UTIs are treated with antibiotics. These kill the bacteria. The length of time you need to take them depends on the type of infection. It may be as short as 3 days. If you have repeated UTIs, a low-dose antibiotic may be needed for several months. Take antibiotics exactly as directed. Don t stop taking them until all of the medicine is gone. If you  stop taking the antibiotic too soon, the infection may not go away, and you may develop a resistance to the antibiotic. This can make it much harder to treat.  Lifestyle changes to treat and prevent UTIs  The lifestyle changes below will help get rid of your UTI. They may also help prevent future UTIs.    Drink plenty of fluids. This includes water, juice, or other caffeine-free drinks. Fluids help flush bacteria out of your body.    Empty your bladder. Always empty your bladder when you feel the urge to urinate. And always urinate before going to sleep. Urine that stays in your bladder can lead to infection. Try to urinate before and after sex as well.    Practice good personal hygiene. Wipe yourself from front to back after using the toilet. This helps keep bacteria from getting into the urethra.    Use condoms during sex. These help prevent UTIs caused by sexually transmitted bacteria. Also, avoid using spermicides during sex. These can increase the risk of UTIs. Choose other forms of birth control instead. For women who tend to get UTIs after sex, a low-dose of a preventive antibiotic may be used. Be sure to discuss this option with your healthcare provider.    Follow up with your healthcare provider as directed. He or she may test to make sure the infection has cleared. If needed, more treatment may be started.  Date Last Reviewed: 1/1/2017 2000-2017 [employer]. [address] [place] [address], [place] [address]. All rights reserved. This information is not intended as a substitute for professional medical care. Always follow your healthcare professional's instructions.

## 2018-01-03 NOTE — ED AVS SNAPSHOT
[facility] Emergency Department    [address] [facility] [address]    Phone:  [phone]    Fax:  [phone]                                       Sirena Dietrich   MRN: [id number]    Department:  [facility] Emergency Department   Date of Visit:  1/3/2018           After Visit Summary Signature Page     I have received my discharge instructions, and my questions have been answered. I have discussed any challenges I see with this plan with the nurse or doctor.    ..........................................................................................................................................  Patient/Patient Representative Signature      ..........................................................................................................................................  Patient Representative Print Name and Relationship to Patient    ..................................................               ................................................  Date                                            Time    ..........................................................................................................................................  Reviewed by Signature/Title    ...................................................              ..............................................  Date                                                            Time

## 2018-01-03 NOTE — ED NOTES
Patient presents with c/o painful urination and burning sensation for past 4 days.  Pt reports a previous history of UTI.

## 2018-01-04 LAB
ALBUMIN UR-MCNC: ABNORMAL MG/DL
APPEARANCE UR: ABNORMAL
BILIRUB UR QL STRIP: ABNORMAL
COLOR UR AUTO: ABNORMAL
GLUCOSE UR STRIP-MCNC: ABNORMAL MG/DL
HGB UR QL STRIP: ABNORMAL
KETONES UR STRIP-MCNC: ABNORMAL MG/DL
LEUKOCYTE ESTERASE UR QL STRIP: ABNORMAL
MUCOUS THREADS #/AREA URNS LPF: PRESENT /LPF
NITRATE UR QL: ABNORMAL
PH UR STRIP: ABNORMAL PH (ref 5–7)
RBC #/AREA URNS AUTO: >182 /HPF (ref 0–2)
SOURCE: ABNORMAL
SP GR UR STRIP: ABNORMAL (ref 1–1.03)
TRANS CELLS #/AREA URNS HPF: 8 /HPF (ref 0–1)
UROBILINOGEN UR STRIP-MCNC: ABNORMAL MG/DL (ref 0–2)
WBC #/AREA URNS AUTO: 1999 /HPF (ref 0–2)
WBC CLUMPS #/AREA URNS HPF: PRESENT /HPF

## 2018-01-06 ENCOUNTER — TELEPHONE (OUTPATIENT)
Dept: EMERGENCY MEDICINE | Facility: CLINIC | Age: 33
End: 2018-01-06

## 2018-01-06 LAB
BACTERIA SPEC CULT: ABNORMAL
BACTERIA SPEC CULT: ABNORMAL
Lab: ABNORMAL
SPECIMEN SOURCE: ABNORMAL

## 2018-01-06 NOTE — TELEPHONE ENCOUNTER
[facility] Emergency Department Lab result notification:    [facility] ED lab result protocol used  Urine culture    Reason for call  Notify of lab results, assess symptoms,  review ED providers recommendations/discharge instructions (if necessary) and advise per ED lab result f/u protocol    Lab Result  Final Urine Culture Report on 1/6/18.  [facility] ED discharge antibiotic's: Ciprofloxacin (Cipro) 500 mg tablet, Take 1 tablet (500 mg) by mouth 2 times daily x 7 days  Bacteria #1: 50,000 to 100,000 colonies/mL Escherichia coli is [SUSCEPTIBLE] to ED discharge antibiotic.    Bacteria #2: 10,000 to 50,000 colonies/mL Strain 2 Escherichia coli is [SUSCEPTIBLE] to ED discharge antibiotic.    Recommendations in treatment per  ED Lab result protocol.    Information table from ED Provider visit on 1/3/18  Symptoms reported at ED visit (Chief complaint, HPI) Sirena Dietrich is a 32 year old female who presented to emergency department complaining of urinary frequency and urgency.  Symptoms have been going on for a couple days with some mild dysuria yesterday followed by some increasing pain this morning and some hematuria this morning.  Patient denies any abdominal pain with this does not have any flank pain.  She has not had a fever chills.  Currently denies any pain.  She has had a previous hysterectomy.   ED providers Impression and Plan (applicable information) Assessments & Plan (with Medical Decision Making) records were reviewed urine analysis was obtained.patient had taken a pyridium tablet so the urine sample had an interfering substance in it but still had many wbc's . A culture was sent and patient will be treated for a UTI with antibiotics. She will return if symptoms worsen or new symptoms develop.    Miscellaneous information N/A     RN Assessment (Patient s current Symptoms), include time called.  [Insert Left message here if message left]  Sirena feeling better, advised of final UC  susceptibility results.  Taking Cipro as prescribed, has no questions or concerns.    Please Contact your PCP clinic or return to the Emergency department if your:    Symptoms return.    Symptoms do not resolve after completing antibiotic.    Symptoms worsen or other concerning symptom's.    PCP follow-up Questions asked: YES       Allison Watts, RN    [facility] Services RN  Lung Nodule and ED Lab Results F/U RN  Epic pool (ED late result f/u RN) : P [id number]   # [phone]    Copy of Lab result   Order   Urine Culture Aerobic Bacterial [[id number]] (Order [id number])   Exam Information   Exam Date Exam Time Accession # Results    1/3/18  8:00 AM [id number]    Component Results   Component Collected Lab   Specimen Description 01/03/2018  8:00 AM 75   Midstream Urine   Special Requests 01/03/2018  8:00 AM 75   Specimen received in preservative   Culture Micro (Abnormal) 01/03/2018  8:00 AM 75   50,000 to 100,000 colonies/mL   Escherichia coli      Culture Micro (Abnormal) 01/03/2018  8:00 AM 75   10,000 to 50,000 colonies/mL   Strain 2   Escherichia coli      Culture & Susceptibility   ESCHERICHIA COLI   Antibiotic Interpretation Sensitivity Unit Method Status   AMPICILLIN Resistant >=32 ug/mL ELISEO Final   AMPICILLIN/SULBACTAM Resistant >=32 ug/mL ELISEO Final   CEFAZOLIN Sensitive <=4 ug/mL ELISEO Final   Comment: Cefazolin ELISEO breakpoints are for the treatment of uncomplicated urinary tract   infections.  For the treatment of systemic infections, please contact the   laboratory for additional testing.   CEFEPIME Sensitive <=1 ug/mL ELISEO Final   CEFOXITIN Sensitive 8 ug/mL ELISEO Final   CEFTAZIDIME Sensitive <=1 ug/mL ELISEO Final   CEFTRIAXONE Sensitive <=1 ug/mL ELISEO Final   CIPROFLOXACIN Sensitive 0.5 ug/mL ELISEO Final   GENTAMICIN Sensitive <=1 ug/mL ELISEO Final   LEVOFLOXACIN Sensitive 0.5 ug/mL ELISEO Final   NITROFURANTOIN Sensitive <=16 ug/mL ELISEO Final   Piperacillin/Tazo Sensitive <=4 ug/mL ELISEO Final   TOBRAMYCIN  Sensitive <=1 ug/mL ELISEO Final   Trimethoprim/Sulfa Resistant >=16/304 ug/mL ELISEO Final         ESCHERICHIA COLI   Antibiotic Interpretation Sensitivity Unit Method Status   AMPICILLIN Resistant >=32 ug/mL ELISEO Final   AMPICILLIN/SULBACTAM Resistant >=32 ug/mL ELISEO Final   CEFAZOLIN Resistant >=64 ug/mL ELISEO Final   Comment: Cefazolin ELISEO breakpoints are for the treatment of uncomplicated urinary tract   infections.  For the treatment of systemic infections, please contact the   laboratory for additional testing.   CEFEPIME Sensitive <=1 ug/mL ELISEO Final   CEFOXITIN Resistant 32 ug/mL ELISEO Final   CEFTAZIDIME Sensitive <=1 ug/mL ELISEO Final   CEFTRIAXONE Sensitive <=1 ug/mL ELISEO Final   CIPROFLOXACIN Sensitive <=0.25 ug/mL ELISEO Final   GENTAMICIN Sensitive <=1 ug/mL ELISEO Final   LEVOFLOXACIN Sensitive <=0.12 ug/mL ELISEO Final   NITROFURANTOIN Sensitive <=16 ug/mL ELISEO Final   Piperacillin/Tazo Sensitive 8 ug/mL ELISEO Final   TOBRAMYCIN Sensitive <=1 ug/mL ELISEO Final   Trimethoprim/Sulfa Sensitive <=1/19 ug/mL ELISEO Final

## 2018-01-11 ENCOUNTER — OFFICE VISIT (OUTPATIENT)
Dept: FAMILY MEDICINE | Facility: CLINIC | Age: 33
End: 2018-01-11
Payer: COMMERCIAL

## 2018-01-11 VITALS
HEART RATE: 116 BPM | TEMPERATURE: 97.7 F | BODY MASS INDEX: 22.02 KG/M2 | DIASTOLIC BLOOD PRESSURE: 70 MMHG | OXYGEN SATURATION: 97 % | WEIGHT: 137 LBS | SYSTOLIC BLOOD PRESSURE: 128 MMHG | HEIGHT: 66 IN | RESPIRATION RATE: 18 BRPM

## 2018-01-11 DIAGNOSIS — Z72.0 TOBACCO USE: ICD-10-CM

## 2018-01-11 DIAGNOSIS — R11.0 NAUSEA: ICD-10-CM

## 2018-01-11 DIAGNOSIS — S06.0X0A CONCUSSION WITHOUT LOSS OF CONSCIOUSNESS, INITIAL ENCOUNTER: ICD-10-CM

## 2018-01-11 DIAGNOSIS — M54.2 CERVICALGIA: ICD-10-CM

## 2018-01-11 DIAGNOSIS — Z71.6 ENCOUNTER FOR TOBACCO USE CESSATION COUNSELING: Primary | ICD-10-CM

## 2018-01-11 DIAGNOSIS — T74.91XA DOMESTIC ABUSE OF ADULT, INITIAL ENCOUNTER: ICD-10-CM

## 2018-01-11 PROCEDURE — 99214 OFFICE O/P EST MOD 30 MIN: CPT | Performed by: FAMILY MEDICINE

## 2018-01-11 RX ORDER — OXYCODONE AND ACETAMINOPHEN 5; 325 MG/1; MG/1
1 TABLET ORAL EVERY 8 HOURS PRN
Qty: 18 TABLET | Refills: 0 | Status: SHIPPED | OUTPATIENT
Start: 2018-01-11 | End: 2018-03-20

## 2018-01-11 RX ORDER — ONDANSETRON 8 MG/1
8 TABLET, FILM COATED ORAL EVERY 8 HOURS PRN
Qty: 30 TABLET | Refills: 1 | Status: SHIPPED | OUTPATIENT
Start: 2018-01-11 | End: 2018-02-20

## 2018-01-11 RX ORDER — NICOTINE 21 MG/24HR
1 PATCH, TRANSDERMAL 24 HOURS TRANSDERMAL
COMMUNITY
End: 2018-01-11

## 2018-01-11 RX ORDER — TRAZODONE HYDROCHLORIDE 50 MG/1
50 TABLET, FILM COATED ORAL
COMMUNITY
Start: 2017-12-12 | End: 2018-05-03

## 2018-01-11 RX ORDER — QUETIAPINE FUMARATE 50 MG/1
50-100 TABLET, FILM COATED ORAL AT BEDTIME
COMMUNITY
Start: 2017-12-12 | End: 2018-02-23

## 2018-01-11 RX ORDER — NICOTINE 21 MG/24HR
1 PATCH, TRANSDERMAL 24 HOURS TRANSDERMAL EVERY 24 HOURS
Qty: 30 PATCH | Refills: 0 | Status: SHIPPED | OUTPATIENT
Start: 2018-01-11 | End: 2018-02-21

## 2018-01-11 RX ORDER — QUETIAPINE FUMARATE 25 MG/1
25-50 TABLET, FILM COATED ORAL EVERY 6 HOURS PRN
COMMUNITY
Start: 2017-12-12 | End: 2018-02-23

## 2018-01-11 ASSESSMENT — PAIN SCALES - GENERAL: PAINLEVEL: EXTREME PAIN (8)
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
## 2018-01-11 NOTE — NURSING NOTE
"Chief Complaint   Patient presents with     Nausea       Initial LMP  (LMP Unknown) Estimated body mass index is 22.11 kg/(m^2) as calculated from the following:    Height as of 10/17/17: 5' 6\" (1.676 m).    Weight as of 11/12/17: 137 lb (62.1 kg).  Medication Reconciliation: complete    Health Maintenance that is potentially due pending provider review:  NONE    n/a    Is there anyone who you would like to be able to receive your results? No  If yes have patient fill out JOSÉ    "
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
## 2018-01-17 DIAGNOSIS — F31.64 SEVERE BIPOLAR I DISORDER, MOST RECENT EPISODE MIXED, WITH PSYCHOTIC FEATURES (H): ICD-10-CM

## 2018-01-17 RX ORDER — GABAPENTIN 600 MG/1
1200 TABLET ORAL AT BEDTIME
Qty: 60 TABLET | Refills: 2 | Status: SHIPPED | OUTPATIENT
Start: 2018-01-17 | End: 2018-02-23

## 2018-01-17 NOTE — TELEPHONE ENCOUNTER
<><><> Patient is Requesting this Medication <><><>    <><><> THIS MEDICATION IS NOT ON THE PATIENTS CURRENT MED LIST! PLEASE ADD - AND SEND A NEW RX <><><>          neurontin 100mg    Patient had this from Dr. Ambar Palacio, may be a hospitalist.    Thank you,    Jazmin Nickerson  [facility]  [place], [place]  [phone]

## 2018-02-19 NOTE — LETTER
Mr. Rama Bobo called in stating that they were told by the cardiologist that the order for O2 would need to come from Dr. Jen Irene. That he was unable to. Mr. Rama Bobo would like a phone call back. this lady was seen as patient today at clinic.  Miss work today and then RTW tomorrow.       Ramana Baca

## 2018-02-20 ENCOUNTER — OFFICE VISIT (OUTPATIENT)
Dept: FAMILY MEDICINE | Facility: CLINIC | Age: 33
End: 2018-02-20
Payer: COMMERCIAL

## 2018-02-20 ENCOUNTER — TELEPHONE (OUTPATIENT)
Dept: FAMILY MEDICINE | Facility: CLINIC | Age: 33
End: 2018-02-20

## 2018-02-20 VITALS
HEART RATE: 101 BPM | OXYGEN SATURATION: 99 % | BODY MASS INDEX: 22.43 KG/M2 | WEIGHT: 140 LBS | SYSTOLIC BLOOD PRESSURE: 125 MMHG | TEMPERATURE: 97.3 F | DIASTOLIC BLOOD PRESSURE: 75 MMHG | RESPIRATION RATE: 14 BRPM

## 2018-02-20 DIAGNOSIS — F31.64 SEVERE BIPOLAR I DISORDER, MOST RECENT EPISODE MIXED, WITH PSYCHOTIC FEATURES (H): ICD-10-CM

## 2018-02-20 DIAGNOSIS — Z02.83 ENCOUNTER FOR DRUG SCREENING: Primary | ICD-10-CM

## 2018-02-20 DIAGNOSIS — S06.0X0D CONCUSSION WITHOUT LOSS OF CONSCIOUSNESS, SUBSEQUENT ENCOUNTER: ICD-10-CM

## 2018-02-20 DIAGNOSIS — R11.0 NAUSEA: ICD-10-CM

## 2018-02-20 LAB
ALBUMIN UR-MCNC: NEGATIVE MG/DL
AMORPH CRY #/AREA URNS HPF: ABNORMAL /HPF
APPEARANCE UR: ABNORMAL
BILIRUB UR QL STRIP: NEGATIVE
COLOR UR AUTO: YELLOW
GLUCOSE UR STRIP-MCNC: NEGATIVE MG/DL
HGB UR QL STRIP: NEGATIVE
KETONES UR STRIP-MCNC: NEGATIVE MG/DL
LEUKOCYTE ESTERASE UR QL STRIP: NEGATIVE
NITRATE UR QL: NEGATIVE
PH UR STRIP: 8.5 PH (ref 5–7)
RBC #/AREA URNS AUTO: ABNORMAL /HPF
SOURCE: ABNORMAL
SP GR UR STRIP: 1.02 (ref 1–1.03)
UROBILINOGEN UR STRIP-ACNC: 0.2 EU/DL (ref 0.2–1)
WBC #/AREA URNS AUTO: ABNORMAL /HPF

## 2018-02-20 PROCEDURE — 99214 OFFICE O/P EST MOD 30 MIN: CPT | Performed by: NURSE PRACTITIONER

## 2018-02-20 PROCEDURE — 81001 URINALYSIS AUTO W/SCOPE: CPT | Mod: 59 | Performed by: NURSE PRACTITIONER

## 2018-02-20 PROCEDURE — 80306 DRUG TEST PRSMV INSTRMNT: CPT | Performed by: NURSE PRACTITIONER

## 2018-02-20 RX ORDER — ONDANSETRON 8 MG/1
8 TABLET, FILM COATED ORAL EVERY 8 HOURS PRN
Qty: 30 TABLET | Refills: 1 | Status: SHIPPED | OUTPATIENT
Start: 2018-02-20 | End: 2018-03-20

## 2018-02-20 RX ORDER — LAMOTRIGINE 200 MG/1
200 TABLET ORAL DAILY
Qty: 90 TABLET | Refills: 0 | Status: SHIPPED | OUTPATIENT
Start: 2018-02-20 | End: 2018-03-20

## 2018-02-20 NOTE — TELEPHONE ENCOUNTER
Reason for Call:  Other     Detailed comments: Claudine Garvey [facility] - [phone] fax - [phone] - patient needs to have a Drug Screen done - looking for Meth - Please fax results to Above name.    Phone Number Patient can be reached at:     Best Time:     Can we leave a detailed message on this number? yes    Call taken on 2/20/2018 at 11:00 AM by Caroline Santana
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
## 2018-02-20 NOTE — NURSING NOTE
"Chief Complaint   Patient presents with     Nausea     Headache       Initial /75 (BP Location: Right arm, Cuff Size: Adult Regular)  Pulse 101  Temp 97.3  F (36.3  C) (Tympanic)  Resp 14  Wt 140 lb (63.5 kg)  LMP  (LMP Unknown)  SpO2 99%  BMI 22.43 kg/m2 Estimated body mass index is 22.43 kg/(m^2) as calculated from the following:    Height as of 1/11/18: 5' 6.25\" (1.683 m).    Weight as of this encounter: 140 lb (63.5 kg).      Health Maintenance that is potentially due pending provider review:  Urine Drug screen     Possibly completing today per provider review.    Is there anyone who you would like to be able to receive your results? Not Applicable  If yes have patient fill out JOSÉ Tobin M.A.      "

## 2018-02-20 NOTE — MR AVS SNAPSHOT
After Visit Summary   2/20/2018    Sirena Dietrich    MRN: [id number]           Patient Information     Date Of Birth          1985        Visit Information        Provider Department      2/20/2018 2:20 PM Rocio Alonzo APRN [facility]        Today's Diagnoses     Encounter for drug screening    -  1    Concussion without loss of consciousness, subsequent encounter        Severe bipolar I disorder, most recent episode mixed, with psychotic features        Nausea          Care Instructions    Will have you follow-up with the concussion clinic.    Follow-up with your primary care provider next week and as needed.    Indications for emergent return to emergency department discussed with patient, who verbalized good understanding and agreement.  Patient understands the limitations of today's evaluation.       Concussion Discharge Instructions  You were seen today for signs of a concussion. The symptoms will vary, depending on the nature of your injury and your health. You may have: headache, confusion, nausea (feel sick to your stomach), vomiting (throwing up) and problems with memory, concentrating or sleep. You may feel dizzy, irritable, and tired.   Children and teens may need help from their parents, teachers and coaches to watch for symptoms as they recover.  Follow-up  It is important for you to see a doctor for follow-up care to see how you are recovering. Please see your primary doctor within the next 5 to 7 days. You may have also been referred to the Concussion  service. They will contact you and arrange a follow-up visit if needed. If you need help sooner, you may call them at [phone].  Warning signs  Call your doctor or come back to Emergency if you suddenly have any of these symptoms:    Headaches that get worse    Feeling more and more drowsy    You keep repeating yourself    Strange behavior    Seizures    Repeat vomiting (throwing  "up)    Trouble walking    Growing confusion    Feeling more irritable    Neck pain that gets worse    Slurred speech    Weakness or numbness    Loss of consciousness    Fluid or blood coming from ears or nose  Self-care    Get lots of rest and get enough sleep at night. Take daytime naps or rest if you feel tired.    Limit physical activity and \"thinking\" activities. These can make symptoms worse.    Physical activity includes gym, sports, weight training, running, exercise and heavy lifting.    Thinking activities include homework, class work, job-related work and screen time (phone, computer, tablet, TV and video games).    Stick to a healthy diet and drink lots of fluids.    As symptoms improve, you may slowly return to your daily activities. If symptoms get worse   or return, reduce your activity.    Know that it is normal to feel sad and frustrated when you do not feel right and are less active.  Going back to work    Your care team will tell you when you are ready to return to work.    Limit the amount of work you do soon after your injury. This may speed healing. Take breaks if your symptoms get worse. You should also reduce your physical activity as well as activities that require a lot of thinking until you see your doctor.    You may need shorter work days and a lighter workload.    Avoid heavy lifting, working with machinery, driving and working at heights until your symptoms are gone or you are cleared by a doctor.  Returning to sports    Never return to play if you have any symptoms. A full recovery will reduce the chances of getting hurt again. Remember, it is better to miss one or two games than a whole season.    You should rest from all physical activity until you see your doctor. Generally, if all symptoms have completely cleared, your doctor can help guide you to slowly return to sports. If symptoms return or worsen, stop the activity and see your doctor.    Important: If you are in an organized " "sport and under age 18, you will need written consent from a healthcare provider before you return to sports. Typically, this will be your primary care or sports medicine doctor. Please make an appointment.  Going back to school    If you are still having symptoms, you may need extra help at school.    Tell your teachers and school nurse about your injury and symptoms. Ask them to watch for problems with learning, memory and concentrating. Symptoms may get worse when you do schoolwork, and you may become more irritable.    You may need shorter school days, a reduced workload, and to postpone testing.    Do not drive or take gym class (physical activity) until cleared by a doctor.  For informational purposes only. Not to replace the advice of your health care provider.   2009 [employer]. Used with permission. This form is adapted from the \"Heads Up: Brain Injury in Your Practice\" tool kit developed by the Centers for Disease Control and Prevention (CDC). All rights reserved. [employer] 486807jd - Rev 03/17.    Concussion    A concussion can be caused by a direct blow to the head, neck, face, or somewhere else on the body with the force being transmitted to the head. This may cause you to lose consciousness - be \"knocked out\" - but not always. Depending on the severity of the blow, it will take from a few hours up to a few days to get better. Sometimes symptoms may last a few months or longer. This is called post-concussion syndrome.  At first, you may have a headache, nausea, vomiting, or dizziness. You may also have problems concentrating or remembering things. This is normal.  Symptoms should get better as the hours and days go by. Symptoms that get worse could be a sign of a more serious injury. This might be a bruise or bleeding in the brain. That s why it s important to watch for the warning signs listed below.  Home care  If your injury is mild and " there are no serious signs or symptoms, your healthcare provider may recommend that you be monitored at home. If there is evidence that the injury is more serious, you will be monitored in the hospital. Follow these tips to help care for yourself at home:    After a concussion, your healthcare provider may recommend that a family member or friend monitor you for 12 to 24 hours. They may be told to wake you every few hours during sleep to check for the signs below.    If your face or scalp swells, apply an ice pack for 20 minutes every 1 to 2 hours. Do this until the swelling starts to go down. You can make an ice pack by putting ice cubes in a plastic bag and wrapping the bag in a towel.    You may use acetaminophen to control pain, unless another pain medicine was prescribed. Do not use aspirin or ibuprofen after a head injury. If you have chronic liver or kidney disease, talk with your doctor before using these medicines. Also talk with your doctor if you ever had a stomach ulcer or gastrointestinal bleeding.    For the next 24 hours:    Don t drink alcohol or take sedatives or medicines that make you sleepy.    Don t drive or operate machinery.    Avoid doing anything strenuous. Don t lift or strain.    Don t return to sports or any activity that could cause you to hit your head until all symptoms are gone and you have been cleared by your doctor. A second head injury before fully recovering from the first one can lead to serious brain injury.    Avoid doing activities that require a lot of concentration or a lot of attention. This will allow your brain to rest and heal quicker.  Follow-up care  Follow up with your doctor in 1 week, or as directed.  Note: A radiologist will review any X-rays or CT scans that were taken. You will be told of any new findings that may affect your care.  When to seek medical advice  Call your healthcare provider right away if any of these occur:    Repeated vomiting    Headache or  dizziness that is severe or gets worse    Loss of consciousness    Unusual drowsiness, or unable to wake up as usual    Weakness or decreased ability to walk or move any limb    Confusion, agitation, or change in behavior or speech, or memory loss    Blurred vision    Convulsion (seizure)    Swelling on the scalp or face that gets worse    Changes in pupil size (the black part of the eye)    Redness, warmth, or pus from the swollen area    Fluid draining from or bleeding from the nose or ears     Date Last Reviewed: 8/14/2015 2000-2017 [employer]. [address] [place] [address]. All rights reserved. This information is not intended as a substitute for professional medical care. Always follow your healthcare professional's instructions.                Follow-ups after your visit        Additional Services     CONCUSSION  REFERRAL       You have been referred to [facility] Concussion  service.    The  Representative will assist you in the coordination of your concussion care as prescribed by your provider.    The  Representative will contact you within one business day, or you may contact the  Representative at [phone].    Referral Options:  Non-Sports related concussion management    Coverage of these services are subject to the terms and limitations of your health insurance plan.  Please call member services at your health plan with any benefit or coverage questions.     If X-rays, CT or MRI's have been performed, please contact the facility where they were done, to arrange for  prior to your scheduled appointment.  Please bring this referral request to your appointment and present it to your specialist.                  Who to contact     If you have questions or need follow up information about today's clinic visit or your schedule please contact [facility] directly at [phone].  Normal or non-critical  lab and imaging results will be communicated to you by MyChart, letter or phone within 4 business days after the clinic has received the results. If you do not hear from us within 7 days, please contact the clinic through [employer] or phone. If you have a critical or abnormal lab result, we will notify you by phone as soon as possible.  Submit refill requests through [employer] or call your pharmacy and they will forward the refill request to us. Please allow 3 business days for your refill to be completed.          Additional Information About Your Visit        [employer] Information     [employer] gives you secure access to your electronic health record. If you see a primary care provider, you can also send messages to your care team and make appointments. If you have questions, please call your primary care clinic.  If you do not have a primary care provider, please call [phone] and they will assist you.        Care EveryWhere ID     This is your Care EveryWhere ID. This could be used by other organizations to access your [facility] medical records  [id number]        Your Vitals Were     Pulse Temperature Respirations Last Period Pulse Oximetry BMI (Body Mass Index)    101 97.3  F (36.3  C) (Tympanic) 14 (LMP Unknown) 99% 22.43 kg/m2       Blood Pressure from Last 3 Encounters:   02/20/18 125/75   01/11/18 128/70   01/03/18 126/86    Weight from Last 3 Encounters:   02/20/18 140 lb (63.5 kg)   01/11/18 137 lb (62.1 kg)   11/12/17 137 lb (62.1 kg)              We Performed the Following     CONCUSSION  REFERRAL     Drug Abuse Screen Panel 13, Urine (Pain Care Package)     UA reflex to Microscopic and Culture     Urine Microscopic          Where to get your medicines      These medications [place] [facility] [place] - [address] [place] [address] [address] , [facility] [address]     Phone:  [phone]     lamoTRIgine 200 MG tablet    ondansetron 8 MG tablet          Primary Care Provider  Office Phone # Fax #    DIPESH Marino [phone]       [address] [facility] [address]        Goals        General    I will attend counseling appointment (pt-stated)     Notes - Note edited  11/25/2015  1:35 PM by Shi Armstrong LSW    As of today's date 11/25/2015 goal is met at 76 - 100%.   Goal Status:  Complete - will keep to maintain for  A few month  As of today's date 7/28/2015 goal is met at 26 - 50%.   Goal Status:  Active          Equal Access to Services     : Hadii aad ku hadasho Soomaali, waaxda luqadaha, qaybta kaalmada adeegyada, raheem day . So [facility] [phone].    ATENCIÓN: Si habla español, tiene a borja disposición servicios gratuitos de asistencia lingüística. Llame al [phone].    We comply with applicable federal civil rights laws and [place] laws. We do not discriminate on the basis of race, color, national origin, age, disability, sex, sexual orientation, or gender identity.            Thank you!     Thank you for choosing [facility]  for your care. Our goal is always to provide you with excellent care. Hearing back from our patients is one way we can continue to improve our services. Please take a few minutes to complete the written survey that you may receive in the mail after your visit with us. Thank you!             Your Updated Medication List - Protect others around you: Learn how to safely use, store and throw away your medicines at www.disposemymeds.org.          This list is accurate as of 2/20/18  3:07 PM.  Always use your most recent med list.                   Brand Name Dispense Instructions for use Diagnosis    acyclovir 5 % cream    ZOVIRAX    5 g    Apply topically 5 times daily    Recurrent cold sores       gabapentin 600 MG tablet    NEURONTIN    60 tablet    Take 2 tablets (1,200 mg) by mouth At Bedtime    Severe bipolar I disorder, most recent episode mixed, with  psychotic features (H)       lamoTRIgine 200 MG tablet    LaMICtal    90 tablet    Take 1 tablet (200 mg) by mouth daily    Severe bipolar I disorder, most recent episode mixed, with psychotic features (H)       lidocaine HCl 3 % cream     453.6 g    Apply topically 3 times daily    Yeast infection of the vagina       nicotine 14 MG/24HR 24 hr patch    NICODERM CQ    30 patch    Place 1 patch onto the skin every 24 hours    Encounter for tobacco use cessation counseling       ondansetron 8 MG tablet    ZOFRAN    30 tablet    Take 1 tablet (8 mg) by mouth every 8 hours as needed for nausea    Concussion without loss of consciousness, subsequent encounter       oxyCODONE-acetaminophen 5-325 MG per tablet    PERCOCET    18 tablet    Take 1 tablet by mouth every 8 hours as needed for pain    Concussion without loss of consciousness, initial encounter       * QUEtiapine 25 MG tablet    SEROquel     Take 25-50 mg by mouth daily as needed        * QUEtiapine 50 MG tablet    SEROquel     Take  mg by mouth At Bedtime        SUMAtriptan 6 MG/0.5ML pen injector kit    IMITREX STATDOSE    3 kit    Inject 0.5 mLs (6 mg) Subcutaneous at onset of headache for migraine    Migraine without aura and with status migrainosus, not intractable       traZODone 50 MG tablet    DESYREL     Take 50 mg by mouth nightly as needed        * Notice:  This list has 2 medication(s) that are the same as other medications prescribed for you. Read the directions carefully, and ask your doctor or other care provider to review them with you.

## 2018-02-20 NOTE — PATIENT INSTRUCTIONS
"Will have you follow-up with the concussion clinic.    Follow-up with your primary care provider next week and as needed.    Indications for emergent return to emergency department discussed with patient, who verbalized good understanding and agreement.  Patient understands the limitations of today's evaluation.       Concussion Discharge Instructions  You were seen today for signs of a concussion. The symptoms will vary, depending on the nature of your injury and your health. You may have: headache, confusion, nausea (feel sick to your stomach), vomiting (throwing up) and problems with memory, concentrating or sleep. You may feel dizzy, irritable, and tired.   Children and teens may need help from their parents, teachers and coaches to watch for symptoms as they recover.  Follow-up  It is important for you to see a doctor for follow-up care to see how you are recovering. Please see your primary doctor within the next 5 to 7 days. You may have also been referred to the Concussion  service. They will contact you and arrange a follow-up visit if needed. If you need help sooner, you may call them at [phone].  Warning signs  Call your doctor or come back to Emergency if you suddenly have any of these symptoms:    Headaches that get worse    Feeling more and more drowsy    You keep repeating yourself    Strange behavior    Seizures    Repeat vomiting (throwing up)    Trouble walking    Growing confusion    Feeling more irritable    Neck pain that gets worse    Slurred speech    Weakness or numbness    Loss of consciousness    Fluid or blood coming from ears or nose  Self-care    Get lots of rest and get enough sleep at night. Take daytime naps or rest if you feel tired.    Limit physical activity and \"thinking\" activities. These can make symptoms worse.    Physical activity includes gym, sports, weight training, running, exercise and heavy lifting.    Thinking activities include homework, class work, " job-related work and screen time (phone, computer, tablet, TV and video games).    Stick to a healthy diet and drink lots of fluids.    As symptoms improve, you may slowly return to your daily activities. If symptoms get worse   or return, reduce your activity.    Know that it is normal to feel sad and frustrated when you do not feel right and are less active.  Going back to work    Your care team will tell you when you are ready to return to work.    Limit the amount of work you do soon after your injury. This may speed healing. Take breaks if your symptoms get worse. You should also reduce your physical activity as well as activities that require a lot of thinking until you see your doctor.    You may need shorter work days and a lighter workload.    Avoid heavy lifting, working with machinery, driving and working at heights until your symptoms are gone or you are cleared by a doctor.  Returning to sports    Never return to play if you have any symptoms. A full recovery will reduce the chances of getting hurt again. Remember, it is better to miss one or two games than a whole season.    You should rest from all physical activity until you see your doctor. Generally, if all symptoms have completely cleared, your doctor can help guide you to slowly return to sports. If symptoms return or worsen, stop the activity and see your doctor.    Important: If you are in an organized sport and under age 18, you will need written consent from a healthcare provider before you return to sports. Typically, this will be your primary care or sports medicine doctor. Please make an appointment.  Going back to school    If you are still having symptoms, you may need extra help at school.    Tell your teachers and school nurse about your injury and symptoms. Ask them to watch for problems with learning, memory and concentrating. Symptoms may get worse when you do schoolwork, and you may become more irritable.    You may need shorter  "school days, a reduced workload, and to postpone testing.    Do not drive or take gym class (physical activity) until cleared by a doctor.  For informational purposes only. Not to replace the advice of your health care provider.   2009 [employer]. Used with permission. This form is adapted from the \"Heads Up: Brain Injury in Your Practice\" tool kit developed by the Centers for Disease Control and Prevention (CDC). All rights reserved. [employer]. [employer] [id number] - Rev 03/17.    Concussion    A concussion can be caused by a direct blow to the head, neck, face, or somewhere else on the body with the force being transmitted to the head. This may cause you to lose consciousness - be \"knocked out\" - but not always. Depending on the severity of the blow, it will take from a few hours up to a few days to get better. Sometimes symptoms may last a few months or longer. This is called post-concussion syndrome.  At first, you may have a headache, nausea, vomiting, or dizziness. You may also have problems concentrating or remembering things. This is normal.  Symptoms should get better as the hours and days go by. Symptoms that get worse could be a sign of a more serious injury. This might be a bruise or bleeding in the brain. That s why it s important to watch for the warning signs listed below.  Home care  If your injury is mild and there are no serious signs or symptoms, your healthcare provider may recommend that you be monitored at home. If there is evidence that the injury is more serious, you will be monitored in the hospital. Follow these tips to help care for yourself at home:    After a concussion, your healthcare provider may recommend that a family member or friend monitor you for 12 to 24 hours. They may be told to wake you every few hours during sleep to check for the signs below.    If your face or scalp swells, apply an ice pack for 20 minutes every 1 to 2 " hours. Do this until the swelling starts to go down. You can make an ice pack by putting ice cubes in a plastic bag and wrapping the bag in a towel.    You may use acetaminophen to control pain, unless another pain medicine was prescribed. Do not use aspirin or ibuprofen after a head injury. If you have chronic liver or kidney disease, talk with your doctor before using these medicines. Also talk with your doctor if you ever had a stomach ulcer or gastrointestinal bleeding.    For the next 24 hours:    Don t drink alcohol or take sedatives or medicines that make you sleepy.    Don t drive or operate machinery.    Avoid doing anything strenuous. Don t lift or strain.    Don t return to sports or any activity that could cause you to hit your head until all symptoms are gone and you have been cleared by your doctor. A second head injury before fully recovering from the first one can lead to serious brain injury.    Avoid doing activities that require a lot of concentration or a lot of attention. This will allow your brain to rest and heal quicker.  Follow-up care  Follow up with your doctor in 1 week, or as directed.  Note: A radiologist will review any X-rays or CT scans that were taken. You will be told of any new findings that may affect your care.  When to seek medical advice  Call your healthcare provider right away if any of these occur:    Repeated vomiting    Headache or dizziness that is severe or gets worse    Loss of consciousness    Unusual drowsiness, or unable to wake up as usual    Weakness or decreased ability to walk or move any limb    Confusion, agitation, or change in behavior or speech, or memory loss    Blurred vision    Convulsion (seizure)    Swelling on the scalp or face that gets worse    Changes in pupil size (the black part of the eye)    Redness, warmth, or pus from the swollen area    Fluid draining from or bleeding from the nose or ears     Date Last Reviewed: 8/14/2015 2000-2017 [employer]  [employer]. [address] [place] [address], [place] [address]. All rights reserved. This information is not intended as a substitute for professional medical care. Always follow your healthcare professional's instructions.

## 2018-02-21 ENCOUNTER — HOSPITAL ENCOUNTER (EMERGENCY)
Facility: CLINIC | Age: 33
Discharge: HOME | End: 2018-02-21
Attending: EMERGENCY MEDICINE | Admitting: EMERGENCY MEDICINE
Payer: COMMERCIAL

## 2018-02-21 VITALS
SYSTOLIC BLOOD PRESSURE: 112 MMHG | OXYGEN SATURATION: 99 % | RESPIRATION RATE: 20 BRPM | DIASTOLIC BLOOD PRESSURE: 69 MMHG | TEMPERATURE: 97.9 F

## 2018-02-21 DIAGNOSIS — R51.9 ACUTE NONINTRACTABLE HEADACHE, UNSPECIFIED HEADACHE TYPE: ICD-10-CM

## 2018-02-21 DIAGNOSIS — J01.90 ACUTE SINUSITIS, RECURRENCE NOT SPECIFIED, UNSPECIFIED LOCATION: ICD-10-CM

## 2018-02-21 LAB
AMPHETAMINES UR QL: NOT DETECTED NG/ML
ANION GAP SERPL CALCULATED.3IONS-SCNC: 5 MMOL/L (ref 3–14)
BARBITURATES UR QL SCN: NOT DETECTED NG/ML
BASOPHILS # BLD AUTO: 0 10E9/L (ref 0–0.2)
BASOPHILS NFR BLD AUTO: 0.2 %
BENZODIAZ UR QL SCN: NOT DETECTED NG/ML
BUN SERPL-MCNC: 11 MG/DL (ref 7–30)
BUPRENORPHINE UR QL: NOT DETECTED NG/ML
CALCIUM SERPL-MCNC: 8.1 MG/DL (ref 8.5–10.1)
CANNABINOIDS UR QL: NOT DETECTED NG/ML
CHLORIDE SERPL-SCNC: 106 MMOL/L (ref 94–109)
CO2 SERPL-SCNC: 29 MMOL/L (ref 20–32)
COCAINE UR QL SCN: NOT DETECTED NG/ML
CREAT SERPL-MCNC: 0.52 MG/DL (ref 0.52–1.04)
D-METHAMPHET UR QL: NOT DETECTED NG/ML
DIFFERENTIAL METHOD BLD: ABNORMAL
EOSINOPHIL # BLD AUTO: 0.4 10E9/L (ref 0–0.7)
EOSINOPHIL NFR BLD AUTO: 2.7 %
ERYTHROCYTE [DISTWIDTH] IN BLOOD BY AUTOMATED COUNT: 12.4 % (ref 10–15)
GFR SERPL CREATININE-BSD FRML MDRD: >90 ML/MIN/1.7M2
GLUCOSE SERPL-MCNC: 85 MG/DL (ref 70–99)
HCG SERPL QL: NEGATIVE
HCT VFR BLD AUTO: 42.8 % (ref 35–47)
HGB BLD-MCNC: 14.6 G/DL (ref 11.7–15.7)
IMM GRANULOCYTES # BLD: 0.1 10E9/L (ref 0–0.4)
IMM GRANULOCYTES NFR BLD: 0.5 %
LYMPHOCYTES # BLD AUTO: 1.3 10E9/L (ref 0.8–5.3)
LYMPHOCYTES NFR BLD AUTO: 9.5 %
MCH RBC QN AUTO: 30.3 PG (ref 26.5–33)
MCHC RBC AUTO-ENTMCNC: 34.1 G/DL (ref 31.5–36.5)
MCV RBC AUTO: 89 FL (ref 78–100)
METHADONE UR QL SCN: NOT DETECTED NG/ML
MONOCYTES # BLD AUTO: 0.9 10E9/L (ref 0–1.3)
MONOCYTES NFR BLD AUTO: 6.5 %
NEUTROPHILS # BLD AUTO: 10.7 10E9/L (ref 1.6–8.3)
NEUTROPHILS NFR BLD AUTO: 80.6 %
OPIATES UR QL SCN: NOT DETECTED NG/ML
OXYCODONE UR QL SCN: NOT DETECTED NG/ML
PCP UR QL SCN: NOT DETECTED NG/ML
PLATELET # BLD AUTO: 268 10E9/L (ref 150–450)
POTASSIUM SERPL-SCNC: 4.1 MMOL/L (ref 3.4–5.3)
PROPOXYPH UR QL: NOT DETECTED NG/ML
RBC # BLD AUTO: 4.82 10E12/L (ref 3.8–5.2)
SODIUM SERPL-SCNC: 140 MMOL/L (ref 133–144)
TRICYCLICS UR QL SCN: NOT DETECTED NG/ML
WBC # BLD AUTO: 13.2 10E9/L (ref 4–11)

## 2018-02-21 PROCEDURE — 84703 CHORIONIC GONADOTROPIN ASSAY: CPT | Performed by: EMERGENCY MEDICINE

## 2018-02-21 PROCEDURE — 85025 COMPLETE CBC W/AUTO DIFF WBC: CPT | Performed by: EMERGENCY MEDICINE

## 2018-02-21 PROCEDURE — 25000128 H RX IP 250 OP 636: Performed by: EMERGENCY MEDICINE

## 2018-02-21 PROCEDURE — 80048 BASIC METABOLIC PNL TOTAL CA: CPT | Performed by: EMERGENCY MEDICINE

## 2018-02-21 PROCEDURE — 96361 HYDRATE IV INFUSION ADD-ON: CPT | Performed by: EMERGENCY MEDICINE

## 2018-02-21 PROCEDURE — 96375 TX/PRO/DX INJ NEW DRUG ADDON: CPT | Performed by: EMERGENCY MEDICINE

## 2018-02-21 PROCEDURE — 99284 EMERGENCY DEPT VISIT MOD MDM: CPT | Mod: 25 | Performed by: EMERGENCY MEDICINE

## 2018-02-21 PROCEDURE — 99284 EMERGENCY DEPT VISIT MOD MDM: CPT | Mod: Z6 | Performed by: EMERGENCY MEDICINE

## 2018-02-21 PROCEDURE — 96374 THER/PROPH/DIAG INJ IV PUSH: CPT | Performed by: EMERGENCY MEDICINE

## 2018-02-21 RX ORDER — ONDANSETRON 4 MG/1
4 TABLET, ORALLY DISINTEGRATING ORAL ONCE
Status: DISCONTINUED | OUTPATIENT
Start: 2018-02-21 | End: 2018-02-21 | Stop reason: HOSPADM

## 2018-02-21 RX ORDER — METOCLOPRAMIDE HYDROCHLORIDE 5 MG/ML
10 INJECTION INTRAMUSCULAR; INTRAVENOUS ONCE
Status: COMPLETED | OUTPATIENT
Start: 2018-02-21 | End: 2018-02-21

## 2018-02-21 RX ORDER — KETOROLAC TROMETHAMINE 30 MG/ML
30 INJECTION, SOLUTION INTRAMUSCULAR; INTRAVENOUS ONCE
Status: COMPLETED | OUTPATIENT
Start: 2018-02-21 | End: 2018-02-21

## 2018-02-21 RX ORDER — ONDANSETRON 2 MG/ML
4 INJECTION INTRAMUSCULAR; INTRAVENOUS ONCE
Status: DISCONTINUED | OUTPATIENT
Start: 2018-02-21 | End: 2018-02-21 | Stop reason: HOSPADM

## 2018-02-21 RX ORDER — PROCHLORPERAZINE MALEATE 5 MG
10 TABLET ORAL EVERY 6 HOURS PRN
Qty: 20 TABLET | Refills: 0 | Status: SHIPPED | OUTPATIENT
Start: 2018-02-21 | End: 2018-02-23

## 2018-02-21 RX ORDER — DIPHENHYDRAMINE HYDROCHLORIDE 50 MG/ML
25 INJECTION INTRAMUSCULAR; INTRAVENOUS ONCE
Status: DISCONTINUED | OUTPATIENT
Start: 2018-02-21 | End: 2018-02-21 | Stop reason: HOSPADM

## 2018-02-21 RX ORDER — DOXYCYCLINE 100 MG/1
100 CAPSULE ORAL 2 TIMES DAILY
Qty: 20 CAPSULE | Refills: 0 | Status: SHIPPED | OUTPATIENT
Start: 2018-02-21 | End: 2018-03-03

## 2018-02-21 RX ADMIN — SODIUM CHLORIDE 1000 ML: 9 INJECTION, SOLUTION INTRAVENOUS at 10:36

## 2018-02-21 RX ADMIN — METOCLOPRAMIDE 10 MG: 5 INJECTION, SOLUTION INTRAMUSCULAR; INTRAVENOUS at 10:36

## 2018-02-21 RX ADMIN — KETOROLAC TROMETHAMINE 30 MG: 30 INJECTION, SOLUTION INTRAMUSCULAR at 10:36

## 2018-02-21 NOTE — ED NOTES
Patient complains of a headache and nausea for a couple days, she states that she has vomited also. Rates headache a 3/10, took tylenol this am, has a history of a concussion this past October. VSS.

## 2018-02-21 NOTE — ED AVS SNAPSHOT
[facility] Emergency Department    [address] [facility] [address] [address]    Phone:  [phone]    Fax:  [phone]                                       Sirena Dietrich   MRN: [id number]    Department:  [facility] Emergency Department   Date of Visit:  2/21/2018           After Visit Summary Signature Page     I have received my discharge instructions, and my questions have been answered. I have discussed any challenges I see with this plan with the nurse or doctor.    ..........................................................................................................................................  Patient/Patient Representative Signature      ..........................................................................................................................................  Patient Representative Print Name and Relationship to Patient    ..................................................               ................................................  Date                                            Time    ..........................................................................................................................................  Reviewed by Signature/Title    ...................................................              ..............................................  Date                                                            Time
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
## 2018-02-21 NOTE — ED AVS SNAPSHOT
[facility] Emergency Department    [address] [facility] [address]    Phone:  [phone]    Fax:  [phone]                                       Sirena Dietrich   MRN: [id number]    Department:  [facility] Emergency Department   Date of Visit:  2/21/2018           Patient Information     Date Of Birth          1985        Your diagnoses for this visit were:     Acute nonintractable headache, unspecified headache type     Acute sinusitis, recurrence not specified, unspecified location        You were seen by Ivan Bradshaw MD.      Follow-up Information     Follow up with Shi Martinez APRN CNP In 1 week.    Specialty:  Family Practice    Why:  For re-evaluation if symptoms not resolving    Contact information:    [address] [place] [id number] [phone]          Follow up with [facility] Emergency [facility].    Specialty:  EMERGENCY MEDICINE    Why:  If symptoms worsen, or for new problems or concerns.    Contact information:    [address] [place] [address] [phone]    Additional information:    The medical center is located at   [address] [facility] ([address]   [facility] [address] in [place], [place] miles north   of [place]).      Discharge References/Attachments     HEADACHES, SELF-CARE FOR (ENGLISH)    HEADACHES, SINUS (ENGLISH)    SINUSITIS, ACUTE (ENGLISH)    SINUSITIS (ANTIBIOTIC TREATMENT) (ENGLISH)    SINUSITIS, SELF-CARE FOR  (ENGLISH)      24 Hour Appointment Hotline       To make an appointment at any [facility] clinic, call [phone] ([phone]). If you don't have a family doctor or clinic, we will help you find one. [facility] clinics are conveniently located to serve the needs of you and your family.             Review of your medicines      START taking        Dose / Directions Last dose taken    doxycycline 100 MG capsule   Commonly known as:  VIBRAMYCIN   Dose:  100 mg   Quantity:  20 capsule        Take 1 capsule  (100 mg) by mouth 2 times daily for 10 days (antibiotic).   Refills:  0        prochlorperazine 5 MG tablet   Commonly known as:  COMPAZINE   Dose:  10 mg   Quantity:  20 tablet        Take 2 tablets (10 mg) by mouth every 6 hours as needed for nausea or vomiting   Refills:  0          Our records show that you are taking the medicines listed below. If these are incorrect, please call your family doctor or clinic.        Dose / Directions Last dose taken    * GABAPENTIN PO   Dose:  300 mg        Take 300 mg by mouth 2 times daily   Refills:  0        * gabapentin 600 MG tablet   Commonly known as:  NEURONTIN   Dose:  1200 mg   Quantity:  60 tablet        Take 2 tablets (1,200 mg) by mouth At Bedtime   Refills:  2        lamoTRIgine 200 MG tablet   Commonly known as:  LaMICtal   Dose:  200 mg   Quantity:  90 tablet        Take 1 tablet (200 mg) by mouth daily   Refills:  0        lidocaine HCl 3 % cream   Quantity:  453.6 g        Apply topically 3 times daily   Refills:  0        ondansetron 8 MG tablet   Commonly known as:  ZOFRAN   Dose:  8 mg   Quantity:  30 tablet        Take 1 tablet (8 mg) by mouth every 8 hours as needed for nausea   Refills:  1        oxyCODONE-acetaminophen 5-325 MG per tablet   Commonly known as:  PERCOCET   Dose:  1 tablet   Quantity:  18 tablet        Take 1 tablet by mouth every 8 hours as needed for pain   Refills:  0        * QUEtiapine 25 MG tablet   Commonly known as:  SEROquel   Dose:  25-50 mg        Take 25-50 mg by mouth every 6 hours as needed   Refills:  0        * QUEtiapine 50 MG tablet   Commonly known as:  SEROquel   Dose:   mg        Take  mg by mouth At Bedtime   Refills:  0        SUMAtriptan 6 MG/0.5ML pen injector kit   Commonly known as:  IMITREX STATDOSE   Dose:  6 mg   Quantity:  3 kit        Inject 0.5 mLs (6 mg) Subcutaneous at onset of headache for migraine   Refills:  3        traZODone 50 MG tablet   Commonly known as:  DESYREL   Dose:  50 mg         Take 50 mg by mouth nightly as needed   Refills:  0        * Notice:  This list has 4 medication(s) that are the same as other medications prescribed for you. Read the directions carefully, and ask your doctor or other care provider to review them with you.            Prescriptions were sent or printed at these locations (2 Prescriptions)                   [facility] [place], [place] - [address] [address]   [address] [facility] [address]    Telephone:  [phone]   Fax:  [phone]   Hours:                  E-Prescribed (2 of 2)         prochlorperazine (COMPAZINE) 5 MG tablet               doxycycline (VIBRAMYCIN) 100 MG capsule                Procedures and tests performed during your visit     Basic metabolic panel    CBC with platelets, differential    HCG qualitative pregnancy (blood)      Orders Needing Specimen Collection     None      Pending Results     Date and Time Order Name Status Description    2/20/2018 1432 URINE DRUGS OF ABUSE SCREEN PANEL 13 In process             Pending Culture Results     Date and Time Order Name Status Description    2/20/2018 1432 URINE DRUGS OF ABUSE SCREEN PANEL 13 In process             Pending Results Instructions     If you had any lab results that were not finalized at the time of your Discharge, you can call the ED Lab Result RN at [phone]. You will be contacted by this team for any positive Lab results or changes in treatment. The nurses are available 7 days a week from 10A to 6:30P.  You can leave a message 24 hours per day and they will return your call.        Test Results From Your Hospital Stay        2/21/2018 10:59 AM      Component Results     Component Value Ref Range & Units Status    WBC 13.2 (H) 4.0 - 11.0 10e9/L Final    RBC Count 4.82 3.8 - 5.2 10e12/L Final    Hemoglobin 14.6 11.7 - 15.7 g/dL Final    Hematocrit 42.8 35.0 - 47.0 % Final    MCV 89 78 - 100 fl Final    MCH 30.3 26.5 - 33.0 pg Final    MCHC 34.1 31.5 - 36.5 g/dL  Final    RDW 12.4 10.0 - 15.0 % Final    Platelet Count 268 150 - 450 10e9/L Final    Diff Method Automated Method  Final    % Neutrophils 80.6 % Final    % Lymphocytes 9.5 % Final    % Monocytes 6.5 % Final    % Eosinophils 2.7 % Final    % Basophils 0.2 % Final    % Immature Granulocytes 0.5 % Final    Absolute Neutrophil 10.7 (H) 1.6 - 8.3 10e9/L Final    Absolute Lymphocytes 1.3 0.8 - 5.3 10e9/L Final    Absolute Monocytes 0.9 0.0 - 1.3 10e9/L Final    Absolute Eosinophils 0.4 0.0 - 0.7 10e9/L Final    Absolute Basophils 0.0 0.0 - 0.2 10e9/L Final    Abs Immature Granulocytes 0.1 0 - 0.4 10e9/L Final         2/21/2018 11:07 AM      Component Results     Component Value Ref Range & Units Status    Sodium 140 133 - 144 mmol/L Final    Potassium 4.1 3.4 - 5.3 mmol/L Final    Chloride 106 94 - 109 mmol/L Final    Carbon Dioxide 29 20 - 32 mmol/L Final    Anion Gap 5 3 - 14 mmol/L Final    Glucose 85 70 - 99 mg/dL Final    Urea Nitrogen 11 7 - 30 mg/dL Final    Creatinine 0.52 0.52 - 1.04 mg/dL Final    GFR Estimate >90 >60 mL/min/1.7m2 Final    Non  GFR Calc    GFR Estimate If Black >90 >60 mL/min/1.7m2 Final    African American GFR Calc    Calcium 8.1 (L) 8.5 - 10.1 mg/dL Final         2/21/2018 11:04 AM      Component Results     Component Value Ref Range & Units Status    HCG Qualitative Serum Negative NEG^Negative Final    This test is for screening purposes.  Results should be interpreted along with   the clinical picture.  Confirmation testing is available if warranted by   ordering [id number], HCG Quantitative Pregnancy.                  Thank you for choosing [facility]       Thank you for choosing [facility] for your care. Our goal is always to provide you with excellent care. Hearing back from our patients is one way we can continue to improve our services. Please take a few minutes to complete the written survey that you may receive in the mail after you visit with us. Thank you!        [facility]  Information     [employer] gives you secure access to your electronic health record. If you see a primary care provider, you can also send messages to your care team and make appointments. If you have questions, please call your primary care clinic.  If you do not have a primary care provider, please call [phone] and they will assist you.        Care EveryWhere ID     This is your Care EveryWhere ID. This could be used by other organizations to access your [facility] medical records  [id number]        Equal Access to Services     SAMRA KAPLAN : Hadii aad ku hadasho Sobrittonali, waaxda luqadaha, qaybta kaalmada adeegyavicky, raheem day . So [facility] [phone].    ATENCIÓN: Si habla español, tiene a borja disposición servicios gratuitos de asistencia lingüística. Llame al [phone].    We comply with applicable federal civil rights laws and [place] laws. We do not discriminate on the basis of race, color, national origin, age, disability, sex, sexual orientation, or gender identity.            After Visit Summary       This is your record. Keep this with you and show to your community pharmacist(s) and doctor(s) at your next visit.

## 2018-02-23 ENCOUNTER — OFFICE VISIT (OUTPATIENT)
Dept: FAMILY MEDICINE | Facility: CLINIC | Age: 33
End: 2018-02-23
Payer: COMMERCIAL

## 2018-02-23 VITALS
HEART RATE: 90 BPM | DIASTOLIC BLOOD PRESSURE: 68 MMHG | OXYGEN SATURATION: 99 % | WEIGHT: 140 LBS | RESPIRATION RATE: 14 BRPM | TEMPERATURE: 97.3 F | HEIGHT: 66 IN | SYSTOLIC BLOOD PRESSURE: 112 MMHG | BODY MASS INDEX: 22.5 KG/M2

## 2018-02-23 DIAGNOSIS — F31.64 SEVERE BIPOLAR I DISORDER, MOST RECENT EPISODE MIXED, WITH PSYCHOTIC FEATURES (H): ICD-10-CM

## 2018-02-23 DIAGNOSIS — R11.0 NAUSEA: Primary | ICD-10-CM

## 2018-02-23 DIAGNOSIS — J34.89 NASAL SORE: ICD-10-CM

## 2018-02-23 DIAGNOSIS — B37.31 CANDIDIASIS OF VULVA AND VAGINA: ICD-10-CM

## 2018-02-23 PROCEDURE — 99214 OFFICE O/P EST MOD 30 MIN: CPT | Performed by: NURSE PRACTITIONER

## 2018-02-23 RX ORDER — QUETIAPINE FUMARATE 25 MG/1
25-50 TABLET, FILM COATED ORAL EVERY 6 HOURS PRN
Qty: 60 TABLET | Refills: 0 | Status: SHIPPED | OUTPATIENT
Start: 2018-02-23 | End: 2018-09-27

## 2018-02-23 RX ORDER — GABAPENTIN 600 MG/1
1200 TABLET ORAL AT BEDTIME
Qty: 60 TABLET | Refills: 0 | Status: SHIPPED | OUTPATIENT
Start: 2018-02-23 | End: 2018-10-16

## 2018-02-23 RX ORDER — PROCHLORPERAZINE MALEATE 5 MG
10 TABLET ORAL EVERY 6 HOURS PRN
Qty: 20 TABLET | Refills: 0 | Status: SHIPPED | OUTPATIENT
Start: 2018-02-23 | End: 2018-03-11

## 2018-02-23 RX ORDER — MUPIROCIN 20 MG/G
OINTMENT TOPICAL 3 TIMES DAILY
Qty: 22 G | Refills: 1 | Status: SHIPPED | OUTPATIENT
Start: 2018-02-23 | End: 2018-02-28

## 2018-02-23 RX ORDER — QUETIAPINE FUMARATE 50 MG/1
100 TABLET, FILM COATED ORAL AT BEDTIME
Qty: 60 TABLET | Refills: 0 | Status: SHIPPED | OUTPATIENT
Start: 2018-02-23 | End: 2018-03-20

## 2018-02-23 RX ORDER — FLUCONAZOLE 150 MG/1
150 TABLET ORAL ONCE
Qty: 1 TABLET | Refills: 0 | Status: SHIPPED | OUTPATIENT
Start: 2018-02-23 | End: 2018-02-23

## 2018-02-23 RX ORDER — LAMOTRIGINE 100 MG/1
TABLET ORAL
Qty: 30 TABLET | Refills: 0 | Status: SHIPPED | OUTPATIENT
Start: 2018-02-23 | End: 2018-05-03 | Stop reason: DRUGHIGH

## 2018-02-23 ASSESSMENT — PAIN SCALES - GENERAL: PAINLEVEL: NO PAIN (0)

## 2018-02-23 ASSESSMENT — ANXIETY QUESTIONNAIRES
GAD7 TOTAL SCORE: 21
3. WORRYING TOO MUCH ABOUT DIFFERENT THINGS: NEARLY EVERY DAY
5. BEING SO RESTLESS THAT IT IS HARD TO SIT STILL: NEARLY EVERY DAY
7. FEELING AFRAID AS IF SOMETHING AWFUL MIGHT HAPPEN: NEARLY EVERY DAY
IF YOU CHECKED OFF ANY PROBLEMS ON THIS QUESTIONNAIRE, HOW DIFFICULT HAVE THESE PROBLEMS MADE IT FOR YOU TO DO YOUR WORK, TAKE CARE OF THINGS AT HOME, OR GET ALONG WITH OTHER PEOPLE: NOT DIFFICULT AT ALL
6. BECOMING EASILY ANNOYED OR IRRITABLE: NEARLY EVERY DAY
2. NOT BEING ABLE TO STOP OR CONTROL WORRYING: NEARLY EVERY DAY
1. FEELING NERVOUS, ANXIOUS, OR ON EDGE: NEARLY EVERY DAY

## 2018-02-23 ASSESSMENT — PATIENT HEALTH QUESTIONNAIRE - PHQ9: 5. POOR APPETITE OR OVEREATING: NEARLY EVERY DAY

## 2018-02-23 NOTE — PATIENT INSTRUCTIONS
"  Bipolar Disorder  Bipolar disorder is an illness that causes strong mood swings between depression and  nicole . It used to be called \"manic depression.\" The mood swings are different from the normal ups and downs we all normally experience in our lives. They are more severe, last longer, and can interfere with work and relationships. These episodes are changes from our usual moods and behavior. Their severity can be mild, or drastic and explosive.    In a manic episode, you may think fast and do things quickly. It may seem like you are getting a lot done. At first, this may feel very good. But in the extreme this can lead to a lifestyle that is disorganized, chaotic, and includes risky behavior (spending sprees, sexual acting-out, or drug use). In later stages, it may affect eating (no interest in food) and sleeping (unable to sleep for days at a time). Speech may speed up and become difficult for others to understand. You may appear to others as if you are in your own world.    In a depressive episode, you may feel a lack of interest in normal activities. Sometimes there is sadness or guilt without any clear reason. Thinking may become slow and there can be a lack energy or feeling of hopelessness. Some people have thoughts of harming themselves at this stage. Thoughts can even turn to suicide.  Between these phases you may actually feel OK. This does not mean that the illness is gone. People with this disorder will usually have to treat it all of their life. Medicine and good care can greatly reduce the symptoms.  The exact cause of this illness is unknown. However, there is a genetic link that makes a person more likely to get this problem. Also, the use of drugs such as speed (amphetamine) and cocaine increase the chances of this illness appearing.  Home care    On-going care and support helps people manage this disease.  Find a healthcare provider and therapist who meet your needs.  Seek help when you feel " like you may be heading into either a manic episode or a depressive state.    Be sure to take your medicine and get regular blood work to check your the levels of medicine in your body.  Take the medicine and get the follow-up lab work as prescribed, even if you think you don t need to do it.    Be certain to tell each of your healthcare providers about all of the prescription drugs, over-the-counter medicines, and supplements you take. Certain supplements interact with medicines and result in dangerous side effects.  You can also use your pharmacist as a resource person when you have questions about drug interactions.    Talk with your family and trusted friends about your thoughts and feelings.  Ask them to help you recognize behavior changes early so you can get help and medicines can be adjusted.    Alcohol and drugs can bring on an episode, and also make them worse    If your life is severely impacted by this illness, the Americans with Disabilities Act (ADA) may provide help. The ADA protects people with chronic physical and mental health problems. If you are having trouble keeping jobs, managing workplace issues, or caring for yourself because of your bipolar disorder, contact your local ADA office to see if they can help. The [place] Department  of Justice operates a toll-free ADA information line at: 662.175.2505 (Voice); or 838-985-1611 (TTY).  They can help you locate a local office.    Follow-up care  Follow up with your doctor or therapist as advised. They can help you to find ways to improve your life.  Call 911  Call your healthcare provider right away if any of these occur:    You have suicidal thoughts, a plan, and the means to  harm yourself    Trouble breathing    Confusion    Drowsiness or trouble wakening    Fainting or loss of consciousness    Rapid heart rate, very low heart rate, or a new irregular heart rate    Seizure    New chest pain that becomes more severe, lasts longer, or spreads into your  shoulder, arm, neck, jaw, or back  When to seek medical advice  Call your healthcare provider right away if any of these occur:    Feeling like your symptoms are getting worse (depression, agitation, excess energy)    Unable to eat or sleep for more than 48 hours    Feeling out of control (racing thoughts, poor concentration)    Feeling like you want to harm yourself or another    Being unable to care for yourself  Date Last Reviewed: 9/29/2015 2000-2017 [employer]. [address] [place] [address]. All rights reserved. This information is not intended as a substitute for professional medical care. Always follow your healthcare professional's instructions.        Vaginal Infection: Yeast (Candidiasis)  Yeast infection occurs when yeast in the vagina increase and attacks the vaginal tissues. Yeast is a type of fungus. These infections are often caused by a type of yeast called Candida albicans. Other species of yeast can also cause infections. Factors that may make infection more likely include recent antibiotic use, douching, or increased sex. Yeast infections are more common in women who have diabetes, or are obese or pregnant, or have a weak immune system.  Symptoms of yeast infection    Clumpy or thin, white discharge, which may look like cottage cheese    No odor or minimal odor    Severe vaginal itching or burning    Burning with urination    Swelling, redness of vulva    Pain during sex  Treating yeast infection  Yeast infection is treated with a vaginal antifungal cream. In some cases, antifungal pills are prescribed instead. During treatment:    Finish all of your medicine, even if your symptoms go away.    Apply the cream before going to bed. Lie flat after applying so that it doesn't drip out.    Do not douche or use tampons.    Don't rely on a diaphragm or condoms, since the cream may weaken them.    Avoid intercourse if advised by your healthcare provider.     Should I treat a  yeast infection myself?  Discuss with your healthcare provider whether you should use over-the-counter medicines to treat a yeast infection. Self-treatment may depend on whether:    You've had a yeast infection in the past.    You're at risk for STDs.  Call your healthcare provider if symptoms do not go away or come back after treatment.   Date Last Reviewed: 3/1/2017    3136-7068 [employer]. [address] [place] [address], [place] [address]. All rights reserved. This information is not intended as a substitute for professional medical care. Always follow your healthcare professional's instructions.

## 2018-02-23 NOTE — MR AVS SNAPSHOT
"              After Visit Summary   2/23/2018    Sirena Dietrich    MRN: [id number]           Patient Information     Date Of Birth          1985        Visit Information        Provider Department      2/23/2018 9:00 AM Shi Martinez APRN [facility]        Today's Diagnoses     Nausea    -  1    Severe bipolar I disorder, most recent episode mixed, with psychotic features        Candidiasis of vulva and vagina          Care Instructions      Bipolar Disorder  Bipolar disorder is an illness that causes strong mood swings between depression and  nicole . It used to be called \"manic depression.\" The mood swings are different from the normal ups and downs we all normally experience in our lives. They are more severe, last longer, and can interfere with work and relationships. These episodes are changes from our usual moods and behavior. Their severity can be mild, or drastic and explosive.    In a manic episode, you may think fast and do things quickly. It may seem like you are getting a lot done. At first, this may feel very good. But in the extreme this can lead to a lifestyle that is disorganized, chaotic, and includes risky behavior (spending sprees, sexual acting-out, or drug use). In later stages, it may affect eating (no interest in food) and sleeping (unable to sleep for days at a time). Speech may speed up and become difficult for others to understand. You may appear to others as if you are in your own world.    In a depressive episode, you may feel a lack of interest in normal activities. Sometimes there is sadness or guilt without any clear reason. Thinking may become slow and there can be a lack energy or feeling of hopelessness. Some people have thoughts of harming themselves at this stage. Thoughts can even turn to suicide.  Between these phases you may actually feel OK. This does not mean that the illness is gone. People with this disorder will usually have to treat it " all of their life. Medicine and good care can greatly reduce the symptoms.  The exact cause of this illness is unknown. However, there is a genetic link that makes a person more likely to get this problem. Also, the use of drugs such as speed (amphetamine) and cocaine increase the chances of this illness appearing.  Home care    On-going care and support helps people manage this disease.  Find a healthcare provider and therapist who meet your needs.  Seek help when you feel like you may be heading into either a manic episode or a depressive state.    Be sure to take your medicine and get regular blood work to check your the levels of medicine in your body.  Take the medicine and get the follow-up lab work as prescribed, even if you think you don t need to do it.    Be certain to tell each of your healthcare providers about all of the prescription drugs, over-the-counter medicines, and supplements you take. Certain supplements interact with medicines and result in dangerous side effects.  You can also use your pharmacist as a resource person when you have questions about drug interactions.    Talk with your family and trusted friends about your thoughts and feelings.  Ask them to help you recognize behavior changes early so you can get help and medicines can be adjusted.    Alcohol and drugs can bring on an episode, and also make them worse    If your life is severely impacted by this illness, the Americans with Disabilities Act (ADA) may provide help. The ADA protects people with chronic physical and mental health problems. If you are having trouble keeping jobs, managing workplace issues, or caring for yourself because of your bipolar disorder, contact your local ADA office to see if they can help. The [place] Department  of Justice operates a toll-free ADA information line at: 225.605.6494 (Voice); or 882-603-0845 (TTY).  They can help you locate a local office.    Follow-up care  Follow up with your doctor or  therapist as advised. They can help you to find ways to improve your life.  Call 911  Call your healthcare provider right away if any of these occur:    You have suicidal thoughts, a plan, and the means to  harm yourself    Trouble breathing    Confusion    Drowsiness or trouble wakening    Fainting or loss of consciousness    Rapid heart rate, very low heart rate, or a new irregular heart rate    Seizure    New chest pain that becomes more severe, lasts longer, or spreads into your shoulder, arm, neck, jaw, or back  When to seek medical advice  Call your healthcare provider right away if any of these occur:    Feeling like your symptoms are getting worse (depression, agitation, excess energy)    Unable to eat or sleep for more than 48 hours    Feeling out of control (racing thoughts, poor concentration)    Feeling like you want to harm yourself or another    Being unable to care for yourself  Date Last Reviewed: 9/29/2015 2000-2017 [employer]. [address] [place] [address]. All rights reserved. This information is not intended as a substitute for professional medical care. Always follow your healthcare professional's instructions.        Vaginal Infection: Yeast (Candidiasis)  Yeast infection occurs when yeast in the vagina increase and attacks the vaginal tissues. Yeast is a type of fungus. These infections are often caused by a type of yeast called Candida albicans. Other species of yeast can also cause infections. Factors that may make infection more likely include recent antibiotic use, douching, or increased sex. Yeast infections are more common in women who have diabetes, or are obese or pregnant, or have a weak immune system.  Symptoms of yeast infection    Clumpy or thin, white discharge, which may look like cottage cheese    No odor or minimal odor    Severe vaginal itching or burning    Burning with urination    Swelling, redness of vulva    Pain during sex  Treating yeast  infection  Yeast infection is treated with a vaginal antifungal cream. In some cases, antifungal pills are prescribed instead. During treatment:    Finish all of your medicine, even if your symptoms go away.    Apply the cream before going to bed. Lie flat after applying so that it doesn't drip out.    Do not douche or use tampons.    Don't rely on a diaphragm or condoms, since the cream may weaken them.    Avoid intercourse if advised by your healthcare provider.     Should I treat a yeast infection myself?  Discuss with your healthcare provider whether you should use over-the-counter medicines to treat a yeast infection. Self-treatment may depend on whether:    You've had a yeast infection in the past.    You're at risk for STDs.  Call your healthcare provider if symptoms do not go away or come back after treatment.   Date Last Reviewed: 3/1/2017    0306-0175 [employer]. [address] [place], [place] [address]. All rights reserved. This information is not intended as a substitute for professional medical care. Always follow your healthcare professional's instructions.                Follow-ups after your visit        Your next 10 appointments already scheduled     Mar 01, 2018  4:00 PM CST   (Arrive by 3:45 PM)   NEW CONCUSSION with Minh Redman PA-C   [facility] ([facility])    [address] [place] [id number] [phone]              Who to contact     If you have questions or need follow up information about today's clinic visit or your schedule please contact [facility] directly at [phone].  Normal or non-critical lab and imaging results will be communicated to you by MyChart, letter or phone within 4 business days after the clinic has received the results. If you do not hear from us within 7 days, please contact the clinic through MyChart or phone. If you have a critical or abnormal lab result, we will  "notify you by phone as soon as possible.  Submit refill requests through [employer] or call your pharmacy and they will forward the refill request to us. Please allow 3 business days for your refill to be completed.          Additional Information About Your Visit        [employer] Information     [employer] gives you secure access to your electronic health record. If you see a primary care provider, you can also send messages to your care team and make appointments. If you have questions, please call your primary care clinic.  If you do not have a primary care provider, please call [phone] and they will assist you.        Care EveryWhere ID     This is your Care EveryWhere ID. This could be used by other organizations to access your [facility] medical records  [id number]        Your Vitals Were     Pulse Temperature Respirations Height Last Period Pulse Oximetry    90 97.3  F (36.3  C) (Tympanic) 14 5' 6\" (1.676 m) (LMP Unknown) 99%    BMI (Body Mass Index)                   22.6 kg/m2            Blood Pressure from Last 3 Encounters:   02/23/18 112/68   02/21/18 112/69   02/20/18 125/75    Weight from Last 3 Encounters:   02/23/18 140 lb (63.5 kg)   02/20/18 140 lb (63.5 kg)   01/11/18 137 lb (62.1 kg)              Today, you had the following     No orders found for display         Today's Medication Changes          These changes are accurate as of 2/23/18  9:52 AM.  If you have any questions, ask your nurse or doctor.               Start taking these medicines.        Dose/Directions    fluconazole 150 MG tablet   Commonly known as:  DIFLUCAN   Used for:  Candidiasis of vulva and vagina   Started by:  Shi Martinez APRN CNP        Dose:  150 mg   Take 1 tablet (150 mg) by mouth once for 1 dose   Quantity:  1 tablet   Refills:  0         These medicines have changed or have updated prescriptions.        Dose/Directions    * lamoTRIgine 200 MG tablet   Commonly known as:  LaMICtal   This may have changed:  " Another medication with the same name was added. Make sure you understand how and when to take each.   Used for:  Severe bipolar I disorder, most recent episode mixed, with psychotic features (H)   Changed by:  Shi Martinez APRN CNP        Dose:  200 mg   Take 1 tablet (200 mg) by mouth daily   Quantity:  90 tablet   Refills:  0       * lamoTRIgine 100 MG tablet   Commonly known as:  LAMICTAL   This may have changed:  You were already taking a medication with the same name, and this prescription was added. Make sure you understand how and when to take each.   Used for:  Severe bipolar I disorder, most recent episode mixed, with psychotic features (H)   Changed by:  Shi Martinez APRN CNP        Take 1/2 tablet daily for 2 weeks, then 1/2 tablet twice daily   Quantity:  30 tablet   Refills:  0       * QUEtiapine 25 MG tablet   Commonly known as:  SEROquel   This may have changed:  Another medication with the same name was changed. Make sure you understand how and when to take each.   Used for:  Severe bipolar I disorder, most recent episode mixed, with psychotic features (H)   Changed by:  Shi Martinez APRN CNP        Dose:  25-50 mg   Take 1-2 tablets (25-50 mg) by mouth every 6 hours as needed   Quantity:  60 tablet   Refills:  0       * QUEtiapine 50 MG tablet   Commonly known as:  SEROquel   This may have changed:  how much to take   Used for:  Severe bipolar I disorder, most recent episode mixed, with psychotic features (H)   Changed by:  Shi Martinez APRN CNP        Dose:  100 mg   Take 2 tablets (100 mg) by mouth At Bedtime   Quantity:  60 tablet   Refills:  0       * Notice:  This list has 4 medication(s) that are the same as other medications prescribed for you. Read the directions carefully, and ask your doctor or other care provider to review them with you.         Where to get your medicines      These medications were sent to [facility]  [address] [address]     Phone:  [phone]     fluconazole 150 MG tablet    gabapentin 600 MG tablet    lamoTRIgine 100 MG tablet    prochlorperazine 5 MG tablet    QUEtiapine 25 MG tablet    QUEtiapine 50 MG tablet                Primary Care Provider Office Phone # Fax #    DIPESH Marino [phone] [address] [address]        Goals        General    I will attend counseling appointment (pt-stated)     Notes - Note edited  11/25/2015  1:35 PM by Shi Armstrong LSW    As of today's date 11/25/2015 goal is met at 76 - 100%.   Goal Status:  Complete - will keep to maintain for  A few month  As of today's date 7/28/2015 goal is met at 26 - 50%.   Goal Status:  Active          Equal Access to Services     [facility]: Hadii jo yoder hadasho Sorian, waaxda luqadaha, qaybta kaalmada adesherwinyavicky, raheem day . So [facility] [phone].    ATENCIÓN: Si habla español, tiene a borja disposición servicios gratuitos de asistencia lingüística. Andres al [phone].    We comply with applicable federal civil rights laws and [place] laws. We do not discriminate on the basis of race, color, national origin, age, disability, sex, sexual orientation, or gender identity.            Thank you!     Thank you for choosing [facility]  for your care. Our goal is always to provide you with excellent care. Hearing back from our patients is one way we can continue to improve our services. Please take a few minutes to complete the written survey that you may receive in the mail after your visit with us. Thank you!             Your Updated Medication List - Protect others around you: Learn how to safely use, store and throw away your medicines at www.disposemymeds.org.          This list is accurate as of 2/23/18  9:52 AM.  Always use your most recent med list.                   Brand Name Dispense  Instructions for use Diagnosis    doxycycline 100 MG capsule    VIBRAMYCIN    20 capsule    Take 1 capsule (100 mg) by mouth 2 times daily for 10 days (antibiotic).        fluconazole 150 MG tablet    DIFLUCAN    1 tablet    Take 1 tablet (150 mg) by mouth once for 1 dose    Candidiasis of vulva and vagina       * GABAPENTIN PO      Take 300 mg by mouth 2 times daily        * gabapentin 600 MG tablet    NEURONTIN    60 tablet    Take 2 tablets (1,200 mg) by mouth At Bedtime    Severe bipolar I disorder, most recent episode mixed, with psychotic features (H)       * lamoTRIgine 200 MG tablet    LaMICtal    90 tablet    Take 1 tablet (200 mg) by mouth daily    Severe bipolar I disorder, most recent episode mixed, with psychotic features (H)       * lamoTRIgine 100 MG tablet    LAMICTAL    30 tablet    Take 1/2 tablet daily for 2 weeks, then 1/2 tablet twice daily    Severe bipolar I disorder, most recent episode mixed, with psychotic features (H)       lidocaine HCl 3 % cream     453.6 g    Apply topically 3 times daily    Yeast infection of the vagina       ondansetron 8 MG tablet    ZOFRAN    30 tablet    Take 1 tablet (8 mg) by mouth every 8 hours as needed for nausea    Concussion without loss of consciousness, subsequent encounter       oxyCODONE-acetaminophen 5-325 MG per tablet    PERCOCET    18 tablet    Take 1 tablet by mouth every 8 hours as needed for pain    Concussion without loss of consciousness, initial encounter       prochlorperazine 5 MG tablet    COMPAZINE    20 tablet    Take 2 tablets (10 mg) by mouth every 6 hours as needed for nausea or vomiting    Nausea       * QUEtiapine 25 MG tablet    SEROquel    60 tablet    Take 1-2 tablets (25-50 mg) by mouth every 6 hours as needed    Severe bipolar I disorder, most recent episode mixed, with psychotic features (H)       * QUEtiapine 50 MG tablet    SEROquel    60 tablet    Take 2 tablets (100 mg) by mouth At Bedtime    Severe bipolar I disorder, most  recent episode mixed, with psychotic features (H)       SUMAtriptan 6 MG/0.5ML pen injector kit    IMITREX STATDOSE    3 kit    Inject 0.5 mLs (6 mg) Subcutaneous at onset of headache for migraine    Migraine without aura and with status migrainosus, not intractable       traZODone 50 MG tablet    DESYREL     Take 50 mg by mouth nightly as needed        * Notice:  This list has 6 medication(s) that are the same as other medications prescribed for you. Read the directions carefully, and ask your doctor or other care provider to review them with you.

## 2018-02-23 NOTE — PROGRESS NOTES
"  SUBJECTIVE:   Sirena Dietrich is a 32 year old female who presents to clinic today for the following health issues:      Depression Followup    Status since last visit: Worsened off lamictal     See PHQ-9 for current symptoms.  Other associated symptoms: None    Complicating factors:   Significant life event:  No   Current substance abuse:  None  Anxiety or Panic symptoms:  No    PHQ-9 9/8/2016 11/23/2016 9/21/2017   Total Score 19 21 20   Q9: Suicide Ideation Several days Several days More than half the days     Puja Sarahy.. No recent visit  Last seen in July ..   Salima Torres last seen 2 weeks ago.   Sleep has been ok seroquel.     In the past two weeks have you had thoughts of suicide or self-harm?  No.    Do you have concerns about your personal safety or the safety of others?   No    Recent methamphetamine addiction.  Cleanout for the last several weeks.  She has a sore in her nose.  Being treated for urinary tract infection.   needs Diflucan    -------------------------------------    Problem list and histories reviewed & adjusted, as indicated.  Additional history: as documented    Labs reviewed in EPIC    Reviewed and updated as needed this visit by clinical staff  Tobacco  Allergies  Meds       Reviewed and updated as needed this visit by Provider         ROS:   ROS: 10 point ROS neg other than the symptoms noted above in the HPI.      OBJECTIVE:                                                    /68  Pulse 90  Temp 97.3  F (36.3  C) (Tympanic)  Resp 14  Ht 5' 6\" (1.676 m)  Wt 140 lb (63.5 kg)  LMP  (LMP Unknown)  SpO2 99%  BMI 22.6 kg/m2  Body mass index is 22.6 kg/(m^2).   GENERAL: healthy, alert, well nourished, well hydrated, no distress  HENT: ear canals- normal; TMs- normal; Nose-pea-sized lesion ulceration right nare mouth- no ulcers, no lesions  NECK: no tenderness, no adenopathy, no asymmetry, no masses, no stiffness; thyroid- normal to palpation  RESP: lungs clear to auscultation - " no rales, no rhonchi, no wheezes  CV: regular rates and rhythm, normal S1 S2, no S3 or S4 and no murmur, no click or rub -  ABDOMEN: soft, no tenderness, no  hepatosplenomegaly, no masses, normal bowel sounds    Diagnostic test results:  Results for orders placed or performed during the hospital encounter of 02/21/18   CBC with platelets, differential   Result Value Ref Range    WBC 13.2 (H) 4.0 - 11.0 10e9/L    RBC Count 4.82 3.8 - 5.2 10e12/L    Hemoglobin 14.6 11.7 - 15.7 g/dL    Hematocrit 42.8 35.0 - 47.0 %    MCV 89 78 - 100 fl    MCH 30.3 26.5 - 33.0 pg    MCHC 34.1 31.5 - 36.5 g/dL    RDW 12.4 10.0 - 15.0 %    Platelet Count 268 150 - 450 10e9/L    Diff Method Automated Method     % Neutrophils 80.6 %    % Lymphocytes 9.5 %    % Monocytes 6.5 %    % Eosinophils 2.7 %    % Basophils 0.2 %    % Immature Granulocytes 0.5 %    Absolute Neutrophil 10.7 (H) 1.6 - 8.3 10e9/L    Absolute Lymphocytes 1.3 0.8 - 5.3 10e9/L    Absolute Monocytes 0.9 0.0 - 1.3 10e9/L    Absolute Eosinophils 0.4 0.0 - 0.7 10e9/L    Absolute Basophils 0.0 0.0 - 0.2 10e9/L    Abs Immature Granulocytes 0.1 0 - 0.4 10e9/L   Basic metabolic panel   Result Value Ref Range    Sodium 140 133 - 144 mmol/L    Potassium 4.1 3.4 - 5.3 mmol/L    Chloride 106 94 - 109 mmol/L    Carbon Dioxide 29 20 - 32 mmol/L    Anion Gap 5 3 - 14 mmol/L    Glucose 85 70 - 99 mg/dL    Urea Nitrogen 11 7 - 30 mg/dL    Creatinine 0.52 0.52 - 1.04 mg/dL    GFR Estimate >90 >60 mL/min/1.7m2    GFR Estimate If Black >90 >60 mL/min/1.7m2    Calcium 8.1 (L) 8.5 - 10.1 mg/dL   HCG qualitative pregnancy (blood)   Result Value Ref Range    HCG Qualitative Serum Negative NEG^Negative     *Note: Due to a large number of results and/or encounters for the requested time period, some results have not been displayed. A complete set of results can be found in Results Review.        ASSESSMENT/PLAN:                                                    1. Severe bipolar I disorder, most  recent episode mixed, with psychotic features  Ongoing psychotherapy strongly recommended.   psychiatry recommended  Medication restarted today  - gabapentin (NEURONTIN) 600 MG tablet; Take 2 tablets (1,200 mg) by mouth At Bedtime  Dispense: 60 tablet; Refill: 0  - QUEtiapine (SEROQUEL) 25 MG tablet; Take 1-2 tablets (25-50 mg) by mouth every 6 hours as needed  Dispense: 60 tablet; Refill: 0  - QUEtiapine (SEROQUEL) 50 MG tablet; Take 2 tablets (100 mg) by mouth At Bedtime  Dispense: 60 tablet; Refill: 0  Begin back on the Lamictal start low and increase as directed  - lamoTRIgine (LAMICTAL) 100 MG tablet; Take 1/2 tablet daily for 2 weeks, then 1/2 tablet twice daily  Dispense: 30 tablet; Refill: 0    2. Nausea  - prochlorperazine (COMPAZINE) 5 MG tablet; Take 2 tablets (10 mg) by mouth every 6 hours as needed for nausea or vomiting  Dispense: 20 tablet; Refill: 0    3. Candidiasis of vulva and vagina  - fluconazole (DIFLUCAN) 150 MG tablet; Take 1 tablet (150 mg) by mouth once for 1 dose  Dispense: 1 tablet; Refill: 0    4. Nasal sore  Stop  snorting methamphetamines  - mupirocin (BACTROBAN) 2 % ointment; Apply topically 3 times daily for 5 days  Dispense: 22 g; Refill: 1      Follow up with Provider - Call or return to the clinic with any worsening of symptoms or no resolution. Patient/Parent verbalized understanding and is in agreement. Medication side effects reviewed.   Current Outpatient Prescriptions   Medication Sig Dispense Refill     prochlorperazine (COMPAZINE) 5 MG tablet Take 2 tablets (10 mg) by mouth every 6 hours as needed for nausea or vomiting 20 tablet 0     gabapentin (NEURONTIN) 600 MG tablet Take 2 tablets (1,200 mg) by mouth At Bedtime 60 tablet 0     QUEtiapine (SEROQUEL) 25 MG tablet Take 1-2 tablets (25-50 mg) by mouth every 6 hours as needed 60 tablet 0     QUEtiapine (SEROQUEL) 50 MG tablet Take 2 tablets (100 mg) by mouth At Bedtime 60 tablet 0     lamoTRIgine (LAMICTAL) 100 MG tablet  Take 1/2 tablet daily for 2 weeks, then 1/2 tablet twice daily 30 tablet 0     mupirocin (BACTROBAN) 2 % ointment Apply topically 3 times daily for 5 days 22 g 1     GABAPENTIN PO Take 300 mg by mouth 2 times daily       doxycycline (VIBRAMYCIN) 100 MG capsule Take 1 capsule (100 mg) by mouth 2 times daily for 10 days (antibiotic). 20 capsule 0     ondansetron (ZOFRAN) 8 MG tablet Take 1 tablet (8 mg) by mouth every 8 hours as needed for nausea 30 tablet 1     lamoTRIgine (LAMICTAL) 200 MG tablet Take 1 tablet (200 mg) by mouth daily 90 tablet 0     traZODone (DESYREL) 50 MG tablet Take 50 mg by mouth nightly as needed       SUMAtriptan (IMITREX STATDOSE) 6 MG/0.5ML pen injector kit Inject 0.5 mLs (6 mg) Subcutaneous at onset of headache for migraine 3 kit 3     lidocaine HCl 3 % cream Apply topically 3 times daily 453.6 g 0     oxyCODONE-acetaminophen (PERCOCET) 5-325 MG per tablet Take 1 tablet by mouth every 8 hours as needed for pain 18 tablet 0     Current Outpatient Prescriptions   Medication Sig Dispense Refill     prochlorperazine (COMPAZINE) 5 MG tablet Take 2 tablets (10 mg) by mouth every 6 hours as needed for nausea or vomiting 20 tablet 0     gabapentin (NEURONTIN) 600 MG tablet Take 2 tablets (1,200 mg) by mouth At Bedtime 60 tablet 0     QUEtiapine (SEROQUEL) 25 MG tablet Take 1-2 tablets (25-50 mg) by mouth every 6 hours as needed 60 tablet 0     QUEtiapine (SEROQUEL) 50 MG tablet Take 2 tablets (100 mg) by mouth At Bedtime 60 tablet 0     lamoTRIgine (LAMICTAL) 100 MG tablet Take 1/2 tablet daily for 2 weeks, then 1/2 tablet twice daily 30 tablet 0     mupirocin (BACTROBAN) 2 % ointment Apply topically 3 times daily for 5 days 22 g 1     GABAPENTIN PO Take 300 mg by mouth 2 times daily       doxycycline (VIBRAMYCIN) 100 MG capsule Take 1 capsule (100 mg) by mouth 2 times daily for 10 days (antibiotic). 20 capsule 0     ondansetron (ZOFRAN) 8 MG tablet Take 1 tablet (8 mg) by mouth every 8 hours as  needed for nausea 30 tablet 1     lamoTRIgine (LAMICTAL) 200 MG tablet Take 1 tablet (200 mg) by mouth daily 90 tablet 0     traZODone (DESYREL) 50 MG tablet Take 50 mg by mouth nightly as needed       SUMAtriptan (IMITREX STATDOSE) 6 MG/0.5ML pen injector kit Inject 0.5 mLs (6 mg) Subcutaneous at onset of headache for migraine 3 kit 3     lidocaine HCl 3 % cream Apply topically 3 times daily 453.6 g 0     oxyCODONE-acetaminophen (PERCOCET) 5-325 MG per tablet Take 1 tablet by mouth every 8 hours as needed for pain 18 tablet 0   Chart documentation with Dragon Voice recognition Software. Although reviewed after completion, some words and grammatical errors may remain.    See Patient Instructions    DIPESH Marino [facility]

## 2018-02-23 NOTE — LETTER
February 23, 2018      Sirena Dietrich  [address]   [place] [address]        To Whom It May Concern:    Gerard Follow up Information    [facility]  [address]  [address]  Mail Code 9265  [facility] [address]  Phone: [phone]  Fax: [phone]      Sincerely,        Shi Martinez [facility]  [phone]  [address] [address]

## 2018-02-24 ASSESSMENT — ANXIETY QUESTIONNAIRES: GAD7 TOTAL SCORE: 21

## 2018-02-24 ASSESSMENT — PATIENT HEALTH QUESTIONNAIRE - PHQ9: SUM OF ALL RESPONSES TO PHQ QUESTIONS 1-9: 19

## 2018-03-11 ENCOUNTER — OFFICE VISIT (OUTPATIENT)
Dept: URGENT CARE | Facility: URGENT CARE | Age: 33
End: 2018-03-11
Payer: COMMERCIAL

## 2018-03-11 VITALS
TEMPERATURE: 97.4 F | WEIGHT: 144 LBS | OXYGEN SATURATION: 99 % | HEART RATE: 101 BPM | SYSTOLIC BLOOD PRESSURE: 126 MMHG | BODY MASS INDEX: 23.24 KG/M2 | DIASTOLIC BLOOD PRESSURE: 73 MMHG

## 2018-03-11 DIAGNOSIS — M62.830 SPASM OF BACK MUSCLES: Primary | ICD-10-CM

## 2018-03-11 DIAGNOSIS — R11.0 NAUSEA: ICD-10-CM

## 2018-03-11 PROCEDURE — 99213 OFFICE O/P EST LOW 20 MIN: CPT | Performed by: NURSE PRACTITIONER

## 2018-03-11 RX ORDER — METHOCARBAMOL 750 MG/1
750 TABLET, FILM COATED ORAL 4 TIMES DAILY PRN
Qty: 40 TABLET | Refills: 0 | Status: SHIPPED | OUTPATIENT
Start: 2018-03-11 | End: 2018-09-18

## 2018-03-11 RX ORDER — PROCHLORPERAZINE MALEATE 5 MG
10 TABLET ORAL EVERY 6 HOURS PRN
Qty: 20 TABLET | Refills: 0 | Status: SHIPPED | OUTPATIENT
Start: 2018-03-11 | End: 2018-09-18

## 2018-03-11 ASSESSMENT — PAIN SCALES - GENERAL: PAINLEVEL: SEVERE PAIN (7)
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
## 2018-03-11 NOTE — PATIENT INSTRUCTIONS
Take muscle relaxer as directed.    Follow up with primary care provider if symptoms worsen or do not resolve.    Contact Shi for further refills    Follow-up with your primary care provider next week and as needed.    Indications for emergent return to emergency department discussed with patient, who verbalized good understanding and agreement.  Patient understands the limitations of today's evaluation.         Back Spasm     Spasm of the back muscles can occur after a sudden forceful twisting or bending force (such as in a car accident), after a simple awkward movement, or after lifting something heavy with poor body positioning. In any case, muscle spasm adds to the pain. Sleeping in an awkward position or on a poor quality mattress can also cause this. Some people respond to emotional stress by tensing the muscles of their back.  Pain that continues may need further evaluation or other types of treatment such as physical therapy.  You don't always need X-rays for the initial evaluation of back pain, unless you had a physical injury such as from a car accident or fall. If your pain continues and doesn't respond to medical treatment, X-rays and other tests may then be done.   Home care    As soon as possible, start sitting or walking again to avoid problems from prolonged bed rest (muscle weakness, worsening back stiffness and pain, blood clots in the legs).    When in bed, try to find a position of comfort. A firm mattress is best. Try lying flat on your back with pillows under your knees. You can also try lying on your side with your knees bent up toward your chest and a pillow between your knees.    Avoid prolonged sitting, long car rides, or travel. This puts more stress on the lower back than standing or walking.     During the first 24 to 72 hours after an injury or flare-up, apply an ice pack to the painful area for 20 minutes, then remove it for 20 minutes. Do this over a period of 60 to 90 minutes or  several times a day. This will reduce swelling and pain. Always wrap ice packs in a thin towel.    You can start with ice, then switch to heat. Heat (hot shower, hot bath, or heating pad) reduces pain, and works well for muscle spasms. Apply heat to the painful area for 20 minutes, then remove it for 20 minutes. Do this over a period of 60 to 90 minutes or several times a day. Do not sleep on a heating pad as it can burn or damage skin.    Alternate ice and heat therapies.    Be aware of safe lifting methods and do not lift anything over 15 pounds until all the pain is gone.  Gentle stretching will help your back heal faster. Do this simple routine 2 to 3 times a day until your back is feeling better.    Lie on your back with your knees bent and both feet on the ground    Slowly raise your left knee to your chest as you flatten your lower back against the floor. Hold for 20 to 30 seconds.    Relax and repeat the exercise with your right knee.    Do 2 to 3 of these exercises for each leg.    Repeat, hugging both knees to your chest at the same time.    Do not bounce, but use a gentle pull.  Medicines  Talk to your doctor before using medicine, especially if you have other medical problems or are taking other medicines.  You may use acetaminophen or ibuprofen to control pain, unless your healthcare provider prescribed another pain medicine. If you have a chronic condition such as diabetes, liver or kidney disease, stomach ulcer, or gastrointestinal bleeding, or are taking blood thinners, talk with your healthcare provider before taking any medicines.  Be careful if you are given prescription pain medicine, narcotics, or medicine for muscle spasm. They can cause drowsiness, affect your coordination, reflexes, or judgment. Do not drive or operate heavy machinery when taking these medicines. Take pain medicine only as prescribed by your healthcare provider.  Follow-up care  Follow up with your doctor, or as advised.  Physical therapy or further tests may be needed.  If X-rays were taken, they may be reviewed by a radiologist. You will be notified of any new findings that may affect your care.  Call 911  Seek emergency medical care if any of these occur:    Trouble breathing    Confusion    Drowsiness or trouble awakening    Fainting or loss of consciousness    Rapid or very slow heart rate    Loss of bowel or bladder control  When to seek medical advice  Call your healthcare provider right away if any of these occur:    Pain becomes worse or spreads to your legs    Weakness or numbness in one or both legs    Numbness in the groin or genital area    Unexplained fever over 100.4 F (38.0 C)    Burning or pain when passing urine  Date Last Reviewed: 6/1/2016 2000-2017 [employer]. [address] [place] [address], [place] [address]. All rights reserved. This information is not intended as a substitute for professional medical care. Always follow your healthcare professional's instructions.

## 2018-03-11 NOTE — MR AVS SNAPSHOT
After Visit Summary   3/11/2018    Sirena Dietrich    MRN: [id number]           Patient Information     Date Of Birth          1985        Visit Information        Provider Department      3/11/2018 10:05 AM Cynthia Martin APRN [facility]        Today's Diagnoses     Spasm of back muscles    -  1    Nausea          Care Instructions    Take muscle relaxer as directed.    Follow up with primary care provider if symptoms worsen or do not resolve.    Contact Shi for further refills    Follow-up with your primary care provider next week and as needed.    Indications for emergent return to emergency department discussed with patient, who verbalized good understanding and agreement.  Patient understands the limitations of today's evaluation.         Back Spasm     Spasm of the back muscles can occur after a sudden forceful twisting or bending force (such as in a car accident), after a simple awkward movement, or after lifting something heavy with poor body positioning. In any case, muscle spasm adds to the pain. Sleeping in an awkward position or on a poor quality mattress can also cause this. Some people respond to emotional stress by tensing the muscles of their back.  Pain that continues may need further evaluation or other types of treatment such as physical therapy.  You don't always need X-rays for the initial evaluation of back pain, unless you had a physical injury such as from a car accident or fall. If your pain continues and doesn't respond to medical treatment, X-rays and other tests may then be done.   Home care    As soon as possible, start sitting or walking again to avoid problems from prolonged bed rest (muscle weakness, worsening back stiffness and pain, blood clots in the legs).    When in bed, try to find a position of comfort. A firm mattress is best. Try lying flat on your back with pillows under your knees. You can also try lying  on your side with your knees bent up toward your chest and a pillow between your knees.    Avoid prolonged sitting, long car rides, or travel. This puts more stress on the lower back than standing or walking.     During the first 24 to 72 hours after an injury or flare-up, apply an ice pack to the painful area for 20 minutes, then remove it for 20 minutes. Do this over a period of 60 to 90 minutes or several times a day. This will reduce swelling and pain. Always wrap ice packs in a thin towel.    You can start with ice, then switch to heat. Heat (hot shower, hot bath, or heating pad) reduces pain, and works well for muscle spasms. Apply heat to the painful area for 20 minutes, then remove it for 20 minutes. Do this over a period of 60 to 90 minutes or several times a day. Do not sleep on a heating pad as it can burn or damage skin.    Alternate ice and heat therapies.    Be aware of safe lifting methods and do not lift anything over 15 pounds until all the pain is gone.  Gentle stretching will help your back heal faster. Do this simple routine 2 to 3 times a day until your back is feeling better.    Lie on your back with your knees bent and both feet on the ground    Slowly raise your left knee to your chest as you flatten your lower back against the floor. Hold for 20 to 30 seconds.    Relax and repeat the exercise with your right knee.    Do 2 to 3 of these exercises for each leg.    Repeat, hugging both knees to your chest at the same time.    Do not bounce, but use a gentle pull.  Medicines  Talk to your doctor before using medicine, especially if you have other medical problems or are taking other medicines.  You may use acetaminophen or ibuprofen to control pain, unless your healthcare provider prescribed another pain medicine. If you have a chronic condition such as diabetes, liver or kidney disease, stomach ulcer, or gastrointestinal bleeding, or are taking blood thinners, talk with your healthcare provider  before taking any medicines.  Be careful if you are given prescription pain medicine, narcotics, or medicine for muscle spasm. They can cause drowsiness, affect your coordination, reflexes, or judgment. Do not drive or operate heavy machinery when taking these medicines. Take pain medicine only as prescribed by your healthcare provider.  Follow-up care  Follow up with your doctor, or as advised. Physical therapy or further tests may be needed.  If X-rays were taken, they may be reviewed by a radiologist. You will be notified of any new findings that may affect your care.  Call 911  Seek emergency medical care if any of these occur:    Trouble breathing    Confusion    Drowsiness or trouble awakening    Fainting or loss of consciousness    Rapid or very slow heart rate    Loss of bowel or bladder control  When to seek medical advice  Call your healthcare provider right away if any of these occur:    Pain becomes worse or spreads to your legs    Weakness or numbness in one or both legs    Numbness in the groin or genital area    Unexplained fever over 100.4 F (38.0 C)    Burning or pain when passing urine  Date Last Reviewed: 6/1/2016 2000-2017 [employer]. [address] [place] [address]. All rights reserved. This information is not intended as a substitute for professional medical care. Always follow your healthcare professional's instructions.                Follow-ups after your visit        Follow-up notes from your care team     See patient instructions section of the AVS Return if symptoms worsen or fail to improve, for Follow up with your primary care provider.      Who to contact     If you have questions or need follow up information about today's clinic visit or your schedule please contact [facility] directly at [phone].  Normal or non-critical lab and imaging results will be communicated to you by MyChart, letter or phone within 4 business  days after the clinic has received the results. If you do not hear from us within 7 days, please contact the clinic through [employer] or phone. If you have a critical or abnormal lab result, we will notify you by phone as soon as possible.  Submit refill requests through [employer] or call your pharmacy and they will forward the refill request to us. Please allow 3 business days for your refill to be completed.          Additional Information About Your Visit        [employer] Information     [employer] gives you secure access to your electronic health record. If you see a primary care provider, you can also send messages to your care team and make appointments. If you have questions, please call your primary care clinic.  If you do not have a primary care provider, please call [phone] and they will assist you.        Care EveryWhere ID     This is your Care EveryWhere ID. This could be used by other organizations to access your [place] medical records  [id number]        Your Vitals Were     Pulse Temperature Last Period Pulse Oximetry BMI (Body Mass Index)       101 97.4  F (36.3  C) (Tympanic) (LMP Unknown) 99% 23.24 kg/m2        Blood Pressure from Last 3 Encounters:   03/11/18 126/73   02/23/18 112/68   02/21/18 112/69    Weight from Last 3 Encounters:   03/11/18 144 lb (65.3 kg)   02/23/18 140 lb (63.5 kg)   02/20/18 140 lb (63.5 kg)              Today, you had the following     No orders found for display         Today's Medication Changes          These changes are accurate as of 3/11/18 11:02 AM.  If you have any questions, ask your nurse or doctor.               Start taking these medicines.        Dose/Directions    methocarbamol 750 MG tablet   Commonly known as:  ROBAXIN   Used for:  Spasm of back muscles   Started by:  Cynthia Martin APRN CNP        Dose:  750 mg   Take 1 tablet (750 mg) by mouth 4 times daily as needed for muscle spasms   Quantity:  40 tablet   Refills:  0            Where to get  your medicines      These medications were sent to [id number] - [place], [place] - [address]  [address] [facility] [address]    Hours:  Closed 10-16-08 business to [facility] Phone:  [phone]     methocarbamol 750 MG tablet    prochlorperazine 5 MG tablet                Primary Care Provider Office Phone # Fax #    Shi Martinez, DIPESH [facility] [phone]       [address] [facility] [address]        Goals        General    I will attend counseling appointment (pt-stated)     Notes - Note edited  11/25/2015  1:35 PM by Shi Armstrong LSW    As of today's date 11/25/2015 goal is met at 76 - 100%.   Goal Status:  Complete - will keep to maintain for  A few month  As of today's date 7/28/2015 goal is met at 26 - 50%.   Goal Status:  Active          Equal Access to Services     [facility]: Hadii aad paresh hadasho Sobrittonali, waaxda luqadaha, qaybta kaalmada adeegyada, waxay marinain haymathew day . So [facility] [phone].    ATENCIÓN: Si habla español, tiene a borja disposición servicios gratuitos de asistencia lingüística. [facility] [phone].    We comply with applicable federal civil rights laws and [place] laws. We do not discriminate on the basis of race, color, national origin, age, disability, sex, sexual orientation, or gender identity.            Thank you!     Thank you for choosing [facility]  for your care. Our goal is always to provide you with excellent care. Hearing back from our patients is one way we can continue to improve our services. Please take a few minutes to complete the written survey that you may receive in the mail after your visit with us. Thank you!             Your Updated Medication List - Protect others around you: Learn how to safely use, store and throw away your medicines at www.disposemymeds.org.          This list is accurate as of 3/11/18 11:02 AM.  Always use your most recent med  list.                   Brand Name Dispense Instructions for use Diagnosis    * GABAPENTIN PO      Take 300 mg by mouth 2 times daily        * gabapentin 600 MG tablet    NEURONTIN    60 tablet    Take 2 tablets (1,200 mg) by mouth At Bedtime    Severe bipolar I disorder, most recent episode mixed, with psychotic features (H)       * lamoTRIgine 200 MG tablet    LaMICtal    90 tablet    Take 1 tablet (200 mg) by mouth daily    Severe bipolar I disorder, most recent episode mixed, with psychotic features (H)       * lamoTRIgine 100 MG tablet    LAMICTAL    30 tablet    Take 1/2 tablet daily for 2 weeks, then 1/2 tablet twice daily    Severe bipolar I disorder, most recent episode mixed, with psychotic features (H)       lidocaine HCl 3 % cream     453.6 g    Apply topically 3 times daily    Yeast infection of the vagina       methocarbamol 750 MG tablet    ROBAXIN    40 tablet    Take 1 tablet (750 mg) by mouth 4 times daily as needed for muscle spasms    Spasm of back muscles       ondansetron 8 MG tablet    ZOFRAN    30 tablet    Take 1 tablet (8 mg) by mouth every 8 hours as needed for nausea    Concussion without loss of consciousness, subsequent encounter       oxyCODONE-acetaminophen 5-325 MG per tablet    PERCOCET    18 tablet    Take 1 tablet by mouth every 8 hours as needed for pain    Concussion without loss of consciousness, initial encounter       prochlorperazine 5 MG tablet    COMPAZINE    20 tablet    Take 2 tablets (10 mg) by mouth every 6 hours as needed for nausea or vomiting    Nausea       * QUEtiapine 25 MG tablet    SEROquel    60 tablet    Take 1-2 tablets (25-50 mg) by mouth every 6 hours as needed    Severe bipolar I disorder, most recent episode mixed, with psychotic features (H)       * QUEtiapine 50 MG tablet    SEROquel    60 tablet    Take 2 tablets (100 mg) by mouth At Bedtime    Severe bipolar I disorder, most recent episode mixed, with psychotic features (H)       SUMAtriptan 6  MG/0.5ML pen injector kit    IMITREX STATDOSE    3 kit    Inject 0.5 mLs (6 mg) Subcutaneous at onset of headache for migraine    Migraine without aura and with status migrainosus, not intractable       traZODone 50 MG tablet    DESYREL     Take 50 mg by mouth nightly as needed        * Notice:  This list has 6 medication(s) that are the same as other medications prescribed for you. Read the directions carefully, and ask your doctor or other care provider to review them with you.

## 2018-03-20 ENCOUNTER — OFFICE VISIT (OUTPATIENT)
Dept: FAMILY MEDICINE | Facility: CLINIC | Age: 33
End: 2018-03-20
Payer: COMMERCIAL

## 2018-03-20 DIAGNOSIS — F31.64 SEVERE BIPOLAR I DISORDER, MOST RECENT EPISODE MIXED, WITH PSYCHOTIC FEATURES (H): ICD-10-CM

## 2018-03-20 DIAGNOSIS — N89.8 VAGINAL DISCHARGE: Primary | ICD-10-CM

## 2018-03-20 DIAGNOSIS — S06.0X0D CONCUSSION WITHOUT LOSS OF CONSCIOUSNESS, SUBSEQUENT ENCOUNTER: ICD-10-CM

## 2018-03-20 DIAGNOSIS — R11.0 NAUSEA: ICD-10-CM

## 2018-03-20 LAB
SPECIMEN SOURCE: NORMAL
WET PREP SPEC: NORMAL

## 2018-03-20 PROCEDURE — 99214 OFFICE O/P EST MOD 30 MIN: CPT | Performed by: NURSE PRACTITIONER

## 2018-03-20 PROCEDURE — 87210 SMEAR WET MOUNT SALINE/INK: CPT | Performed by: NURSE PRACTITIONER

## 2018-03-20 RX ORDER — ONDANSETRON 8 MG/1
8 TABLET, FILM COATED ORAL EVERY 8 HOURS PRN
Qty: 30 TABLET | Refills: 1 | Status: SHIPPED | OUTPATIENT
Start: 2018-03-20 | End: 2018-05-03

## 2018-03-20 RX ORDER — QUETIAPINE FUMARATE 100 MG/1
200 TABLET, FILM COATED ORAL DAILY
Qty: 60 TABLET | Refills: 0 | Status: SHIPPED | OUTPATIENT
Start: 2018-03-20 | End: 2018-07-07

## 2018-03-20 RX ORDER — PROCHLORPERAZINE MALEATE 5 MG
10 TABLET ORAL EVERY 6 HOURS PRN
Qty: 20 TABLET | Refills: 0 | Status: CANCELLED | OUTPATIENT
Start: 2018-03-20

## 2018-03-20 ASSESSMENT — ANXIETY QUESTIONNAIRES
5. BEING SO RESTLESS THAT IT IS HARD TO SIT STILL: SEVERAL DAYS
3. WORRYING TOO MUCH ABOUT DIFFERENT THINGS: NEARLY EVERY DAY
GAD7 TOTAL SCORE: 19
1. FEELING NERVOUS, ANXIOUS, OR ON EDGE: NEARLY EVERY DAY
7. FEELING AFRAID AS IF SOMETHING AWFUL MIGHT HAPPEN: NEARLY EVERY DAY
2. NOT BEING ABLE TO STOP OR CONTROL WORRYING: NEARLY EVERY DAY
IF YOU CHECKED OFF ANY PROBLEMS ON THIS QUESTIONNAIRE, HOW DIFFICULT HAVE THESE PROBLEMS MADE IT FOR YOU TO DO YOUR WORK, TAKE CARE OF THINGS AT HOME, OR GET ALONG WITH OTHER PEOPLE: NOT DIFFICULT AT ALL
6. BECOMING EASILY ANNOYED OR IRRITABLE: NEARLY EVERY DAY

## 2018-03-20 ASSESSMENT — PATIENT HEALTH QUESTIONNAIRE - PHQ9: 5. POOR APPETITE OR OVEREATING: NEARLY EVERY DAY

## 2018-03-20 NOTE — MR AVS SNAPSHOT
After Visit Summary   3/20/2018    Sirena Dietrich    MRN: [id number]           Patient Information     Date Of Birth          1985        Visit Information        Provider Department      3/20/2018 3:20 PM Shi Martinez APRN [facility]        Today's Diagnoses     Vaginal discharge    -  1    Concussion without loss of consciousness, subsequent encounter        Nausea        Severe bipolar I disorder, most recent episode mixed, with psychotic features (H)          Care Instructions    General recommendations for sleep problems (Insomnia)   Allow 2-4 weeks to see results   Establish a regular sleep schedule   Go to bed at same time each night (when you think you can sleep)   Get up at same time each day   Avoid sleeping-in on Sunday morning   Cut down time in bed (if not asleep, get up)   Avoid trying to force yourself to sleep   Use your bed only for sleep and sex   Do not read or watch Television in bed   Make the bedroom comfortable   Keep temperature in your bedroom comfortable   Keep bedroom quiet when sleeping   Consider ear plugs (silicon)   Keep bedroom dark enough   Use dark blinds or wear an eye mask if needed   Relax at bedtime   Relax each muscle group individually   Begin with your feet   Work toward your head   Deal with your worries before bedtime   Set aside a worry time for 30 minutes earlier   Listen to relaxation tapes   Classical Music or Nature sounds   Imagine a tranquil scene (e.g. waterfall or beach)   Back Massage   Gentle 5-minute back rub prior to bedtime   Perform measures to make you tired at bedtime   Get regular Exercise each day (6 hours before bedtime)   Take medications only as directed   Eat a light bedtime snack or warm drink   Warm milk   Warm herbal tea (non-caffeinated)   Special Therapy Measures   Sleep Restriction Therapy   Limit time in bed for 15 minutes more than sleep   Do not set limit under 4 hours   Increase time by 15  minutes per effective sleep trial   Effective sleep suggests >90% of bedtime asleep   Stimulus Control   Do not lie awake for more than 30 minutes   Get out of bed and perform a quiet activity   Return to bed when sleepy   Repeat as many times per night as needed   No Napping during day   Things to avoid   Do not Exercise just before bedtime   No overstimulating activities just before bed   No competitive games before bedtime   No exciting Television programs before bedtime   Avoid caffeine after lunchtime   Avoid chocolate   Avoid coffee, tea, or soda   Do not use alcohol to induce sleep (worsens Insomnia)   Do not take someone else's sleeping pills   Do not look at the clock when awakening   Do not turn on light when getting up to use bathroom   Read the book   Say Good Night To Insomnia                   Follow-ups after your visit        Who to contact     If you have questions or need follow up information about today's clinic visit or your schedule please contact [facility] directly at [phone].  Normal or non-critical lab and imaging results will be communicated to you by [employer], letter or phone within 4 business days after the clinic has received the results. If you do not hear from us within 7 days, please contact the clinic through [employer] or phone. If you have a critical or abnormal lab result, we will notify you by phone as soon as possible.  Submit refill requests through [employer] or call your pharmacy and they will forward the refill request to us. Please allow 3 business days for your refill to be completed.          Additional Information About Your Visit        [employer] Information     [employer] gives you secure access to your electronic health record. If you see a primary care provider, you can also send messages to your care team and make appointments. If you have questions, please call your primary care clinic.  If you do not have a primary care provider, please call [phone]  and they will assist you.        Care EveryWhere ID     This is your Care EveryWhere ID. This could be used by other organizations to access your [facility] medical records  [id number]        Your Vitals Were     Pulse Temperature Respirations Last Period Pulse Oximetry BMI (Body Mass Index)    62 97.3  F (36.3  C) (Tympanic) 14 (LMP Unknown) 99% 23.24 kg/m2       Blood Pressure from Last 3 Encounters:   03/21/18 112/62   03/11/18 126/73   02/23/18 112/68    Weight from Last 3 Encounters:   03/21/18 144 lb (65.3 kg)   03/11/18 144 lb (65.3 kg)   02/23/18 140 lb (63.5 kg)              We Performed the Following     Wet prep          Today's Medication Changes          These changes are accurate as of 3/20/18 11:59 PM.  If you have any questions, ask your nurse or doctor.               These medicines have changed or have updated prescriptions.        Dose/Directions    lamoTRIgine 100 MG tablet   Commonly known as:  LAMICTAL   This may have changed:  Another medication with the same name was removed. Continue taking this medication, and follow the directions you see here.   Used for:  Severe bipolar I disorder, most recent episode mixed, with psychotic features (H)        Take 1/2 tablet daily for 2 weeks, then 1/2 tablet twice daily   Quantity:  30 tablet   Refills:  0       * QUEtiapine 25 MG tablet   Commonly known as:  SEROquel   This may have changed:  Another medication with the same name was changed. Make sure you understand how and when to take each.   Used for:  Severe bipolar I disorder, most recent episode mixed, with psychotic features (H)        Dose:  25-50 mg   Take 1-2 tablets (25-50 mg) by mouth every 6 hours as needed   Quantity:  60 tablet   Refills:  0       * QUEtiapine 100 MG tablet   Commonly known as:  SEROquel   This may have changed:    - medication strength  - how much to take  - when to take this  - additional instructions   Used for:  Severe bipolar I disorder, most recent episode mixed,  with psychotic features (H)        Dose:  200 mg   Take 2 tablets (200 mg) by mouth daily For one week if insomnia persists to 300 mg daily   Quantity:  60 tablet   Refills:  0       * Notice:  This list has 2 medication(s) that are the same as other medications prescribed for you. Read the directions carefully, and ask your doctor or other care provider to review them with you.      Stop taking these medicines if you haven't already. Please contact your care team if you have questions.     oxyCODONE-acetaminophen 5-325 MG per tablet   Commonly known as:  PERCOCET                Where to get your medicines      These medications were sent to [facility] [place] - [address] [address]     Phone:  [phone]     ondansetron 8 MG tablet    QUEtiapine 100 MG tablet                Primary Care Provider Office Phone # Fax #    DIPESH Marino [phone]       [address] [place] [id number]        Goals        General    I will attend counseling appointment (pt-stated)     Notes - Note edited  11/25/2015  1:35 PM by Shi Armstrong LSW    As of today's date 11/25/2015 goal is met at 76 - 100%.   Goal Status:  Complete - will keep to maintain for  A few month  As of today's date 7/28/2015 goal is met at 26 - 50%.   Goal Status:  Active          Equal Access to Services     [facility]: Hadii jo ku hadasho Soomaali, waaxda luqadaha, qaybta kaalmada adeegyada, raheem evangelista. So [facility] [phone].    ATENCIÓN: Si habla español, tiene a borja disposición servicios gratuitos de asistencia lingüística. Andres al [phone].    We comply with applicable federal civil rights laws and [place] laws. We do not discriminate on the basis of race, color, national origin, age, disability, sex, sexual orientation, or gender identity.            Thank you!     Thank you for choosing [facility]  for your  care. Our goal is always to provide you with excellent care. Hearing back from our patients is one way we can continue to improve our services. Please take a few minutes to complete the written survey that you may receive in the mail after your visit with us. Thank you!             Your Updated Medication List - Protect others around you: Learn how to safely use, store and throw away your medicines at www.disposemymeds.org.          This list is accurate as of 3/20/18 11:59 PM.  Always use your most recent med list.                   Brand Name Dispense Instructions for use Diagnosis    * GABAPENTIN PO      Take 300 mg by mouth 2 times daily        * gabapentin 600 MG tablet    NEURONTIN    60 tablet    Take 2 tablets (1,200 mg) by mouth At Bedtime    Severe bipolar I disorder, most recent episode mixed, with psychotic features (H)       lamoTRIgine 100 MG tablet    LAMICTAL    30 tablet    Take 1/2 tablet daily for 2 weeks, then 1/2 tablet twice daily    Severe bipolar I disorder, most recent episode mixed, with psychotic features (H)       lidocaine HCl 3 % cream     453.6 g    Apply topically 3 times daily    Yeast infection of the vagina       methocarbamol 750 MG tablet    ROBAXIN    40 tablet    Take 1 tablet (750 mg) by mouth 4 times daily as needed for muscle spasms    Spasm of back muscles       ondansetron 8 MG tablet    ZOFRAN    30 tablet    Take 1 tablet (8 mg) by mouth every 8 hours as needed for nausea    Concussion without loss of consciousness, subsequent encounter       prochlorperazine 5 MG tablet    COMPAZINE    20 tablet    Take 2 tablets (10 mg) by mouth every 6 hours as needed for nausea or vomiting    Nausea       * QUEtiapine 25 MG tablet    SEROquel    60 tablet    Take 1-2 tablets (25-50 mg) by mouth every 6 hours as needed    Severe bipolar I disorder, most recent episode mixed, with psychotic features (H)       * QUEtiapine 100 MG tablet    SEROquel    60 tablet    Take 2 tablets (200  mg) by mouth daily For one week if insomnia persists to 300 mg daily    Severe bipolar I disorder, most recent episode mixed, with psychotic features (H)       SUMAtriptan 6 MG/0.5ML pen injector kit    IMITREX STATDOSE    3 kit    Inject 0.5 mLs (6 mg) Subcutaneous at onset of headache for migraine    Migraine without aura and with status migrainosus, not intractable       traZODone 50 MG tablet    DESYREL     Take 50 mg by mouth nightly as needed        * Notice:  This list has 4 medication(s) that are the same as other medications prescribed for you. Read the directions carefully, and ask your doctor or other care provider to review them with you.

## 2018-03-20 NOTE — PATIENT INSTRUCTIONS
General recommendations for sleep problems (Insomnia)   Allow 2-4 weeks to see results   Establish a regular sleep schedule   Go to bed at same time each night (when you think you can sleep)   Get up at same time each day   Avoid sleeping-in on Sunday morning   Cut down time in bed (if not asleep, get up)   Avoid trying to force yourself to sleep   Use your bed only for sleep and sex   Do not read or watch Television in bed   Make the bedroom comfortable   Keep temperature in your bedroom comfortable   Keep bedroom quiet when sleeping   Consider ear plugs (silicon)   Keep bedroom dark enough   Use dark blinds or wear an eye mask if needed   Relax at bedtime   Relax each muscle group individually   Begin with your feet   Work toward your head   Deal with your worries before bedtime   Set aside a worry time for 30 minutes earlier   Listen to relaxation tapes   Classical Music or Nature sounds   Imagine a tranquil scene (e.g. waterfall or beach)   Back Massage   Gentle 5-minute back rub prior to bedtime   Perform measures to make you tired at bedtime   Get regular Exercise each day (6 hours before bedtime)   Take medications only as directed   Eat a light bedtime snack or warm drink   Warm milk   Warm herbal tea (non-caffeinated)   Special Therapy Measures   Sleep Restriction Therapy   Limit time in bed for 15 minutes more than sleep   Do not set limit under 4 hours   Increase time by 15 minutes per effective sleep trial   Effective sleep suggests >90% of bedtime asleep   Stimulus Control   Do not lie awake for more than 30 minutes   Get out of bed and perform a quiet activity   Return to bed when sleepy   Repeat as many times per night as needed   No Napping during day   Things to avoid   Do not Exercise just before bedtime   No overstimulating activities just before bed   No competitive games before bedtime   No exciting Television programs before bedtime   Avoid caffeine after lunchtime   Avoid chocolate   Avoid  coffee, tea, or soda   Do not use alcohol to induce sleep (worsens Insomnia)   Do not take someone else's sleeping pills   Do not look at the clock when awakening   Do not turn on light when getting up to use bathroom   Read the book   Say Good Night To Insomnia

## 2018-03-20 NOTE — PROGRESS NOTES
SUBJECTIVE:   Sirena Dietrich is a 32 year old female who presents to clinic today for the following health issues:      Anxiety Follow-Up    Status since last visit: Worsened 38 days sober     Other associated symptoms:None    Complicating factors:   Significant life event:yes not using drug at this time   Current substance abuse: None  Depression symptoms: yes  VICTOR M-7 SCORE 11/23/2016 9/21/2017 2/23/2018   Total Score - - -   Total Score 19 20 21   Total Score - - -       VICTOR M-7      Vaginal Symptoms      Duration: ongoing     Description  vaginal discharge - white, itching and burning    Intensity:  moderate    Accompanying signs and symptoms (fever/dysuria/abdominal or back pain): None    seroquel 25 mg bid  Seroquel 100 mg at HS    -------------------------------------    Problem list and histories reviewed & adjusted, as indicated.  Additional history: as documented    BP Readings from Last 3 Encounters:   03/21/18 112/62   03/11/18 126/73   02/23/18 112/68    Wt Readings from Last 3 Encounters:   03/21/18 144 lb (65.3 kg)   03/11/18 144 lb (65.3 kg)   02/23/18 140 lb (63.5 kg)                  Labs reviewed in EPIC    Reviewed and updated as needed this visit by clinical staff  Allergies       Reviewed and updated as needed this visit by Provider         ROS:   ROS: 10 point ROS neg other than the symptoms noted above in the HPI.  No suicidal or homicidal ideation today.  Patient is free from drugs or alcohol per her report    OBJECTIVE:                                                    /62  Pulse 62  Temp 97.3  F (36.3  C) (Tympanic)  Resp 14  Wt 144 lb (65.3 kg)  LMP  (LMP Unknown)  SpO2 99%  BMI 23.24 kg/m2  Body mass index is 23.24 kg/(m^2).   GENERAL: healthy, alert, well nourished, well hydrated, no distress  HENT: ear canals- normal; TMs- normal; Nose- normal; Mouth- no ulcers, no lesions  NECK: no tenderness, no adenopathy, no asymmetry, no masses, no stiffness; thyroid- normal to  palpation  RESP: lungs clear to auscultation - no rales, no rhonchi, no wheezes  CV: regular rates and rhythm, normal S1 S2, no S3 or S4 and no murmur, no click or rub -  ABDOMEN: soft, no tenderness, no  hepatosplenomegaly, no masses, normal bowel sounds  MS: extremities- no gross deformities noted, no edema  SKIN: no suspicious lesions, no rashes  NEURO: strength and tone- normal, sensory exam- grossly normal, mentation- intact, speech- normal, reflexes- symmetric    Diagnostic test results:  Results for orders placed or performed in visit on 03/20/18   Wet prep   Result Value Ref Range    Specimen Description Vagina     Wet Prep No Trichomonas seen     Wet Prep No clue cells seen     Wet Prep No yeast seen      *Note: Due to a large number of results and/or encounters for the requested time period, some results have not been displayed. A complete set of results can be found in Results Review.        ASSESSMENT/PLAN:                                                    1. Concussion without loss of consciousness, subsequent encounter  Intermittent nausea.  May use  - ondansetron (ZOFRAN) 8 MG tablet; Take 1 tablet (8 mg) by mouth every 8 hours as needed for nausea  Dispense: 30 tablet; Refill: 1    2. Nausea  Trial Zofran    3. Vaginal discharge  We will contact with results  - Wet prep    4. Severe bipolar I disorder, most recent episode mixed, with psychotic features (H)  Follow-up with psychiatry medication management recommended.    Increase dose due to ongoing insomnia  - QUEtiapine (SEROQUEL) 100 MG tablet; Take 2 tablets (200 mg) by mouth daily For one week if insomnia persists to 300 mg daily  Dispense: 60 tablet; Refill: 0      Follow up with Provider - Call or return to the clinic with any worsening of symptoms or no resolution. Patient/Parent verbalized understanding and is in agreement. Medication side effects reviewed.       Current Outpatient Prescriptions   Medication Sig Dispense Refill      ondansetron (ZOFRAN) 8 MG tablet Take 1 tablet (8 mg) by mouth every 8 hours as needed for nausea 30 tablet 1     QUEtiapine (SEROQUEL) 100 MG tablet Take 2 tablets (200 mg) by mouth daily For one week if insomnia persists to 300 mg daily 60 tablet 0     prochlorperazine (COMPAZINE) 5 MG tablet Take 2 tablets (10 mg) by mouth every 6 hours as needed for nausea or vomiting 20 tablet 0     methocarbamol (ROBAXIN) 750 MG tablet Take 1 tablet (750 mg) by mouth 4 times daily as needed for muscle spasms 40 tablet 0     gabapentin (NEURONTIN) 600 MG tablet Take 2 tablets (1,200 mg) by mouth At Bedtime 60 tablet 0     QUEtiapine (SEROQUEL) 25 MG tablet Take 1-2 tablets (25-50 mg) by mouth every 6 hours as needed 60 tablet 0     lamoTRIgine (LAMICTAL) 100 MG tablet Take 1/2 tablet daily for 2 weeks, then 1/2 tablet twice daily 30 tablet 0     GABAPENTIN PO Take 300 mg by mouth 2 times daily       SUMAtriptan (IMITREX STATDOSE) 6 MG/0.5ML pen injector kit Inject 0.5 mLs (6 mg) Subcutaneous at onset of headache for migraine 3 kit 3     lidocaine HCl 3 % cream Apply topically 3 times daily 453.6 g 0     traZODone (DESYREL) 50 MG tablet Take 50 mg by mouth nightly as needed          See Patient Instructions    DIPESH Marino [facility]

## 2018-03-21 VITALS
RESPIRATION RATE: 14 BRPM | TEMPERATURE: 97.3 F | WEIGHT: 144 LBS | OXYGEN SATURATION: 99 % | SYSTOLIC BLOOD PRESSURE: 112 MMHG | HEART RATE: 62 BPM | BODY MASS INDEX: 23.24 KG/M2 | DIASTOLIC BLOOD PRESSURE: 62 MMHG

## 2018-03-21 ASSESSMENT — PAIN SCALES - GENERAL: PAINLEVEL: NO PAIN (0)

## 2018-03-21 ASSESSMENT — ANXIETY QUESTIONNAIRES: GAD7 TOTAL SCORE: 19

## 2018-03-21 ASSESSMENT — PATIENT HEALTH QUESTIONNAIRE - PHQ9: SUM OF ALL RESPONSES TO PHQ QUESTIONS 1-9: 23

## 2018-05-03 ENCOUNTER — TELEPHONE (OUTPATIENT)
Dept: FAMILY MEDICINE | Facility: CLINIC | Age: 33
End: 2018-05-03

## 2018-05-03 ENCOUNTER — OFFICE VISIT (OUTPATIENT)
Dept: FAMILY MEDICINE | Facility: CLINIC | Age: 33
End: 2018-05-03
Payer: COMMERCIAL

## 2018-05-03 VITALS
RESPIRATION RATE: 16 BRPM | HEART RATE: 92 BPM | WEIGHT: 151 LBS | SYSTOLIC BLOOD PRESSURE: 110 MMHG | BODY MASS INDEX: 24.27 KG/M2 | TEMPERATURE: 98 F | DIASTOLIC BLOOD PRESSURE: 73 MMHG | HEIGHT: 66 IN

## 2018-05-03 DIAGNOSIS — F41.9 ANXIETY: ICD-10-CM

## 2018-05-03 DIAGNOSIS — F98.8 ADD (ATTENTION DEFICIT DISORDER) WITHOUT HYPERACTIVITY: Primary | ICD-10-CM

## 2018-05-03 DIAGNOSIS — F19.20 CHEMICAL DEPENDENCY (H): ICD-10-CM

## 2018-05-03 DIAGNOSIS — F31.64 SEVERE BIPOLAR I DISORDER, MOST RECENT EPISODE MIXED, WITH PSYCHOTIC FEATURES (H): ICD-10-CM

## 2018-05-03 PROCEDURE — 99214 OFFICE O/P EST MOD 30 MIN: CPT | Performed by: NURSE PRACTITIONER

## 2018-05-03 RX ORDER — BUPROPION HYDROCHLORIDE 75 MG/1
75 TABLET ORAL 2 TIMES DAILY
Qty: 60 TABLET | Refills: 2 | Status: SHIPPED | OUTPATIENT
Start: 2018-05-03 | End: 2018-07-07

## 2018-05-03 RX ORDER — LAMOTRIGINE 200 MG/1
200 TABLET ORAL DAILY
Qty: 30 TABLET | Refills: 1 | COMMUNITY
Start: 2018-05-03 | End: 2018-11-20

## 2018-05-03 RX ORDER — HYDROXYZINE HYDROCHLORIDE 25 MG/1
25-50 TABLET, FILM COATED ORAL EVERY 6 HOURS PRN
Qty: 30 TABLET | Refills: 1 | Status: SHIPPED | OUTPATIENT
Start: 2018-05-03 | End: 2018-09-18

## 2018-05-03 ASSESSMENT — ANXIETY QUESTIONNAIRES
GAD7 TOTAL SCORE: 19
7. FEELING AFRAID AS IF SOMETHING AWFUL MIGHT HAPPEN: NEARLY EVERY DAY
5. BEING SO RESTLESS THAT IT IS HARD TO SIT STILL: SEVERAL DAYS
3. WORRYING TOO MUCH ABOUT DIFFERENT THINGS: NEARLY EVERY DAY
6. BECOMING EASILY ANNOYED OR IRRITABLE: NEARLY EVERY DAY
2. NOT BEING ABLE TO STOP OR CONTROL WORRYING: NEARLY EVERY DAY
1. FEELING NERVOUS, ANXIOUS, OR ON EDGE: NEARLY EVERY DAY

## 2018-05-03 ASSESSMENT — PATIENT HEALTH QUESTIONNAIRE - PHQ9: 5. POOR APPETITE OR OVEREATING: NEARLY EVERY DAY

## 2018-05-03 NOTE — LETTER
[facility]  [address]  [facility] [address]  [phone]        August 8, 2018    Sirena Dietrich  [address]   [facility] [address]              Dear Sirena Dietrich    This is to remind you that you have nonfasting labs due.    You may call our office at [phone] to schedule an appointment.    Please disregard this notice if you have already had your labs drawn or made an appointment.        Sincerely,        Shi Martinez CNP

## 2018-05-03 NOTE — TELEPHONE ENCOUNTER
Reason for call:  Symptom   Symptom or request: anxiety pt is sober    Duration (how long have symptoms been present): 5 days and is getting worse  Have you been treated for this before? Yes    Additional comments: very bad today    Phone number to reach patient:  Home number on file [phone] (home)    Best Time:  any    Can we leave a detailed message on this number?  YES

## 2018-05-03 NOTE — PROGRESS NOTES
SUBJECTIVE:   Sirena Dietrich is a 32 year old female who presents to clinic today for the following health issues:      Depression and Anxiety Follow-Up    Status since last visit: Worsened for 1 week     Other associated symptoms:None    Complicating factors:     Significant life event: Yes-  School, quitting smoking, burnt out with the kids. Sober 81 days       Current substance abuse: None    PHQ-9 2/23/2018 3/20/2018 5/3/2018   Total Score 19 23 27   Q9: Suicide Ideation Several days Several days Nearly every day   F/U: Thoughts of suicide or self-harm No No -   F/U: Safety concerns No No -     VICTOR M-7 SCORE 2/23/2018 3/20/2018 5/3/2018   Total Score - - -   Total Score 21 19 19   Total Score - - -     In the past two weeks have you had thoughts of suicide or self-harm?  YES-no plan.  Do you have concerns about your personal safety or the safety of others?   No  PHQ-9  English  PHQ-9   Any Language  VICTOR M-7  Suicide Assessment Five-step Evaluation and Treatment (SAFE-T)    Seen by Dr Aguero.. Set up primary there at [facility] so she can be seen by psychiatry there.  Set her up with [facility] for mental health June.   Sober now x 81 days. No therapy right now. - More.   Started school for [employer]. Went and visited her Dad for 2 weeks.   Felt good to get away and now back feels rlshp with Mom and Grandma is better.   Going back down to visit in June.   Upped gabapentin for her leg pain. 600 mg BID.   Back on patches for smoking cessation.   Anxiety has increased seroquel 25 mg Bid as needed.   Taking seroquel 100 mg at HS.   Shooting pain in the arm and chest when really anxious.  Benadryl 2 tablets twice a day as needed.    -------------------------------------    Problem list and histories reviewed & adjusted, as indicated.  Additional history: as documented    Labs reviewed in EPIC    Reviewed and updated as needed this visit by clinical staff  Tobacco  Meds  Med Hx  Surg Hx  Fam Hx  Soc Hx     "  Reviewed and updated as needed this visit by Provider         ROS:   ROS: 10 point ROS neg other than the symptoms noted above in the HPI.      OBJECTIVE:                                                    /73 (BP Location: Right arm, Patient Position: Sitting, Cuff Size: Adult Large)  Pulse 92  Temp 98  F (36.7  C) (Tympanic)  Resp 16  Ht 5' 6\" (1.676 m)  Wt 151 lb (68.5 kg)  LMP  (LMP Unknown)  BMI 24.37 kg/m2  Body mass index is 24.37 kg/(m^2).   GENERAL: healthy, alert, well nourished, well hydrated, no distress  HENT: ear canals- normal; TMs- normal; Nose- normal; Mouth- no ulcers, no lesions  NECK: no tenderness, no adenopathy, no asymmetry, no masses, no stiffness; thyroid- normal to palpation  RESP: lungs clear to auscultation - no rales, no rhonchi, no wheezes  CV: regular rates and rhythm, normal S1 S2, no S3 or S4 and no murmur, no click or rub -  ABDOMEN: soft, no tenderness, no  hepatosplenomegaly, no masses, normal bowel sounds  MS: extremities- no gross deformities noted, no edema  NEURO: strength and tone- normal, sensory exam- grossly normal, mentation- intact, speech- normal, reflexes- symmetric    Diagnostic test results:  Results for orders placed or performed in visit on 03/20/18   Wet prep   Result Value Ref Range    Specimen Description Vagina     Wet Prep No Trichomonas seen     Wet Prep No clue cells seen     Wet Prep No yeast seen      *Note: Due to a large number of results and/or encounters for the requested time period, some results have not been displayed. A complete set of results can be found in Results Review.        ASSESSMENT/PLAN:                                                    1. Severe bipolar I disorder, most recent episode mixed, with psychotic features  Refilled meds today until she can be seen by psychiatry.  Patient needs ongoing psychotherapy and ongoing psychiatry management to the complexity of her mental health issues to include bipolar ADD chemical " dependency and anxiety with panic disorder  Verbal no harm contract is generated today.  Support systems in place identified.  - lamoTRIgine (LAMICTAL) 200 MG tablet; Take 1 tablet (200 mg) by mouth daily  Dispense: 30 tablet; Refill: 1  - Lamotrigine Level; Future  - CBC with platelets; Future  - Comprehensive metabolic panel; Future  - TSH with free T4 reflex; Future    2. ADD (attention deficit disorder) without hyperactivity  - buPROPion (WELLBUTRIN) 75 MG tablet; Take 1 tablet (75 mg) by mouth 2 times daily  Dispense: 60 tablet; Refill: 2  - Lamotrigine Level; Future  - CBC with platelets; Future  - Comprehensive metabolic panel; Future  - TSH with free T4 reflex; Future    3. Chemical dependency (H)  Restart  - buPROPion (WELLBUTRIN) 75 MG tablet; Take 1 tablet (75 mg) by mouth 2 times daily  Dispense: 60 tablet; Refill: 2    4. Anxiety  Avoid benzodiazepines patient history of chemical dependency most recently methamphetamines  - hydrOXYzine (ATARAX) 25 MG tablet; Take 1-2 tablets (25-50 mg) by mouth every 6 hours as needed for anxiety  Dispense: 30 tablet; Refill: 1      Follow up with Provider -1 month  Call or return to the clinic with any worsening of symptoms or no resolution. Patient/Parent verbalized understanding and is in agreement. Medication side effects reviewed.         See Patient Instructions    DIPESH [facility]

## 2018-05-03 NOTE — NURSING NOTE
"Chief Complaint   Patient presents with     Anxiety     Depression       Initial /73 (BP Location: Right arm, Patient Position: Sitting, Cuff Size: Adult Large)  Pulse 92  Temp 98  F (36.7  C) (Tympanic)  Resp 16  Ht 5' 6\" (1.676 m)  Wt 151 lb (68.5 kg)  LMP  (LMP Unknown)  BMI 24.37 kg/m2 Estimated body mass index is 24.37 kg/(m^2) as calculated from the following:    Height as of this encounter: 5' 6\" (1.676 m).    Weight as of this encounter: 151 lb (68.5 kg).      Health Maintenance that is potentially due pending provider review:  NONE    n/a    Is there anyone who you would like to be able to receive your results? No  If yes have patient fill out JOSÉ    "

## 2018-05-03 NOTE — PATIENT INSTRUCTIONS
Treating Anxiety Disorders with Therapy    If you have an anxiety disorder, you don t have to suffer anymore. Treatment is available. Therapy (also called counseling) is often a helpful treatment for anxiety disorders. With therapy, a specially trained professional (therapist) helps you face and learn to manage your anxiety. Therapy can be short-term or long-term depending on your needs. In some cases, medicine may also be prescribed with therapy. It may take time before you notice how much therapy is helping, but stick with it. With therapy, you can feel better.  Cognitive behavioral therapy (CBT)  Cognitive behavioral therapy (CBT) teaches you to manage anxiety. It does this by helping you understand how you think and act when you re anxious. Research has shown CBT to be a very effective treatment for anxiety disorders. How CBT is run is almost like a class. It involves homework and activities to build skills that teach you to cope with anxiety step by step. It can be done in a group or one-on-one, and often takes place for a set number of sessions. CBT has two main parts:    Cognitive therapy helps you identify the negative, irrational thoughts that occur with your anxiety. You ll learn to replace these with more positive, realistic thoughts.    Behavioral therapy helps you change how you react to anxiety. You ll learn coping skills and methods for relaxing to help you better deal with anxiety.  Other forms of therapy  Other therapy methods may work better for you than CBT. Or, you may move from CBT to another form of therapy as your treatment needs change. This may mean meeting with a therapist by yourself or in a group. Therapy can also help you work through problems in your life, such as drug or alcohol dependence, that may be making your anxiety worse.  Getting better takes time  Therapy will help you feel better and teach you skills to help manage anxiety long term. But change doesn t happen right away. It  takes a commitment from you. And treatment only works if you learn to face the causes of your anxiety. So, you might feel worse before you feel better. This can sometimes make it hard to stick with it. But remember: Therapy is a very effective treatment. The results will be well worth it.  Helping yourself  If anxiety is wearing you down, here are some things you can do to cope:    Check with your doctor and rule out any physical problems that may be causing the anxiety symptoms.    If an anxiety disorder is diagnosed, seek mental healthcare. This is an illness and it can respond to treatment. Most types of anxiety disorders will respond to talk therapy and medicine.    Educate yourself about anxiety disorders. Keep track of helpful online resources and books you can use during stressful periods.    Try stress management techniques such as meditation.    Consider online or in-person support groups.    Don t fight your feelings. Anxiety feeds itself. The more you worry about it, the worse it gets. Instead, try to identify what might have triggered your anxiety. Then try to put this threat in perspective.    Keep in mind that you can t control everything about a situation. Change what you can and let the rest take its course.    Exercise   it s a great way to relieve tension and help your body feel relaxed.    Examine your life for stress, and try to find ways to reduce it.    Avoid caffeine and nicotine, which can make anxiety symptoms worse.    Fight the temptation to turn to alcohol or unprescribed drugs for relief. They only make things worse in the long run.   Date Last Reviewed: 1/1/2017 2000-2017 [employer]. [address] [place] [address], [place] [address]. All rights reserved. This information is not intended as a substitute for professional medical care. Always follow your healthcare professional's instructions.        Treating Bipolar Disorder    Bipolar disorder results in extreme mood swings  that can greatly disrupt your life. These symptoms may cause you distress. But with treatment, you can lead a more normal life.  Medicines  Bipolar disorder is often treated with medicines that stabilize moods. They help you feel better by keeping your moods more even, and help prevent future mood swings. Sometimes you may also be prescribed medicines that treat depression. Take your medicine as prescribed. All medicines can have side effects. If you re troubled by side effects, tell your healthcare provider. But don t stop taking your medicine until your healthcare provider tells you. If you do, your symptoms will likely come back.  Talk therapy (psychotherapy)  Talking to a therapist or counselor may be part of your treatment. Having bipolar disorder can make it hard to hold a job or go to school. It can create stress for both you and your loved ones. A therapist can teach you how to cope with bipolar disorder. This can help you lessen manic or depressive episodes, or even prevent them. Your therapist can help you work out problems and heal relationships. He or she can also provide support when you need it most.  Friends and family  Those closest to you may also need support. There are many groups for families of people with bipolar disorder. Learning more about this disorder can help your loved ones cope. It can also help them take an active role in your care.  Looking ahead  People with bipolar disorder have periods with no symptoms. But it is a chronic illness that requires lifetime care. Just as with heart conditions or diabetes, bipolar symptoms can return or treatments many need to be changed. Ongoing professional support is key to effective long-term management. Much research is being done on bipolar disorder. This research may lead to improved treatments and hope for a better future.  Resources    National [place] of Mental Health  207.300.8752  www.nimh.nih.gov    National [place] on Mental Illness   521.904.9579  www.[place].org    [facility]  679.876.6007  www.[facility].org    National Suicide Prevention Lifeline 793-285-FMJS (154-484-1599) www.suicidepreventionlifeline.org   Date Last Reviewed: 5/1/2017 2000-2017 [employer]. [address] [place] [address], [place] [address]. All rights reserved. This information is not intended as a substitute for professional medical care. Always follow your healthcare professional's instructions.

## 2018-05-03 NOTE — MR AVS SNAPSHOT
After Visit Summary   5/3/2018    Sirena Dietrich    MRN: [id number]           Patient Information     Date Of Birth          1985        Visit Information        Provider Department      5/3/2018 11:20 AM Shi Martinez APRN [facility]        Today's Diagnoses     ADD (attention deficit disorder) without hyperactivity    -  1    Severe bipolar I disorder, most recent episode mixed, with psychotic features        Chemical dependency (H)        Anxiety          Care Instructions      Treating Anxiety Disorders with Therapy    If you have an anxiety disorder, you don t have to suffer anymore. Treatment is available. Therapy (also called counseling) is often a helpful treatment for anxiety disorders. With therapy, a specially trained professional (therapist) helps you face and learn to manage your anxiety. Therapy can be short-term or long-term depending on your needs. In some cases, medicine may also be prescribed with therapy. It may take time before you notice how much therapy is helping, but stick with it. With therapy, you can feel better.  Cognitive behavioral therapy (CBT)  Cognitive behavioral therapy (CBT) teaches you to manage anxiety. It does this by helping you understand how you think and act when you re anxious. Research has shown CBT to be a very effective treatment for anxiety disorders. How CBT is run is almost like a class. It involves homework and activities to build skills that teach you to cope with anxiety step by step. It can be done in a group or one-on-one, and often takes place for a set number of sessions. CBT has two main parts:    Cognitive therapy helps you identify the negative, irrational thoughts that occur with your anxiety. You ll learn to replace these with more positive, realistic thoughts.    Behavioral therapy helps you change how you react to anxiety. You ll learn coping skills and methods for relaxing to help you better deal with  anxiety.  Other forms of therapy  Other therapy methods may work better for you than CBT. Or, you may move from CBT to another form of therapy as your treatment needs change. This may mean meeting with a therapist by yourself or in a group. Therapy can also help you work through problems in your life, such as drug or alcohol dependence, that may be making your anxiety worse.  Getting better takes time  Therapy will help you feel better and teach you skills to help manage anxiety long term. But change doesn t happen right away. It takes a commitment from you. And treatment only works if you learn to face the causes of your anxiety. So, you might feel worse before you feel better. This can sometimes make it hard to stick with it. But remember: Therapy is a very effective treatment. The results will be well worth it.  Helping yourself  If anxiety is wearing you down, here are some things you can do to cope:    Check with your doctor and rule out any physical problems that may be causing the anxiety symptoms.    If an anxiety disorder is diagnosed, seek mental healthcare. This is an illness and it can respond to treatment. Most types of anxiety disorders will respond to talk therapy and medicine.    Educate yourself about anxiety disorders. Keep track of helpful online resources and books you can use during stressful periods.    Try stress management techniques such as meditation.    Consider online or in-person support groups.    Don t fight your feelings. Anxiety feeds itself. The more you worry about it, the worse it gets. Instead, try to identify what might have triggered your anxiety. Then try to put this threat in perspective.    Keep in mind that you can t control everything about a situation. Change what you can and let the rest take its course.    Exercise -- it s a great way to relieve tension and help your body feel relaxed.    Examine your life for stress, and try to find ways to reduce it.    Avoid caffeine  and nicotine, which can make anxiety symptoms worse.    Fight the temptation to turn to alcohol or unprescribed drugs for relief. They only make things worse in the long run.   Date Last Reviewed: 1/1/2017 2000-2017 [employer]. [address] [place] [address], [place] [address]. All rights reserved. This information is not intended as a substitute for professional medical care. Always follow your healthcare professional's instructions.        Treating Bipolar Disorder    Bipolar disorder results in extreme mood swings that can greatly disrupt your life. These symptoms may cause you distress. But with treatment, you can lead a more normal life.  Medicines  Bipolar disorder is often treated with medicines that stabilize moods. They help you feel better by keeping your moods more even, and help prevent future mood swings. Sometimes you may also be prescribed medicines that treat depression. Take your medicine as prescribed. All medicines can have side effects. If you re troubled by side effects, tell your healthcare provider. But don t stop taking your medicine until your healthcare provider tells you. If you do, your symptoms will likely come back.  Talk therapy (psychotherapy)  Talking to a therapist or counselor may be part of your treatment. Having bipolar disorder can make it hard to hold a job or go to school. It can create stress for both you and your loved ones. A therapist can teach you how to cope with bipolar disorder. This can help you lessen manic or depressive episodes, or even prevent them. Your therapist can help you work out problems and heal relationships. He or she can also provide support when you need it most.  Friends and family  Those closest to you may also need support. There are many groups for families of people with bipolar disorder. Learning more about this disorder can help your loved ones cope. It can also help them take an active role in your care.  Looking ahead  People with  bipolar disorder have periods with no symptoms. But it is a chronic illness that requires lifetime care. Just as with heart conditions or diabetes, bipolar symptoms can return or treatments many need to be changed. Ongoing professional support is key to effective long-term management. Much research is being done on bipolar disorder. This research may lead to improved treatments and hope for a better future.  Resources    National [place] of Mental Health  144.613.6619  www.[facility].nih.gov    National [place] on Mental Illness  148.922.4852  www.[place].org    [facility] [place]  906.111.8700  www.[facility].org    National Suicide Prevention Lifeline 080-691-XFDM (896-885-0963) www.suicidepreventionlifeline.org   Date Last Reviewed: 5/1/2017 2000-2017 [employer]. [address] [place], [place] [address]. All rights reserved. This information is not intended as a substitute for professional medical care. Always follow your healthcare professional's instructions.                Follow-ups after your visit        Who to contact     If you have questions or need follow up information about today's clinic visit or your schedule please contact [facility] directly at [phone].  Normal or non-critical lab and imaging results will be communicated to you by MyChart, letter or phone within 4 business days after the clinic has received the results. If you do not hear from us within 7 days, please contact the clinic through [facility] or phone. If you have a critical or abnormal lab result, we will notify you by phone as soon as possible.  Submit refill requests through [employer] or call your pharmacy and they will forward the refill request to us. Please allow 3 business days for your refill to be completed.          Additional Information About Your Visit        [facility] Information     [employer] gives you secure access to your electronic health record. If you see a primary care provider, you can also  "send messages to your care team and make appointments. If you have questions, please call your primary care clinic.  If you do not have a primary care provider, please call [phone] and they will assist you.        Care EveryWhere ID     This is your Care EveryWhere ID. This could be used by other organizations to access your [facility] medical records  [id number]        Your Vitals Were     Pulse Temperature Respirations Height Last Period BMI (Body Mass Index)    92 98  F (36.7  C) (Tympanic) 16 5' 6\" (1.676 m) (LMP Unknown) 24.37 kg/m2       Blood Pressure from Last 3 Encounters:   05/03/18 110/73   03/21/18 112/62   03/11/18 126/73    Weight from Last 3 Encounters:   05/03/18 151 lb (68.5 kg)   03/21/18 144 lb (65.3 kg)   03/11/18 144 lb (65.3 kg)              Today, you had the following     No orders found for display         Today's Medication Changes          These changes are accurate as of 5/3/18 12:15 PM.  If you have any questions, ask your nurse or doctor.               Start taking these medicines.        Dose/Directions    buPROPion 75 MG tablet   Commonly known as:  WELLBUTRIN   Used for:  ADD (attention deficit disorder) without hyperactivity, Chemical dependency (H)   Started by:  Shi Martinez APRN CNP        Dose:  75 mg   Take 1 tablet (75 mg) by mouth 2 times daily   Quantity:  60 tablet   Refills:  2       hydrOXYzine 25 MG tablet   Commonly known as:  ATARAX   Used for:  Anxiety   Started by:  Shi Martinez APRN CNP        Dose:  25-50 mg   Take 1-2 tablets (25-50 mg) by mouth every 6 hours as needed for anxiety   Quantity:  30 tablet   Refills:  1         These medicines have changed or have updated prescriptions.        Dose/Directions    gabapentin 600 MG tablet   Commonly known as:  NEURONTIN   This may have changed:    - how much to take  - when to take this  - Another medication with the same name was removed. Continue taking this medication, and follow the " directions you see here.   Used for:  Severe bipolar I disorder, most recent episode mixed, with psychotic features (H)        Dose:  1200 mg   Take 2 tablets (1,200 mg) by mouth At Bedtime   Quantity:  60 tablet   Refills:  0       lamoTRIgine 200 MG tablet   Commonly known as:  LaMICtal   This may have changed:  Another medication with the same name was removed. Continue taking this medication, and follow the directions you see here.   Used for:  Severe bipolar I disorder, most recent episode mixed, with psychotic features (H)   Changed by:  Shi Martinez APRN CNP        Dose:  200 mg   Take 1 tablet (200 mg) by mouth daily   Quantity:  30 tablet   Refills:  1         Stop taking these medicines if you haven't already. Please contact your care team if you have questions.     ondansetron 8 MG tablet   Commonly known as:  ZOFRAN   Stopped by:  Shi Martinez APRN CNP           traZODone 50 MG tablet   Commonly known as:  DESYREL   Stopped by:  Shi Martinez APRN CNP                Where to get your medicines      These medications were sent to [facility] [address] [address]     Phone:  [phone]     buPROPion 75 MG tablet    hydrOXYzine 25 MG tablet                Primary Care Provider Office Phone # Fax #    DIPESH Marino [phone]       [address] [place] [id number]        Goals        General    I will attend counseling appointment (pt-stated)     Notes - Note edited  11/25/2015  1:35 PM by Shi Armstrong LSW    As of today's date 11/25/2015 goal is met at 76 - 100%.   Goal Status:  Complete - will keep to maintain for  A few month  As of today's date 7/28/2015 goal is met at 26 - 50%.   Goal Status:  Active          Equal Access to Services     SAMRA KAPLAN AH: Ian Nazario, wapravinda stella, qaemily kaaltrinidad dominguez, raheem day  ah. So [facility] [phone].    ATENCIÓN: Si maikol prado, tiene a borja disposición servicios gratuitos de asistencia lingüística. Andres guillen [phone].    We comply with applicable federal civil rights laws and [place] laws. We do not discriminate on the basis of race, color, national origin, age, disability, sex, sexual orientation, or gender identity.            Thank you!     Thank you for choosing [facility]  for your care. Our goal is always to provide you with excellent care. Hearing back from our patients is one way we can continue to improve our services. Please take a few minutes to complete the written survey that you may receive in the mail after your visit with us. Thank you!             Your Updated Medication List - Protect others around you: Learn how to safely use, store and throw away your medicines at www.disposemymeds.org.          This list is accurate as of 5/3/18 12:15 PM.  Always use your most recent med list.                   Brand Name Dispense Instructions for use Diagnosis    buPROPion 75 MG tablet    WELLBUTRIN    60 tablet    Take 1 tablet (75 mg) by mouth 2 times daily    ADD (attention deficit disorder) without hyperactivity, Chemical dependency (H)       gabapentin 600 MG tablet    NEURONTIN    60 tablet    Take 2 tablets (1,200 mg) by mouth At Bedtime    Severe bipolar I disorder, most recent episode mixed, with psychotic features (H)       hydrOXYzine 25 MG tablet    ATARAX    30 tablet    Take 1-2 tablets (25-50 mg) by mouth every 6 hours as needed for anxiety    Anxiety       lamoTRIgine 200 MG tablet    LaMICtal    30 tablet    Take 1 tablet (200 mg) by mouth daily    Severe bipolar I disorder, most recent episode mixed, with psychotic features (H)       lidocaine HCl 3 % cream     453.6 g    Apply topically 3 times daily    Yeast infection of the vagina       methocarbamol 750 MG tablet    ROBAXIN    40 tablet    Take 1 tablet (750 mg) by mouth 4 times daily as  needed for muscle spasms    Spasm of back muscles       prochlorperazine 5 MG tablet    COMPAZINE    20 tablet    Take 2 tablets (10 mg) by mouth every 6 hours as needed for nausea or vomiting    Nausea       * QUEtiapine 25 MG tablet    SEROquel    60 tablet    Take 1-2 tablets (25-50 mg) by mouth every 6 hours as needed    Severe bipolar I disorder, most recent episode mixed, with psychotic features (H)       * QUEtiapine 100 MG tablet    SEROquel    60 tablet    Take 2 tablets (200 mg) by mouth daily For one week if insomnia persists to 300 mg daily    Severe bipolar I disorder, most recent episode mixed, with psychotic features (H)       SUMAtriptan 6 MG/0.5ML pen injector kit    IMITREX STATDOSE    3 kit    Inject 0.5 mLs (6 mg) Subcutaneous at onset of headache for migraine    Migraine without aura and with status migrainosus, not intractable       * Notice:  This list has 2 medication(s) that are the same as other medications prescribed for you. Read the directions carefully, and ask your doctor or other care provider to review them with you.

## 2018-05-04 ASSESSMENT — ANXIETY QUESTIONNAIRES: GAD7 TOTAL SCORE: 19

## 2018-05-04 ASSESSMENT — PATIENT HEALTH QUESTIONNAIRE - PHQ9: SUM OF ALL RESPONSES TO PHQ QUESTIONS 1-9: 27

## 2018-05-23 ENCOUNTER — OFFICE VISIT (OUTPATIENT)
Dept: FAMILY MEDICINE | Facility: CLINIC | Age: 33
End: 2018-05-23
Payer: COMMERCIAL

## 2018-05-23 VITALS
RESPIRATION RATE: 24 BRPM | HEART RATE: 110 BPM | SYSTOLIC BLOOD PRESSURE: 120 MMHG | BODY MASS INDEX: 24.34 KG/M2 | TEMPERATURE: 98.7 F | WEIGHT: 150.8 LBS | DIASTOLIC BLOOD PRESSURE: 80 MMHG

## 2018-05-23 DIAGNOSIS — N76.0 BV (BACTERIAL VAGINOSIS): Primary | ICD-10-CM

## 2018-05-23 DIAGNOSIS — B96.89 BV (BACTERIAL VAGINOSIS): Primary | ICD-10-CM

## 2018-05-23 LAB
SPECIMEN SOURCE: ABNORMAL
WET PREP SPEC: ABNORMAL

## 2018-05-23 PROCEDURE — 99213 OFFICE O/P EST LOW 20 MIN: CPT | Performed by: NURSE PRACTITIONER

## 2018-05-23 PROCEDURE — 87210 SMEAR WET MOUNT SALINE/INK: CPT | Performed by: NURSE PRACTITIONER

## 2018-05-23 RX ORDER — METRONIDAZOLE 7.5 MG/G
1 GEL VAGINAL AT BEDTIME
Qty: 70 G | Refills: 0 | Status: SHIPPED | OUTPATIENT
Start: 2018-05-23 | End: 2018-05-28

## 2018-05-23 ASSESSMENT — PAIN SCALES - GENERAL: PAINLEVEL: NO PAIN (0)
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
## 2018-05-23 NOTE — MR AVS SNAPSHOT
After Visit Summary   2018    Sirena Dietrich    MRN: [id number]           Patient Information     Date Of Birth          1985        Visit Information        Provider Department      2018 12:40 PM Aleja Cedeño APRN [facility]        Today's Diagnoses     Female genital symptoms    -  1    BV (bacterial vaginosis)          Care Instructions    Gel at bedtime for bacterial vaginosis     No baths with soap or bubbles     Use sensitive soaps      Bacterial Vaginosis    You have a vaginal infection called bacterial vaginosis (BV). Both good and bad bacteria are present in a healthy vagina. BV occurs when these bacteria get out of balance. The number of bad bacteria increase. And the number of good bacteria decrease. Although BV is associated with sexual activity, it is not a sexually transmitted disease.  BV may or may not cause symptoms. If symptoms do occur, they can include:    Thin, gray, milky-white, or sometimes green discharge    Unpleasant odor or  fishy  smell    Itching, burning, or pain in or around the vagina  It is not known what causes BV, but certain factors can make the problem more likely. This can include:    Douching    Having sex with a new partner    Having sex with more than one partner  BV will sometimes go away on its own. But treatment is usually recommended. This is because untreated BV can increase the risk of more serious health problems such as:    Pelvic inflammatory disease (PID)     delivery (giving birth to a baby early if you re pregnant)    HIV and certain other sexually transmitted diseases (STDs)    Infection after surgery on the reproductive organs  Home care  General care    BV is most often treated with medicines called antibiotics. These may be given as pills or as a vaginal cream. If antibiotics are prescribed, be sure to use them exactly as directed. Also, be sure to complete all of the medicine, even if your  symptoms go away.    Don't douche or having sex during treatment.    If you have sex with a female partner, ask your healthcare provider if she should also be treated.  Prevention    Don't douche.    Don't have sex. If you do have sex, then take steps to lower your risk:  ? Use condoms when having sex.  ? Limit the number of sexual partners you have.  Follow-up care  Follow up with your healthcare provider, or as advised.  When to seek medical advice  Call your healthcare provider right away if:    You have a fever of 100.4 F (38 C) or higher, or as directed by your provider.    Your symptoms worsen, or they don t go away within a few days of starting treatment.    You have new pain in the lower belly or pelvic region.    You have side effects that bother you or a reaction to the pills or cream you re prescribed.    You or any partners you have sex with have new symptoms, such as a rash, joint pain, or sores.  Date Last Reviewed: 10/1/2017    [phone] [employer]. [address] [place] [address]. All rights reserved. This information is not intended as a substitute for professional medical care. Always follow your healthcare professional's instructions.                Follow-ups after your visit        Who to contact     If you have questions or need follow up information about today's clinic visit or your schedule please contact [facility] directly at [phone].  Normal or non-critical lab and imaging results will be communicated to you by MyChart, letter or phone within 4 business days after the clinic has received the results. If you do not hear from us within 7 days, please contact the clinic through MyChart or phone. If you have a critical or abnormal lab result, we will notify you by phone as soon as possible.  Submit refill requests through [employer] or call your pharmacy and they will forward the refill request to us. Please allow 3 business days for your refill to  be completed.          Additional Information About Your Visit        [employer] Information     [employer] gives you secure access to your electronic health record. If you see a primary care provider, you can also send messages to your care team and make appointments. If you have questions, please call your primary care clinic.  If you do not have a primary care provider, please call [phone] and they will assist you.        Care EveryWhere ID     This is your Care EveryWhere ID. This could be used by other organizations to access your [facility] medical records  [id number]        Your Vitals Were     Pulse Temperature Respirations Last Period BMI (Body Mass Index)       110 98.7  F (37.1  C) 24 (LMP Unknown) 24.34 kg/m2        Blood Pressure from Last 3 Encounters:   05/23/18 120/80   05/03/18 110/73   03/21/18 112/62    Weight from Last 3 Encounters:   05/23/18 150 lb 12.8 oz (68.4 kg)   05/03/18 151 lb (68.5 kg)   03/21/18 144 lb (65.3 kg)              We Performed the Following     Wet prep          Today's Medication Changes          These changes are accurate as of 5/23/18  1:12 PM.  If you have any questions, ask your nurse or doctor.               Start taking these medicines.        Dose/Directions    metroNIDAZOLE 0.75 % vaginal gel   Commonly known as:  METROGEL   Used for:  BV (bacterial vaginosis)   Started by:  Aleja Cedeño APRN CNP        Dose:  1 applicator   Place 1 applicator (5 g) vaginally At Bedtime for 5 days   Quantity:  70 g   Refills:  0         These medicines have changed or have updated prescriptions.        Dose/Directions    gabapentin 600 MG tablet   Commonly known as:  NEURONTIN   This may have changed:    - how much to take  - when to take this   Used for:  Severe bipolar I disorder, most recent episode mixed, with psychotic features (H)        Dose:  1200 mg   Take 2 tablets (1,200 mg) by mouth At Bedtime   Quantity:  60 tablet   Refills:  0            Where to get your  medicines      These medications were sent [place] [facility] [place] [address]  [address] [facility] [address]     Phone:  [phone]     metroNIDAZOLE 0.75 % vaginal gel                Primary Care Provider Office Phone # Fax #    Shi Zhao JuanDIPESH [phone]       [address] [facility] [address]        Goals        General    I will attend counseling appointment (pt-stated)     Notes - Note edited  11/25/2015  1:35 PM by Shi Armstrong LSW    As of today's date 11/25/2015 goal is met at 76 - 100%.   Goal Status:  Complete - will keep to maintain for  A few month  As of today's date 7/28/2015 goal is met at 26 - 50%.   Goal Status:  Active          Equal Access to Services     SAMRA KAPLAN : Hadii aad ku hadasho Sorian, waaxda luqadaha, qaybta kaalmada adeegyada, raheem day . So [facility] [phone].    ATENCIÓN: Si habla español, tiene a borja disposición servicios gratuitos de asistencia lingüística. Llame al [phone].    We comply with applicable federal civil rights laws and [place] laws. We do not discriminate on the basis of race, color, national origin, age, disability, sex, sexual orientation, or gender identity.            Thank you!     Thank you for choosing [facility]  for your care. Our goal is always to provide you with excellent care. Hearing back from our patients is one way we can continue to improve our services. Please take a few minutes to complete the written survey that you may receive in the mail after your visit with us. Thank you!             Your Updated Medication List - Protect others around you: Learn how to safely use, store and throw away your medicines at www.disposemymeds.org.          This list is accurate as of 5/23/18  1:12 PM.  Always use your most recent med list.                   Brand Name Dispense Instructions for use Diagnosis    buPROPion 75 MG tablet     WELLBUTRIN    60 tablet    Take 1 tablet (75 mg) by mouth 2 times daily    ADD (attention deficit disorder) without hyperactivity, Chemical dependency (H)       gabapentin 600 MG tablet    NEURONTIN    60 tablet    Take 2 tablets (1,200 mg) by mouth At Bedtime    Severe bipolar I disorder, most recent episode mixed, with psychotic features (H)       hydrOXYzine 25 MG tablet    ATARAX    30 tablet    Take 1-2 tablets (25-50 mg) by mouth every 6 hours as needed for anxiety    Anxiety       lamoTRIgine 200 MG tablet    LaMICtal    30 tablet    Take 1 tablet (200 mg) by mouth daily    Severe bipolar I disorder, most recent episode mixed, with psychotic features (H)       lidocaine HCl 3 % cream     453.6 g    Apply topically 3 times daily    Yeast infection of the vagina       methocarbamol 750 MG tablet    ROBAXIN    40 tablet    Take 1 tablet (750 mg) by mouth 4 times daily as needed for muscle spasms    Spasm of back muscles       metroNIDAZOLE 0.75 % vaginal gel    METROGEL    70 g    Place 1 applicator (5 g) vaginally At Bedtime for 5 days    BV (bacterial vaginosis)       prochlorperazine 5 MG tablet    COMPAZINE    20 tablet    Take 2 tablets (10 mg) by mouth every 6 hours as needed for nausea or vomiting    Nausea       * QUEtiapine 25 MG tablet    SEROquel    60 tablet    Take 1-2 tablets (25-50 mg) by mouth every 6 hours as needed    Severe bipolar I disorder, most recent episode mixed, with psychotic features (H)       * QUEtiapine 100 MG tablet    SEROquel    60 tablet    Take 2 tablets (200 mg) by mouth daily For one week if insomnia persists to 300 mg daily    Severe bipolar I disorder, most recent episode mixed, with psychotic features (H)       SUMAtriptan 6 MG/0.5ML pen injector kit    IMITREX STATDOSE    3 kit    Inject 0.5 mLs (6 mg) Subcutaneous at onset of headache for migraine    Migraine without aura and with status migrainosus, not intractable       * Notice:  This list has 2 medication(s) that  are the same as other medications prescribed for you. Read the directions carefully, and ask your doctor or other care provider to review them with you.

## 2018-05-23 NOTE — PATIENT INSTRUCTIONS
Gel at bedtime for bacterial vaginosis     No baths with soap or bubbles     Use sensitive soaps      Bacterial Vaginosis    You have a vaginal infection called bacterial vaginosis (BV). Both good and bad bacteria are present in a healthy vagina. BV occurs when these bacteria get out of balance. The number of bad bacteria increase. And the number of good bacteria decrease. Although BV is associated with sexual activity, it is not a sexually transmitted disease.  BV may or may not cause symptoms. If symptoms do occur, they can include:    Thin, gray, milky-white, or sometimes green discharge    Unpleasant odor or  fishy  smell    Itching, burning, or pain in or around the vagina  It is not known what causes BV, but certain factors can make the problem more likely. This can include:    Douching    Having sex with a new partner    Having sex with more than one partner  BV will sometimes go away on its own. But treatment is usually recommended. This is because untreated BV can increase the risk of more serious health problems such as:    Pelvic inflammatory disease (PID)     delivery (giving birth to a baby early if you re pregnant)    HIV and certain other sexually transmitted diseases (STDs)    Infection after surgery on the reproductive organs  Home care  General care    BV is most often treated with medicines called antibiotics. These may be given as pills or as a vaginal cream. If antibiotics are prescribed, be sure to use them exactly as directed. Also, be sure to complete all of the medicine, even if your symptoms go away.    Don't douche or having sex during treatment.    If you have sex with a female partner, ask your healthcare provider if she should also be treated.  Prevention    Don't douche.    Don't have sex. If you do have sex, then take steps to lower your risk:  ? Use condoms when having sex.  ? Limit the number of sexual partners you have.  Follow-up care  Follow up with your healthcare  provider, or as advised.  When to seek medical advice  Call your healthcare provider right away if:    You have a fever of 100.4 F (38 C) or higher, or as directed by your provider.    Your symptoms worsen, or they don t go away within a few days of starting treatment.    You have new pain in the lower belly or pelvic region.    You have side effects that bother you or a reaction to the pills or cream you re prescribed.    You or any partners you have sex with have new symptoms, such as a rash, joint pain, or sores.  Date Last Reviewed: 10/1/2017    8316-4192 [employer]. [address] [place] [address]. All rights reserved. This information is not intended as a substitute for professional medical care. Always follow your healthcare professional's instructions.

## 2018-05-23 NOTE — NURSING NOTE
"Chief Complaint   Patient presents with     Vaginal Problem       Initial /80  Pulse 110  Temp 98.7  F (37.1  C)  Resp 24  Wt 150 lb 12.8 oz (68.4 kg)  LMP  (LMP Unknown)  BMI 24.34 kg/m2 Estimated body mass index is 24.34 kg/(m^2) as calculated from the following:    Height as of 5/3/18: 5' 6\" (1.676 m).    Weight as of this encounter: 150 lb 12.8 oz (68.4 kg).      Health Maintenance that is potentially due pending provider review:  NONE    n/a    Is there anyone who you would like to be able to receive your results? No  If yes have patient fill out JOSÉ    "

## 2018-06-13 ENCOUNTER — TELEPHONE (OUTPATIENT)
Dept: FAMILY MEDICINE | Facility: CLINIC | Age: 33
End: 2018-06-13
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
## 2018-07-07 ENCOUNTER — OFFICE VISIT (OUTPATIENT)
Dept: URGENT CARE | Facility: URGENT CARE | Age: 33
End: 2018-07-07
Payer: COMMERCIAL

## 2018-07-07 VITALS
WEIGHT: 150 LBS | SYSTOLIC BLOOD PRESSURE: 118 MMHG | DIASTOLIC BLOOD PRESSURE: 76 MMHG | RESPIRATION RATE: 18 BRPM | HEART RATE: 68 BPM | TEMPERATURE: 97.7 F | BODY MASS INDEX: 24.21 KG/M2

## 2018-07-07 DIAGNOSIS — N89.8 VAGINAL DISCHARGE: ICD-10-CM

## 2018-07-07 DIAGNOSIS — J01.90 ACUTE SINUSITIS WITH SYMPTOMS > 10 DAYS: Primary | ICD-10-CM

## 2018-07-07 DIAGNOSIS — N76.0 BV (BACTERIAL VAGINOSIS): ICD-10-CM

## 2018-07-07 DIAGNOSIS — B96.89 BV (BACTERIAL VAGINOSIS): ICD-10-CM

## 2018-07-07 LAB
SPECIMEN SOURCE: ABNORMAL
WET PREP SPEC: ABNORMAL

## 2018-07-07 PROCEDURE — 87210 SMEAR WET MOUNT SALINE/INK: CPT | Performed by: PHYSICIAN ASSISTANT

## 2018-07-07 PROCEDURE — 99214 OFFICE O/P EST MOD 30 MIN: CPT | Performed by: PHYSICIAN ASSISTANT

## 2018-07-07 RX ORDER — LAMOTRIGINE 200 MG/1
200 TABLET ORAL DAILY
Qty: 30 TABLET | Refills: 1 | Status: CANCELLED | OUTPATIENT
Start: 2018-07-07

## 2018-07-07 RX ORDER — FLUCONAZOLE 150 MG/1
TABLET ORAL
Qty: 2 TABLET | Refills: 0 | Status: SHIPPED | OUTPATIENT
Start: 2018-07-07 | End: 2018-09-18

## 2018-07-07 RX ORDER — QUETIAPINE FUMARATE 25 MG/1
25-50 TABLET, FILM COATED ORAL EVERY 6 HOURS PRN
Qty: 60 TABLET | Refills: 0 | Status: CANCELLED | OUTPATIENT
Start: 2018-07-07

## 2018-07-07 RX ORDER — GABAPENTIN 600 MG/1
1200 TABLET ORAL AT BEDTIME
Qty: 60 TABLET | Refills: 0 | Status: CANCELLED | OUTPATIENT
Start: 2018-07-07

## 2018-07-07 RX ORDER — DOXYCYCLINE 100 MG/1
100 CAPSULE ORAL 2 TIMES DAILY
Qty: 14 CAPSULE | Refills: 0 | Status: SHIPPED | OUTPATIENT
Start: 2018-07-07 | End: 2018-07-14

## 2018-07-07 ASSESSMENT — ENCOUNTER SYMPTOMS
COUGH: 1
NAUSEA: 0
SHORTNESS OF BREATH: 0
SINUS PRESSURE: 1
ARTHRALGIAS: 0
EYE REDNESS: 0
RHINORRHEA: 0
FEVER: 0
TROUBLE SWALLOWING: 0
WHEEZING: 0
VOMITING: 0
MYALGIAS: 0
EYE PAIN: 0
SORE THROAT: 0
CHEST TIGHTNESS: 0
BACK PAIN: 0
ABDOMINAL PAIN: 0
UNEXPECTED WEIGHT CHANGE: 0
DIARRHEA: 0
FATIGUE: 0
PALPITATIONS: 0
SINUS PAIN: 1
CHILLS: 0

## 2018-07-07 NOTE — NURSING NOTE
"Chief Complaint   Patient presents with     URI     Started about a week ago.  Congestion, slight cough, Sinus pressure and pain. Ear pressure. Fatigue.  Headaches.  Tylenol and Ibuporfen     Refill Request     Lamictal, Seroquel and gabapentin     Vaginal Discharge     Since June. Was treated for Strep a month ago.        Initial /76 (BP Location: Right arm, Cuff Size: Adult Regular)  Pulse 68  Temp 97.7  F (36.5  C) (Tympanic)  Resp 18  Wt 150 lb (68 kg)  LMP  (LMP Unknown)  BMI 24.21 kg/m2 Estimated body mass index is 24.21 kg/(m^2) as calculated from the following:    Height as of 5/3/18: 5' 6\" (1.676 m).    Weight as of this encounter: 150 lb (68 kg).      Health Maintenance that is potentially due pending provider review:  NONE    n/a    Is there anyone who you would like to be able to receive your results? Not Applicable  If yes have patient fill out JOSÉ Tobin M.A.        "

## 2018-07-07 NOTE — PROGRESS NOTES
SUBJECTIVE:   Sirena Dietrich is a 32 year old female presenting with a chief complaint of   Chief Complaint   Patient presents with     URI     Started about a week ago.  Congestion, slight cough, Sinus pressure and pain. Ear pressure. Fatigue.  Headaches.  Tylenol and Ibuporfen     Refill Request     Lamictal, Seroquel and gabapentin     Vaginal Discharge     Since June. Was treated for Strep a month ago.        She is an established patient of [facility].    URI Adult    Onset of symptoms was 2 week(s) ago.  Course of illness is worsening.    Severity moderate  Current and Associated symptoms: cough - non-productive, ear pain bilateral and facial pain/pressure  Treatment measures tried include Tylenol/Ibuprofen.  Predisposing factors include None.      Also having vaginal discharge x 1 month. Was recently treated with metronidazole insert for bacterial vaginosis. No urinary symptoms. No flank pain. No fever/chills.       Review of Systems   Constitutional: Negative for chills, fatigue, fever and unexpected weight change.   HENT: Positive for sinus pain and sinus pressure. Negative for congestion, ear pain, postnasal drip, rhinorrhea, sore throat and trouble swallowing.    Eyes: Negative for pain, redness and visual disturbance.   Respiratory: Positive for cough. Negative for chest tightness, shortness of breath and wheezing.    Cardiovascular: Negative for chest pain and palpitations.   Gastrointestinal: Negative for abdominal pain, diarrhea, nausea and vomiting.   Genitourinary: Positive for vaginal discharge.   Musculoskeletal: Negative for arthralgias, back pain and myalgias.   Skin: Negative for rash.       Past Medical History:   Diagnosis Date     Agoraphobia with panic disorder 5/20/2013     ATTN DEFICIT W HYPERACT 12/15/2005    Off medication (strattera)      Bipolar I disorder, most recent episode (or current) mixed, severe, specified as with psychotic behavior 5/20/2013     Hypothyroidism 3/5/2010    3/10:  pt reports fatigue, TSH wnl however Free T4 low. Started on levothyroxine 50mcg 6/10: TSH and FT4 normal off medications.  Remained normal       Migraine headache 2012     Other abnormal heart sounds     as a child     Papanicolaou smear of cervix with low grade squamous intraepithelial lesion (LGSIL) 3/2008    colpo negative     Pelvic abscess in female 1/10/2015     PTSD (post-traumatic stress disorder) 2013    Related to       Urinary incontinence 2012    kegels-cough/sneeze and urgency.  Nocturia-a few.        Uterus, adenomyosis 12/15/2014    Hysterectomy 2014      Family History   Problem Relation Age of Onset     Alcohol/Drug Mother      drugs     Depression Mother      HEART DISEASE Mother      ?     Alcohol/Drug Father      drugs     Depression Father      Hypertension Maternal Grandmother      Cancer Maternal Grandmother      cervical     Depression Maternal Grandmother      HEART DISEASE Maternal Grandmother      Hypertension Brother      Bipolar Disorder Other      Bipolar Disorder Other      HEART DISEASE Other      stents, clogged arteries     Current Outpatient Prescriptions   Medication Sig Dispense Refill     clindamycin (CLEOCIN) 100 MG vaginal suppository Place 1 suppository (100 mg) vaginally At Bedtime for 3 days 3 suppository 0     doxycycline monohydrate 100 MG capsule Take 1 capsule (100 mg) by mouth 2 times daily for 7 days 14 capsule 0     fluconazole (DIFLUCAN) 150 MG tablet Take 1 tablet by mouth x 1; May repeat in 1 week if still symptomatic 2 tablet 0     gabapentin (NEURONTIN) 600 MG tablet Take 2 tablets (1,200 mg) by mouth At Bedtime (Patient taking differently: Take 600 mg by mouth 2 times daily ) 60 tablet 0     lamoTRIgine (LAMICTAL) 200 MG tablet Take 1 tablet (200 mg) by mouth daily 30 tablet 1     lidocaine HCl 3 % cream Apply topically 3 times daily 453.6 g 0     methocarbamol (ROBAXIN) 750 MG tablet Take 1 tablet (750 mg) by mouth 4 times daily  as needed for muscle spasms 40 tablet 0     prochlorperazine (COMPAZINE) 5 MG tablet Take 2 tablets (10 mg) by mouth every 6 hours as needed for nausea or vomiting 20 tablet 0     QUEtiapine (SEROQUEL) 25 MG tablet Take 1-2 tablets (25-50 mg) by mouth every 6 hours as needed (Patient taking differently: Take 50 mg by mouth every 6 hours as needed ) 60 tablet 0     SUMAtriptan (IMITREX STATDOSE) 6 MG/0.5ML pen injector kit Inject 0.5 mLs (6 mg) Subcutaneous at onset of headache for migraine 3 kit 3     hydrOXYzine (ATARAX) 25 MG tablet Take 1-2 tablets (25-50 mg) by mouth every 6 hours as needed for anxiety (Patient not taking: Reported on 7/7/2018) 30 tablet 1     [DISCONTINUED] QUEtiapine (SEROQUEL) 100 MG tablet Take 2 tablets (200 mg) by mouth daily For one week if insomnia persists to 300 mg daily (Patient not taking: Reported on 7/7/2018) 60 tablet 0     Social History   Substance Use Topics     Smoking status: Former Smoker     Packs/day: 0.50     Types: Cigarettes     Quit date: 5/2/2018     Smokeless tobacco: Never Used      Comment: rare use     Alcohol use Yes      Comment: rare        OBJECTIVE  /76 (BP Location: Right arm, Cuff Size: Adult Regular)  Pulse 68  Temp 97.7  F (36.5  C) (Tympanic)  Resp 18  Wt 150 lb (68 kg)  LMP  (LMP Unknown)  BMI 24.21 kg/m2    Physical Exam   Constitutional: She appears well-developed and well-nourished.   HENT:   Head: Normocephalic.   Right Ear: Tympanic membrane and ear canal normal.   Left Ear: Tympanic membrane and ear canal normal.   Nose: Mucosal edema and rhinorrhea present.   Mouth/Throat: Oropharynx is clear and moist.   Eyes: Conjunctivae are normal. Pupils are equal, round, and reactive to light.   Cardiovascular: Normal rate and normal heart sounds.    Pulmonary/Chest: Effort normal and breath sounds normal.   Skin: Skin is warm and dry. No rash noted.   Psychiatric: She has a normal mood and affect. Her behavior is normal.       Labs:  Results  for orders placed or performed in visit on 07/07/18 (from the past 24 hour(s))   Wet prep   Result Value Ref Range    Specimen Description Vagina     Wet Prep No Trichomonas seen     Wet Prep Clue cells seen (A)     Wet Prep No yeast seen      *Note: Due to a large number of results and/or encounters for the requested time period, some results have not been displayed. A complete set of results can be found in Results Review.       X-Ray was not done.    ASSESSMENT:      ICD-10-CM    1. Acute sinusitis with symptoms > 10 days J01.90 doxycycline monohydrate 100 MG capsule   2. Vaginal discharge N89.8 Wet prep     fluconazole (DIFLUCAN) 150 MG tablet   3. BV (bacterial vaginosis) N76.0 clindamycin (CLEOCIN) 100 MG vaginal suppository    B96.89         Medical Decision Making:    Differential Diagnosis:  URI Adult/Peds:  Bronchitis-viral, Sinusitis, Viral pharyngitis and Viral upper respiratory illness  Gyn Problem: Vaginitis:  yeast, bacterial vaginosis    Serious Comorbid Conditions:  Adult:  None    PLAN:    URI Adult:  Tylenol, Ibuprofen, Fluids, Rest and RX sinusitis  Doxycyline x 7 days   Gyn Problem:  Clindamycin insert x 5 days for bacterial vaginosis, fluconazole if needed with history of yeast infections with oral antibiotics     Followup:    If not improving or if condition worsens, follow up with your Primary Care Provider    There are no Patient Instructions on file for this visit.

## 2018-07-07 NOTE — MR AVS SNAPSHOT
After Visit Summary   7/7/2018    Sirena Dietrich    MRN: [id number]           Patient Information     Date Of Birth          1985        Visit Information        Provider Department      7/7/2018 12:20 PM Brit Forbes PA-C [facility]        Today's Diagnoses     Acute sinusitis with symptoms > 10 days    -  1    Vaginal discharge        BV (bacterial vaginosis)           Follow-ups after your visit        Follow-up notes from your care team     Return if symptoms worsen or fail to improve.      Who to contact     If you have questions or need follow up information about today's clinic visit or your schedule please contact [facility] directly at [phone].  Normal or non-critical lab and imaging results will be communicated to you by MyChart, letter or phone within 4 business days after the clinic has received the results. If you do not hear from us within 7 days, please contact the clinic through [employer] or phone. If you have a critical or abnormal lab result, we will notify you by phone as soon as possible.  Submit refill requests through [employer] or call your pharmacy and they will forward the refill request to us. Please allow 3 business days for your refill to be completed.          Additional Information About Your Visit        MyChart Information     [employer] gives you secure access to your electronic health record. If you see a primary care provider, you can also send messages to your care team and make appointments. If you have questions, please call your primary care clinic.  If you do not have a primary care provider, please call [phone] and they will assist you.        Care EveryWhere ID     This is your Care EveryWhere ID. This could be used by other organizations to access your [facility] medical records  [id number]        Your Vitals Were     Pulse Temperature Respirations Last Period BMI (Body Mass Index)        68 97.7  F (36.5  C) (Tympanic) 18 (LMP Unknown) 24.21 kg/m2        Blood Pressure from Last 3 Encounters:   07/07/18 118/76   05/23/18 120/80   05/03/18 110/73    Weight from Last 3 Encounters:   07/07/18 150 lb (68 kg)   05/23/18 150 lb 12.8 oz (68.4 kg)   05/03/18 151 lb (68.5 kg)              We Performed the Following     Wet prep          Today's Medication Changes          These changes are accurate as of 7/7/18  1:22 PM.  If you have any questions, ask your nurse or doctor.               Start taking these medicines.        Dose/Directions    clindamycin 100 MG vaginal suppository   Commonly known as:  CLEOCIN   Used for:  BV (bacterial vaginosis)   Started by:  Brit Forbes PA-C        Dose:  100 mg   Place 1 suppository (100 mg) vaginally At Bedtime for 3 days   Quantity:  3 suppository   Refills:  0       doxycycline monohydrate 100 MG capsule   Used for:  Acute sinusitis with symptoms > 10 days   Started by:  Brit Forbes PA-C        Dose:  100 mg   Take 1 capsule (100 mg) by mouth 2 times daily for 7 days   Quantity:  14 capsule   Refills:  0       fluconazole 150 MG tablet   Commonly known as:  DIFLUCAN   Used for:  Vaginal discharge   Started by:  Brit Forbes PA-C        Take 1 tablet by mouth x 1; May repeat in 1 week if still symptomatic   Quantity:  2 tablet   Refills:  0         These medicines have changed or have updated prescriptions.        Dose/Directions    gabapentin 600 MG tablet   Commonly known as:  NEURONTIN   This may have changed:    - how much to take  - when to take this   Used for:  Severe bipolar I disorder, most recent episode mixed, with psychotic features (H)        Dose:  1200 mg   Take 2 tablets (1,200 mg) by mouth At Bedtime   Quantity:  60 tablet   Refills:  0       QUEtiapine 25 MG tablet   Commonly known as:  SEROquel   This may have changed:  how much to take   Used for:  Severe bipolar I disorder, most recent episode mixed, with psychotic features  (H)        Dose:  25-50 mg   Take 1-2 tablets (25-50 mg) by mouth every 6 hours as needed   Quantity:  60 tablet   Refills:  0            Where to get your medicines      These medications were sent to [id number] - [place], [place] - [address] [facility] [address]    Hours:  Closed 10-16-08 business to [facility] Phone:  [phone]     clindamycin 100 MG vaginal suppository    doxycycline monohydrate 100 MG capsule    fluconazole 150 MG tablet                Primary Care Provider Office Phone # Fax #    Shi Martinez, APRN [facility] [phone]       [address] [facility] [address]        Goals        General    I will attend counseling appointment (pt-stated)     Notes - Note edited  11/25/2015  1:35 PM by Shi Armstrong LSW    As of today's date 11/25/2015 goal is met at 76 - 100%.   Goal Status:  Complete - will keep to maintain for  A few month  As of today's date 7/28/2015 goal is met at 26 - 50%.   Goal Status:  Active          Equal Access to Services     [facility]: Hadii jo yoder hadasho Sorian, waaxda luqadaha, qaybta kaalmada angelica, raheem day . So [facility] [phone].    ATENCIÓN: Si habla español, tiene a borja disposición servicios gratuitos de asistencia lingüística. Andres al [phone].    We comply with applicable federal civil rights laws and [place] laws. We do not discriminate on the basis of race, color, national origin, age, disability, sex, sexual orientation, or gender identity.            Thank you!     Thank you for choosing [facility]  for your care. Our goal is always to provide you with excellent care. Hearing back from our patients is one way we can continue to improve our services. Please take a few minutes to complete the written survey that you may receive in the mail after your visit with us. Thank you!             Your Updated Medication List -  Protect others around you: Learn how to safely use, store and throw away your medicines at www.disposemymeds.org.          This list is accurate as of 7/7/18  1:22 PM.  Always use your most recent med list.                   Brand Name Dispense Instructions for use Diagnosis    clindamycin 100 MG vaginal suppository    CLEOCIN    3 suppository    Place 1 suppository (100 mg) vaginally At Bedtime for 3 days    BV (bacterial vaginosis)       doxycycline monohydrate 100 MG capsule     14 capsule    Take 1 capsule (100 mg) by mouth 2 times daily for 7 days    Acute sinusitis with symptoms > 10 days       fluconazole 150 MG tablet    DIFLUCAN    2 tablet    Take 1 tablet by mouth x 1; May repeat in 1 week if still symptomatic    Vaginal discharge       gabapentin 600 MG tablet    NEURONTIN    60 tablet    Take 2 tablets (1,200 mg) by mouth At Bedtime    Severe bipolar I disorder, most recent episode mixed, with psychotic features (H)       hydrOXYzine 25 MG tablet    ATARAX    30 tablet    Take 1-2 tablets (25-50 mg) by mouth every 6 hours as needed for anxiety    Anxiety       lamoTRIgine 200 MG tablet    LaMICtal    30 tablet    Take 1 tablet (200 mg) by mouth daily    Severe bipolar I disorder, most recent episode mixed, with psychotic features (H)       lidocaine HCl 3 % cream     453.6 g    Apply topically 3 times daily    Yeast infection of the vagina       methocarbamol 750 MG tablet    ROBAXIN    40 tablet    Take 1 tablet (750 mg) by mouth 4 times daily as needed for muscle spasms    Spasm of back muscles       prochlorperazine 5 MG tablet    COMPAZINE    20 tablet    Take 2 tablets (10 mg) by mouth every 6 hours as needed for nausea or vomiting    Nausea       QUEtiapine 25 MG tablet    SEROquel    60 tablet    Take 1-2 tablets (25-50 mg) by mouth every 6 hours as needed    Severe bipolar I disorder, most recent episode mixed, with psychotic features (H)       SUMAtriptan 6 MG/0.5ML pen injector kit    IMITREX  STATDOSE    3 kit    Inject 0.5 mLs (6 mg) Subcutaneous at onset of headache for migraine    Migraine without aura and with status migrainosus, not intractable

## 2018-09-04 ENCOUNTER — TELEPHONE (OUTPATIENT)
Dept: OBGYN | Facility: CLINIC | Age: 33
End: 2018-09-04

## 2018-09-04 DIAGNOSIS — R10.2 PELVIC PAIN IN FEMALE: Primary | ICD-10-CM

## 2018-09-04 NOTE — TELEPHONE ENCOUNTER
Should we order a pelvic ultrasound before seeing patient in clinic or schedule office visit?    Last office visit  7/20/15 with Dr. Marinelli  Surgery with Dr. Grossman 12/3/14 Total laparoscopic hysterectomy, cystoscopy    Please review and advise.  Thank you.    Rocio Nicholson   [facility]  RN

## 2018-09-04 NOTE — TELEPHONE ENCOUNTER
Reason for Call:  Other     Detailed comments: Pt wants an appt ASAP for left ovary pain for the past 2 weeks - Pain during intercourse, tender around the lt ovary area. Pt had a partial hysterectomy 3-4 years ago. - Please advise    Phone Number Patient can be reached at: Home number on file [phone] (home)    Best Time: Any    Can we leave a detailed message on this number? YES    Call taken on 9/4/2018 at 8:19 AM by Denise Behrendt

## 2018-09-04 NOTE — TELEPHONE ENCOUNTER
Walterdlekym with Dr. Grossman  Recommend pelvic ultrasound to assess ovaries prior to clinic visit.  Rule out ovarian cyst.    Order placed.  Patient notified.  Transferred to scheduling.  Await results/recommendation from Dr. Grossman.    Rocio Nicholson   [facility]yn Clinic  RN

## 2018-09-10 ENCOUNTER — TELEPHONE (OUTPATIENT)
Dept: FAMILY MEDICINE | Facility: CLINIC | Age: 33
End: 2018-09-10

## 2018-09-10 NOTE — TELEPHONE ENCOUNTER
Reason for Call:  Other     Detailed comments: Patient would like to be seen today for a rectal bleed with clots - Please call pt    Phone Number Patient can be reached at: Home number on file [phone] (home)    Best Time:     Can we leave a detailed message on this number? YES    Call taken on 9/10/2018 at 11:13 AM by Caroline Santana

## 2018-09-10 NOTE — TELEPHONE ENCOUNTER
"Spoke with Sirena, she is no longer having any rectal bleeding or pain. She says she and her partner were \"having anal sex this morning and it hurt really bad so they stopped\". She went to the bathroom later and had some blood and a clot came out. She is going to wait and see how she feels. Advised to call or come in if she has more bleeding or pain or if she has further questions  "

## 2018-09-13 ENCOUNTER — TELEPHONE (OUTPATIENT)
Dept: FAMILY MEDICINE | Facility: CLINIC | Age: 33
End: 2018-09-13

## 2018-09-13 NOTE — TELEPHONE ENCOUNTER
Patient was told to return for non-fasting labs, reminder letter was sent to patient on 08/08/2018, patient has still not scheduled an appointment.

## 2018-09-16 ENCOUNTER — HOSPITAL ENCOUNTER (EMERGENCY)
Facility: CLINIC | Age: 33
Discharge: HOME | End: 2018-09-16
Attending: EMERGENCY MEDICINE | Admitting: EMERGENCY MEDICINE
Payer: COMMERCIAL

## 2018-09-16 ENCOUNTER — APPOINTMENT (OUTPATIENT)
Dept: ULTRASOUND IMAGING | Facility: CLINIC | Age: 33
End: 2018-09-16
Attending: EMERGENCY MEDICINE
Payer: COMMERCIAL

## 2018-09-16 VITALS
RESPIRATION RATE: 16 BRPM | SYSTOLIC BLOOD PRESSURE: 125 MMHG | TEMPERATURE: 98.1 F | HEART RATE: 94 BPM | DIASTOLIC BLOOD PRESSURE: 72 MMHG | OXYGEN SATURATION: 98 % | HEIGHT: 66 IN

## 2018-09-16 DIAGNOSIS — N76.0 BACTERIAL VAGINOSIS: ICD-10-CM

## 2018-09-16 DIAGNOSIS — B96.89 BACTERIAL VAGINOSIS: ICD-10-CM

## 2018-09-16 DIAGNOSIS — N83.202 LEFT OVARIAN CYST: ICD-10-CM

## 2018-09-16 LAB
ALBUMIN UR-MCNC: NEGATIVE MG/DL
APPEARANCE UR: CLEAR
BILIRUB UR QL STRIP: NEGATIVE
COLOR UR AUTO: NORMAL
GLUCOSE UR STRIP-MCNC: NEGATIVE MG/DL
HGB UR QL STRIP: NEGATIVE
KETONES UR STRIP-MCNC: NEGATIVE MG/DL
LEUKOCYTE ESTERASE UR QL STRIP: NEGATIVE
NITRATE UR QL: NEGATIVE
PH UR STRIP: 7 PH (ref 5–7)
SOURCE: NORMAL
SP GR UR STRIP: 1.02 (ref 1–1.03)
SPECIMEN SOURCE: NORMAL
UROBILINOGEN UR STRIP-MCNC: 0 MG/DL (ref 0–2)
WET PREP SPEC: NORMAL

## 2018-09-16 PROCEDURE — 81003 URINALYSIS AUTO W/O SCOPE: CPT | Performed by: EMERGENCY MEDICINE

## 2018-09-16 PROCEDURE — 87591 N.GONORRHOEAE DNA AMP PROB: CPT | Performed by: EMERGENCY MEDICINE

## 2018-09-16 PROCEDURE — 87210 SMEAR WET MOUNT SALINE/INK: CPT | Performed by: EMERGENCY MEDICINE

## 2018-09-16 PROCEDURE — 99284 EMERGENCY DEPT VISIT MOD MDM: CPT | Mod: Z6 | Performed by: EMERGENCY MEDICINE

## 2018-09-16 PROCEDURE — 87491 CHLMYD TRACH DNA AMP PROBE: CPT | Performed by: EMERGENCY MEDICINE

## 2018-09-16 PROCEDURE — 76830 TRANSVAGINAL US NON-OB: CPT

## 2018-09-16 PROCEDURE — 25000132 ZZH RX MED GY IP 250 OP 250 PS 637: Performed by: EMERGENCY MEDICINE

## 2018-09-16 PROCEDURE — 99284 EMERGENCY DEPT VISIT MOD MDM: CPT | Mod: 25

## 2018-09-16 RX ORDER — METRONIDAZOLE 500 MG/1
500 TABLET ORAL 2 TIMES DAILY
Qty: 14 TABLET | Refills: 0 | Status: SHIPPED | OUTPATIENT
Start: 2018-09-16 | End: 2019-03-01

## 2018-09-16 RX ORDER — MORPHINE SULFATE 15 MG/1
15 TABLET ORAL ONCE
Status: COMPLETED | OUTPATIENT
Start: 2018-09-16 | End: 2018-09-16

## 2018-09-16 RX ORDER — MORPHINE SULFATE 15 MG/1
15 TABLET ORAL EVERY 4 HOURS PRN
Qty: 6 TABLET | Refills: 0 | Status: SHIPPED | OUTPATIENT
Start: 2018-09-16 | End: 2018-09-18

## 2018-09-16 RX ADMIN — MORPHINE SULFATE 15 MG: 15 TABLET ORAL at 22:05

## 2018-09-16 ASSESSMENT — ENCOUNTER SYMPTOMS
FEVER: 0
CHILLS: 0
FREQUENCY: 0
UNEXPECTED WEIGHT CHANGE: 0
DIFFICULTY URINATING: 0
DYSURIA: 0
APPETITE CHANGE: 0

## 2018-09-16 NOTE — ED AVS SNAPSHOT
[facility] Emergency Department    [address] [address]    Phone:  [phone]    Fax:  [phone]                                       Sirena Dietrich   MRN: [id number]    Department:  [facility] Emergency Department   Date of Visit:  9/16/2018           Patient Information     Date Of Birth          1985        Your diagnoses for this visit were:     Left ovarian cyst     Bacterial vaginosis        You were seen by Manuel Cabrera MD.      Follow-up Information     Follow up with Caroline Grossman MD. Call in 1 day.    Specialty:  OB/Gyn    Why:  To schedule follow up    Contact information:    [address] [facility] [place] [address]  [phone]          Discharge Instructions         Understanding Ovarian Cysts  An ovarian cyst is a fluid-filled sac that forms on or inside an ovary. The ovaries are a pair of small, oval-shaped organs in the lower part of a woman s belly (abdomen). About once a month, one of the ovaries releases an egg. The ovaries also make the hormones estrogen and progesterone. These hormones are part of pregnancy, the menstrual cycle, and breast growth.  Ovarian cysts are very common in women of all ages. Young girls can also get them, but this is less common. There are different types of ovarian cysts. They can occur for various reasons, and they may need different treatments. A cyst can vary in size from half an inch to more than 4 inches.  Types of ovarian cysts  There are different types of ovarian cysts:  Functional cyst  This is the most common type of ovarian cyst. They only occur in women who haven t gone through menopause. There are 2 types of functional cysts:    Follicular cyst. This cyst happens when an egg isn t released and it keeps growing inside the ovary.    Corpus luteum cyst. This type of cyst occurs when the sac around the egg doesn t dissolve after the egg is released.  Endometrioma  This is a cyst filled with old blood and  tissue from the lining of the uterus. They are often called chocolate cysts because of their dark color. They can happen in women with endometriosis.  Dermoid cyst  This cyst develops from ovarian cells and eggs. They may have hair, skin, or fat in them. These cysts are common in women of childbearing age.  What causes ovarian cysts?  Cysts can also be caused by:    Polycystic ovary syndrome (PCOS), a condition that causes multiple cysts on the ovaries    Pregnancy    Severe pelvic infection, such as chlamydia    Noncancerous growths    Cancer (rare)    Using fertility medicine to cause ovulation, such as clomiphene  Symptoms of an ovarian cyst  Many women don t have any symptoms from the cyst. In women with symptoms, the most common is pain or pressure in your lower belly on the side of the cyst. This pain may be dull or sharp, and it may come and go. A cyst that breaks open (ruptures) may lead to sudden, sharp pain.  Other symptoms of an ovarian cyst can include:    Pain in the lower back or thighs    Trouble emptying your bladder fully    Pain during sex    Weight gain    Pain during your period    Breast tenderness    Abnormal vaginal bleeding (rare)  Diagnosing an ovarian cyst  Your primary care doctor or an obstetrics and gynecology (OB/GYN) doctor may diagnose the condition. Your doctor will ask about your health history and your symptoms. You will also have a physical exam. This will likely include a pelvic exam. During the pelvic exam, your doctor may feel the swelling on your ovary. In women with no symptoms, this is often the first sign of a cyst.  If your doctor thinks you may have an ovarian cyst, you may need tests. These can help your doctor learn the type of cyst. Tests can also help rule out other problems, such as an ectopic pregnancy. The tests may include:    Ultrasound. This test uses sound waves to view the size, shape, and location of the cyst. The test can also show if the growth is solid or  filled with fluid.    MRI. This uses large magnets and a computer to create a detailed picture of the area.    Pregnancy test. This is done to check if pregnancy may be the cause of the cyst.    Blood tests. These check for hormone problems and cancer. They also check if the cyst is bleeding.    Biopsy. This is a test where a tiny piece of the ovary is taken. The piece is examined in a lab for cancer cells. This may be done if an ultrasound shows a certain type of growth on the ovary.  Date Last Reviewed: 2017-2017 [employer]. [address] [place], [place] [address]. All rights reserved. This information is not intended as a substitute for professional medical care. Always follow your healthcare professional's instructions.          Bacterial Vaginosis    You have a vaginal infection called bacterial vaginosis (BV). Both good and bad bacteria are present in a healthy vagina. BV occurs when these bacteria get out of balance. The number of bad bacteria increase. And the number of good bacteria decrease. Although BV is associated with sexual activity, it is not a sexually transmitted disease.  BV may or may not cause symptoms. If symptoms do occur, they can include:    Thin, gray, milky-white, or sometimes green discharge    Unpleasant odor or  fishy  smell    Itching, burning, or pain in or around the vagina  It is not known what causes BV, but certain factors can make the problem more likely. This can include:    Douching    Having sex with a new partner    Having sex with more than one partner  BV will sometimes go away on its own. But treatment is usually recommended. This is because untreated BV can increase the risk of more serious health problems such as:    Pelvic inflammatory disease (PID)     delivery (giving birth to a baby early if you re pregnant)    HIV and certain other sexually transmitted diseases (STDs)    Infection after surgery on the reproductive organs  Home  care  General care    BV is most often treated with medicines called antibiotics. These may be given as pills or as a vaginal cream. If antibiotics are prescribed, be sure to use them exactly as directed. Also, be sure to complete all of the medicine, even if your symptoms go away.    Don't douche or having sex during treatment.    If you have sex with a female partner, ask your healthcare provider if she should also be treated.  Prevention    Don't douche.    Don't have sex. If you do have sex, then take steps to lower your risk:  ? Use condoms when having sex.  ? Limit the number of sexual partners you have.  Follow-up care  Follow up with your healthcare provider, or as advised.  When to seek medical advice  Call your healthcare provider right away if:    You have a fever of 100.4 F (38 C) or higher, or as directed by your provider.    Your symptoms worsen, or they don t go away within a few days of starting treatment.    You have new pain in the lower belly or pelvic region.    You have side effects that bother you or a reaction to the pills or cream you re prescribed.    You or any partners you have sex with have new symptoms, such as a rash, joint pain, or sores.  Date Last Reviewed: 10/1/2017    3158-7899 [employer]. [address] [place] [address], [place] [address]. All rights reserved. This information is not intended as a substitute for professional medical care. Always follow your healthcare professional's instructions.          24 Hour Appointment Hotline       To make an appointment at any [facility], call [phone] ([phone]). If you don't have a family doctor or clinic, we will help you find one. [facility] clinics are conveniently located to serve the needs of you and your family.             Review of your medicines      START taking        Dose / Directions Last dose taken    metroNIDAZOLE 500 MG tablet   Commonly known as:  FLAGYL   Dose:  500 mg   Quantity:  14 tablet         Take 1 tablet (500 mg) by mouth 2 times daily for 7 days   Refills:  0        morphine 15 MG IR tablet   Commonly known as:  MSIR   Dose:  15 mg   Quantity:  6 tablet        Take 1 tablet (15 mg) by mouth every 4 hours as needed for moderate to severe pain   Refills:  0          Our records show that you are taking the medicines listed below. If these are incorrect, please call your family doctor or clinic.        Dose / Directions Last dose taken    fluconazole 150 MG tablet   Commonly known as:  DIFLUCAN   Quantity:  2 tablet        Take 1 tablet by mouth x 1; May repeat in 1 week if still symptomatic   Refills:  0        gabapentin 600 MG tablet   Commonly known as:  NEURONTIN   Dose:  1200 mg   Quantity:  60 tablet        Take 2 tablets (1,200 mg) by mouth At Bedtime   Refills:  0        hydrOXYzine 25 MG tablet   Commonly known as:  ATARAX   Dose:  25-50 mg   Quantity:  30 tablet        Take 1-2 tablets (25-50 mg) by mouth every 6 hours as needed for anxiety   Refills:  1        lamoTRIgine 200 MG tablet   Commonly known as:  LaMICtal   Dose:  200 mg   Quantity:  30 tablet        Take 1 tablet (200 mg) by mouth daily   Refills:  1        lidocaine HCl 3 % cream   Quantity:  453.6 g        Apply topically 3 times daily   Refills:  0        methocarbamol 750 MG tablet   Commonly known as:  ROBAXIN   Dose:  750 mg   Quantity:  40 tablet        Take 1 tablet (750 mg) by mouth 4 times daily as needed for muscle spasms   Refills:  0        prochlorperazine 5 MG tablet   Commonly known as:  COMPAZINE   Dose:  10 mg   Quantity:  20 tablet        Take 2 tablets (10 mg) by mouth every 6 hours as needed for nausea or vomiting   Refills:  0        QUEtiapine 25 MG tablet   Commonly known as:  SEROquel   Dose:  25-50 mg   Quantity:  60 tablet        Take 1-2 tablets (25-50 mg) by mouth every 6 hours as needed   Refills:  0        SUMAtriptan 6 MG/0.5ML pen injector kit   Commonly known as:  IMITREX STATDOSE   Dose:  6 mg    Quantity:  3 kit        Inject 0.5 mLs (6 mg) Subcutaneous at onset of headache for migraine   Refills:  3                Information about OPIOIDS     PRESCRIPTION OPIOIDS: WHAT YOU NEED TO KNOW   We gave you an opioid (narcotic) pain medicine. It is important to manage your pain, but opioids are not always the best choice. You should first try all the other options your care team gave you. Take this medicine for as short a time (and as few doses) as possible.    Some activities can increase your pain, such as bandage changes or therapy sessions. It may help to take your pain medicine 30 to 60 minutes before these activities. Reduce your stress by getting enough sleep, working on hobbies you enjoy and practicing relaxation or meditation. Talk to your care team about ways to manage your pain beyond prescription opioids.    These medicines have risks:    DO NOT drive when on new or higher doses of pain medicine. These medicines can affect your alertness and reaction times, and you could be arrested for driving under the influence (DUI). If you need to use opioids long-term, talk to your care team about driving.    DO NOT operate heavy machinery    DO NOT do any other dangerous activities while taking these medicines.    DO NOT drink any alcohol while taking these medicines.     If the opioid prescribed includes acetaminophen, DO NOT take with any other medicines that contain acetaminophen. Read all labels carefully. Look for the word  acetaminophen  or  Tylenol.  Ask your pharmacist if you have questions or are unsure.    You can get addicted to pain medicines, especially if you have a history of addiction (chemical, alcohol or substance dependence). Talk to your care team about ways to reduce this risk.    All opioids tend to cause constipation. Drink plenty of water and eat foods that have a lot of fiber, such as fruits, vegetables, prune juice, apple juice and high-fiber cereal. Take a laxative (Miralax, milk of  magnesia, Colace, Senna) if you don t move your bowels at least every other day. Other side effects include upset stomach, sleepiness, dizziness, throwing up, tolerance (needing more of the medicine to have the same effect), physical dependence and slowed breathing.    Store your pills in a secure place, locked if possible. We will not replace any lost or stolen medicine. If you don t finish your medicine, please throw away (dispose) as directed by your pharmacist. The [place] Pollution Control Agency has more information about safe disposal: https://[email or web address].[email or web address]/living-green/managing-unwanted-medications        Prescriptions were sent or printed at these locations (2 Prescriptions)                   [facility] [address] [address]    Telephone:  [phone]   Fax:  [phone]   Hours:                  E-Prescribed (1 of 2)         metroNIDAZOLE (FLAGYL) 500 MG tablet                 Printed at Department/Unit printer (1 of 2)         morphine (MSIR) 15 MG IR tablet                Procedures and tests performed during your visit     Chlamydia trachomatis PCR    Neisseria gonorrhoea PCR    Pelvic Ultrasound (US) with doppler imaging (r/o ovarian torsion)    UA reflex to Microscopic    Wet prep      Orders Needing Specimen Collection     None      Pending Results     Date and Time Order Name Status Description    9/16/2018 2229 Neisseria gonorrhoea PCR In process     9/16/2018 2229 Chlamydia trachomatis PCR In process             Pending Culture Results     Date and Time Order Name Status Description    9/16/2018 2229 Neisseria gonorrhoea PCR In process     9/16/2018 2229 Chlamydia trachomatis PCR In process             Pending Results Instructions     If you had any lab results that were not finalized at the time of your Discharge, you can call the ED Lab Result RN at [phone]. You will be contacted by this team for any positive Lab  results or changes in treatment. The nurses are available 7 days a week from 10A to 6:30P.  You can leave a message 24 hours per day and they will return your call.        Test Results From Your Hospital Stay        9/16/2018 10:44 PM      Component Results     Component Value Ref Range & Units Status    Color Urine Straw  Final    Appearance Urine Clear  Final    Glucose Urine Negative NEG^Negative mg/dL Final    Bilirubin Urine Negative NEG^Negative Final    Ketones Urine Negative NEG^Negative mg/dL Final    Specific Gravity Urine 1.018 1.003 - 1.035 Final    Blood Urine Negative NEG^Negative Final    pH Urine 7.0 5.0 - 7.0 pH Final    Protein Albumin Urine Negative NEG^Negative mg/dL Final    Urobilinogen mg/dL 0.0 0.0 - 2.0 mg/dL Final    Nitrite Urine Negative NEG^Negative Final    Leukocyte Esterase Urine Negative NEG^Negative Final    Source Midstream Urine  Final         9/16/2018 11:24 PM      Narrative     US PELVIS COMPLETE W TRANSVAGINAL AND DOPPLER LIMITED 9/16/2018 11:18  PM    HISTORY: Hysterectomy in 2014. Intermittent pelvic and left lower  quadrant pain.    COMPARISON: None.    FINDINGS:  Transvaginal images were performed to better evaluate the  patient's uterus, ovaries and endometrial stripe.    The uterus is absent.    Right ovary measures 2.0 x 2.4 x 1.5 cm. Right ovarian vascularity is  intact by color Doppler.    Left ovary measures 6.5 x 3.6 x 3.6 cm and contains 2 complex cysts  measuring 3.4 cm and 3.0 cm in maximum dimension, respectively. Left  ovarian vascularity is intact by waveform Doppler.    Moderate amount of free fluid in the right adnexal region.        Impression     IMPRESSION: Moderate free fluid in the right adnexal region and two  complex left ovarian cysts. Short interval follow-up ultrasound is  recommended after 2-3 menstrual cycles.    VENKATA MERIDA MD         9/16/2018 10:53 PM      Component Results     Component    Specimen Description    Vagina    Wet Prep    FEW  CLUE CELLS SEEN    Wet Prep    NO WBCS SEEN    Wet Prep    No Trichomonas seen    Wet Prep    No yeast seen         9/16/2018 10:45 PM         9/16/2018 10:45 PM                Thank you for choosing [facility]       Thank you for choosing [facility] for your care. Our goal is always to provide you with excellent care. Hearing back from our patients is one way we can continue to improve our services. Please take a few minutes to complete the written survey that you may receive in the mail after you visit with us. Thank you!        [employer] [facility] [employer] Information     [facility] gives you secure access to your electronic health record. If you see a primary care provider, you can also send messages to your care team and make appointments. If you have questions, please call your primary care clinic.  If you do not have a primary care provider, please call [phone] and they will assist you.        Care EveryWhere ID     This is your Care EveryWhere ID. This could be used by other organizations to access your [facility] medical records  [id number]        Equal Access to Services     SAMRA KAPLAN AH: Hadgabrielle rangelo Sorian, waaxda lukamron, qaybta kaalmavicky dominguez, raheem day . So [facility] [phone].    ATENCIÓN: Si habla español, tiene a borja disposición servicios gratuitos de asistencia lingüística. Llame al [phone].    We comply with applicable federal civil rights laws and [place] laws. We do not discriminate on the basis of race, color, national origin, age, disability, sex, sexual orientation, or gender identity.            After Visit Summary       This is your record. Keep this with you and show to your community pharmacist(s) and doctor(s) at your next visit.

## 2018-09-16 NOTE — ED AVS SNAPSHOT
[facility] Emergency Department    [address] [address]    Phone:  [phone]    Fax:  [phone]                                       Sirena Dietrich   MRN: [id number]    Department:  [facility] Emergency Department   Date of Visit:  9/16/2018           After Visit Summary Signature Page     I have received my discharge instructions, and my questions have been answered. I have discussed any challenges I see with this plan with the nurse or doctor.    ..........................................................................................................................................  Patient/Patient Representative Signature      ..........................................................................................................................................  Patient Representative Print Name and Relationship to Patient    ..................................................               ................................................  Date                                   Time    ..........................................................................................................................................  Reviewed by Signature/Title    ...................................................              ..............................................  Date                                               Time          22EPIC Rev 08/18

## 2018-09-17 ENCOUNTER — TELEPHONE (OUTPATIENT)
Dept: OBGYN | Facility: CLINIC | Age: 33
End: 2018-09-17

## 2018-09-17 PROBLEM — N83.202 CYST OF LEFT OVARY: Status: ACTIVE | Noted: 2018-09-17

## 2018-09-17 NOTE — DISCHARGE INSTRUCTIONS
Understanding Ovarian Cysts  An ovarian cyst is a fluid-filled sac that forms on or inside an ovary. The ovaries are a pair of small, oval-shaped organs in the lower part of a woman s belly (abdomen). About once a month, one of the ovaries releases an egg. The ovaries also make the hormones estrogen and progesterone. These hormones are part of pregnancy, the menstrual cycle, and breast growth.  Ovarian cysts are very common in women of all ages. Young girls can also get them, but this is less common. There are different types of ovarian cysts. They can occur for various reasons, and they may need different treatments. A cyst can vary in size from half an inch to more than 4 inches.  Types of ovarian cysts  There are different types of ovarian cysts:  Functional cyst  This is the most common type of ovarian cyst. They only occur in women who haven t gone through menopause. There are 2 types of functional cysts:    Follicular cyst. This cyst happens when an egg isn t released and it keeps growing inside the ovary.    Corpus luteum cyst. This type of cyst occurs when the sac around the egg doesn t dissolve after the egg is released.  Endometrioma  This is a cyst filled with old blood and tissue from the lining of the uterus. They are often called chocolate cysts because of their dark color. They can happen in women with endometriosis.  Dermoid cyst  This cyst develops from ovarian cells and eggs. They may have hair, skin, or fat in them. These cysts are common in women of childbearing age.  What causes ovarian cysts?  Cysts can also be caused by:    Polycystic ovary syndrome (PCOS), a condition that causes multiple cysts on the ovaries    Pregnancy    Severe pelvic infection, such as chlamydia    Noncancerous growths    Cancer (rare)    Using fertility medicine to cause ovulation, such as clomiphene  Symptoms of an ovarian cyst  Many women don t have any symptoms from the cyst. In women with symptoms, the most common  is pain or pressure in your lower belly on the side of the cyst. This pain may be dull or sharp, and it may come and go. A cyst that breaks open (ruptures) may lead to sudden, sharp pain.  Other symptoms of an ovarian cyst can include:    Pain in the lower back or thighs    Trouble emptying your bladder fully    Pain during sex    Weight gain    Pain during your period    Breast tenderness    Abnormal vaginal bleeding (rare)  Diagnosing an ovarian cyst  Your primary care doctor or an obstetrics and gynecology (OB/GYN) doctor may diagnose the condition. Your doctor will ask about your health history and your symptoms. You will also have a physical exam. This will likely include a pelvic exam. During the pelvic exam, your doctor may feel the swelling on your ovary. In women with no symptoms, this is often the first sign of a cyst.  If your doctor thinks you may have an ovarian cyst, you may need tests. These can help your doctor learn the type of cyst. Tests can also help rule out other problems, such as an ectopic pregnancy. The tests may include:    Ultrasound. This test uses sound waves to view the size, shape, and location of the cyst. The test can also show if the growth is solid or filled with fluid.    MRI. This uses large magnets and a computer to create a detailed picture of the area.    Pregnancy test. This is done to check if pregnancy may be the cause of the cyst.    Blood tests. These check for hormone problems and cancer. They also check if the cyst is bleeding.    Biopsy. This is a test where a tiny piece of the ovary is taken. The piece is examined in a lab for cancer cells. This may be done if an ultrasound shows a certain type of growth on the ovary.  Date Last Reviewed: 8/1/2017 2000-2017 [employer]. [address] [place] [address], [place] [address]. All rights reserved. This information is not intended as a substitute for professional medical care. Always follow your healthcare  professional's instructions.          Bacterial Vaginosis    You have a vaginal infection called bacterial vaginosis (BV). Both good and bad bacteria are present in a healthy vagina. BV occurs when these bacteria get out of balance. The number of bad bacteria increase. And the number of good bacteria decrease. Although BV is associated with sexual activity, it is not a sexually transmitted disease.  BV may or may not cause symptoms. If symptoms do occur, they can include:    Thin, gray, milky-white, or sometimes green discharge    Unpleasant odor or  fishy  smell    Itching, burning, or pain in or around the vagina  It is not known what causes BV, but certain factors can make the problem more likely. This can include:    Douching    Having sex with a new partner    Having sex with more than one partner  BV will sometimes go away on its own. But treatment is usually recommended. This is because untreated BV can increase the risk of more serious health problems such as:    Pelvic inflammatory disease (PID)     delivery (giving birth to a baby early if you re pregnant)    HIV and certain other sexually transmitted diseases (STDs)    Infection after surgery on the reproductive organs  Home care  General care    BV is most often treated with medicines called antibiotics. These may be given as pills or as a vaginal cream. If antibiotics are prescribed, be sure to use them exactly as directed. Also, be sure to complete all of the medicine, even if your symptoms go away.    Don't douche or having sex during treatment.    If you have sex with a female partner, ask your healthcare provider if she should also be treated.  Prevention    Don't douche.    Don't have sex. If you do have sex, then take steps to lower your risk:  ? Use condoms when having sex.  ? Limit the number of sexual partners you have.  Follow-up care  Follow up with your healthcare provider, or as advised.  When to seek medical advice  Call your  healthcare provider right away if:    You have a fever of 100.4 F (38 C) or higher, or as directed by your provider.    Your symptoms worsen, or they don t go away within a few days of starting treatment.    You have new pain in the lower belly or pelvic region.    You have side effects that bother you or a reaction to the pills or cream you re prescribed.    You or any partners you have sex with have new symptoms, such as a rash, joint pain, or sores.  Date Last Reviewed: 10/1/2017    0821-1046 [employer]. [address] [place] [address]. All rights reserved. This information is not intended as a substitute for professional medical care. Always follow your healthcare professional's instructions.

## 2018-09-17 NOTE — ED NOTES
"Has had \"pelvic pain\" off and on for 8yrs.  Points to bilat lower quadrants  when stating this  4weeks ago was seen no infection  Denies discharge denies fevers denies urinary symptoms   Has been taking tylenol and ibuprofen at home  Reports she now has pain during sex which she did not have before  Has had a hysterectomy still has ovaries   "
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
## 2018-09-18 ENCOUNTER — TELEPHONE (OUTPATIENT)
Dept: OBGYN | Facility: CLINIC | Age: 33
End: 2018-09-18

## 2018-09-18 ENCOUNTER — OFFICE VISIT (OUTPATIENT)
Dept: OBGYN | Facility: CLINIC | Age: 33
End: 2018-09-18
Payer: COMMERCIAL

## 2018-09-18 VITALS
DIASTOLIC BLOOD PRESSURE: 78 MMHG | RESPIRATION RATE: 20 BRPM | HEART RATE: 82 BPM | SYSTOLIC BLOOD PRESSURE: 135 MMHG | BODY MASS INDEX: 25.75 KG/M2 | HEIGHT: 66 IN | WEIGHT: 160.2 LBS | TEMPERATURE: 98.2 F

## 2018-09-18 DIAGNOSIS — A60.04 HERPES SIMPLEX VULVOVAGINITIS: ICD-10-CM

## 2018-09-18 DIAGNOSIS — N83.202 CYST OF LEFT OVARY: Primary | ICD-10-CM

## 2018-09-18 DIAGNOSIS — R11.0 NAUSEA: ICD-10-CM

## 2018-09-18 PROCEDURE — 99215 OFFICE O/P EST HI 40 MIN: CPT | Performed by: OBSTETRICS & GYNECOLOGY

## 2018-09-18 RX ORDER — ONDANSETRON 4 MG/1
4-8 TABLET, ORALLY DISINTEGRATING ORAL EVERY 6 HOURS PRN
Qty: 30 TABLET | Refills: 0 | Status: SHIPPED | OUTPATIENT
Start: 2018-09-18 | End: 2018-09-27

## 2018-09-18 RX ORDER — VALACYCLOVIR HYDROCHLORIDE 500 MG/1
500 TABLET, FILM COATED ORAL 2 TIMES DAILY
Qty: 30 TABLET | Refills: 1 | Status: SHIPPED | OUTPATIENT
Start: 2018-09-18 | End: 2018-10-11

## 2018-09-18 RX ORDER — MORPHINE SULFATE 15 MG/1
15 TABLET ORAL EVERY 4 HOURS PRN
Qty: 15 TABLET | Refills: 0 | Status: ON HOLD | OUTPATIENT
Start: 2018-09-18 | End: 2018-09-21

## 2018-09-18 RX ORDER — ACYCLOVIR 50 MG/G
OINTMENT TOPICAL
Qty: 15 G | Refills: 0 | Status: SHIPPED | OUTPATIENT
Start: 2018-09-18 | End: 2018-09-19

## 2018-09-18 RX ORDER — PROCHLORPERAZINE MALEATE 10 MG
10 TABLET ORAL EVERY 6 HOURS PRN
Qty: 30 TABLET | Refills: 0 | Status: SHIPPED | OUTPATIENT
Start: 2018-09-18 | End: 2018-10-09

## 2018-09-18 ASSESSMENT — PAIN SCALES - GENERAL: PAINLEVEL: EXTREME PAIN (8)

## 2018-09-18 NOTE — PROGRESS NOTES
"Sirena is a 32 year old  here for follow up of pelvic pain and ovarian cysts.   Patient underwent hysterectomy in  for pelvic pain and dysmenorrhea.  Since that time, she had difficult with pelvic floor dysfunction and has slowly improved within the last year.  However, she has been having intermittent lower pelvic pain that has been attributed to ovarian cysts.  The pain is sharp and dull and usually resolves in a few days.  Most recently, her pain brought her to the ER and has been ongoing for the last 4 weeks.      In the past, she tried Lupron for her pain (prior to surgery) but had severe side effects.      In the ED , she underwent a pelvic U/S:  FINDINGS:  Transvaginal images were performed to better evaluate the  patient's uterus, ovaries and endometrial stripe.     The uterus is absent.     Right ovary measures 2.0 x 2.4 x 1.5 cm. Right ovarian vascularity is  intact by color Doppler.     Left ovary measures 6.5 x 3.6 x 3.6 cm and contains 2 complex cysts  measuring 3.4 cm and 3.0 cm in maximum dimension, respectively. Left  ovarian vascularity is intact by waveform Doppler.     Moderate amount of free fluid in the right adnexal region    ROS: Ten point review of systems was reviewed and negative except the above.    PMH: Her past medical, surgical, and obstetric histories were reviewed and are documented in their appropriate chart areas.    ALL/Meds: Her medication and allergy histories were reviewed and are documented in their appropriate chart areas.    Social History   Substance Use Topics     Smoking status: Former Smoker     Packs/day: 0.50     Types: Cigarettes     Quit date: 2018     Smokeless tobacco: Never Used     Alcohol use Yes      Comment: rare       PE: /78 (BP Location: Right arm, Patient Position: Chair, Cuff Size: Adult Regular)  Pulse 82  Temp 98.2  F (36.8  C) (Tympanic)  Resp 20  Ht 5' 6\" (1.676 m)  Wt 160 lb 3.2 oz (72.7 kg)  LMP  (LMP Unknown)  " Breastfeeding? No  BMI 25.86 kg/m2    General Appearance:  healthy, alert, active, no distress  HEENT: NCAT  Abdomen: Soft, nontender.  Normal bowel sounds.  No masses  Pelvic exam: deferred to OR.    A/P 32 year old  here for     ICD-10-CM    1. Cyst of left ovary N83.202 [facility]-Operative Worksheet     morphine (MSIR) 15 MG IR tablet   2. Nausea R11.0 prochlorperazine (COMPAZINE) 10 MG tablet     ondansetron (ZOFRAN ODT) 4 MG ODT tab   3. Herpes simplex vulvovaginitis A60.04 valACYclovir (VALTREX) 500 MG tablet     acyclovir (ZOVIRAX) 5 % ointment        1. We discussed at length the pros and cons of expectant versus surgical management.  She has not tolerated hormonal therapies well in the past and stated she had continued symptoms despite trying to use combined hormonal contraception after her hysterectomy.  She has had ongoing pain for 4 weeks and feels that surgery is necessary at this time.  We discussed pros/cons of removal of just the left ovary versus bilateral, especially given her age.  We reviewed that removal of the left will result in the right ovary taking over hormonal function and that cysts will likely or inevitably form on the right.  Whether it would be as large or as painful as it is currently would stand to be determined.  If we remove both ovaries, that would prevent future ovarian cyst formation and theoretically pain related to her ovaries.  However, she would be in menopause and would need ERT given her age.      She feels she is not quite ready for removal of both ovaries and would like to plan for removal of just the left ovary.  However, she is aware that it is possible that laparoscopy may need to be converted if there are too many adhesions and that removal of the right ovary may be indicated if there is too much adhesive disease as well.  She is amenable to the plan of laparoscopy with removal of the left and understands that removal of the right or laparotomy is a possibility.       To prevent right ovarian cyst formation, she is amenable to resuming oral contraceptive pills to see if she can conserve its continued presence.      Pain medication refilled today with the expectation of no more than 3 tabs per day for the next 5 days.  Further refills will depend on how surgery goes and with the expectation that post-surgical pain management is temporary and brief.    She states she is amenable to this plan.      Due to my upcoming travel, I have discussed this case with my partner, Dr. Debi Luis, who agrees to perform the surgery before my return.      Medication for HSV treatment and prophylaxis was also sent for patient.     Caroline Grossman M.D.      40 minutes was spent face to face with the patient today discussing her history, diagnosis, and follow-up plan as noted above.  Over 50% of the visit was spent in counseling and coordination of care.

## 2018-09-18 NOTE — Clinical Note
This is the patient we discussed for Friday surgery.  Hope it goes well.  Feel free to start her on oral contraceptive pills immediately post-op.

## 2018-09-18 NOTE — TELEPHONE ENCOUNTER
"  You are now scheduled for surgery at [facility].  Below are the details for your surgery.  Please read the \"Preparing for Your Surgery\" instructions and let us know if you have any questions.    Type of surgery: diagnostic laparoscopy, left salpingo-oophorectomy, possible bilateral salpingo-oophorectomy, possible laparotomy  Surgeon:  Debi Luis MD  Location of surgery: [facility] OR    Date of surgery: 9-21-18    Time: 7:30am   Arrival Time: 6:30am    Time can change, to be confirmed a couple of days prior by pre-op surgery nurse.    Pre-Op Appt Date: Patient to schedule with a PCP or Family Practice Provider within 30 days to the surgery.  Post-Op Appt Date: To be determined by provider     Packet sent out: Yes  Pre-cert/Authorization completed:  TBD by Financial Securing Office.   MA Sterilization/Hysterectomy Acknowledgment Consent signed: Not Applicable    [facility]  [phone]    Fax: [phone]  Same Day Surgery [phone]  Fax: [phone]    "

## 2018-09-18 NOTE — TELEPHONE ENCOUNTER
Prior Authorization Retail Medication Request    Medication/Dose: Acyclovir 5 %  ICD code (if different than what is on RX):  Herpes simplex vulvovaginitis [A60.04]   Previously Tried and Failed:    Rationale:      Insurance Name:  [facility]  Insurance ID:  [id number]      Pharmacy Information (if different than what is on RX)  Name:  [facility]  Phone:  [phone]

## 2018-09-18 NOTE — MR AVS SNAPSHOT
After Visit Summary   9/18/2018    Sirena Dietrich    MRN: [id number]           Patient Information     Date Of Birth          1985        Visit Information        Provider Department      9/18/2018 2:00 PM Caroline Grossman MD [facility]        Today's Diagnoses     Cyst of left ovary    -  1    Nausea        Herpes simplex vulvovaginitis           Follow-ups after your visit        Your next 10 appointments already scheduled     Sep 19, 2018  1:00 PM CDT   Pre-Op physical with Lindsey Loomis CNP   [facility]    [address] [facility] [address]   [phone]            Sep 21, 2018   Procedure with Debi Luis MD   [facility] (--)    [address] [facility] [id number] [phone]           The medical center is located at [address]. (between [address] in [place], [place] miles north of [place]).              Who to contact     If you have questions or need follow up information about today's clinic visit or your schedule please contact [facility] directly at [phone].  Normal or non-critical lab and imaging results will be communicated to you by MyChart, letter or phone within 4 business days after the clinic has received the results. If you do not hear from us within 7 days, please contact the clinic through MyChart or phone. If you have a critical or abnormal lab result, we will notify you by phone as soon as possible.  Submit refill requests through [employer] or call your pharmacy and they will forward the refill request to us. Please allow 3 business days for your refill to be completed.          Additional Information About Your Visit        [employer] Information     [employer] gives you secure access to your electronic health record. If you see a primary care provider, you can also send messages to your care team and make appointments.  "If you have questions, please call your primary care clinic.  If you do not have a primary care provider, please call [phone] and they will assist you.        Care EveryWhere ID     This is your Care EveryWhere ID. This could be used by other organizations to access your [place] medical records  [id number]        Your Vitals Were     Pulse Temperature Respirations Height Last Period Breastfeeding?    82 98.2  F (36.8  C) (Tympanic) 20 5' 6\" (1.676 m) (LMP Unknown) No    BMI (Body Mass Index)                   25.86 kg/m2            Blood Pressure from Last 3 Encounters:   09/18/18 135/78   09/16/18 125/72   07/07/18 118/76    Weight from Last 3 Encounters:   09/18/18 160 lb 3.2 oz (72.7 kg)   07/07/18 150 lb (68 kg)   05/23/18 150 lb 12.8 oz (68.4 kg)              We Performed the Following     [facility]oy-Operative Worksheet          Today's Medication Changes          These changes are accurate as of 9/18/18  6:47 PM.  If you have any questions, ask your nurse or doctor.               Start taking these medicines.        Dose/Directions    acyclovir 5 % ointment   Commonly known as:  ZOVIRAX   Used for:  Herpes simplex vulvovaginitis   Started by:  Caroline Grossman MD        Apply topically 6 times daily   Quantity:  15 g   Refills:  0       ondansetron 4 MG ODT tab   Commonly known as:  ZOFRAN ODT   Used for:  Nausea   Started by:  Caroline Grossman MD        Dose:  4-8 mg   Take 1-2 tablets (4-8 mg) by mouth every 6 hours as needed for nausea   Quantity:  30 tablet   Refills:  0       valACYclovir 500 MG tablet   Commonly known as:  VALTREX   Used for:  Herpes simplex vulvovaginitis   Started by:  Caroline Grossman MD        Dose:  500 mg   Take 1 tablet (500 mg) by mouth 2 times daily X 3 days.  1 tablet daily for suppression   Quantity:  30 tablet   Refills:  1         These medicines have changed or have updated prescriptions.        Dose/Directions    gabapentin 600 MG tablet   Commonly known as:  " NEURONTIN   This may have changed:    - how much to take  - when to take this   Used for:  Severe bipolar I disorder, most recent episode mixed, with psychotic features (H)        Dose:  1200 mg   Take 2 tablets (1,200 mg) by mouth At Bedtime   Quantity:  60 tablet   Refills:  0       prochlorperazine 10 MG tablet   Commonly known as:  COMPAZINE   This may have changed:  medication strength   Used for:  Nausea   Changed by:  Caroline Grossman MD        Dose:  10 mg   Take 1 tablet (10 mg) by mouth every 6 hours as needed for nausea or vomiting   Quantity:  30 tablet   Refills:  0       QUEtiapine 25 MG tablet   Commonly known as:  SEROquel   This may have changed:  how much to take   Used for:  Severe bipolar I disorder, most recent episode mixed, with psychotic features (H)        Dose:  25-50 mg   Take 1-2 tablets (25-50 mg) by mouth every 6 hours as needed   Quantity:  60 tablet   Refills:  0         Stop taking these medicines if you haven't already. Please contact your care team if you have questions.     fluconazole 150 MG tablet   Commonly known as:  DIFLUCAN   Stopped by:  Caroline Grossman MD           hydrOXYzine 25 MG tablet   Commonly known as:  ATARAX   Stopped by:  Caroline Grossman MD           methocarbamol 750 MG tablet   Commonly known as:  ROBAXIN   Stopped by:  Caroline Grossman MD           SUMAtriptan 6 MG/0.5ML pen injector kit   Commonly known as:  IMITREX STATDOSE   Stopped by:  Caroline Grossman MD                Where to get your medicines      These medications were sent to [id number] - [place], [place] - [address] [facility]  [address] [address]    Hours:  Closed 10-16-08 business to [facility] Phone:  [phone]     acyclovir 5 % ointment    ondansetron 4 MG ODT tab    prochlorperazine 10 MG tablet    valACYclovir 500 MG tablet         Some of these will need a paper prescription and others can be bought over the counter.  Ask your nurse if you  have questions.     Bring a paper prescription for each of these medications     morphine 15 MG IR tablet               Information about OPIOIDS     PRESCRIPTION OPIOIDS: WHAT YOU NEED TO KNOW   We gave you an opioid (narcotic) pain medicine. It is important to manage your pain, but opioids are not always the best choice. You should first try all the other options your care team gave you. Take this medicine for as short a time (and as few doses) as possible.    Some activities can increase your pain, such as bandage changes or therapy sessions. It may help to take your pain medicine 30 to 60 minutes before these activities. Reduce your stress by getting enough sleep, working on hobbies you enjoy and practicing relaxation or meditation. Talk to your care team about ways to manage your pain beyond prescription opioids.    These medicines have risks:    DO NOT drive when on new or higher doses of pain medicine. These medicines can affect your alertness and reaction times, and you could be arrested for driving under the influence (DUI). If you need to use opioids long-term, talk to your care team about driving.    DO NOT operate heavy machinery    DO NOT do any other dangerous activities while taking these medicines.    DO NOT drink any alcohol while taking these medicines.     If the opioid prescribed includes acetaminophen, DO NOT take with any other medicines that contain acetaminophen. Read all labels carefully. Look for the word  acetaminophen  or  Tylenol.  Ask your pharmacist if you have questions or are unsure.    You can get addicted to pain medicines, especially if you have a history of addiction (chemical, alcohol or substance dependence). Talk to your care team about ways to reduce this risk.    All opioids tend to cause constipation. Drink plenty of water and eat foods that have a lot of fiber, such as fruits, vegetables, prune juice, apple juice and high-fiber cereal. Take a laxative (Miralax, milk of  magnesia, Colace, Senna) if you don t move your bowels at least every other day. Other side effects include upset stomach, sleepiness, dizziness, throwing up, tolerance (needing more of the medicine to have the same effect), physical dependence and slowed breathing.    Store your pills in a secure place, locked if possible. We will not replace any lost or stolen medicine. If you don t finish your medicine, please throw away (dispose) as directed by your pharmacist. The [place] [employer] has more information about safe disposal: [email or web address]-medications         Primary Care Provider Office Phone # Fax #    Shi Cece Juan, DIPESH [facility] [phone]       [address] [place] [address]        Goals        General    I will attend counseling appointment (pt-stated)     Notes - Note edited  11/25/2015  1:35 PM by Shi Armstrong LSW    As of today's date 11/25/2015 goal is met at 76 - 100%.   Goal Status:  Complete - will keep to maintain for  A few month  As of today's date 7/28/2015 goal is met at 26 - 50%.   Goal Status:  Active          Equal Access to Services     MARCELA KAPLAN : Hadii jo yoder hadelleno Sorian, waaxda luqadaha, qaybta kaalmada adesherwinyada, raheem evangelista. So [facility] [phone].    ATENCIÓN: Si habla español, tiene a borja disposición servicios gratuitos de asistencia lingüística. Andres al [phone].    We comply with applicable federal civil rights laws and [place] laws. We do not discriminate on the basis of race, color, national origin, age, disability, sex, sexual orientation, or gender identity.            Thank you!     Thank you for choosing [facility]  for your care. Our goal is always to provide you with excellent care. Hearing back from our patients is one way we can continue to improve our services. Please take a few minutes to complete the written survey that you may  receive in the mail after your visit with us. Thank you!             Your Updated Medication List - Protect others around you: Learn how to safely use, store and throw away your medicines at www.disposemymeds.org.          This list is accurate as of 9/18/18  6:47 PM.  Always use your most recent med list.                   Brand Name Dispense Instructions for use Diagnosis    acyclovir 5 % ointment    ZOVIRAX    15 g    Apply topically 6 times daily    Herpes simplex vulvovaginitis       gabapentin 600 MG tablet    NEURONTIN    60 tablet    Take 2 tablets (1,200 mg) by mouth At Bedtime    Severe bipolar I disorder, most recent episode mixed, with psychotic features (H)       lamoTRIgine 200 MG tablet    LaMICtal    30 tablet    Take 1 tablet (200 mg) by mouth daily    Severe bipolar I disorder, most recent episode mixed, with psychotic features (H)       lidocaine HCl 3 % cream     453.6 g    Apply topically 3 times daily    Yeast infection of the vagina       metroNIDAZOLE 500 MG tablet    FLAGYL    14 tablet    Take 1 tablet (500 mg) by mouth 2 times daily for 7 days        morphine 15 MG IR tablet    MSIR    15 tablet    Take 1 tablet (15 mg) by mouth every 4 hours as needed for moderate to severe pain    Cyst of left ovary       ondansetron 4 MG ODT tab    ZOFRAN ODT    30 tablet    Take 1-2 tablets (4-8 mg) by mouth every 6 hours as needed for nausea    Nausea       prochlorperazine 10 MG tablet    COMPAZINE    30 tablet    Take 1 tablet (10 mg) by mouth every 6 hours as needed for nausea or vomiting    Nausea       QUEtiapine 25 MG tablet    SEROquel    60 tablet    Take 1-2 tablets (25-50 mg) by mouth every 6 hours as needed    Severe bipolar I disorder, most recent episode mixed, with psychotic features (H)       valACYclovir 500 MG tablet    VALTREX    30 tablet    Take 1 tablet (500 mg) by mouth 2 times daily X 3 days.  1 tablet daily for suppression    Herpes simplex vulvovaginitis

## 2018-09-19 ENCOUNTER — OFFICE VISIT (OUTPATIENT)
Dept: FAMILY MEDICINE | Facility: CLINIC | Age: 33
End: 2018-09-19
Payer: COMMERCIAL

## 2018-09-19 ENCOUNTER — NURSE TRIAGE (OUTPATIENT)
Dept: NURSING | Facility: CLINIC | Age: 33
End: 2018-09-19

## 2018-09-19 VITALS
RESPIRATION RATE: 20 BRPM | HEIGHT: 66 IN | TEMPERATURE: 98.2 F | OXYGEN SATURATION: 98 % | DIASTOLIC BLOOD PRESSURE: 74 MMHG | SYSTOLIC BLOOD PRESSURE: 128 MMHG | BODY MASS INDEX: 25.71 KG/M2 | WEIGHT: 160 LBS | HEART RATE: 92 BPM

## 2018-09-19 DIAGNOSIS — F31.64 SEVERE BIPOLAR I DISORDER, MOST RECENT EPISODE MIXED, WITH PSYCHOTIC FEATURES (H): ICD-10-CM

## 2018-09-19 DIAGNOSIS — Z51.81 ENCOUNTER FOR THERAPEUTIC DRUG MONITORING: ICD-10-CM

## 2018-09-19 DIAGNOSIS — N83.202 CYST OF LEFT OVARY: ICD-10-CM

## 2018-09-19 DIAGNOSIS — R10.2 PELVIC PAIN IN FEMALE: ICD-10-CM

## 2018-09-19 DIAGNOSIS — Z01.818 PREOP GENERAL PHYSICAL EXAM: Primary | ICD-10-CM

## 2018-09-19 LAB
HGB BLD-MCNC: 12.8 G/DL (ref 11.7–15.7)
TSH SERPL DL<=0.005 MIU/L-ACNC: 1.59 MU/L (ref 0.4–4)

## 2018-09-19 PROCEDURE — 84443 ASSAY THYROID STIM HORMONE: CPT | Performed by: NURSE PRACTITIONER

## 2018-09-19 PROCEDURE — 85018 HEMOGLOBIN: CPT | Performed by: NURSE PRACTITIONER

## 2018-09-19 PROCEDURE — 36415 COLL VENOUS BLD VENIPUNCTURE: CPT | Performed by: NURSE PRACTITIONER

## 2018-09-19 PROCEDURE — 99214 OFFICE O/P EST MOD 30 MIN: CPT | Performed by: NURSE PRACTITIONER

## 2018-09-19 RX ORDER — QUETIAPINE FUMARATE 25 MG/1
50 TABLET, FILM COATED ORAL EVERY 6 HOURS PRN
Status: CANCELLED | OUTPATIENT
Start: 2018-09-19

## 2018-09-19 NOTE — PATIENT INSTRUCTIONS
1. Preop general physical exam  Screening  - Hemoglobin  Follow pre-op instructions    2. Encounter for therapeutic drug monitoring  Chronic, stable  - TSH    3. Cyst of left ovary  Acute    4. Pelvic pain in female  Acute    Before Your Surgery      Call your surgeon if there is any change in your health. This includes signs of a cold or flu (such as a sore throat, runny nose, cough, rash or fever).    Do not smoke, drink alcohol or take over the counter medicine (unless your surgeon or primary care doctor tells you to) for the 24 hours before and after surgery.    If you take prescribed drugs: Follow your doctor s orders about which medicines to take and which to stop until after surgery.    Eating and drinking prior to surgery: follow the instructions from your surgeon    Take a shower or bath the night before surgery. Use the soap your surgeon gave you to gently clean your skin. If you do not have soap from your surgeon, use your regular soap. Do not shave or scrub the surgery site.  Wear clean pajamas and have clean sheets on your bed.

## 2018-09-19 NOTE — TELEPHONE ENCOUNTER
Reason for Disposition    Caller requesting lab results    Additional Information    Negative: Lab calling with strep throat test results and triager can call in prescription    Negative: Lab calling with urinalysis test results and triager can call in prescription    Negative: Medication questions    Negative: ED call to PCP    Negative: Physician call to PCP    Negative: Call about patient who is currently hospitalized    Negative: Lab or radiology calling with CRITICAL test results    Negative: [1] Prescription not at pharmacy AND [2] was prescribed today by PCP    Negative: [1] Follow-up call from patient regarding patient's clinical status AND [2] information urgent    Negative: [1] Caller requests to speak ONLY to PCP AND [2] URGENT question    Negative: [1] Caller requests to speak to PCP now AND [2] won't tell us reason for call  (Exception: if 10 pm to 6 am, caller must first discuss reason for the call)    Negative: Notification of hospital admission    Negative: Notification of death    Protocols used: [facility]

## 2018-09-19 NOTE — MR AVS SNAPSHOT
After Visit Summary   9/19/2018    Sirena Dietrich    MRN: [id number]           Patient Information     Date Of Birth          1985        Visit Information        Provider Department      9/19/2018 1:00 PM Lindsey Loomis CNP [facility]        Today's Diagnoses     Preop general physical exam    -  1    Encounter for therapeutic drug monitoring        Cyst of left ovary        Pelvic pain in female          Care Instructions    1. Preop general physical exam  Screening  - Hemoglobin  Follow pre-op instructions    2. Encounter for therapeutic drug monitoring  Chronic, stable  - TSH    3. Cyst of left ovary  Acute    4. Pelvic pain in female  Acute    Before Your Surgery      Call your surgeon if there is any change in your health. This includes signs of a cold or flu (such as a sore throat, runny nose, cough, rash or fever).    Do not smoke, drink alcohol or take over the counter medicine (unless your surgeon or primary care doctor tells you to) for the 24 hours before and after surgery.    If you take prescribed drugs: Follow your doctor s orders about which medicines to take and which to stop until after surgery.    Eating and drinking prior to surgery: follow the instructions from your surgeon    Take a shower or bath the night before surgery. Use the soap your surgeon gave you to gently clean your skin. If you do not have soap from your surgeon, use your regular soap. Do not shave or scrub the surgery site.  Wear clean pajamas and have clean sheets on your bed.           Follow-ups after your visit        Follow-up notes from your care team     Return in about 1 month (around 10/19/2018) for Mental health.      Your next 10 appointments already scheduled     Sep 21, 2018   Procedure with Debi Luis MD   [facility] (--)    [address] [place] [id number]   [phone]           The medical center is located at [address] [place] [address]  [address] (between [address] in [place], four miles north of [place]).              Who to contact     If you have questions or need follow up information about today's clinic visit or your schedule please contact [facility] directly at [phone].  Normal or non-critical lab and imaging results will be communicated to you by MyChart, letter or phone within 4 business days after the clinic has received the results. If you do not hear from us within 7 days, please contact the clinic through [employer] or phone. If you have a critical or abnormal lab result, we will notify you by phone as soon as possible.  Submit refill requests through [employer] or call your pharmacy and they will forward the refill request to us. Please allow 3 business days for your refill to be completed.          Additional Information About Your Visit        MyChart Information     [employer] gives you secure access to your electronic health record. If you see a primary care provider, you can also send messages to your care team and make appointments. If you have questions, please call your primary care clinic.  If you do not have a primary care provider, please call [phone] and they will assist you.        Care EveryWhere ID     This is your Care EveryWhere ID. This could be used by other organizations to access your [place] medical records  [id number]        Your Vitals Were     Pulse Temperature Respirations Height Last Period Pulse Oximetry    92 98.2  F (36.8  C) (Tympanic) 20 5' 6\" (1.676 m) (LMP Unknown) 98%    BMI (Body Mass Index)                   25.82 kg/m2            Blood Pressure from Last 3 Encounters:   09/19/18 128/74   09/18/18 135/78   09/16/18 125/72    Weight from Last 3 Encounters:   09/19/18 160 lb (72.6 kg)   09/18/18 160 lb 3.2 oz (72.7 kg)   07/07/18 150 lb (68 kg)              We Performed the Following     DEPRESSION ACTION PLAN (DAP)     Hemoglobin     TSH          Today's Medication " Changes          These changes are accurate as of 9/19/18  1:36 PM.  If you have any questions, ask your nurse or doctor.               These medicines have changed or have updated prescriptions.        Dose/Directions    gabapentin 600 MG tablet   Commonly known as:  NEURONTIN   This may have changed:    - how much to take  - when to take this   Used for:  Severe bipolar I disorder, most recent episode mixed, with psychotic features (H)        Dose:  1200 mg   Take 2 tablets (1,200 mg) by mouth At Bedtime   Quantity:  60 tablet   Refills:  0       QUEtiapine 25 MG tablet   Commonly known as:  SEROquel   This may have changed:  how much to take   Used for:  Severe bipolar I disorder, most recent episode mixed, with psychotic features (H)        Dose:  25-50 mg   Take 1-2 tablets (25-50 mg) by mouth every 6 hours as needed   Quantity:  60 tablet   Refills:  0         Stop taking these medicines if you haven't already. Please contact your care team if you have questions.     acyclovir 5 % ointment   Commonly known as:  ZOVIRAX   Stopped by:  Lindsey Loomis CNP                    Primary Care Provider Office Phone # Fax #    DIPESH Marino [facility] [phone]       [address] [place] [address]        Goals        General    I will attend counseling appointment (pt-stated)     Notes - Note edited  11/25/2015  1:35 PM by Shi Armstrong LSW    As of today's date 11/25/2015 goal is met at 76 - 100%.   Goal Status:  Complete - will keep to maintain for  A few month  As of today's date 7/28/2015 goal is met at 26 - 50%.   Goal Status:  Active          Equal Access to Services     [facility]: Hadii jo thompson Sorian, waaxda luqadaha, qaybta kaalmada raheem dominguez . So [facility] [phone].    ATENCIÓN: Si habla español, tiene a borja disposición servicios gratuitos de asistencia lingüística. Llame al [phone].    We comply with applicable  federal civil rights laws and [place] laws. We do not discriminate on the basis of race, color, national origin, age, disability, sex, sexual orientation, or gender identity.            Thank you!     Thank you for choosing [facility]  for your care. Our goal is always to provide you with excellent care. Hearing back from our patients is one way we can continue to improve our services. Please take a few minutes to complete the written survey that you may receive in the mail after your visit with us. Thank you!             Your Updated Medication List - Protect others around you: Learn how to safely use, store and throw away your medicines at www.disposemymeds.org.          This list is accurate as of 9/19/18  1:36 PM.  Always use your most recent med list.                   Brand Name Dispense Instructions for use Diagnosis    gabapentin 600 MG tablet    NEURONTIN    60 tablet    Take 2 tablets (1,200 mg) by mouth At Bedtime    Severe bipolar I disorder, most recent episode mixed, with psychotic features (H)       lamoTRIgine 200 MG tablet    LaMICtal    30 tablet    Take 1 tablet (200 mg) by mouth daily    Severe bipolar I disorder, most recent episode mixed, with psychotic features (H)       lidocaine HCl 3 % cream     453.6 g    Apply topically 3 times daily    Yeast infection of the vagina       metroNIDAZOLE 500 MG tablet    FLAGYL    14 tablet    Take 1 tablet (500 mg) by mouth 2 times daily for 7 days        morphine 15 MG IR tablet    MSIR    15 tablet    Take 1 tablet (15 mg) by mouth every 4 hours as needed for moderate to severe pain    Cyst of left ovary       ondansetron 4 MG ODT tab    ZOFRAN ODT    30 tablet    Take 1-2 tablets (4-8 mg) by mouth every 6 hours as needed for nausea    Nausea       prochlorperazine 10 MG tablet    COMPAZINE    30 tablet    Take 1 tablet (10 mg) by mouth every 6 hours as needed for nausea or vomiting    Nausea       QUEtiapine 25 MG tablet    SEROquel     60 tablet    Take 1-2 tablets (25-50 mg) by mouth every 6 hours as needed    Severe bipolar I disorder, most recent episode mixed, with psychotic features (H)       valACYclovir 500 MG tablet    VALTREX    30 tablet    Take 1 tablet (500 mg) by mouth 2 times daily X 3 days.  1 tablet daily for suppression    Herpes simplex vulvovaginitis

## 2018-09-19 NOTE — TELEPHONE ENCOUNTER
PA Initiation    Medication: ACYCLOVIR 5% OINT  Insurance Company: [employer] - Phone [phone] Fax [phone]  Pharmacy Filling the Rx: [id number] [place] [id number]  Filling Pharmacy Phone: [phone]  Filling Pharmacy Fax:    Start Date: 9/19/2018

## 2018-09-19 NOTE — TELEPHONE ENCOUNTER
Patient is requesting lab results.  FNA advised hemoglobin is normal at 12.8 and TSH is still in process.  Patient veralized understanding.

## 2018-09-19 NOTE — LETTER
My Depression Action Plan  Name: Sirena Dietrich   Date of Birth 1985  Date: 9/19/2018    My doctor: Shi Martinez   My clinic: [address] [address]  [phone]          GREEN    ZONE   Good Control    What it looks like:     Things are going generally well. You have normal up s and down s. You may even feel depressed from time to time, but bad moods usually last less than a day.   What you need to do:  1. Continue to care for yourself (see self care plan)  2. Check your depression survival kit and update it as needed  3. Follow your physician s recommendations including any medication.  4. Do not stop taking medication unless you consult with your physician first.           YELLOW         ZONE Getting Worse    What it looks like:     Depression is starting to interfere with your life.     It may be hard to get out of bed; you may be starting to isolate yourself from others.    Symptoms of depression are starting to last most all day and this has happened for several days.     You may have suicidal thoughts but they are not constant.   What you need to do:     1. Call your care team, your response to treatment will improve if you keep your care team informed of your progress. Yellow periods are signs an adjustment may need to be made.     2. Continue your self-care, even if you have to fake it!    3. Talk to someone in your support network    4. Open up your depression survival kit           RED    ZONE Medical Alert - Get Help    What it looks like:     Depression is seriously interfering with your life.     You may experience these or other symptoms: You can t get out of bed most days, can t work or engage in other necessary activities, you have trouble taking care of basic hygiene, or basic responsibilities, thoughts of suicide or death that will not go away, self-injurious behavior.     What you need to do:  1. Call your care team  and request a same-day appointment. If they are not available (weekends or after hours) call your local crisis line, emergency room or 911.            Depression Self Care Plan / Survival Kit    Self-Care for Depression  Here s the deal. Your body and mind are really not as separate as most people think.  What you do and think affects how you feel and how you feel influences what you do and think. This means if you do things that people who feel good do, it will help you feel better.  Sometimes this is all it takes.  There is also a place for medication and therapy depending on how severe your depression is, so be sure to consult with your medical provider and/ or Behavioral Health Consultant if your symptoms are worsening or not improving.     In order to better manage my stress, I will:    Exercise  Get some form of exercise, every day. This will help reduce pain and release endorphins, the  feel good  chemicals in your brain. This is almost as good as taking antidepressants!  This is not the same as joining a gym and then never going! (they count on that by the way ) It can be as simple as just going for a walk or doing some gardening, anything that will get you moving.      Hygiene   Maintain good hygiene (Get out of bed in the morning, Make your bed, Brush your teeth, Take a shower, and Get dressed like you were going to work, even if you are unemployed).  If your clothes don't fit try to get ones that do.    Diet  I will strive to eat foods that are good for me, drink plenty of water, and avoid excessive sugar, caffeine, alcohol, and other mood-altering substances.  Some foods that are helpful in depression are: complex carbohydrates, B vitamins, flaxseed, fish or fish oil, fresh fruits and vegetables.    Psychotherapy  I agree to participate in Individual Therapy (if recommended).    Medication  If prescribed medications, I agree to take them.  Missing doses can result in serious side effects.  I understand  that drinking alcohol, or other illicit drug use, may cause potential side effects.  I will not stop my medication abruptly without first discussing it with my provider.    Staying Connected With Others  I will stay in touch with my friends, family members, and my primary care provider/team.    Use your imagination  Be creative.  We all have a creative side; it doesn t matter if it s oil painting, sand castles, or mud pies! This will also kick up the endorphins.    Witness Beauty  (AKA stop and smell the roses) Take a look outside, even in mid-winter. Notice colors, textures. Watch the squirrels and birds.     Service to others  Be of service to others.  There is always someone else in need.  By helping others we can  get out of ourselves  and remember the really important things.  This also provides opportunities for practicing all the other parts of the program.    Humor  Laugh and be silly!  Adjust your TV habits for less news and crime-drama and more comedy.    Control your stress  Try breathing deep, massage therapy, biofeedback, and meditation. Find time to relax each day.     My support system    Clinic Contact:  Phone number:    Contact 1:  Phone number:    Contact 2:  Phone number:    Temple/:  Phone number:    Therapist:  Phone number:    Local crisis center:    Phone number:    Other community support:  Phone number:
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
## 2018-09-19 NOTE — NURSING NOTE
"Chief Complaint   Patient presents with     Pre-Op Exam       Initial /74 (BP Location: Right arm, Patient Position: Sitting, Cuff Size: Adult Large)  Pulse 92  Temp 98.2  F (36.8  C) (Tympanic)  Resp 20  Ht 5' 6\" (1.676 m)  Wt 160 lb (72.6 kg)  LMP  (LMP Unknown)  SpO2 98%  BMI 25.82 kg/m2 Estimated body mass index is 25.82 kg/(m^2) as calculated from the following:    Height as of this encounter: 5' 6\" (1.676 m).    Weight as of this encounter: 160 lb (72.6 kg).    Patient presents to the clinic using No DME    Health Maintenance that is potentially due pending provider review:  NONE    n/a    Is there anyone who you would like to be able to receive your results? No  If yes have patient fill out JOSÉ    "

## 2018-09-20 ENCOUNTER — ANESTHESIA EVENT (OUTPATIENT)
Dept: SURGERY | Facility: CLINIC | Age: 33
End: 2018-09-20
Payer: COMMERCIAL

## 2018-09-20 RX ORDER — QUETIAPINE FUMARATE 50 MG/1
50 TABLET, FILM COATED ORAL 3 TIMES DAILY
Qty: 90 TABLET | Refills: 0 | Status: SHIPPED | OUTPATIENT
Start: 2018-09-20 | End: 2018-11-20

## 2018-09-20 NOTE — PROGRESS NOTES
Patient requested refill on her Seroquel today stating provider would not refill this yesterday during her PreOp evaluation.   Ok to refill seroquel x 30 days until she can be seen by psychiatry in [place].  Thanks Shi Martinez [facility]

## 2018-09-20 NOTE — TELEPHONE ENCOUNTER
Dr. Grossman - Acyclovir ointment is denied by insurance/PA denied.  Would you like to change to Acyclovir Capsules or Tablets?    [facility].    -Fariba Law  Clinic Station

## 2018-09-20 NOTE — TELEPHONE ENCOUNTER
Change RX to Acyclovir tablets   Leonor Marinelli MD   [facility] [place]     Patient received prescription for Valacyclovir 500 mg  tablets at appointment on 9/18/18    Take 1 tablet (500 mg) by mouth 2 times daily X 3 days.  1 tablet daily for suppression - Oral    Would this be sufficient?  Thank you.    Rocio Nicholson   Ob/Gyn Clinic  RN

## 2018-09-20 NOTE — PROGRESS NOTES
Dear Sirena,  Hemoglobin is normal  Pre-op approved.  Please contact our clinic via phone or My Chart if you have any questions or concerns.  Thanks,  MICHAEL Hernandez

## 2018-09-20 NOTE — TELEPHONE ENCOUNTER
PRIOR AUTHORIZATION DENIED    Medication: ACYCLOVIR 5% OINT - DENIED    Denial Date: 9/19/2018    Denial Rational: PT MUST TRY/FAIL ACYCLOVIR CAPSULES, SUSPENSION, OR TABLETS.        Appeal Information:

## 2018-09-21 ENCOUNTER — HOSPITAL ENCOUNTER (OUTPATIENT)
Facility: CLINIC | Age: 33
Discharge: HOME | End: 2018-09-21
Attending: OBSTETRICS & GYNECOLOGY | Admitting: OBSTETRICS & GYNECOLOGY
Payer: COMMERCIAL

## 2018-09-21 ENCOUNTER — ANESTHESIA (OUTPATIENT)
Dept: SURGERY | Facility: CLINIC | Age: 33
End: 2018-09-21
Payer: COMMERCIAL

## 2018-09-21 VITALS
HEIGHT: 66 IN | SYSTOLIC BLOOD PRESSURE: 113 MMHG | DIASTOLIC BLOOD PRESSURE: 74 MMHG | TEMPERATURE: 97.6 F | OXYGEN SATURATION: 96 % | BODY MASS INDEX: 25.71 KG/M2 | RESPIRATION RATE: 24 BRPM | WEIGHT: 160 LBS

## 2018-09-21 DIAGNOSIS — Z90.721 S/P LEFT OOPHORECTOMY: Primary | ICD-10-CM

## 2018-09-21 DIAGNOSIS — N83.202 CYST OF LEFT OVARY: ICD-10-CM

## 2018-09-21 LAB
ABO + RH BLD: NORMAL
ABO + RH BLD: NORMAL
BLD GP AB SCN SERPL QL: NORMAL
BLOOD BANK CMNT PATIENT-IMP: NORMAL
SPECIMEN EXP DATE BLD: NORMAL

## 2018-09-21 PROCEDURE — 25000128 H RX IP 250 OP 636: Performed by: NURSE ANESTHETIST, CERTIFIED REGISTERED

## 2018-09-21 PROCEDURE — 86900 BLOOD TYPING SEROLOGIC ABO: CPT | Performed by: OBSTETRICS & GYNECOLOGY

## 2018-09-21 PROCEDURE — 25000125 ZZHC RX 250: Performed by: NURSE ANESTHETIST, CERTIFIED REGISTERED

## 2018-09-21 PROCEDURE — 27110028 ZZH OR GENERAL SUPPLY NON-STERILE: Performed by: OBSTETRICS & GYNECOLOGY

## 2018-09-21 PROCEDURE — 25000125 ZZHC RX 250: Performed by: OBSTETRICS & GYNECOLOGY

## 2018-09-21 PROCEDURE — 36000056 ZZH SURGERY LEVEL 3 1ST 30 MIN: Performed by: OBSTETRICS & GYNECOLOGY

## 2018-09-21 PROCEDURE — 71000014 ZZH RECOVERY PHASE 1 LEVEL 2 FIRST HR: Performed by: OBSTETRICS & GYNECOLOGY

## 2018-09-21 PROCEDURE — 88305 TISSUE EXAM BY PATHOLOGIST: CPT | Mod: 26 | Performed by: OBSTETRICS & GYNECOLOGY

## 2018-09-21 PROCEDURE — 25000566 ZZH SEVOFLURANE, EA 15 MIN: Performed by: OBSTETRICS & GYNECOLOGY

## 2018-09-21 PROCEDURE — 86850 RBC ANTIBODY SCREEN: CPT | Performed by: OBSTETRICS & GYNECOLOGY

## 2018-09-21 PROCEDURE — 71000027 ZZH RECOVERY PHASE 2 EACH 15 MINS: Performed by: OBSTETRICS & GYNECOLOGY

## 2018-09-21 PROCEDURE — 25000132 ZZH RX MED GY IP 250 OP 250 PS 637: Performed by: NURSE ANESTHETIST, CERTIFIED REGISTERED

## 2018-09-21 PROCEDURE — 88305 TISSUE EXAM BY PATHOLOGIST: CPT | Performed by: OBSTETRICS & GYNECOLOGY

## 2018-09-21 PROCEDURE — 71000015 ZZH RECOVERY PHASE 1 LEVEL 2 EA ADDTL HR: Performed by: OBSTETRICS & GYNECOLOGY

## 2018-09-21 PROCEDURE — 37000008 ZZH ANESTHESIA TECHNICAL FEE, 1ST 30 MIN: Performed by: OBSTETRICS & GYNECOLOGY

## 2018-09-21 PROCEDURE — 40000306 ZZH STATISTIC PRE PROC ASSESS II: Performed by: OBSTETRICS & GYNECOLOGY

## 2018-09-21 PROCEDURE — 25000128 H RX IP 250 OP 636: Performed by: OBSTETRICS & GYNECOLOGY

## 2018-09-21 PROCEDURE — 36415 COLL VENOUS BLD VENIPUNCTURE: CPT | Performed by: OBSTETRICS & GYNECOLOGY

## 2018-09-21 PROCEDURE — 86901 BLOOD TYPING SEROLOGIC RH(D): CPT | Performed by: OBSTETRICS & GYNECOLOGY

## 2018-09-21 PROCEDURE — 25800025 ZZH RX 258: Performed by: OBSTETRICS & GYNECOLOGY

## 2018-09-21 PROCEDURE — 58661 LAPAROSCOPY REMOVE ADNEXA: CPT | Mod: GC | Performed by: OBSTETRICS & GYNECOLOGY

## 2018-09-21 PROCEDURE — 37000009 ZZH ANESTHESIA TECHNICAL FEE, EACH ADDTL 15 MIN: Performed by: OBSTETRICS & GYNECOLOGY

## 2018-09-21 PROCEDURE — 25000132 ZZH RX MED GY IP 250 OP 250 PS 637: Performed by: OBSTETRICS & GYNECOLOGY

## 2018-09-21 PROCEDURE — 27210794 ZZH OR GENERAL SUPPLY STERILE: Performed by: OBSTETRICS & GYNECOLOGY

## 2018-09-21 PROCEDURE — 58661 LAPAROSCOPY REMOVE ADNEXA: CPT | Mod: AS | Performed by: PHYSICIAN ASSISTANT

## 2018-09-21 PROCEDURE — 36000058 ZZH SURGERY LEVEL 3 EA 15 ADDTL MIN: Performed by: OBSTETRICS & GYNECOLOGY

## 2018-09-21 RX ORDER — LIDOCAINE 40 MG/G
CREAM TOPICAL
Status: DISCONTINUED | OUTPATIENT
Start: 2018-09-21 | End: 2018-09-21 | Stop reason: HOSPADM

## 2018-09-21 RX ORDER — ONDANSETRON 2 MG/ML
INJECTION INTRAMUSCULAR; INTRAVENOUS PRN
Status: DISCONTINUED | OUTPATIENT
Start: 2018-09-21 | End: 2018-09-21

## 2018-09-21 RX ORDER — SODIUM CHLORIDE, SODIUM LACTATE, POTASSIUM CHLORIDE, CALCIUM CHLORIDE 600; 310; 30; 20 MG/100ML; MG/100ML; MG/100ML; MG/100ML
INJECTION, SOLUTION INTRAVENOUS CONTINUOUS
Status: DISCONTINUED | OUTPATIENT
Start: 2018-09-21 | End: 2018-09-21 | Stop reason: HOSPADM

## 2018-09-21 RX ORDER — IBUPROFEN 600 MG/1
600 TABLET, FILM COATED ORAL
Status: DISCONTINUED | OUTPATIENT
Start: 2018-09-21 | End: 2018-09-21 | Stop reason: HOSPADM

## 2018-09-21 RX ORDER — OXYCODONE HYDROCHLORIDE 5 MG/1
5-10 TABLET ORAL
Status: DISCONTINUED | OUTPATIENT
Start: 2018-09-21 | End: 2018-09-21 | Stop reason: HOSPADM

## 2018-09-21 RX ORDER — EPHEDRINE SULFATE 50 MG/ML
INJECTION, SOLUTION INTRAVENOUS PRN
Status: DISCONTINUED | OUTPATIENT
Start: 2018-09-21 | End: 2018-09-21

## 2018-09-21 RX ORDER — ONDANSETRON 4 MG/1
4 TABLET, ORALLY DISINTEGRATING ORAL EVERY 30 MIN PRN
Status: DISCONTINUED | OUTPATIENT
Start: 2018-09-21 | End: 2018-09-21 | Stop reason: HOSPADM

## 2018-09-21 RX ORDER — AMOXICILLIN 250 MG
1-2 CAPSULE ORAL 2 TIMES DAILY
Qty: 60 TABLET | Refills: 0 | Status: SHIPPED | OUTPATIENT
Start: 2018-09-21 | End: 2019-05-06

## 2018-09-21 RX ORDER — GABAPENTIN 300 MG/1
300 CAPSULE ORAL ONCE
Status: COMPLETED | OUTPATIENT
Start: 2018-09-21 | End: 2018-09-21

## 2018-09-21 RX ORDER — IBUPROFEN 800 MG/1
800 TABLET, FILM COATED ORAL EVERY 6 HOURS PRN
Qty: 90 TABLET | Refills: 1 | Status: SHIPPED | OUTPATIENT
Start: 2018-09-21 | End: 2018-10-16 | Stop reason: SINTOL

## 2018-09-21 RX ORDER — NALOXONE HYDROCHLORIDE 0.4 MG/ML
.1-.4 INJECTION, SOLUTION INTRAMUSCULAR; INTRAVENOUS; SUBCUTANEOUS
Status: DISCONTINUED | OUTPATIENT
Start: 2018-09-21 | End: 2018-09-21 | Stop reason: HOSPADM

## 2018-09-21 RX ORDER — MORPHINE SULFATE 15 MG/1
15 TABLET ORAL EVERY 4 HOURS PRN
Qty: 40 TABLET | Refills: 0 | Status: SHIPPED | OUTPATIENT
Start: 2018-09-21 | End: 2018-09-27

## 2018-09-21 RX ORDER — FENTANYL CITRATE 50 UG/ML
25-50 INJECTION, SOLUTION INTRAMUSCULAR; INTRAVENOUS
Status: DISCONTINUED | OUTPATIENT
Start: 2018-09-21 | End: 2018-09-21 | Stop reason: HOSPADM

## 2018-09-21 RX ORDER — KETOROLAC TROMETHAMINE 30 MG/ML
30 INJECTION, SOLUTION INTRAMUSCULAR; INTRAVENOUS EVERY 6 HOURS PRN
Status: DISCONTINUED | OUTPATIENT
Start: 2018-09-21 | End: 2018-09-21 | Stop reason: HOSPADM

## 2018-09-21 RX ORDER — ALBUTEROL SULFATE 0.83 MG/ML
2.5 SOLUTION RESPIRATORY (INHALATION) EVERY 4 HOURS PRN
Status: DISCONTINUED | OUTPATIENT
Start: 2018-09-21 | End: 2018-09-21 | Stop reason: HOSPADM

## 2018-09-21 RX ORDER — PHENAZOPYRIDINE HYDROCHLORIDE 200 MG/1
200 TABLET, FILM COATED ORAL ONCE
Status: COMPLETED | OUTPATIENT
Start: 2018-09-21 | End: 2018-09-21

## 2018-09-21 RX ORDER — ACETAMINOPHEN 500 MG
1000 TABLET ORAL EVERY 4 HOURS PRN
Qty: 100 TABLET | Refills: 1 | Status: SHIPPED | OUTPATIENT
Start: 2018-09-21 | End: 2020-01-11

## 2018-09-21 RX ORDER — HYDROMORPHONE HYDROCHLORIDE 1 MG/ML
.3-.5 INJECTION, SOLUTION INTRAMUSCULAR; INTRAVENOUS; SUBCUTANEOUS EVERY 10 MIN PRN
Status: DISCONTINUED | OUTPATIENT
Start: 2018-09-21 | End: 2018-09-21 | Stop reason: HOSPADM

## 2018-09-21 RX ORDER — PROPOFOL 10 MG/ML
INJECTION, EMULSION INTRAVENOUS PRN
Status: DISCONTINUED | OUTPATIENT
Start: 2018-09-21 | End: 2018-09-21

## 2018-09-21 RX ORDER — HYDROXYZINE HYDROCHLORIDE 50 MG/1
50 TABLET, FILM COATED ORAL ONCE
Status: COMPLETED | OUTPATIENT
Start: 2018-09-21 | End: 2018-09-21

## 2018-09-21 RX ORDER — MEPERIDINE HYDROCHLORIDE 50 MG/ML
12.5 INJECTION INTRAMUSCULAR; INTRAVENOUS; SUBCUTANEOUS
Status: COMPLETED | OUTPATIENT
Start: 2018-09-21 | End: 2018-09-21

## 2018-09-21 RX ORDER — ONDANSETRON 4 MG/1
4-8 TABLET, ORALLY DISINTEGRATING ORAL EVERY 8 HOURS PRN
Qty: 20 TABLET | Refills: 1 | Status: SHIPPED | OUTPATIENT
Start: 2018-09-21 | End: 2018-09-27

## 2018-09-21 RX ORDER — BUPIVACAINE HYDROCHLORIDE 2.5 MG/ML
INJECTION, SOLUTION INFILTRATION; PERINEURAL PRN
Status: DISCONTINUED | OUTPATIENT
Start: 2018-09-21 | End: 2018-09-21 | Stop reason: HOSPADM

## 2018-09-21 RX ORDER — ACETAMINOPHEN 325 MG/1
975 TABLET ORAL ONCE
Status: COMPLETED | OUTPATIENT
Start: 2018-09-21 | End: 2018-09-21

## 2018-09-21 RX ORDER — CLINDAMYCIN PHOSPHATE 900 MG/50ML
900 INJECTION, SOLUTION INTRAVENOUS
Status: COMPLETED | OUTPATIENT
Start: 2018-09-21 | End: 2018-09-21

## 2018-09-21 RX ORDER — CLINDAMYCIN PHOSPHATE 900 MG/50ML
900 INJECTION, SOLUTION INTRAVENOUS SEE ADMIN INSTRUCTIONS
Status: DISCONTINUED | OUTPATIENT
Start: 2018-09-21 | End: 2018-09-21 | Stop reason: HOSPADM

## 2018-09-21 RX ORDER — FENTANYL CITRATE 50 UG/ML
INJECTION, SOLUTION INTRAMUSCULAR; INTRAVENOUS PRN
Status: DISCONTINUED | OUTPATIENT
Start: 2018-09-21 | End: 2018-09-21

## 2018-09-21 RX ORDER — DEXAMETHASONE SODIUM PHOSPHATE 4 MG/ML
INJECTION, SOLUTION INTRA-ARTICULAR; INTRALESIONAL; INTRAMUSCULAR; INTRAVENOUS; SOFT TISSUE PRN
Status: DISCONTINUED | OUTPATIENT
Start: 2018-09-21 | End: 2018-09-21

## 2018-09-21 RX ORDER — ONDANSETRON 2 MG/ML
4 INJECTION INTRAMUSCULAR; INTRAVENOUS EVERY 30 MIN PRN
Status: DISCONTINUED | OUTPATIENT
Start: 2018-09-21 | End: 2018-09-21 | Stop reason: HOSPADM

## 2018-09-21 RX ADMIN — MEPERIDINE HYDROCHLORIDE 12.5 MG: 50 INJECTION, SOLUTION INTRAMUSCULAR; INTRAVENOUS; SUBCUTANEOUS at 09:54

## 2018-09-21 RX ADMIN — LIDOCAINE HYDROCHLORIDE 1 ML: 10 INJECTION, SOLUTION EPIDURAL; INFILTRATION; INTRACAUDAL; PERINEURAL at 07:33

## 2018-09-21 RX ADMIN — LIDOCAINE HYDROCHLORIDE 100 MG: 10 INJECTION, SOLUTION EPIDURAL; INFILTRATION; INTRACAUDAL; PERINEURAL at 07:42

## 2018-09-21 RX ADMIN — FENTANYL CITRATE 150 MCG: 50 INJECTION, SOLUTION INTRAMUSCULAR; INTRAVENOUS at 07:42

## 2018-09-21 RX ADMIN — GABAPENTIN 300 MG: 300 CAPSULE ORAL at 07:33

## 2018-09-21 RX ADMIN — PROPOFOL 200 MG: 10 INJECTION, EMULSION INTRAVENOUS at 07:42

## 2018-09-21 RX ADMIN — Medication 0.5 MG: at 10:29

## 2018-09-21 RX ADMIN — MIDAZOLAM 2 MG: 1 INJECTION INTRAMUSCULAR; INTRAVENOUS at 07:39

## 2018-09-21 RX ADMIN — SODIUM CHLORIDE, POTASSIUM CHLORIDE, SODIUM LACTATE AND CALCIUM CHLORIDE: 600; 310; 30; 20 INJECTION, SOLUTION INTRAVENOUS at 08:35

## 2018-09-21 RX ADMIN — FENTANYL CITRATE 100 MCG: 50 INJECTION, SOLUTION INTRAMUSCULAR; INTRAVENOUS at 07:39

## 2018-09-21 RX ADMIN — GENTAMICIN SULFATE 140 MG: 40 INJECTION, SOLUTION INTRAMUSCULAR; INTRAVENOUS at 07:50

## 2018-09-21 RX ADMIN — ACETAMINOPHEN 325 MG: 325 TABLET, FILM COATED ORAL at 07:33

## 2018-09-21 RX ADMIN — SODIUM CHLORIDE, POTASSIUM CHLORIDE, SODIUM LACTATE AND CALCIUM CHLORIDE: 600; 310; 30; 20 INJECTION, SOLUTION INTRAVENOUS at 07:33

## 2018-09-21 RX ADMIN — ONDANSETRON 4 MG: 2 INJECTION INTRAMUSCULAR; INTRAVENOUS at 08:45

## 2018-09-21 RX ADMIN — KETOROLAC TROMETHAMINE 30 MG: 30 INJECTION, SOLUTION INTRAMUSCULAR at 09:40

## 2018-09-21 RX ADMIN — HYDROXYZINE HYDROCHLORIDE 50 MG: 50 TABLET ORAL at 10:29

## 2018-09-21 RX ADMIN — EPHEDRINE SULFATE 10 MG: 50 INJECTION, SOLUTION INTRAVENOUS at 08:31

## 2018-09-21 RX ADMIN — DEXAMETHASONE SODIUM PHOSPHATE 4 MG: 4 INJECTION, SOLUTION INTRA-ARTICULAR; INTRALESIONAL; INTRAMUSCULAR; INTRAVENOUS; SOFT TISSUE at 08:45

## 2018-09-21 RX ADMIN — ROCURONIUM BROMIDE 30 MG: 10 INJECTION INTRAVENOUS at 07:42

## 2018-09-21 RX ADMIN — EPHEDRINE SULFATE 10 MG: 50 INJECTION, SOLUTION INTRAVENOUS at 08:00

## 2018-09-21 RX ADMIN — PHENAZOPYRIDINE HYDROCHLORIDE 200 MG: 200 TABLET, FILM COATED ORAL at 07:34

## 2018-09-21 RX ADMIN — MEPERIDINE HYDROCHLORIDE 12.5 MG: 50 INJECTION, SOLUTION INTRAMUSCULAR; INTRAVENOUS; SUBCUTANEOUS at 09:41

## 2018-09-21 RX ADMIN — CLINDAMYCIN PHOSPHATE 900 MG: 18 INJECTION, SOLUTION INTRAVENOUS at 07:39

## 2018-09-21 ASSESSMENT — LIFESTYLE VARIABLES: TOBACCO_USE: 1

## 2018-09-21 ASSESSMENT — ENCOUNTER SYMPTOMS
SHORTNESS OF BREATH: 0
ABDOMINAL PAIN: 1
LIGHT-HEADEDNESS: 0
BACK PAIN: 0
VOMITING: 0
DYSRHYTHMIAS: 1
DIAPHORESIS: 0
NAUSEA: 1
HEADACHES: 0

## 2018-09-21 NOTE — DISCHARGE INSTRUCTIONS
Same Day Surgery Discharge Instructions  Special Precautions After Surgery - Adult    1. It is not unusual to feel lightheaded or faint, up to 24 hours after surgery or while taking pain medication.  If you have these symptoms; sit for a few minutes before standing and have someone assist you when getting up.  2. You should rest and relax for the next 24 hours and must have someone stay with you for at least 24 hours after your discharge.  3. DO NOT DRIVE any vehicle or operate mechanical equipment for 24 hours following the end of your surgery.  DO NOT DRIVE while taking narcotic pain medications that have been prescribed by your physician.  If you had a limb operated on, you must be able to use it fully to drive.  4. DO NOT drink alcoholic beverages for 24 hours following surgery or while taking prescription pain medication.  5. Drink clear liquids (apple juice, ginger ale, broth, 7-Up, etc.).  Progress to your regular diet as you feel able.  6. Any questions call your physician and do not make important decisions for 24 hours.    ACTIVITY  ? Rest today.  No activity or diet restrictions.  ? Resume activity as tolerated.  ? No lifting more than 10 pounds for 4 weeks. No driving for 3 weeks no strenuous activity. Also pelvic Rest : No tampons ,douching or intercourse for 6 weeks  ? Restrictions: per md direction  ? See printed discharge sheet.     INCISIONAL CARE  ? May  shower after 24 hours.  ? Apply ice 1/2 hour on and 1/2 hour off while awake.  ? Be alert for signs of infection:  redness, swelling, heat, drainage of pus, and/or elevated temperature.  Contact your doctor if these occur.        Call for an appointment to return to the clinic in 4 weeks    Medications:  ? Acetaminophen (Tylenol):  Last dose: 0730.  ? Ibuprofen (Motrin, Advil):  Last dose: 0830.  ? Zofran:  Next dose: as needed.  ? Morphine MSR 15 mg as needed   ? Sennakot to prevent constipation   ? Follow the instructions on  the bottle.     Additional discharge instructions: Pelvic Rest : No tampons, douching or intercourse for 6 weeks  __________________________________________________________________________________________________________________________________  IMPORTANT NUMBERS:    [facility] [place] [facility] Main Number:  [phone], [phone]  Pharmacy:  [phone]  Same Day Surgery:  [phone], Monday - Friday until 8:30 p.m.  Urgent Care:  [phone]  Emergency Room:  [phone]      [facility]:  [phone]                                                                             [facility]:  [phone] option [address] [place] [address] Orthopedics:  [phone]     [facility]:  [phone]   [facility]:  [phone]   Home Medical Equipment: [phone]  [facility]:  [phone]

## 2018-09-21 NOTE — OR NURSING
Pt arrives late. Crying and reassurred. Dr loredo in to see pt and so dale. Pt has had hysterectomy in the past. Lab here. Will give preop meds as per dr wilson.

## 2018-09-21 NOTE — OR NURSING
To [facility]. Shivers. Iv patent. Merino discontinue as per dr order. 200cc cl yellow urine emptied from bag. incis d/i. Ice to site. Sl nauseated. Aromatherapy given. Demerol for shivering and toradol for pain given. Dozes with meds.

## 2018-09-21 NOTE — IP AVS SNAPSHOT
[facility] PreOP/Phase II    [address] [address]    Phone:  [phone]    Fax:  [phone]                                       After Visit Summary   9/21/2018    Sirena Dietrich    MRN: [id number]           After Visit Summary Signature Page     I have received my discharge instructions, and my questions have been answered. I have discussed any challenges I see with this plan with the nurse or doctor.    ..........................................................................................................................................  Patient/Patient Representative Signature      ..........................................................................................................................................  Patient Representative Print Name and Relationship to Patient    ..................................................               ................................................  Date                                   Time    ..........................................................................................................................................  Reviewed by Signature/Title    ...................................................              ..............................................  Date                                               Time          22EPIC Rev 08/18

## 2018-09-21 NOTE — ANESTHESIA POSTPROCEDURE EVALUATION
Patient: Sirena Dietrich    Procedure(s):  Diagnostic Laparoscopy. Left Salpingo-Oopherectomy. lysis of adhesions, cystoscopy - Wound Class: II-Clean Contaminated   - Wound Class: II-Clean Contaminated    Diagnosis:Cyst of left ovary.  Diagnosis Additional Information: No value filed.    Anesthesia Type:  General, ETT    Note:  Anesthesia Post Evaluation    Patient location during evaluation: Bedside  Patient participation: Able to fully participate in evaluation  Level of consciousness: awake and alert  Pain management: adequate  Airway patency: patent  Cardiovascular status: acceptable  Respiratory status: acceptable  Hydration status: acceptable  PONV: none     Anesthetic complications: None          Last vitals:  Vitals:    09/21/18 1100 09/21/18 1115 09/21/18 1130   BP: (!) 118/93 118/70 113/74   Resp: 21 20 24   Temp:      SpO2: 96% 96%          Electronically Signed By: DIPESH Mercedes CRNA  September 21, 2018  1:16 PM

## 2018-09-21 NOTE — ANESTHESIA CARE TRANSFER NOTE
Patient: Sirena Dietrich    Procedure(s):  Diagnostic Laparoscopy. Left Salpingo-Oopherectomy. lysis of adhesions, cystoscopy - Wound Class: II-Clean Contaminated   - Wound Class: II-Clean Contaminated    Diagnosis: Cyst of left ovary.  Diagnosis Additional Information: No value filed.    Anesthesia Type:   General, ETT     Note:  Airway :Nasal Cannula  Patient transferred to:PACU  Handoff Report: Identifed the Patient, Identified the Reponsible Provider, Reviewed the pertinent medical history, Discussed the surgical course, Reviewed Intra-OP anesthesia mangement and issues during anesthesia, Set expectations for post-procedure period and Allowed opportunity for questions and acknowledgement of understanding      Vitals: (Last set prior to Anesthesia Care Transfer)    CRNA VITALS  9/21/2018 0843 - 9/21/2018 0938      9/21/2018             Pulse: 113    SpO2: 100 %    Resp Rate (observed): 16                Electronically Signed By: Frances Oneill CRNA, APRN CRNA  September 21, 2018  9:38 AM

## 2018-09-21 NOTE — OP NOTE
[facility]  Full Operative Note    Patient Name:Sirena Dietrich  MRN: [id number]  YOB: 1985  Surgery Date: 9/21/2018    Surgeon: Debi Luis MD  Assistant: Tor Voss PA-C who was instrumental with laparoscopic camera guidance, retraction and closer of skin incisions  Medical student: Leigha Cleveland MS3     Pre-operative Diagnosis: 1) Pelvic pain 2) Left ovarian cyst   Post-operative Diagnosis: Same  Procedure: Laparoscopic left oophorectomy, lysis of adhesions, cystoscopy    Anesthesia: General endotracheal  FRA Score: 2    EBL: 10 mL   IV fluids: 1400 mL crystalloids  Urine Output: 125 mL orange tinged urine at the end of the procedure    Complications: None  Specimen: Left ovary  Drains: Merino catheter    Findings: Exam under anesthesia immobile left sided adnexal mass, absent uterus and no right sided adnexal masses. Laparoscopy revealed a left ovarian cyst measuring approximately 4-5 cm in size with normal appearing left ovarian tissue present. Left ovary adhered to the left sigmoid colon and left pelvic wall that was taken down with blunt dissection. Left ureter not well seen in the retroperitoneum due to pre-peritoneal adipose tissue. Normal appearing right ovary. Vaginal cuff partially obscured by adhesion of the sigmoid colon. Normal appearing appendix and upper abdomen. Cystoscopy revealed efflux of orange tinged (pyridium stained) from bilateral ureteral orifices.     Indication: Ms. Sirena Dietrich 32 year old who presents for pelvic pain and ultrasound findings of enlarged left ovarian cyst. She has had a history of hysterectomy and bilateral salpingectomy. Options were reviewed and she elected to proceed with removal of her left ovary. Risks of the procedure were reviewed including but not limited to bleeding, need for blood transfusion, infection, injury to surrounding organs (ie bowel/intestines, bladder, ureters, major blood vessels and nerves). If  any of these organs are injured, then we identify it and try to fix it. If we cant fix it ourselves, then we consult surgeons that specialize in those areas that are damaged and they fix it for us. Unintended injuries can go unnoticed at the time of surgery as well, and present with complications days to weeks later. Patient conveys understanding of these risks and agreed to proceed. She signed the consent form.     Technique: The patient was taken to the operating room where she was placed in the dorsal lithotomy position with feet in yellow fin stirrups. General endotracheal anesthesia was administered. An exam under anesthesia was performed. The patient was then prepped and draped in the usual sterile fashion. A sponge stick was inserted in the vagina to act as a manipulator.   Attention was then turned to the upper abdomen where the infraumbilical skin was injected with 0.25% bupivacaine and a scalpel used to make 5 mm vertical incision in the umbilicus. A 5 mm trochar was placed in the umbilical incision with the visa-port laparoscopic scope confirming intrabdominal placement and no injury to the intra-abdominal organs. CO2 gas was attached to the trochar and opening pressure was less than 5 mmHg, and flow was increased to 40 L/min. Pneumoperitoneum was achieved with good tympany of the abdomen. A 5 mm skin incision was then made in the RLQ. Through this a second trochar sleeve was placed into the abdominal cavity using direct observation with the laparoscope. A 12 mm incision was then made in the LLQ and a third trocar sleeve was placed under direct visualization. A survey of the abdomen and pelvis was completed with above findings noted.   Using the blunt laparoscopic graspers, the left ovary was manipulated out of the pelvic side wal and lifted into view. Using the Ligasure, the left infundubilopelvic ligament was grasped, cauterized and cut. Proceeding caudally with the Ligasure in while cauterizing and  cutting, the left ovary was removed from the ovarian fossa. This was placed in a 10 cm Endo-catch bag and brought to the 12 mm LLQ port.  The rest of the contents inside the bag were morcellated using the Kocher clamps.  Care was taken not to ray the bag, which was not. The specimen was finally removed from the abdomen through the 12 mm port without spillage of ovarian tissue into the abdomen. Specimen was handed off to pathology.   Operative sites were inspected and noted to be hemostatic.  A final visual sweep of the pelvis showed no further pathology and hemostasis.   Cystoscopy was performed in the usual sterile fashion. Efflux of orange tinged urine from bilateral ureteral orifices were seen. No injury or stitches to the bladder epithelium were seen.   The ports were removed under direct visualization, and the peritoneum and fascia of the 12 mm port site closed using the Yuri-Bae insert and needle with 0-vicryl. The pneumoperitoneum was expelled. The umbilical skin incision, RLQ and LLQ abdominal skin incisions were closed with 4-0 Vicryl and dermabond.   The sponge stick was removed from the vagina.   Instrument, sponge, and needle counts were correct times 2. The patient was extubated in the operating room and transferred to the PACU in stable condition.    Debi Luis MD  [facility]

## 2018-09-21 NOTE — ANESTHESIA PREPROCEDURE EVALUATION
Anesthesia Evaluation     . Pt has had prior anesthetic.            ROS/MED HX    ENT/Pulmonary:     (+)tobacco use, Past use , . .    Neurologic:     (+)migraines,     Cardiovascular:     (+) ----. : . . . :. dysrhythmias .       METS/Exercise Tolerance:     Hematologic:         Musculoskeletal:         GI/Hepatic:         Renal/Genitourinary:     (+) Other Renal/ Genitourinary,       Endo:     (+) thyroid problem hypothyroidism, .      Psychiatric:     (+) psychiatric history bipolar and other (comment)      Infectious Disease:         Malignancy:         Other:                     Physical Exam  Normal systems: cardiovascular, pulmonary and dental    Airway   Mallampati: II  TM distance: >3 FB  Neck ROM: full    Dental     Cardiovascular       Pulmonary                     Anesthesia Plan      History & Physical Review  History and physical reviewed and following examination; no interval change.    ASA Status:  3 .    NPO Status:  > 6 hours    Plan for General and ETT with Intravenous induction. Maintenance will be Balanced.    PONV prophylaxis:  Ondansetron (or other 5HT-3) and Dexamethasone or Solumedrol  Additional equipment: Videolaryngoscope      Postoperative Care  Postoperative pain management:  IV analgesics and Oral pain medications.      Consents  Anesthetic plan, risks, benefits and alternatives discussed with:  Patient..                          .
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
## 2018-09-21 NOTE — IP AVS SNAPSHOT
MRN:[id number]                      After Visit Summary   9/21/2018    Sirena Dietrich    MRN: [id number]           Thank you!     Thank you for choosing [facility] for your care. Our goal is always to provide you with excellent care. Hearing back from our patients is one way we can continue to improve our services. Please take a few minutes to complete the written survey that you may receive in the mail after you visit with us. Thank you!        Patient Information     Date Of Birth          1985        About your hospital stay     You were admitted on:  September 21, 2018 You last received care in the:  [facility] Pre[facility]    You were discharged on:  September 21, 2018       Who to Call     For medical emergencies, please call 911.  For non-urgent questions about your medical care, please call your primary care provider or clinic, [phone]  For questions related to your surgery, please call your surgery clinic        Attending Provider     Provider Specialty    Debi Luis MD OB/Gyn       Primary Care Provider Office Phone # Fax #    DIPESH Marino [facility] [phone]      After Care Instructions     Discharge Instructions       Resume pre procedure diet            Discharge Instructions       Pelvic Rest. No tampons, douching or intercourse for  6  weeks.            Discharge Instructions       Patient to arrange follow up appointment in 4  Weeks    Call if you have any of the following:  Temperature > 100.4  Foul smelling vaginal discharge  Bleeding > 1 pad per hour x 2 hrs,   Pain not controlled by oral pain meds  Severe constipation or severe nausea or vomiting.            Ice to affected area       PRN as tolerated            No driving or operating machinery       No driving or operating machinery while under narcotic pain medication and for 3 weeks            No lifting       No lifting over 10 pounds and no strenuous physical  activity.  For 4 weeks            Shower        Shower on Post-op day  1.   DO NOT take a bath for 2 weeks                  Further instructions from your care team                           Same Day Surgery Discharge Instructions  Special Precautions After Surgery - Adult    1. It is not unusual to feel lightheaded or faint, up to 24 hours after surgery or while taking pain medication.  If you have these symptoms; sit for a few minutes before standing and have someone assist you when getting up.  2. You should rest and relax for the next 24 hours and must have someone stay with you for at least 24 hours after your discharge.  3. DO NOT DRIVE any vehicle or operate mechanical equipment for 24 hours following the end of your surgery.  DO NOT DRIVE while taking narcotic pain medications that have been prescribed by your physician.  If you had a limb operated on, you must be able to use it fully to drive.  4. DO NOT drink alcoholic beverages for 24 hours following surgery or while taking prescription pain medication.  5. Drink clear liquids (apple juice, ginger ale, broth, 7-Up, etc.).  Progress to your regular diet as you feel able.  6. Any questions call your physician and do not make important decisions for 24 hours.    ACTIVITY  ? Rest today.  No activity or diet restrictions.  ? Resume activity as tolerated.  ? No lifting more than 10 pounds for 4 weeks. No driving for 3 weeks no strenuous activity. Also pelvic Rest : No tampons ,douching or intercourse for 6 weeks  ? Restrictions: per md direction  ? See printed discharge sheet.     INCISIONAL CARE  ? May  shower after 24 hours.  ? Apply ice 1/2 hour on and 1/2 hour off while awake.  ? Be alert for signs of infection:  redness, swelling, heat, drainage of pus, and/or elevated temperature.  Contact your doctor if these occur.        Call for an appointment to return to the clinic in 4 weeks    Medications:  ? Acetaminophen (Tylenol):  Last dose: 0730.  ? Ibuprofen  "(Motrin, Advil):  Last dose: 0830.  ? Zofran:  Next dose: as needed.  ? Morphine MSR 15 mg as needed   ? Sennakot to prevent constipation   ? Follow the instructions on the bottle.     Additional discharge instructions: Pelvic Rest : No tampons, douching or intercourse for 6 weeks  __________________________________________________________________________________________________________________________________  IMPORTANT NUMBERS:    [facility]:  [phone], [phone]  Pharmacy:  [phone]  Same Day Surgery:  [phone], Monday - Friday until 8:30 p.m.  Urgent Care:  [phone]  Emergency Room:  [phone]      [facility]:  [phone]                                                                             [facility]:  [phone] option 1  [facility]:  [phone]     OB Clinic:  [phone]   Surgery Specialty Clinic:  [phone]   Home Medical Equipment: [phone]  [facility]:  [phone]        Pending Results     Date and Time Order Name Status Description    9/21/2018 0835 Surgical pathology exam In process             Admission Information     Date & Time Provider Department Dept. Phone    9/21/2018 Debi Luis MD [facility] PreOP/Phase [phone]      Your Vitals Were     Blood Pressure Temperature Respirations Height Weight Last Period    118/65 97.6  F (36.4  C) (Oral) 26 1.676 m (5' 6\") 72.6 kg (160 lb) (LMP Unknown)    Pulse Oximetry BMI (Body Mass Index)                95% 25.82 kg/m2          MyChart Information     [employer] gives you secure access to your electronic health record. If you see a primary care provider, you can also send messages to your care team and make appointments. If you have questions, please call your primary care clinic.  If you do not have a primary care provider, please call [phone] and they will assist you.        Care EveryWhere ID     This is your Care " EveryWhere ID. This could be used by other organizations to access your [facility] medical records  [id number]        Equal Access to Services     SAMRA KAPLAN : Ian Nazario, benjamin douglas, marydaily awadviryvicky wongbrannonvicky, waxbaudilio jerdo edelrosmery wongsherwin jean barb evangelista. So [facility] [phone].    ATENCIÓN: Si habla español, tiene a borja disposición servicios gratuitos de asistencia lingüística. Andres al [phone].    We comply with applicable federal civil rights laws and [place] laws. We do not discriminate on the basis of race, color, national origin, age, disability, sex, sexual orientation, or gender identity.               Review of your medicines      START taking        Dose / Directions    acetaminophen 500 MG tablet   Commonly known as:  TYLENOL   Used for:  S/P left oophorectomy        Dose:  1000 mg   Take 2 tablets (1,000 mg) by mouth every 4 hours as needed for other (mild pain)   Quantity:  100 tablet   Refills:  1       ibuprofen 800 MG tablet   Commonly known as:  ADVIL/MOTRIN   Used for:  S/P left oophorectomy        Dose:  800 mg   Take 1 tablet (800 mg) by mouth every 6 hours as needed for pain (mild)   Quantity:  90 tablet   Refills:  1       senna-docusate 8.6-50 MG per tablet   Commonly known as:  SENOKOT-S;PERICOLACE   Used for:  S/P left oophorectomy        Dose:  1-2 tablet   Take 1-2 tablets by mouth 2 times daily Take while on oral narcotics to prevent or treat constipation.   Quantity:  60 tablet   Refills:  0         CONTINUE these medicines which may have CHANGED, or have new prescriptions. If we are uncertain of the size of tablets/capsules you have at home, strength may be listed as something that might have changed.        Dose / Directions    gabapentin 600 MG tablet   Commonly known as:  NEURONTIN   This may have changed:    - how much to take  - when to take this   Used for:  Severe bipolar I disorder, most recent episode mixed, with psychotic features (H)        Dose:   1200 mg   Take 2 tablets (1,200 mg) by mouth At Bedtime   Quantity:  60 tablet   Refills:  0       * ondansetron 4 MG ODT tab   Commonly known as:  ZOFRAN ODT   This may have changed:  Another medication with the same name was added. Make sure you understand how and when to take each.   Used for:  Nausea        Dose:  4-8 mg   Take 1-2 tablets (4-8 mg) by mouth every 6 hours as needed for nausea   Quantity:  30 tablet   Refills:  0       * ondansetron 4 MG ODT tab   Commonly known as:  ZOFRAN-ODT   This may have changed:  You were already taking a medication with the same name, and this prescription was added. Make sure you understand how and when to take each.   Used for:  S/P left oophorectomy        Dose:  4-8 mg   Take 1-2 tablets (4-8 mg) by mouth every 8 hours as needed for nausea Dissolve ON the tongue.   Quantity:  20 tablet   Refills:  1       * QUEtiapine 25 MG tablet   Commonly known as:  SEROquel   This may have changed:  how much to take   Used for:  Severe bipolar I disorder, most recent episode mixed, with psychotic features (H)        Dose:  25-50 mg   Take 1-2 tablets (25-50 mg) by mouth every 6 hours as needed   Quantity:  60 tablet   Refills:  0       * QUEtiapine 50 MG tablet   Commonly known as:  SEROQUEL   This may have changed:  Another medication with the same name was changed. Make sure you understand how and when to take each.   Used for:  Severe bipolar I disorder, most recent episode mixed, with psychotic features (H)        Dose:  50 mg   Take 1 tablet (50 mg) by mouth 3 times daily   Quantity:  90 tablet   Refills:  0       * Notice:  This list has 4 medication(s) that are the same as other medications prescribed for you. Read the directions carefully, and ask your doctor or other care provider to review them with you.      CONTINUE these medicines which have NOT CHANGED        Dose / Directions    lamoTRIgine 200 MG tablet   Commonly known as:  LaMICtal   Used for:  Severe bipolar I  disorder, most recent episode mixed, with psychotic features (H)        Dose:  200 mg   Take 1 tablet (200 mg) by mouth daily   Quantity:  30 tablet   Refills:  1       lidocaine HCl 3 % cream   Used for:  Yeast infection of the vagina        Apply topically 3 times daily   Quantity:  453.6 g   Refills:  0       metroNIDAZOLE 500 MG tablet   Commonly known as:  FLAGYL        Dose:  500 mg   Take 1 tablet (500 mg) by mouth 2 times daily for 7 days   Quantity:  14 tablet   Refills:  0       morphine 15 MG IR tablet   Commonly known as:  MSIR   Used for:  Cyst of left ovary        Dose:  15 mg   Take 1 tablet (15 mg) by mouth every 4 hours as needed for moderate to severe pain   Quantity:  40 tablet   Refills:  0       prochlorperazine 10 MG tablet   Commonly known as:  COMPAZINE   Used for:  Nausea        Dose:  10 mg   Take 1 tablet (10 mg) by mouth every 6 hours as needed for nausea or vomiting   Quantity:  30 tablet   Refills:  0       valACYclovir 500 MG tablet   Commonly known as:  VALTREX   Used for:  Herpes simplex vulvovaginitis        Dose:  500 mg   Take 1 tablet (500 mg) by mouth 2 times daily X 3 days.  1 tablet daily for suppression   Quantity:  30 tablet   Refills:  1            Where to get your medicines      These medications were sent to [facility] [place], [place] - [address]  [address] [address]     Phone:  [phone]     acetaminophen 500 MG tablet    ibuprofen 800 MG tablet    ondansetron 4 MG ODT tab    senna-docusate 8.6-50 MG per tablet         Some of these will need a paper prescription and others can be bought over the counter. Ask your nurse if you have questions.     Bring a paper prescription for each of these medications     morphine 15 MG IR tablet                Protect others around you: Learn how to safely use, store and throw away your medicines at www.disposemymeds.org.        Information about OPIOIDS     PRESCRIPTION OPIOIDS: WHAT YOU NEED  TO KNOW   We gave you an opioid (narcotic) pain medicine. It is important to manage your pain, but opioids are not always the best choice. You should first try all the other options your care team gave you. Take this medicine for as short a time (and as few doses) as possible.    Some activities can increase your pain, such as bandage changes or therapy sessions. It may help to take your pain medicine 30 to 60 minutes before these activities. Reduce your stress by getting enough sleep, working on hobbies you enjoy and practicing relaxation or meditation. Talk to your care team about ways to manage your pain beyond prescription opioids.    These medicines have risks:    DO NOT drive when on new or higher doses of pain medicine. These medicines can affect your alertness and reaction times, and you could be arrested for driving under the influence (DUI). If you need to use opioids long-term, talk to your care team about driving.    DO NOT operate heavy machinery    DO NOT do any other dangerous activities while taking these medicines.    DO NOT drink any alcohol while taking these medicines.     If the opioid prescribed includes acetaminophen, DO NOT take with any other medicines that contain acetaminophen. Read all labels carefully. Look for the word  acetaminophen  or  Tylenol.  Ask your pharmacist if you have questions or are unsure.    You can get addicted to pain medicines, especially if you have a history of addiction (chemical, alcohol or substance dependence). Talk to your care team about ways to reduce this risk.    All opioids tend to cause constipation. Drink plenty of water and eat foods that have a lot of fiber, such as fruits, vegetables, prune juice, apple juice and high-fiber cereal. Take a laxative (Miralax, milk of magnesia, Colace, Senna) if you don t move your bowels at least every other day. Other side effects include upset stomach, sleepiness, dizziness, throwing up, tolerance (needing more of the  medicine to have the same effect), physical dependence and slowed breathing.    Store your pills in a secure place, locked if possible. We will not replace any lost or stolen medicine. If you don t finish your medicine, please throw away (dispose) as directed by your pharmacist. The [place] [employer]ution [employer] has more information about safe disposal: https://www.pca.[facility].[place].us/living-green/managing-unwanted-medications             Medication List: This is a list of all your medications and when to take them. Check marks below indicate your daily home schedule. Keep this list as a reference.      Medications           Morning Afternoon Evening Bedtime As Needed    acetaminophen 500 MG tablet   Commonly known as:  TYLENOL   Take 2 tablets (1,000 mg) by mouth every 4 hours as needed for other (mild pain)   Last time this was given:  325 mg on 9/21/2018  7:33 AM                                gabapentin 600 MG tablet   Commonly known as:  NEURONTIN   Take 2 tablets (1,200 mg) by mouth At Bedtime                                ibuprofen 800 MG tablet   Commonly known as:  ADVIL/MOTRIN   Take 1 tablet (800 mg) by mouth every 6 hours as needed for pain (mild)                                lamoTRIgine 200 MG tablet   Commonly known as:  LaMICtal   Take 1 tablet (200 mg) by mouth daily                                lidocaine HCl 3 % cream   Apply topically 3 times daily                                metroNIDAZOLE 500 MG tablet   Commonly known as:  FLAGYL   Take 1 tablet (500 mg) by mouth 2 times daily for 7 days                                morphine 15 MG IR tablet   Commonly known as:  MSIR   Take 1 tablet (15 mg) by mouth every 4 hours as needed for moderate to severe pain                                * ondansetron 4 MG ODT tab   Commonly known as:  ZOFRAN ODT   Take 1-2 tablets (4-8 mg) by mouth every 6 hours as needed for nausea                                * ondansetron 4 MG ODT tab   Commonly known  as:  ZOFRAN-ODT   Take 1-2 tablets (4-8 mg) by mouth every 8 hours as needed for nausea Dissolve ON the tongue.                                prochlorperazine 10 MG tablet   Commonly known as:  COMPAZINE   Take 1 tablet (10 mg) by mouth every 6 hours as needed for nausea or vomiting                                * QUEtiapine 25 MG tablet   Commonly known as:  SEROquel   Take 1-2 tablets (25-50 mg) by mouth every 6 hours as needed                                * QUEtiapine 50 MG tablet   Commonly known as:  SEROQUEL   Take 1 tablet (50 mg) by mouth 3 times daily                                senna-docusate 8.6-50 MG per tablet   Commonly known as:  SENOKOT-S;PERICOLACE   Take 1-2 tablets by mouth 2 times daily Take while on oral narcotics to prevent or treat constipation.                                valACYclovir 500 MG tablet   Commonly known as:  VALTREX   Take 1 tablet (500 mg) by mouth 2 times daily X 3 days.  1 tablet daily for suppression                                * Notice:  This list has 4 medication(s) that are the same as other medications prescribed for you. Read the directions carefully, and ask your doctor or other care provider to review them with you.

## 2018-09-27 ENCOUNTER — OFFICE VISIT (OUTPATIENT)
Dept: FAMILY MEDICINE | Facility: CLINIC | Age: 33
End: 2018-09-27
Payer: COMMERCIAL

## 2018-09-27 VITALS
DIASTOLIC BLOOD PRESSURE: 72 MMHG | RESPIRATION RATE: 14 BRPM | WEIGHT: 167 LBS | HEART RATE: 88 BPM | BODY MASS INDEX: 26.95 KG/M2 | SYSTOLIC BLOOD PRESSURE: 116 MMHG | TEMPERATURE: 98.2 F

## 2018-09-27 DIAGNOSIS — N83.202 CYST OF LEFT OVARY: ICD-10-CM

## 2018-09-27 DIAGNOSIS — Z90.721 S/P LEFT OOPHORECTOMY: ICD-10-CM

## 2018-09-27 DIAGNOSIS — F31.64 SEVERE BIPOLAR I DISORDER, MOST RECENT EPISODE MIXED, WITH PSYCHOTIC FEATURES (H): ICD-10-CM

## 2018-09-27 PROCEDURE — 99214 OFFICE O/P EST MOD 30 MIN: CPT | Performed by: NURSE PRACTITIONER

## 2018-09-27 RX ORDER — MORPHINE SULFATE 15 MG/1
15 TABLET ORAL EVERY 6 HOURS PRN
Qty: 40 TABLET | Refills: 0 | Status: SHIPPED | OUTPATIENT
Start: 2018-09-27 | End: 2018-10-05

## 2018-09-27 RX ORDER — QUETIAPINE FUMARATE 25 MG/1
25 TABLET, FILM COATED ORAL 2 TIMES DAILY
Qty: 60 TABLET | Refills: 0 | Status: SHIPPED | OUTPATIENT
Start: 2018-09-27 | End: 2018-11-20

## 2018-09-27 RX ORDER — ONDANSETRON 4 MG/1
4-8 TABLET, ORALLY DISINTEGRATING ORAL EVERY 8 HOURS PRN
Qty: 20 TABLET | Refills: 1 | Status: SHIPPED | OUTPATIENT
Start: 2018-09-27 | End: 2018-11-20

## 2018-09-27 ASSESSMENT — PAIN SCALES - GENERAL: PAINLEVEL: EXTREME PAIN (8)

## 2018-09-27 NOTE — PROGRESS NOTES
SUBJECTIVE:   Sirena Dietrich is a 32 year old female who presents to clinic today for the following health issues:      SurgicalFollowup:  Left oophorectomy  Facility: [facility] [place]     Pre-operative Diagnosis: 1) Pelvic pain 2) Left ovarian cyst   Post-operative Diagnosis: Same  Procedure: Laparoscopic left oophorectomy, lysis of adhesions, cystoscopy    Date of visit: 09/21/2018  Pain scale 8/10   Fall at home on left side   Med refills   Seeing Puja Weathers tomorrow psychiatry medication management.   Left ovary removed. Right remains. Vaginal hysterectomy 4 yrs ago.   Tripped and fell over one of the boys last night.   Needs refills on her pain meds.   Needs refills on her 25 mg seroquel.          -------------------------------------    Problem list and histories reviewed & adjusted, as indicated.  Additional history: as documented    BP Readings from Last 3 Encounters:   09/27/18 116/72   09/21/18 113/74   09/19/18 128/74    Wt Readings from Last 3 Encounters:   09/27/18 167 lb (75.8 kg)   09/21/18 160 lb (72.6 kg)   09/19/18 160 lb (72.6 kg)                  Labs reviewed in EPIC    Reviewed and updated as needed this visit by clinical staff  Allergies  Meds       Reviewed and updated as needed this visit by Provider         ROS:   ROS: 10 point ROS neg other than the symptoms noted above in the HPI.      OBJECTIVE:                                                    /72  Pulse 88  Temp 98.2  F (36.8  C) (Tympanic)  Resp 14  Wt 167 lb (75.8 kg)  LMP  (LMP Unknown)  BMI 26.95 kg/m2  Body mass index is 26.95 kg/(m^2).   GENERAL: healthy, alert, well nourished, well hydrated, no distress  HENT: ear canals- normal; TMs- normal; Nose- normal; Mouth- no ulcers, no lesions  NECK: no tenderness, no adenopathy, no asymmetry, no masses, no stiffness; thyroid- normal to palpation  RESP: lungs clear to auscultation - no rales, no rhonchi, no wheezes  CV: regular rates and rhythm, normal S1 S2, no S3 or S4  "and no murmur, no click or rub -  ABDOMEN: soft, incisions healing well.  Mild diffuse abdominal tenderness, no  hepatosplenomegaly, no masses, normal bowel sounds    Diagnostic test results:  Results for orders placed or performed during the hospital encounter of 09/21/18   Surgical pathology exam   Result Value Ref Range    Copath Report       Patient Name: FITO VÁSQUEZ  MR#: [id number]  Specimen #: [id number]  Collected: 9/21/2018  Received: 9/21/2018  Reported: 9/28/2018 17:05  Ordering Phy(s): SHANI PEREZ    For improved result formatting, select 'View Enhanced Report Format' under   Linked Documents section.    SPECIMEN(S):  Left ovary    FINAL DIAGNOSIS:  Left ovary, laparoscopic oophorectomy:  - Benign involuting ovarian hemorrhagic corpus luteum cyst.  - Occasional benign ovarian physiologic follicle cyst.  - Negative for malignancy.    Electronically signed out by:    Camden James M.D.    CLINICAL HISTORY:  32 year old female.  Pelvic pain.  On 9/16/2018 ultrasound of pelvis shows   two complex left ovarian cysts,  measuring 3.4 cm and 3.0 cm.  At surgery a 4-5 cm left ovarian cyst is   described.    GROSS:  A single specimen container with formalin is received labeled with the   patient's name, date of birth, and  medical record number. Information on the requisition slip, container, and   associate d labels is confirmed.    The specimen is designated \"left ovary\" consisting of a 10.3 g 5.5 x 2 x   1.5 cm ovary.  There is a disrupted  1.5 x 1 x 0.9 cm hemorrhagic corpus luteum.  In addition, there is a 2 x   1.5 x 1 cm hemorrhagic corpus luteum  that has a gelatinous, hemorrhagic center.  The remaining ovarian   parenchyma is grossly unremarkable.  No  distinct cyst is identified.  Representative sections are submitted in   four cassettes.  Summary of cassettes:    1-2 - first described hemorrhagic corpus luteum  3-4 - second described hemorrhagic corpus luteum (Dictated by: Bernadine "   Rempel 9/24/2018 09:03 AM)    MICROSCOPIC:  Microscopic examination is performed.    CPT Codes:  A: 49557-GQ3    TESTING LAB LOCATION:  [address] [address] [phone]    COLLECTION SITE:  Client: [facility]  Location: [facility] ([facility])     ABO/Rh type and screen   Result Value Ref Range    ABO A     RH(D) Pos     Antibody Screen Neg     Test Valid Only At [facility]        Specimen Expires 09/24/2018      *Note: Due to a large number of results and/or encounters for the requested time period, some results have not been displayed. A complete set of results can be found in Results Review.        ASSESSMENT/PLAN:                                                    1. S/P left oophorectomy  For nausea May use  - ondansetron (ZOFRAN-ODT) 4 MG ODT tab; Take 1-2 tablets (4-8 mg) by mouth every 8 hours as needed for nausea Dissolve ON the tongue.  Dispense: 20 tablet; Refill: 1    2. Cyst of left ovary  Resolved with surgery.  Refilled morphine today.  Do not anticipate further refills  - morphine (MSIR) 15 MG IR tablet; Take 1 tablet (15 mg) by mouth every 6 hours as needed for moderate to severe pain  Dispense: 40 tablet; Refill: 0    3. Severe bipolar I disorder, most recent episode mixed, with psychotic features  Refilled meds today  - QUEtiapine (SEROQUEL) 25 MG tablet; Take 1 tablet (25 mg) by mouth 2 times daily  Dispense: 60 tablet; Refill: 0  Follow-up with psychiatry as planned    Follow up with Provider - Call or return to the clinic with any worsening of symptoms or no resolution. Patient/Parent verbalized understanding and is in agreement. Medication side effects reviewed.   Current Outpatient Prescriptions   Medication Sig Dispense Refill     acetaminophen (TYLENOL) 500 MG tablet Take 2 tablets (1,000 mg) by mouth every 4 hours as needed for other (mild pain) 100 tablet 1     gabapentin  (NEURONTIN) 600 MG tablet Take 2 tablets (1,200 mg) by mouth At Bedtime (Patient taking differently: Take 600 mg by mouth 2 times daily ) 60 tablet 0     ibuprofen (ADVIL/MOTRIN) 800 MG tablet Take 1 tablet (800 mg) by mouth every 6 hours as needed for pain (mild) 90 tablet 1     lamoTRIgine (LAMICTAL) 200 MG tablet Take 1 tablet (200 mg) by mouth daily 30 tablet 1     morphine (MSIR) 15 MG IR tablet Take 1 tablet (15 mg) by mouth every 6 hours as needed for moderate to severe pain 40 tablet 0     ondansetron (ZOFRAN-ODT) 4 MG ODT tab Take 1-2 tablets (4-8 mg) by mouth every 8 hours as needed for nausea Dissolve ON the tongue. 20 tablet 1     prochlorperazine (COMPAZINE) 10 MG tablet Take 1 tablet (10 mg) by mouth every 6 hours as needed for nausea or vomiting 30 tablet 0     QUEtiapine (SEROQUEL) 25 MG tablet Take 1 tablet (25 mg) by mouth 2 times daily 60 tablet 0     QUEtiapine (SEROQUEL) 50 MG tablet Take 1 tablet (50 mg) by mouth 3 times daily 90 tablet 0     senna-docusate (SENOKOT-S;PERICOLACE) 8.6-50 MG per tablet Take 1-2 tablets by mouth 2 times daily Take while on oral narcotics to prevent or treat constipation. 60 tablet 0     valACYclovir (VALTREX) 500 MG tablet Take 1 tablet (500 mg) by mouth 2 times daily X 3 days.  1 tablet daily for suppression 30 tablet 1        See Patient Instructions    DIPESH Marino [facility]

## 2018-09-27 NOTE — MR AVS SNAPSHOT
After Visit Summary   9/27/2018    Sirena Dietrich    MRN: [id number]           Patient Information     Date Of Birth          1985        Visit Information        Provider Department      9/27/2018 9:20 AM Shi Martinez APRN [facility]        Today's Diagnoses     S/P left oophorectomy        Cyst of left ovary        Severe bipolar I disorder, most recent episode mixed, with psychotic features          Care Instructions    Taking Care of Yourself after Robotic Gynecologic Surgery  Discharge Instructions   Contact information    Please call [phone] with any questions or concerns.    Monday through Friday, 8 a.m. to 5 p.m., ask for Dr. Beard's nurse.    After 5 p.m. and on weekends, explain that Dr. Beard is your doctor. Then, ask to speak to the doctor on call.   Follow-up  You will need to see your doctor or nurse about 3 weeks after surgery. Please call [phone] to set up this visit.   Normal side effects    The area around your incision (surgery wound) may be numb. This should go away over several months.    You may notice a small amount of fluid or blood coming from the vagina or your incisions (cuts from surgery).  Exercise and activity    You may feel like resting more after surgery. Slowly start to do more each day. Rest when you need to.    Do not lift more than 15 to 20 pounds for six weeks after surgery.    No routine exercise for six weeks. You may walk daily as you are able.    Do not vacuum, carry laundry or carry groceries for six weeks.    You may slowly climb stairs when you are able.    No driving while taking prescribed pain medicine (narcotics). Driving may make you dizzy.    After two weeks, if you have stopped your pain medicine, you may drive short distances with an automatic vehicle (not a clutch). If you drive a clutch, ask your doctor about driving limits.  Diet    You may go back to your normal diet. Avoid gas-forming foods  such as raw fruits and vegetables.    Drink eight glasses of fluid daily. Avoid large amounts of caffeine.    Eat prunes or buy a daily stool softener from the drug store. These will help keep your bowels regular, especially while using pain medicine. Use laxatives only if you were told to do so.    Call our office if you have not had a bowel movement for two days.  Bathing and hygiene    Shower daily, rinsing your incisions with soap and water. Then, pat them dry.    No tub baths until six weeks after surgery.    Do not put anything in the vagina for eight weeks after surgery. This means no tampons, douche or sex (sexual intercourse).    Wear cotton panties. They are less irritating, and they reduce the risk of bladder infection.  Caring for your incisions    Gently soap and rinse your incisions each day in the shower.    Pat to dry. Dry all areas fully, including any folds in the belly area.    A bandage is not needed if there is no drainage.  Post-vulvectomy patients     Keep your incisions clean and dry, especially after using the toilet. You may use a blow-dryer if needed.    No sitz baths or tub baths.    If you have a catheter, follow your instructions for [place] catheter care.  When to call your doctor  Call your doctor's office if:    Your incisions are red, sore, hot, coming apart or draining.    Your bandage is soaked with blood.    You are bleeding from the vagina and soaking two pads an hour with blood.    You have a foul odor from your vagina.    Your skin is itchy or you notice a rash.    You begin vomiting (throwing up) or you have nausea (feel sick to your stomach) that won't go away.    You have pain that cannot be controlled.    You have a fever over 100.5 F (38 C), or you have shaking chills.    You have pain or burning when you urinate, you cannot urinate, or you urinate more often than normal.    You have questions or concerns about your surgery or medicine.  If you have chest pain or trouble  breathing, call 911 or go to the emergency room right away.   For information only. Not to replace the advice of your health care provider.   Copyright   2010 [employer]. All rights reserved. [employer] 087192 - REV 05/17.    Medicine for Pain  Medicines can help to block pain, decrease inflammation, and treat related problems. More than one medicine may be used to treat your pain. Medicines may be changed as you feel better, or if they cause side effects.  Medicines What they do Possible side effects   Non-opioid NSAIDs, aspirin, acetaminophen Reduce pain chemicals at the site of pain. NSAIDs can reduce joint and soft tissue inflammation. Nausea, stomach pain, ulcers, indigestion, bleeding, kidney, and liver problems. Certain NSAIDs may increase the risk for cardiovascular disease in some people. Talk with your healthcare provider.   Opioids (morphine and similar medicines often called narcotics) Reduce feelings or perception of pain. Used for moderate to severe pain. Nausea, vomiting, itching, drowsiness, constipation, slowed breathing   Other medicines (corticosteroids, antinausea, antidepressant, and antiseizure medicines) Reduce swelling, burning or tingling pain, or certain side effects of pain medicines, such as nausea or vomiting Your healthcare provider will explain the possible side effects of these medicines.   Anesthetics (local, injected) include lidocaine, benzocaine, and medicines used by anesthesiologists Stop pain signals from reaching the brain by blocking feeling in the treated area Nausea, low blood pressure, fever, slowed breathing, fainting, seizures, heart attack   When to call your healthcare provider  Call your healthcare provider right away (or have a family member call) if you have:    Unrelieved pain    Side effects, including constipation or uncontrolled nausea, that interfere with daily activities  If you have extreme sleepiness or breathing problems, call 911.   Other  precautions    Ask your healthcare provider or pharmacist how to get rid of your pain medicines safely when you stop using them.    Never share your pain medicines with anyone.    Store your medicines in a safe place so they can t be stolen. If you think your medicine has been stolen or lost, tell your healthcare provider right away.  Date Last Reviewed: 5/1/2017 2000-2017 [employer]. [address] [place] [address], [place] [address]. All rights reserved. This information is not intended as a substitute for professional medical care. Always follow your healthcare professional's instructions.        Taking Opioid Medicines  For your health and safety, it s important to take opioids exactly as directed. This helps ensure that the medicines work as they should. It also lowers the chances of side effects. And it lowers the risk of taking too high a dose (overdose). Each opioid medicine has its own instructions for use. Your healthcare provider will explain the ones you re prescribed and tell you how to take them. If you have questions or concerns, talk with your healthcare provider.   Using opioids safely  Opioids can work very well to relieve pain. But taking too much, taking them too long, or taking them in the wrong way can be harmful. To help reduce the risks to your health, follow these safety tips:    Make sure you know if you are to take the medicine on a regular basis or only as needed.    If your medicine is taken on a regular basis, take it on time and at the right dose. If you miss a dose, don t double up the next dose.    Use a medicine log, reed, or calendar to keep track of when you take your medicine. This helps you stay on schedule and not miss doses or take extra doses.    When taking liquid doses, use a measuring spoon or dropper. This ensures you take the correct dose.    Tell your healthcare provider right away if you have any side effects.    Don t cut, crush, or change your medicine in any  way.    Don t take someone else s opioids. Don t share yours with other people.    Don t drive while taking opioids.    Don t use dangerous equipment or power tools while taking opioids.    Check expiration dates regularly. Dispose of all  medicines properly.   Beware of combining medicines  Some medicines can be dangerous when used with opioids. In some cases, combining medicines can cause death. Make sure to tell your healthcare provider and pharmacist about all of the medicines you re taking. This includes over-the-counter medicines. It also includes vitamins, herbs, and other supplements. And it includes illegal or street drugs. Medicines that may be unsafe to use with opioids include:    Over-the-counter pain relievers, such as acetaminophen    Other prescription opioids    Benzodiazepines such as clonazepam or alprazolam    Muscle relaxants such as cyclobenzaprine or carisoprodol    Hypnotics such as sleep aids like zolpidem  WARNING: Don t take opioids with alcohol or street drugs. This can cause death.     Symptoms of opioid overdose  Opioids act on the part of the brain that affects breathing. An overdose of opioids can slow breathing down too much. It can even stop your breathing. This can cause death. Call 911 right away if you think you or someone else has had an overdose.   Look for these 3 key symptoms:    The dark circles in the middle of the eyes are very small (pinpoint pupils)    Breathing is slow or stopped    The person has passed out and does not respond(is not conscious)  Other symptoms to look for include:    Limp body    Pale face    Cool, damp skin    Purple or blue tint to lips and fingernails    Vomiting   Storing opioids safely  Opioids need to be stored safely. This helps protect anyone else from accidentally taking the medicine. It also helps prevent the theft and misuse of the medicine. If possible, store the medicine in a locked container or cupboard that others cannot get to.  Store the medicine in a cool dry place. Don t store it in a damp place, such as a bathroom. Always put the medicine back in its secure place after each use.   How to dispose opioids  Dispose of unused or  opioids in a safe way. This is to prevent harm to other people. Don t save your medicine or give it to other people for any reason. Even a single dose of opioids can lead to death if it used by someone else. To dispose of your medicine safely:    Find your [facility] medicine take-back program. You may need to drop the medicine off at a local police station or pharmacy.    Ask your pharmacy about a mail-back program. You may be able to send the medicine through the mail. This is done using a special envelope.  If these options are not available to you, ask your healthcare provider for help.                           The FDA also has guidelines for disposing of medicines. You may be able to flush them down the toilet. Or you may be able to put them in the trash. Check with your local water and waste management company to see what is allowed in your city or state. You can learn more here: www.fda.gov/drugs/resourcesforyou/consumers/buyingusingmedicinesafely/ensuringsafeuseofmedicine/safedisposalofmedicines/ven035290.htm. Before using these options, check with your local water and waste management company to determine if this is allowed in your city or state.    Stopping opioid treatment  If you have been taking an opioid for more than a few weeks, your body gets used to having it. When you stop taking the medicine, you can have withdrawal symptoms. There are many kinds of withdrawal symptoms. They can range from mild to severe. They can include:    Restlessness    Anxiety    Muscle aches    Sweating    Large (dilated) pupils    Watery eyes    Runny nose    Trouble sleeping    Nausea and vomiting    Abdominal cramping    Diarrhea    Fast heartbeat  Don t stop opioid medicine without help from your healthcare  provider. You will need a plan to safely stop taking it. This is to help manage withdrawal symptoms. In most cases, the amount of medicine you take will be cut down. You will take less and less over several weeks. If needed, you may need to take other medicines and treatments to help with this process. As the opioid medicine leaves your body, your body will adapt. Your withdrawal symptoms should then go away. How long this takes can vary for each person.  Date Last Reviewed: 8/1/2017 2000-2017 [employer]. [address] [place] [address]. All rights reserved. This information is not intended as a substitute for professional medical care. Always follow your healthcare professional's instructions.                Follow-ups after your visit        Who to contact     If you have questions or need follow up information about today's clinic visit or your schedule please contact [facility] directly at [phone].  Normal or non-critical lab and imaging results will be communicated to you by [employer], letter or phone within 4 business days after the clinic has received the results. If you do not hear from us within 7 days, please contact the clinic through [employer] or phone. If you have a critical or abnormal lab result, we will notify you by phone as soon as possible.  Submit refill requests through [employer] or call your pharmacy and they will forward the refill request to us. Please allow 3 business days for your refill to be completed.          Additional Information About Your Visit        [employer] Information     [employer] gives you secure access to your electronic health record. If you see a primary care provider, you can also send messages to your care team and make appointments. If you have questions, please call your primary care clinic.  If you do not have a primary care provider, please call [phone] and they will assist you.        Care EveryWhere ID     This is your  Care EveryWhere ID. This could be used by other organizations to access your [facility] medical records  [id number]        Your Vitals Were     Pulse Temperature Respirations Last Period BMI (Body Mass Index)       88 98.2  F (36.8  C) (Tympanic) 14 (LMP Unknown) 26.95 kg/m2        Blood Pressure from Last 3 Encounters:   09/27/18 116/72   09/21/18 113/74   09/19/18 128/74    Weight from Last 3 Encounters:   09/27/18 167 lb (75.8 kg)   09/21/18 160 lb (72.6 kg)   09/19/18 160 lb (72.6 kg)              Today, you had the following     No orders found for display         Today's Medication Changes          These changes are accurate as of 9/27/18 10:23 AM.  If you have any questions, ask your nurse or doctor.               These medicines have changed or have updated prescriptions.        Dose/Directions    gabapentin 600 MG tablet   Commonly known as:  NEURONTIN   This may have changed:    - how much to take  - when to take this   Used for:  Severe bipolar I disorder, most recent episode mixed, with psychotic features (H)        Dose:  1200 mg   Take 2 tablets (1,200 mg) by mouth At Bedtime   Quantity:  60 tablet   Refills:  0       morphine 15 MG IR tablet   Commonly known as:  MSIR   This may have changed:  when to take this   Used for:  Cyst of left ovary        Dose:  15 mg   Take 1 tablet (15 mg) by mouth every 6 hours as needed for moderate to severe pain   Quantity:  40 tablet   Refills:  0       ondansetron 4 MG ODT tab   Commonly known as:  ZOFRAN-ODT   This may have changed:  Another medication with the same name was removed. Continue taking this medication, and follow the directions you see here.   Used for:  S/P left oophorectomy        Dose:  4-8 mg   Take 1-2 tablets (4-8 mg) by mouth every 8 hours as needed for nausea Dissolve ON the tongue.   Quantity:  20 tablet   Refills:  1       * QUEtiapine 50 MG tablet   Commonly known as:  SEROQUEL   This may have changed:  Another medication with the same name  was changed. Make sure you understand how and when to take each.   Used for:  Severe bipolar I disorder, most recent episode mixed, with psychotic features (H)        Dose:  50 mg   Take 1 tablet (50 mg) by mouth 3 times daily   Quantity:  90 tablet   Refills:  0       * QUEtiapine 25 MG tablet   Commonly known as:  SEROquel   This may have changed:    - how much to take  - when to take this  - reasons to take this   Used for:  Severe bipolar I disorder, most recent episode mixed, with psychotic features (H)        Dose:  25 mg   Take 1 tablet (25 mg) by mouth 2 times daily   Quantity:  60 tablet   Refills:  0       * Notice:  This list has 2 medication(s) that are the same as other medications prescribed for you. Read the directions carefully, and ask your doctor or other care provider to review them with you.      Stop taking these medicines if you haven't already. Please contact your care team if you have questions.     lidocaine HCl 3 % cream                Where to get your medicines      These medications were sent to [id number] [address] [facility]  [address] [facility] [address]    Hours:  Closed 10-16-08 business to [facility] Phone:  [phone]     ondansetron 4 MG ODT tab    QUEtiapine 25 MG tablet         Some of these will need a paper prescription and others can be bought over the counter.  Ask your nurse if you have questions.     Bring a paper prescription for each of these medications     morphine 15 MG IR tablet               Information about OPIOIDS     PRESCRIPTION OPIOIDS: WHAT YOU NEED TO KNOW   We gave you an opioid (narcotic) pain medicine. It is important to manage your pain, but opioids are not always the best choice. You should first try all the other options your care team gave you. Take this medicine for as short a time (and as few doses) as possible.    Some activities can increase your pain, such as bandage changes or therapy sessions. It  may help to take your pain medicine 30 to 60 minutes before these activities. Reduce your stress by getting enough sleep, working on hobbies you enjoy and practicing relaxation or meditation. Talk to your care team about ways to manage your pain beyond prescription opioids.    These medicines have risks:    DO NOT drive when on new or higher doses of pain medicine. These medicines can affect your alertness and reaction times, and you could be arrested for driving under the influence (DUI). If you need to use opioids long-term, talk to your care team about driving.    DO NOT operate heavy machinery    DO NOT do any other dangerous activities while taking these medicines.    DO NOT drink any alcohol while taking these medicines.     If the opioid prescribed includes acetaminophen, DO NOT take with any other medicines that contain acetaminophen. Read all labels carefully. Look for the word  acetaminophen  or  Tylenol.  Ask your pharmacist if you have questions or are unsure.    You can get addicted to pain medicines, especially if you have a history of addiction (chemical, alcohol or substance dependence). Talk to your care team about ways to reduce this risk.    All opioids tend to cause constipation. Drink plenty of water and eat foods that have a lot of fiber, such as fruits, vegetables, prune juice, apple juice and high-fiber cereal. Take a laxative (Miralax, milk of magnesia, Colace, Senna) if you don t move your bowels at least every other day. Other side effects include upset stomach, sleepiness, dizziness, throwing up, tolerance (needing more of the medicine to have the same effect), physical dependence and slowed breathing.    Store your pills in a secure place, locked if possible. We will not replace any lost or stolen medicine. If you don t finish your medicine, please throw away (dispose) as directed by your pharmacist. The [place] Pollution [employer] Agency has more information about safe disposal:  https://www.pca.[facility].mn.us/living-green/managing-unwanted-medications         Primary Care Provider Office Phone # Fax #    DIPESH Marino [facility] [phone] [address] [place] [address]        Goals        General    I will attend counseling appointment (pt-stated)     Notes - Note edited  11/25/2015  1:35 PM by Shi Armstrong LSW    As of today's date 11/25/2015 goal is met at 76 - 100%.   Goal Status:  Complete - will keep to maintain for  A few month  As of today's date 7/28/2015 goal is met at 26 - 50%.   Goal Status:  Active          Equal Access to Services     MARCELA KAPLAN : Hadii jo thompson Sorian, waaxda luqadaha, qaybta kaalmada adesherwinyada, raheem day . So [facility] [phone].    ATENCIÓN: Si habla español, tiene a borja disposición servicios gratuitos de asistencia lingüística. Llame al [phone].    We comply with applicable federal civil rights laws and [place] laws. We do not discriminate on the basis of race, color, national origin, age, disability, sex, sexual orientation, or gender identity.            Thank you!     Thank you for choosing [facility]  for your care. Our goal is always to provide you with excellent care. Hearing back from our patients is one way we can continue to improve our services. Please take a few minutes to complete the written survey that you may receive in the mail after your visit with us. Thank you!             Your Updated Medication List - Protect others around you: Learn how to safely use, store and throw away your medicines at www.disposemymeds.org.          This list is accurate as of 9/27/18 10:23 AM.  Always use your most recent med list.                   Brand Name Dispense Instructions for use Diagnosis    acetaminophen 500 MG tablet    TYLENOL    100 tablet    Take 2 tablets (1,000 mg) by mouth every 4 hours as needed for other (mild pain)    S/P left oophorectomy        gabapentin 600 MG tablet    NEURONTIN    60 tablet    Take 2 tablets (1,200 mg) by mouth At Bedtime    Severe bipolar I disorder, most recent episode mixed, with psychotic features (H)       ibuprofen 800 MG tablet    ADVIL/MOTRIN    90 tablet    Take 1 tablet (800 mg) by mouth every 6 hours as needed for pain (mild)    S/P left oophorectomy       lamoTRIgine 200 MG tablet    LaMICtal    30 tablet    Take 1 tablet (200 mg) by mouth daily    Severe bipolar I disorder, most recent episode mixed, with psychotic features (H)       morphine 15 MG IR tablet    MSIR    40 tablet    Take 1 tablet (15 mg) by mouth every 6 hours as needed for moderate to severe pain    Cyst of left ovary       ondansetron 4 MG ODT tab    ZOFRAN-ODT    20 tablet    Take 1-2 tablets (4-8 mg) by mouth every 8 hours as needed for nausea Dissolve ON the tongue.    S/P left oophorectomy       prochlorperazine 10 MG tablet    COMPAZINE    30 tablet    Take 1 tablet (10 mg) by mouth every 6 hours as needed for nausea or vomiting    Nausea       * QUEtiapine 50 MG tablet    SEROQUEL    90 tablet    Take 1 tablet (50 mg) by mouth 3 times daily    Severe bipolar I disorder, most recent episode mixed, with psychotic features (H)       * QUEtiapine 25 MG tablet    SEROquel    60 tablet    Take 1 tablet (25 mg) by mouth 2 times daily    Severe bipolar I disorder, most recent episode mixed, with psychotic features (H)       senna-docusate 8.6-50 MG per tablet    SENOKOT-S;PERICOLACE    60 tablet    Take 1-2 tablets by mouth 2 times daily Take while on oral narcotics to prevent or treat constipation.    S/P left oophorectomy       valACYclovir 500 MG tablet    VALTREX    30 tablet    Take 1 tablet (500 mg) by mouth 2 times daily X 3 days.  1 tablet daily for suppression    Herpes simplex vulvovaginitis       * Notice:  This list has 2 medication(s) that are the same as other medications prescribed for you. Read the directions carefully, and ask your doctor  or other care provider to review them with you.

## 2018-09-27 NOTE — PATIENT INSTRUCTIONS
Taking Care of Yourself after Robotic Gynecologic Surgery  Discharge Instructions   Contact information    Please call [phone] with any questions or concerns.    Monday through Friday, 8 a.m. to 5 p.m., ask for Dr. Beard's nurse.    After 5 p.m. and on weekends, explain that Dr. Beard is your doctor. Then, ask to speak to the doctor on call.   Follow-up  You will need to see your doctor or nurse about 3 weeks after surgery. Please call [phone] to set up this visit.   Normal side effects    The area around your incision (surgery wound) may be numb. This should go away over several months.    You may notice a small amount of fluid or blood coming from the vagina or your incisions (cuts from surgery).  Exercise and activity    You may feel like resting more after surgery. Slowly start to do more each day. Rest when you need to.    Do not lift more than 15 to 20 pounds for six weeks after surgery.    No routine exercise for six weeks. You may walk daily as you are able.    Do not vacuum, carry laundry or carry groceries for six weeks.    You may slowly climb stairs when you are able.    No driving while taking prescribed pain medicine (narcotics). Driving may make you dizzy.    After two weeks, if you have stopped your pain medicine, you may drive short distances with an automatic vehicle (not a clutch). If you drive a clutch, ask your doctor about driving limits.  Diet    You may go back to your normal diet. Avoid gas-forming foods such as raw fruits and vegetables.    Drink eight glasses of fluid daily. Avoid large amounts of caffeine.    Eat prunes or buy a daily stool softener from the drug store. These will help keep your bowels regular, especially while using pain medicine. Use laxatives only if you were told to do so.    Call our office if you have not had a bowel movement for two days.  Bathing and hygiene    Shower daily, rinsing your incisions with soap and water. Then, pat them dry.    No tub  baths until six weeks after surgery.    Do not put anything in the vagina for eight weeks after surgery. This means no tampons, douche or sex (sexual intercourse).    Wear cotton panties. They are less irritating, and they reduce the risk of bladder infection.  Caring for your incisions    Gently soap and rinse your incisions each day in the shower.    Pat to dry. Dry all areas fully, including any folds in the belly area.    A bandage is not needed if there is no drainage.  Post-vulvectomy patients     Keep your incisions clean and dry, especially after using the toilet. You may use a blow-dryer if needed.    No sitz baths or tub baths.    If you have a catheter, follow your instructions for [facility] catheter care.  When to call your doctor  Call your doctor's office if:    Your incisions are red, sore, hot, coming apart or draining.    Your bandage is soaked with blood.    You are bleeding from the vagina and soaking two pads an hour with blood.    You have a foul odor from your vagina.    Your skin is itchy or you notice a rash.    You begin vomiting (throwing up) or you have nausea (feel sick to your stomach) that won't go away.    You have pain that cannot be controlled.    You have a fever over 100.5 F (38 C), or you have shaking chills.    You have pain or burning when you urinate, you cannot urinate, or you urinate more often than normal.    You have questions or concerns about your surgery or medicine.  If you have chest pain or trouble breathing, call 911 or go to the emergency room right away.   For information only. Not to replace the advice of your health care provider.   Copyright   2010 [employer]. All rights reserved. [employer] 324581 - REV 05/17.    Medicine for Pain  Medicines can help to block pain, decrease inflammation, and treat related problems. More than one medicine may be used to treat your pain. Medicines may be changed as you feel better, or if they cause side  effects.  Medicines What they do Possible side effects   Non-opioid NSAIDs, aspirin, acetaminophen Reduce pain chemicals at the site of pain. NSAIDs can reduce joint and soft tissue inflammation. Nausea, stomach pain, ulcers, indigestion, bleeding, kidney, and liver problems. Certain NSAIDs may increase the risk for cardiovascular disease in some people. Talk with your healthcare provider.   Opioids (morphine and similar medicines often called narcotics) Reduce feelings or perception of pain. Used for moderate to severe pain. Nausea, vomiting, itching, drowsiness, constipation, slowed breathing   Other medicines (corticosteroids, antinausea, antidepressant, and antiseizure medicines) Reduce swelling, burning or tingling pain, or certain side effects of pain medicines, such as nausea or vomiting Your healthcare provider will explain the possible side effects of these medicines.   Anesthetics (local, injected) include lidocaine, benzocaine, and medicines used by anesthesiologists Stop pain signals from reaching the brain by blocking feeling in the treated area Nausea, low blood pressure, fever, slowed breathing, fainting, seizures, heart attack   When to call your healthcare provider  Call your healthcare provider right away (or have a family member call) if you have:    Unrelieved pain    Side effects, including constipation or uncontrolled nausea, that interfere with daily activities  If you have extreme sleepiness or breathing problems, call 911.   Other precautions    Ask your healthcare provider or pharmacist how to get rid of your pain medicines safely when you stop using them.    Never share your pain medicines with anyone.    Store your medicines in a safe place so they can t be stolen. If you think your medicine has been stolen or lost, tell your healthcare provider right away.  Date Last Reviewed: 5/1/2017 2000-2017 [employer]. [address] [place] [address], [place] [address]. All rights reserved.  This information is not intended as a substitute for professional medical care. Always follow your healthcare professional's instructions.        Taking Opioid Medicines  For your health and safety, it s important to take opioids exactly as directed. This helps ensure that the medicines work as they should. It also lowers the chances of side effects. And it lowers the risk of taking too high a dose (overdose). Each opioid medicine has its own instructions for use. Your healthcare provider will explain the ones you re prescribed and tell you how to take them. If you have questions or concerns, talk with your healthcare provider.   Using opioids safely  Opioids can work very well to relieve pain. But taking too much, taking them too long, or taking them in the wrong way can be harmful. To help reduce the risks to your health, follow these safety tips:    Make sure you know if you are to take the medicine on a regular basis or only as needed.    If your medicine is taken on a regular basis, take it on time and at the right dose. If you miss a dose, don t double up the next dose.    Use a medicine log, reed, or calendar to keep track of when you take your medicine. This helps you stay on schedule and not miss doses or take extra doses.    When taking liquid doses, use a measuring spoon or dropper. This ensures you take the correct dose.    Tell your healthcare provider right away if you have any side effects.    Don t cut, crush, or change your medicine in any way.    Don t take someone else s opioids. Don t share yours with other people.    Don t drive while taking opioids.    Don t use dangerous equipment or power tools while taking opioids.    Check expiration dates regularly. Dispose of all  medicines properly.   Beware of combining medicines  Some medicines can be dangerous when used with opioids. In some cases, combining medicines can cause death. Make sure to tell your healthcare provider and pharmacist  about all of the medicines you re taking. This includes over-the-counter medicines. It also includes vitamins, herbs, and other supplements. And it includes illegal or street drugs. Medicines that may be unsafe to use with opioids include:    Over-the-counter pain relievers, such as acetaminophen    Other prescription opioids    Benzodiazepines such as clonazepam or alprazolam    Muscle relaxants such as cyclobenzaprine or carisoprodol    Hypnotics such as sleep aids like zolpidem  WARNING: Don t take opioids with alcohol or street drugs. This can cause death.     Symptoms of opioid overdose  Opioids act on the part of the brain that affects breathing. An overdose of opioids can slow breathing down too much. It can even stop your breathing. This can cause death. Call 911 right away if you think you or someone else has had an overdose.   Look for these 3 key symptoms:    The dark circles in the middle of the eyes are very small (pinpoint pupils)    Breathing is slow or stopped    The person has passed out and does not respond(is not conscious)  Other symptoms to look for include:    Limp body    Pale face    Cool, damp skin    Purple or blue tint to lips and fingernails    Vomiting   Storing opioids safely  Opioids need to be stored safely. This helps protect anyone else from accidentally taking the medicine. It also helps prevent the theft and misuse of the medicine. If possible, store the medicine in a locked container or cupboard that others cannot get to. Store the medicine in a cool dry place. Don t store it in a damp place, such as a bathroom. Always put the medicine back in its secure place after each use.   How to dispose opioids  Dispose of unused or  opioids in a safe way. This is to prevent harm to other people. Don t save your medicine or give it to other people for any reason. Even a single dose of opioids can lead to death if it used by someone else. To dispose of your medicine safely:    Find  your [facility] medicine take-back program. You may need to drop the medicine off at a local police station or pharmacy.    Ask your pharmacy about a mail-back program. You may be able to send the medicine through the mail. This is done using a special envelope.  If these options are not available to you, ask your healthcare provider for help.                           The FDA also has guidelines for disposing of medicines. You may be able to flush them down the toilet. Or you may be able to put them in the trash. Check with your local water and waste management company to see what is allowed in your city or state. You can learn more here: www.fda.gov/drugs/resourcesforyou/consumers/buyingusingmedicinesafely/ensuringsafeuseofmedicine/safedisposalofmedicines/rbi785848.htm. Before using these options, check with your local water and waste management company to determine if this is allowed in your city or state.    Stopping opioid treatment  If you have been taking an opioid for more than a few weeks, your body gets used to having it. When you stop taking the medicine, you can have withdrawal symptoms. There are many kinds of withdrawal symptoms. They can range from mild to severe. They can include:    Restlessness    Anxiety    Muscle aches    Sweating    Large (dilated) pupils    Watery eyes    Runny nose    Trouble sleeping    Nausea and vomiting    Abdominal cramping    Diarrhea    Fast heartbeat  Don t stop opioid medicine without help from your healthcare provider. You will need a plan to safely stop taking it. This is to help manage withdrawal symptoms. In most cases, the amount of medicine you take will be cut down. You will take less and less over several weeks. If needed, you may need to take other medicines and treatments to help with this process. As the opioid medicine leaves your body, your body will adapt. Your withdrawal symptoms should then go away. How long this takes can vary for each person.  Date  Last Reviewed: 8/1/2017 2000-2017 [employer]. [address] [place] [address], [place] [address]. All rights reserved. This information is not intended as a substitute for professional medical care. Always follow your healthcare professional's instructions.

## 2018-09-28 LAB — COPATH REPORT: NORMAL

## 2018-10-01 NOTE — PROGRESS NOTES
Inform patient that her ovary removed was benign.     Debi Luis MD  [facility]

## 2018-10-01 NOTE — PROGRESS NOTES
Will forward to [facility] pool for pt notification of normal result.    Rocio Nicholson   [facility]  RN

## 2018-10-01 NOTE — TELEPHONE ENCOUNTER
Using Valacyclovir - BID with outbreaks or illness, daily for suppression.  Patient must try and fail 2 other forms of Acyclovir before insurance will cover ointment.    Left patient message to notify of above.    Rocio Nicholson   [facility]  RN

## 2018-10-05 ENCOUNTER — APPOINTMENT (OUTPATIENT)
Dept: CT IMAGING | Facility: CLINIC | Age: 33
End: 2018-10-05
Attending: EMERGENCY MEDICINE
Payer: COMMERCIAL

## 2018-10-05 ENCOUNTER — HOSPITAL ENCOUNTER (EMERGENCY)
Facility: CLINIC | Age: 33
Discharge: HOME | End: 2018-10-06
Attending: EMERGENCY MEDICINE | Admitting: EMERGENCY MEDICINE
Payer: COMMERCIAL

## 2018-10-05 DIAGNOSIS — G89.18 ACUTE POST-OPERATIVE PAIN: ICD-10-CM

## 2018-10-05 DIAGNOSIS — N83.201 CYST OF OVARY, RIGHT: ICD-10-CM

## 2018-10-05 LAB
ALBUMIN UR-MCNC: NEGATIVE MG/DL
ANION GAP SERPL CALCULATED.3IONS-SCNC: 7 MMOL/L (ref 3–14)
APPEARANCE UR: CLEAR
BASOPHILS # BLD AUTO: 0.1 10E9/L (ref 0–0.2)
BASOPHILS NFR BLD AUTO: 0.5 %
BILIRUB UR QL STRIP: NEGATIVE
BUN SERPL-MCNC: 13 MG/DL (ref 7–30)
CALCIUM SERPL-MCNC: 8.2 MG/DL (ref 8.5–10.1)
CHLORIDE SERPL-SCNC: 110 MMOL/L (ref 94–109)
CO2 SERPL-SCNC: 26 MMOL/L (ref 20–32)
COLOR UR AUTO: YELLOW
CREAT SERPL-MCNC: 0.75 MG/DL (ref 0.52–1.04)
DIFFERENTIAL METHOD BLD: NORMAL
EOSINOPHIL # BLD AUTO: 0.5 10E9/L (ref 0–0.7)
EOSINOPHIL NFR BLD AUTO: 5.8 %
ERYTHROCYTE [DISTWIDTH] IN BLOOD BY AUTOMATED COUNT: 12.5 % (ref 10–15)
GFR SERPL CREATININE-BSD FRML MDRD: 90 ML/MIN/1.7M2
GLUCOSE SERPL-MCNC: 83 MG/DL (ref 70–99)
GLUCOSE UR STRIP-MCNC: NEGATIVE MG/DL
HCT VFR BLD AUTO: 38 % (ref 35–47)
HGB BLD-MCNC: 13 G/DL (ref 11.7–15.7)
HGB UR QL STRIP: NEGATIVE
IMM GRANULOCYTES # BLD: 0 10E9/L (ref 0–0.4)
IMM GRANULOCYTES NFR BLD: 0.3 %
KETONES UR STRIP-MCNC: NEGATIVE MG/DL
LEUKOCYTE ESTERASE UR QL STRIP: NEGATIVE
LYMPHOCYTES # BLD AUTO: 4.1 10E9/L (ref 0.8–5.3)
LYMPHOCYTES NFR BLD AUTO: 44.5 %
MCH RBC QN AUTO: 30.5 PG (ref 26.5–33)
MCHC RBC AUTO-ENTMCNC: 34.2 G/DL (ref 31.5–36.5)
MCV RBC AUTO: 89 FL (ref 78–100)
MONOCYTES # BLD AUTO: 0.6 10E9/L (ref 0–1.3)
MONOCYTES NFR BLD AUTO: 6.2 %
NEUTROPHILS # BLD AUTO: 3.9 10E9/L (ref 1.6–8.3)
NEUTROPHILS NFR BLD AUTO: 42.7 %
NITRATE UR QL: NEGATIVE
NRBC # BLD AUTO: 0 10*3/UL
NRBC BLD AUTO-RTO: 0 /100
PH UR STRIP: 5 PH (ref 5–7)
PLATELET # BLD AUTO: 362 10E9/L (ref 150–450)
POTASSIUM SERPL-SCNC: 3.8 MMOL/L (ref 3.4–5.3)
RBC # BLD AUTO: 4.26 10E12/L (ref 3.8–5.2)
SODIUM SERPL-SCNC: 143 MMOL/L (ref 133–144)
SOURCE: NORMAL
SP GR UR STRIP: 1.02 (ref 1–1.03)
UROBILINOGEN UR STRIP-MCNC: 0 MG/DL (ref 0–2)
WBC # BLD AUTO: 9.1 10E9/L (ref 4–11)

## 2018-10-05 PROCEDURE — 99285 EMERGENCY DEPT VISIT HI MDM: CPT | Mod: Z6 | Performed by: EMERGENCY MEDICINE

## 2018-10-05 PROCEDURE — 74177 CT ABD & PELVIS W/CONTRAST: CPT

## 2018-10-05 PROCEDURE — 25000125 ZZHC RX 250: Performed by: EMERGENCY MEDICINE

## 2018-10-05 PROCEDURE — 85025 COMPLETE CBC W/AUTO DIFF WBC: CPT | Performed by: EMERGENCY MEDICINE

## 2018-10-05 PROCEDURE — 25000128 H RX IP 250 OP 636: Performed by: EMERGENCY MEDICINE

## 2018-10-05 PROCEDURE — 99285 EMERGENCY DEPT VISIT HI MDM: CPT | Mod: 25

## 2018-10-05 PROCEDURE — 96374 THER/PROPH/DIAG INJ IV PUSH: CPT | Mod: 59

## 2018-10-05 PROCEDURE — 81003 URINALYSIS AUTO W/O SCOPE: CPT | Performed by: EMERGENCY MEDICINE

## 2018-10-05 PROCEDURE — 96375 TX/PRO/DX INJ NEW DRUG ADDON: CPT

## 2018-10-05 PROCEDURE — 80048 BASIC METABOLIC PNL TOTAL CA: CPT | Performed by: EMERGENCY MEDICINE

## 2018-10-05 RX ORDER — FENTANYL CITRATE 50 UG/ML
75 INJECTION, SOLUTION INTRAMUSCULAR; INTRAVENOUS ONCE
Status: COMPLETED | OUTPATIENT
Start: 2018-10-05 | End: 2018-10-05

## 2018-10-05 RX ORDER — MORPHINE SULFATE 15 MG/1
15 TABLET ORAL EVERY 4 HOURS PRN
Qty: 2 TABLET | Refills: 0 | Status: SHIPPED | OUTPATIENT
Start: 2018-10-05 | End: 2018-10-09

## 2018-10-05 RX ORDER — IOPAMIDOL 755 MG/ML
100 INJECTION, SOLUTION INTRAVASCULAR ONCE
Status: COMPLETED | OUTPATIENT
Start: 2018-10-05 | End: 2018-10-05

## 2018-10-05 RX ORDER — ONDANSETRON 2 MG/ML
4 INJECTION INTRAMUSCULAR; INTRAVENOUS ONCE
Status: COMPLETED | OUTPATIENT
Start: 2018-10-05 | End: 2018-10-05

## 2018-10-05 RX ADMIN — FENTANYL CITRATE 75 MCG: 50 INJECTION INTRAMUSCULAR; INTRAVENOUS at 21:54

## 2018-10-05 RX ADMIN — SODIUM CHLORIDE 66 ML: 9 INJECTION, SOLUTION INTRAVENOUS at 22:49

## 2018-10-05 RX ADMIN — IOPAMIDOL 100 ML: 755 INJECTION, SOLUTION INTRAVENOUS at 22:49

## 2018-10-05 RX ADMIN — ONDANSETRON 4 MG: 2 INJECTION INTRAMUSCULAR; INTRAVENOUS at 21:52

## 2018-10-05 NOTE — ED AVS SNAPSHOT
[facility]    [address] [address]    Phone:  [phone]    Fax:  [phone]                                       Sirena Dietrich   MRN: [id number]    Department:  [facility]   Date of Visit:  10/5/2018           After Visit Summary Signature Page     I have received my discharge instructions, and my questions have been answered. I have discussed any challenges I see with this plan with the nurse or doctor.    ..........................................................................................................................................  Patient/Patient Representative Signature      ..........................................................................................................................................  Patient Representative Print Name and Relationship to Patient    ..................................................               ................................................  Date                                   Time    ..........................................................................................................................................  Reviewed by Signature/Title    ...................................................              ..............................................  Date                                               Time          22EPIC Rev 08/18

## 2018-10-05 NOTE — ED AVS SNAPSHOT
[facility] Emergency Department    [address] [facility] [address]    Phone:  [phone]    Fax:  [phone]                                       Sirena Dietrich   MRN: [id number]    Department:  [facility] Department   Date of Visit:  10/5/2018           Patient Information     Date Of Birth          1985        Your diagnoses for this visit were:     Acute post-operative pain     Cyst of ovary, right        You were seen by Regino Ozuna MD.      Follow-up Information     Follow up with Shi Martinez APRN CNP.    Specialty:  Family Practice    Why:  As needed    Contact information:    [address] [facility] [address]  [phone]          Follow up with [facility].    Specialty:  EMERGENCY MEDICINE    Why:  If symptoms worsen    Contact information:    [address] [place] [id number] [phone]    Additional information:    The medical center is located at   [address] [facility] ([address] in [place], [place] miles north   of [place]).        Discharge Instructions       Return if symptoms worsen or new symptoms develop.  Follow-up with primary care physician next week.  Take medication as directed.  If increased pain or sudden onset of severe pain please return for recheck we talked about an ultrasound to rule out torsion but you did not feel it was necessary at this point.  Ovarian Cysts  A cyst is usually a fluid-filled sac, like a small water balloon. Cysts are almost always harmless, and many go away on their own. Usually they grow slowly. They can vary in size from as small as a pea to larger than a grapefruit. Many cause no symptoms at all. Often they are felt only during a pelvic exam. Ovarian cysts are usually not cancer.       Functional cyst  A functional cyst is the most common kind of cyst. It forms when a follicle does not release a mature egg or continues to grow after  releasing the egg. Functional cysts usually occur on only one ovary at a time. They usually shrink on their own in 1 to 3 months. In rare cases, a cyst will burst (rupture), causing pain. Pain might also be caused by the twisting of an ovary that is enlarged because of the cyst growing on it.     Dermoid cyst  Sometimes cells that are present from birth will start to grow into different kinds of tissue such as skin, fat, hair, and teeth. This kind of cyst is called a dermoid cyst. Dermoid cysts can grow on one or both ovaries. Usually they cause no symptoms. But if they leak or the ovary becomes twisted, they can cause severe pain.    Endometrioma  Sometimes tissue similar to the lining of the uterus (endometrium) grows and becomes part of the ovary. This kind of cyst is often called a chocolate cyst because of its dark-brown color. These cysts can grow on one or both ovaries. They often cause pain, especially around menstruation or during sex.    Benign cystadenoma  If the capsule that surrounds the ovary grows, it can form a cystadenoma. These cysts can grow on one or both ovaries. Usually they cause no symptoms if they are small. But if they become large, they can press on organs near the ovaries, causing pain. They can also cause pain by stretching the ovarian capsule. A cyst that pushes on the bladder can cause frequent urination. Sometimes these cysts rupture and bleed.  Malignant cysts  These cysts can invade other tissues or spread to other parts of the body.  Date Last Reviewed: 6/1/2017 2000-2017 [employer]. [address] [place] [address], [place] [address]. All rights reserved. This information is not intended as a substitute for professional medical care. Always follow your healthcare professional's instructions.          Taking Opioid Medicines  For your health and safety, it s important to take opioids exactly as directed. This helps ensure that the medicines work as they should. It also  lowers the chances of side effects. And it lowers the risk of taking too high a dose (overdose). Each opioid medicine has its own instructions for use. Your healthcare provider will explain the ones you re prescribed and tell you how to take them. If you have questions or concerns, talk with your healthcare provider.   Using opioids safely  Opioids can work very well to relieve pain. But taking too much, taking them too long, or taking them in the wrong way can be harmful. To help reduce the risks to your health, follow these safety tips:    Make sure you know if you are to take the medicine on a regular basis or only as needed.    If your medicine is taken on a regular basis, take it on time and at the right dose. If you miss a dose, don t double up the next dose.    Use a medicine log, reed, or calendar to keep track of when you take your medicine. This helps you stay on schedule and not miss doses or take extra doses.    When taking liquid doses, use a measuring spoon or dropper. This ensures you take the correct dose.    Tell your healthcare provider right away if you have any side effects.    Don t cut, crush, or change your medicine in any way.    Don t take someone else s opioids. Don t share yours with other people.    Don t drive while taking opioids.    Don t use dangerous equipment or power tools while taking opioids.    Check expiration dates regularly. Dispose of all  medicines properly.   Beware of combining medicines  Some medicines can be dangerous when used with opioids. In some cases, combining medicines can cause death. Make sure to tell your healthcare provider and pharmacist about all of the medicines you re taking. This includes over-the-counter medicines. It also includes vitamins, herbs, and other supplements. And it includes illegal or street drugs. Medicines that may be unsafe to use with opioids include:    Over-the-counter pain relievers, such as acetaminophen    Other prescription  opioids    Benzodiazepines such as clonazepam or alprazolam    Muscle relaxants such as cyclobenzaprine or carisoprodol    Hypnotics such as sleep aids like zolpidem  WARNING: Don t take opioids with alcohol or street drugs. This can cause death.     Symptoms of opioid overdose  Opioids act on the part of the brain that affects breathing. An overdose of opioids can slow breathing down too much. It can even stop your breathing. This can cause death. Call 911 right away if you think you or someone else has had an overdose.   Look for these 3 key symptoms:    The dark circles in the middle of the eyes are very small (pinpoint pupils)    Breathing is slow or stopped    The person has passed out and does not respond(is not conscious)  Other symptoms to look for include:    Limp body    Pale face    Cool, damp skin    Purple or blue tint to lips and fingernails    Vomiting   Storing opioids safely  Opioids need to be stored safely. This helps protect anyone else from accidentally taking the medicine. It also helps prevent the theft and misuse of the medicine. If possible, store the medicine in a locked container or cupboard that others cannot get to. Store the medicine in a cool dry place. Don t store it in a damp place, such as a bathroom. Always put the medicine back in its secure place after each use.   How to dispose opioids  Dispose of unused or  opioids in a safe way. This is to prevent harm to other people. Don t save your medicine or give it to other people for any reason. Even a single dose of opioids can lead to death if it used by someone else. To dispose of your medicine safely:    Find your [facility] medicine take-back program. You may need to drop the medicine off at a local police station or pharmacy.    Ask your pharmacy about a mail-back program. You may be able to send the medicine through the mail. This is done using a special envelope.  If these options are not available to you, ask your  healthcare provider for help.                           The FDA also has guidelines for disposing of medicines. You may be able to flush them down the toilet. Or you may be able to put them in the trash. Check with your local water and waste management company to see what is allowed in your city or state. You can learn more here: www.fda.gov/drugs/resourcesforyou/consumers/buyingusingmedicinesafely/ensuringsafeuseofmedicine/safedisposalofmedicines/myo673583.htm. Before using these options, check with your local water and waste management company to determine if this is allowed in your city or state.    Stopping opioid treatment  If you have been taking an opioid for more than a few weeks, your body gets used to having it. When you stop taking the medicine, you can have withdrawal symptoms. There are many kinds of withdrawal symptoms. They can range from mild to severe. They can include:    Restlessness    Anxiety    Muscle aches    Sweating    Large (dilated) pupils    Watery eyes    Runny nose    Trouble sleeping    Nausea and vomiting    Abdominal cramping    Diarrhea    Fast heartbeat  Don t stop opioid medicine without help from your healthcare provider. You will need a plan to safely stop taking it. This is to help manage withdrawal symptoms. In most cases, the amount of medicine you take will be cut down. You will take less and less over several weeks. If needed, you may need to take other medicines and treatments to help with this process. As the opioid medicine leaves your body, your body will adapt. Your withdrawal symptoms should then go away. How long this takes can vary for each person.  Date Last Reviewed: 8/1/2017 2000-2017 [employer]. [address] [place] [address], [place] [address]. All rights reserved. This information is not intended as a substitute for professional medical care. Always follow your healthcare professional's instructions.          Treatment for Ovarian Cysts  An ovarian  cyst is a fluid-filled sac that forms on or inside an ovary. The ovaries are a pair of small, oval-shaped organs in the lower part of a woman s belly (abdomen). About once a month, one of the ovaries releases an egg. The ovaries also make the hormones estrogen and progesterone. These hormones are part of pregnancy, the menstrual cycle, and breast growth.  Ovarian cysts are very common in women of all ages, however they are unusual in women who have reached menopause. Young girls can also get them, but this is less common. There are different types of ovarian cysts. They can occur for various reasons, and they may need different treatments. A cyst can vary in size from half an inch to more than 4 inches.  Types of treatment  Treatment for an ovarian cyst will depend on the type of cyst, your age, and your general health. Most women will not need treatment. You may be told to watch your symptoms over time. An ovarian cyst will often go away with no treatment in a few weeks or months.  In some cases, you may need to have follow-up ultrasound tests. These are to check if your cyst has gone away or is not growing. You may not need any other treatment.  If your ultrasound or blood tests show signs of cancer, your healthcare provider may advise surgery. This is done to remove part or all of your ovary. Your healthcare provider might also advise surgery if:    Your cyst causes ongoing pressure or pain    Your cyst appears to be growing    You have a very large cyst    You have endometriosis and want the cyst removed to help with fertility  Can an ovarian cyst be prevented?  If you have hormone problems, your healthcare provider may advise taking birth control pills. These may help prevent ovarian cysts. Taking antibiotics for a pelvic infection may also prevent a cyst.  Possible complications of an ovarian cyst  An ovarian cyst can sometimes break open (rupture). This may not cause any symptoms. Or it may cause sudden,  sharp pain in the lower belly. A ruptured cyst can cause a lot of blood and fluid loss. This can lead to low blood pressure. In some cases, surgery may be needed.  Rarely an ovarian cyst can also cause twisting (torsion) of the fallopian tube. This can block normal blood supply to the ovary. This can lead to sudden pain and may need emergency surgery.  When to call the healthcare provider  Call your healthcare provider right away if you have any of these:    Sudden belly pain    Other severe symptoms   Date Last Reviewed: 10/1/2017    7421-5341 [employer]. [address] [place] [address]. All rights reserved. This information is not intended as a substitute for professional medical care. Always follow your healthcare professional's instructions.          24 Hour Appointment Hotline       To make an appointment at any [facility], call [phone] ([phone]). If you don't have a family doctor or clinic, we will help you find one. [place] clinics are conveniently located to serve the needs of you and your family.             Review of your medicines      CONTINUE these medicines which may have CHANGED, or have new prescriptions. If we are uncertain of the size of tablets/capsules you have at home, strength may be listed as something that might have changed.        Dose / Directions Last dose taken    morphine 15 MG IR tablet   Commonly known as:  MSIR   Dose:  15 mg   What changed:    - when to take this  - reasons to take this   Quantity:  2 tablet        Take 1 tablet (15 mg) by mouth every 4 hours as needed for severe pain   Refills:  0          Our records show that you are taking the medicines listed below. If these are incorrect, please call your family doctor or clinic.        Dose / Directions Last dose taken    acetaminophen 500 MG tablet   Commonly known as:  TYLENOL   Dose:  1000 mg   Quantity:  100 tablet        Take 2 tablets (1,000 mg) by mouth every 4 hours as  needed for other (mild pain)   Refills:  1        gabapentin 600 MG tablet   Commonly known as:  NEURONTIN   Dose:  1200 mg   Quantity:  60 tablet        Take 2 tablets (1,200 mg) by mouth At Bedtime   Refills:  0        ibuprofen 800 MG tablet   Commonly known as:  ADVIL/MOTRIN   Dose:  800 mg   Quantity:  90 tablet        Take 1 tablet (800 mg) by mouth every 6 hours as needed for pain (mild)   Refills:  1        lamoTRIgine 200 MG tablet   Commonly known as:  LaMICtal   Dose:  200 mg   Quantity:  30 tablet        Take 1 tablet (200 mg) by mouth daily   Refills:  1        ondansetron 4 MG ODT tab   Commonly known as:  ZOFRAN-ODT   Dose:  4-8 mg   Quantity:  20 tablet        Take 1-2 tablets (4-8 mg) by mouth every 8 hours as needed for nausea Dissolve ON the tongue.   Refills:  1        prochlorperazine 10 MG tablet   Commonly known as:  COMPAZINE   Dose:  10 mg   Quantity:  30 tablet        Take 1 tablet (10 mg) by mouth every 6 hours as needed for nausea or vomiting   Refills:  0        * QUEtiapine 50 MG tablet   Commonly known as:  SEROQUEL   Dose:  50 mg   Quantity:  90 tablet        Take 1 tablet (50 mg) by mouth 3 times daily   Refills:  0        * QUEtiapine 25 MG tablet   Commonly known as:  SEROquel   Dose:  25 mg   Quantity:  60 tablet        Take 1 tablet (25 mg) by mouth 2 times daily   Refills:  0        senna-docusate 8.6-50 MG per tablet   Commonly known as:  SENOKOT-S;PERICOLACE   Dose:  1-2 tablet   Quantity:  60 tablet        Take 1-2 tablets by mouth 2 times daily Take while on oral narcotics to prevent or treat constipation.   Refills:  0        valACYclovir 500 MG tablet   Commonly known as:  VALTREX   Dose:  500 mg   Quantity:  30 tablet        Take 1 tablet (500 mg) by mouth 2 times daily X 3 days.  1 tablet daily for suppression   Refills:  1        * Notice:  This list has 2 medication(s) that are the same as other medications prescribed for you. Read the directions carefully, and ask  your doctor or other care provider to review them with you.            Information about OPIOIDS     PRESCRIPTION OPIOIDS: WHAT YOU NEED TO KNOW   We gave you an opioid (narcotic) pain medicine. It is important to manage your pain, but opioids are not always the best choice. You should first try all the other options your care team gave you. Take this medicine for as short a time (and as few doses) as possible.    Some activities can increase your pain, such as bandage changes or therapy sessions. It may help to take your pain medicine 30 to 60 minutes before these activities. Reduce your stress by getting enough sleep, working on hobbies you enjoy and practicing relaxation or meditation. Talk to your care team about ways to manage your pain beyond prescription opioids.    These medicines have risks:    DO NOT drive when on new or higher doses of pain medicine. These medicines can affect your alertness and reaction times, and you could be arrested for driving under the influence (DUI). If you need to use opioids long-term, talk to your care team about driving.    DO NOT operate heavy machinery    DO NOT do any other dangerous activities while taking these medicines.    DO NOT drink any alcohol while taking these medicines.     If the opioid prescribed includes acetaminophen, DO NOT take with any other medicines that contain acetaminophen. Read all labels carefully. Look for the word  acetaminophen  or  Tylenol.  Ask your pharmacist if you have questions or are unsure.    You can get addicted to pain medicines, especially if you have a history of addiction (chemical, alcohol or substance dependence). Talk to your care team about ways to reduce this risk.    All opioids tend to cause constipation. Drink plenty of water and eat foods that have a lot of fiber, such as fruits, vegetables, prune juice, apple juice and high-fiber cereal. Take a laxative (Miralax, milk of magnesia, Colace, Senna) if you don t move your  bowels at least every other day. Other side effects include upset stomach, sleepiness, dizziness, throwing up, tolerance (needing more of the medicine to have the same effect), physical dependence and slowed breathing.    Store your pills in a secure place, locked if possible. We will not replace any lost or stolen medicine. If you don t finish your medicine, please throw away (dispose) as directed by your pharmacist. The [place] [employer] has more information about safe disposal: https://www.pca.state.[place]/living-green/managing-unwanted-medications        Prescriptions were sent or printed at these locations (1 Prescription)                   Other Prescriptions                Printed at Department/Unit printer (1 of 1)         morphine (MSIR) 15 MG IR tablet                Procedures and tests performed during your visit     Basic metabolic panel    CBC with platelets, differential    CT Abdomen Pelvis w Contrast    UA reflex to Microscopic      Orders Needing Specimen Collection     None      Pending Results     Date and Time Order Name Status Description    10/5/2018 2234 CT Abdomen Pelvis w Contrast Preliminary             Pending Culture Results     No orders found for last 3 day(s).            Pending Results Instructions     If you had any lab results that were not finalized at the time of your Discharge, you can call the ED Lab Result RN at [phone]. You will be contacted by this team for any positive Lab results or changes in treatment. The nurses are available 7 days a week from 10A to 6:30P.  You can leave a message 24 hours per day and they will return your call.        Test Results From Your Hospital Stay        10/5/2018 10:12 PM      Component Results     Component Value Ref Range & Units Status    WBC 9.1 4.0 - 11.0 10e9/L Final    RBC Count 4.26 3.8 - 5.2 10e12/L Final    Hemoglobin 13.0 11.7 - 15.7 g/dL Final    Hematocrit 38.0 35.0 - 47.0 % Final    MCV 89 78 - 100 fl Final     MCH 30.5 26.5 - 33.0 pg Final    MCHC 34.2 31.5 - 36.5 g/dL Final    RDW 12.5 10.0 - 15.0 % Final    Platelet Count 362 150 - 450 10e9/L Final    Diff Method Automated Method  Final    % Neutrophils 42.7 % Final    % Lymphocytes 44.5 % Final    % Monocytes 6.2 % Final    % Eosinophils 5.8 % Final    % Basophils 0.5 % Final    % Immature Granulocytes 0.3 % Final    Nucleated RBCs 0 0 /100 Final    Absolute Neutrophil 3.9 1.6 - 8.3 10e9/L Final    Absolute Lymphocytes 4.1 0.8 - 5.3 10e9/L Final    Absolute Monocytes 0.6 0.0 - 1.3 10e9/L Final    Absolute Eosinophils 0.5 0.0 - 0.7 10e9/L Final    Absolute Basophils 0.1 0.0 - 0.2 10e9/L Final    Abs Immature Granulocytes 0.0 0 - 0.4 10e9/L Final    Absolute Nucleated RBC 0.0  Final         10/5/2018 10:23 PM      Component Results     Component Value Ref Range & Units Status    Sodium 143 133 - 144 mmol/L Final    Potassium 3.8 3.4 - 5.3 mmol/L Final    Chloride 110 (H) 94 - 109 mmol/L Final    Carbon Dioxide 26 20 - 32 mmol/L Final    Anion Gap 7 3 - 14 mmol/L Final    Glucose 83 70 - 99 mg/dL Final    Urea Nitrogen 13 7 - 30 mg/dL Final    Creatinine 0.75 0.52 - 1.04 mg/dL Final    GFR Estimate 90 >60 mL/min/1.7m2 Final    Non  GFR Calc    GFR Estimate If Black >90 >60 mL/min/1.7m2 Final    African American GFR Calc    Calcium 8.2 (L) 8.5 - 10.1 mg/dL Final         10/5/2018 10:13 PM      Component Results     Component Value Ref Range & Units Status    Color Urine Yellow  Final    Appearance Urine Clear  Final    Glucose Urine Negative NEG^Negative mg/dL Final    Bilirubin Urine Negative NEG^Negative Final    Ketones Urine Negative NEG^Negative mg/dL Final    Specific Gravity Urine 1.021 1.003 - 1.035 Final    Blood Urine Negative NEG^Negative Final    pH Urine 5.0 5.0 - 7.0 pH Final    Protein Albumin Urine Negative NEG^Negative mg/dL Final    Urobilinogen mg/dL 0.0 0.0 - 2.0 mg/dL Final    Nitrite Urine Negative NEG^Negative Final    Leukocyte  Esterase Urine Negative NEG^Negative Final    Source Midstream Urine  Final         10/5/2018 11:18 PM      Narrative     CT ABDOMEN PELVIS W CONTRAST  10/5/2018 10:57 PM     HISTORY: Abdominal pain; lower abdomen status post left oophorectomy.    TECHNIQUE: Volumetric acquisition through abdomen and pelvis with IV  contrast. 100 mL Isovue-370. Radiation dose for this scan was reduced  using automated exposure control, adjustment of the mA and/or kV  according to patient size, or iterative reconstruction technique.    COMPARISON: 11/12/2017.    FINDINGS: The liver, gallbladder, spleen, pancreas, adrenal glands and  kidneys demonstrate no worrisome findings. Gallbladder is slightly  contracted.    3.2 cm right adnexal cyst, likely an ovarian cyst or follicle. Trace  pelvic fluid. Pelvic structures are otherwise negative. No bowel  obstruction or significant ascites. No focal inflammatory changes or  free air.        Impression     IMPRESSION:  1. 3.2 cm right adnexal cyst and trace free fluid.  2. No other cause of pain identified.                Thank you for choosing [facility]       Thank you for choosing [facility] for your care. Our goal is always to provide you with excellent care. Hearing back from our patients is one way we can continue to improve our services. Please take a few minutes to complete the written survey that you may receive in the mail after you visit with us. Thank you!        [employer] Information     [employer] gives you secure access to your electronic health record. If you see a primary care provider, you can also send messages to your care team and make appointments. If you have questions, please call your primary care clinic.  If you do not have a primary care provider, please call [phone] and they will assist you.        Care EveryWhere ID     This is your Care EveryWhere ID. This could be used by other organizations to access your [facility] medical records  [id number]        Equal Access  to Services     SAMRA KAPLAN : Ian Nazario, benjamin douglas, raheem hernandez. [facility] [phone].    ATENCIÓN: Si habla español, tiene a borja disposición servicios gratuitos de asistencia lingüística. Llame al [phone].    We comply with applicable federal civil rights laws and [place] laws. We do not discriminate on the basis of race, color, national origin, age, disability, sex, sexual orientation, or gender identity.            After Visit Summary       This is your record. Keep this with you and show to your community pharmacist(s) and doctor(s) at your next visit.

## 2018-10-06 VITALS
RESPIRATION RATE: 20 BRPM | DIASTOLIC BLOOD PRESSURE: 72 MMHG | TEMPERATURE: 97.9 F | SYSTOLIC BLOOD PRESSURE: 124 MMHG | HEART RATE: 87 BPM | OXYGEN SATURATION: 99 %

## 2018-10-06 RX ORDER — MORPHINE SULFATE 15 MG/1
15 TABLET ORAL EVERY 4 HOURS PRN
Status: DISCONTINUED | OUTPATIENT
Start: 2018-10-06 | End: 2018-10-06 | Stop reason: HOSPADM

## 2018-10-06 NOTE — DISCHARGE INSTRUCTIONS
Return if symptoms worsen or new symptoms develop.  Follow-up with primary care physician next week.  Take medication as directed.  If increased pain or sudden onset of severe pain please return for recheck we talked about an ultrasound to rule out torsion but you did not feel it was necessary at this point.  Ovarian Cysts  A cyst is usually a fluid-filled sac, like a small water balloon. Cysts are almost always harmless, and many go away on their own. Usually they grow slowly. They can vary in size from as small as a pea to larger than a grapefruit. Many cause no symptoms at all. Often they are felt only during a pelvic exam. Ovarian cysts are usually not cancer.       Functional cyst  A functional cyst is the most common kind of cyst. It forms when a follicle does not release a mature egg or continues to grow after releasing the egg. Functional cysts usually occur on only one ovary at a time. They usually shrink on their own in 1 to 3 months. In rare cases, a cyst will burst (rupture), causing pain. Pain might also be caused by the twisting of an ovary that is enlarged because of the cyst growing on it.     Dermoid cyst  Sometimes cells that are present from birth will start to grow into different kinds of tissue such as skin, fat, hair, and teeth. This kind of cyst is called a dermoid cyst. Dermoid cysts can grow on one or both ovaries. Usually they cause no symptoms. But if they leak or the ovary becomes twisted, they can cause severe pain.    Endometrioma  Sometimes tissue similar to the lining of the uterus (endometrium) grows and becomes part of the ovary. This kind of cyst is often called a chocolate cyst because of its dark-brown color. These cysts can grow on one or both ovaries. They often cause pain, especially around menstruation or during sex.    Benign cystadenoma  If the capsule that surrounds the ovary grows, it can form a cystadenoma. These cysts can grow on one or both ovaries. Usually they cause  no symptoms if they are small. But if they become large, they can press on organs near the ovaries, causing pain. They can also cause pain by stretching the ovarian capsule. A cyst that pushes on the bladder can cause frequent urination. Sometimes these cysts rupture and bleed.  Malignant cysts  These cysts can invade other tissues or spread to other parts of the body.  Date Last Reviewed: 6/1/2017 2000-2017 [employer]. [address] [place] [address], [place] [address]. All rights reserved. This information is not intended as a substitute for professional medical care. Always follow your healthcare professional's instructions.          Taking Opioid Medicines  For your health and safety, it s important to take opioids exactly as directed. This helps ensure that the medicines work as they should. It also lowers the chances of side effects. And it lowers the risk of taking too high a dose (overdose). Each opioid medicine has its own instructions for use. Your healthcare provider will explain the ones you re prescribed and tell you how to take them. If you have questions or concerns, talk with your healthcare provider.   Using opioids safely  Opioids can work very well to relieve pain. But taking too much, taking them too long, or taking them in the wrong way can be harmful. To help reduce the risks to your health, follow these safety tips:    Make sure you know if you are to take the medicine on a regular basis or only as needed.    If your medicine is taken on a regular basis, take it on time and at the right dose. If you miss a dose, don t double up the next dose.    Use a medicine log, reed, or calendar to keep track of when you take your medicine. This helps you stay on schedule and not miss doses or take extra doses.    When taking liquid doses, use a measuring spoon or dropper. This ensures you take the correct dose.    Tell your healthcare provider right away if you have any side effects.    Don t cut,  crush, or change your medicine in any way.    Don t take someone else s opioids. Don t share yours with other people.    Don t drive while taking opioids.    Don t use dangerous equipment or power tools while taking opioids.    Check expiration dates regularly. Dispose of all  medicines properly.   Beware of combining medicines  Some medicines can be dangerous when used with opioids. In some cases, combining medicines can cause death. Make sure to tell your healthcare provider and pharmacist about all of the medicines you re taking. This includes over-the-counter medicines. It also includes vitamins, herbs, and other supplements. And it includes illegal or street drugs. Medicines that may be unsafe to use with opioids include:    Over-the-counter pain relievers, such as acetaminophen    Other prescription opioids    Benzodiazepines such as clonazepam or alprazolam    Muscle relaxants such as cyclobenzaprine or carisoprodol    Hypnotics such as sleep aids like zolpidem  WARNING: Don t take opioids with alcohol or street drugs. This can cause death.     Symptoms of opioid overdose  Opioids act on the part of the brain that affects breathing. An overdose of opioids can slow breathing down too much. It can even stop your breathing. This can cause death. Call 911 right away if you think you or someone else has had an overdose.   Look for these 3 key symptoms:    The dark circles in the middle of the eyes are very small (pinpoint pupils)    Breathing is slow or stopped    The person has passed out and does not respond(is not conscious)  Other symptoms to look for include:    Limp body    Pale face    Cool, damp skin    Purple or blue tint to lips and fingernails    Vomiting   Storing opioids safely  Opioids need to be stored safely. This helps protect anyone else from accidentally taking the medicine. It also helps prevent the theft and misuse of the medicine. If possible, store the medicine in a locked container  or cupboard that others cannot get to. Store the medicine in a cool dry place. Don t store it in a damp place, such as a bathroom. Always put the medicine back in its secure place after each use.   How to dispose opioids  Dispose of unused or  opioids in a safe way. This is to prevent harm to other people. Don t save your medicine or give it to other people for any reason. Even a single dose of opioids can lead to death if it used by someone else. To dispose of your medicine safely:    Find your [facility] medicine take-back program. You may need to drop the medicine off at a local police station or pharmacy.    Ask your pharmacy about a mail-back program. You may be able to send the medicine through the mail. This is done using a special envelope.  If these options are not available to you, ask your healthcare provider for help.                           The FDA also has guidelines for disposing of medicines. You may be able to flush them down the toilet. Or you may be able to put them in the trash. Check with your local water and waste management company to see what is allowed in your city or state. You can learn more here: www.fda.gov/drugs/resourcesforyou/consumers/buyingusingmedicinesafely/ensuringsafeuseofmedicine/safedisposalofmedicines/sgy508451.htm. Before using these options, check with your local water and waste management company to determine if this is allowed in your city or state.    Stopping opioid treatment  If you have been taking an opioid for more than a few weeks, your body gets used to having it. When you stop taking the medicine, you can have withdrawal symptoms. There are many kinds of withdrawal symptoms. They can range from mild to severe. They can include:    Restlessness    Anxiety    Muscle aches    Sweating    Large (dilated) pupils    Watery eyes    Runny nose    Trouble sleeping    Nausea and vomiting    Abdominal cramping    Diarrhea    Fast heartbeat  Don t stop opioid  medicine without help from your healthcare provider. You will need a plan to safely stop taking it. This is to help manage withdrawal symptoms. In most cases, the amount of medicine you take will be cut down. You will take less and less over several weeks. If needed, you may need to take other medicines and treatments to help with this process. As the opioid medicine leaves your body, your body will adapt. Your withdrawal symptoms should then go away. How long this takes can vary for each person.  Date Last Reviewed: 8/1/2017 2000-2017 [employer]. [address] [place] [id number]. All rights reserved. This information is not intended as a substitute for professional medical care. Always follow your healthcare professional's instructions.          Treatment for Ovarian Cysts  An ovarian cyst is a fluid-filled sac that forms on or inside an ovary. The ovaries are a pair of small, oval-shaped organs in the lower part of a woman s belly (abdomen). About once a month, one of the ovaries releases an egg. The ovaries also make the hormones estrogen and progesterone. These hormones are part of pregnancy, the menstrual cycle, and breast growth.  Ovarian cysts are very common in women of all ages, however they are unusual in women who have reached menopause. Young girls can also get them, but this is less common. There are different types of ovarian cysts. They can occur for various reasons, and they may need different treatments. A cyst can vary in size from half an inch to more than 4 inches.  Types of treatment  Treatment for an ovarian cyst will depend on the type of cyst, your age, and your general health. Most women will not need treatment. You may be told to watch your symptoms over time. An ovarian cyst will often go away with no treatment in a few weeks or months.  In some cases, you may need to have follow-up ultrasound tests. These are to check if your cyst has gone away or is not  growing. You may not need any other treatment.  If your ultrasound or blood tests show signs of cancer, your healthcare provider may advise surgery. This is done to remove part or all of your ovary. Your healthcare provider might also advise surgery if:    Your cyst causes ongoing pressure or pain    Your cyst appears to be growing    You have a very large cyst    You have endometriosis and want the cyst removed to help with fertility  Can an ovarian cyst be prevented?  If you have hormone problems, your healthcare provider may advise taking birth control pills. These may help prevent ovarian cysts. Taking antibiotics for a pelvic infection may also prevent a cyst.  Possible complications of an ovarian cyst  An ovarian cyst can sometimes break open (rupture). This may not cause any symptoms. Or it may cause sudden, sharp pain in the lower belly. A ruptured cyst can cause a lot of blood and fluid loss. This can lead to low blood pressure. In some cases, surgery may be needed.  Rarely an ovarian cyst can also cause twisting (torsion) of the fallopian tube. This can block normal blood supply to the ovary. This can lead to sudden pain and may need emergency surgery.  When to call the healthcare provider  Call your healthcare provider right away if you have any of these:    Sudden belly pain    Other severe symptoms   Date Last Reviewed: 10/1/2017    2517-6381 [employer]. [address] [place] [address], [place] [address]. All rights reserved. This information is not intended as a substitute for professional medical care. Always follow your healthcare professional's instructions.

## 2018-10-06 NOTE — ED NOTES
Pt last dose of morphine was at 1700 tonight.  Pt states her pain was better prior to today then pain got worse.  Denies bleeding or vaginal discharge.  Denies fevers.
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
## 2018-10-07 PROBLEM — F11.10: Status: ACTIVE | Noted: 2018-10-07

## 2018-10-08 ENCOUNTER — PATIENT OUTREACH (OUTPATIENT)
Dept: CARE COORDINATION | Facility: CLINIC | Age: 33
End: 2018-10-08

## 2018-10-08 ASSESSMENT — ACTIVITIES OF DAILY LIVING (ADL): DEPENDENT_IADLS:: INDEPENDENT

## 2018-10-08 NOTE — LETTER
[place] CARE COORDINATION  [facility]  [address] [address]  [phone]    October 8, 2018    Sirena Dietrich  [address]  [facility] [address]      Dear Sirena,    I am a clinic care coordinator who works with DIPESH Marino CNP at [facility]. I wanted to thank you for spending the time talking with me.  I wanted to introduce myself and provide you with my contact information so that you can call me with questions or concerns about your health care. Below is a description of clinic care coordination and how I can further assist you.     The clinic care coordinator is a registered nurse and/or  who understand the health care system. The goal of clinic care coordination is to help you manage your health and improve access to the [facility] system in the most efficient manner. The registered nurse can assist you in meeting your health care goals by providing education, coordinating services, and strengthening the communication among your providers. The  can assist you with financial, behavioral, psychosocial, chemical dependency, counseling, and/or psychiatric resources.    Please feel free to contact me at [phone], with any questions or concerns. We at [facility] are focused on providing you with the highest-quality healthcare experience possible and that all starts with you.     Sincerely,     Harmony Clay    Enclosed: I have enclosed a copy of a 24 Hour Access Plan. This has helpful phone numbers for you to call when needed. Please keep this in an easy to access place to use as needed.

## 2018-10-08 NOTE — LETTER
Health Care Home - Access Care Plan    About Me  Patient Name:  Sirena Dietrich    YOB: 1985  Age:                             32 year old   Nikhil MRN:            [id number] Telephone Information:     Home Phone [phone]   Mobile [phone]       Address:    [address] [address] Email address:  [email or web address]      Emergency Contact(s)  Name Relationship Lgl Grd Work Phone Home Phone Mobile Phone   1. SHAILA KUHN Mother   [phone]    2. YAJAIRA GARDUNO* Friend   [phone]             Health Maintenance: Routine Health maintenance Reviewed: Up to date    My Access Plan  Medical Emergency 911   Questions or concerns during clinic hours Primary Clinic Line, I will call the clinic directly: [facility] - [phone]   24 Hour Appointment Line [phone] or  [phone]) (toll free)   24 Hour Nurse Line [phone] (toll free)   Questions or concerns outside clinic hours 24 Hour Appointment Line, I will call the after-hours on-call line:   [facility] [phone] or [phone]) (toll-free)   Preferred Urgent Care [facility] [phone]   Preferred Hospital [place], [place]  [phone]   Preferred Pharmacy [facility] [address]     Behavioral Health Crisis Line The National Suicide Prevention Lifeline at 1-846.745.3773 or 919     My Care Team Members  Patient Care Team       Relationship Specialty Notifications Start End    Shi Martinez APRN CNP PCP - General Family Practice  11/17/11     Phone: [phone] Fax: [phone]         [address] [place] [address]    Harmony Clay, RN Clinic Care Coordinator Primary Care - CC Admissions 10/7/18     Phone: [phone] Fax: [phone]               My Medical and Care Information  Problem List   Patient Active Problem List    Diagnosis     Lumbago     CARDIOVASCULAR SCREENING; LDL GOAL LESS THAN 160     Pelvic pain in female     Urinary incontinence     Migraine headache     Tortuous colon     PTSD (post-traumatic stress disorder)     Severe bipolar I disorder, most recent episode mixed, with psychotic features (H)     Agoraphobia with panic disorder     S/P hysterectomy     Insomnia due to other mental disorder     MTHFR mutation (methylenetetrahydrofolate reductase) (H)     Cyst of left ovary     Herpes simplex vulvovaginitis     Methadone use disorder, mild (H)      Current Medications and Allergies:  See printed Medication Report

## 2018-10-08 NOTE — PROGRESS NOTES
Clinic Care Coordination Contact    Clinic Care Coordination Contact  OUTREACH    Referral Information:  Referral Source: ED Follow-Up    Primary Diagnosis: Gynecological disorders    Chief Complaint   Patient presents with     ER F/U     Nurse: ER f/u 10/5-10/6        Universal Utilization: Pt has had 5 ED/Hospital visits in past 90 days;  Pt did trigger high for risk of readmission-ED/hospital utilization.  Pt sees the following providers/specialties outside of the [facility] network:  Puja Peguero at [facility]    Clinic Utilization  Difficulty keeping appointments:: Yes  Compliance Concerns: Yes  No-Show Concerns: Yes  No PCP office visit in Past Year: No  Utilization    Last refreshed: 10/7/2018  9:02 PM:  No Show Count (past year) 4       Last refreshed: 10/7/2018  9:02 PM:  ED visits 5       Last refreshed: 10/7/2018  9:02 PM:  Hospital admissions 0          Current as of: 10/7/2018  9:02 PM             Clinical Concerns:  Care Coordinator RN spoke with patient, she states she still having pain. She requested an appointment to see PCP tomorrow as she is about to run out of her pain medication, Care Coordinator RN was able to assist by getting her an appointment to see PCP tomorrow at 11:40. We discussed how with this sort of pain that sometimes things like Ibuprofen and heat make the pain better vs narcotic. Care Coordinator RN suggested talking with PCP for a prescription strength Ibuprofen, she verbalized understanding. She denies having any other questions or concerns.    Current Medical Concerns:    Patient Active Problem List   Diagnosis     Lumbago     CARDIOVASCULAR SCREENING; LDL GOAL LESS THAN 160     Pelvic pain in female     Urinary incontinence     Migraine headache     Tortuous colon     PTSD (post-traumatic stress disorder)     Severe bipolar I disorder, most recent episode mixed, with psychotic features (H)     Agoraphobia with panic disorder     S/P hysterectomy     Insomnia due to other mental  disorder     MTHFR mutation (methylenetetrahydrofolate reductase) (H)     Cyst of left ovary     Herpes simplex vulvovaginitis     Methadone use disorder, mild (H)         Current Behavioral Concerns: Not assessed at this time.      Education Provided to patient: RN CC educated about Care Coordination Services, discharge instructions, medications reviewed and follow up.      Pain  Pain (GOAL):: Yes  Type: Acute (<3mo)  Location of chronic pain:: Left lower abdominal   Progression: Unchanged  Alleviating Factors: Heat, Pain Medication, Rest  Aggravating Factors: Activity  Health Maintenance Reviewed: Up to date  Clinical Pathway: None    Medication Management:  Patient is independent in medication management.      Functional Status:  Dependent ADLs:: Independent  Dependent IADLs:: Independent  Bed or wheelchair confined:: No  Mobility Status: Independent  Fallen 2 or more times in the past year?: No  Any fall with injury in the past year?: No    Living Situation:  Current living arrangement:: I live in a private home with family    Diet/Exercise/Sleep:  Diet:: Regular  Inadequate nutrition (GOAL):: No  Food Insecurity: No  Tube Feeding: No  Exercise:: Currently not exercising  Inadequate activity/exercise (GOAL):: No  Significant changes in sleep pattern (GOAL): No    Transportation:  Transportation concerns (GOAL):: No  Transportation means:: Family, Friend, Regular car     Psychosocial:  Zoroastrianism or spiritual beliefs that impact treatment:: No  Mental health DX:: Yes  Mental health DX how managed:: Medication, Outpatient Counseling, Psychiatrist, Mental Health Targeted Care Manager  Mental health management concern (GOAL):: No  Informal Support system:: Children, Family, Friends, Parent     Financial/Insurance:   Financial/Insurance concerns (GOAL):: No  Not assessed at this time.     Resources and Interventions:  Current Resources: RN will mail out a letter to pt's home with RN CC contact information, explanation  of CC services and patient care plan.     Community Resources: [facility], [facility], [facility]  Supplies used at home:: None  Equipment Currently Used at Home: none    Advance Care Plan/Directive  Advanced Care Plans/Directives on file:: No    Referrals Placed: None     Future Appointments              Tomorrow Shi Martinez APRN [facility]          Plan:   Patient will continue to follow treatment plan as directed and follow up with PCP with concerns ongoing.   Care Coordinator RN to f/u after OV tomorrow.    Harmony Clay RN, [facility]  RN Care Coordinator  [facility]  Phone # [phone]  10/8/2018 11:08 AM

## 2018-10-09 ENCOUNTER — TELEPHONE (OUTPATIENT)
Dept: OBGYN | Facility: CLINIC | Age: 33
End: 2018-10-09

## 2018-10-09 ENCOUNTER — OFFICE VISIT (OUTPATIENT)
Dept: FAMILY MEDICINE | Facility: CLINIC | Age: 33
End: 2018-10-09
Payer: COMMERCIAL

## 2018-10-09 ENCOUNTER — PATIENT OUTREACH (OUTPATIENT)
Dept: CARE COORDINATION | Facility: CLINIC | Age: 33
End: 2018-10-09

## 2018-10-09 VITALS
DIASTOLIC BLOOD PRESSURE: 68 MMHG | WEIGHT: 163 LBS | TEMPERATURE: 98.7 F | BODY MASS INDEX: 26.31 KG/M2 | SYSTOLIC BLOOD PRESSURE: 122 MMHG | HEART RATE: 90 BPM

## 2018-10-09 DIAGNOSIS — N83.291 COMPLEX CYST OF RIGHT OVARY: Primary | ICD-10-CM

## 2018-10-09 DIAGNOSIS — R11.0 NAUSEA: ICD-10-CM

## 2018-10-09 PROCEDURE — 99214 OFFICE O/P EST MOD 30 MIN: CPT | Performed by: NURSE PRACTITIONER

## 2018-10-09 RX ORDER — PROCHLORPERAZINE MALEATE 10 MG
10 TABLET ORAL EVERY 6 HOURS PRN
Qty: 30 TABLET | Refills: 0 | Status: SHIPPED | OUTPATIENT
Start: 2018-10-09 | End: 2019-09-03

## 2018-10-09 RX ORDER — MORPHINE SULFATE 15 MG/1
15 TABLET ORAL 3 TIMES DAILY PRN
Qty: 21 TABLET | Refills: 0 | Status: SHIPPED | OUTPATIENT
Start: 2018-10-09 | End: 2018-10-16

## 2018-10-09 ASSESSMENT — ACTIVITIES OF DAILY LIVING (ADL): DEPENDENT_IADLS:: INDEPENDENT

## 2018-10-09 ASSESSMENT — ANXIETY QUESTIONNAIRES
2. NOT BEING ABLE TO STOP OR CONTROL WORRYING: NEARLY EVERY DAY
6. BECOMING EASILY ANNOYED OR IRRITABLE: NEARLY EVERY DAY
3. WORRYING TOO MUCH ABOUT DIFFERENT THINGS: MORE THAN HALF THE DAYS
GAD7 TOTAL SCORE: 14
7. FEELING AFRAID AS IF SOMETHING AWFUL MIGHT HAPPEN: SEVERAL DAYS
4. TROUBLE RELAXING: NEARLY EVERY DAY
GAD7 TOTAL SCORE: 14
GAD7 TOTAL SCORE: 14
7. FEELING AFRAID AS IF SOMETHING AWFUL MIGHT HAPPEN: SEVERAL DAYS
1. FEELING NERVOUS, ANXIOUS, OR ON EDGE: MORE THAN HALF THE DAYS
5. BEING SO RESTLESS THAT IT IS HARD TO SIT STILL: NOT AT ALL

## 2018-10-09 ASSESSMENT — PATIENT HEALTH QUESTIONNAIRE - PHQ9
SUM OF ALL RESPONSES TO PHQ QUESTIONS 1-9: 15
10. IF YOU CHECKED OFF ANY PROBLEMS, HOW DIFFICULT HAVE THESE PROBLEMS MADE IT FOR YOU TO DO YOUR WORK, TAKE CARE OF THINGS AT HOME, OR GET ALONG WITH OTHER PEOPLE: VERY DIFFICULT
SUM OF ALL RESPONSES TO PHQ QUESTIONS 1-9: 15

## 2018-10-09 ASSESSMENT — PAIN SCALES - GENERAL: PAINLEVEL: WORST PAIN (10)

## 2018-10-09 NOTE — PROGRESS NOTES
Clinic Care Coordination Contact    Situation: Patient chart reviewed by care coordinator.    Background: ER f/u with PCP     Assessment: Per chart review,  Patient attended her OV with her PCP for her getting more pain pills. Care Coordinator RN discussed with PCP about this OV, she states patient has received 103 tablets of Morphine 15mg IR since initial ER 9/16/18. PCP states this is not ok and needs to cut back. She was also referred to see Dr. Carrera regarding her pain.    Plan/Recommendations: No further Care Coordination needs identified at this time. Patient may be referred to Care Coordination in the future if additional needs arise.  Pt encouraged to contact Care Coordinator through the clinic if situation changes and assistance is needed. No follow-up planned.      Harmony Clay RN, [facility]  RN Care Coordinator  [facility]  Phone # [phone]  10/9/2018 2:39 PM

## 2018-10-09 NOTE — TELEPHONE ENCOUNTER
Patient saw FP today in follow up from the ER.    Will send to Dr. Luis (as patient is requesting him) to review ER visit/follow-up and imaging.  Patient would like to see Dr. Luis for surgical consult for right ovarian cyst vs follicle. Could this be scheduled on an on-call day?    Please advise.  Thank you.    Rocio Nicholson   Ob/Gyn Clinic  RN

## 2018-10-09 NOTE — TELEPHONE ENCOUNTER
Im okay with seeing her for a consultation, but I would not recommend surgery for a ovarian follicle that would likely resolve on its own. I would recommend her going on birth control as was discussed at the time of her surgery to prevent against future recurrence of ovarian follicles/cysts.     Debi Luis MD  [facility]

## 2018-10-09 NOTE — TELEPHONE ENCOUNTER
"Reason for call:  Patient reporting a symptom    Symptom or request: surgery 9-21-18.  States she has cysts on the right side and she is in a lot of pain -   Was in the ER on Friday.  Now wants to get these removed \"I am not going to stay in this pain\"  No openings with  Dr. Carrera in clinic until 10-19-18.    Duration (how long have symptoms been present):     Have you been treated for this before? Yes - cyst on left side    Additional comments: states this right cyst feels bigger than the left one that was removed.    Phone Number patient can be reached at:  Home number on file [phone] (home)    Best Time:  any    Can we leave a detailed message on this number:  YES    Call taken on 10/9/2018 at 1:17 PM by Fariba Law    "

## 2018-10-09 NOTE — NURSING NOTE
"Chief Complaint   Patient presents with     ER F/U       Initial LMP  (LMP Unknown) Estimated body mass index is 26.95 kg/(m^2) as calculated from the following:    Height as of 9/21/18: 5' 6\" (1.676 m).    Weight as of 9/27/18: 167 lb (75.8 kg).    Patient presents to the clinic using No DME    Health Maintenance that is potentially due pending provider review:  NONE    n/a    Is there anyone who you would like to be able to receive your results? No  If yes have patient fill out JOSÉ    "

## 2018-10-09 NOTE — PROGRESS NOTES
3  SUBJECTIVE:   Sirena Dietrich is a 32 year old female who presents to clinic today for the following health issues:      ED/UC Followup:    Facility:  [facility]   Date of visit: 10/05/2018  Reason for visit: post op pain- pelvic pain   Current Status: 9/21/2018 SURGERY. A LOT OF PAIN TODAY ON THE RIGHT LOWER ABDOMEN.    WANTS MED REFILLS   CT Abdomen Pelvis w Contrast     Narrative     CT ABDOMEN PELVIS W CONTRAST  10/5/2018 10:57 PM      HISTORY: Abdominal pain; lower abdomen status post left oophorectomy.     TECHNIQUE: Volumetric acquisition through abdomen and pelvis with IV  contrast. 100 mL Isovue-370. Radiation dose for this scan was reduced  using automated exposure control, adjustment of the mA and/or kV  according to patient size, or iterative reconstruction technique.     COMPARISON: 11/12/2017.     FINDINGS: The liver, gallbladder, spleen, pancreas, adrenal glands and  kidneys demonstrate no worrisome findings. Gallbladder is slightly  contracted.     3.2 cm right adnexal cyst, likely an ovarian cyst or follicle. Trace  pelvic fluid. Pelvic structures are otherwise negative. No bowel  obstruction or significant ascites. No focal inflammatory changes or  free air.     Impression     IMPRESSION:  1. 3.2 cm right adnexal cyst and trace free fluid.  2. No other cause of pain identified.     BENY FUCHS MD         -------------------------------------    Problem list and histories reviewed & adjusted, as indicated.  Additional history: as documented    BP Readings from Last 3 Encounters:   10/09/18 122/68   10/05/18 124/72   09/27/18 116/72    Wt Readings from Last 3 Encounters:   10/09/18 163 lb (73.9 kg)   09/27/18 167 lb (75.8 kg)   09/21/18 160 lb (72.6 kg)                  Labs reviewed in EPIC    Reviewed and updated as needed this visit by clinical staff  Allergies  Meds       Reviewed and updated as needed this visit by Provider         ROS:   ROS: 10 point ROS neg other than the symptoms noted  above in the HPI.      OBJECTIVE:                                                    /68  Pulse 90  Temp 98.7  F (37.1  C) (Tympanic)  Wt 163 lb (73.9 kg)  LMP  (LMP Unknown)  BMI 26.31 kg/m2  Body mass index is 26.31 kg/(m^2).   GENERAL: healthy, alert, well nourished, well hydrated, no distress  HENT: ear canals- normal; TMs- normal; Nose- normal; Mouth- no ulcers, no lesions  NECK: no tenderness, no adenopathy, no asymmetry, no masses, no stiffness; thyroid- normal to palpation  RESP: lungs clear to auscultation - no rales, no rhonchi, no wheezes  CV: regular rates and rhythm, normal S1 S2, no S3 or S4 and no murmur, no click or rub -  ABDOMEN: soft,  SEVERE RLQ tenderness, no  hepatosplenomegaly, no masses, normal bowel sounds  SKIN incisions clean and dry no redness    Diagnostic test results:  none      ASSESSMENT/PLAN:                                                    ASSESSMENT / PLAN:  (N83.231) Complex cyst of right ovary  (primary encounter diagnosis)  Comment: new since surgery   Plan: morphine (MSIR) 15 MG IR tablet up to 3 times daily for severe pain not relieved by more conservative measures. ES Tylenol up to 3500 mg daily.  Long discussion today with Sirena about opiates.  I think it is reasonable for her to assist her coming back on her opiate pain medications.  I have asked her to follow-up with GYN to discuss her new right ovarian cyst and her severe onset  Right-sided pelvic pain status post left oophorectomy    (R11.0) Nausea  Comment: Not able to get her Zofran filled yet. To early due to restrictions from her insurance company. the pain medications make her nauseated   Plan: Refill prochlorperazine (COMPAZINE) 10 MG tablet            Constipation prevention strategies are reviewed.    Follow up with Provider - Call or return to the clinic with any worsening of symptoms or no resolution. Patient/Parent verbalized understanding and is in agreement. Medication side effects reviewed.    Current Outpatient Prescriptions   Medication Sig Dispense Refill     acetaminophen (TYLENOL) 500 MG tablet Take 2 tablets (1,000 mg) by mouth every 4 hours as needed for other (mild pain) 100 tablet 1     gabapentin (NEURONTIN) 600 MG tablet Take 2 tablets (1,200 mg) by mouth At Bedtime (Patient taking differently: Take 600 mg by mouth 2 times daily ) 60 tablet 0     ibuprofen (ADVIL/MOTRIN) 800 MG tablet Take 1 tablet (800 mg) by mouth every 6 hours as needed for pain (mild) 90 tablet 1     lamoTRIgine (LAMICTAL) 200 MG tablet Take 1 tablet (200 mg) by mouth daily 30 tablet 1     morphine (MSIR) 15 MG IR tablet Take 1 tablet (15 mg) by mouth 3 times daily as needed for severe pain 21 tablet 0     ondansetron (ZOFRAN-ODT) 4 MG ODT tab Take 1-2 tablets (4-8 mg) by mouth every 8 hours as needed for nausea Dissolve ON the tongue. 20 tablet 1     prochlorperazine (COMPAZINE) 10 MG tablet Take 1 tablet (10 mg) by mouth every 6 hours as needed for nausea or vomiting 30 tablet 0     QUEtiapine (SEROQUEL) 25 MG tablet Take 1 tablet (25 mg) by mouth 2 times daily 60 tablet 0     QUEtiapine (SEROQUEL) 50 MG tablet Take 1 tablet (50 mg) by mouth 3 times daily 90 tablet 0     senna-docusate (SENOKOT-S;PERICOLACE) 8.6-50 MG per tablet Take 1-2 tablets by mouth 2 times daily Take while on oral narcotics to prevent or treat constipation. 60 tablet 0     valACYclovir (VALTREX) 500 MG tablet Take 1 tablet (500 mg) by mouth 2 times daily X 3 days.  1 tablet daily for suppression 30 tablet 1        See Patient Instructions    DIPESH [facility]

## 2018-10-09 NOTE — MR AVS SNAPSHOT
After Visit Summary   10/9/2018    Sirena Dietrich    MRN: [id number]           Patient Information     Date Of Birth          1985        Visit Information        Provider Department      10/9/2018 11:40 AM Shi Martinez APRN [facility]        Today's Diagnoses     Complex cyst of right ovary    -  1    Nausea          Care Instructions      Managing Post-Op Pain at Home  Pain is expected after surgery. Know that you have a right to have this pain controlled. Managing pain helps you recover faster. Less pain means you can be active sooner. It also means less stress on the body and mind, which will help your body heal. When you go home after surgery, you will take charge of your pain management.  What is post-op pain?  Pain after surgery (post-op pain) is normal. How much pain you feel depends on the surgery. Your use of pain medicines and your sensitivity to pain are also factors. Each person feels pain differently. So try not to compare your pain with someone else s. Your healthcare team will need to know how you are feeling. Be honest. If you are in pain, say so.  Measuring your pain  A pain scale helps you rate pain intensity. In the scale, 0 means no pain, and 10 is the worst pain possible. Pain scales are not used to compare your pain with another person's pain. A pain scale is used only to measure how your pain changes for you. You should rate your pain every few hours. You may feel some pain even with medicines. It is important to tell your healthcare provider if medicines don't reduce the pain. Be sure to mention if the pain suddenly increases or changes.     Your recovery  Your first hours at home, you may feel groggy or tired from the medicines given during surgery. As pain management methods used during surgery wear off, pain may increase. So be sure not to skip a dose of prescribed medicine. In fact, set an alarm or have someone remind you when it s  time to take your medicine. During this time, try to rest, even if you feel pretty good. Within the first 24 hours, a nurse or other healthcare provider is likely to call and ask how you re doing.   Medicines for pain  Medicines can help to block pain, limit swelling and control related problems. You may be given more than one medicine to treat your pain. Medicines may be changed as you feel better, or if they cause side effects.   Pain medicine can be given in several ways: by injection, by mouth, as a patch, or as a suppository.  Medicines What they do Possible side effects   Non-steroidal anti-inflammatory drugs (NSAIDs) Reduce mild to moderate pain. They also help reduce swelling. Nausea, stomach and digestive problems, and kidney and liver problems. Certain NSAIDs may increase the risk for cardiovascular disease in some people.   Opioids (morphine and similar medicines often called narcotics) Reduce moderate to severe pain Nausea, vomiting, itching, drowsiness, constipation, slowed or shallow breathing   Other pain relievers (analgesics) Reduce mild to severe pain Constipation, nausea, dizziness, drowsiness, kidney and liver problems   Anti-vomiting medicine (antiemetics) Manage nausea Dizziness, drowsiness, muscle spasms   Seizure medicines (anticonvulsants) Manage nerve-related pain Drowsiness, dizziness, liver problems   Medicines for depression (antidepressants) Manage chronic pain Dry mouth, drowsiness, dizziness, constipation   Non-medicine pain relief  Medicines are not the only way to manage pain after surgery.   You can use ice to help reduce swelling and pain. Use a cold pack or bag of ice cubes wrapped in a thin cloth. Never put ice directly on your skin. Use the ice for up to 20 minutes at a time every 3 to 4 hours.   You can also reduce swelling and pain by keeping the operated area above the level of your heart if you can. This helps blood and other fluids drain from the area.   You can also use  relaxation to reduce pain. Try these techniques:    Visualization or guided imagery. You picture yourself in a quiet, peaceful place to help take your mind off the pain.    Progressive body relaxation. Starting at your feet, you clench and release your muscles. Work up your body slowly until you reach your neck and face.    Deep breathing. Breathe in deeply and hold your breath for a few seconds. Then exhale slowly to help relax your body.   Tips for controlling pain    Give pain medicine time to work. Most pain relievers taken by mouth need at least 20 to 30 minutes to take effect. They may not reach their maximum effect for close to an hour.     Take pain medicine at regular times as directed. Don t wait until the pain gets bad to take it.    Get plenty of rest. Taking your medicine at night may help you get a good night s rest.    If pain lessens, try taking your medicine less often or in smaller doses.  Safety tips for taking pain medicines    Ask your pharmacist if you need to take the medicine with food or milk to avoid an upset stomach.    Don t break, crush, or cut in half any long-acting medicines. This could be harmful.    Don t take more medicine than directed. If your pain isn t relieved, call your healthcare provider.    Try to time your medicine so that you take it before starting an activity, such as dressing or sitting at the table for dinner.    Constipation is a common side effect with some pain medicines. Eating fruit, vegetables, and other foods high in fiber can help. Also drink plenty of fluids.    Don t drink alcohol while you are taking pain medicine. This can make you dizzy and slow your breathing. It can even be fatal.    Don t drive if you are taking opioid medicines.    Don t take opioids in combination with benzodiazepines. Doing so can cause serious health problems. These include extreme sleepiness, slowed breathing, and death. Let your healthcare provider know if you are taking  benzodiazepines.     When to call your healthcare provider  Call your healthcare provider right away if:    Your pain is not relieved or if it gets worse    You can't take your pain medicines as prescribed    You have severe side effects like breathing problems, trouble waking up, dizziness, confusion, or severe constipation   Date Last Reviewed: 12/1/2017 2000-2017 [employer]. [address] [place] [address]. All rights reserved. This information is not intended as a substitute for professional medical care. Always follow your healthcare professional's instructions.        Opioid Withdrawal  Opioid withdrawal occurs in people who have used opioids on a daily basis for at least 3 weeks. Symptoms usually start about 12 hours after the last dose of the opioid. Withdrawal symptoms last from 3 to 5 days and may include yawning, sweating, runny nose, restlessness, stomach cramping, diarrhea, nausea, vomiting, hot and cold flashes, and trouble sleeping.  Home care  The treatment for withdrawal is mostly managing the symptoms without making the problem worse. Follow these guidelines when caring for yourself at home:    Stay with someone who can help you and give you emotional support during this time. Resist the temptation to take more of the addicting drug to stop your symptoms.    If you have stomach cramps, nausea, or vomiting, take only clear liquids until the symptoms improve. Adults should drink a total of 2 to 3 quarts of liquid daily. It is best to take small frequent drinks rather than a few large ones. You may consume liquids in any of the following forms: mineral water, apple juice, sports drinks, soft drinks without caffeine, clear broth soups, plain gelatin, and ice pops.    Don't use alcohol, caffeine, or tobacco during this time.    Take any medicines prescribed for nausea or cramping exactly as directed.    If you were given a clonidine patch, leave this on for 7 days. You may  remove it sooner if you develop excess dizziness or drowsiness.  Follow-up care  Follow up with your healthcare provider if you need further symptom control, or as advised. When you are through withdrawal, take the opportunity to enter a treatment program. This may help you stay off the addicting drug.  When to seek medical advice  Call your healthcare provider right away if any of these occur:    Fever of 100.4 F (38 C) or higher, or as directed by your healthcare provider    Inability to keep down liquids for 8 hours    Frequent diarrhea    Signs of infection at the site of IV needle use (redness, warmth, pain, or swelling)  Call 911  Call 911 if any of these occur:    Trouble breathing or swallowing, or wheezing    Severe confusion    Extreme drowsiness or trouble awakening    Fainting or loss of consciousness    Rapid heart rate or very slow heart rate    Very low or very high blood pressure    Vomiting blood, or large amounts of blood in stool    Seizure  Date Last Reviewed: 3/1/2017    7943-4387 [employer]. [address] [place] [address]. All rights reserved. This information is not intended as a substitute for professional medical care. Always follow your healthcare professional's instructions.                Follow-ups after your visit        Your next 10 appointments already scheduled     Oct 18, 2018 11:15 AM CDT   Office Visit with Debi Luis MD   [facility] ([facility]    [address] [place] [id number] [phone]           Bring a current list of meds and any records pertaining to this visit. For Physicals, please bring immunization records and any forms needing to be filled out. Please arrive 10 minutes early to complete paperwork.              Who to contact     If you have questions or need follow up information about today's clinic visit or your schedule please contact [facility] directly at  [phone].  Normal or non-critical lab and imaging results will be communicated to you by [employer], letter or phone within 4 business days after the clinic has received the results. If you do not hear from us within 7 days, please contact the clinic through [employer] or phone. If you have a critical or abnormal lab result, we will notify you by phone as soon as possible.  Submit refill requests through [employer] or call your pharmacy and they will forward the refill request to us. Please allow 3 business days for your refill to be completed.          Additional Information About Your Visit        [employer] Information     [employer] gives you secure access to your electronic health record. If you see a primary care provider, you can also send messages to your care team and make appointments. If you have questions, please call your primary care clinic.  If you do not have a primary care provider, please call [phone] and they will assist you.        Care EveryWhere ID     This is your Care EveryWhere ID. This could be used by other organizations to access your [facility] medical records  [id number]        Your Vitals Were     Pulse Temperature Last Period BMI (Body Mass Index)          90 98.7  F (37.1  C) (Tympanic) (LMP Unknown) 26.31 kg/m2         Blood Pressure from Last 3 Encounters:   10/09/18 122/68   10/05/18 124/72   09/27/18 116/72    Weight from Last 3 Encounters:   10/09/18 163 lb (73.9 kg)   09/27/18 167 lb (75.8 kg)   09/21/18 160 lb (72.6 kg)              Today, you had the following     No orders found for display         Today's Medication Changes          These changes are accurate as of 10/9/18  8:54 PM.  If you have any questions, ask your nurse or doctor.               These medicines have changed or have updated prescriptions.        Dose/Directions    gabapentin 600 MG tablet   Commonly known as:  NEURONTIN   This may have changed:    - how much to take  - when to take this   Used for:  Severe  bipolar I disorder, most recent episode mixed, with psychotic features (H)        Dose:  1200 mg   Take 2 tablets (1,200 mg) by mouth At Bedtime   Quantity:  60 tablet   Refills:  0       morphine 15 MG IR tablet   Commonly known as:  MSIR   This may have changed:  when to take this   Used for:  Complex cyst of right ovary   Changed by:  Shi Martinez APRN CNP        Dose:  15 mg   Take 1 tablet (15 mg) by mouth 3 times daily as needed for severe pain   Quantity:  21 tablet   Refills:  0            Where to get your medicines      These medications were sent to [id number] - [place], [place] - [address] [facility]  [address] [address]    Hours:  Closed 10-16-08 business to [facility] Phone:  [phone]     prochlorperazine 10 MG tablet         Some of these will need a paper prescription and others can be bought over the counter.  Ask your nurse if you have questions.     Bring a paper prescription for each of these medications     morphine 15 MG IR tablet               Information about OPIOIDS     PRESCRIPTION OPIOIDS: WHAT YOU NEED TO KNOW   We gave you an opioid (narcotic) pain medicine. It is important to manage your pain, but opioids are not always the best choice. You should first try all the other options your care team gave you. Take this medicine for as short a time (and as few doses) as possible.    Some activities can increase your pain, such as bandage changes or therapy sessions. It may help to take your pain medicine 30 to 60 minutes before these activities. Reduce your stress by getting enough sleep, working on hobbies you enjoy and practicing relaxation or meditation. Talk to your care team about ways to manage your pain beyond prescription opioids.    These medicines have risks:    DO NOT drive when on new or higher doses of pain medicine. These medicines can affect your alertness and reaction times, and you could be arrested for driving under the  influence (DUI). If you need to use opioids long-term, talk to your care team about driving.    DO NOT operate heavy machinery    DO NOT do any other dangerous activities while taking these medicines.    DO NOT drink any alcohol while taking these medicines.     If the opioid prescribed includes acetaminophen, DO NOT take with any other medicines that contain acetaminophen. Read all labels carefully. Look for the word  acetaminophen  or  Tylenol.  Ask your pharmacist if you have questions or are unsure.    You can get addicted to pain medicines, especially if you have a history of addiction (chemical, alcohol or substance dependence). Talk to your care team about ways to reduce this risk.    All opioids tend to cause constipation. Drink plenty of water and eat foods that have a lot of fiber, such as fruits, vegetables, prune juice, apple juice and high-fiber cereal. Take a laxative (Miralax, milk of magnesia, Colace, Senna) if you don t move your bowels at least every other day. Other side effects include upset stomach, sleepiness, dizziness, throwing up, tolerance (needing more of the medicine to have the same effect), physical dependence and slowed breathing.    Store your pills in a secure place, locked if possible. We will not replace any lost or stolen medicine. If you don t finish your medicine, please throw away (dispose) as directed by your pharmacist. The [place] [employer] has more information about safe disposal: [email or web address]://[email or web address].[email or web address]-medications         Primary Care Provider Office Phone # Fax #    DIPESH Marino [facility] [phone]       [address] [place] [address]        Equal Access to Services     SAMRA KAPLAN : Ian Nazario, walisa douglas, qaybta karaheem ivory. So [facility] [phone].    ATENCIÓN: Si habla español, tiene a borja disposición servicios  clint de asistencia lingüística. Andres guillen [phone].    We comply with applicable federal civil rights laws and [place] laws. We do not discriminate on the basis of race, color, national origin, age, disability, sex, sexual orientation, or gender identity.            Thank you!     Thank you for choosing [facility]  for your care. Our goal is always to provide you with excellent care. Hearing back from our patients is one way we can continue to improve our services. Please take a few minutes to complete the written survey that you may receive in the mail after your visit with us. Thank you!             Your Updated Medication List - Protect others around you: Learn how to safely use, store and throw away your medicines at www.disposemymeds.org.          This list is accurate as of 10/9/18  8:54 PM.  Always use your most recent med list.                   Brand Name Dispense Instructions for use Diagnosis    acetaminophen 500 MG tablet    TYLENOL    100 tablet    Take 2 tablets (1,000 mg) by mouth every 4 hours as needed for other (mild pain)    S/P left oophorectomy       gabapentin 600 MG tablet    NEURONTIN    60 tablet    Take 2 tablets (1,200 mg) by mouth At Bedtime    Severe bipolar I disorder, most recent episode mixed, with psychotic features (H)       ibuprofen 800 MG tablet    ADVIL/MOTRIN    90 tablet    Take 1 tablet (800 mg) by mouth every 6 hours as needed for pain (mild)    S/P left oophorectomy       lamoTRIgine 200 MG tablet    LaMICtal    30 tablet    Take 1 tablet (200 mg) by mouth daily    Severe bipolar I disorder, most recent episode mixed, with psychotic features (H)       morphine 15 MG IR tablet    MSIR    21 tablet    Take 1 tablet (15 mg) by mouth 3 times daily as needed for severe pain    Complex cyst of right ovary       ondansetron 4 MG ODT tab    ZOFRAN-ODT    20 tablet    Take 1-2 tablets (4-8 mg) by mouth every 8 hours as needed for nausea Dissolve ON the  tongue.    S/P left oophorectomy       prochlorperazine 10 MG tablet    COMPAZINE    30 tablet    Take 1 tablet (10 mg) by mouth every 6 hours as needed for nausea or vomiting    Nausea       * QUEtiapine 50 MG tablet    SEROQUEL    90 tablet    Take 1 tablet (50 mg) by mouth 3 times daily    Severe bipolar I disorder, most recent episode mixed, with psychotic features (H)       * QUEtiapine 25 MG tablet    SEROquel    60 tablet    Take 1 tablet (25 mg) by mouth 2 times daily    Severe bipolar I disorder, most recent episode mixed, with psychotic features (H)       senna-docusate 8.6-50 MG per tablet    SENOKOT-S;PERICOLACE    60 tablet    Take 1-2 tablets by mouth 2 times daily Take while on oral narcotics to prevent or treat constipation.    S/P left oophorectomy       valACYclovir 500 MG tablet    VALTREX    30 tablet    Take 1 tablet (500 mg) by mouth 2 times daily X 3 days.  1 tablet daily for suppression    Herpes simplex vulvovaginitis       * Notice:  This list has 2 medication(s) that are the same as other medications prescribed for you. Read the directions carefully, and ask your doctor or other care provider to review them with you.

## 2018-10-10 ASSESSMENT — PATIENT HEALTH QUESTIONNAIRE - PHQ9: SUM OF ALL RESPONSES TO PHQ QUESTIONS 1-9: 15

## 2018-10-10 ASSESSMENT — ANXIETY QUESTIONNAIRES: GAD7 TOTAL SCORE: 14

## 2018-10-10 NOTE — PATIENT INSTRUCTIONS
Managing Post-Op Pain at Home  Pain is expected after surgery. Know that you have a right to have this pain controlled. Managing pain helps you recover faster. Less pain means you can be active sooner. It also means less stress on the body and mind, which will help your body heal. When you go home after surgery, you will take charge of your pain management.  What is post-op pain?  Pain after surgery (post-op pain) is normal. How much pain you feel depends on the surgery. Your use of pain medicines and your sensitivity to pain are also factors. Each person feels pain differently. So try not to compare your pain with someone else s. Your healthcare team will need to know how you are feeling. Be honest. If you are in pain, say so.  Measuring your pain  A pain scale helps you rate pain intensity. In the scale, 0 means no pain, and 10 is the worst pain possible. Pain scales are not used to compare your pain with another person's pain. A pain scale is used only to measure how your pain changes for you. You should rate your pain every few hours. You may feel some pain even with medicines. It is important to tell your healthcare provider if medicines don't reduce the pain. Be sure to mention if the pain suddenly increases or changes.     Your recovery  Your first hours at home, you may feel groggy or tired from the medicines given during surgery. As pain management methods used during surgery wear off, pain may increase. So be sure not to skip a dose of prescribed medicine. In fact, set an alarm or have someone remind you when it s time to take your medicine. During this time, try to rest, even if you feel pretty good. Within the first 24 hours, a nurse or other healthcare provider is likely to call and ask how you re doing.   Medicines for pain  Medicines can help to block pain, limit swelling and control related problems. You may be given more than one medicine to treat your pain. Medicines may be changed as you feel  better, or if they cause side effects.   Pain medicine can be given in several ways: by injection, by mouth, as a patch, or as a suppository.  Medicines What they do Possible side effects   Non-steroidal anti-inflammatory drugs (NSAIDs) Reduce mild to moderate pain. They also help reduce swelling. Nausea, stomach and digestive problems, and kidney and liver problems. Certain NSAIDs may increase the risk for cardiovascular disease in some people.   Opioids (morphine and similar medicines often called narcotics) Reduce moderate to severe pain Nausea, vomiting, itching, drowsiness, constipation, slowed or shallow breathing   Other pain relievers (analgesics) Reduce mild to severe pain Constipation, nausea, dizziness, drowsiness, kidney and liver problems   Anti-vomiting medicine (antiemetics) Manage nausea Dizziness, drowsiness, muscle spasms   Seizure medicines (anticonvulsants) Manage nerve-related pain Drowsiness, dizziness, liver problems   Medicines for depression (antidepressants) Manage chronic pain Dry mouth, drowsiness, dizziness, constipation   Non-medicine pain relief  Medicines are not the only way to manage pain after surgery.   You can use ice to help reduce swelling and pain. Use a cold pack or bag of ice cubes wrapped in a thin cloth. Never put ice directly on your skin. Use the ice for up to 20 minutes at a time every 3 to 4 hours.   You can also reduce swelling and pain by keeping the operated area above the level of your heart if you can. This helps blood and other fluids drain from the area.   You can also use relaxation to reduce pain. Try these techniques:    Visualization or guided imagery. You picture yourself in a quiet, peaceful place to help take your mind off the pain.    Progressive body relaxation. Starting at your feet, you clench and release your muscles. Work up your body slowly until you reach your neck and face.    Deep breathing. Breathe in deeply and hold your breath for a few  seconds. Then exhale slowly to help relax your body.   Tips for controlling pain    Give pain medicine time to work. Most pain relievers taken by mouth need at least 20 to 30 minutes to take effect. They may not reach their maximum effect for close to an hour.     Take pain medicine at regular times as directed. Don t wait until the pain gets bad to take it.    Get plenty of rest. Taking your medicine at night may help you get a good night s rest.    If pain lessens, try taking your medicine less often or in smaller doses.  Safety tips for taking pain medicines    Ask your pharmacist if you need to take the medicine with food or milk to avoid an upset stomach.    Don t break, crush, or cut in half any long-acting medicines. This could be harmful.    Don t take more medicine than directed. If your pain isn t relieved, call your healthcare provider.    Try to time your medicine so that you take it before starting an activity, such as dressing or sitting at the table for dinner.    Constipation is a common side effect with some pain medicines. Eating fruit, vegetables, and other foods high in fiber can help. Also drink plenty of fluids.    Don t drink alcohol while you are taking pain medicine. This can make you dizzy and slow your breathing. It can even be fatal.    Don t drive if you are taking opioid medicines.    Don t take opioids in combination with benzodiazepines. Doing so can cause serious health problems. These include extreme sleepiness, slowed breathing, and death. Let your healthcare provider know if you are taking benzodiazepines.     When to call your healthcare provider  Call your healthcare provider right away if:    Your pain is not relieved or if it gets worse    You can't take your pain medicines as prescribed    You have severe side effects like breathing problems, trouble waking up, dizziness, confusion, or severe constipation   Date Last Reviewed: 12/1/2017 2000-2017 [employer]  [address] [place] [address]. All rights reserved. This information is not intended as a substitute for professional medical care. Always follow your healthcare professional's instructions.        Opioid Withdrawal  Opioid withdrawal occurs in people who have used opioids on a daily basis for at least 3 weeks. Symptoms usually start about 12 hours after the last dose of the opioid. Withdrawal symptoms last from 3 to 5 days and may include yawning, sweating, runny nose, restlessness, stomach cramping, diarrhea, nausea, vomiting, hot and cold flashes, and trouble sleeping.  Home care  The treatment for withdrawal is mostly managing the symptoms without making the problem worse. Follow these guidelines when caring for yourself at home:    Stay with someone who can help you and give you emotional support during this time. Resist the temptation to take more of the addicting drug to stop your symptoms.    If you have stomach cramps, nausea, or vomiting, take only clear liquids until the symptoms improve. Adults should drink a total of 2 to 3 quarts of liquid daily. It is best to take small frequent drinks rather than a few large ones. You may consume liquids in any of the following forms: mineral water, apple juice, sports drinks, soft drinks without caffeine, clear broth soups, plain gelatin, and ice pops.    Don't use alcohol, caffeine, or tobacco during this time.    Take any medicines prescribed for nausea or cramping exactly as directed.    If you were given a clonidine patch, leave this on for 7 days. You may remove it sooner if you develop excess dizziness or drowsiness.  Follow-up care  Follow up with your healthcare provider if you need further symptom control, or as advised. When you are through withdrawal, take the opportunity to enter a treatment program. This may help you stay off the addicting drug.  When to seek medical advice  Call your healthcare provider right away if any of these  occur:    Fever of 100.4 F (38 C) or higher, or as directed by your healthcare provider    Inability to keep down liquids for 8 hours    Frequent diarrhea    Signs of infection at the site of IV needle use (redness, warmth, pain, or swelling)  Call 911  Call 911 if any of these occur:    Trouble breathing or swallowing, or wheezing    Severe confusion    Extreme drowsiness or trouble awakening    Fainting or loss of consciousness    Rapid heart rate or very slow heart rate    Very low or very high blood pressure    Vomiting blood, or large amounts of blood in stool    Seizure  Date Last Reviewed: 3/1/2017    8239-4344 [employer]. [address] [place] [address]. All rights reserved. This information is not intended as a substitute for professional medical care. Always follow your healthcare professional's instructions.

## 2018-10-11 ENCOUNTER — APPOINTMENT (OUTPATIENT)
Dept: ULTRASOUND IMAGING | Facility: CLINIC | Age: 33
End: 2018-10-11
Attending: STUDENT IN AN ORGANIZED HEALTH CARE EDUCATION/TRAINING PROGRAM
Payer: COMMERCIAL

## 2018-10-11 ENCOUNTER — HOSPITAL ENCOUNTER (EMERGENCY)
Facility: CLINIC | Age: 33
Discharge: HOME | End: 2018-10-11
Attending: STUDENT IN AN ORGANIZED HEALTH CARE EDUCATION/TRAINING PROGRAM | Admitting: STUDENT IN AN ORGANIZED HEALTH CARE EDUCATION/TRAINING PROGRAM
Payer: COMMERCIAL

## 2018-10-11 ENCOUNTER — TELEPHONE (OUTPATIENT)
Dept: FAMILY MEDICINE | Facility: CLINIC | Age: 33
End: 2018-10-11

## 2018-10-11 VITALS
TEMPERATURE: 97.6 F | RESPIRATION RATE: 16 BRPM | SYSTOLIC BLOOD PRESSURE: 132 MMHG | OXYGEN SATURATION: 97 % | DIASTOLIC BLOOD PRESSURE: 77 MMHG

## 2018-10-11 DIAGNOSIS — R10.2 PELVIC PAIN IN FEMALE: ICD-10-CM

## 2018-10-11 DIAGNOSIS — N83.201 RIGHT OVARIAN CYST: Primary | ICD-10-CM

## 2018-10-11 DIAGNOSIS — N83.201 RIGHT OVARIAN CYST: ICD-10-CM

## 2018-10-11 PROBLEM — F15.11 METHAMPHETAMINE ABUSE IN REMISSION (H): Status: ACTIVE | Noted: 2018-10-11

## 2018-10-11 LAB
ALBUMIN SERPL-MCNC: 4 G/DL (ref 3.4–5)
ALBUMIN UR-MCNC: NEGATIVE MG/DL
ALP SERPL-CCNC: 53 U/L (ref 40–150)
ALT SERPL W P-5'-P-CCNC: 26 U/L (ref 0–50)
ANION GAP SERPL CALCULATED.3IONS-SCNC: 8 MMOL/L (ref 3–14)
APPEARANCE UR: CLEAR
AST SERPL W P-5'-P-CCNC: 14 U/L (ref 0–45)
BASOPHILS # BLD AUTO: 0 10E9/L (ref 0–0.2)
BASOPHILS NFR BLD AUTO: 0.4 %
BILIRUB SERPL-MCNC: 0.2 MG/DL (ref 0.2–1.3)
BILIRUB UR QL STRIP: NEGATIVE
BUN SERPL-MCNC: 19 MG/DL (ref 7–30)
CALCIUM SERPL-MCNC: 8.6 MG/DL (ref 8.5–10.1)
CHLORIDE SERPL-SCNC: 106 MMOL/L (ref 94–109)
CO2 SERPL-SCNC: 23 MMOL/L (ref 20–32)
COLOR UR AUTO: NORMAL
CREAT SERPL-MCNC: 0.67 MG/DL (ref 0.52–1.04)
DIFFERENTIAL METHOD BLD: NORMAL
EOSINOPHIL # BLD AUTO: 0.4 10E9/L (ref 0–0.7)
EOSINOPHIL NFR BLD AUTO: 4.6 %
ERYTHROCYTE [DISTWIDTH] IN BLOOD BY AUTOMATED COUNT: 12.5 % (ref 10–15)
GFR SERPL CREATININE-BSD FRML MDRD: >90 ML/MIN/1.7M2
GLUCOSE SERPL-MCNC: 94 MG/DL (ref 70–99)
GLUCOSE UR STRIP-MCNC: NEGATIVE MG/DL
HCT VFR BLD AUTO: 37.7 % (ref 35–47)
HGB BLD-MCNC: 12.6 G/DL (ref 11.7–15.7)
HGB UR QL STRIP: NEGATIVE
IMM GRANULOCYTES # BLD: 0 10E9/L (ref 0–0.4)
IMM GRANULOCYTES NFR BLD: 0.3 %
KETONES UR STRIP-MCNC: NEGATIVE MG/DL
LEUKOCYTE ESTERASE UR QL STRIP: NEGATIVE
LYMPHOCYTES # BLD AUTO: 3 10E9/L (ref 0.8–5.3)
LYMPHOCYTES NFR BLD AUTO: 31.6 %
MCH RBC QN AUTO: 29.9 PG (ref 26.5–33)
MCHC RBC AUTO-ENTMCNC: 33.4 G/DL (ref 31.5–36.5)
MCV RBC AUTO: 90 FL (ref 78–100)
MONOCYTES # BLD AUTO: 0.6 10E9/L (ref 0–1.3)
MONOCYTES NFR BLD AUTO: 6 %
NEUTROPHILS # BLD AUTO: 5.5 10E9/L (ref 1.6–8.3)
NEUTROPHILS NFR BLD AUTO: 57.1 %
NITRATE UR QL: NEGATIVE
NRBC # BLD AUTO: 0 10*3/UL
NRBC BLD AUTO-RTO: 0 /100
PH UR STRIP: 6 PH (ref 5–7)
PLATELET # BLD AUTO: 341 10E9/L (ref 150–450)
POTASSIUM SERPL-SCNC: 3.8 MMOL/L (ref 3.4–5.3)
PROT SERPL-MCNC: 7.1 G/DL (ref 6.8–8.8)
RBC # BLD AUTO: 4.21 10E12/L (ref 3.8–5.2)
SODIUM SERPL-SCNC: 137 MMOL/L (ref 133–144)
SOURCE: NORMAL
SP GR UR STRIP: 1.01 (ref 1–1.03)
UROBILINOGEN UR STRIP-MCNC: 0 MG/DL (ref 0–2)
WBC # BLD AUTO: 9.6 10E9/L (ref 4–11)

## 2018-10-11 PROCEDURE — 99284 EMERGENCY DEPT VISIT MOD MDM: CPT | Mod: 25 | Performed by: STUDENT IN AN ORGANIZED HEALTH CARE EDUCATION/TRAINING PROGRAM

## 2018-10-11 PROCEDURE — 99284 EMERGENCY DEPT VISIT MOD MDM: CPT | Mod: Z6 | Performed by: STUDENT IN AN ORGANIZED HEALTH CARE EDUCATION/TRAINING PROGRAM

## 2018-10-11 PROCEDURE — 81003 URINALYSIS AUTO W/O SCOPE: CPT | Performed by: STUDENT IN AN ORGANIZED HEALTH CARE EDUCATION/TRAINING PROGRAM

## 2018-10-11 PROCEDURE — 80053 COMPREHEN METABOLIC PANEL: CPT | Performed by: STUDENT IN AN ORGANIZED HEALTH CARE EDUCATION/TRAINING PROGRAM

## 2018-10-11 PROCEDURE — 25000132 ZZH RX MED GY IP 250 OP 250 PS 637: Performed by: STUDENT IN AN ORGANIZED HEALTH CARE EDUCATION/TRAINING PROGRAM

## 2018-10-11 PROCEDURE — 76856 US EXAM PELVIC COMPLETE: CPT

## 2018-10-11 PROCEDURE — 85025 COMPLETE CBC W/AUTO DIFF WBC: CPT | Performed by: STUDENT IN AN ORGANIZED HEALTH CARE EDUCATION/TRAINING PROGRAM

## 2018-10-11 PROCEDURE — 25000128 H RX IP 250 OP 636: Performed by: STUDENT IN AN ORGANIZED HEALTH CARE EDUCATION/TRAINING PROGRAM

## 2018-10-11 PROCEDURE — 96361 HYDRATE IV INFUSION ADD-ON: CPT | Performed by: STUDENT IN AN ORGANIZED HEALTH CARE EDUCATION/TRAINING PROGRAM

## 2018-10-11 PROCEDURE — 96360 HYDRATION IV INFUSION INIT: CPT | Performed by: STUDENT IN AN ORGANIZED HEALTH CARE EDUCATION/TRAINING PROGRAM

## 2018-10-11 RX ORDER — CELECOXIB 200 MG/1
200 CAPSULE ORAL 2 TIMES DAILY
Qty: 60 CAPSULE | Refills: 0 | Status: SHIPPED | OUTPATIENT
Start: 2018-10-11 | End: 2018-10-11 | Stop reason: SINTOL

## 2018-10-11 RX ORDER — LORAZEPAM 0.5 MG/1
TABLET ORAL
Refills: 0 | COMMUNITY
Start: 2018-10-01 | End: 2021-07-22

## 2018-10-11 RX ORDER — NORGESTIMATE AND ETHINYL ESTRADIOL 0.25-0.035
1 KIT ORAL DAILY
Qty: 28 TABLET | Refills: 2 | Status: SHIPPED | OUTPATIENT
Start: 2018-10-11 | End: 2018-10-30

## 2018-10-11 RX ORDER — OXYCODONE HYDROCHLORIDE 5 MG/1
5 TABLET ORAL ONCE
Status: COMPLETED | OUTPATIENT
Start: 2018-10-11 | End: 2018-10-11

## 2018-10-11 RX ADMIN — SODIUM CHLORIDE, POTASSIUM CHLORIDE, SODIUM LACTATE AND CALCIUM CHLORIDE 1000 ML: 600; 310; 30; 20 INJECTION, SOLUTION INTRAVENOUS at 10:32

## 2018-10-11 RX ADMIN — OXYCODONE HYDROCHLORIDE 5 MG: 5 TABLET ORAL at 10:32

## 2018-10-11 NOTE — TELEPHONE ENCOUNTER
Garland from [facility] says Shi Martinez wrote Rx today for Celebrex.  Patient has a Sulfa Allergy and there is a cross-over.  He wants to make sure it's OK with Shi to fill.   [phone]
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
## 2018-10-11 NOTE — ED AVS SNAPSHOT
[facility] Emergency Department    [address] [place] [address]    Phone:  [phone]    Fax:  [phone]                                       Sirena Dietrich   MRN: [id number]    Department:  [facility] Emergency Department   Date of Visit:  10/11/2018           Patient Information     Date Of Birth          1985        Your diagnoses for this visit were:     Pelvic pain in female     Right ovarian cyst        You were seen by Ramana Hung DO.      Follow-up Information     Follow up with [facility]. Schedule an appointment as soon as possible for a visit in 2 days.    Specialty:  OB/Gyn    Why:  Followup for reevaluation and managment plan.    Contact information:    [address] [place] [address] [phone]    Additional information:    The medical center is located at   [address] [place] [address] (between [facility] and   [address], [place] miles north   of [place]).        Schedule an appointment as soon as possible for a visit with Shi Martinez APRN CNP.    Specialty:  Family Practice    Why:  As needed.    Contact information:    [facility] [address] [place] [address] [id number] [phone]          Follow up with [facility] Emergency Department.    Specialty:  EMERGENCY MEDICINE    Why:  Return if symptoms progress or any other concerns.    Contact information:    [address] [place] [address] [phone]    Additional information:    The medical center is located at   [address] [place] [address] (between [facility] and   [address], [place] north   of [place]).      Discharge References/Attachments     OVARIAN CYSTS, UNDERSTANDING (ENGLISH)      Your next 10 appointments already scheduled     Oct 18, 2018 11:15 AM CDT   Office Visit with Debi Luis MD   [facility] ([facility]    [address] [place] [address] [phone]           Bring a  current list of meds and any records pertaining to this visit. For Physicals, please bring immunization records and any forms needing to be filled out. Please arrive 10 minutes early to complete paperwork.              24 Hour Appointment Hotline       To make an appointment at any [facility], call [phone] ([phone]). If you don't have a family doctor or clinic, we will help you find one. [facility] are conveniently located to serve the needs of you and your family.             Review of your medicines      Our records show that you are taking the medicines listed below. If these are incorrect, please call your family doctor or clinic.        Dose / Directions Last dose taken    acetaminophen 500 MG tablet   Commonly known as:  TYLENOL   Dose:  1000 mg   Quantity:  100 tablet        Take 2 tablets (1,000 mg) by mouth every 4 hours as needed for other (mild pain)   Refills:  1        gabapentin 600 MG tablet   Commonly known as:  NEURONTIN   Dose:  1200 mg   Quantity:  60 tablet        Take 2 tablets (1,200 mg) by mouth At Bedtime   Refills:  0        ibuprofen 800 MG tablet   Commonly known as:  ADVIL/MOTRIN   Dose:  800 mg   Quantity:  90 tablet        Take 1 tablet (800 mg) by mouth every 6 hours as needed for pain (mild)   Refills:  1        lamoTRIgine 200 MG tablet   Commonly known as:  LaMICtal   Dose:  200 mg   Quantity:  30 tablet        Take 1 tablet (200 mg) by mouth daily   Refills:  1        LORazepam 0.5 MG tablet   Commonly known as:  ATIVAN        Refills:  0        morphine 15 MG IR tablet   Commonly known as:  MSIR   Dose:  15 mg   Quantity:  21 tablet        Take 1 tablet (15 mg) by mouth 3 times daily as needed for severe pain   Refills:  0        ondansetron 4 MG ODT tab   Commonly known as:  ZOFRAN-ODT   Dose:  4-8 mg   Quantity:  20 tablet        Take 1-2 tablets (4-8 mg) by mouth every 8 hours as needed for nausea Dissolve ON the tongue.   Refills:  1         prochlorperazine 10 MG tablet   Commonly known as:  COMPAZINE   Dose:  10 mg   Quantity:  30 tablet        Take 1 tablet (10 mg) by mouth every 6 hours as needed for nausea or vomiting   Refills:  0        * QUEtiapine 50 MG tablet   Commonly known as:  SEROQUEL   Dose:  50 mg   Quantity:  90 tablet        Take 1 tablet (50 mg) by mouth 3 times daily   Refills:  0        * QUEtiapine 25 MG tablet   Commonly known as:  SEROquel   Dose:  25 mg   Quantity:  60 tablet        Take 1 tablet (25 mg) by mouth 2 times daily   Refills:  0        senna-docusate 8.6-50 MG per tablet   Commonly known as:  SENOKOT-S;PERICOLACE   Dose:  1-2 tablet   Quantity:  60 tablet        Take 1-2 tablets by mouth 2 times daily Take while on oral narcotics to prevent or treat constipation.   Refills:  0        * Notice:  This list has 2 medication(s) that are the same as other medications prescribed for you. Read the directions carefully, and ask your doctor or other care provider to review them with you.            Procedures and tests performed during your visit     CBC with platelets differential    Comprehensive metabolic panel    NO Rho (D) immune globulin (RhoGam) needed - NOT obstetric patient    Pelvic Ultrasound (US) with doppler imaging (r/o ovarian torsion)    Peripheral IV: Standard    UA reflex to Microscopic and Culture    Vital signs      Orders Needing Specimen Collection     None      Pending Results     No orders found from 10/9/2018 to 10/12/2018.            Pending Culture Results     No orders found from 10/9/2018 to 10/12/2018.            Pending Results Instructions     If you had any lab results that were not finalized at the time of your Discharge, you can call the ED Lab Result RN at [phone]. You will be contacted by this team for any positive Lab results or changes in treatment. The nurses are available 7 days a week from 10A to 6:30P.  You can leave a message 24 hours per day and they will return your call.         Test Results From Your Hospital Stay        10/11/2018 10:40 AM      Component Results     Component Value Ref Range & Units Status    WBC 9.6 4.0 - 11.0 10e9/L Final    RBC Count 4.21 3.8 - 5.2 10e12/L Final    Hemoglobin 12.6 11.7 - 15.7 g/dL Final    Hematocrit 37.7 35.0 - 47.0 % Final    MCV 90 78 - 100 fl Final    MCH 29.9 26.5 - 33.0 pg Final    MCHC 33.4 31.5 - 36.5 g/dL Final    RDW 12.5 10.0 - 15.0 % Final    Platelet Count 341 150 - 450 10e9/L Final    Diff Method Automated Method  Final    % Neutrophils 57.1 % Final    % Lymphocytes 31.6 % Final    % Monocytes 6.0 % Final    % Eosinophils 4.6 % Final    % Basophils 0.4 % Final    % Immature Granulocytes 0.3 % Final    Nucleated RBCs 0 0 /100 Final    Absolute Neutrophil 5.5 1.6 - 8.3 10e9/L Final    Absolute Lymphocytes 3.0 0.8 - 5.3 10e9/L Final    Absolute Monocytes 0.6 0.0 - 1.3 10e9/L Final    Absolute Eosinophils 0.4 0.0 - 0.7 10e9/L Final    Absolute Basophils 0.0 0.0 - 0.2 10e9/L Final    Abs Immature Granulocytes 0.0 0 - 0.4 10e9/L Final    Absolute Nucleated RBC 0.0  Final         10/11/2018 12:13 PM      Component Results     Component Value Ref Range & Units Status    Color Urine Straw  Final    Appearance Urine Clear  Final    Glucose Urine Negative NEG^Negative mg/dL Final    Bilirubin Urine Negative NEG^Negative Final    Ketones Urine Negative NEG^Negative mg/dL Final    Specific Gravity Urine 1.010 1.003 - 1.035 Final    Blood Urine Negative NEG^Negative Final    pH Urine 6.0 5.0 - 7.0 pH Final    Protein Albumin Urine Negative NEG^Negative mg/dL Final    Urobilinogen mg/dL 0.0 0.0 - 2.0 mg/dL Final    Nitrite Urine Negative NEG^Negative Final    Leukocyte Esterase Urine Negative NEG^Negative Final    Source Midstream Urine  Final         10/11/2018 11:00 AM      Component Results     Component Value Ref Range & Units Status    Sodium 137 133 - 144 mmol/L Final    Potassium 3.8 3.4 - 5.3 mmol/L Final    Chloride 106 94 - 109 mmol/L  Final    Carbon Dioxide 23 20 - 32 mmol/L Final    Anion Gap 8 3 - 14 mmol/L Final    Glucose 94 70 - 99 mg/dL Final    Urea Nitrogen 19 7 - 30 mg/dL Final    Creatinine 0.67 0.52 - 1.04 mg/dL Final    GFR Estimate >90 >60 mL/min/1.7m2 Final    Non  GFR Calc    GFR Estimate If Black >90 >60 mL/min/1.7m2 Final    African American GFR Calc    Calcium 8.6 8.5 - 10.1 mg/dL Final    Bilirubin Total 0.2 0.2 - 1.3 mg/dL Final    Albumin 4.0 3.4 - 5.0 g/dL Final    Protein Total 7.1 6.8 - 8.8 g/dL Final    Alkaline Phosphatase 53 40 - 150 U/L Final    ALT 26 0 - 50 U/L Final    AST 14 0 - 45 U/L Final         10/11/2018 11:09 AM      Narrative     ULTRASOUND PELVIS COMPLETE WITH TRANSVAGINAL AND DOPPLER LIMITED  IMAGES  10/11/2018 11:04 AM    HISTORY:  Pelvic pain 3 weeks status post left oophorectomy.    FINDINGS: Ultrasound was performed transvaginally as well as  transabdominally in order to optimally evaluate the adnexa.    Uterus is not visible and reported to be surgically absent. The left  ovary is not visible and reported to be surgically absent. Within the  right ovary, there is a 4.6 x 2.8 x 3.1 cm slightly complex cyst.  There was no free pelvic fluid.        Impression     IMPRESSION:  Right ovarian 4.6 cm slightly complex cyst.  Hysterectomy/left oophorectomy. No free pelvic fluid.    KAY VANEGAS MD                Thank you for choosing [facility]       Thank you for choosing [facility] for your care. Our goal is always to provide you with excellent care. Hearing back from our patients is one way we can continue to improve our services. Please take a few minutes to complete the written survey that you may receive in the mail after you visit with us. Thank you!        [employer] Information     [facility] gives you secure access to your electronic health record. If you see a primary care provider, you can also send messages to your care team and make appointments. If you have questions, please call your  primary care clinic.  If you do not have a primary care provider, please call [phone] and they will assist you.        Care EveryWhere ID     This is your Care EveryWhere ID. This could be used by other organizations to access your [facility] medical records  [id number]        Equal Access to Services     SAMRA KAPLAN : Ian Nazario, benjamin douglas, sandie dominguez, raheem evangelista. So [facility] [phone].    ATENCIÓN: Si habla español, tiene a borja disposición servicios gratuitos de asistencia lingüística. Llame al [phone].    We comply with applicable federal civil rights laws and [place] laws. We do not discriminate on the basis of race, color, national origin, age, disability, sex, sexual orientation, or gender identity.            After Visit Summary       This is your record. Keep this with you and show to your community pharmacist(s) and doctor(s) at your next visit.

## 2018-10-11 NOTE — TELEPHONE ENCOUNTER
Spoke with Shi Martinez to coordinate Sirena's care.  We discussed that her right ovary is still consistent with ovulation event and that the conservative method of treatment would be suppression with oral contraceptive pills rather than surgery.  We would want her to heal from her more recent surgery prior to entertaining additional surgery unless she really has a surgical abdomen.  In the meantime, attempt pain control with NSAIDS and morphine as prescribed by Shi.      She has follow up with Dr. Luis to review plan as well.  Shi with call and coordinate with patient regarding pain management and initiation of oral contraceptive pills.    Caroline Grossman M.D.

## 2018-10-11 NOTE — ED AVS SNAPSHOT
[facility] Emergency Department    [address] [facility] [address]    Phone:  [phone]    Fax:  [phone]                                       Sirena Dietrich   MRN: [id number]    Department:  [facility] Emergency Department   Date of Visit:  10/11/2018           After Visit Summary Signature Page     I have received my discharge instructions, and my questions have been answered. I have discussed any challenges I see with this plan with the nurse or doctor.    ..........................................................................................................................................  Patient/Patient Representative Signature      ..........................................................................................................................................  Patient Representative Print Name and Relationship to Patient    ..................................................               ................................................  Date                                   Time    ..........................................................................................................................................  Reviewed by Signature/Title    ...................................................              ..............................................  Date                                               Time          22EPIC Rev 08/18

## 2018-10-11 NOTE — TELEPHONE ENCOUNTER
No celebrex then. Continue Ibuprofen. 800 mg TID with food.   Phone call placed to [facility].   Thanks Shi Martinez [facility]

## 2018-10-12 ENCOUNTER — PATIENT OUTREACH (OUTPATIENT)
Dept: CARE COORDINATION | Facility: CLINIC | Age: 33
End: 2018-10-12

## 2018-10-12 ASSESSMENT — ACTIVITIES OF DAILY LIVING (ADL): DEPENDENT_IADLS:: INDEPENDENT

## 2018-10-12 NOTE — PROGRESS NOTES
Clinic Care Coordination Contact    Situation: Patient chart reviewed by care coordinator.    Background: ER f/u 10/11/18.    Assessment: Per chart review, patient was in the ER yesterday for pelvic pain, right ovarian cyst. ER doctor spoke with PCP about workup performed in the ER and no further recommendations except patient to f/u with OB/GYN for reevaluation, appointment scheduled 10/18/18. Patient to take Ibuprofen 800mg TID with food and f/u telephone visit with PCP 10/16/18.    Plan/Recommendations: No outreach is planned as PCP is in the loop as to what is going and is managing it. Patient maybe referred again if needed.    Harmony Clay RN, [facility]  RN Care Coordinator  [facility]  Phone # [phone]  10/12/2018 9:46 AM

## 2018-10-16 ENCOUNTER — VIRTUAL VISIT (OUTPATIENT)
Dept: FAMILY MEDICINE | Facility: CLINIC | Age: 33
End: 2018-10-16
Payer: COMMERCIAL

## 2018-10-16 ENCOUNTER — TELEPHONE (OUTPATIENT)
Dept: FAMILY MEDICINE | Facility: CLINIC | Age: 33
End: 2018-10-16

## 2018-10-16 DIAGNOSIS — J01.90 ACUTE SINUSITIS WITH SYMPTOMS > 10 DAYS: ICD-10-CM

## 2018-10-16 DIAGNOSIS — G56.31 RADIAL NEUROPATHY, RIGHT: Primary | ICD-10-CM

## 2018-10-16 DIAGNOSIS — K29.00 ACUTE GASTRITIS WITHOUT HEMORRHAGE, UNSPECIFIED GASTRITIS TYPE: ICD-10-CM

## 2018-10-16 DIAGNOSIS — B37.31 CANDIDIASIS OF VAGINA: ICD-10-CM

## 2018-10-16 DIAGNOSIS — F31.64 SEVERE BIPOLAR I DISORDER, MOST RECENT EPISODE MIXED, WITH PSYCHOTIC FEATURES (H): ICD-10-CM

## 2018-10-16 PROCEDURE — 98967 PH1 ASSMT&MGMT NQHP 11-20: CPT | Performed by: NURSE PRACTITIONER

## 2018-10-16 RX ORDER — FLUCONAZOLE 150 MG/1
150 TABLET ORAL ONCE
Qty: 2 TABLET | Refills: 0 | Status: SHIPPED | OUTPATIENT
Start: 2018-10-16 | End: 2019-03-01

## 2018-10-16 RX ORDER — GABAPENTIN 600 MG/1
1200 TABLET ORAL 2 TIMES DAILY
Qty: 120 TABLET | Refills: 1 | Status: SHIPPED | OUTPATIENT
Start: 2018-10-16 | End: 2019-03-14

## 2018-10-16 RX ORDER — CEFIXIME 400 MG/1
400 CAPSULE ORAL DAILY
Qty: 10 CAPSULE | Refills: 0 | Status: SHIPPED | OUTPATIENT
Start: 2018-10-16 | End: 2018-11-01

## 2018-10-16 NOTE — PATIENT INSTRUCTIONS
Yeast Infection (Candida Vaginal Infection)    You have a Candida vaginal infection. This is also known as a yeast infection. It is most often caused by a type of yeast (fungus) called Candida. Candida are normally found in the vagina. But if they increase in number, this can lead to infection and cause symptoms.  Symptoms of a yeast infection can include:    Clumpy or thin, white discharge, which may look like cottage cheese    Itching or burning    Burning with urination  Certain factors can make a yeast infection more likely. These can include:    Taking certain medicines, such as antibiotics or birth control pills    Pregnancy    Diabetes    Weak immune system  A yeast infection is most often treated with antifungal medicine. This may be given as a vaginal cream or pills you take by mouth. Treatment may last for about 1 to 7 days. Women with severe or recurrent infections may need longer courses of treatment.  Home care    If you re prescribed medicine, be sure to use it as directed. Finish all of the medicine, even if your symptoms go away. Note: Don t try to treat yourself using over-the-counter products without talking to your provider first. He or she will let you know if this is a good option for you.    Ask your provider what steps you can take to help reduce your risk of having a yeast infection in the future.  Follow-up care  Follow up with your healthcare provider, or as directed.  When to seek medical advice  Call your healthcare provider right away if:    You have a fever of 100.4 F (38 C) or higher, or as directed by your provider.    Your symptoms worsen, or they don t go away within a few days of starting treatment.    You have new pain in the lower belly or pelvic region.    You have side effects that bother you or a reaction to the cream or pills you re prescribed.    You or any partners you have sex with have new symptoms, such as a rash, joint pain, or sores.  Date Last Reviewed: 10/1/2017     7443-7382 [employer]. [address] [place] [address], [place] [address]. All rights reserved. This information is not intended as a substitute for professional medical care. Always follow your healthcare professional's instructions.        Acute Bacterial Rhinosinusitis (ABRS)    Acute bacterial rhinosinusitis (ABRS) is an infection of your nasal cavity and sinuses. It s caused by bacteria. Acute means that you ve had symptoms for less than 4 weeks, but possibly up to 12 weeks.  Understanding your sinuses  The nasal cavity is the large air-filled space behind your nose. The sinuses are a group of spaces formed by the bones of your face. They connect with your nasal cavity. ABRS causes the tissue lining these spaces to become inflamed. Mucus may not drain normally. This leads to facial pain and other symptoms.  What causes ABRS?  ABRS most often follows an upper respiratory infection caused by a virus. Bacteria then infect the lining of your nasal cavity and sinuses. But you can also get ABRS if you have:    Nasal allergies    Long-term nasal swelling and congestion not caused by allergies    Blockage in the nose  Symptoms of ABRS  The symptoms of ABRS may be different for each person and include:    Nasal congestion or blockage    Pain or pressure in the face    Thick, colored drainage from the nose  Other symptoms may include:    Runny nose    Fluid draining from the nose down the throat (postnasal drip)    Headache    Cough    Pain    Fever  Diagnosing ABRS  ABRS may be diagnosed if you ve had an upper respiratory infection like a cold and cough for 10 or more days without improvement or with worsening symptoms. Your healthcare provider will ask about your symptoms and your medical history. The provider will check your vital signs, including your temperature. You ll have a physical exam. The healthcare provider will check your ears, nose, and throat. You likely won t need any tests. If ABRS comes back, you  may have a culture or other tests.  Treatment for ABRS  Treatment may include:    Antibiotic medicine. This is for symptoms that last for at least 10 to 14 days.    Nasal corticosteroid medicine. Drops or spray used in the nose can lessen swelling and congestion.    Over-the-counter pain medicine. This is to lessen sinus pain and pressure.    Nasal decongestant medicine. Spray or drops may help to lessen congestion. Do not use them for more than a few days.    Salt wash (saline irrigation). This can help to loosen mucus.  Possible complications of ABRS  ABRS may come back or become long-term (chronic). In rare cases, ABRS may cause complications such as:     Inflamed tissue around the brain and spinal cord (meningitis)    Inflamed tissue around the eyes (orbital cellulitis)    Inflamed bones around the sinuses (osteitis)  These problems may need to be treated in a hospital with intravenous (IV) antibiotic medicine or surgery.  When to call the healthcare provider  Call your healthcare provider if you have any of the following:    Symptoms that don t get better, or get worse    Symptoms that don t get better after 3 to 5 days on antibiotics    Trouble seeing    Swelling around your eyes    Confusion or trouble staying awake   Date Last Reviewed: 5/1/2017 2000-2017 [employer]. [address] [place] [address]. All rights reserved. This information is not intended as a substitute for professional medical care. Always follow your healthcare professional's instructions.        Gastritis (Adult)    Gastritis is inflammation and irritation of the stomach lining. It can be present for a short time (acute) or be long lasting (chronic). Gastritis is often caused by infection with bacteria called H pylori. More than a third of people in the [place] have this bacteria in their bodies. In many cases, H pylori causes no problems or symptoms. In some people, though, the infection irritates the stomach lining  and causes gastritis. Other causes of stomach irritation include drinking alcohol or taking pain-relieving medicines called NSAIDs (such as aspirin or ibuprofen).   Symptoms of gastritis can include:    Abdominal pain or bloating    Loss of appetite    Nausea or vomiting    Vomiting blood or having black stools    Feeling more tired than usual  An inflamed and irritated stomach lining is more likely to develop a sore called an ulcer. To help prevent this, gastritis should be treated.  Home care  If needed, medicines may be prescribed. If you have H pylori infection, treating it will likely relieve your symptoms. Other changes can help reduce stomach irritation and help it heal.    If you have been prescribed medicines for H pylori infection, take them as directed. Take all of the medicine until it is finished or your healthcare provider tells you to stop, even if you feel better.    Your healthcare provider may recommend avoiding NSAIDs. If you take daily aspirin for your heart or other medical reasons, do not stop without talking to your healthcare provider first.    Avoid drinking alcohol.    Stop smoking. Smoking can irritate the stomach and delay healing. As much as possible, stay away from second hand smoke.  Follow-up care  Follow up with your healthcare provider, or as advised by our staff. Testing may be needed to check for inflammation or an ulcer.  When to seek medical advice  Call your healthcare provider for any of the following:    Stomach pain that gets worse or moves to the lower right abdomen (appendix area)    Chest pain that appears or gets worse, or spreads to the back, neck, shoulder, or arm    Frequent vomiting (can t keep down liquids)    Blood in the stool or vomit (red or black in color)    Feeling weak or dizzy    Fever of 100.4 F (38 C) or higher, or as directed by your healthcare provider  Date Last Reviewed: 6/22/2015 2000-2017 [employer]. [address] [facility],  [place] [address]. All rights reserved. This information is not intended as a substitute for professional medical care. Always follow your healthcare professional's instructions.        Anatomy of the Female Urinary Tract  Your urinary tract helps get rid of urine (your body s liquid waste). The kidneys collect chemicals and water your body doesn t need. This is turned into urine. Urine travels out of the kidneys through the ureters to the bladder. The bladder holds urine until you re ready to release it. The urethra carries urine from the bladder out of the body. The main sphincter muscle circles the mid-urethra.      Front view of female urinary tract.   Date Last Reviewed: 1/1/2017 2000-2017 The [employer]. [address] [facility], [place] [address]. All rights reserved. This information is not intended as a substitute for professional medical care. Always follow your healthcare professional's instructions.        Treatment for Ovarian Cysts  An ovarian cyst is a fluid-filled sac that forms on or inside an ovary. The ovaries are a pair of small, oval-shaped organs in the lower part of a woman s belly (abdomen). About once a month, one of the ovaries releases an egg. The ovaries also make the hormones estrogen and progesterone. These hormones are part of pregnancy, the menstrual cycle, and breast growth.  Ovarian cysts are very common in women of all ages, however they are unusual in women who have reached menopause. Young girls can also get them, but this is less common. There are different types of ovarian cysts. They can occur for various reasons, and they may need different treatments. A cyst can vary in size from half an inch to more than 4 inches.  Types of treatment  Treatment for an ovarian cyst will depend on the type of cyst, your age, and your general health. Most women will not need treatment. You may be told to watch your symptoms over time. An ovarian cyst will often go away with no  treatment in a few weeks or months.  In some cases, you may need to have follow-up ultrasound tests. These are to check if your cyst has gone away or is not growing. You may not need any other treatment.  If your ultrasound or blood tests show signs of cancer, your healthcare provider may advise surgery. This is done to remove part or all of your ovary. Your healthcare provider might also advise surgery if:    Your cyst causes ongoing pressure or pain    Your cyst appears to be growing    You have a very large cyst    You have endometriosis and want the cyst removed to help with fertility  Can an ovarian cyst be prevented?  If you have hormone problems, your healthcare provider may advise taking birth control pills. These may help prevent ovarian cysts. Taking antibiotics for a pelvic infection may also prevent a cyst.  Possible complications of an ovarian cyst  An ovarian cyst can sometimes break open (rupture). This may not cause any symptoms. Or it may cause sudden, sharp pain in the lower belly. A ruptured cyst can cause a lot of blood and fluid loss. This can lead to low blood pressure. In some cases, surgery may be needed.  Rarely an ovarian cyst can also cause twisting (torsion) of the fallopian tube. This can block normal blood supply to the ovary. This can lead to sudden pain and may need emergency surgery.  When to call the healthcare provider  Call your healthcare provider right away if you have any of these:    Sudden belly pain    Other severe symptoms   Date Last Reviewed: 10/1/2017    3916-5173 [employer]. [address] [place] [address], [place] [address]. All rights reserved. This information is not intended as a substitute for professional medical care. Always follow your healthcare professional's instructions.

## 2018-10-16 NOTE — TELEPHONE ENCOUNTER
Central Prior Authorization Team   Phone: [phone]    [place] Initiation    Medication: suprax-Initiated  Insurance Company: [employer] PMAP - Phone [phone] Fax [phone]  Pharmacy Filling the Rx: [facility] - [place], [place] - [address] [place] [address]  Filling Pharmacy Phone: [phone]  Filling Pharmacy Fax:    Start Date: 10/16/2018
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
## 2018-10-16 NOTE — TELEPHONE ENCOUNTER
Suprax 400mg caps are non-formulary. Please call BC/BS PMAP at [phone] for formulary alternatives. Sirena's ID# [id number]    [facility] [place]

## 2018-10-16 NOTE — MR AVS SNAPSHOT
After Visit Summary   10/16/2018    Sirena Dietrich    MRN: [id number]           Patient Information     Date Of Birth          1985        Visit Information        Provider Department      10/16/2018 8:20 AM Shi Martinez APRN [facility]        Today's Diagnoses     Radial neuropathy, right    -  1    Severe bipolar I disorder, most recent episode mixed, with psychotic features        Acute gastritis without hemorrhage, unspecified gastritis type        Acute sinusitis with symptoms > 10 days        Candidiasis of vagina          Care Instructions      Yeast Infection (Candida Vaginal Infection)    You have a Candida vaginal infection. This is also known as a yeast infection. It is most often caused by a type of yeast (fungus) called Candida. Candida are normally found in the vagina. But if they increase in number, this can lead to infection and cause symptoms.  Symptoms of a yeast infection can include:    Clumpy or thin, white discharge, which may look like cottage cheese    Itching or burning    Burning with urination  Certain factors can make a yeast infection more likely. These can include:    Taking certain medicines, such as antibiotics or birth control pills    Pregnancy    Diabetes    Weak immune system  A yeast infection is most often treated with antifungal medicine. This may be given as a vaginal cream or pills you take by mouth. Treatment may last for about 1 to 7 days. Women with severe or recurrent infections may need longer courses of treatment.  Home care    If you re prescribed medicine, be sure to use it as directed. Finish all of the medicine, even if your symptoms go away. Note: Don t try to treat yourself using over-the-counter products without talking to your provider first. He or she will let you know if this is a good option for you.    Ask your provider what steps you can take to help reduce your risk of having a yeast infection in the  future.  Follow-up care  Follow up with your healthcare provider, or as directed.  When to seek medical advice  Call your healthcare provider right away if:    You have a fever of 100.4 F (38 C) or higher, or as directed by your provider.    Your symptoms worsen, or they don t go away within a few days of starting treatment.    You have new pain in the lower belly or pelvic region.    You have side effects that bother you or a reaction to the cream or pills you re prescribed.    You or any partners you have sex with have new symptoms, such as a rash, joint pain, or sores.  Date Last Reviewed: 10/1/2017    7098-9987 [employer]. [address] [place] [address], [place] [address]. All rights reserved. This information is not intended as a substitute for professional medical care. Always follow your healthcare professional's instructions.        Acute Bacterial Rhinosinusitis (ABRS)    Acute bacterial rhinosinusitis (ABRS) is an infection of your nasal cavity and sinuses. It s caused by bacteria. Acute means that you ve had symptoms for less than 4 weeks, but possibly up to 12 weeks.  Understanding your sinuses  The nasal cavity is the large air-filled space behind your nose. The sinuses are a group of spaces formed by the bones of your face. They connect with your nasal cavity. ABRS causes the tissue lining these spaces to become inflamed. Mucus may not drain normally. This leads to facial pain and other symptoms.  What causes ABRS?  ABRS most often follows an upper respiratory infection caused by a virus. Bacteria then infect the lining of your nasal cavity and sinuses. But you can also get ABRS if you have:    Nasal allergies    Long-term nasal swelling and congestion not caused by allergies    Blockage in the nose  Symptoms of ABRS  The symptoms of ABRS may be different for each person and include:    Nasal congestion or blockage    Pain or pressure in the face    Thick, colored drainage from the nose  Other  symptoms may include:    Runny nose    Fluid draining from the nose down the throat (postnasal drip)    Headache    Cough    Pain    Fever  Diagnosing ABRS  ABRS may be diagnosed if you ve had an upper respiratory infection like a cold and cough for 10 or more days without improvement or with worsening symptoms. Your healthcare provider will ask about your symptoms and your medical history. The provider will check your vital signs, including your temperature. You ll have a physical exam. The healthcare provider will check your ears, nose, and throat. You likely won t need any tests. If ABRS comes back, you may have a culture or other tests.  Treatment for ABRS  Treatment may include:    Antibiotic medicine. This is for symptoms that last for at least 10 to 14 days.    Nasal corticosteroid medicine. Drops or spray used in the nose can lessen swelling and congestion.    Over-the-counter pain medicine. This is to lessen sinus pain and pressure.    Nasal decongestant medicine. Spray or drops may help to lessen congestion. Do not use them for more than a few days.    Salt wash (saline irrigation). This can help to loosen mucus.  Possible complications of ABRS  ABRS may come back or become long-term (chronic). In rare cases, ABRS may cause complications such as:     Inflamed tissue around the brain and spinal cord (meningitis)    Inflamed tissue around the eyes (orbital cellulitis)    Inflamed bones around the sinuses (osteitis)  These problems may need to be treated in a hospital with intravenous (IV) antibiotic medicine or surgery.  When to call the healthcare provider  Call your healthcare provider if you have any of the following:    Symptoms that don t get better, or get worse    Symptoms that don t get better after 3 to 5 days on antibiotics    Trouble seeing    Swelling around your eyes    Confusion or trouble staying awake   Date Last Reviewed: 5/1/2017 2000-2017 [employer]. [address], [place] [address]. All rights reserved. This information is not intended as a substitute for professional medical care. Always follow your healthcare professional's instructions.        Gastritis (Adult)    Gastritis is inflammation and irritation of the stomach lining. It can be present for a short time (acute) or be long lasting (chronic). Gastritis is often caused by infection with bacteria called H pylori. More than a third of people in the  have this bacteria in their bodies. In many cases, H pylori causes no problems or symptoms. In some people, though, the infection irritates the stomach lining and causes gastritis. Other causes of stomach irritation include drinking alcohol or taking pain-relieving medicines called NSAIDs (such as aspirin or ibuprofen).   Symptoms of gastritis can include:    Abdominal pain or bloating    Loss of appetite    Nausea or vomiting    Vomiting blood or having black stools    Feeling more tired than usual  An inflamed and irritated stomach lining is more likely to develop a sore called an ulcer. To help prevent this, gastritis should be treated.  Home care  If needed, medicines may be prescribed. If you have H pylori infection, treating it will likely relieve your symptoms. Other changes can help reduce stomach irritation and help it heal.    If you have been prescribed medicines for H pylori infection, take them as directed. Take all of the medicine until it is finished or your healthcare provider tells you to stop, even if you feel better.    Your healthcare provider may recommend avoiding NSAIDs. If you take daily aspirin for your heart or other medical reasons, do not stop without talking to your healthcare provider first.    Avoid drinking alcohol.    Stop smoking. Smoking can irritate the stomach and delay healing. As much as possible, stay away from second hand smoke.  Follow-up care  Follow up with your healthcare provider, or as advised by our staff. Testing may  be needed to check for inflammation or an ulcer.  When to seek medical advice  Call your healthcare provider for any of the following:    Stomach pain that gets worse or moves to the lower right abdomen (appendix area)    Chest pain that appears or gets worse, or spreads to the back, neck, shoulder, or arm    Frequent vomiting (can t keep down liquids)    Blood in the stool or vomit (red or black in color)    Feeling weak or dizzy    Fever of 100.4 F (38 C) or higher, or as directed by your healthcare provider  Date Last Reviewed: 6/22/2015 2000-2017 [employer]. [address] [place] [address]. All rights reserved. This information is not intended as a substitute for professional medical care. Always follow your healthcare professional's instructions.        Anatomy of the Female Urinary Tract  Your urinary tract helps get rid of urine (your body s liquid waste). The kidneys collect chemicals and water your body doesn t need. This is turned into urine. Urine travels out of the kidneys through the ureters to the bladder. The bladder holds urine until you re ready to release it. The urethra carries urine from the bladder out of the body. The main sphincter muscle circles the mid-urethra.      Front view of female urinary tract.   Date Last Reviewed: 1/1/2017 2000-2017 [employer]. [address] [place] [address]. All rights reserved. This information is not intended as a substitute for professional medical care. Always follow your healthcare professional's instructions.        Treatment for Ovarian Cysts  An ovarian cyst is a fluid-filled sac that forms on or inside an ovary. The ovaries are a pair of small, oval-shaped organs in the lower part of a woman s belly (abdomen). About once a month, one of the ovaries releases an egg. The ovaries also make the hormones estrogen and progesterone. These hormones are part of pregnancy, the menstrual cycle, and breast  growth.  Ovarian cysts are very common in women of all ages, however they are unusual in women who have reached menopause. Young girls can also get them, but this is less common. There are different types of ovarian cysts. They can occur for various reasons, and they may need different treatments. A cyst can vary in size from half an inch to more than 4 inches.  Types of treatment  Treatment for an ovarian cyst will depend on the type of cyst, your age, and your general health. Most women will not need treatment. You may be told to watch your symptoms over time. An ovarian cyst will often go away with no treatment in a few weeks or months.  In some cases, you may need to have follow-up ultrasound tests. These are to check if your cyst has gone away or is not growing. You may not need any other treatment.  If your ultrasound or blood tests show signs of cancer, your healthcare provider may advise surgery. This is done to remove part or all of your ovary. Your healthcare provider might also advise surgery if:    Your cyst causes ongoing pressure or pain    Your cyst appears to be growing    You have a very large cyst    You have endometriosis and want the cyst removed to help with fertility  Can an ovarian cyst be prevented?  If you have hormone problems, your healthcare provider may advise taking birth control pills. These may help prevent ovarian cysts. Taking antibiotics for a pelvic infection may also prevent a cyst.  Possible complications of an ovarian cyst  An ovarian cyst can sometimes break open (rupture). This may not cause any symptoms. Or it may cause sudden, sharp pain in the lower belly. A ruptured cyst can cause a lot of blood and fluid loss. This can lead to low blood pressure. In some cases, surgery may be needed.  Rarely an ovarian cyst can also cause twisting (torsion) of the fallopian tube. This can block normal blood supply to the ovary. This can lead to sudden pain and may need emergency  surgery.  When to call the healthcare provider  Call your healthcare provider right away if you have any of these:    Sudden belly pain    Other severe symptoms   Date Last Reviewed: 10/1/2017    3307-8230 [employer]. [address] [place] [address], [place] [address]. All rights reserved. This information is not intended as a substitute for professional medical care. Always follow your healthcare professional's instructions.                Follow-ups after your visit        Follow-up notes from your care team     Return in about 2 weeks (around 10/30/2018) for PAIN.      Your next 10 appointments already scheduled     Oct 18, 2018 11:15 AM CDT   Office Visit with Debi Luis MD   [facility]    [address] [facility] [address] [phone]           Bring a current list of meds and any records pertaining to this visit. For Physicals, please bring immunization records and any forms needing to be filled out. Please arrive 10 minutes early to complete paperwork.              Who to contact     If you have questions or need follow up information about today's clinic visit or your schedule please contact [facility] directly at [phone].  Normal or non-critical lab and imaging results will be communicated to you by MyChart, letter or phone within 4 business days after the clinic has received the results. If you do not hear from us within 7 days, please contact the clinic through [employer] or phone. If you have a critical or abnormal lab result, we will notify you by phone as soon as possible.  Submit refill requests through siOPTICA or call your pharmacy and they will forward the refill request to us. Please allow 3 business days for your refill to be completed.          Additional Information About Your Visit        [employer] Information     [employer]ICA gives you secure access to your electronic health record. If you see a primary  care provider, you can also send messages to your care team and make appointments. If you have questions, please call your primary care clinic.  If you do not have a primary care provider, please call [phone] and they will assist you.        Care EveryWhere ID     This is your Care EveryWhere ID. This could be used by other organizations to access your [facility] medical records  [id number]        Your Vitals Were     Last Period                   (LMP Unknown)            Blood Pressure from Last 3 Encounters:   10/11/18 132/77   10/09/18 122/68   10/05/18 124/72    Weight from Last 3 Encounters:   10/09/18 163 lb (73.9 kg)   09/27/18 167 lb (75.8 kg)   09/21/18 160 lb (72.6 kg)              Today, you had the following     No orders found for display         Today's Medication Changes          These changes are accurate as of 10/16/18  9:06 AM.  If you have any questions, ask your nurse or doctor.               Start taking these medicines.        Dose/Directions    Cefixime 400 MG Caps capsule   Commonly known as:  SUPRAX   Used for:  Acute sinusitis with symptoms > 10 days        Dose:  400 mg   Take 1 capsule (400 mg) by mouth daily   Quantity:  10 capsule   Refills:  0       fluconazole 150 MG tablet   Commonly known as:  DIFLUCAN   Used for:  Candidiasis of vagina        Dose:  150 mg   Take 1 tablet (150 mg) by mouth once for 1 dose May repeat in one week   Quantity:  2 tablet   Refills:  0       ranitidine 300 MG tablet   Commonly known as:  ZANTAC   Used for:  Acute gastritis without hemorrhage, unspecified gastritis type        Dose:  300 mg   Take 1 tablet (300 mg) by mouth At Bedtime   Quantity:  30 tablet   Refills:  2         These medicines have changed or have updated prescriptions.        Dose/Directions    gabapentin 600 MG tablet   Commonly known as:  NEURONTIN   This may have changed:  when to take this   Used for:  Radial neuropathy, right        Dose:  1200 mg   Take 2 tablets (1,200 mg)  by mouth 2 times daily   Quantity:  120 tablet   Refills:  1       * QUEtiapine 50 MG tablet   Commonly known as:  SEROQUEL   This may have changed:    - when to take this  - additional instructions   Used for:  Severe bipolar I disorder, most recent episode mixed, with psychotic features (H)        Dose:  50 mg   Take 1 tablet (50 mg) by mouth 3 times daily   Quantity:  90 tablet   Refills:  0       * QUEtiapine 25 MG tablet   Commonly known as:  SEROquel   This may have changed:  Another medication with the same name was changed. Make sure you understand how and when to take each.   Used for:  Severe bipolar I disorder, most recent episode mixed, with psychotic features (H)        Dose:  25 mg   Take 1 tablet (25 mg) by mouth 2 times daily   Quantity:  60 tablet   Refills:  0       * Notice:  This list has 2 medication(s) that are the same as other medications prescribed for you. Read the directions carefully, and ask your doctor or other care provider to review them with you.      Stop taking these medicines if you haven't already. Please contact your care team if you have questions.     ibuprofen 800 MG tablet   Commonly known as:  ADVIL/MOTRIN                Where to get your medicines      These medications were sent to [facility] [place] [id number]     Phone:  [phone]     Cefixime 400 MG Caps capsule    fluconazole 150 MG tablet    gabapentin 600 MG tablet    ranitidine 300 MG tablet                Primary Care Provider Office Phone # Fax #    DIPESH Marino [facility] [phone]       [address] [place] [id number]        Equal Access to Services     [facility]ELA [facility] : Hadii jo rangelo Sorian, waaxda luqadaha, qaybta kaalmada adeegyada, raheem evangelista. So [facility] [place] [phone].    ATENCIÓN: Si habla español, tiene a borja disposición servicios gratuitos de asistencia lingüística. Llame al  [phone].    We comply with applicable federal civil rights laws and [place] laws. We do not discriminate on the basis of race, color, national origin, age, disability, sex, sexual orientation, or gender identity.            Thank you!     Thank you for choosing [facility]  for your care. Our goal is always to provide you with excellent care. Hearing back from our patients is one way we can continue to improve our services. Please take a few minutes to complete the written survey that you may receive in the mail after your visit with us. Thank you!             Your Updated Medication List - Protect others around you: Learn how to safely use, store and throw away your medicines at www.disposemymeds.org.          This list is accurate as of 10/16/18  9:06 AM.  Always use your most recent med list.                   Brand Name Dispense Instructions for use Diagnosis    acetaminophen 500 MG tablet    TYLENOL    100 tablet    Take 2 tablets (1,000 mg) by mouth every 4 hours as needed for other (mild pain)    S/P left oophorectomy       Cefixime 400 MG Caps capsule    SUPRAX    10 capsule    Take 1 capsule (400 mg) by mouth daily    Acute sinusitis with symptoms > 10 days       fluconazole 150 MG tablet    DIFLUCAN    2 tablet    Take 1 tablet (150 mg) by mouth once for 1 dose May repeat in one week    Candidiasis of vagina       gabapentin 600 MG tablet    NEURONTIN    120 tablet    Take 2 tablets (1,200 mg) by mouth 2 times daily    Radial neuropathy, right       lamoTRIgine 200 MG tablet    LaMICtal    30 tablet    Take 1 tablet (200 mg) by mouth daily    Severe bipolar I disorder, most recent episode mixed, with psychotic features (H)       LORazepam 0.5 MG tablet    ATIVAN          norgestimate-ethinyl estradiol 0.25-35 MG-MCG per tablet    ORTHO-CYCLEN, SPRINTEC    28 tablet    Take 1 tablet by mouth daily    Right ovarian cyst       ondansetron 4 MG ODT tab    ZOFRAN-ODT    20 tablet     Take 1-2 tablets (4-8 mg) by mouth every 8 hours as needed for nausea Dissolve ON the tongue.    S/P left oophorectomy       prochlorperazine 10 MG tablet    COMPAZINE    30 tablet    Take 1 tablet (10 mg) by mouth every 6 hours as needed for nausea or vomiting    Nausea       * QUEtiapine 50 MG tablet    SEROQUEL    90 tablet    Take 1 tablet (50 mg) by mouth 3 times daily    Severe bipolar I disorder, most recent episode mixed, with psychotic features (H)       * QUEtiapine 25 MG tablet    SEROquel    60 tablet    Take 1 tablet (25 mg) by mouth 2 times daily    Severe bipolar I disorder, most recent episode mixed, with psychotic features (H)       ranitidine 300 MG tablet    ZANTAC    30 tablet    Take 1 tablet (300 mg) by mouth At Bedtime    Acute gastritis without hemorrhage, unspecified gastritis type       senna-docusate 8.6-50 MG per tablet    SENOKOT-S;PERICOLACE    60 tablet    Take 1-2 tablets by mouth 2 times daily Take while on oral narcotics to prevent or treat constipation.    S/P left oophorectomy       * Notice:  This list has 2 medication(s) that are the same as other medications prescribed for you. Read the directions carefully, and ask your doctor or other care provider to review them with you.

## 2018-10-16 NOTE — TELEPHONE ENCOUNTER
Prior Authorization Retail Medication Request    Medication/Dose: suprax  ICD code (if different than what is on RX):  Acute sinusitis with symptoms > 10 days [J01.90]   Previously Tried and Failed:  Allergic to amoxicillian, erythromycin, bactrim  Rationale:  Pt has allergic reaction to many antibiotics.    Insurance Name:  [facility]  Insurance ID:   [id number]      Pharmacy Information (if different than what is on RX)  Name:     Phone:

## 2018-10-17 NOTE — TELEPHONE ENCOUNTER
Prior Authorization Approval    Authorization Effective Date: 10/16/2018  Authorization Expiration Date: 10/16/2019  Medication: suprax-APPROVED  Approved Dose/Quantity:   Reference #:     Insurance Company: [employer] - Phone [phone] Fax [phone]  Expected CoPay:       CoPay Card Available:      Foundation Assistance Needed:    Which Pharmacy is filling the prescription (Not needed for infusion/clinic administered): [facility] [place], [place] - [address] [place] [address]  Pharmacy Notified: Yes  Patient Notified: No    Pharmacy will notify patient when medication is ready.

## 2018-10-18 ENCOUNTER — OFFICE VISIT (OUTPATIENT)
Dept: OBGYN | Facility: CLINIC | Age: 33
End: 2018-10-18
Payer: COMMERCIAL

## 2018-10-18 VITALS
SYSTOLIC BLOOD PRESSURE: 113 MMHG | TEMPERATURE: 97.4 F | DIASTOLIC BLOOD PRESSURE: 73 MMHG | WEIGHT: 164 LBS | BODY MASS INDEX: 26.47 KG/M2 | HEART RATE: 93 BPM

## 2018-10-18 DIAGNOSIS — Z48.89 POSTOPERATIVE VISIT: ICD-10-CM

## 2018-10-18 DIAGNOSIS — N83.201 CYST OF RIGHT OVARY: Primary | ICD-10-CM

## 2018-10-18 PROCEDURE — 99213 OFFICE O/P EST LOW 20 MIN: CPT | Performed by: OBSTETRICS & GYNECOLOGY

## 2018-10-18 NOTE — MR AVS SNAPSHOT
After Visit Summary   10/18/2018    Sirena Dietrich    MRN: [id number]           Patient Information     Date Of Birth          1985        Visit Information        Provider Department      10/18/2018 11:15 AM Debi Luis MD [facility]        Today's Diagnoses     Cyst of right ovary    -  1    Postoperative visit           Follow-ups after your visit        Future tests that were ordered for you today     Open Future Orders        Priority Expected Expires Ordered    US Pelvic Complete with Transvaginal Routine  10/18/2019 10/18/2018            Who to contact     If you have questions or need follow up information about today's clinic visit or your schedule please contact [facility] directly at [phone].  Normal or non-critical lab and imaging results will be communicated to you by MyChart, letter or phone within 4 business days after the clinic has received the results. If you do not hear from us within 7 days, please contact the clinic through [employer] or phone. If you have a critical or abnormal lab result, we will notify you by phone as soon as possible.  Submit refill requests through [email or web address] or call your pharmacy and they will forward the refill request to us. Please allow 3 business days for your refill to be completed.          Additional Information About Your Visit        MyChart Information     [email or web address] gives you secure access to your electronic health record. If you see a primary care provider, you can also send messages to your care team and make appointments. If you have questions, please call your primary care clinic.  If you do not have a primary care provider, please call [phone] and they will assist you.        Care EveryWhere ID     This is your Care EveryWhere ID. This could be used by other organizations to access your [place] medical records  [id number]        Your Vitals Were     Pulse Temperature Last Period  Breastfeeding? BMI (Body Mass Index)       93 97.4  F (36.3  C) (Tympanic) (LMP Unknown) No 26.47 kg/m2        Blood Pressure from Last 3 Encounters:   10/18/18 113/73   10/11/18 132/77   10/09/18 122/68    Weight from Last 3 Encounters:   10/18/18 164 lb (74.4 kg)   10/09/18 163 lb (73.9 kg)   09/27/18 167 lb (75.8 kg)                 Today's Medication Changes          These changes are accurate as of 10/18/18 11:41 AM.  If you have any questions, ask your nurse or doctor.               These medicines have changed or have updated prescriptions.        Dose/Directions    * QUEtiapine 50 MG tablet   Commonly known as:  SEROQUEL   This may have changed:    - when to take this  - additional instructions   Used for:  Severe bipolar I disorder, most recent episode mixed, with psychotic features (H)        Dose:  50 mg   Take 1 tablet (50 mg) by mouth 3 times daily   Quantity:  90 tablet   Refills:  0       * QUEtiapine 25 MG tablet   Commonly known as:  SEROquel   This may have changed:  Another medication with the same name was changed. Make sure you understand how and when to take each.   Used for:  Severe bipolar I disorder, most recent episode mixed, with psychotic features (H)        Dose:  25 mg   Take 1 tablet (25 mg) by mouth 2 times daily   Quantity:  60 tablet   Refills:  0       * Notice:  This list has 2 medication(s) that are the same as other medications prescribed for you. Read the directions carefully, and ask your doctor or other care provider to review them with you.             Primary Care Provider Office Phone # Fax #    DIPESH Marino [facility] [phone]       [address] [place] [id number]        Equal Access to Services     MARCELA KAPLAN : Ian Nazario, benjamin douglas, sandie kaaltrinidad dominguez, raheem evangelista. So [facility] [phone].    ATENCIÓN: Si habla español, tiene a borja disposición servicios gratuitos de asistencia  lingüística. Anrdes al [phone].    We comply with applicable federal civil rights laws and [place] laws. We do not discriminate on the basis of race, color, national origin, age, disability, sex, sexual orientation, or gender identity.            Thank you!     Thank you for choosing [facility]  for your care. Our goal is always to provide you with excellent care. Hearing back from our patients is one way we can continue to improve our services. Please take a few minutes to complete the written survey that you may receive in the mail after your visit with us. Thank you!             Your Updated Medication List - Protect others around you: Learn how to safely use, store and throw away your medicines at www.disposemymeds.org.          This list is accurate as of 10/18/18 11:41 AM.  Always use your most recent med list.                   Brand Name Dispense Instructions for use Diagnosis    acetaminophen 500 MG tablet    TYLENOL    100 tablet    Take 2 tablets (1,000 mg) by mouth every 4 hours as needed for other (mild pain)    S/P left oophorectomy       Cefixime 400 MG Caps capsule    SUPRAX    10 capsule    Take 1 capsule (400 mg) by mouth daily    Acute sinusitis with symptoms > 10 days       gabapentin 600 MG tablet    NEURONTIN    120 tablet    Take 2 tablets (1,200 mg) by mouth 2 times daily    Radial neuropathy, right       lamoTRIgine 200 MG tablet    LaMICtal    30 tablet    Take 1 tablet (200 mg) by mouth daily    Severe bipolar I disorder, most recent episode mixed, with psychotic features (H)       LORazepam 0.5 MG tablet    ATIVAN          norgestimate-ethinyl estradiol 0.25-35 MG-MCG per tablet    ORTHO-CYCLEN, SPRINTEC    28 tablet    Take 1 tablet by mouth daily    Right ovarian cyst       ondansetron 4 MG ODT tab    ZOFRAN-ODT    20 tablet    Take 1-2 tablets (4-8 mg) by mouth every 8 hours as needed for nausea Dissolve ON the tongue.    S/P left oophorectomy       prochlorperazine  10 MG tablet    COMPAZINE    30 tablet    Take 1 tablet (10 mg) by mouth every 6 hours as needed for nausea or vomiting    Nausea       * QUEtiapine 50 MG tablet    SEROQUEL    90 tablet    Take 1 tablet (50 mg) by mouth 3 times daily    Severe bipolar I disorder, most recent episode mixed, with psychotic features (H)       * QUEtiapine 25 MG tablet    SEROquel    60 tablet    Take 1 tablet (25 mg) by mouth 2 times daily    Severe bipolar I disorder, most recent episode mixed, with psychotic features (H)       ranitidine 300 MG tablet    ZANTAC    30 tablet    Take 1 tablet (300 mg) by mouth At Bedtime    Acute gastritis without hemorrhage, unspecified gastritis type       senna-docusate 8.6-50 MG per tablet    SENOKOT-S;PERICOLACE    60 tablet    Take 1-2 tablets by mouth 2 times daily Take while on oral narcotics to prevent or treat constipation.    S/P left oophorectomy       * Notice:  This list has 2 medication(s) that are the same as other medications prescribed for you. Read the directions carefully, and ask your doctor or other care provider to review them with you.

## 2018-10-18 NOTE — NURSING NOTE
"Initial /73 (BP Location: Right arm, Patient Position: Chair, Cuff Size: Adult Large)  Pulse 93  Temp 97.4  F (36.3  C) (Tympanic)  Wt 164 lb (74.4 kg)  LMP  (LMP Unknown)  Breastfeeding? No  BMI 26.47 kg/m2 Estimated body mass index is 26.47 kg/(m^2) as calculated from the following:    Height as of 9/21/18: 5' 6\" (1.676 m).    Weight as of this encounter: 164 lb (74.4 kg). .    Trixie Lee        "
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
## 2018-10-30 ENCOUNTER — TELEPHONE (OUTPATIENT)
Dept: FAMILY MEDICINE | Facility: CLINIC | Age: 33
End: 2018-10-30

## 2018-10-30 DIAGNOSIS — N83.201 RIGHT OVARIAN CYST: ICD-10-CM

## 2018-10-30 RX ORDER — NORGESTIMATE AND ETHINYL ESTRADIOL 0.25-0.035
1 KIT ORAL DAILY
Qty: 84 TABLET | Refills: 0 | Status: SHIPPED | OUTPATIENT
Start: 2018-10-30 | End: 2019-01-14

## 2018-10-30 NOTE — TELEPHONE ENCOUNTER
Pt said she takes this continuously, active pills only, but I didn't see any notes in Epic and her RX doesn't say that.  She's requesting a refill, but insurance is rejecting until 11/04/18 and she said she needs it now.  Can you please send a new RX stating she takes it continuously, if this is the case?    Thank you,    Jazmin Healy [facility]  On behalf of [facility]

## 2018-10-30 NOTE — TELEPHONE ENCOUNTER
PER LAST GYN NOTE  Right ovarian cyst  -- encouraged to continue OCPs; she is okay to skip placebo pills as well to prevent opportunity for ovarian cysts to recur   RX sent  for continuous use.  Thanks Shi Martinez FNP-BC

## 2018-11-01 ENCOUNTER — OFFICE VISIT (OUTPATIENT)
Dept: FAMILY MEDICINE | Facility: CLINIC | Age: 33
End: 2018-11-01
Payer: COMMERCIAL

## 2018-11-01 VITALS
DIASTOLIC BLOOD PRESSURE: 72 MMHG | HEART RATE: 88 BPM | RESPIRATION RATE: 16 BRPM | HEIGHT: 66 IN | TEMPERATURE: 98.8 F | BODY MASS INDEX: 26.68 KG/M2 | SYSTOLIC BLOOD PRESSURE: 118 MMHG | WEIGHT: 166 LBS

## 2018-11-01 DIAGNOSIS — Z87.42 HISTORY OF OVARIAN CYST: Primary | ICD-10-CM

## 2018-11-01 PROCEDURE — 99213 OFFICE O/P EST LOW 20 MIN: CPT | Performed by: NURSE PRACTITIONER

## 2018-11-01 RX ORDER — VALACYCLOVIR HYDROCHLORIDE 500 MG/1
500 TABLET, FILM COATED ORAL DAILY
COMMUNITY
End: 2018-11-20

## 2018-11-01 RX ORDER — TRAMADOL HYDROCHLORIDE 50 MG/1
50 TABLET ORAL EVERY 6 HOURS PRN
Qty: 10 TABLET | Refills: 0 | Status: SHIPPED | OUTPATIENT
Start: 2018-11-01 | End: 2018-11-20

## 2018-11-01 RX ORDER — IBUPROFEN 800 MG/1
800 TABLET, FILM COATED ORAL EVERY 8 HOURS PRN
Qty: 60 TABLET | Refills: 1 | Status: CANCELLED | OUTPATIENT
Start: 2018-11-01

## 2018-11-01 NOTE — MR AVS SNAPSHOT
After Visit Summary   11/1/2018    Sirena iDetrich    MRN: [id number]           Patient Information     Date Of Birth          1985        Visit Information        Provider Department      11/1/2018 4:00 PM Lindsey Loomis, [facility]        Today's Diagnoses     History of ovarian cyst    -  1      Care Instructions    1. History of ovarian cyst  Chronic  CALL OB to ask for possible suggestions for Right ovarian cyst pain- chronically  - traMADol (ULTRAM) 50 MG tablet; Take 1 tablet (50 mg) by mouth every 6 hours as needed for severe pain  Dispense: 10 tablet; Refill: 0  - naloxone (NARCAN) nasal spray; Spray 1 spray (4 mg) into one nostril alternating nostrils as needed for opioid reversal every 2-3 minutes until assistance arrives  Dispense: 0.2 mL; Refill: 0      Treatment for Ovarian Cysts  An ovarian cyst is a fluid-filled sac that forms on or inside an ovary. The ovaries are a pair of small, oval-shaped organs in the lower part of a woman s belly (abdomen). About once a month, one of the ovaries releases an egg. The ovaries also make the hormones estrogen and progesterone. These hormones are part of pregnancy, the menstrual cycle, and breast growth.  Ovarian cysts are very common in women of all ages, however they are unusual in women who have reached menopause. Young girls can also get them, but this is less common. There are different types of ovarian cysts. They can occur for various reasons, and they may need different treatments. A cyst can vary in size from half an inch to more than 4 inches.  Types of treatment  Treatment for an ovarian cyst will depend on the type of cyst, your age, and your general health. Most women will not need treatment. You may be told to watch your symptoms over time. An ovarian cyst will often go away with no treatment in a few weeks or months.  In some cases, you may need to have follow-up ultrasound tests. These are to check if  your cyst has gone away or is not growing. You may not need any other treatment.  If your ultrasound or blood tests show signs of cancer, your healthcare provider may advise surgery. This is done to remove part or all of your ovary. Your healthcare provider might also advise surgery if:    Your cyst causes ongoing pressure or pain    Your cyst appears to be growing    You have a very large cyst    You have endometriosis and want the cyst removed to help with fertility  Can an ovarian cyst be prevented?  If you have hormone problems, your healthcare provider may advise taking birth control pills. These may help prevent ovarian cysts. Taking antibiotics for a pelvic infection may also prevent a cyst.  Possible complications of an ovarian cyst  An ovarian cyst can sometimes break open (rupture). This may not cause any symptoms. Or it may cause sudden, sharp pain in the lower belly. A ruptured cyst can cause a lot of blood and fluid loss. This can lead to low blood pressure. In some cases, surgery may be needed.  Rarely an ovarian cyst can also cause twisting (torsion) of the fallopian tube. This can block normal blood supply to the ovary. This can lead to sudden pain and may need emergency surgery.  When to call the healthcare provider  Call your healthcare provider right away if you have any of these:    Sudden belly pain    Other severe symptoms   Date Last Reviewed: 10/1/2017    5927-5268 [employer]. [address] [place] [address], [place] [address]. All rights reserved. This information is not intended as a substitute for professional medical care. Always follow your healthcare professional's instructions.                Follow-ups after your visit        Follow-up notes from your care team     Return in about 1 month (around 12/1/2018) for follow-up with Shi Martinez.      Who to contact     If you have questions or need follow up information about today's clinic visit or your schedule please contact  "[facility] directly at [phone].  Normal or non-critical lab and imaging results will be communicated to you by MyChart, letter or phone within 4 business days after the clinic has received the results. If you do not hear from us within 7 days, please contact the clinic through [employer] or phone. If you have a critical or abnormal lab result, we will notify you by phone as soon as possible.  Submit refill requests through [employer] or call your pharmacy and they will forward the refill request to us. Please allow 3 business days for your refill to be completed.          Additional Information About Your Visit        [employer] Information     [employer] gives you secure access to your electronic health record. If you see a primary care provider, you can also send messages to your care team and make appointments. If you have questions, please call your primary care clinic.  If you do not have a primary care provider, please call [phone] and they will assist you.        Care EveryWhere ID     This is your Care EveryWhere ID. This could be used by other organizations to access your [facility] medical records  [id number]        Your Vitals Were     Pulse Temperature Respirations Height Last Period BMI (Body Mass Index)    88 98.8  F (37.1  C) (Tympanic) 16 5' 6\" (1.676 m) (LMP Unknown) 26.79 kg/m2       Blood Pressure from Last 3 Encounters:   11/01/18 118/72   10/18/18 113/73   10/11/18 132/77    Weight from Last 3 Encounters:   11/01/18 166 lb (75.3 kg)   10/18/18 164 lb (74.4 kg)   10/09/18 163 lb (73.9 kg)              Today, you had the following     No orders found for display         Today's Medication Changes          These changes are accurate as of 11/1/18  4:52 PM.  If you have any questions, ask your nurse or doctor.               Start taking these medicines.        Dose/Directions    naloxone nasal spray   Commonly known as:  NARCAN   Used for:  History of ovarian cyst   Started by:  " Lindsey Loomis CNP        Dose:  4 mg   Spray 1 spray (4 mg) into one nostril alternating nostrils as needed for opioid reversal every 2-3 minutes until assistance arrives   Quantity:  0.2 mL   Refills:  0       traMADol 50 MG tablet   Commonly known as:  ULTRAM   Used for:  History of ovarian cyst   Started by:  Lindsey Loomis CNP        Dose:  50 mg   Take 1 tablet (50 mg) by mouth every 6 hours as needed for severe pain   Quantity:  10 tablet   Refills:  0         These medicines have changed or have updated prescriptions.        Dose/Directions    * QUEtiapine 50 MG tablet   Commonly known as:  SEROQUEL   This may have changed:    - when to take this  - additional instructions   Used for:  Severe bipolar I disorder, most recent episode mixed, with psychotic features (H)        Dose:  50 mg   Take 1 tablet (50 mg) by mouth 3 times daily   Quantity:  90 tablet   Refills:  0       * QUEtiapine 25 MG tablet   Commonly known as:  SEROquel   This may have changed:  Another medication with the same name was changed. Make sure you understand how and when to take each.   Used for:  Severe bipolar I disorder, most recent episode mixed, with psychotic features (H)        Dose:  25 mg   Take 1 tablet (25 mg) by mouth 2 times daily   Quantity:  60 tablet   Refills:  0       * Notice:  This list has 2 medication(s) that are the same as other medications prescribed for you. Read the directions carefully, and ask your doctor or other care provider to review them with you.         Where to get your medicines      These medications were sent to [id number] [address] [facility]  [address] [facility] [address]    Hours:  Closed 10-16-08 business to [facility] Phone:  [phone]     naloxone nasal spray         Some of these will need a paper prescription and others can be bought over the counter.  Ask your nurse if you have questions.     Bring a paper prescription for  each of these medications     traMADol 50 MG tablet               Information about OPIOIDS     PRESCRIPTION OPIOIDS: WHAT YOU NEED TO KNOW   We gave you an opioid (narcotic) pain medicine. It is important to manage your pain, but opioids are not always the best choice. You should first try all the other options your care team gave you. Take this medicine for as short a time (and as few doses) as possible.    Some activities can increase your pain, such as bandage changes or therapy sessions. It may help to take your pain medicine 30 to 60 minutes before these activities. Reduce your stress by getting enough sleep, working on hobbies you enjoy and practicing relaxation or meditation. Talk to your care team about ways to manage your pain beyond prescription opioids.    These medicines have risks:    DO NOT drive when on new or higher doses of pain medicine. These medicines can affect your alertness and reaction times, and you could be arrested for driving under the influence (DUI). If you need to use opioids long-term, talk to your care team about driving.    DO NOT operate heavy machinery    DO NOT do any other dangerous activities while taking these medicines.    DO NOT drink any alcohol while taking these medicines.     If the opioid prescribed includes acetaminophen, DO NOT take with any other medicines that contain acetaminophen. Read all labels carefully. Look for the word  acetaminophen  or  Tylenol.  Ask your pharmacist if you have questions or are unsure.    You can get addicted to pain medicines, especially if you have a history of addiction (chemical, alcohol or substance dependence). Talk to your care team about ways to reduce this risk.    All opioids tend to cause constipation. Drink plenty of water and eat foods that have a lot of fiber, such as fruits, vegetables, prune juice, apple juice and high-fiber cereal. Take a laxative (Miralax, milk of magnesia, Colace, Senna) if you don t move your bowels at  least every other day. Other side effects include upset stomach, sleepiness, dizziness, throwing up, tolerance (needing more of the medicine to have the same effect), physical dependence and slowed breathing.    Store your pills in a secure place, locked if possible. We will not replace any lost or stolen medicine. If you don t finish your medicine, please throw away (dispose) as directed by your pharmacist. The [place] Pollution [employer] Agency has more information about safe disposal: [email or web address]://[email or web address]/living-[email or web address]-medications         Primary Care Provider Office Phone # Fax #    Shi Martinez DIPESH [facility] [phone]       [address] [place] [address]        Equal Access to Services     SAMRA KAPLAN : Ian Nazario, wapravinda stella, qaybta kaalmada angelica, raheem day . So [facility] [phone].    ATENCIÓN: Si habla español, tiene a borja disposición servicios gratuitos de asistencia lingüística. Llame al [phone].    We comply with applicable federal civil rights laws and [place] laws. We do not discriminate on the basis of race, color, national origin, age, disability, sex, sexual orientation, or gender identity.            Thank you!     Thank you for choosing [facility]  for your care. Our goal is always to provide you with excellent care. Hearing back from our patients is one way we can continue to improve our services. Please take a few minutes to complete the written survey that you may receive in the mail after your visit with us. Thank you!             Your Updated Medication List - Protect others around you: Learn how to safely use, store and throw away your medicines at www.disposemymeds.org.          This list is accurate as of 11/1/18  4:52 PM.  Always use your most recent med list.                   Brand Name Dispense Instructions for use Diagnosis    acetaminophen 500 MG tablet     TYLENOL    100 tablet    Take 2 tablets (1,000 mg) by mouth every 4 hours as needed for other (mild pain)    S/P left oophorectomy       gabapentin 600 MG tablet    NEURONTIN    120 tablet    Take 2 tablets (1,200 mg) by mouth 2 times daily    Radial neuropathy, right       lamoTRIgine 200 MG tablet    LaMICtal    30 tablet    Take 1 tablet (200 mg) by mouth daily    Severe bipolar I disorder, most recent episode mixed, with psychotic features (H)       LORazepam 0.5 MG tablet    ATIVAN          naloxone nasal spray    NARCAN    0.2 mL    Spray 1 spray (4 mg) into one nostril alternating nostrils as needed for opioid reversal every 2-3 minutes until assistance arrives    History of ovarian cyst       norgestimate-ethinyl estradiol 0.25-35 MG-MCG per tablet    ORTHO-CYCLEN, SPRINTEC    84 tablet    Take 1 tablet by mouth daily Use continuously skip placebo pills    Right ovarian cyst       ondansetron 4 MG ODT tab    ZOFRAN-ODT    20 tablet    Take 1-2 tablets (4-8 mg) by mouth every 8 hours as needed for nausea Dissolve ON the tongue.    S/P left oophorectomy       prochlorperazine 10 MG tablet    COMPAZINE    30 tablet    Take 1 tablet (10 mg) by mouth every 6 hours as needed for nausea or vomiting    Nausea       * QUEtiapine 50 MG tablet    SEROQUEL    90 tablet    Take 1 tablet (50 mg) by mouth 3 times daily    Severe bipolar I disorder, most recent episode mixed, with psychotic features (H)       * QUEtiapine 25 MG tablet    SEROquel    60 tablet    Take 1 tablet (25 mg) by mouth 2 times daily    Severe bipolar I disorder, most recent episode mixed, with psychotic features (H)       ranitidine 300 MG tablet    ZANTAC    30 tablet    Take 1 tablet (300 mg) by mouth At Bedtime    Acute gastritis without hemorrhage, unspecified gastritis type       senna-docusate 8.6-50 MG per tablet    SENOKOT-S;PERICOLACE    60 tablet    Take 1-2 tablets by mouth 2 times daily Take while on oral narcotics to prevent or treat  constipation.    S/P left oophorectomy       traMADol 50 MG tablet    ULTRAM    10 tablet    Take 1 tablet (50 mg) by mouth every 6 hours as needed for severe pain    History of ovarian cyst       VALTREX 500 MG tablet   Generic drug:  valACYclovir      Take 500 mg by mouth daily    History of ovarian cyst       * Notice:  This list has 2 medication(s) that are the same as other medications prescribed for you. Read the directions carefully, and ask your doctor or other care provider to review them with you.

## 2018-11-01 NOTE — PATIENT INSTRUCTIONS
1. History of ovarian cyst  Chronic  CALL OB to ask for possible suggestions for Right ovarian cyst pain- chronically  - traMADol (ULTRAM) 50 MG tablet; Take 1 tablet (50 mg) by mouth every 6 hours as needed for severe pain  Dispense: 10 tablet; Refill: 0  - naloxone (NARCAN) nasal spray; Spray 1 spray (4 mg) into one nostril alternating nostrils as needed for opioid reversal every 2-3 minutes until assistance arrives  Dispense: 0.2 mL; Refill: 0      Treatment for Ovarian Cysts  An ovarian cyst is a fluid-filled sac that forms on or inside an ovary. The ovaries are a pair of small, oval-shaped organs in the lower part of a woman s belly (abdomen). About once a month, one of the ovaries releases an egg. The ovaries also make the hormones estrogen and progesterone. These hormones are part of pregnancy, the menstrual cycle, and breast growth.  Ovarian cysts are very common in women of all ages, however they are unusual in women who have reached menopause. Young girls can also get them, but this is less common. There are different types of ovarian cysts. They can occur for various reasons, and they may need different treatments. A cyst can vary in size from half an inch to more than 4 inches.  Types of treatment  Treatment for an ovarian cyst will depend on the type of cyst, your age, and your general health. Most women will not need treatment. You may be told to watch your symptoms over time. An ovarian cyst will often go away with no treatment in a few weeks or months.  In some cases, you may need to have follow-up ultrasound tests. These are to check if your cyst has gone away or is not growing. You may not need any other treatment.  If your ultrasound or blood tests show signs of cancer, your healthcare provider may advise surgery. This is done to remove part or all of your ovary. Your healthcare provider might also advise surgery if:    Your cyst causes ongoing pressure or pain    Your cyst appears to be  growing    You have a very large cyst    You have endometriosis and want the cyst removed to help with fertility  Can an ovarian cyst be prevented?  If you have hormone problems, your healthcare provider may advise taking birth control pills. These may help prevent ovarian cysts. Taking antibiotics for a pelvic infection may also prevent a cyst.  Possible complications of an ovarian cyst  An ovarian cyst can sometimes break open (rupture). This may not cause any symptoms. Or it may cause sudden, sharp pain in the lower belly. A ruptured cyst can cause a lot of blood and fluid loss. This can lead to low blood pressure. In some cases, surgery may be needed.  Rarely an ovarian cyst can also cause twisting (torsion) of the fallopian tube. This can block normal blood supply to the ovary. This can lead to sudden pain and may need emergency surgery.  When to call the healthcare provider  Call your healthcare provider right away if you have any of these:    Sudden belly pain    Other severe symptoms   Date Last Reviewed: 10/1/2017    8319-4221 [employer]. [address] [place] [address], [place] [address]. All rights reserved. This information is not intended as a substitute for professional medical care. Always follow your healthcare professional's instructions.

## 2018-11-01 NOTE — PROGRESS NOTES
SUBJECTIVE:   Sirena Dietrich is a 32 year old female who presents to clinic today for the following health issues:      Abdominal Pain      Duration: 2 days     Description (location/character/radiation): Rt lower abdominal pain        Associated flank pain: None    Intensity:  Severe then gets lightheaded and dizzy     Accompanying signs and symptoms:        Fever/Chills: no        Gas/Bloating: no        Nausea/vomitting: no        Diarrhea: no        Dysuria or Hematuria: no     History (previous similar pain/trauma/previous testing): Yes history of ovarian cyst     Precipitating or alleviating factors:       Pain worse with eating/BM/urination: no       Pain relieved by BM: no     Therapies tried and outcome: IBU ( she notes she is taken way more Ibuprofen than recommended) or Tylenol as needed     LMP:  not applicable    10/18/2018 OBGYN NOTE:   She is status post laparoscopic left oophorectomy, lysis of adhesions on 9/21 for a left ovarian cyst concurrent with pelvic pain.   Since surgery, she presented to the ER on 10/5 for acute post-operative pain. Evaluation revealed a right complex 4 cm ovarian cyst. She was seen for ER follow-up visit with a PCP and had her morphine PO pain medication refilled in addition to initiating birth control therapy in order to suppress recurrent ovarian cysts from her remaining right ovary.   She reports improvement in her pain since initiating birth control pills. She is nervous about the pain recurring, but is otherwise doing fine.     ULTRASOUND PELVIS COMPLETE WITH TRANSVAGINAL AND DOPPLER LIMITED  IMAGES  10/11/2018 11:04 AM     HISTORY:  Pelvic pain 3 weeks status post left oophorectomy.     FINDINGS: Ultrasound was performed transvaginally as well as  transabdominally in order to optimally evaluate the adnexa.     Uterus is not visible and reported to be surgically absent. The left  ovary is not visible and reported to be surgically absent. Within the  right ovary,  there is a 4.6 x 2.8 x 3.1 cm slightly complex cyst.  There was no free pelvic fluid.         IMPRESSION:  Right ovarian 4.6 cm slightly complex cyst.  Hysterectomy/left oophorectomy. No free pelvic fluid.     KAY VANEGAS MD  HPI:   PCP:  DIPESH Marino [phone]    Patient Active Problem List   Diagnosis     Lumbago     CARDIOVASCULAR SCREENING; LDL GOAL LESS THAN 160     Pelvic pain in female     Urinary incontinence     Migraine headache     Tortuous colon     PTSD (post-traumatic stress disorder)     Severe bipolar I disorder, most recent episode mixed, with psychotic features (H)     Agoraphobia with panic disorder     S/P hysterectomy     Insomnia due to other mental disorder     MTHFR mutation (methylenetetrahydrofolate reductase) (H)     Cyst of left ovary     Herpes simplex vulvovaginitis     Methamphetamine abuse in remission (H)     Current Outpatient Prescriptions   Medication     acetaminophen (TYLENOL) 500 MG tablet     gabapentin (NEURONTIN) 600 MG tablet     lamoTRIgine (LAMICTAL) 200 MG tablet     LORazepam (ATIVAN) 0.5 MG tablet     naloxone (NARCAN) nasal spray     norgestimate-ethinyl estradiol (ORTHO-CYCLEN, SPRINTEC) 0.25-35 MG-MCG per tablet     ondansetron (ZOFRAN-ODT) 4 MG ODT tab     prochlorperazine (COMPAZINE) 10 MG tablet     QUEtiapine (SEROQUEL) 25 MG tablet     QUEtiapine (SEROQUEL) 50 MG tablet     ranitidine (ZANTAC) 300 MG tablet     senna-docusate (SENOKOT-S;PERICOLACE) 8.6-50 MG per tablet     traMADol (ULTRAM) 50 MG tablet     valACYclovir (VALTREX) 500 MG tablet     No current facility-administered medications for this visit.        There are no preventive care reminders to display for this patient.    Reviewed and updated:  Tobacco  Allergies  Meds  Med Hx  Surg Hx  Fam Hx  Soc Hx     ROS:  Constitutional, HEENT, cardiovascular, pulmonary, gi and gu systems are negative, except as otherwise noted.    PHYSICAL EXAM:   /72 (BP Location: Right  "arm, Patient Position: Sitting, Cuff Size: Adult Regular)  Pulse 88  Temp 98.8  F (37.1  C) (Tympanic)  Resp 16  Ht 5' 6\" (1.676 m)  Wt 166 lb (75.3 kg)  LMP  (LMP Unknown)  BMI 26.79 kg/m2  Body mass index is 26.79 kg/(m^2).  GENERAL APPEARANCE: healthy, alert and no distress  RESP: lungs clear to auscultation - no rales, rhonchi or wheezes  CV: regular rates and rhythm, normal S1 S2, no S3 or S4 and no murmur, click or rub  ABDOMEN: soft, nontender, without hepatosplenomegaly or masses, bowel sounds normal and tender over right ovary  MS: extremities normal- no gross deformities noted  PSYCH: mentation appears normal and affect normal/bright    ASSESSMENT & PLAN:   I explained that Tramadol and benzodiazepines can cause respiratory depression and should not be taken together. I will prescribe one dose of Tramadol. Further refills or pain medications to be prescribed by her PCP.    1. History of ovarian cyst  Chronic  CALL OB to ask for possible suggestions for Right ovarian cyst pain- chronically  - traMADol (ULTRAM) 50 MG tablet; Take 1 tablet (50 mg) by mouth every 6 hours as needed for severe pain  Dispense: 10 tablet; Refill: 0  - naloxone (NARCAN) nasal spray; Spray 1 spray (4 mg) into one nostril alternating nostrils as needed for opioid reversal every 2-3 minutes until assistance arrives  Dispense: 0.2 mL; Refill: 0      Treatment for Ovarian Cysts  An ovarian cyst is a fluid-filled sac that forms on or inside an ovary. The ovaries are a pair of small, oval-shaped organs in the lower part of a woman s belly (abdomen). About once a month, one of the ovaries releases an egg. The ovaries also make the hormones estrogen and progesterone. These hormones are part of pregnancy, the menstrual cycle, and breast growth.  Ovarian cysts are very common in women of all ages, however they are unusual in women who have reached menopause. Young girls can also get them, but this is less common. There are different " types of ovarian cysts. They can occur for various reasons, and they may need different treatments. A cyst can vary in size from half an inch to more than 4 inches.  Types of treatment  Treatment for an ovarian cyst will depend on the type of cyst, your age, and your general health. Most women will not need treatment. You may be told to watch your symptoms over time. An ovarian cyst will often go away with no treatment in a few weeks or months.  In some cases, you may need to have follow-up ultrasound tests. These are to check if your cyst has gone away or is not growing. You may not need any other treatment.  If your ultrasound or blood tests show signs of cancer, your healthcare provider may advise surgery. This is done to remove part or all of your ovary. Your healthcare provider might also advise surgery if:    Your cyst causes ongoing pressure or pain    Your cyst appears to be growing    You have a very large cyst    You have endometriosis and want the cyst removed to help with fertility  Can an ovarian cyst be prevented?  If you have hormone problems, your healthcare provider may advise taking birth control pills. These may help prevent ovarian cysts. Taking antibiotics for a pelvic infection may also prevent a cyst.  Possible complications of an ovarian cyst  An ovarian cyst can sometimes break open (rupture). This may not cause any symptoms. Or it may cause sudden, sharp pain in the lower belly. A ruptured cyst can cause a lot of blood and fluid loss. This can lead to low blood pressure. In some cases, surgery may be needed.  Rarely an ovarian cyst can also cause twisting (torsion) of the fallopian tube. This can block normal blood supply to the ovary. This can lead to sudden pain and may need emergency surgery.  When to call the healthcare provider  Call your healthcare provider right away if you have any of these:    Sudden belly pain    Other severe symptoms   Date Last Reviewed: 10/1/2017    [id number]  [employer]. [address] [place] [address], [place] [address]. All rights reserved. This information is not intended as a substitute for professional medical care. Always follow your healthcare professional's instructions.          Risks, benefits, side effects and rationale for treatment plan fully discussed with the patient and understanding expressed.    MICHAEL [facility]

## 2018-11-07 ENCOUNTER — OFFICE VISIT (OUTPATIENT)
Dept: FAMILY MEDICINE | Facility: CLINIC | Age: 33
End: 2018-11-07
Payer: COMMERCIAL

## 2018-11-07 VITALS
SYSTOLIC BLOOD PRESSURE: 120 MMHG | HEART RATE: 83 BPM | DIASTOLIC BLOOD PRESSURE: 70 MMHG | HEIGHT: 66 IN | OXYGEN SATURATION: 98 % | RESPIRATION RATE: 12 BRPM | WEIGHT: 170 LBS | BODY MASS INDEX: 27.32 KG/M2 | TEMPERATURE: 98.3 F

## 2018-11-07 DIAGNOSIS — B96.89 BV (BACTERIAL VAGINOSIS): Primary | ICD-10-CM

## 2018-11-07 DIAGNOSIS — N76.0 BV (BACTERIAL VAGINOSIS): Primary | ICD-10-CM

## 2018-11-07 LAB
SPECIMEN SOURCE: ABNORMAL
WET PREP SPEC: ABNORMAL

## 2018-11-07 PROCEDURE — 87210 SMEAR WET MOUNT SALINE/INK: CPT | Performed by: NURSE PRACTITIONER

## 2018-11-07 PROCEDURE — 99213 OFFICE O/P EST LOW 20 MIN: CPT | Performed by: NURSE PRACTITIONER

## 2018-11-07 RX ORDER — MORPHINE SULFATE 15 MG/1
TABLET ORAL
Refills: 0 | COMMUNITY
Start: 2018-09-28 | End: 2019-01-23

## 2018-11-07 RX ORDER — CLINDAMYCIN PHOSPHATE 20 MG/G
1 CREAM VAGINAL AT BEDTIME
Qty: 40 G | Refills: 0 | Status: SHIPPED | OUTPATIENT
Start: 2018-11-07 | End: 2019-03-01

## 2018-11-07 NOTE — MR AVS SNAPSHOT
After Visit Summary   11/7/2018    Sirena Dietrich    MRN: [id number]           Patient Information     Date Of Birth          1985        Visit Information        Provider Department      11/7/2018 1:00 PM Aleja Cedeño APRN CNP [facility]        Today's Diagnoses     Vaginal itching    -  1    BV (bacterial vaginosis)          Care Instructions    Will treat bacterial vaginosis with vaginal antibiotic     Would continue probiotic    If urine becomes a problem return to clinic for a urine test     Push fluids     Keep vaginal area clean and dry, sensitive products    Return to clinic as needed           Follow-ups after your visit        Follow-up notes from your care team     Return in about 1 week (around 11/14/2018), or if symptoms worsen or fail to improve.      Who to contact     If you have questions or need follow up information about today's clinic visit or your schedule please contact [facility] directly at [phone].  Normal or non-critical lab and imaging results will be communicated to you by [employer], letter or phone within 4 business days after the clinic has received the results. If you do not hear from us within 7 days, please contact the clinic through [employer] or phone. If you have a critical or abnormal lab result, we will notify you by phone as soon as possible.  Submit refill requests through [facility] or call your pharmacy and they will forward the refill request to us. Please allow 3 business days for your refill to be completed.          Additional Information About Your Visit        MyChart Information     [facility] gives you secure access to your electronic health record. If you see a primary care provider, you can also send messages to your care team and make appointments. If you have questions, please call your primary care clinic.  If you do not have a primary care provider, please call [phone] and they will assist you.       "  Care EveryWhere ID     This is your Care EveryWhere ID. This could be used by other organizations to access your [facility] medical records  [id number]        Your Vitals Were     Pulse Temperature Respirations Height Last Period Pulse Oximetry    83 98.3  F (36.8  C) (Tympanic) 12 5' 6\" (1.676 m) (LMP Unknown) 98%    BMI (Body Mass Index)                   27.44 kg/m2            Blood Pressure from Last 3 Encounters:   11/07/18 120/70   11/01/18 118/72   10/18/18 113/73    Weight from Last 3 Encounters:   11/07/18 170 lb (77.1 kg)   11/01/18 166 lb (75.3 kg)   10/18/18 164 lb (74.4 kg)              We Performed the Following     Wet prep          Today's Medication Changes          These changes are accurate as of 11/7/18  1:19 PM.  If you have any questions, ask your nurse or doctor.               Start taking these medicines.        Dose/Directions    clindamycin 2 % cream   Commonly known as:  CLEOCIN   Used for:  BV (bacterial vaginosis)   Started by:  Aleja Cedeño APRN CNP        Dose:  1 applicator   Place 1 applicator vaginally At Bedtime for 3 days   Quantity:  40 g   Refills:  0         These medicines have changed or have updated prescriptions.        Dose/Directions    * QUEtiapine 50 MG tablet   Commonly known as:  SEROQUEL   This may have changed:    - when to take this  - additional instructions   Used for:  Severe bipolar I disorder, most recent episode mixed, with psychotic features (H)        Dose:  50 mg   Take 1 tablet (50 mg) by mouth 3 times daily   Quantity:  90 tablet   Refills:  0       * QUEtiapine 25 MG tablet   Commonly known as:  SEROquel   This may have changed:  Another medication with the same name was changed. Make sure you understand how and when to take each.   Used for:  Severe bipolar I disorder, most recent episode mixed, with psychotic features (H)        Dose:  25 mg   Take 1 tablet (25 mg) by mouth 2 times daily   Quantity:  60 tablet   Refills:  0       * Notice:  " This list has 2 medication(s) that are the same as other medications prescribed for you. Read the directions carefully, and ask your doctor or other care provider to review them with you.         Where to get your medicines      These medications were sent to [facility] [place] - [address] , [facility] [address]     Phone:  [phone]     clindamycin 2 % cream               Information about OPIOIDS     PRESCRIPTION OPIOIDS: WHAT YOU NEED TO KNOW   We gave you an opioid (narcotic) pain medicine. It is important to manage your pain, but opioids are not always the best choice. You should first try all the other options your care team gave you. Take this medicine for as short a time (and as few doses) as possible.    Some activities can increase your pain, such as bandage changes or therapy sessions. It may help to take your pain medicine 30 to 60 minutes before these activities. Reduce your stress by getting enough sleep, working on hobbies you enjoy and practicing relaxation or meditation. Talk to your care team about ways to manage your pain beyond prescription opioids.    These medicines have risks:    DO NOT drive when on new or higher doses of pain medicine. These medicines can affect your alertness and reaction times, and you could be arrested for driving under the influence (DUI). If you need to use opioids long-term, talk to your care team about driving.    DO NOT operate heavy machinery    DO NOT do any other dangerous activities while taking these medicines.    DO NOT drink any alcohol while taking these medicines.     If the opioid prescribed includes acetaminophen, DO NOT take with any other medicines that contain acetaminophen. Read all labels carefully. Look for the word  acetaminophen  or  Tylenol.  Ask your pharmacist if you have questions or are unsure.    You can get addicted to pain medicines, especially if you have a history of addiction (chemical,  alcohol or substance dependence). Talk to your care team about ways to reduce this risk.    All opioids tend to cause constipation. Drink plenty of water and eat foods that have a lot of fiber, such as fruits, vegetables, prune juice, apple juice and high-fiber cereal. Take a laxative (Miralax, milk of magnesia, Colace, Senna) if you don t move your bowels at least every other day. Other side effects include upset stomach, sleepiness, dizziness, throwing up, tolerance (needing more of the medicine to have the same effect), physical dependence and slowed breathing.    Store your pills in a secure place, locked if possible. We will not replace any lost or stolen medicine. If you don t finish your medicine, please throw away (dispose) as directed by your pharmacist. The [place] [employer] has more information about safe disposal: [email or web address].pca.[email or web address]/living-[email or web address]-medications         Primary Care Provider Office Phone # Fax #    DIPESH Marino [facility] [phone]       [address] [place] [address]        Equal Access to Services     [facility] ROSAURA : Hadii aad ku hadasho Sobrittonali, waaxda luqadaha, qaybta kaalmada angelica, raheem day . So [facility] [phone].    ATENCIÓN: Si habla español, tiene a borja disposición servicios gratuitos de asistencia lingüística. Andres al [phone].    We comply with applicable federal civil rights laws and [place] laws. We do not discriminate on the basis of race, color, national origin, age, disability, sex, sexual orientation, or gender identity.            Thank you!     Thank you for choosing [facility]  for your care. Our goal is always to provide you with excellent care. Hearing back from our patients is one way we can continue to improve our services. Please take a few minutes to complete the written survey that you may receive in the mail after your visit with us. Thank  you!             Your Updated Medication List - Protect others around you: Learn how to safely use, store and throw away your medicines at www.disposemymeds.org.          This list is accurate as of 11/7/18  1:19 PM.  Always use your most recent med list.                   Brand Name Dispense Instructions for use Diagnosis    acetaminophen 500 MG tablet    TYLENOL    100 tablet    Take 2 tablets (1,000 mg) by mouth every 4 hours as needed for other (mild pain)    S/P left oophorectomy       clindamycin 2 % cream    CLEOCIN    40 g    Place 1 applicator vaginally At Bedtime for 3 days    BV (bacterial vaginosis)       gabapentin 600 MG tablet    NEURONTIN    120 tablet    Take 2 tablets (1,200 mg) by mouth 2 times daily    Radial neuropathy, right       lamoTRIgine 200 MG tablet    LaMICtal    30 tablet    Take 1 tablet (200 mg) by mouth daily    Severe bipolar I disorder, most recent episode mixed, with psychotic features (H)       LORazepam 0.5 MG tablet    ATIVAN          morphine 15 MG IR tablet    MSIR          naloxone nasal spray    NARCAN    0.2 mL    Spray 1 spray (4 mg) into one nostril alternating nostrils as needed for opioid reversal every 2-3 minutes until assistance arrives    History of ovarian cyst       norgestimate-ethinyl estradiol 0.25-35 MG-MCG per tablet    ORTHO-CYCLEN, SPRINTEC    84 tablet    Take 1 tablet by mouth daily Use continuously skip placebo pills    Right ovarian cyst       ondansetron 4 MG ODT tab    ZOFRAN-ODT    20 tablet    Take 1-2 tablets (4-8 mg) by mouth every 8 hours as needed for nausea Dissolve ON the tongue.    S/P left oophorectomy       prochlorperazine 10 MG tablet    COMPAZINE    30 tablet    Take 1 tablet (10 mg) by mouth every 6 hours as needed for nausea or vomiting    Nausea       * QUEtiapine 50 MG tablet    SEROQUEL    90 tablet    Take 1 tablet (50 mg) by mouth 3 times daily    Severe bipolar I disorder, most recent episode mixed, with psychotic features (H)        * QUEtiapine 25 MG tablet    SEROquel    60 tablet    Take 1 tablet (25 mg) by mouth 2 times daily    Severe bipolar I disorder, most recent episode mixed, with psychotic features (H)       ranitidine 300 MG tablet    ZANTAC    30 tablet    Take 1 tablet (300 mg) by mouth At Bedtime    Acute gastritis without hemorrhage, unspecified gastritis type       senna-docusate 8.6-50 MG per tablet    SENOKOT-S;PERICOLACE    60 tablet    Take 1-2 tablets by mouth 2 times daily Take while on oral narcotics to prevent or treat constipation.    S/P left oophorectomy       traMADol 50 MG tablet    ULTRAM    10 tablet    Take 1 tablet (50 mg) by mouth every 6 hours as needed for severe pain    History of ovarian cyst       VALTREX 500 MG tablet   Generic drug:  valACYclovir      Take 500 mg by mouth daily    History of ovarian cyst       * Notice:  This list has 2 medication(s) that are the same as other medications prescribed for you. Read the directions carefully, and ask your doctor or other care provider to review them with you.

## 2018-11-07 NOTE — PATIENT INSTRUCTIONS
Will treat bacterial vaginosis with vaginal antibiotic     Would continue probiotic    If urine becomes a problem return to clinic for a urine test     Push fluids     Keep vaginal area clean and dry, sensitive products    Return to clinic as needed

## 2018-11-07 NOTE — NURSING NOTE
"Chief Complaint   Patient presents with     Vaginal Problem     few days       Initial /70  Pulse 83  Temp 98.3  F (36.8  C) (Tympanic)  Resp 12  Ht 5' 6\" (1.676 m)  Wt 170 lb (77.1 kg)  LMP  (LMP Unknown)  SpO2 98%  BMI 27.44 kg/m2 Estimated body mass index is 27.44 kg/(m^2) as calculated from the following:    Height as of this encounter: 5' 6\" (1.676 m).    Weight as of this encounter: 170 lb (77.1 kg).    Patient presents to the clinic using No DME    Health Maintenance that is potentially due pending provider review:  NONE    n/a    Is there anyone who you would like to be able to receive your results? No  If yes have patient fill out JOSÉ    "

## 2018-11-07 NOTE — PROGRESS NOTES
SUBJECTIVE:   Sirena Dietrich is a 32 year old female who presents to clinic today for the following health issues:      Vaginal Symptoms      Duration: few days    Description  itching and odor    Intensity:  moderate    Accompanying signs and symptoms (fever/dysuria/abdominal or back pain): None    History  Sexually active: yes, single partner, contraception - oral contraceptives (combined) and hysterectomy  Possibility of pregnancy: No    Recent antibiotic use: YES- patient is doesn't know name of antibiotic, it was for sinus infection    Precipitating or alleviating factors: None    Therapies tried and outcome: none   Outcome: NA    Having urinary frequency and urgency     Problem list and histories reviewed & adjusted, as indicated.  Additional history: as documented    Patient Active Problem List   Diagnosis     Lumbago     CARDIOVASCULAR SCREENING; LDL GOAL LESS THAN 160     Pelvic pain in female     Urinary incontinence     Migraine headache     Tortuous colon     PTSD (post-traumatic stress disorder)     Severe bipolar I disorder, most recent episode mixed, with psychotic features (H)     Agoraphobia with panic disorder     S/P hysterectomy     Insomnia due to other mental disorder     MTHFR mutation (methylenetetrahydrofolate reductase) (H)     Cyst of left ovary     Herpes simplex vulvovaginitis     Methamphetamine abuse in remission (H)     Past Surgical History:   Procedure Laterality Date     ANESTHESIA OUT OF OR MRI N/A 1/12/2015    Procedure: ANESTHESIA OUT OF OR MRI;  Surgeon: Generic Anesthesia Provider;  Location: [facility]     C INDUCED ABORTN BY D&C  2007     C INDUCED ABORTN BY D&C  3/2009     C INDUCED ABORTN BY D&C  10/2008     CYSTOSCOPY       CYSTOSCOPY N/A 12/3/2014    Procedure: CYSTOSCOPY;  Surgeon: Caroline Grossman MD;  Location: [facility]     CYSTOSCOPY N/A 9/21/2018    Procedure: CYSTOSCOPY;;  Surgeon: Debi Luis MD;  Location: [facility]     DILATION AND CURETTAGE N/A 1/5/2015     Procedure: DILATION AND CURETTAGE;  Surgeon: Caroline Grossman MD;  [facility]     ESOPHAGOSCOPY, GASTROSCOPY, DUODENOSCOPY (EGD), COMBINED N/A 8/25/2016    Procedure: COMBINED ESOPHAGOSCOPY, GASTROSCOPY, DUODENOSCOPY (EGD);  Surgeon: Tommie Clancy MD;  [facility]: [facility]     EXCISE LESION PERINEAL  4/9/2014    Procedure: EXCISE LESION PERINEAL;;  Surgeon: Lisa Helton MD;  [facility]     HYSTERECTOMY, PAP NO LONGER INDICATED       LAPAROSCOPIC HYSTERECTOMY TOTAL N/A 12/3/2014    Procedure: LAPAROSCOPIC HYSTERECTOMY TOTAL;  Surgeon: Caroline Grossman MD;  [facility]     LAPAROSCOPIC SALPINGECTOMY  4/9/2014    Procedure: LAPAROSCOPIC SALPINGECTOMY;  Laparoscopic Bilateral Salpingectomy, Removal Of Perineal Skin Tag;  Surgeon: Lisa Helton MD;  [facility]     LAPAROSCOPIC SALPINGO-OOPHORECTOMY N/A 9/21/2018    Procedure: LAPAROSCOPIC SALPINGO-OOPHORECTOMY;  Diagnostic Laparoscopy. Left Salpingo-Oopherectomy. lysis of adhesions, cystoscopy;  Surgeon: Debi Luis MD;  [facility]     LAPAROSCOPY DIAGNOSTIC (GYN)  10/16/2012    Procedure: LAPAROSCOPY DIAGNOSTIC (GYN);  Diagnostic Laparoscopy, Excision of Bilateral Paratubal Cysts;  Surgeon: La Guzmán MD;  Location: [facility]     TONSILLECTOMY         Social History   Substance Use Topics     Smoking status: Former Smoker     Packs/day: 0.50     Types: Cigarettes     Quit date: 5/2/2018     Smokeless tobacco: Never Used     Alcohol use Yes      Comment: rare      Family History   Problem Relation Age of Onset     Alcohol/Drug Mother      drugs     Depression Mother      HEART DISEASE Mother      ?     Alcohol/Drug Father      drugs     Depression Father      Hypertension Maternal Grandmother      Cancer Maternal Grandmother      cervical     Depression Maternal Grandmother      HEART DISEASE Maternal Grandmother      Other - See Comments Maternal Grandmother      ovaries removed for cancer  cells     Hypertension Brother      Bipolar Disorder Other      Bipolar Disorder Other      Crohn Disease Paternal Grandmother      LUNG DISEASE Paternal Grandmother      breathing problems     HEART DISEASE Other      stents, clogged arteries         Current Outpatient Prescriptions   Medication Sig Dispense Refill     acetaminophen (TYLENOL) 500 MG tablet Take 2 tablets (1,000 mg) by mouth every 4 hours as needed for other (mild pain) 100 tablet 1     clindamycin (CLEOCIN) 2 % cream Place 1 applicator vaginally At Bedtime for 3 days 40 g 0     gabapentin (NEURONTIN) 600 MG tablet Take 2 tablets (1,200 mg) by mouth 2 times daily 120 tablet 1     lamoTRIgine (LAMICTAL) 200 MG tablet Take 1 tablet (200 mg) by mouth daily 30 tablet 1     LORazepam (ATIVAN) 0.5 MG tablet   0     norgestimate-ethinyl estradiol (ORTHO-CYCLEN, SPRINTEC) 0.25-35 MG-MCG per tablet Take 1 tablet by mouth daily Use continuously skip placebo pills 84 tablet 0     ondansetron (ZOFRAN-ODT) 4 MG ODT tab Take 1-2 tablets (4-8 mg) by mouth every 8 hours as needed for nausea Dissolve ON the tongue. 20 tablet 1     prochlorperazine (COMPAZINE) 10 MG tablet Take 1 tablet (10 mg) by mouth every 6 hours as needed for nausea or vomiting 30 tablet 0     QUEtiapine (SEROQUEL) 25 MG tablet Take 1 tablet (25 mg) by mouth 2 times daily 60 tablet 0     QUEtiapine (SEROQUEL) 50 MG tablet Take 1 tablet (50 mg) by mouth 3 times daily (Patient taking differently: Take 50 mg by mouth Up to 150 mg daily) 90 tablet 0     ranitidine (ZANTAC) 300 MG tablet Take 1 tablet (300 mg) by mouth At Bedtime 30 tablet 2     traMADol (ULTRAM) 50 MG tablet Take 1 tablet (50 mg) by mouth every 6 hours as needed for severe pain 10 tablet 0     valACYclovir (VALTREX) 500 MG tablet Take 500 mg by mouth daily       morphine (MSIR) 15 MG IR tablet   0     naloxone (NARCAN) nasal spray Spray 1 spray (4 mg) into one nostril alternating nostrils as needed for opioid reversal every 2-3  "minutes until assistance arrives (Patient not taking: Reported on 11/7/2018) 0.2 mL 0     senna-docusate (SENOKOT-S;PERICOLACE) 8.6-50 MG per tablet Take 1-2 tablets by mouth 2 times daily Take while on oral narcotics to prevent or treat constipation. (Patient not taking: Reported on 11/7/2018) 60 tablet 0     Allergies   Allergen Reactions     Vicodin [Hydrocodone-Acetaminophen] Other (See Comments)     Severe irritability.  Neither vicodin nor percocet seem to provide relief     Amoxicillin Swelling     As a child--facial swelling and redness     Bactrim Itching and Rash     Erythro [Erythromycin] Other (See Comments) and Swelling     As a child--facial swelling       Reviewed and updated as needed this visit by clinical staff  Tobacco  Allergies  Meds  Problems  Med Hx  Surg Hx  Fam Hx  Soc Hx        Reviewed and updated as needed this visit by Provider  Tobacco  Allergies  Meds  Problems  Med Hx  Surg Hx  Fam Hx  Soc Hx          ROS:  Constitutional, HEENT, cardiovascular, pulmonary, gi and gu systems are negative, except as otherwise noted.    OBJECTIVE:     /70  Pulse 83  Temp 98.3  F (36.8  C) (Tympanic)  Resp 12  Ht 5' 6\" (1.676 m)  Wt 170 lb (77.1 kg)  LMP  (LMP Unknown)  SpO2 98%  BMI 27.44 kg/m2  Body mass index is 27.44 kg/(m^2).  GENERAL APPEARANCE: healthy, alert and no distress  RESP: lungs clear to auscultation - no rales, rhonchi or wheezes  CV: regular rates and rhythm, normal S1 S2, no S3 or S4 and no murmur, click or rub  ABDOMEN: soft, tenderness generalized from recent surgery, without hepatosplenomegaly or masses and bowel sounds normal    Diagnostic Test Results:  Results for orders placed or performed in visit on 11/07/18 (from the past 24 hour(s))   Wet prep   Result Value Ref Range    Specimen Description Vagina     Wet Prep No Trichomonas seen     Wet Prep No yeast seen     Wet Prep Clue cells seen (A)      *Note: Due to a large number of results and/or " encounters for the requested time period, some results have not been displayed. A complete set of results can be found in Results Review.       ASSESSMENT/PLAN:     1. BV (bacterial vaginosis)  Discussed treatment versus non-treatment. Patient would like to treat. Discussed prevention of bacterial vaginosis. Patient to return to clinic if symptoms persist or do not improve.   - Wet prep  - clindamycin (CLEOCIN) 2 % cream; Place 1 applicator vaginally At Bedtime for 3 days  Dispense: 40 g; Refill: 0    Patient Instructions   Will treat bacterial vaginosis with vaginal antibiotic     Would continue probiotic    If urine becomes a problem return to clinic for a urine test     Push fluids     Keep vaginal area clean and dry, sensitive products    Return to clinic as needed       DIPESH Lewis [facility]

## 2018-11-12 ENCOUNTER — TELEPHONE (OUTPATIENT)
Dept: FAMILY MEDICINE | Facility: CLINIC | Age: 33
End: 2018-11-12

## 2018-11-12 DIAGNOSIS — G89.29 OTHER CHRONIC PAIN: ICD-10-CM

## 2018-11-12 DIAGNOSIS — R10.9 CHRONIC ABDOMINAL PAIN: ICD-10-CM

## 2018-11-12 DIAGNOSIS — G89.29 CHRONIC ABDOMINAL PAIN: ICD-10-CM

## 2018-11-12 NOTE — TELEPHONE ENCOUNTER
This pt came on the CV suite weekly scorecard as needing chronic pain added to problem list. Chronic abdominal pain and other chronic pain added to problem list,  per 9/10/2015 diagnosis

## 2018-11-18 ENCOUNTER — OFFICE VISIT (OUTPATIENT)
Dept: URGENT CARE | Facility: URGENT CARE | Age: 33
End: 2018-11-18
Payer: COMMERCIAL

## 2018-11-18 VITALS
DIASTOLIC BLOOD PRESSURE: 73 MMHG | HEART RATE: 78 BPM | WEIGHT: 169.2 LBS | SYSTOLIC BLOOD PRESSURE: 117 MMHG | OXYGEN SATURATION: 99 % | BODY MASS INDEX: 27.31 KG/M2 | TEMPERATURE: 98.2 F

## 2018-11-18 DIAGNOSIS — G43.909 MIGRAINE WITHOUT STATUS MIGRAINOSUS, NOT INTRACTABLE, UNSPECIFIED MIGRAINE TYPE: Primary | ICD-10-CM

## 2018-11-18 PROCEDURE — 96372 THER/PROPH/DIAG INJ SC/IM: CPT | Performed by: FAMILY MEDICINE

## 2018-11-18 PROCEDURE — 99214 OFFICE O/P EST MOD 30 MIN: CPT | Mod: 25 | Performed by: FAMILY MEDICINE

## 2018-11-18 RX ORDER — KETOROLAC TROMETHAMINE 30 MG/ML
30 INJECTION, SOLUTION INTRAMUSCULAR; INTRAVENOUS ONCE
Qty: 1 ML | Refills: 0 | OUTPATIENT
Start: 2018-11-18 | End: 2019-03-01

## 2018-11-18 RX ORDER — SUMATRIPTAN 25 MG/1
25-50 TABLET, FILM COATED ORAL
Qty: 9 TABLET | Refills: 1 | Status: SHIPPED | OUTPATIENT
Start: 2018-11-18 | End: 2019-01-23

## 2018-11-18 ASSESSMENT — ENCOUNTER SYMPTOMS
CONSTITUTIONAL NEGATIVE: 1
WEAKNESS: 0
EYES NEGATIVE: 1
MUSCULOSKELETAL NEGATIVE: 1
RESPIRATORY NEGATIVE: 1
HEADACHES: 1
CARDIOVASCULAR NEGATIVE: 1
HEMATOLOGIC/LYMPHATIC NEGATIVE: 1
DIZZINESS: 0

## 2018-11-18 NOTE — MR AVS SNAPSHOT
After Visit Summary   11/18/2018    Sirena Dietrich    MRN: [id number]           Patient Information     Date Of Birth          1985        Visit Information        Provider Department      11/18/2018 10:40 AM Montana Magallon MD [facility]        Today's Diagnoses     Migraine without status migrainosus, not intractable, unspecified migraine type    -  1       Follow-ups after your visit        Your next 10 appointments already scheduled     Nov 20, 2018  9:00 AM CST   Office Visit with DIPEHS Marino CNP   [facility] ([facility]    [address] [place] [id number]   [phone]           Bring a current list of meds and any records pertaining to this visit. For Physicals, please bring immunization records and any forms needing to be filled out. Please arrive 10 minutes early to complete paperwork.              Who to contact     If you have questions or need follow up information about today's clinic visit or your schedule please contact [facility] directly at [phone].  Normal or non-critical lab and imaging results will be communicated to you by CerRxhart, letter or phone within 4 business days after the clinic has received the results. If you do not hear from us within 7 days, please contact the clinic through [employer] System[employer] or phone. If you have a critical or abnormal lab result, we will notify you by phone as soon as possible.  Submit refill requests through [employer] or call your pharmacy and they will forward the refill request to us. Please allow 3 business days for your refill to be completed.          Additional Information About Your Visit        MyChart Information     [employer] gives you secure access to your electronic health record. If you see a primary care provider, you can also send messages to your care team and make appointments. If you have  questions, please call your primary care clinic.  If you do not have a primary care provider, please call [phone] and they will assist you.        Care EveryWhere ID     This is your Care EveryWhere ID. This could be used by other organizations to access your [place] medical records  [id number]        Your Vitals Were     Pulse Temperature Last Period Pulse Oximetry BMI (Body Mass Index)       78 98.2  F (36.8  C) (Tympanic) (LMP Unknown) 99% 27.31 kg/m2        Blood Pressure from Last 3 Encounters:   11/18/18 117/73   11/07/18 120/70   11/01/18 118/72    Weight from Last 3 Encounters:   11/18/18 169 lb 3.2 oz (76.7 kg)   11/07/18 170 lb (77.1 kg)   11/01/18 166 lb (75.3 kg)              We Performed the Following     INJECTION INTRAMUSCULAR OR SUB-Q     KETOROLAC (TORADOL) 15 MG          Today's Medication Changes          These changes are accurate as of 11/18/18  4:33 PM.  If you have any questions, ask your nurse or doctor.               Start taking these medicines.        Dose/Directions    ketorolac 30 MG/ML injection   Commonly known as:  TORADOL   Used for:  Migraine without status migrainosus, not intractable, unspecified migraine type   Started by:  Montana Magallon MD        Dose:  30 mg   Inject 1 mL (30 mg) into the muscle once for 1 dose   Quantity:  1 mL   Refills:  0       SUMAtriptan 25 MG tablet   Commonly known as:  IMITREX   Used for:  Migraine without status migrainosus, not intractable, unspecified migraine type   Started by:  Montana Magallon MD        Dose:  25-50 mg   Take 1-2 tablets (25-50 mg) by mouth at onset of headache for migraine May repeat in 2 hours. Max 8 tablets/24 hours.   Quantity:  9 tablet   Refills:  1         These medicines have changed or have updated prescriptions.        Dose/Directions    * QUEtiapine 50 MG tablet   Commonly known as:  SEROQUEL   This may have changed:    - when to take this  - additional instructions   Used for:  Severe  bipolar I disorder, most recent episode mixed, with psychotic features (H)        Dose:  50 mg   Take 1 tablet (50 mg) by mouth 3 times daily   Quantity:  90 tablet   Refills:  0       * QUEtiapine 25 MG tablet   Commonly known as:  SEROquel   This may have changed:  Another medication with the same name was changed. Make sure you understand how and when to take each.   Used for:  Severe bipolar I disorder, most recent episode mixed, with psychotic features (H)        Dose:  25 mg   Take 1 tablet (25 mg) by mouth 2 times daily   Quantity:  60 tablet   Refills:  0       * Notice:  This list has 2 medication(s) that are the same as other medications prescribed for you. Read the directions carefully, and ask your doctor or other care provider to review them with you.         Where to get your medicines      These medications were sent to [id number] - [place], [place] - [address]  [address] [address]    Hours:  Closed 10-16-08 business to [facility] Phone:  [phone]     SUMAtriptan 25 MG tablet         Some of these will need a paper prescription and others can be bought over the counter.  Ask your nurse if you have questions.     You don't need a prescription for these medications     ketorolac 30 MG/ML injection                Primary Care Provider Office Phone # Fax #    DIPESH Marino [facility] [phone]       [address] [facility] [address]        Equal Access to Services     SAMRA KAPLAN : Hadgabrielle rangelo Sorian, waaxda luqadaha, qaybta kaalmada angelica, raheem evangelista. So [facility] [phone].    ATENCIÓN: Si habla español, tiene a borja disposición servicios gratuitos de asistencia lingüística. Andres al [phone].    We comply with applicable federal civil rights laws and [place] laws. We do not discriminate on the basis of race, color, national origin, age, disability, sex, sexual orientation, or gender  identity.            Thank you!     Thank you for choosing [facility]  for your care. Our goal is always to provide you with excellent care. Hearing back from our patients is one way we can continue to improve our services. Please take a few minutes to complete the written survey that you may receive in the mail after your visit with us. Thank you!             Your Updated Medication List - Protect others around you: Learn how to safely use, store and throw away your medicines at www.disposemymeds.org.          This list is accurate as of 11/18/18  4:33 PM.  Always use your most recent med list.                   Brand Name Dispense Instructions for use Diagnosis    acetaminophen 500 MG tablet    TYLENOL    100 tablet    Take 2 tablets (1,000 mg) by mouth every 4 hours as needed for other (mild pain)    S/P left oophorectomy       gabapentin 600 MG tablet    NEURONTIN    120 tablet    Take 2 tablets (1,200 mg) by mouth 2 times daily    Radial neuropathy, right       ketorolac 30 MG/ML injection    TORADOL    1 mL    Inject 1 mL (30 mg) into the muscle once for 1 dose    Migraine without status migrainosus, not intractable, unspecified migraine type       lamoTRIgine 200 MG tablet    LaMICtal    30 tablet    Take 1 tablet (200 mg) by mouth daily    Severe bipolar I disorder, most recent episode mixed, with psychotic features (H)       LORazepam 0.5 MG tablet    ATIVAN          morphine 15 MG IR tablet    MSIR          naloxone 4 MG/0.1ML nasal spray    NARCAN    0.2 mL    Spray 1 spray (4 mg) into one nostril alternating nostrils as needed for opioid reversal every 2-3 minutes until assistance arrives    History of ovarian cyst       norgestimate-ethinyl estradiol 0.25-35 MG-MCG per tablet    ORTHO-CYCLEN, SPRINTEC    84 tablet    Take 1 tablet by mouth daily Use continuously skip placebo pills    Right ovarian cyst       ondansetron 4 MG ODT tab    ZOFRAN-ODT    20 tablet    Take 1-2  tablets (4-8 mg) by mouth every 8 hours as needed for nausea Dissolve ON the tongue.    S/P left oophorectomy       prochlorperazine 10 MG tablet    COMPAZINE    30 tablet    Take 1 tablet (10 mg) by mouth every 6 hours as needed for nausea or vomiting    Nausea       * QUEtiapine 50 MG tablet    SEROQUEL    90 tablet    Take 1 tablet (50 mg) by mouth 3 times daily    Severe bipolar I disorder, most recent episode mixed, with psychotic features (H)       * QUEtiapine 25 MG tablet    SEROquel    60 tablet    Take 1 tablet (25 mg) by mouth 2 times daily    Severe bipolar I disorder, most recent episode mixed, with psychotic features (H)       ranitidine 300 MG tablet    ZANTAC    30 tablet    Take 1 tablet (300 mg) by mouth At Bedtime    Acute gastritis without hemorrhage, unspecified gastritis type       senna-docusate 8.6-50 MG per tablet    SENOKOT-S;PERICOLACE    60 tablet    Take 1-2 tablets by mouth 2 times daily Take while on oral narcotics to prevent or treat constipation.    S/P left oophorectomy       SUMAtriptan 25 MG tablet    IMITREX    9 tablet    Take 1-2 tablets (25-50 mg) by mouth at onset of headache for migraine May repeat in 2 hours. Max 8 tablets/24 hours.    Migraine without status migrainosus, not intractable, unspecified migraine type       traMADol 50 MG tablet    ULTRAM    10 tablet    Take 1 tablet (50 mg) by mouth every 6 hours as needed for severe pain    History of ovarian cyst       VALTREX 500 MG tablet   Generic drug:  valACYclovir      Take 500 mg by mouth daily    History of ovarian cyst       * Notice:  This list has 2 medication(s) that are the same as other medications prescribed for you. Read the directions carefully, and ask your doctor or other care provider to review them with you.
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
## 2018-11-20 ENCOUNTER — OFFICE VISIT (OUTPATIENT)
Dept: FAMILY MEDICINE | Facility: CLINIC | Age: 33
End: 2018-11-20
Payer: COMMERCIAL

## 2018-11-20 VITALS
SYSTOLIC BLOOD PRESSURE: 125 MMHG | DIASTOLIC BLOOD PRESSURE: 80 MMHG | WEIGHT: 168 LBS | TEMPERATURE: 97.3 F | BODY MASS INDEX: 27.12 KG/M2 | RESPIRATION RATE: 14 BRPM | HEART RATE: 89 BPM | OXYGEN SATURATION: 99 %

## 2018-11-20 DIAGNOSIS — N76.0 BV (BACTERIAL VAGINOSIS): ICD-10-CM

## 2018-11-20 DIAGNOSIS — B00.9 HERPES SIMPLEX VIRUS INFECTION: ICD-10-CM

## 2018-11-20 DIAGNOSIS — N89.8 VAGINAL DISCHARGE: Primary | ICD-10-CM

## 2018-11-20 DIAGNOSIS — B96.89 BV (BACTERIAL VAGINOSIS): ICD-10-CM

## 2018-11-20 DIAGNOSIS — F31.64 SEVERE BIPOLAR I DISORDER, MOST RECENT EPISODE MIXED, WITH PSYCHOTIC FEATURES (H): ICD-10-CM

## 2018-11-20 DIAGNOSIS — Z87.42 HISTORY OF OVARIAN CYST: ICD-10-CM

## 2018-11-20 DIAGNOSIS — Z90.721 S/P LEFT OOPHORECTOMY: ICD-10-CM

## 2018-11-20 LAB
SPECIMEN SOURCE: ABNORMAL
WET PREP SPEC: ABNORMAL

## 2018-11-20 PROCEDURE — 99214 OFFICE O/P EST MOD 30 MIN: CPT | Performed by: NURSE PRACTITIONER

## 2018-11-20 PROCEDURE — 87210 SMEAR WET MOUNT SALINE/INK: CPT | Performed by: NURSE PRACTITIONER

## 2018-11-20 RX ORDER — ONDANSETRON 4 MG/1
4-8 TABLET, ORALLY DISINTEGRATING ORAL EVERY 8 HOURS PRN
Qty: 20 TABLET | Refills: 1 | Status: SHIPPED | OUTPATIENT
Start: 2018-11-20 | End: 2019-01-31

## 2018-11-20 RX ORDER — METRONIDAZOLE 7.5 MG/G
1 GEL VAGINAL AT BEDTIME
Qty: 70 G | Refills: 1 | Status: SHIPPED | OUTPATIENT
Start: 2018-11-20 | End: 2019-03-01

## 2018-11-20 RX ORDER — TRAMADOL HYDROCHLORIDE 50 MG/1
50 TABLET ORAL EVERY 6 HOURS PRN
Qty: 10 TABLET | Refills: 0 | Status: SHIPPED | OUTPATIENT
Start: 2018-11-20 | End: 2019-01-31

## 2018-11-20 RX ORDER — VALACYCLOVIR HYDROCHLORIDE 500 MG/1
500 TABLET, FILM COATED ORAL DAILY
Qty: 31 TABLET | Refills: 11 | Status: SHIPPED | OUTPATIENT
Start: 2018-11-20 | End: 2019-04-04

## 2018-11-20 RX ORDER — QUETIAPINE FUMARATE 50 MG/1
100-150 TABLET, FILM COATED ORAL AT BEDTIME
Qty: 90 TABLET | Refills: 1 | Status: SHIPPED | OUTPATIENT
Start: 2018-11-20 | End: 2019-01-25

## 2018-11-20 RX ORDER — LAMOTRIGINE 200 MG/1
200 TABLET ORAL DAILY
Qty: 30 TABLET | Refills: 2 | Status: SHIPPED | OUTPATIENT
Start: 2018-11-20 | End: 2019-01-21

## 2018-11-20 RX ORDER — QUETIAPINE FUMARATE 25 MG/1
25 TABLET, FILM COATED ORAL 2 TIMES DAILY
Qty: 60 TABLET | Refills: 1 | Status: SHIPPED | OUTPATIENT
Start: 2018-11-20 | End: 2019-01-25

## 2018-11-20 ASSESSMENT — PAIN SCALES - GENERAL: PAINLEVEL: NO PAIN (0)

## 2018-11-20 NOTE — MR AVS SNAPSHOT
After Visit Summary   2018    Sirena Dietrich    MRN: [id number]           Patient Information     Date Of Birth          1985        Visit Information        Provider Department      2018 9:00 AM Shi Martinez APRN [facility]        Today's Diagnoses     Vaginal discharge    -  1    Severe bipolar I disorder, most recent episode mixed, with psychotic features        S/P left oophorectomy        History of ovarian cyst        Herpes simplex virus infection        BV (bacterial vaginosis)          Care Instructions      Bacterial Vaginosis    You have a vaginal infection called bacterial vaginosis (BV). Both good and bad bacteria are present in a healthy vagina. BV occurs when these bacteria get out of balance. The number of bad bacteria increase. And the number of good bacteria decrease. Although BV is associated with sexual activity, it is not a sexually transmitted disease.  BV may or may not cause symptoms. If symptoms do occur, they can include:    Thin, gray, milky-white, or sometimes green discharge    Unpleasant odor or  fishy  smell    Itching, burning, or pain in or around the vagina  It is not known what causes BV, but certain factors can make the problem more likely. This can include:    Douching    Having sex with a new partner    Having sex with more than one partner  BV will sometimes go away on its own. But treatment is usually recommended. This is because untreated BV can increase the risk of more serious health problems such as:    Pelvic inflammatory disease (PID)     delivery (giving birth to a baby early if you re pregnant)    HIV and certain other sexually transmitted diseases (STDs)    Infection after surgery on the reproductive organs  Home care  General care    BV is most often treated with medicines called antibiotics. These may be given as pills or as a vaginal cream. If antibiotics are prescribed, be sure to use them  exactly as directed. Also, be sure to complete all of the medicine, even if your symptoms go away.    Don't douche or having sex during treatment.    If you have sex with a female partner, ask your healthcare provider if she should also be treated.  Prevention    Don't douche.    Don't have sex. If you do have sex, then take steps to lower your risk:  ? Use condoms when having sex.  ? Limit the number of sexual partners you have.  Follow-up care  Follow up with your healthcare provider, or as advised.  When to seek medical advice  Call your healthcare provider right away if:    You have a fever of 100.4 F (38 C) or higher, or as directed by your provider.    Your symptoms worsen, or they don t go away within a few days of starting treatment.    You have new pain in the lower belly or pelvic region.    You have side effects that bother you or a reaction to the pills or cream you re prescribed.    You or any partners you have sex with have new symptoms, such as a rash, joint pain, or sores.  Date Last Reviewed: 10/1/2017    6225-8983 [employer]. [address] [place] [address]. All rights reserved. This information is not intended as a substitute for professional medical care. Always follow your healthcare professional's instructions.                Follow-ups after your visit        Who to contact     If you have questions or need follow up information about today's clinic visit or your schedule please contact [facility] directly at [phone].  Normal or non-critical lab and imaging results will be communicated to you by MyChart, letter or phone within 4 business days after the clinic has received the results. If you do not hear from us within 7 days, please contact the clinic through [employer] or phone. If you have a critical or abnormal lab result, we will notify you by phone as soon as possible.  Submit refill requests through [employer] or call your pharmacy and they will  forward the refill request to us. Please allow 3 business days for your refill to be completed.          Additional Information About Your Visit        [employer] Information     [employer] gives you secure access to your electronic health record. If you see a primary care provider, you can also send messages to your care team and make appointments. If you have questions, please call your primary care clinic.  If you do not have a primary care provider, please call [phone] and they will assist you.        Care EveryWhere ID     This is your Care EveryWhere ID. This could be used by other organizations to access your [facility] medical records  [id number]        Your Vitals Were     Pulse Temperature Respirations Last Period Pulse Oximetry BMI (Body Mass Index)    89 97.3  F (36.3  C) (Tympanic) 14 (LMP Unknown) 99% 27.12 kg/m2       Blood Pressure from Last 3 Encounters:   11/20/18 125/80   11/18/18 117/73   11/07/18 120/70    Weight from Last 3 Encounters:   11/20/18 168 lb (76.2 kg)   11/18/18 169 lb 3.2 oz (76.7 kg)   11/07/18 170 lb (77.1 kg)              We Performed the Following     Wet prep          Today's Medication Changes          These changes are accurate as of 11/20/18 10:10 AM.  If you have any questions, ask your nurse or doctor.               Start taking these medicines.        Dose/Directions    metroNIDAZOLE 0.75 % vaginal gel   Commonly known as:  METROGEL   Used for:  BV (bacterial vaginosis)   Started by:  Shi Martinez APRN CNP        Dose:  1 applicator   Place 1 applicator (5 g) vaginally At Bedtime for 5 days   Quantity:  70 g   Refills:  1         These medicines have changed or have updated prescriptions.        Dose/Directions    * QUEtiapine 25 MG tablet   Commonly known as:  SEROquel   This may have changed:  Another medication with the same name was changed. Make sure you understand how and when to take each.   Used for:  Severe bipolar I disorder, most recent episode  mixed, with psychotic features (H)   Changed by:  Shi Martinez APRN CNP        Dose:  25 mg   Take 1 tablet (25 mg) by mouth 2 times daily   Quantity:  60 tablet   Refills:  1       * QUEtiapine 50 MG tablet   Commonly known as:  SEROQUEL   This may have changed:    - how much to take  - when to take this  - additional instructions   Used for:  Severe bipolar I disorder, most recent episode mixed, with psychotic features (H)   Changed by:  Shi Martinez APRN CNP        Dose:  100-150 mg   Take 2-3 tablets (100-150 mg) by mouth At Bedtime Up to 150 mg daily   Quantity:  90 tablet   Refills:  1       * Notice:  This list has 2 medication(s) that are the same as other medications prescribed for you. Read the directions carefully, and ask your doctor or other care provider to review them with you.      Stop taking these medicines if you haven't already. Please contact your care team if you have questions.     naloxone 4 MG/0.1ML nasal spray   Commonly known as:  NARCAN   Stopped by:  Shi Martinez APRN CNP                Where to get your medicines      These medications were sent to [id number] [address] [facility]  [address] [place] [id number]    Hours:  Closed 10-16-08 business to [facility] Phone:  [phone]     lamoTRIgine 200 MG tablet    metroNIDAZOLE 0.75 % vaginal gel    ondansetron 4 MG ODT tab    QUEtiapine 25 MG tablet    QUEtiapine 50 MG tablet    valACYclovir 500 MG tablet         Some of these will need a paper prescription and others can be bought over the counter.  Ask your nurse if you have questions.     Bring a paper prescription for each of these medications     traMADol 50 MG tablet               Information about OPIOIDS     PRESCRIPTION OPIOIDS: WHAT YOU NEED TO KNOW   We gave you an opioid (narcotic) pain medicine. It is important to manage your pain, but opioids are not always the best choice. You should first try all  the other options your care team gave you. Take this medicine for as short a time (and as few doses) as possible.    Some activities can increase your pain, such as bandage changes or therapy sessions. It may help to take your pain medicine 30 to 60 minutes before these activities. Reduce your stress by getting enough sleep, working on hobbies you enjoy and practicing relaxation or meditation. Talk to your care team about ways to manage your pain beyond prescription opioids.    These medicines have risks:    DO NOT drive when on new or higher doses of pain medicine. These medicines can affect your alertness and reaction times, and you could be arrested for driving under the influence (DUI). If you need to use opioids long-term, talk to your care team about driving.    DO NOT operate heavy machinery    DO NOT do any other dangerous activities while taking these medicines.    DO NOT drink any alcohol while taking these medicines.     If the opioid prescribed includes acetaminophen, DO NOT take with any other medicines that contain acetaminophen. Read all labels carefully. Look for the word  acetaminophen  or  Tylenol.  Ask your pharmacist if you have questions or are unsure.    You can get addicted to pain medicines, especially if you have a history of addiction (chemical, alcohol or substance dependence). Talk to your care team about ways to reduce this risk.    All opioids tend to cause constipation. Drink plenty of water and eat foods that have a lot of fiber, such as fruits, vegetables, prune juice, apple juice and high-fiber cereal. Take a laxative (Miralax, milk of magnesia, Colace, Senna) if you don t move your bowels at least every other day. Other side effects include upset stomach, sleepiness, dizziness, throwing up, tolerance (needing more of the medicine to have the same effect), physical dependence and slowed breathing.    Store your pills in a secure place, locked if possible. We will not replace any  lost or stolen medicine. If you don t finish your medicine, please throw away (dispose) as directed by your pharmacist. The [place] Pollution Control Agency has more information about safe disposal: https://www.pca.state.[place]/living-green/managing-unwanted-medications         Primary Care Provider Office Phone # Fax #    DIPESH Marino [phone]       [address] [facility] [address]        Equal Access to Services     SAMRA KAPLAN : Hadii aad ku hadasho Soomaali, waaxda luqadaha, qaybta kaalmada adeegyada, waxay idiin hayaan adeeg kostaarabri day . So [facility] [phone].    ATENCIÓN: Si habla español, tiene a borja disposición servicios gratuitos de asistencia lingüística. [facility] [phone].    We comply with applicable federal civil rights laws and [place] laws. We do not discriminate on the basis of race, color, national origin, age, disability, sex, sexual orientation, or gender identity.            Thank you!     Thank you for choosing [facility]  for your care. Our goal is always to provide you with excellent care. Hearing back from our patients is one way we can continue to improve our services. Please take a few minutes to complete the written survey that you may receive in the mail after your visit with us. Thank you!             Your Updated Medication List - Protect others around you: Learn how to safely use, store and throw away your medicines at www.disposemymeds.org.          This list is accurate as of 11/20/18 10:10 AM.  Always use your most recent med list.                   Brand Name Dispense Instructions for use Diagnosis    acetaminophen 500 MG tablet    TYLENOL    100 tablet    Take 2 tablets (1,000 mg) by mouth every 4 hours as needed for other (mild pain)    S/P left oophorectomy       gabapentin 600 MG tablet    NEURONTIN    120 tablet    Take 2 tablets (1,200 mg) by mouth 2 times daily    Radial neuropathy, right       lamoTRIgine 200  MG tablet    LaMICtal    30 tablet    Take 1 tablet (200 mg) by mouth daily    Severe bipolar I disorder, most recent episode mixed, with psychotic features (H)       LORazepam 0.5 MG tablet    ATIVAN          metroNIDAZOLE 0.75 % vaginal gel    METROGEL    70 g    Place 1 applicator (5 g) vaginally At Bedtime for 5 days    BV (bacterial vaginosis)       morphine 15 MG IR tablet    MSIR          norgestimate-ethinyl estradiol 0.25-35 MG-MCG per tablet    ORTHO-CYCLEN, SPRINTEC    84 tablet    Take 1 tablet by mouth daily Use continuously skip placebo pills    Right ovarian cyst       ondansetron 4 MG ODT tab    ZOFRAN-ODT    20 tablet    Take 1-2 tablets (4-8 mg) by mouth every 8 hours as needed for nausea Dissolve ON the tongue.    S/P left oophorectomy       prochlorperazine 10 MG tablet    COMPAZINE    30 tablet    Take 1 tablet (10 mg) by mouth every 6 hours as needed for nausea or vomiting    Nausea       * QUEtiapine 25 MG tablet    SEROquel    60 tablet    Take 1 tablet (25 mg) by mouth 2 times daily    Severe bipolar I disorder, most recent episode mixed, with psychotic features (H)       * QUEtiapine 50 MG tablet    SEROQUEL    90 tablet    Take 2-3 tablets (100-150 mg) by mouth At Bedtime Up to 150 mg daily    Severe bipolar I disorder, most recent episode mixed, with psychotic features (H)       ranitidine 300 MG tablet    ZANTAC    30 tablet    Take 1 tablet (300 mg) by mouth At Bedtime    Acute gastritis without hemorrhage, unspecified gastritis type       senna-docusate 8.6-50 MG per tablet    SENOKOT-S;PERICOLACE    60 tablet    Take 1-2 tablets by mouth 2 times daily Take while on oral narcotics to prevent or treat constipation.    S/P left oophorectomy       SUMAtriptan 25 MG tablet    IMITREX    9 tablet    Take 1-2 tablets (25-50 mg) by mouth at onset of headache for migraine May repeat in 2 hours. Max 8 tablets/24 hours.    Migraine without status migrainosus, not intractable, unspecified  migraine type       traMADol 50 MG tablet    ULTRAM    10 tablet    Take 1 tablet (50 mg) by mouth every 6 hours as needed for severe pain    History of ovarian cyst       valACYclovir 500 MG tablet    VALTREX    31 tablet    Take 1 tablet (500 mg) by mouth daily    History of ovarian cyst       * Notice:  This list has 2 medication(s) that are the same as other medications prescribed for you. Read the directions carefully, and ask your doctor or other care provider to review them with you.

## 2018-11-20 NOTE — PROGRESS NOTES
SUBJECTIVE:   Sirena Dietrich is a 32 year old female who presents to clinic today for the following health issues:      Depression and Anxiety Follow-Up    Status since last visit: Needs improvment     Other associated symptoms:anxiety    Complicating factors:     Significant life event: No     Current substance abuse: None    PHQ-9 ([employer]) 10/9/2018   Do you have concerns about your personal safety or the safety of others?    1.  Little interest or pleasure in doing things Nearly every day   2.  Feeling down, depressed, or hopeless Nearly every day   3.  Trouble falling or staying asleep, or sleeping too much Nearly every day   4.  Feeling tired or having little energy Nearly every day   5.  Poor appetite or overeating Several days   6.  Feeling bad about yourself Several days   7.  Trouble concentrating Not at all   8.  Moving slowly or restless Several days   9.  Suicidal or self-harm thoughts Not at all   PHQ-9 via [employer] TOTAL SCORE-----> 15 (Moderately severe depression)   Difficulty at work, home, or with people Very difficult     VICTOR M-7   [employer], 2002; Used with Permission) 10/9/2018   1. Feeling nervous, anxious, or on edge More than half the days   2. Not being able to stop or control worrying Nearly every day   3. Worrying too much about different things More than half the days   4. Trouble relaxing Nearly every day   5. Being so restless that it is hard to sit still Not at all   6. Becoming easily annoyed or irritable Nearly every day   7. Feeling afraid, as if something awful might happen Several days   VICTOR M 7 TOTAL SCORE 14 (moderate anxiety)     Seen [facility] in Sept 2018   Follow up on January 11th scheduled  Started prozac because the depression was really bad  Sleep has been poor. Insomnia.  Bladder pain and frequent urination.  HSV no recent outbreaks if she takes her medications  She does have some vaginal odor and some vaginal discharge with some lower abdominal pain she has had and all  oophorectomy on the left she does have an ovarian cyst on the right she is currently on oral birth control pills and tolerating them well.  -  PHQ 3/20/2018 5/3/2018 10/9/2018   PHQ-9 Total Score 23 27 15   Q9: Suicide Ideation Several days Nearly every day Not at all   F/U: Thoughts of suicide or self-harm No - -   F/U: Safety concerns No - -     VICTOR M-7 SCORE 3/20/2018 5/3/2018 10/9/2018   Total Score - - -   Total Score - - 14 (moderate anxiety)   Total Score 19 19 14   Total Score - - -     In the past two weeks have you had thoughts of suicide or self-harm?  No.    Do you have concerns about your personal safety or the safety of others?   No  PHQ-9  English  PHQ-9   Any Language  VICTOR M-7  Suicide Assessment Five-step Evaluation and Treatment (SAFE-T)    Amount of exercise or physical activity: 2-3 days/week for an average of 30-45 minutes    Problems taking medications regularly: No    Medication side effects: none    Diet: regular (no restrictions)        -------------------------------------    Problem list and histories reviewed & adjusted, as indicated.  Additional history: as documented    Patient Active Problem List   Diagnosis     Lumbago     CARDIOVASCULAR SCREENING; LDL GOAL LESS THAN 160     Pelvic pain in female     Urinary incontinence     Migraine headache     Tortuous colon     PTSD (post-traumatic stress disorder)     Severe bipolar I disorder, most recent episode mixed, with psychotic features (H)     Agoraphobia with panic disorder     S/P hysterectomy     Insomnia due to other mental disorder     MTHFR mutation (methylenetetrahydrofolate reductase) (H)     Cyst of left ovary     Herpes simplex vulvovaginitis     Methamphetamine abuse in remission (H)     Chronic abdominal pain     Other chronic pain     Past Surgical History:   Procedure Laterality Date     ANESTHESIA OUT OF OR MRI N/A 1/12/2015    Procedure: ANESTHESIA OUT OF OR MRI;  Surgeon: Generic Anesthesia Provider;  [facility]: [facility]  INDUCED ABORTN BY D&C  2007     C INDUCED ABORTN BY D&C  3/2009     C INDUCED ABORTN BY D&C  10/2008     CYSTOSCOPY       CYSTOSCOPY N/A 12/3/2014    Procedure: CYSTOSCOPY;  Surgeon: Caroline Grossman MD;  Location: [facility]     CYSTOSCOPY N/A 9/21/2018    Procedure: CYSTOSCOPY;;  Surgeon: Debi Luis MD;  Location: [facility]     DILATION AND CURETTAGE N/A 1/5/2015    Procedure: DILATION AND CURETTAGE;  Surgeon: Caroline Grossman MD;  Location: [facility]     ESOPHAGOSCOPY, GASTROSCOPY, DUODENOSCOPY (EGD), COMBINED N/A 8/25/2016    Procedure: COMBINED ESOPHAGOSCOPY, GASTROSCOPY, DUODENOSCOPY (EGD);  Surgeon: Tommie Clancy MD;  Location: [facility]     EXCISE LESION PERINEAL  4/9/2014    Procedure: EXCISE LESION PERINEAL;;  Surgeon: Lisa Helton MD;  Location: [facility]     HYSTERECTOMY, PAP NO LONGER INDICATED       LAPAROSCOPIC HYSTERECTOMY TOTAL N/A 12/3/2014    Procedure: LAPAROSCOPIC HYSTERECTOMY TOTAL;  Surgeon: Caroline Grossman MD;  Location: [facility]     LAPAROSCOPIC SALPINGECTOMY  4/9/2014    Procedure: LAPAROSCOPIC SALPINGECTOMY;  Laparoscopic Bilateral Salpingectomy, Removal Of Perineal Skin Tag;  Surgeon: Lisa Helton MD;  Location: [facility]     LAPAROSCOPIC SALPINGO-OOPHORECTOMY N/A 9/21/2018    Procedure: LAPAROSCOPIC SALPINGO-OOPHORECTOMY;  Diagnostic Laparoscopy. Left Salpingo-Oopherectomy. lysis of adhesions, cystoscopy;  Surgeon: Debi Luis MD;  Location: [facility]     LAPAROSCOPY DIAGNOSTIC (GYN)  10/16/2012    Procedure: LAPAROSCOPY DIAGNOSTIC (GYN);  Diagnostic Laparoscopy, Excision of Bilateral Paratubal Cysts;  Surgeon: La Guzmán MD;  Location: [facility]     TONSILLECTOMY         Social History   Substance Use Topics     Smoking status: Former Smoker     Packs/day: 0.50     Types: Cigarettes     Quit date: 5/2/2018     Smokeless tobacco: Never Used     Alcohol use Yes      Comment: rare      Family History   Problem Relation Age of Onset      Alcohol/Drug Mother      drugs     Depression Mother      HEART DISEASE Mother      ?     Alcohol/Drug Father      drugs     Depression Father      Hypertension Maternal Grandmother      Cancer Maternal Grandmother      cervical     Depression Maternal Grandmother      HEART DISEASE Maternal Grandmother      Other - See Comments Maternal Grandmother      ovaries removed for cancer cells     Hypertension Brother      Bipolar Disorder Other      Bipolar Disorder Other      Crohn Disease Paternal Grandmother      LUNG DISEASE Paternal Grandmother      breathing problems     HEART DISEASE Other      stents, clogged arteries           Reviewed and updated as needed this visit by clinical staff  Allergies       Reviewed and updated as needed this visit by Provider         ROS:   ROS: 10 point ROS neg other than the symptoms noted above in the HPI.      OBJECTIVE:                                                    /80  Pulse 89  Temp 97.3  F (36.3  C) (Tympanic)  Resp 14  Wt 168 lb (76.2 kg)  LMP  (LMP Unknown)  SpO2 99%  BMI 27.12 kg/m2  Body mass index is 27.12 kg/(m^2).   GENERAL: healthy, alert, well nourished, well hydrated, no distress  HENT: ear canals- normal; TMs- normal; Nose- normal; Mouth- no ulcers, no lesions  NECK: no tenderness, no adenopathy, no asymmetry, no masses, no stiffness; thyroid- normal to palpation  RESP: lungs clear to auscultation - no rales, no rhonchi, no wheezes  CV: regular rates and rhythm, normal S1 S2, no S3 or S4 and no murmur, no click or rub -  ABDOMEN: soft, mild suprapubic alert oriented tenderness, no  hepatosplenomegaly, no masses, normal bowel sounds  PSYCH interacting appropriately well-groomed    Diagnostic test results:  Results for orders placed or performed in visit on 11/20/18   Wet prep   Result Value Ref Range    Specimen Description Vagina     Wet Prep No yeast seen     Wet Prep Clue cells seen (A)     Wet Prep No Trichomonas seen      *Note: Due to a  large number of results and/or encounters for the requested time period, some results have not been displayed. A complete set of results can be found in Results Review.        VICTOR M-7 SCORE 3/20/2018 5/3/2018 10/9/2018   Total Score - - -   Total Score - - 14 (moderate anxiety)   Total Score 19 19 14   Total Score - - -     PHQ-9 SCORE 3/20/2018 5/3/2018 10/9/2018   Total Score - - -   Total Score MyChart - - 15 (Moderately severe depression)   Total Score 23 27 15         ASSESSMENT/PLAN:                                                    1. Severe bipolar I disorder, most recent episode mixed, with psychotic features  Chronic and ongoing.  Mildly improved.  Continue current medications.  Follow-up with psychiatry and psychotherapy as recommended refilled meds for nausea  - lamoTRIgine (LAMICTAL) 200 MG tablet; Take 1 tablet (200 mg) by mouth daily  Dispense: 30 tablet; Refill: 2  - QUEtiapine (SEROQUEL) 25 MG tablet; Take 1 tablet (25 mg) by mouth 2 times daily  Dispense: 60 tablet; Refill: 1  - QUEtiapine (SEROQUEL) 50 MG tablet; Take 2-3 tablets (100-150 mg) by mouth At Bedtime Up to 150 mg daily  Dispense: 90 tablet; Refill: 1    2. S/P left oophorectomy  - ondansetron (ZOFRAN-ODT) 4 MG ODT tab; Take 1-2 tablets (4-8 mg) by mouth every 8 hours as needed for nausea Dissolve ON the tongue.  Dispense: 20 tablet; Refill: 1    3. History of ovarian cyst  Pain medication refilled today to prevent ED visits  Continue oral birth control pills  - traMADol (ULTRAM) 50 MG tablet; Take 1 tablet (50 mg) by mouth every 6 hours as needed for severe pain  Dispense: 10 tablet; Refill: 0      4. Vaginal discharge  - Wet prep    5. Herpes simplex virus infection  Refilled Valtrex 500 mg daily    6. BV (bacterial vaginosis)  Begin new medication  - metroNIDAZOLE (METROGEL) 0.75 % vaginal gel; Place 1 applicator (5 g) vaginally At Bedtime for 5 days  Dispense: 70 g; Refill: 1      Follow up with Provider Call or return to the clinic  with any worsening of symptoms or no resolution. Patient/Parent verbalized understanding and is in agreement. Medication side effects reviewed.   Current Outpatient Prescriptions   Medication Sig Dispense Refill     acetaminophen (TYLENOL) 500 MG tablet Take 2 tablets (1,000 mg) by mouth every 4 hours as needed for other (mild pain) 100 tablet 1     gabapentin (NEURONTIN) 600 MG tablet Take 2 tablets (1,200 mg) by mouth 2 times daily 120 tablet 1     lamoTRIgine (LAMICTAL) 200 MG tablet Take 1 tablet (200 mg) by mouth daily 30 tablet 2     LORazepam (ATIVAN) 0.5 MG tablet   0     metroNIDAZOLE (METROGEL) 0.75 % vaginal gel Place 1 applicator (5 g) vaginally At Bedtime for 5 days 70 g 1     morphine (MSIR) 15 MG IR tablet   0     norgestimate-ethinyl estradiol (ORTHO-CYCLEN, SPRINTEC) 0.25-35 MG-MCG per tablet Take 1 tablet by mouth daily Use continuously skip placebo pills 84 tablet 0     ondansetron (ZOFRAN-ODT) 4 MG ODT tab Take 1-2 tablets (4-8 mg) by mouth every 8 hours as needed for nausea Dissolve ON the tongue. 20 tablet 1     prochlorperazine (COMPAZINE) 10 MG tablet Take 1 tablet (10 mg) by mouth every 6 hours as needed for nausea or vomiting 30 tablet 0     QUEtiapine (SEROQUEL) 25 MG tablet Take 1 tablet (25 mg) by mouth 2 times daily 60 tablet 1     QUEtiapine (SEROQUEL) 50 MG tablet Take 2-3 tablets (100-150 mg) by mouth At Bedtime Up to 150 mg daily 90 tablet 1     ranitidine (ZANTAC) 300 MG tablet Take 1 tablet (300 mg) by mouth At Bedtime 30 tablet 2     senna-docusate (SENOKOT-S;PERICOLACE) 8.6-50 MG per tablet Take 1-2 tablets by mouth 2 times daily Take while on oral narcotics to prevent or treat constipation. 60 tablet 0     SUMAtriptan (IMITREX) 25 MG tablet Take 1-2 tablets (25-50 mg) by mouth at onset of headache for migraine May repeat in 2 hours. Max 8 tablets/24 hours. 9 tablet 1     traMADol (ULTRAM) 50 MG tablet Take 1 tablet (50 mg) by mouth every 6 hours as needed for severe pain 10  tablet 0     valACYclovir (VALTREX) 500 MG tablet Take 1 tablet (500 mg) by mouth daily 31 tablet 11     [DISCONTINUED] lamoTRIgine (LAMICTAL) 200 MG tablet Take 1 tablet (200 mg) by mouth daily 30 tablet 1     [DISCONTINUED] QUEtiapine (SEROQUEL) 25 MG tablet Take 1 tablet (25 mg) by mouth 2 times daily 60 tablet 0     [DISCONTINUED] QUEtiapine (SEROQUEL) 50 MG tablet Take 1 tablet (50 mg) by mouth 3 times daily (Patient taking differently: Take 50 mg by mouth Up to 150 mg daily) 90 tablet 0     [DISCONTINUED] valACYclovir (VALTREX) 500 MG tablet Take 500 mg by mouth daily          See Patient Instructions    DIPESH Marino [facility]

## 2018-11-20 NOTE — PATIENT INSTRUCTIONS
Bacterial Vaginosis    You have a vaginal infection called bacterial vaginosis (BV). Both good and bad bacteria are present in a healthy vagina. BV occurs when these bacteria get out of balance. The number of bad bacteria increase. And the number of good bacteria decrease. Although BV is associated with sexual activity, it is not a sexually transmitted disease.  BV may or may not cause symptoms. If symptoms do occur, they can include:    Thin, gray, milky-white, or sometimes green discharge    Unpleasant odor or  fishy  smell    Itching, burning, or pain in or around the vagina  It is not known what causes BV, but certain factors can make the problem more likely. This can include:    Douching    Having sex with a new partner    Having sex with more than one partner  BV will sometimes go away on its own. But treatment is usually recommended. This is because untreated BV can increase the risk of more serious health problems such as:    Pelvic inflammatory disease (PID)     delivery (giving birth to a baby early if you re pregnant)    HIV and certain other sexually transmitted diseases (STDs)    Infection after surgery on the reproductive organs  Home care  General care    BV is most often treated with medicines called antibiotics. These may be given as pills or as a vaginal cream. If antibiotics are prescribed, be sure to use them exactly as directed. Also, be sure to complete all of the medicine, even if your symptoms go away.    Don't douche or having sex during treatment.    If you have sex with a female partner, ask your healthcare provider if she should also be treated.  Prevention    Don't douche.    Don't have sex. If you do have sex, then take steps to lower your risk:  ? Use condoms when having sex.  ? Limit the number of sexual partners you have.  Follow-up care  Follow up with your healthcare provider, or as advised.  When to seek medical advice  Call your healthcare provider right away if:    You  have a fever of 100.4 F (38 C) or higher, or as directed by your provider.    Your symptoms worsen, or they don t go away within a few days of starting treatment.    You have new pain in the lower belly or pelvic region.    You have side effects that bother you or a reaction to the pills or cream you re prescribed.    You or any partners you have sex with have new symptoms, such as a rash, joint pain, or sores.  Date Last Reviewed: 10/1/2017    4760-8474 [employer]. [address] [place] [address]. All rights reserved. This information is not intended as a substitute for professional medical care. Always follow your healthcare professional's instructions.

## 2018-11-28 ENCOUNTER — TELEPHONE (OUTPATIENT)
Dept: OBGYN | Facility: CLINIC | Age: 33
End: 2018-11-28

## 2018-11-28 NOTE — TELEPHONE ENCOUNTER
Reason for Call:  Other prescription    Detailed comments: Pt still has a bacterial infection and now is having pain with it, can something be called in for it?  She does not have a car right now for her to be seen.    Phone Number Patient can be reached at: Cell number on file:    Telephone Information:   Mobile [phone]       Best Time: t    Can we leave a detailed message on this number? YES    Call taken on 11/28/2018 at 12:28 PM by Patricia Arango

## 2018-11-28 NOTE — TELEPHONE ENCOUNTER
Return call to patient.  Spoke with patient on the phone.    Patient thinks BV returned.  Was treated with metrogel.  Has been off it a couple of days.  Symptoms of foul odor and pelvic pain returned.  Patient reports she was told to follow up with OB/GYN from [facility] if symptoms re-occurred.    Patient advised cannot treat the pelvic pain without appointment.  Recommend follow up in Urgent Care or ER with worsening pain.  Patient may contact office of [facility] to see if she would re-treat in the meantime or see patient back.  Patient squeezed in with Dr. Luis on Monday.    Pt in agreement and reports understanding.    Rocio Nicholson   [facility]  RN

## 2018-12-03 ENCOUNTER — OFFICE VISIT (OUTPATIENT)
Dept: OBGYN | Facility: CLINIC | Age: 33
End: 2018-12-03
Payer: COMMERCIAL

## 2018-12-03 VITALS
WEIGHT: 166 LBS | HEART RATE: 96 BPM | TEMPERATURE: 96.7 F | SYSTOLIC BLOOD PRESSURE: 121 MMHG | BODY MASS INDEX: 26.79 KG/M2 | DIASTOLIC BLOOD PRESSURE: 79 MMHG

## 2018-12-03 DIAGNOSIS — N89.8 VAGINAL DISCHARGE: Primary | ICD-10-CM

## 2018-12-03 DIAGNOSIS — R10.2 PELVIC PAIN IN FEMALE: ICD-10-CM

## 2018-12-03 DIAGNOSIS — N76.0 BACTERIAL VAGINITIS: ICD-10-CM

## 2018-12-03 DIAGNOSIS — B37.31 YEAST INFECTION OF THE VAGINA: ICD-10-CM

## 2018-12-03 DIAGNOSIS — B96.89 BACTERIAL VAGINITIS: ICD-10-CM

## 2018-12-03 LAB
SPECIMEN SOURCE: ABNORMAL
WET PREP SPEC: ABNORMAL

## 2018-12-03 PROCEDURE — 99214 OFFICE O/P EST MOD 30 MIN: CPT | Performed by: OBSTETRICS & GYNECOLOGY

## 2018-12-03 PROCEDURE — 87210 SMEAR WET MOUNT SALINE/INK: CPT | Performed by: OBSTETRICS & GYNECOLOGY

## 2018-12-03 RX ORDER — METRONIDAZOLE 500 MG/1
500 TABLET ORAL 2 TIMES DAILY
Qty: 14 TABLET | Refills: 0 | Status: SHIPPED | OUTPATIENT
Start: 2018-12-03 | End: 2019-01-23

## 2018-12-03 RX ORDER — FLUCONAZOLE 150 MG/1
150 TABLET ORAL ONCE
Qty: 1 TABLET | Refills: 0 | Status: SHIPPED | OUTPATIENT
Start: 2018-12-03 | End: 2019-03-01
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
## 2018-12-03 NOTE — NURSING NOTE
"Initial /79 (BP Location: Left arm, Patient Position: Chair, Cuff Size: Adult Large)  Pulse 96  Temp 96.7  F (35.9  C) (Tympanic)  Wt 166 lb (75.3 kg)  LMP  (LMP Unknown)  Breastfeeding? No  BMI 26.79 kg/m2 Estimated body mass index is 26.79 kg/(m^2) as calculated from the following:    Height as of 11/7/18: 5' 6\" (1.676 m).    Weight as of this encounter: 166 lb (75.3 kg). .    Trixie Lee    "

## 2018-12-03 NOTE — MR AVS SNAPSHOT
After Visit Summary   12/3/2018    Sirena Dietrich    MRN: [id number]           Patient Information     Date Of Birth          1985        Visit Information        Provider Department      12/3/2018 11:30 AM Debi Luis MD [facility]        Today's Diagnoses     Vaginal discharge    -  1    Bacterial vaginitis        Yeast infection of the vagina        Pelvic pain in female           Follow-ups after your visit        Who to contact     If you have questions or need follow up information about today's clinic visit or your schedule please contact [facility] directly at [phone].  Normal or non-critical lab and imaging results will be communicated to you by MyChart, letter or phone within 4 business days after the clinic has received the results. If you do not hear from us within 7 days, please contact the clinic through [employer] or phone. If you have a critical or abnormal lab result, we will notify you by phone as soon as possible.  Submit refill requests through [employer] or call your pharmacy and they will forward the refill request to us. Please allow 3 business days for your refill to be completed.          Additional Information About Your Visit        MyChart Information     [employer] gives you secure access to your electronic health record. If you see a primary care provider, you can also send messages to your care team and make appointments. If you have questions, please call your primary care clinic.  If you do not have a primary care provider, please call [phone] and they will assist you.        Care EveryWhere ID     This is your Care EveryWhere ID. This could be used by other organizations to access your [facility] medical records  [id number]        Your Vitals Were     Pulse Temperature Last Period Breastfeeding? BMI (Body Mass Index)       96 96.7  F (35.9  C) (Tympanic) (LMP Unknown) No 26.79 kg/m2        Blood Pressure from Last 3  Encounters:   12/03/18 121/79   11/20/18 125/80   11/18/18 117/73    Weight from Last 3 Encounters:   12/03/18 166 lb (75.3 kg)   11/20/18 168 lb (76.2 kg)   11/18/18 169 lb 3.2 oz (76.7 kg)              We Performed the Following     Wet prep          Today's Medication Changes          These changes are accurate as of 12/3/18 11:40 AM.  If you have any questions, ask your nurse or doctor.               Start taking these medicines.        Dose/Directions    fluconazole 150 MG tablet   Commonly known as:  DIFLUCAN   Used for:  Yeast infection of the vagina   Started by:  Debi Luis MD        Dose:  150 mg   Take 1 tablet (150 mg) by mouth once for 1 dose   Quantity:  1 tablet   Refills:  0       metroNIDAZOLE 500 MG tablet   Commonly known as:  FLAGYL   Used for:  Bacterial vaginitis   Started by:  Debi Luis MD        Dose:  500 mg   Take 1 tablet (500 mg) by mouth 2 times daily   Quantity:  14 tablet   Refills:  0            Where to get your medicines      These medications were sent to [facility] [place] [id number]     Phone:  [phone]     fluconazole 150 MG tablet    metroNIDAZOLE 500 MG tablet                Primary Care Provider Office Phone # Fax #    Shi Zhao DIPESH Martinez [facility] [phone]       [address] [place] [address]        Equal Access to Services     MARCELA KAPLAN AH: Hadii jo rangelo Sorian, waaxda luqadaha, qaybta kaalmada adeegyada, raheem evangelista. So [facility] [phone].    ATENCIÓN: Si habla español, tiene a borja disposición servicios gratuitos de asistencia lingüística. Andres al [phone].    We comply with applicable federal civil rights laws and [place] laws. We do not discriminate on the basis of race, color, national origin, age, disability, sex, sexual orientation, or gender identity.            Thank you!     Thank you for  choosing [facility]  for your care. Our goal is always to provide you with excellent care. Hearing back from our patients is one way we can continue to improve our services. Please take a few minutes to complete the written survey that you may receive in the mail after your visit with us. Thank you!             Your Updated Medication List - Protect others around you: Learn how to safely use, store and throw away your medicines at www.disposemymeds.org.          This list is accurate as of 12/3/18 11:40 AM.  Always use your most recent med list.                   Brand Name Dispense Instructions for use Diagnosis    acetaminophen 500 MG tablet    TYLENOL    100 tablet    Take 2 tablets (1,000 mg) by mouth every 4 hours as needed for other (mild pain)    S/P left oophorectomy       fluconazole 150 MG tablet    DIFLUCAN    1 tablet    Take 1 tablet (150 mg) by mouth once for 1 dose    Yeast infection of the vagina       gabapentin 600 MG tablet    NEURONTIN    120 tablet    Take 2 tablets (1,200 mg) by mouth 2 times daily    Radial neuropathy, right       lamoTRIgine 200 MG tablet    LaMICtal    30 tablet    Take 1 tablet (200 mg) by mouth daily    Severe bipolar I disorder, most recent episode mixed, with psychotic features (H)       LORazepam 0.5 MG tablet    ATIVAN          metroNIDAZOLE 500 MG tablet    FLAGYL    14 tablet    Take 1 tablet (500 mg) by mouth 2 times daily    Bacterial vaginitis       morphine 15 MG IR tablet    MSIR          norgestimate-ethinyl estradiol 0.25-35 MG-MCG tablet    ORTHO-CYCLEN/SPRINTEC    84 tablet    Take 1 tablet by mouth daily Use continuously skip placebo pills    Right ovarian cyst       ondansetron 4 MG ODT tab    ZOFRAN-ODT    20 tablet    Take 1-2 tablets (4-8 mg) by mouth every 8 hours as needed for nausea Dissolve ON the tongue.    S/P left oophorectomy       prochlorperazine 10 MG tablet    COMPAZINE    30 tablet    Take 1 tablet (10 mg) by mouth every 6  hours as needed for nausea or vomiting    Nausea       * QUEtiapine 25 MG tablet    SEROquel    60 tablet    Take 1 tablet (25 mg) by mouth 2 times daily    Severe bipolar I disorder, most recent episode mixed, with psychotic features (H)       * QUEtiapine 50 MG tablet    SEROQUEL    90 tablet    Take 2-3 tablets (100-150 mg) by mouth At Bedtime Up to 150 mg daily    Severe bipolar I disorder, most recent episode mixed, with psychotic features (H)       ranitidine 300 MG tablet    ZANTAC    30 tablet    Take 1 tablet (300 mg) by mouth At Bedtime    Acute gastritis without hemorrhage, unspecified gastritis type       senna-docusate 8.6-50 MG tablet    SENOKOT-S/PERICOLACE    60 tablet    Take 1-2 tablets by mouth 2 times daily Take while on oral narcotics to prevent or treat constipation.    S/P left oophorectomy       SUMAtriptan 25 MG tablet    IMITREX    9 tablet    Take 1-2 tablets (25-50 mg) by mouth at onset of headache for migraine May repeat in 2 hours. Max 8 tablets/24 hours.    Migraine without status migrainosus, not intractable, unspecified migraine type       traMADol 50 MG tablet    ULTRAM    10 tablet    Take 1 tablet (50 mg) by mouth every 6 hours as needed for severe pain    History of ovarian cyst       valACYclovir 500 MG tablet    VALTREX    31 tablet    Take 1 tablet (500 mg) by mouth daily    History of ovarian cyst       * Notice:  This list has 2 medication(s) that are the same as other medications prescribed for you. Read the directions carefully, and ask your doctor or other care provider to review them with you.

## 2018-12-10 ENCOUNTER — TELEPHONE (OUTPATIENT)
Dept: FAMILY MEDICINE | Facility: CLINIC | Age: 33
End: 2018-12-10

## 2018-12-10 DIAGNOSIS — G43.109 MIGRAINE WITH AURA AND WITHOUT STATUS MIGRAINOSUS, NOT INTRACTABLE: Primary | Chronic | ICD-10-CM

## 2018-12-10 RX ORDER — ONDANSETRON 4 MG/1
4 TABLET, FILM COATED ORAL EVERY 8 HOURS PRN
Qty: 20 TABLET | Refills: 3 | Status: SHIPPED | OUTPATIENT
Start: 2018-12-10 | End: 2019-03-14

## 2018-12-10 NOTE — TELEPHONE ENCOUNTER
The Ondansetron ODT tablets require a PA. If there is a medical reason the IR tablets cannot be used.--please fax new rx for the IR tabs if OK to change.

## 2018-12-10 NOTE — TELEPHONE ENCOUNTER
Please call Sirena and find out if this has been an issue for her before.   I feel like it has been and she is likely to vomit with the migraines.   Thanks Shi Martinez P-BC

## 2018-12-11 NOTE — TELEPHONE ENCOUNTER
IR prescription sent to pharmacy yesterday.  Can try that if she is not able to keep them down then could do PA for ODT.   Thanks Shi Martinez ABY-BC

## 2018-12-31 ENCOUNTER — HOSPITAL ENCOUNTER (EMERGENCY)
Facility: CLINIC | Age: 33
Discharge: HOME | End: 2019-01-01
Attending: EMERGENCY MEDICINE | Admitting: EMERGENCY MEDICINE
Payer: COMMERCIAL
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
## 2018-12-31 DIAGNOSIS — B96.89 BACTERIAL VAGINITIS: ICD-10-CM

## 2018-12-31 DIAGNOSIS — S60.571A OTHER SUPERFICIAL BITE OF HAND OF RIGHT HAND, INITIAL ENCOUNTER: ICD-10-CM
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
## 2018-12-31 DIAGNOSIS — N76.0 BACTERIAL VAGINITIS: ICD-10-CM
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
## 2018-12-31 DIAGNOSIS — S60.572A OTHER SUPERFICIAL BITE OF HAND OF LEFT HAND, INITIAL ENCOUNTER: ICD-10-CM

## 2018-12-31 DIAGNOSIS — S80.871A OTHER SUPERFICIAL BITE, RIGHT LOWER LEG, INITIAL ENCOUNTER: ICD-10-CM

## 2018-12-31 DIAGNOSIS — S50.871A OTHER SUPERFICIAL BITE OF RIGHT FOREARM, INITIAL ENCOUNTER: ICD-10-CM

## 2018-12-31 DIAGNOSIS — S80.872A OTHER SUPERFICIAL BITE, LEFT LOWER LEG, INITIAL ENCOUNTER: ICD-10-CM

## 2018-12-31 DIAGNOSIS — S50.872A OTHER SUPERFICIAL BITE OF LEFT FOREARM, INITIAL ENCOUNTER: ICD-10-CM

## 2018-12-31 PROCEDURE — 99283 EMERGENCY DEPT VISIT LOW MDM: CPT

## 2018-12-31 PROCEDURE — 99283 EMERGENCY DEPT VISIT LOW MDM: CPT | Mod: Z6 | Performed by: EMERGENCY MEDICINE

## 2018-12-31 PROCEDURE — 25000132 ZZH RX MED GY IP 250 OP 250 PS 637: Performed by: EMERGENCY MEDICINE

## 2018-12-31 RX ORDER — OXYCODONE AND ACETAMINOPHEN 5; 325 MG/1; MG/1
2 TABLET ORAL ONCE
Status: COMPLETED | OUTPATIENT
Start: 2019-01-01 | End: 2019-01-01

## 2018-12-31 RX ORDER — IBUPROFEN 400 MG/1
800 TABLET, FILM COATED ORAL ONCE
Status: COMPLETED | OUTPATIENT
Start: 2018-12-31 | End: 2018-12-31

## 2018-12-31 RX ADMIN — IBUPROFEN 800 MG: 400 TABLET ORAL at 22:52

## 2018-12-31 NOTE — ED AVS SNAPSHOT
[facility] Emergency Department  [address] [address]  Phone:  [phone]  Fax:  [phone]                                    Sirena Dietrich   MRN: [id number]    Department:  [facility] Emergency Department   Date of Visit:  12/31/2018           After Visit Summary Signature Page    I have received my discharge instructions, and my questions have been answered. I have discussed any challenges I see with this plan with the nurse or doctor.    ..........................................................................................................................................  Patient/Patient Representative Signature      ..........................................................................................................................................  Patient Representative Print Name and Relationship to Patient    ..................................................               ................................................  Date                                   Time    ..........................................................................................................................................  Reviewed by Signature/Title    ...................................................              ..............................................  Date                                               Time          22EPIC Rev 08/18

## 2019-01-01 VITALS
HEART RATE: 93 BPM | DIASTOLIC BLOOD PRESSURE: 93 MMHG | SYSTOLIC BLOOD PRESSURE: 142 MMHG | OXYGEN SATURATION: 96 % | TEMPERATURE: 99.4 F | RESPIRATION RATE: 16 BRPM

## 2019-01-01 PROCEDURE — 25000132 ZZH RX MED GY IP 250 OP 250 PS 637: Performed by: EMERGENCY MEDICINE

## 2019-01-01 RX ORDER — DOXYCYCLINE 100 MG/1
100 CAPSULE ORAL 2 TIMES DAILY
Qty: 14 CAPSULE | Refills: 0 | Status: SHIPPED | OUTPATIENT
Start: 2019-01-01 | End: 2019-03-01

## 2019-01-01 RX ORDER — DOXYCYCLINE 100 MG/1
100 CAPSULE ORAL EVERY 12 HOURS SCHEDULED
Status: DISCONTINUED | OUTPATIENT
Start: 2019-01-01 | End: 2019-01-01 | Stop reason: HOSPADM

## 2019-01-01 RX ORDER — METRONIDAZOLE 500 MG/1
500 TABLET ORAL 3 TIMES DAILY
Qty: 21 TABLET | Refills: 0 | Status: SHIPPED | OUTPATIENT
Start: 2019-01-01 | End: 2019-03-01

## 2019-01-01 RX ORDER — METRONIDAZOLE 500 MG/1
500 TABLET ORAL ONCE
Status: COMPLETED | OUTPATIENT
Start: 2019-01-01 | End: 2019-01-01

## 2019-01-01 RX ADMIN — METRONIDAZOLE 500 MG: 500 TABLET, FILM COATED ORAL at 01:26

## 2019-01-01 RX ADMIN — OXYCODONE HYDROCHLORIDE AND ACETAMINOPHEN 2 TABLET: 5; 325 TABLET ORAL at 00:18

## 2019-01-01 RX ADMIN — DOXYCYCLINE HYCLATE 100 MG: 100 CAPSULE ORAL at 01:26

## 2019-01-01 NOTE — DISCHARGE INSTRUCTIONS
Keep wounds clean and dry, apply antibiotic ointment twice daily, take doxycycline and metronidazole as prescribed, return here for progressive redness, swelling, pain, fever or any other concern    Quarantine the dog for the next week, if dog becomes ill present to clinic for rabies series.  You may also contact [place] Department of Health regarding testing the animal for rabies.    Tylenol/ibuprofen for pain.

## 2019-01-02 ENCOUNTER — PATIENT OUTREACH (OUTPATIENT)
Dept: CARE COORDINATION | Facility: CLINIC | Age: 34
End: 2019-01-02

## 2019-01-02 ASSESSMENT — ACTIVITIES OF DAILY LIVING (ADL): DEPENDENT_IADLS:: INDEPENDENT
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
## 2019-01-02 NOTE — LETTER
Health Care Home - Access Care Plan    About Me  Patient Name:  Sirena Dietrich    YOB: 1985  Age:                             33 year old   Nikhil MRN:            [id number] Telephone Information:   Home Phone [phone]   Mobile [phone]       Address:    [address] [address] Email address:  [email or web address]      Emergency Contact(s)  Name Relationship Lgl Grd Work Phone Home Phone Mobile Phone   1. SHAILA KUHN Mother   [phone]    2. YAJAIRA GARDUNO* Friend   [phone]             Health Maintenance: Routine Health maintenance Reviewed: Up to date    My Access Plan  Medical Emergency 911   Questions or concerns during clinic hours Primary Clinic Line, I will call the clinic directly: [facility] - [phone]   24 Hour Appointment Line [phone] or  [phone]) (toll free)   24 Hour Nurse Line [phone] (toll free)   Questions or concerns outside clinic hours 24 Hour Appointment Line, I will call the after-hours on-call line:   [facility] [phone] or [phone]) (toll-free)   Preferred Urgent Care [facility] [phone]   Preferred Hospital [place], [place]  [phone]   Preferred Pharmacy [facility] [place] [address] Behavioral Health Crisis Line The National Suicide Prevention Lifeline at 1-152.748.6719 or 911     My Care Team Members  Patient Care Team       Relationship Specialty Notifications Start End    Shi Martinez APRN CNP PCP - General Family Practice  11/17/11     Phone: [phone] Fax: [phone] [address] [place] [address]    Shi Martinez APRN CNP PCP - Assigned PCP   11/11/18     Phone: [phone] Fax: [phone] [address] [place] [address]           My Medical and Care Information  Problem List   Patient  Active Problem List   Diagnosis     Lumbago     CARDIOVASCULAR SCREENING; LDL GOAL LESS THAN 160     Pelvic pain in female     Urinary incontinence     Migraine headache     Tortuous colon     PTSD (post-traumatic stress disorder)     Severe bipolar I disorder, most recent episode mixed, with psychotic features (H)     Agoraphobia with panic disorder     S/P hysterectomy     Insomnia due to other mental disorder     MTHFR mutation (methylenetetrahydrofolate reductase) (H)     Cyst of left ovary     Herpes simplex vulvovaginitis     Methamphetamine abuse in remission (H)     Chronic abdominal pain     Other chronic pain      Current Medications and Allergies:  See printed Medication Report

## 2019-01-02 NOTE — PROGRESS NOTES
Clinic Care Coordination Contact    Clinic Care Coordination Contact  OUTREACH    Referral Information:  Referral Source: ED Follow-Up    Primary Diagnosis: Injury/Fall    Chief Complaint   Patient presents with     ER F/U     Nurse: ER f/u 12/31 dog, cat bite        Universal Utilization: Pt has had 6 ED/Hospital visits in past 90 days;  Pt did trigger high for risk of readmission-ED/hospital utilization.  Clinic Utilization  Difficulty keeping appointments:: Yes  Compliance Concerns: Yes  No-Show Concerns: Yes  No PCP office visit in Past Year: No  Utilization    Last refreshed: 1/1/2019  8:03 PM:  Hospital Admissions 0           Last refreshed: 1/1/2019  8:03 PM:  ED Visits 6           Last refreshed: 1/1/2019  8:03 PM:  No Show Count (past year) 3              Current as of: 1/1/2019  8:03 PM              Clinical Concerns:  Care Coordinator RN spoke with patient, she states she is doing ok at this time. She states her wounds appear to be healing. She denies having any questions or concerns at this time regarding her discharge instructions. She is taking her antibiotics, tolerating them well.    Current Medical Concerns:    Patient Active Problem List   Diagnosis     Lumbago     CARDIOVASCULAR SCREENING; LDL GOAL LESS THAN 160     Pelvic pain in female     Urinary incontinence     Migraine headache     Tortuous colon     PTSD (post-traumatic stress disorder)     Severe bipolar I disorder, most recent episode mixed, with psychotic features (H)     Agoraphobia with panic disorder     S/P hysterectomy     Insomnia due to other mental disorder     MTHFR mutation (methylenetetrahydrofolate reductase) (H)     Cyst of left ovary     Herpes simplex vulvovaginitis     Methamphetamine abuse in remission (H)     Chronic abdominal pain     Other chronic pain         Current Behavioral Concerns: No concerns at this time.       Education Provided to patient: RN CC educated about Care Coordination Services, discharge  instructions, medications reviewed and follow up.         Health Maintenance Reviewed: Up to date  Clinical Pathway: None    Medication Management:  Patient independent in medication management and verbalizes adherence and understanding of medication regimen.        Functional Status:  Dependent ADLs:: Independent  Dependent IADLs:: Independent  Bed or wheelchair confined:: No  Mobility Status: Independent  Fallen 2 or more times in the past year?: No  Any fall with injury in the past year?: No    Living Situation:  Current living arrangement:: I live in a private home with family    Diet/Exercise/Sleep:  Diet:: Regular  Inadequate nutrition (GOAL):: No  Food Insecurity: No  Tube Feeding: No  Exercise:: Currently not exercising  Inadequate activity/exercise (GOAL):: No  Significant changes in sleep pattern (GOAL): No    Transportation:  Transportation concerns (GOAL):: No  Transportation means:: Family, Friend, Regular car     Psychosocial:  Episcopalian or spiritual beliefs that impact treatment:: No  Mental health DX:: Yes  Mental health DX how managed:: Medication, Outpatient Counseling, Psychiatrist, Mental Health Targeted Care Manager  Mental health management concern (GOAL):: No  Informal Support system:: Children, Family, Friends, Parent     Financial/Insurance:   Financial/Insurance concerns (GOAL):: No  Not assessed.     Resources and Interventions:  Current Resources: RN [facility] with RN CC contact information, explanation of CC services and patient care plan.     Community Resources: [facility], [facility] Programs, [facility]  Supplies used at home:: Wound Care Supplies  Equipment Currently Used at Home: none    Advance Care Plan/Directive  Advanced Care Plans/Directives on file:: No    Referrals Placed: None     Plan:   No Care Coordination needs identified at this time. Patient may be referred to Care Coordination in the future if additional needs arise.  Pt encouraged to contact Care  Coordinator through the clinic if situation changes and assistance is needed. No follow-up planned.    Harmony Clay RN, [facility]  RN Care Coordinator  [facility]  Phone # [phone]  1/2/2019 11:14 AM

## 2019-01-02 NOTE — LETTER
[place] CARE COORDINATION  [facility]  [address] [address]  [phone]    January 2, 2019    Sirena Dietrich  [address]  [facility] [address]      Dear Sirena,    I am a clinic care coordinator who works with DIPESH Marino CNP at [facility]. I wanted to thank you for spending the time talking with me.  I wanted to introduce myself and provide you with my contact information so that you can call me with questions or concerns about your health care. Below is a description of clinic care coordination and how I can further assist you.     The clinic care coordinator is a registered nurse and/or  who understand the health care system. The goal of clinic care coordination is to help you manage your health and improve access to the [facility] system in the most efficient manner. The registered nurse can assist you in meeting your health care goals by providing education, coordinating services, and strengthening the communication among your providers. The  can assist you with financial, behavioral, psychosocial, chemical dependency, counseling, and/or psychiatric resources.    Please feel free to contact me at [phone], with any questions or concerns. We at [facility] are focused on providing you with the highest-quality healthcare experience possible and that all starts with you.     Sincerely,     Harmony Clay    Enclosed: I have enclosed a copy of a 24 Hour Access Plan. This has helpful phone numbers for you to call when needed. Please keep this in an easy to access place to use as needed.

## 2019-01-14 ENCOUNTER — OFFICE VISIT (OUTPATIENT)
Dept: FAMILY MEDICINE | Facility: CLINIC | Age: 34
End: 2019-01-14
Payer: COMMERCIAL

## 2019-01-14 VITALS
HEART RATE: 75 BPM | RESPIRATION RATE: 12 BRPM | TEMPERATURE: 98.2 F | OXYGEN SATURATION: 98 % | WEIGHT: 165 LBS | DIASTOLIC BLOOD PRESSURE: 80 MMHG | SYSTOLIC BLOOD PRESSURE: 118 MMHG | BODY MASS INDEX: 26.63 KG/M2

## 2019-01-14 DIAGNOSIS — N83.201 RIGHT OVARIAN CYST: ICD-10-CM

## 2019-01-14 DIAGNOSIS — B37.31 YEAST INFECTION OF THE VAGINA: ICD-10-CM

## 2019-01-14 DIAGNOSIS — N89.8 VAGINAL ITCHING: Primary | ICD-10-CM

## 2019-01-14 LAB
ALBUMIN UR-MCNC: NEGATIVE MG/DL
APPEARANCE UR: CLEAR
BILIRUB UR QL STRIP: NEGATIVE
COLOR UR AUTO: YELLOW
GLUCOSE UR STRIP-MCNC: NEGATIVE MG/DL
HGB UR QL STRIP: ABNORMAL
KETONES UR STRIP-MCNC: NEGATIVE MG/DL
LEUKOCYTE ESTERASE UR QL STRIP: NEGATIVE
NITRATE UR QL: NEGATIVE
NON-SQ EPI CELLS #/AREA URNS LPF: NORMAL /LPF
PH UR STRIP: 5.5 PH (ref 5–7)
RBC #/AREA URNS AUTO: NORMAL /HPF
SOURCE: ABNORMAL
SP GR UR STRIP: >1.03 (ref 1–1.03)
SPECIMEN SOURCE: ABNORMAL
UROBILINOGEN UR STRIP-ACNC: 0.2 EU/DL (ref 0.2–1)
WBC #/AREA URNS AUTO: NORMAL /HPF
WET PREP SPEC: ABNORMAL
WET PREP SPEC: ABNORMAL

## 2019-01-14 PROCEDURE — 87210 SMEAR WET MOUNT SALINE/INK: CPT | Performed by: NURSE PRACTITIONER

## 2019-01-14 PROCEDURE — 81001 URINALYSIS AUTO W/SCOPE: CPT | Performed by: NURSE PRACTITIONER

## 2019-01-14 PROCEDURE — 99213 OFFICE O/P EST LOW 20 MIN: CPT | Performed by: NURSE PRACTITIONER

## 2019-01-14 RX ORDER — FLUCONAZOLE 150 MG/1
150 TABLET ORAL DAILY
Qty: 3 TABLET | Refills: 0 | Status: SHIPPED | OUTPATIENT
Start: 2019-01-14 | End: 2019-03-01

## 2019-01-14 RX ORDER — NORGESTIMATE AND ETHINYL ESTRADIOL 0.25-0.035
1 KIT ORAL DAILY
Qty: 84 TABLET | Refills: 3 | Status: SHIPPED | OUTPATIENT
Start: 2019-01-14 | End: 2021-07-22

## 2019-01-14 ASSESSMENT — PAIN SCALES - GENERAL: PAINLEVEL: NO PAIN (0)

## 2019-01-14 NOTE — PROGRESS NOTES
SUBJECTIVE:   Sirena Dietrich is a 33 year old female who presents to clinic today for the following health issues:      Recheck vaginal infection   Was treated Diflucan and flagyl  Never took antibiotics for cat and dog scratches         Problem list and histories reviewed & adjusted, as indicated.  Additional history: as documented    Patient Active Problem List   Diagnosis     Lumbago     CARDIOVASCULAR SCREENING; LDL GOAL LESS THAN 160     Pelvic pain in female     Urinary incontinence     Migraine headache     Tortuous colon     PTSD (post-traumatic stress disorder)     Severe bipolar I disorder, most recent episode mixed, with psychotic features (H)     Agoraphobia with panic disorder     S/P hysterectomy     Insomnia due to other mental disorder     MTHFR mutation (methylenetetrahydrofolate reductase) (H)     Cyst of left ovary     Herpes simplex vulvovaginitis     Methamphetamine abuse in remission (H)     Chronic abdominal pain     Other chronic pain     Past Surgical History:   Procedure Laterality Date     ANESTHESIA OUT OF OR MRI N/A 1/12/2015    Procedure: ANESTHESIA OUT OF OR MRI;  Surgeon: Generic Anesthesia Provider;  Location: [facility]     C INDUCED ABORTN BY D&C  2007     C INDUCED ABORTN BY D&C  3/2009     C INDUCED ABORTN BY D&C  10/2008     CYSTOSCOPY       CYSTOSCOPY N/A 12/3/2014    Procedure: CYSTOSCOPY;  Surgeon: Caroline Grossman MD;  Location: [facility]     CYSTOSCOPY N/A 9/21/2018    Procedure: CYSTOSCOPY;;  Surgeon: Debi Luis MD;  Location: [facility]     DILATION AND CURETTAGE N/A 1/5/2015    Procedure: DILATION AND CURETTAGE;  Surgeon: Caroline Grossman MD;  Location: [facility]     ESOPHAGOSCOPY, GASTROSCOPY, DUODENOSCOPY (EGD), COMBINED N/A 8/25/2016    Procedure: COMBINED ESOPHAGOSCOPY, GASTROSCOPY, DUODENOSCOPY (EGD);  Surgeon: Tommie Clancy MD;  Location: [facility]     EXCISE LESION PERINEAL  4/9/2014    Procedure: EXCISE LESION PERINEAL;;  Surgeon: Lisa Helton MD;   [facility]     HYSTERECTOMY, PAP NO LONGER INDICATED       LAPAROSCOPIC HYSTERECTOMY TOTAL N/A 12/3/2014    Procedure: LAPAROSCOPIC HYSTERECTOMY TOTAL;  Surgeon: Caroline Grossman MD;  Location: [facility]     LAPAROSCOPIC SALPINGECTOMY  2014    Procedure: LAPAROSCOPIC SALPINGECTOMY;  Laparoscopic Bilateral Salpingectomy, Removal Of Perineal Skin Tag;  Surgeon: Lisa Helton MD;  [facility]     LAPAROSCOPIC SALPINGO-OOPHORECTOMY N/A 2018    Procedure: LAPAROSCOPIC SALPINGO-OOPHORECTOMY;  Diagnostic Laparoscopy. Left Salpingo-Oopherectomy. lysis of adhesions, cystoscopy;  Surgeon: Debi Luis MD;  Location: [facility]     LAPAROSCOPY DIAGNOSTIC (GYN)  10/16/2012    Procedure: LAPAROSCOPY DIAGNOSTIC (GYN);  Diagnostic Laparoscopy, Excision of Bilateral Paratubal Cysts;  Surgeon: La Guzmán MD;  Location: [facility]     TONSILLECTOMY         Social History     Tobacco Use     Smoking status: Former Smoker     Packs/day: 0.50     Types: Cigarettes     Last attempt to quit: 2018     Years since quittin.7     Smokeless tobacco: Never Used   Substance Use Topics     Alcohol use: Yes     Comment: rare      Family History   Problem Relation Age of Onset     Alcohol/Drug Mother         drugs     Depression Mother      Heart Disease Mother         ?     Alcohol/Drug Father         drugs     Depression Father      Hypertension Maternal Grandmother      Cancer Maternal Grandmother         cervical     Depression Maternal Grandmother      Heart Disease Maternal Grandmother      Other - See Comments Maternal Grandmother         ovaries removed for cancer cells     Hypertension Brother      Bipolar Disorder Other      Bipolar Disorder Other      Crohn's Disease Paternal Grandmother      LUNG DISEASE Paternal Grandmother         breathing problems     Heart Disease Other         stents, clogged arteries           Reviewed and updated as needed this visit by clinical  staff  Tobacco  Allergies  Meds       Reviewed and updated as needed this visit by Provider         ROS:  Constitutional, HEENT, cardiovascular, pulmonary, gi and gu systems are negative, except as otherwise noted.    OBJECTIVE:                                                    /80   Pulse 75   Temp 98.2  F (36.8  C) (Tympanic)   Resp 12   Wt 74.8 kg (165 lb)   LMP  (LMP Unknown)   SpO2 98%   BMI 26.63 kg/m    Body mass index is 26.63 kg/m .  GENERAL APPEARANCE: healthy, alert and no distress  RESP: lungs clear to auscultation - no rales, rhonchi or wheezes  CV: regular rates and rhythm, normal S1 S2, no S3 or S4 and no murmur, click or rub  ABDOMEN: soft, nontender, without hepatosplenomegaly or masses and bowel sounds normal  SKIN: scattered small scabs on hands without signs of infections    Diagnostic test results:  Diagnostic Test Results:  Results for orders placed or performed in visit on 01/14/19   *UA reflex to Microscopic and Culture ([place] and [facility] (except [place] and [place])   Result Value Ref Range    Color Urine Yellow     Appearance Urine Clear     Glucose Urine Negative NEG^Negative mg/dL    Bilirubin Urine Negative NEG^Negative    Ketones Urine Negative NEG^Negative mg/dL    Specific Gravity Urine >1.030 1.003 - 1.035    Blood Urine Trace (A) NEG^Negative    pH Urine 5.5 5.0 - 7.0 pH    Protein Albumin Urine Negative NEG^Negative mg/dL    Urobilinogen Urine 0.2 0.2 - 1.0 EU/dL    Nitrite Urine Negative NEG^Negative    Leukocyte Esterase Urine Negative NEG^Negative    Source Midstream Urine    Urine Microscopic   Result Value Ref Range    WBC Urine 0 - 5 OTO5^0 - 5 /HPF    RBC Urine O - 2 OTO2^O - 2 /HPF    Squamous Epithelial /LPF Urine Few FEW^Few /LPF   Wet prep   Result Value Ref Range    Specimen Description Vagina     Wet Prep No Trichomonas seen  No clue cells seen       Wet Prep Yeast seen (A)      *Note: Due to a large number of results and/or encounters for  the requested time period, some results have not been displayed. A complete set of results can be found in Results Review.        ASSESSMENT/PLAN:                                                        1. Vaginal itching  - Wet prep  - *UA reflex to Microscopic and Culture ([place] and [facility] (except [place] and [place])  - Urine Microscopic    2. Right ovarian cyst  Refilled   ultrasound order is in the system   - norgestimate-ethinyl estradiol (ORTHO-CYCLEN/SPRINTEC) 0.25-35 MG-MCG tablet; Take 1 tablet by mouth daily Use continuously skip placebo pills  Dispense: 84 tablet; Refill: 3    3. Yeast infection of the vagina  Will treat with diflucan every 3 days x3   - fluconazole (DIFLUCAN) 150 MG tablet; Take 1 tablet (150 mg) by mouth daily for 3 days  Dispense: 3 tablet; Refill: 0      Harmony Valdez NP  [facility]

## 2019-01-14 NOTE — NURSING NOTE
"Chief Complaint   Patient presents with     Vaginal Itching     recheck       Initial /80   Pulse 75   Temp 98.2  F (36.8  C) (Tympanic)   Resp 12   Wt 74.8 kg (165 lb)   LMP  (LMP Unknown)   SpO2 98%   BMI 26.63 kg/m   Estimated body mass index is 26.63 kg/m  as calculated from the following:    Height as of 11/7/18: 1.676 m (5' 6\").    Weight as of this encounter: 74.8 kg (165 lb).    Patient presents to the clinic using No DME    Health Maintenance that is potentially due pending provider review:  NONE    n/a    Is there anyone who you would like to be able to receive your results? No  If yes have patient fill out JOSÉ      "

## 2019-01-14 NOTE — PATIENT INSTRUCTIONS
1. Vaginal itching  - Wet prep  - *UA reflex to Microscopic and Culture ([place] and [facility] (except [place] and [place])  - Urine Microscopic    2. Right ovarian cyst  Refilled   ultrasound order is in the system   - norgestimate-ethinyl estradiol (ORTHO-CYCLEN/SPRINTEC) 0.25-35 MG-MCG tablet; Take 1 tablet by mouth daily Use continuously skip placebo pills  Dispense: 84 tablet; Refill: 3    3. Yeast infection of the vagina  Will treat with diflucan every 3 days x3   - fluconazole (DIFLUCAN) 150 MG tablet; Take 1 tablet (150 mg) by mouth daily for 3 days  Dispense: 3 tablet; Refill: 0

## 2019-01-21 DIAGNOSIS — F31.64 SEVERE BIPOLAR I DISORDER, MOST RECENT EPISODE MIXED, WITH PSYCHOTIC FEATURES (H): ICD-10-CM

## 2019-01-21 RX ORDER — LAMOTRIGINE 200 MG/1
200 TABLET ORAL DAILY
Qty: 30 TABLET | Refills: 2 | Status: SHIPPED | OUTPATIENT
Start: 2019-01-21

## 2019-01-23 ENCOUNTER — OFFICE VISIT (OUTPATIENT)
Dept: FAMILY MEDICINE | Facility: CLINIC | Age: 34
End: 2019-01-23
Payer: COMMERCIAL

## 2019-01-23 ENCOUNTER — APPOINTMENT (OUTPATIENT)
Dept: OCCUPATIONAL MEDICINE | Facility: CLINIC | Age: 34
End: 2019-01-23

## 2019-01-23 VITALS
WEIGHT: 165.4 LBS | BODY MASS INDEX: 26.58 KG/M2 | DIASTOLIC BLOOD PRESSURE: 66 MMHG | HEIGHT: 66 IN | TEMPERATURE: 98.8 F | RESPIRATION RATE: 18 BRPM | HEART RATE: 86 BPM | OXYGEN SATURATION: 98 % | SYSTOLIC BLOOD PRESSURE: 116 MMHG

## 2019-01-23 DIAGNOSIS — G43.909 MIGRAINE WITHOUT STATUS MIGRAINOSUS, NOT INTRACTABLE, UNSPECIFIED MIGRAINE TYPE: Primary | ICD-10-CM

## 2019-01-23 DIAGNOSIS — F31.64 SEVERE BIPOLAR I DISORDER, MOST RECENT EPISODE MIXED, WITH PSYCHOTIC FEATURES (H): ICD-10-CM

## 2019-01-23 PROCEDURE — 86580 TB INTRADERMAL TEST: CPT | Performed by: NURSE PRACTITIONER

## 2019-01-23 PROCEDURE — 99214 OFFICE O/P EST MOD 30 MIN: CPT | Performed by: FAMILY MEDICINE

## 2019-01-23 RX ORDER — SUMATRIPTAN 25 MG/1
25-50 TABLET, FILM COATED ORAL
Qty: 9 TABLET | Refills: 1 | Status: SHIPPED | OUTPATIENT
Start: 2019-01-23 | End: 2019-04-04

## 2019-01-23 ASSESSMENT — MIFFLIN-ST. JEOR: SCORE: 1482

## 2019-01-23 ASSESSMENT — PAIN SCALES - GENERAL: PAINLEVEL: NO PAIN (0)

## 2019-01-23 NOTE — PROGRESS NOTES
SUBJECTIVE:   Sirena Dietrich is a 33 year old female who presents to clinic today for the following health issues:      Migraine Follow-Up    Headaches symptoms:  Worsened comes and goes    Frequency: daily headache     Duration of headaches: few hours    Able to do normal daily activities/work with migraines: No -     Rescue/Relief medication:sumatriptan (Imitrex) using 2-3 times per day-    has also tried Tramadol, Tylenol with caffeine but it was not helpful             Effectiveness: moderate relief    Preventative medication: None    Neurologic complications: No new stroke-like symptoms, loss of vision or speech, numbness or weakness    In the past 4 weeks, how often have you gone to Urgent Care or the emergency room because of your headaches?  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  Additional history: as documented    Patient Active Problem List   Diagnosis     Lumbago     CARDIOVASCULAR SCREENING; LDL GOAL LESS THAN 160     Pelvic pain in female     Urinary incontinence     Migraine headache     Tortuous colon     PTSD (post-traumatic stress disorder)     Severe bipolar I disorder, most recent episode mixed, with psychotic features (H)     Agoraphobia with panic disorder     S/P hysterectomy     Insomnia due to other mental disorder     MTHFR mutation (methylenetetrahydrofolate reductase) (H)     Cyst of left ovary     Herpes simplex vulvovaginitis     Methamphetamine abuse in remission (H)     Chronic abdominal pain     Other chronic pain     Past Surgical History:   Procedure Laterality Date     ANESTHESIA OUT OF OR MRI N/A 1/12/2015    Procedure: ANESTHESIA OUT OF OR MRI;  Surgeon: Generic Anesthesia Provider;  Location: [facility]  ABORTN BY D&C       C INDUCED ABORTN BY D&C  3/2009     C INDUCED ABORTN BY D&C  10/2008     CYSTOSCOPY       CYSTOSCOPY N/A 12/3/2014    Procedure: CYSTOSCOPY;  Surgeon: Caroline Grossman MD;  Location: [facility]     CYSTOSCOPY N/A 2018    Procedure: CYSTOSCOPY;;  Surgeon: Debi Luis MD;  Location: [facility]     DILATION AND CURETTAGE N/A 2015    Procedure: DILATION AND CURETTAGE;  Surgeon: Caroline Grossman MD;  Location: [facility]     ESOPHAGOSCOPY, GASTROSCOPY, DUODENOSCOPY (EGD), COMBINED N/A 2016    Procedure: COMBINED ESOPHAGOSCOPY, GASTROSCOPY, DUODENOSCOPY (EGD);  Surgeon: Tommie Clancy MD;  Location: [facility]     EXCISE LESION PERINEAL  2014    Procedure: EXCISE LESION PERINEAL;;  Surgeon: Lisa Helton MD;  [facility]     HYSTERECTOMY, PAP NO LONGER INDICATED       LAPAROSCOPIC HYSTERECTOMY TOTAL N/A 12/3/2014    Procedure: LAPAROSCOPIC HYSTERECTOMY TOTAL;  Surgeon: Caroline Grossman MD;  Location: [facility]     LAPAROSCOPIC SALPINGECTOMY  2014    Procedure: LAPAROSCOPIC SALPINGECTOMY;  Laparoscopic Bilateral Salpingectomy, Removal Of Perineal Skin Tag;  Surgeon: Lisa Helton MD;  [facility]     LAPAROSCOPIC SALPINGO-OOPHORECTOMY N/A 2018    Procedure: LAPAROSCOPIC SALPINGO-OOPHORECTOMY;  Diagnostic Laparoscopy. Left Salpingo-Oopherectomy. lysis of adhesions, cystoscopy;  Surgeon: Debi Luis MD;  Location: [facility]     LAPAROSCOPY DIAGNOSTIC (GYN)  10/16/2012    Procedure: LAPAROSCOPY DIAGNOSTIC (GYN);  Diagnostic Laparoscopy, Excision of Bilateral Paratubal Cysts;  Surgeon: La Guzmán MD;  Location: [facility]     TONSILLECTOMY         Social History     Tobacco Use     Smoking status: Former Smoker     Packs/day: 0.50     Types: Cigarettes     Last attempt to quit: 2018     Years since quittin.7     Smokeless tobacco: Never Used   Substance Use Topics     Alcohol use: Yes     Comment: rare      Family  History   Problem Relation Age of Onset     Alcohol/Drug Mother         drugs     Depression Mother      Heart Disease Mother         ?     Alcohol/Drug Father         drugs     Depression Father      Hypertension Maternal Grandmother      Cancer Maternal Grandmother         cervical     Depression Maternal Grandmother      Heart Disease Maternal Grandmother      Other - See Comments Maternal Grandmother         ovaries removed for cancer cells     Hypertension Brother      Bipolar Disorder Other      Bipolar Disorder Other      Crohn's Disease Paternal Grandmother      LUNG DISEASE Paternal Grandmother         breathing problems     Heart Disease Other         stents, clogged arteries         Current Outpatient Medications   Medication Sig Dispense Refill     acetaminophen (TYLENOL) 500 MG tablet Take 2 tablets (1,000 mg) by mouth every 4 hours as needed for other (mild pain) 100 tablet 1     lamoTRIgine (LAMICTAL) 200 MG tablet Take 1 tablet (200 mg) by mouth daily 30 tablet 2     LORazepam (ATIVAN) 0.5 MG tablet   0     norgestimate-ethinyl estradiol (ORTHO-CYCLEN/SPRINTEC) 0.25-35 MG-MCG tablet Take 1 tablet by mouth daily Use continuously skip placebo pills 84 tablet 3     ondansetron (ZOFRAN) 4 MG tablet Take 1 tablet (4 mg) by mouth every 8 hours as needed for nausea 20 tablet 3     ondansetron (ZOFRAN-ODT) 4 MG ODT tab Take 1-2 tablets (4-8 mg) by mouth every 8 hours as needed for nausea Dissolve ON the tongue. 20 tablet 1     prochlorperazine (COMPAZINE) 10 MG tablet Take 1 tablet (10 mg) by mouth every 6 hours as needed for nausea or vomiting 30 tablet 0     QUEtiapine (SEROQUEL) 25 MG tablet Take 1 tablet (25 mg) by mouth 2 times daily 60 tablet 1     QUEtiapine (SEROQUEL) 50 MG tablet Take 2-3 tablets (100-150 mg) by mouth At Bedtime Up to 150 mg daily 90 tablet 1     ranitidine (ZANTAC) 300 MG tablet Take 1 tablet (300 mg) by mouth At Bedtime 30 tablet 2     senna-docusate (SENOKOT-S;PERICOLACE) 8.6-50  MG per tablet Take 1-2 tablets by mouth 2 times daily Take while on oral narcotics to prevent or treat constipation. 60 tablet 0     SUMAtriptan (IMITREX) 25 MG tablet Take 1-2 tablets (25-50 mg) by mouth at onset of headache for migraine May repeat in 2 hours. Max 8 tablets/24 hours. 9 tablet 1     traMADol (ULTRAM) 50 MG tablet Take 1 tablet (50 mg) by mouth every 6 hours as needed for severe pain 10 tablet 0     valACYclovir (VALTREX) 500 MG tablet Take 1 tablet (500 mg) by mouth daily 31 tablet 11     gabapentin (NEURONTIN) 600 MG tablet Take 2 tablets (1,200 mg) by mouth 2 times daily 120 tablet 1     Allergies   Allergen Reactions     Vicodin [Hydrocodone-Acetaminophen] Other (See Comments)     Severe irritability.  Neither vicodin nor percocet seem to provide relief     Amoxicillin Swelling     As a child--facial swelling and redness     Bactrim Itching and Rash     Erythro [Erythromycin] Other (See Comments) and Swelling     As a child--facial swelling     Recent Labs   Lab Test 10/11/18  1025 10/05/18  2156 09/19/18  1340  01/27/17  1231 01/20/17  1730 12/06/16  0852  06/02/11  1540   LDL  --   --   --   --   --   --  Cannot estimate LDL when triglyceride exceeds 400 mg/dL  --  138*   HDL  --   --   --   --   --   --  38*  --  46*   TRIG  --   --   --   --   --   --  532*  --  312*   ALT 26  --   --   --  57* 57* 18   < >  --    CR 0.67 0.75  --    < > 0.70 0.71 0.68   < >  --    GFRESTIMATED >90 90  --    < > >90  Non  GFR Calc   >90  Non  GFR Calc   >90  Non  GFR Calc     < >  --    GFRESTBLACK >90 >90  --    < > >90   GFR Calc   >90   GFR Calc   >90   GFR Calc     < >  --    POTASSIUM 3.8 3.8  --    < > 4.2 3.3* 3.6   < >  --    TSH  --   --  1.59  --   --   --  1.65   < > 2.62    < > = values in this interval not displayed.      BP Readings from Last 3 Encounters:   01/23/19 116/66   01/14/19 118/80  "  01/01/19 (!) 142/93    Wt Readings from Last 3 Encounters:   01/23/19 75 kg (165 lb 6.4 oz)   01/14/19 74.8 kg (165 lb)   12/03/18 75.3 kg (166 lb)                  Labs reviewed in EPIC    Reviewed and updated as needed this visit by clinical staff  Problems       Reviewed and updated as needed this visit by Provider         ROS:   ROS: 10 point ROS neg other than the symptoms noted above in the HPI.    OBJECTIVE:     /66   Pulse 86   Temp 98.8  F (37.1  C)   Resp 18   Ht 1.676 m (5' 6\")   Wt 75 kg (165 lb 6.4 oz)   LMP  (LMP Unknown)   SpO2 98%   BMI 26.70 kg/m    Body mass index is 26.7 kg/m .  GENERAL: alert and no distress  EYES: Eyes grossly normal to inspection, PERRL and conjunctivae and sclerae normal  HENT: ear canals and TM's normal, nose and mouth without ulcers or lesions  NECK: no adenopathy, no asymmetry, masses, or scars and thyroid normal to palpation  RESP: lungs clear to auscultation - no rales, rhonchi or wheezes  CV: regular rates and rhythm, normal S1 S2, no S3 or S4 and no murmur, click or rub  ABDOMEN: soft, nontender  MS: no gross musculoskeletal defects noted, no edema  NEURO: Normal strength and tone, sensory exam grossly normal, mentation intact, speech normal, cranial nerves 2-12 intact and DTR's normal and symmetric bilaterally      ASSESSMENT/PLAN:         ICD-10-CM    1. Migraine without status migrainosus, not intractable, unspecified migraine type G43.909 NEUROLOGY ADULT REFERRAL     SUMAtriptan (IMITREX) 25 MG tablet     amitriptyline (ELAVIL) 25 MG tablet   2. Severe bipolar I disorder, most recent episode mixed, with psychotic features (H) F31.64        33-year-old female presents with persistent recurrent headaches, known to have migraine, symptoms worsened in the recent past.  Physical examination unremarkable.  Differentials discussed in detail including migraine headache, medication side effects and neurological etiology.  Imitrex refilled and suggested to " use amitriptyline, common side effects discussed, known to have bipolar disorder, drug interactions reviewed.  Recommended to continue well hydration, balanced diet and regular exercise.  Neurology referral placed for further review and recommendations.  Written information provided.  Patient understood and in agreement with above plan.  All questions answered.      Patient Instructions       Patient Education     Preventing Migraine Headaches: Medicines and Lifestyle Changes     Going to bed and getting up at the same time each day, including weekends, may help prevent migraines.     A migraine is a type of severe headache. Having a migraine can be very painful. But there are steps you can take to help prevent migraines.  Medicines to help prevent migraines    Your healthcare provider may prescribe certain medicines to help prevent migraines. These medicines may need to be taken daily. Or they may only need to be taken at times when you re likely to have a migraine.    Common medicines used to help prevent migraines include:  ? Triptans (serotonin receptor agonists)  ? Nonsteroidal anti-inflammatory drugs (such as ibuprofen, available over-the-counter)  ? Beta-blockers  ? Anticonvulsants  ? Tricyclic antidepressants  ? Calcium channel blockers  ? Certain vitamins, minerals, and plant extracts  ? Botulinum toxin injection for certain chronic migraines   ? CGRP (calcitonin gene-related peptide) agnonists are being reviewed by the Food and Drug Administration (FDA)  Lifestyle changes for long-term prevention  Here are some suggestions:    Exercise. Regular exercise can help prevent migraines and improve your health. (If exercise triggers your migraines, talk to your healthcare provider.)    Keep regular habits. Don t skip or delay meals. Drink plenty of water. And go to bed and get up at about the same time each day. This includes weekends.    Try alternative treatments. These are treatments that don't involve the use  of medicines or surgery. They may help relieve symptoms and prevent migraines. Some treatment options include biofeedback and acupuncture. Ask your healthcare provider to tell you more about these treatments if you have questions.    Limit caffeine. You may find that caffeine helps relieve pain during an attack. But too much caffeine can also trigger migraines. So, limit the amount of caffeine you consume.  Date Last Reviewed: 5/1/2018 2000-2018 [employer]. [address] [place] [address], [place] [address]. All rights reserved. This information is not intended as a substitute for professional medical care. Always follow your healthcare professional's instructions.           Patient Education     Amitriptyline Hydrochloride Oral tablet  What is this medicine?  AMITRIPTYLINE (a evie TRIP ti yasmani) is used to treat depression.  This medicine may be used for other purposes; ask your health care provider or pharmacist if you have questions.  What should I tell my health care provider before I take this medicine?  They need to know if you have any of these conditions:    an alcohol problem    asthma, difficulty breathing    bipolar disorder or schizophrenia    difficulty passing urine, prostate trouble    glaucoma    heart disease or previous heart attack    liver disease    over active thyroid    seizures    thoughts or plans of suicide, a previous suicide attempt, or family history of suicide attempt    an unusual or allergic reaction to amitriptyline, other medicines, foods, dyes, or preservatives    pregnant or trying to get pregnant    breast-feeding  How should I use this medicine?  Take this medicine by mouth with a drink of water. Follow the directions on the prescription label. You can take the tablets with or without food. Take your medicine at regular intervals. Do not take it more often than directed. Do not stop taking this medicine suddenly except upon the advice of your doctor. Stopping this  medicine too quickly may cause serious side effects or your condition may worsen.  A special MedGuide will be given to you by the pharmacist with each prescription and refill. Be sure to read this information carefully each time.  Talk to your pediatrician regarding the use of this medicine in children. Special care may be needed.  Overdosage: If you think you have taken too much of this medicine contact a poison control center or emergency room at once.  NOTE: This medicine is only for you. Do not share this medicine with others.  What if I miss a dose?  If you miss a dose, take it as soon as you can. If it is almost time for your next dose, take only that dose. Do not take double or extra doses.  What may interact with this medicine?  Do not take this medicine with any of the following medications:    arsenic trioxide    certain medicines used to regulate abnormal heartbeat or to treat other heart conditions    cisapride    droperidol    halofantrine    linezolid    MAOIs like Carbex, Eldepryl, Marplan, Nardil, and Parnate    methylene blue    other medicines for mental depression    phenothiazines like perphenazine, thioridazine and chlorpromazine    pimozide    probucol    procarbazine    sparfloxacin    Hernandez's Wort    ziprasidone  This medicine may also interact with the following medications:    atropine and related drugs like hyoscyamine, scopolamine, tolterodine and others    barbiturate medicines for inducing sleep or treating seizures, like phenobarbital    cimetidine    disulfiram    ethchlorvynol    thyroid hormones such as levothyroxine  This list may not describe all possible interactions. Give your health care provider a list of all the medicines, herbs, non-prescription drugs, or dietary supplements you use. Also tell them if you smoke, drink alcohol, or use illegal drugs. Some items may interact with your medicine.  What should I watch for while using this medicine?  Tell your doctor if your  symptoms do not get better or if they get worse. Visit your doctor or health care professional for regular checks on your progress. Because it may take several weeks to see the full effects of this medicine, it is important to continue your treatment as prescribed by your doctor.  Patients and their families should watch out for new or worsening thoughts of suicide or depression. Also watch out for sudden changes in feelings such as feeling anxious, agitated, panicky, irritable, hostile, aggressive, impulsive, severely restless, overly excited and hyperactive, or not being able to sleep. If this happens, especially at the beginning of treatment or after a change in dose, call your health care professional.  You may get drowsy or dizzy. Do not drive, use machinery, or do anything that needs mental alertness until you know how this medicine affects you. Do not stand or sit up quickly, especially if you are an older patient. This reduces the risk of dizzy or fainting spells. Alcohol may interfere with the effect of this medicine. Avoid alcoholic drinks.  Do not treat yourself for coughs, colds, or allergies without asking your doctor or health care professional for advice. Some ingredients can increase possible side effects.  Your mouth may get dry. Chewing sugarless gum or sucking hard candy, and drinking plenty of water will help. Contact your doctor if the problem does not go away or is severe.  This medicine may cause dry eyes and blurred vision. If you wear contact lenses you may feel some discomfort. Lubricating drops may help. See your eye doctor if the problem does not go away or is severe.  This medicine can cause constipation. Try to have a bowel movement at least every 2 to 3 days. If you do not have a bowel movement for 3 days, call your doctor or health care professional.  This medicine can make you more sensitive to the sun. Keep out of the sun. If you cannot avoid being in the sun, wear protective  clothing and use sunscreen. Do not use sun lamps or tanning beds/booths.  What side effects may I notice from receiving this medicine?  Side effects that you should report to your doctor or health care professional as soon as possible:    allergic reactions like skin rash, itching or hives, swelling of the face, lips, or tongue    abnormal production of milk in females    breast enlargement in both males and females    breathing problems    confusion, hallucinations    fast, irregular heartbeat    fever with increased sweating    muscle stiffness, or spasms    pain or difficulty passing urine, loss of bladder control    seizures    suicidal thoughts or other mood changes    swelling of the testicles    tingling, pain, or numbness in the feet or hands    yellowing of the eyes or skin  Side effects that usually do not require medical attention (report to your doctor or health care professional if they continue or are bothersome):    change in sex drive or performance    constipation or diarrhea    nausea, vomiting    weight gain or loss  This list may not describe all possible side effects. Call your doctor for medical advice about side effects. You may report side effects to FDA at 5-015-FDA-0503.  Where should I keep my medicine?  Keep out of the reach of children.  Store at room temperature between 20 and 25 degrees C (68 and 77 degrees F). Throw away any unused medicine after the expiration date.  NOTE:This sheet is a summary. It may not cover all possible information. If you have questions about this medicine, talk to your doctor, pharmacist, or health care provider. Copyright  2016 [employer]               Rosales Montaño MD  [facility]

## 2019-01-23 NOTE — PATIENT INSTRUCTIONS
Patient Education     Preventing Migraine Headaches: Medicines and Lifestyle Changes     Going to bed and getting up at the same time each day, including weekends, may help prevent migraines.     A migraine is a type of severe headache. Having a migraine can be very painful. But there are steps you can take to help prevent migraines.  Medicines to help prevent migraines    Your healthcare provider may prescribe certain medicines to help prevent migraines. These medicines may need to be taken daily. Or they may only need to be taken at times when you re likely to have a migraine.    Common medicines used to help prevent migraines include:  ? Triptans (serotonin receptor agonists)  ? Nonsteroidal anti-inflammatory drugs (such as ibuprofen, available over-the-counter)  ? Beta-blockers  ? Anticonvulsants  ? Tricyclic antidepressants  ? Calcium channel blockers  ? Certain vitamins, minerals, and plant extracts  ? Botulinum toxin injection for certain chronic migraines   ? CGRP (calcitonin gene-related peptide) agnonists are being reviewed by the Food and Drug Administration (FDA)  Lifestyle changes for long-term prevention  Here are some suggestions:    Exercise. Regular exercise can help prevent migraines and improve your health. (If exercise triggers your migraines, talk to your healthcare provider.)    Keep regular habits. Don t skip or delay meals. Drink plenty of water. And go to bed and get up at about the same time each day. This includes weekends.    Try alternative treatments. These are treatments that don't involve the use of medicines or surgery. They may help relieve symptoms and prevent migraines. Some treatment options include biofeedback and acupuncture. Ask your healthcare provider to tell you more about these treatments if you have questions.    Limit caffeine. You may find that caffeine helps relieve pain during an attack. But too much caffeine can also trigger migraines. So, limit the amount of  caffeine you consume.  Date Last Reviewed: 5/1/2018 2000-2018 [employer]. [address] [place] [address], [place] [address]. All rights reserved. This information is not intended as a substitute for professional medical care. Always follow your healthcare professional's instructions.           Patient Education     Amitriptyline Hydrochloride Oral tablet  What is this medicine?  AMITRIPTYLINE (a evie TRIP ti yasmani) is used to treat depression.  This medicine may be used for other purposes; ask your health care provider or pharmacist if you have questions.  What should I tell my health care provider before I take this medicine?  They need to know if you have any of these conditions:    an alcohol problem    asthma, difficulty breathing    bipolar disorder or schizophrenia    difficulty passing urine, prostate trouble    glaucoma    heart disease or previous heart attack    liver disease    over active thyroid    seizures    thoughts or plans of suicide, a previous suicide attempt, or family history of suicide attempt    an unusual or allergic reaction to amitriptyline, other medicines, foods, dyes, or preservatives    pregnant or trying to get pregnant    breast-feeding  How should I use this medicine?  Take this medicine by mouth with a drink of water. Follow the directions on the prescription label. You can take the tablets with or without food. Take your medicine at regular intervals. Do not take it more often than directed. Do not stop taking this medicine suddenly except upon the advice of your doctor. Stopping this medicine too quickly may cause serious side effects or your condition may worsen.  A special MedGuide will be given to you by the pharmacist with each prescription and refill. Be sure to read this information carefully each time.  Talk to your pediatrician regarding the use of this medicine in children. Special care may be needed.  Overdosage: If you think you have taken too much of this  medicine contact a poison control center or emergency room at once.  NOTE: This medicine is only for you. Do not share this medicine with others.  What if I miss a dose?  If you miss a dose, take it as soon as you can. If it is almost time for your next dose, take only that dose. Do not take double or extra doses.  What may interact with this medicine?  Do not take this medicine with any of the following medications:    arsenic trioxide    certain medicines used to regulate abnormal heartbeat or to treat other heart conditions    cisapride    droperidol    halofantrine    linezolid    MAOIs like Carbex, Eldepryl, Marplan, Nardil, and Parnate    methylene blue    other medicines for mental depression    phenothiazines like perphenazine, thioridazine and chlorpromazine    pimozide    probucol    procarbazine    sparfloxacin    [place] Wort    ziprasidone  This medicine may also interact with the following medications:    atropine and related drugs like hyoscyamine, scopolamine, tolterodine and others    barbiturate medicines for inducing sleep or treating seizures, like phenobarbital    cimetidine    disulfiram    ethchlorvynol    thyroid hormones such as levothyroxine  This list may not describe all possible interactions. Give your health care provider a list of all the medicines, herbs, non-prescription drugs, or dietary supplements you use. Also tell them if you smoke, drink alcohol, or use illegal drugs. Some items may interact with your medicine.  What should I watch for while using this medicine?  Tell your doctor if your symptoms do not get better or if they get worse. Visit your doctor or health care professional for regular checks on your progress. Because it may take several weeks to see the full effects of this medicine, it is important to continue your treatment as prescribed by your doctor.  Patients and their families should watch out for new or worsening thoughts of suicide or depression. Also watch  out for sudden changes in feelings such as feeling anxious, agitated, panicky, irritable, hostile, aggressive, impulsive, severely restless, overly excited and hyperactive, or not being able to sleep. If this happens, especially at the beginning of treatment or after a change in dose, call your health care professional.  You may get drowsy or dizzy. Do not drive, use machinery, or do anything that needs mental alertness until you know how this medicine affects you. Do not stand or sit up quickly, especially if you are an older patient. This reduces the risk of dizzy or fainting spells. Alcohol may interfere with the effect of this medicine. Avoid alcoholic drinks.  Do not treat yourself for coughs, colds, or allergies without asking your doctor or health care professional for advice. Some ingredients can increase possible side effects.  Your mouth may get dry. Chewing sugarless gum or sucking hard candy, and drinking plenty of water will help. Contact your doctor if the problem does not go away or is severe.  This medicine may cause dry eyes and blurred vision. If you wear contact lenses you may feel some discomfort. Lubricating drops may help. See your eye doctor if the problem does not go away or is severe.  This medicine can cause constipation. Try to have a bowel movement at least every 2 to 3 days. If you do not have a bowel movement for 3 days, call your doctor or health care professional.  This medicine can make you more sensitive to the sun. Keep out of the sun. If you cannot avoid being in the sun, wear protective clothing and use sunscreen. Do not use sun lamps or tanning beds/booths.  What side effects may I notice from receiving this medicine?  Side effects that you should report to your doctor or health care professional as soon as possible:    allergic reactions like skin rash, itching or hives, swelling of the face, lips, or tongue    abnormal production of milk in females    breast enlargement in  both males and females    breathing problems    confusion, hallucinations    fast, irregular heartbeat    fever with increased sweating    muscle stiffness, or spasms    pain or difficulty passing urine, loss of bladder control    seizures    suicidal thoughts or other mood changes    swelling of the testicles    tingling, pain, or numbness in the feet or hands    yellowing of the eyes or skin  Side effects that usually do not require medical attention (report to your doctor or health care professional if they continue or are bothersome):    change in sex drive or performance    constipation or diarrhea    nausea, vomiting    weight gain or loss  This list may not describe all possible side effects. Call your doctor for medical advice about side effects. You may report side effects to FDA at 8-029-JZC-2615.  Where should I keep my medicine?  Keep out of the reach of children.  Store at room temperature between 20 and 25 degrees C (68 and 77 degrees F). Throw away any unused medicine after the expiration date.  NOTE:This sheet is a summary. It may not cover all possible information. If you have questions about this medicine, talk to your doctor, pharmacist, or health care provider. Copyright  2016 [employer]

## 2019-01-25 DIAGNOSIS — G43.109 MIGRAINE WITH AURA AND WITHOUT STATUS MIGRAINOSUS, NOT INTRACTABLE: Chronic | ICD-10-CM

## 2019-01-25 DIAGNOSIS — F31.64 SEVERE BIPOLAR I DISORDER, MOST RECENT EPISODE MIXED, WITH PSYCHOTIC FEATURES (H): ICD-10-CM

## 2019-01-25 RX ORDER — QUETIAPINE FUMARATE 50 MG/1
100-150 TABLET, FILM COATED ORAL AT BEDTIME
Qty: 90 TABLET | Refills: 1 | Status: SHIPPED | OUTPATIENT
Start: 2019-01-25 | End: 2019-10-16

## 2019-01-25 RX ORDER — QUETIAPINE FUMARATE 25 MG/1
25 TABLET, FILM COATED ORAL 2 TIMES DAILY
Qty: 60 TABLET | Refills: 1 | Status: SHIPPED | OUTPATIENT
Start: 2019-01-25 | End: 2019-10-16

## 2019-01-25 NOTE — TELEPHONE ENCOUNTER
Checking on the status of the refill request for Seroquel (Quetapine 100mg)   Original request sent 1/21/19    EVER AARON [facility]

## 2019-01-26 NOTE — TELEPHONE ENCOUNTER
Refilled.   First request I have seen  Ok to refill today.   Further refills should come from Puja Peguero DNP- [facility].   Thanks Shi Martinez ABY-BC

## 2019-01-30 NOTE — PROGRESS NOTES
SUBJECTIVE:   Sirena Dietrich is a 33 year old female who presents to clinic today for the following health issues:      Vaginal Symptoms      Duration: Did not resolve from 01/14/2019    Description  vaginal discharge - white and itching    Intensity:  Worsening     Accompanying signs and symptoms (fever/dysuria/abdominal or back pain): None    History  Sexually active: not at present  Possibility of pregnancy: No  Recent antibiotic use: no     Precipitating or alleviating factors: None    Therapies tried and outcome: Diflucan and 3 day Monistat   Outcome: with no relief     Noticing more urinary incontinence- more so at night than during the day  Symptoms are external  No STD concerns  HISTORY of Hysterectomy   Left oopherectomy removed in 9/2018 for cysts  3 kids- all vaginal deliveries    PCP   Shi Martinez, APRN [facility] [phone]    Health Maintenance        Health Maintenance Due   Topic Date Due     URINE DRUG SCREEN Q1 YR  02/20/2019       HPI        Patient Active Problem List   Diagnosis     Lumbago     CARDIOVASCULAR SCREENING; LDL GOAL LESS THAN 160     Pelvic pain in female     Urinary incontinence     Migraine headache     Tortuous colon     PTSD (post-traumatic stress disorder)     Severe bipolar I disorder, most recent episode mixed, with psychotic features (H)     Agoraphobia with panic disorder     S/P hysterectomy     Insomnia due to other mental disorder     MTHFR mutation (methylenetetrahydrofolate reductase) (H)     Cyst of left ovary     Herpes simplex vulvovaginitis     Methamphetamine abuse in remission (H)     Chronic abdominal pain     Other chronic pain     Current Outpatient Medications   Medication     acetaminophen (TYLENOL) 500 MG tablet     amitriptyline (ELAVIL) 25 MG tablet     gabapentin (NEURONTIN) 600 MG tablet     lamoTRIgine (LAMICTAL) 200 MG tablet     LORazepam (ATIVAN) 0.5 MG tablet     norgestimate-ethinyl estradiol (ORTHO-CYCLEN/SPRINTEC) 0.25-35 MG-MCG tablet  "    ondansetron (ZOFRAN) 4 MG tablet     prochlorperazine (COMPAZINE) 10 MG tablet     QUEtiapine (SEROQUEL) 25 MG tablet     QUEtiapine (SEROQUEL) 50 MG tablet     ranitidine (ZANTAC) 300 MG tablet     senna-docusate (SENOKOT-S;PERICOLACE) 8.6-50 MG per tablet     SUMAtriptan (IMITREX) 25 MG tablet     valACYclovir (VALTREX) 500 MG tablet     No current facility-administered medications for this visit.        Reviewed and updated:  Tobacco  Allergies  Meds  Med Hx  Surg Hx  Fam Hx  Soc Hx     ROS:  Constitutional, HEENT, cardiovascular, pulmonary, gi and gu systems are negative, except as otherwise noted.    PHYSICAL EXAM   /60 (BP Location: Right arm, Patient Position: Sitting, Cuff Size: Adult Large)   Pulse 84   Temp 98.5  F (36.9  C) (Tympanic)   Resp 16   Ht 1.676 m (5' 6\")   Wt 73 kg (161 lb)   LMP  (LMP Unknown)   BMI 25.99 kg/m    Body mass index is 25.99 kg/m .  GENERAL APPEARANCE: healthy, alert and no distress  RESP: lungs clear to auscultation - no rales, rhonchi or wheezes  CV: regular rates and rhythm, normal S1 S2, no S3 or S4 and no murmur, click or rub  ABDOMEN: soft, nontender, without hepatosplenomegaly or masses and bowel sounds normal  MS: extremities normal- no gross deformities noted  PSYCH: mentation appears normal and affect normal/bright    Results for orders placed or performed in visit on 01/31/19   Wet prep   Result Value Ref Range    Specimen Description Vagina     Wet Prep No Trichomonas seen     Wet Prep No clue cells seen     Wet Prep No yeast seen      *Note: Due to a large number of results and/or encounters for the requested time period, some results have not been displayed. A complete set of results can be found in Results Review.       ASSESSMENT & PLAN     1. Vaginal discharge  Acute  - Wet prep  Reviewed home care and perineal care  Results for orders placed or performed in visit on 01/31/19   Wet prep   Result Value Ref Range    Specimen Description Vagina "     Wet Prep No Trichomonas seen     Wet Prep No clue cells seen     Wet Prep No yeast seen      *Note: Due to a large number of results and/or encounters for the requested time period, some results have not been displayed. A complete set of results can be found in Results Review.       2. Urge incontinence of urine  Chronic, stable  - PHYSICAL THERAPY REFERRAL; Future  - UROLOGY ADULT REFERRAL      Patient Education     Urinary Incontinence, Female (Adult)  Urinary incontinence means loss of control of the bladder. This problem affects many women, especially as they get older. If you have incontinence, you may be embarrassed to ask for help. But know that this problem can be treated.  Types of Incontinence  There are different types of incontinence. Two of the main types are described here. You can have more than one type.    Stress incontinence. With this type, urine leaks when pressure (stress) is put on the bladder. This may happen when you cough, sneeze, or laugh. Stress incontinence most often occurs because the pelvic floor muscles that support the bladder and urethra are weak. This can happen after pregnancy and vaginal childbirth or a hysterectomy. It can also be due to excess body weight or hormone changes.    Urge incontinence (also called overactive bladder). With this type, a sudden urge to urinate is felt often. This may happen even though there may not be much urine in the bladder. The need to urinate often during the night is common. Urge incontinence most often occurs because of bladder spasms. This may be due to bladder irritation or infection. Damage to bladder nerves or pelvic muscles, constipation, and certain medicines can also lead to urge incontinence.  Treatment of urinary incontinence depends on the cause. Further evaluation is needed to find the type you have. This will likely include an exam and certain tests. Based on the results, you and your healthcare provider can then plan treatment.  Until a diagnosis is made, the home care tips below can help relieve symptoms.  Home care    Do pelvic floor muscle exercises, if they are prescribed. The pelvic floor muscles help support the bladder and urethra. Many women find that their symptoms improve when doing special exercises that strengthen these muscles. To do the exercises contract the muscles you would use to stop your stream of urine, but do this when you re not urinating. Hold for 10 seconds, then relax. Repeat 10 to 20 times in a row, at least 3 times a day. Your provider may give you other instructions for how to do the exercises and how often.    Keep a bladder diary. This helps track how often and how much you urinate over a set period of time. Bring this diary with you to your next visit with the provider. The information can help your provider learn more about your bladder problem.    Lose weight, if advised to by your provider. Excess weight puts pressure on the bladder. Your provider can help you create a weight-loss plan that s right for you. This may include exercising more and making certain diet changes.    Don't consume foods and drinks that may irritate the bladder. These can include alcohol and caffeinated drinks.    Quit smoking. Smoking and other tobacco use can lead to chronic cough that strains the pelvic floor muscles. Smoking may also damage the bladder and urethra. Talk with your provider about treatments or methods you can use to quit smoking.    If drinking large amounts of fluid causes you to have symptoms, you may be advised to limit your fluid intake. You may also be advised to drink most of your fluids during the day and to limit fluids at night.    If you re worried about urine leakage or accidents, you may wear absorbent pads to catch urine. Change the pads often. This helps reduce discomfort. It may also reduce the risk of skin or bladder infections.  Follow-up care  Follow up with your healthcare provider, or as  directed. It may take some to find the right treatment for your problem. Your treatment plan may include special therapies or medicines. Certain procedures or surgery may also be options. Be sure to discuss any questions you have with your provider.  When to seek medical advice  Call the healthcare provider right away if any of these occur:    Fever of 100.4 F (38 C) or higher, or as directed by your provider    Bladder pain or fullness    Abdominal swelling    Nausea or vomiting    Back pain    Weakness, dizziness or fainting  Date Last Reviewed: 10/1/2017    4645-1124 [employer]. [address] [place] [address]. All rights reserved. This information is not intended as a substitute for professional medical care. Always follow your healthcare professional's instructions.             Risks, benefits, side effects and rationale for treatment plan fully discussed with the patient and understanding expressed.    Lindsey Loomis, MICHA[facility]

## 2019-01-31 ENCOUNTER — OFFICE VISIT (OUTPATIENT)
Dept: FAMILY MEDICINE | Facility: CLINIC | Age: 34
End: 2019-01-31
Payer: COMMERCIAL

## 2019-01-31 VITALS
DIASTOLIC BLOOD PRESSURE: 60 MMHG | WEIGHT: 161 LBS | HEIGHT: 66 IN | BODY MASS INDEX: 25.88 KG/M2 | RESPIRATION RATE: 16 BRPM | TEMPERATURE: 98.5 F | SYSTOLIC BLOOD PRESSURE: 128 MMHG | HEART RATE: 84 BPM

## 2019-01-31 DIAGNOSIS — N39.41 URGE INCONTINENCE OF URINE: Chronic | ICD-10-CM

## 2019-01-31 DIAGNOSIS — N89.8 VAGINAL DISCHARGE: Primary | ICD-10-CM

## 2019-01-31 LAB
SPECIMEN SOURCE: NORMAL
WET PREP SPEC: NORMAL

## 2019-01-31 PROCEDURE — 87210 SMEAR WET MOUNT SALINE/INK: CPT | Performed by: NURSE PRACTITIONER

## 2019-01-31 PROCEDURE — 99214 OFFICE O/P EST MOD 30 MIN: CPT | Performed by: NURSE PRACTITIONER

## 2019-01-31 ASSESSMENT — MIFFLIN-ST. JEOR: SCORE: 1462.04

## 2019-01-31 NOTE — PATIENT INSTRUCTIONS
1. Vaginal discharge  Acute  - Wet prep  Results for orders placed or performed in visit on 01/31/19   Wet prep   Result Value Ref Range    Specimen Description Vagina     Wet Prep No Trichomonas seen     Wet Prep No clue cells seen     Wet Prep No yeast seen      *Note: Due to a large number of results and/or encounters for the requested time period, some results have not been displayed. A complete set of results can be found in Results Review.       2. Urge incontinence of urine  Chronic, stable  - PHYSICAL THERAPY REFERRAL; Future  - UROLOGY ADULT REFERRAL      Patient Education     Urinary Incontinence, Female (Adult)  Urinary incontinence means loss of control of the bladder. This problem affects many women, especially as they get older. If you have incontinence, you may be embarrassed to ask for help. But know that this problem can be treated.  Types of Incontinence  There are different types of incontinence. Two of the main types are described here. You can have more than one type.    Stress incontinence. With this type, urine leaks when pressure (stress) is put on the bladder. This may happen when you cough, sneeze, or laugh. Stress incontinence most often occurs because the pelvic floor muscles that support the bladder and urethra are weak. This can happen after pregnancy and vaginal childbirth or a hysterectomy. It can also be due to excess body weight or hormone changes.    Urge incontinence (also called overactive bladder). With this type, a sudden urge to urinate is felt often. This may happen even though there may not be much urine in the bladder. The need to urinate often during the night is common. Urge incontinence most often occurs because of bladder spasms. This may be due to bladder irritation or infection. Damage to bladder nerves or pelvic muscles, constipation, and certain medicines can also lead to urge incontinence.  Treatment of urinary incontinence depends on the cause. Further evaluation  is needed to find the type you have. This will likely include an exam and certain tests. Based on the results, you and your healthcare provider can then plan treatment. Until a diagnosis is made, the home care tips below can help relieve symptoms.  Home care    Do pelvic floor muscle exercises, if they are prescribed. The pelvic floor muscles help support the bladder and urethra. Many women find that their symptoms improve when doing special exercises that strengthen these muscles. To do the exercises contract the muscles you would use to stop your stream of urine, but do this when you re not urinating. Hold for 10 seconds, then relax. Repeat 10 to 20 times in a row, at least 3 times a day. Your provider may give you other instructions for how to do the exercises and how often.    Keep a bladder diary. This helps track how often and how much you urinate over a set period of time. Bring this diary with you to your next visit with the provider. The information can help your provider learn more about your bladder problem.    Lose weight, if advised to by your provider. Excess weight puts pressure on the bladder. Your provider can help you create a weight-loss plan that s right for you. This may include exercising more and making certain diet changes.    Don't consume foods and drinks that may irritate the bladder. These can include alcohol and caffeinated drinks.    Quit smoking. Smoking and other tobacco use can lead to chronic cough that strains the pelvic floor muscles. Smoking may also damage the bladder and urethra. Talk with your provider about treatments or methods you can use to quit smoking.    If drinking large amounts of fluid causes you to have symptoms, you may be advised to limit your fluid intake. You may also be advised to drink most of your fluids during the day and to limit fluids at night.    If you re worried about urine leakage or accidents, you may wear absorbent pads to catch urine. Change the  pads often. This helps reduce discomfort. It may also reduce the risk of skin or bladder infections.  Follow-up care  Follow up with your healthcare provider, or as directed. It may take some to find the right treatment for your problem. Your treatment plan may include special therapies or medicines. Certain procedures or surgery may also be options. Be sure to discuss any questions you have with your provider.  When to seek medical advice  Call the healthcare provider right away if any of these occur:    Fever of 100.4 F (38 C) or higher, or as directed by your provider    Bladder pain or fullness    Abdominal swelling    Nausea or vomiting    Back pain    Weakness, dizziness or fainting  Date Last Reviewed: 10/1/2017    7466-4522 [employer]. [address] [place] [address], [place] [address]. All rights reserved. This information is not intended as a substitute for professional medical care. Always follow your healthcare professional's instructions.

## 2019-01-31 NOTE — NURSING NOTE
"Chief Complaint   Patient presents with     Vaginal Problem       Initial /60 (BP Location: Right arm, Patient Position: Sitting, Cuff Size: Adult Large)   Pulse 84   Temp 98.5  F (36.9  C) (Tympanic)   Resp 16   Ht 1.676 m (5' 6\")   Wt 73 kg (161 lb)   LMP  (LMP Unknown)   BMI 25.99 kg/m   Estimated body mass index is 25.99 kg/m  as calculated from the following:    Height as of this encounter: 1.676 m (5' 6\").    Weight as of this encounter: 73 kg (161 lb).    Patient presents to the clinic using No DME    Health Maintenance that is potentially due pending provider review:  NONE    n/a    Is there anyone who you would like to be able to receive your results? No  If yes have patient fill out JOSÉ    "

## 2019-02-05 ENCOUNTER — APPOINTMENT (OUTPATIENT)
Dept: OCCUPATIONAL MEDICINE | Facility: CLINIC | Age: 34
End: 2019-02-05

## 2019-02-05 PROCEDURE — 86580 TB INTRADERMAL TEST: CPT | Performed by: NURSE PRACTITIONER

## 2019-02-08 ENCOUNTER — TELEPHONE (OUTPATIENT)
Dept: FAMILY MEDICINE | Facility: CLINIC | Age: 34
End: 2019-02-08

## 2019-02-08 NOTE — TELEPHONE ENCOUNTER
PRIOR AUTH REQUIRED ON Lidocaine 5% Patch  INSURANCE BC/[employer]  INSURANCE PHONE # [phone]  PATIENT ID# [id number]    PLEASE LET US KNOW WHEN PA IS GRANTED/DENIED THANK YOU  [facility]

## 2019-02-11 NOTE — TELEPHONE ENCOUNTER
PRIOR AUTHORIZATION DENIED    Medication: lidoderm 5% patch-DENIED    Denial Date: 2/11/2019    Denial Rational: Lidoderm patches are only covered with the diagnosis of post-herpetic neuralgia, diabetic neuropathy, or cancer related pain. It will not be covered for the associated diagnosis.  And patient must have tried/failed OTC topical lidocaine.        Appeal Information:

## 2019-02-11 NOTE — TELEPHONE ENCOUNTER
Central Prior Authorization Team   Phone: [phone]      PA Initiation    Medication: lidoderm 5% patch-Initiated  Insurance Company: [employer]AP - Phone [phone] Fax [phone]  Pharmacy Filling the Rx: [facility] [place] - [place], [place] - [address] [place] [address]  Filling Pharmacy Phone: [phone]  Filling Pharmacy Fax:    Start Date: 2/11/2019

## 2019-02-12 NOTE — TELEPHONE ENCOUNTER
Please call and let her know this is not covered by her insurance.  She will need to pay cash or use OTC salonpas  Thanks Shi RODRIGUEZ-BC

## 2019-02-12 NOTE — TELEPHONE ENCOUNTER
PA has been denied, please see rational.  If an appeal is desired please let the PA team know when a letter of medical necessity has been written and placed in the patient's chart.   If done with encounter, please close.     Thanks,   Central PA Team   (Routing comment)

## 2019-02-19 ENCOUNTER — TELEPHONE (OUTPATIENT)
Dept: FAMILY MEDICINE | Facility: CLINIC | Age: 34
End: 2019-02-19

## 2019-02-19 PROBLEM — G89.29 OTHER CHRONIC PAIN: Status: ACTIVE | Noted: 2018-11-12

## 2019-03-01 ENCOUNTER — OFFICE VISIT (OUTPATIENT)
Dept: FAMILY MEDICINE | Facility: CLINIC | Age: 34
End: 2019-03-01
Payer: COMMERCIAL

## 2019-03-01 VITALS
OXYGEN SATURATION: 100 % | HEIGHT: 66 IN | DIASTOLIC BLOOD PRESSURE: 60 MMHG | RESPIRATION RATE: 18 BRPM | BODY MASS INDEX: 25.97 KG/M2 | HEART RATE: 83 BPM | WEIGHT: 161.6 LBS | TEMPERATURE: 98 F | SYSTOLIC BLOOD PRESSURE: 116 MMHG

## 2019-03-01 DIAGNOSIS — R30.0 DYSURIA: ICD-10-CM

## 2019-03-01 DIAGNOSIS — B37.31 YEAST INFECTION OF THE VAGINA: Primary | ICD-10-CM

## 2019-03-01 LAB
ALBUMIN UR-MCNC: NEGATIVE MG/DL
APPEARANCE UR: CLEAR
BACTERIA #/AREA URNS HPF: ABNORMAL /HPF
BILIRUB UR QL STRIP: NEGATIVE
COLOR UR AUTO: YELLOW
GLUCOSE UR STRIP-MCNC: NEGATIVE MG/DL
HGB UR QL STRIP: ABNORMAL
KETONES UR STRIP-MCNC: NEGATIVE MG/DL
LEUKOCYTE ESTERASE UR QL STRIP: NEGATIVE
NITRATE UR QL: NEGATIVE
NON-SQ EPI CELLS #/AREA URNS LPF: ABNORMAL /LPF
PH UR STRIP: 6 PH (ref 5–7)
RBC #/AREA URNS AUTO: ABNORMAL /HPF
SOURCE: ABNORMAL
SP GR UR STRIP: 1.02 (ref 1–1.03)
SPECIMEN SOURCE: ABNORMAL
UROBILINOGEN UR STRIP-ACNC: 0.2 EU/DL (ref 0.2–1)
WBC #/AREA URNS AUTO: ABNORMAL /HPF
WET PREP SPEC: ABNORMAL
WET PREP SPEC: ABNORMAL

## 2019-03-01 PROCEDURE — 87210 SMEAR WET MOUNT SALINE/INK: CPT | Performed by: NURSE PRACTITIONER

## 2019-03-01 PROCEDURE — 99213 OFFICE O/P EST LOW 20 MIN: CPT | Performed by: NURSE PRACTITIONER

## 2019-03-01 PROCEDURE — 81001 URINALYSIS AUTO W/SCOPE: CPT | Performed by: NURSE PRACTITIONER

## 2019-03-01 RX ORDER — FLUCONAZOLE 150 MG/1
150 TABLET ORAL
Qty: 3 TABLET | Refills: 0 | Status: SHIPPED | OUTPATIENT
Start: 2019-03-01 | End: 2019-03-14

## 2019-03-01 ASSESSMENT — PAIN SCALES - GENERAL: PAINLEVEL: MODERATE PAIN (5)

## 2019-03-01 ASSESSMENT — MIFFLIN-ST. JEOR: SCORE: 1464.76

## 2019-03-01 NOTE — PATIENT INSTRUCTIONS
Wet prep positive for yeast    Urine looks good    Will treat with Diflucan every 3 days x 3     Schedule ultrasound of pelvis     See Urology as scheduled

## 2019-03-01 NOTE — PROGRESS NOTES
SUBJECTIVE:   Sirena Dietrich is a 33 year old female who presents to clinic today for the following health issues:      Vaginal Symptoms      Duration: 1 week    Description    vaginal discharge - white clear and pelvic pain    Dark urine and incontinence    Intensity:  moderate    Accompanying signs and symptoms (fever/dysuria/abdominal or back pain): dysuria and frequency    History  Sexually active: yes, single partner, contraception - hysterectomy  Possibility of pregnancy: No  Recent antibiotic use: no     Precipitating or alleviating factors: None    Therapies tried and outcome: Vagisil and Vaseline Outcome: is some help        Problem list and histories reviewed & adjusted, as indicated.  Additional history: as documented    Patient Active Problem List   Diagnosis     Lumbago     CARDIOVASCULAR SCREENING; LDL GOAL LESS THAN 160     Pelvic pain in female     Urinary incontinence     Migraine headache     Tortuous colon     PTSD (post-traumatic stress disorder)     Severe bipolar I disorder, most recent episode mixed, with psychotic features (H)     Agoraphobia with panic disorder     S/P hysterectomy     Insomnia due to other mental disorder     MTHFR mutation (methylenetetrahydrofolate reductase) (H)     Cyst of left ovary     Herpes simplex vulvovaginitis     Methamphetamine abuse in remission (H)     Chronic abdominal pain     Other chronic pain     Past Surgical History:   Procedure Laterality Date     ANESTHESIA OUT OF OR MRI N/A 1/12/2015    Procedure: ANESTHESIA OUT OF OR MRI;  Surgeon: Generic Anesthesia Provider;  [facility]: [facility]     C INDUCED ABORTN BY D&C  2007     C INDUCED ABORTN BY D&C  3/2009     C INDUCED ABORTN BY D&C  10/2008     CYSTOSCOPY       CYSTOSCOPY N/A 12/3/2014    Procedure: CYSTOSCOPY;  Surgeon: Caroline Grossman MD;  [facility]: [facility]     CYSTOSCOPY N/A 9/21/2018    Procedure: CYSTOSCOPY;;  Surgeon: Debi Luis MD;  [facility]     DILATION AND CURETTAGE N/A  2015    Procedure: DILATION AND CURETTAGE;  Surgeon: Caroline Grossman MD;  [facility]     ESOPHAGOSCOPY, GASTROSCOPY, DUODENOSCOPY (EGD), COMBINED N/A 2016    Procedure: COMBINED ESOPHAGOSCOPY, GASTROSCOPY, DUODENOSCOPY (EGD);  Surgeon: Tommie Clancy MD;  [facility]     EXCISE LESION PERINEAL  2014    Procedure: EXCISE LESION PERINEAL;;  Surgeon: Lisa Helton MD;  [facility]     HYSTERECTOMY, PAP NO LONGER INDICATED       LAPAROSCOPIC HYSTERECTOMY TOTAL N/A 12/3/2014    Procedure: LAPAROSCOPIC HYSTERECTOMY TOTAL;  Surgeon: Caroline Grossman MD;  [facility]     LAPAROSCOPIC SALPINGECTOMY  2014    Procedure: LAPAROSCOPIC SALPINGECTOMY;  Laparoscopic Bilateral Salpingectomy, Removal Of Perineal Skin Tag;  Surgeon: Lisa Helton MD;  [facility]     LAPAROSCOPIC SALPINGO-OOPHORECTOMY N/A 2018    Procedure: LAPAROSCOPIC SALPINGO-OOPHORECTOMY;  Diagnostic Laparoscopy. Left Salpingo-Oopherectomy. lysis of adhesions, cystoscopy;  Surgeon: Debi Luis MD;  [facility]     LAPAROSCOPY DIAGNOSTIC (GYN)  10/16/2012    Procedure: LAPAROSCOPY DIAGNOSTIC (GYN);  Diagnostic Laparoscopy, Excision of Bilateral Paratubal Cysts;  Surgeon: La Guzmán MD;  [facility]     TONSILLECTOMY         Social History     Tobacco Use     Smoking status: Former Smoker     Packs/day: 0.50     Types: Cigarettes     Last attempt to quit: 2018     Years since quittin.8     Smokeless tobacco: Never Used   Substance Use Topics     Alcohol use: Yes     Comment: rare      Family History   Problem Relation Age of Onset     Alcohol/Drug Mother         drugs     Depression Mother      Heart Disease Mother         ?     Alcohol/Drug Father         drugs     Depression Father      Hypertension Maternal Grandmother      Cancer Maternal Grandmother         cervical     Depression Maternal Grandmother      Heart Disease Maternal Grandmother       Other - See Comments Maternal Grandmother         ovaries removed for cancer cells     Hypertension Brother      Bipolar Disorder Other      Bipolar Disorder Other      Crohn's Disease Paternal Grandmother      LUNG DISEASE Paternal Grandmother         breathing problems     Heart Disease Other         stents, clogged arteries         Current Outpatient Medications   Medication Sig Dispense Refill     acetaminophen (TYLENOL) 500 MG tablet Take 2 tablets (1,000 mg) by mouth every 4 hours as needed for other (mild pain) 100 tablet 1     amitriptyline (ELAVIL) 25 MG tablet Take 1 tablet (25 mg) by mouth At Bedtime 90 tablet 3     fluconazole (DIFLUCAN) 150 MG tablet Take 1 tablet (150 mg) by mouth every 3 days for 3 doses 3 tablet 0     lamoTRIgine (LAMICTAL) 200 MG tablet Take 1 tablet (200 mg) by mouth daily 30 tablet 2     lidocaine (LIDODERM) 5 % patch Place 1 patch onto the skin every 24 hours 30 patch 3     LORazepam (ATIVAN) 0.5 MG tablet   0     nicotine (NICODERM CQ) 14 MG/24HR 24 hr patch Place 1 patch onto the skin every 24 hours 30 patch 2     norgestimate-ethinyl estradiol (ORTHO-CYCLEN/SPRINTEC) 0.25-35 MG-MCG tablet Take 1 tablet by mouth daily Use continuously skip placebo pills 84 tablet 3     ondansetron (ZOFRAN) 4 MG tablet Take 1 tablet (4 mg) by mouth every 8 hours as needed for nausea 20 tablet 3     prochlorperazine (COMPAZINE) 10 MG tablet Take 1 tablet (10 mg) by mouth every 6 hours as needed for nausea or vomiting 30 tablet 0     QUEtiapine (SEROQUEL) 25 MG tablet Take 1 tablet (25 mg) by mouth 2 times daily 60 tablet 1     QUEtiapine (SEROQUEL) 50 MG tablet Take 2-3 tablets (100-150 mg) by mouth At Bedtime Up to 150 mg daily 90 tablet 1     ranitidine (ZANTAC) 300 MG tablet Take 1 tablet (300 mg) by mouth At Bedtime 30 tablet 2     senna-docusate (SENOKOT-S;PERICOLACE) 8.6-50 MG per tablet Take 1-2 tablets by mouth 2 times daily Take while on oral narcotics to prevent or treat  "constipation. 60 tablet 0     SUMAtriptan (IMITREX) 25 MG tablet Take 1-2 tablets (25-50 mg) by mouth at onset of headache for migraine May repeat in 2 hours. Max 8 tablets/24 hours. 9 tablet 1     valACYclovir (VALTREX) 500 MG tablet Take 1 tablet (500 mg) by mouth daily 31 tablet 11     gabapentin (NEURONTIN) 600 MG tablet Take 2 tablets (1,200 mg) by mouth 2 times daily 120 tablet 1     Allergies   Allergen Reactions     Vicodin [Hydrocodone-Acetaminophen] Other (See Comments)     Severe irritability.  Neither vicodin nor percocet seem to provide relief     Amoxicillin Swelling     As a child--facial swelling and redness     Bactrim Itching and Rash     Erythro [Erythromycin] Other (See Comments) and Swelling     As a child--facial swelling       Reviewed and updated as needed this visit by clinical staff  Tobacco  Allergies  Meds  Problems  Med Hx  Surg Hx  Fam Hx  Soc Hx        Reviewed and updated as needed this visit by Provider  Tobacco  Allergies  Meds  Problems  Med Hx  Surg Hx  Fam Hx  Soc Hx          ROS:  Constitutional, HEENT, cardiovascular, pulmonary, gi and gu systems are negative, except as otherwise noted.    OBJECTIVE:     /60   Pulse 83   Temp 98  F (36.7  C)   Resp 18   Ht 1.676 m (5' 6\")   Wt 73.3 kg (161 lb 9.6 oz)   LMP  (LMP Unknown)   SpO2 100%   Breastfeeding? No   BMI 26.08 kg/m    Body mass index is 26.08 kg/m .  GENERAL APPEARANCE: healthy, alert and no distress  RESP: lungs clear to auscultation - no rales, rhonchi or wheezes  CV: regular rates and rhythm, normal S1 S2, no S3 or S4 and no murmur, click or rub  ABDOMEN: soft, nontender, without hepatosplenomegaly or masses and bowel sounds normal  MS: extremities normal- no gross deformities noted  SKIN: no suspicious lesions or rashes    Diagnostic Test Results:  Results for orders placed or performed in visit on 03/01/19   UA reflex to Microscopic and Culture   Result Value Ref Range    Color Urine Yellow "     Appearance Urine Clear     Glucose Urine Negative NEG^Negative mg/dL    Bilirubin Urine Negative NEG^Negative    Ketones Urine Negative NEG^Negative mg/dL    Specific Gravity Urine 1.020 1.003 - 1.035    Blood Urine Trace (A) NEG^Negative    pH Urine 6.0 5.0 - 7.0 pH    Protein Albumin Urine Negative NEG^Negative mg/dL    Urobilinogen Urine 0.2 0.2 - 1.0 EU/dL    Nitrite Urine Negative NEG^Negative    Leukocyte Esterase Urine Negative NEG^Negative    Source Midstream Urine    Urine Microscopic   Result Value Ref Range    WBC Urine 0 - 5 OTO5^0 - 5 /HPF    RBC Urine O - 2 OTO2^O - 2 /HPF    Squamous Epithelial /LPF Urine Few FEW^Few /LPF    Bacteria Urine Few (A) NEG^Negative /HPF   Wet prep   Result Value Ref Range    Specimen Description Vagina     Wet Prep No Trichomonas seen  No clue cells seen       Wet Prep Yeast seen (A)      *Note: Due to a large number of results and/or encounters for the requested time period, some results have not been displayed. A complete set of results can be found in Results Review.       ASSESSMENT/PLAN:     1. Yeast infection of the vagina  Wet prep positive for yeast, treat with Diflucan x 3. Discussed other preventive measures.   - Wet prep  - fluconazole (DIFLUCAN) 150 MG tablet; Take 1 tablet (150 mg) by mouth every 3 days for 3 doses  Dispense: 3 tablet; Refill: 0    2. Dysuria  Urinalysis negative for infection. Symptoms may likely be related to yeast. Monitor and return to clinic if not improving after diflucan as above.   - UA reflex to Microscopic and Culture  - Urine Microscopic    Patient Instructions   Wet prep positive for yeast    Urine looks good    Will treat with Diflucan every 3 days x 3     Schedule ultrasound of pelvis     See Urology as scheduled       DIPESH Lewis [facility]

## 2019-03-01 NOTE — NURSING NOTE
"Chief Complaint   Patient presents with     Vaginal Problem       Initial Ht 1.676 m (5' 6\")   Wt 73.3 kg (161 lb 9.6 oz)   LMP  (LMP Unknown)   BMI 26.08 kg/m   Estimated body mass index is 26.08 kg/m  as calculated from the following:    Height as of this encounter: 1.676 m (5' 6\").    Weight as of this encounter: 73.3 kg (161 lb 9.6 oz).    Patient presents to the clinic using No DME    Health Maintenance that is potentially due pending provider review:  NONE    n/a    Is there anyone who you would like to be able to receive your results? not asked  If yes have patient fill out JOSÉ    "

## 2019-03-04 ENCOUNTER — ANCILLARY PROCEDURE (OUTPATIENT)
Dept: ULTRASOUND IMAGING | Facility: CLINIC | Age: 34
End: 2019-03-04
Attending: OBSTETRICS & GYNECOLOGY
Payer: COMMERCIAL

## 2019-03-04 ENCOUNTER — TELEPHONE (OUTPATIENT)
Dept: FAMILY MEDICINE | Facility: CLINIC | Age: 34
End: 2019-03-04

## 2019-03-04 ENCOUNTER — OFFICE VISIT (OUTPATIENT)
Dept: FAMILY MEDICINE | Facility: CLINIC | Age: 34
End: 2019-03-04
Payer: COMMERCIAL

## 2019-03-04 VITALS
HEART RATE: 80 BPM | RESPIRATION RATE: 18 BRPM | OXYGEN SATURATION: 99 % | TEMPERATURE: 98 F | DIASTOLIC BLOOD PRESSURE: 64 MMHG | SYSTOLIC BLOOD PRESSURE: 120 MMHG | WEIGHT: 161.6 LBS | BODY MASS INDEX: 26.08 KG/M2

## 2019-03-04 DIAGNOSIS — M54.6 BILATERAL THORACIC BACK PAIN, UNSPECIFIED CHRONICITY: ICD-10-CM

## 2019-03-04 DIAGNOSIS — R10.2 PELVIC PAIN IN FEMALE: Primary | ICD-10-CM

## 2019-03-04 PROCEDURE — 76830 TRANSVAGINAL US NON-OB: CPT

## 2019-03-04 PROCEDURE — 76856 US EXAM PELVIC COMPLETE: CPT

## 2019-03-04 PROCEDURE — 99214 OFFICE O/P EST MOD 30 MIN: CPT | Performed by: NURSE PRACTITIONER

## 2019-03-04 ASSESSMENT — PAIN SCALES - GENERAL: PAINLEVEL: EXTREME PAIN (8)

## 2019-03-04 NOTE — TELEPHONE ENCOUNTER
Pt reports having even more pelvic/ lower back pain than when saw Aleja 03-01-19.   States pain fairly constant, unrelieved by tyl/ ibu, heat.  Advised pelvic US is indicated to eval possible cause for pain, would be best to F/U with Aleja after US.  Pt wanting to keep appt this AM.  AVE Rodriguez RN

## 2019-03-04 NOTE — PROGRESS NOTES
SUBJECTIVE:   Sirena Dietrich is a 33 year old female who presents to clinic today for the following health issues:      Low back and pelvic pain      Duration: ongoing but worse over past week    Description (location/character/radiation): mid back pain bilateral wraps around to pelvis, pelvic pain, dark urine, vaginal discharge    Intensity:  8/10    Accompanying signs and symptoms: yeast vaginitis dx 3/1/2019    History (similar episodes/previous evaluation): has pelvic US scheduled for today and Urology appointment scheduled for 3/8/2019  Had CT of Abdomen/Pelvis  10/5/2018: IMPRESSION:  1. 3.2 cm right adnexal cyst and trace free fluid.    2. No other cause of pain identified.    Precipitating or alleviating factors: seen on US 10/11/2018: Right ovarian 4.6 cm slightly complex cyst.    Therapies tried and outcome: IBU/Tylenol and Heat without help     Just seen in office on 3/1/2019 for white/clear vaginal discharge and pelvic pain. Diagnosed and started on treatment for yeast vaginitis.   Patient has history of yeast and bacterial vaginosis and reports always getting significant pain with these infections   Patient has history of hysterectomy with right ovary remaining with large cyst identified on CT and ultrasound   Having normal bowel movement's    Problem list and histories reviewed & adjusted, as indicated.  Additional history: as documented    Patient Active Problem List   Diagnosis     Lumbago     CARDIOVASCULAR SCREENING; LDL GOAL LESS THAN 160     Pelvic pain in female     Urinary incontinence     Migraine headache     Tortuous colon     PTSD (post-traumatic stress disorder)     Severe bipolar I disorder, most recent episode mixed, with psychotic features (H)     Agoraphobia with panic disorder     S/P hysterectomy     Insomnia due to other mental disorder     MTHFR mutation (methylenetetrahydrofolate reductase) (H)     Cyst of left ovary     Herpes simplex vulvovaginitis     Methamphetamine abuse in  remission (H)     Chronic abdominal pain     Other chronic pain     Past Surgical History:   Procedure Laterality Date     ANESTHESIA OUT OF OR MRI N/A 1/12/2015    Procedure: ANESTHESIA OUT OF OR MRI;  Surgeon: Generic Anesthesia Provider;  [facility]     C INDUCED ABORTN BY D&C  2007     C INDUCED ABORTN BY D&C  3/2009     C INDUCED ABORTN BY D&C  10/2008     CYSTOSCOPY       CYSTOSCOPY N/A 12/3/2014    Procedure: CYSTOSCOPY;  Surgeon: Caroline Grossman MD;  [facility]     CYSTOSCOPY N/A 9/21/2018    Procedure: CYSTOSCOPY;;  Surgeon: Debi Luis MD;  [facility]     DILATION AND CURETTAGE N/A 1/5/2015    Procedure: DILATION AND CURETTAGE;  Surgeon: Caroline Grossman MD;  [facility]     ESOPHAGOSCOPY, GASTROSCOPY, DUODENOSCOPY (EGD), COMBINED N/A 8/25/2016    Procedure: COMBINED ESOPHAGOSCOPY, GASTROSCOPY, DUODENOSCOPY (EGD);  Surgeon: Tommie Clancy MD;  [facility]     EXCISE LESION PERINEAL  4/9/2014    Procedure: EXCISE LESION PERINEAL;;  Surgeon: Lisa Helton MD;  [facility]     HYSTERECTOMY, PAP NO LONGER INDICATED       LAPAROSCOPIC HYSTERECTOMY TOTAL N/A 12/3/2014    Procedure: LAPAROSCOPIC HYSTERECTOMY TOTAL;  Surgeon: Caroline Grossman MD;  [facility]     LAPAROSCOPIC SALPINGECTOMY  4/9/2014    Procedure: LAPAROSCOPIC SALPINGECTOMY;  Laparoscopic Bilateral Salpingectomy, Removal Of Perineal Skin Tag;  Surgeon: Lisa Helton MD;  [facility]     LAPAROSCOPIC SALPINGO-OOPHORECTOMY N/A 9/21/2018    Procedure: LAPAROSCOPIC SALPINGO-OOPHORECTOMY;  Diagnostic Laparoscopy. Left Salpingo-Oopherectomy. lysis of adhesions, cystoscopy;  Surgeon: Debi Luis MD;  [facility]     LAPAROSCOPY DIAGNOSTIC (GYN)  10/16/2012    Procedure: LAPAROSCOPY DIAGNOSTIC (GYN);  Diagnostic Laparoscopy, Excision of Bilateral Paratubal Cysts;  Surgeon: La Guzmán MD;  [facility]     TONSILLECTOMY         Social  History     Tobacco Use     Smoking status: Former Smoker     Packs/day: 0.50     Types: Cigarettes     Last attempt to quit: 2018     Years since quittin.8     Smokeless tobacco: Never Used   Substance Use Topics     Alcohol use: Yes     Comment: rare      Family History   Problem Relation Age of Onset     Alcohol/Drug Mother         drugs     Depression Mother      Heart Disease Mother         ?     Alcohol/Drug Father         drugs     Depression Father      Hypertension Maternal Grandmother      Cancer Maternal Grandmother         cervical     Depression Maternal Grandmother      Heart Disease Maternal Grandmother      Other - See Comments Maternal Grandmother         ovaries removed for cancer cells     Hypertension Brother      Bipolar Disorder Other      Bipolar Disorder Other      Crohn's Disease Paternal Grandmother      LUNG DISEASE Paternal Grandmother         breathing problems     Heart Disease Other         stents, clogged arteries         Current Outpatient Medications   Medication Sig Dispense Refill     acetaminophen (TYLENOL) 500 MG tablet Take 2 tablets (1,000 mg) by mouth every 4 hours as needed for other (mild pain) 100 tablet 1     amitriptyline (ELAVIL) 25 MG tablet Take 1 tablet (25 mg) by mouth At Bedtime 90 tablet 3     fluconazole (DIFLUCAN) 150 MG tablet Take 1 tablet (150 mg) by mouth every 3 days for 3 doses 3 tablet 0     gabapentin (NEURONTIN) 600 MG tablet Take 2 tablets (1,200 mg) by mouth 2 times daily 120 tablet 1     lamoTRIgine (LAMICTAL) 200 MG tablet Take 1 tablet (200 mg) by mouth daily 30 tablet 2     lidocaine (LIDODERM) 5 % patch Place 1 patch onto the skin every 24 hours 30 patch 3     LORazepam (ATIVAN) 0.5 MG tablet   0     nicotine (NICODERM CQ) 14 MG/24HR 24 hr patch Place 1 patch onto the skin every 24 hours 30 patch 2     norgestimate-ethinyl estradiol (ORTHO-CYCLEN/SPRINTEC) 0.25-35 MG-MCG tablet Take 1 tablet by mouth daily Use continuously skip placebo  pills 84 tablet 3     ondansetron (ZOFRAN) 4 MG tablet Take 1 tablet (4 mg) by mouth every 8 hours as needed for nausea 20 tablet 3     prochlorperazine (COMPAZINE) 10 MG tablet Take 1 tablet (10 mg) by mouth every 6 hours as needed for nausea or vomiting 30 tablet 0     QUEtiapine (SEROQUEL) 25 MG tablet Take 1 tablet (25 mg) by mouth 2 times daily 60 tablet 1     QUEtiapine (SEROQUEL) 50 MG tablet Take 2-3 tablets (100-150 mg) by mouth At Bedtime Up to 150 mg daily 90 tablet 1     ranitidine (ZANTAC) 300 MG tablet Take 1 tablet (300 mg) by mouth At Bedtime 30 tablet 2     senna-docusate (SENOKOT-S;PERICOLACE) 8.6-50 MG per tablet Take 1-2 tablets by mouth 2 times daily Take while on oral narcotics to prevent or treat constipation. 60 tablet 0     SUMAtriptan (IMITREX) 25 MG tablet Take 1-2 tablets (25-50 mg) by mouth at onset of headache for migraine May repeat in 2 hours. Max 8 tablets/24 hours. 9 tablet 1     valACYclovir (VALTREX) 500 MG tablet Take 1 tablet (500 mg) by mouth daily 31 tablet 11     Allergies   Allergen Reactions     Vicodin [Hydrocodone-Acetaminophen] Other (See Comments)     Severe irritability.  Neither vicodin nor percocet seem to provide relief     Amoxicillin Swelling     As a child--facial swelling and redness     Bactrim Itching and Rash     Erythro [Erythromycin] Other (See Comments) and Swelling     As a child--facial swelling       Reviewed and updated as needed this visit by clinical staff  Tobacco  Soc Hx      Reviewed and updated as needed this visit by Provider         ROS:  Constitutional, HEENT, cardiovascular, pulmonary, gi and gu systems are negative, except as otherwise noted.    OBJECTIVE:     /64   Pulse 80   Temp 98  F (36.7  C)   Resp 18   Wt 73.3 kg (161 lb 9.6 oz)   LMP  (LMP Unknown)   SpO2 99%   Breastfeeding? No   BMI 26.08 kg/m    Body mass index is 26.08 kg/m .  GENERAL APPEARANCE: healthy, alert, mild distress and intermittent crying  RESP: lungs  clear to auscultation - no rales, rhonchi or wheezes  CV: regular rates and rhythm, normal S1 S2, no S3 or S4 and no murmur, click or rub  ABDOMEN: soft, generalized tenderness with increased tenderness over left and right pelvic region without guarding or rebound tenderness. Negative psoas and obturator, without hepatosplenomegaly or masses, bowel sounds normal  MS: mid thoracic tenderness R>L   (female): deferred - done 12/2018    Diagnostic Test Results:  none     ASSESSMENT/PLAN:     1. Pelvic pain in female  Patient has had a week of pelvic pain with some radiation into mid/thoracic back R>L. Diagnosed with vaginal yeast infection and started on Diflucan on Friday, 2nd dose due today. Urinalysis at that time negative with the exception of trace blood, which patient has had a history of and is seeing Urology on Friday. Patient appears comfortable through out most of office visit and with exam diffuse abdominal tenderness with more pronounced tenderness over pelvic region. Patient advised would like patient to complete pelvic ultrasound scheduled for this afternoon and review results before anything further.   Abdominal ultrasound result in and discussed over the phone with patient that large cyst on right ovary has resolved and there is a 2.7 cm cyst or follicle on right ovary. Discussed at length repeating CT scan due to previous negative workup. Advised patient to continue Yeast treatment, follow up with Urology and if not improving to schedule CT. Patient agreeable.   - CT Abdomen Pelvis w Contrast; Future    2. Bilateral thoracic back pain, unspecified chronicity  Tenderness of thoracic muscles R>L, patient reports radiates from pelvic area.   - CT Abdomen Pelvis w Contrast; Future    Patient Instructions   Complete ultrasound this afternoon as scheduled and will call you with results and further recommendations     Continue Ibuprofen and Tylenol as needed for discomfort     Use heating pad as much as able  as well     Keep Urology appointment on Friday     May need to follow up with GYN           DIPESH Lewis CNP  [facility]

## 2019-03-04 NOTE — TELEPHONE ENCOUNTER
Left message on number identified phone, return call to provider care team re: your appointment    Per Aleja LANDON-CNP    Please check to see if appointment is needed with Aleja today. Patient does have an ultrasound scheduled for today and that would be the plan if Aleja was to see her today for pelvic pain. Patient had a recent appointment with Aleja.

## 2019-03-04 NOTE — PATIENT INSTRUCTIONS
Complete ultrasound this afternoon as scheduled and will call you with results and further recommendations     Continue Ibuprofen and Tylenol as needed for discomfort     Use heating pad as much as able as well     Keep Urology appointment on Friday     May need to follow up with GYN

## 2019-03-08 ENCOUNTER — NURSE TRIAGE (OUTPATIENT)
Dept: NURSING | Facility: CLINIC | Age: 34
End: 2019-03-08

## 2019-03-08 ENCOUNTER — OFFICE VISIT (OUTPATIENT)
Dept: UROLOGY | Facility: CLINIC | Age: 34
End: 2019-03-08
Payer: COMMERCIAL

## 2019-03-08 VITALS
DIASTOLIC BLOOD PRESSURE: 76 MMHG | TEMPERATURE: 98.6 F | SYSTOLIC BLOOD PRESSURE: 137 MMHG | HEART RATE: 104 BPM | HEIGHT: 66 IN | BODY MASS INDEX: 26.03 KG/M2 | WEIGHT: 162 LBS

## 2019-03-08 DIAGNOSIS — N39.41 URGE INCONTINENCE: Primary | ICD-10-CM

## 2019-03-08 LAB
ALBUMIN UR-MCNC: NEGATIVE MG/DL
APPEARANCE UR: CLEAR
BILIRUB UR QL STRIP: NEGATIVE
COLOR UR AUTO: YELLOW
GLUCOSE UR STRIP-MCNC: NEGATIVE MG/DL
HGB UR QL STRIP: ABNORMAL
KETONES UR STRIP-MCNC: 15 MG/DL
LEUKOCYTE ESTERASE UR QL STRIP: NEGATIVE
MUCOUS THREADS #/AREA URNS LPF: PRESENT /LPF
NITRATE UR QL: NEGATIVE
NON-SQ EPI CELLS #/AREA URNS LPF: ABNORMAL /LPF
PH UR STRIP: 7 PH (ref 5–7)
RBC #/AREA URNS AUTO: ABNORMAL /HPF
SOURCE: ABNORMAL
SP GR UR STRIP: 1.02 (ref 1–1.03)
UROBILINOGEN UR STRIP-ACNC: 0.2 EU/DL (ref 0.2–1)
WBC #/AREA URNS AUTO: ABNORMAL /HPF

## 2019-03-08 PROCEDURE — 81001 URINALYSIS AUTO W/SCOPE: CPT | Performed by: UROLOGY

## 2019-03-08 PROCEDURE — 52000 CYSTOURETHROSCOPY: CPT | Performed by: UROLOGY

## 2019-03-08 PROCEDURE — 99204 OFFICE O/P NEW MOD 45 MIN: CPT | Mod: 25 | Performed by: UROLOGY

## 2019-03-08 RX ORDER — TOLTERODINE 4 MG/1
4 CAPSULE, EXTENDED RELEASE ORAL DAILY
Qty: 30 CAPSULE | Refills: 1 | Status: SHIPPED | OUTPATIENT
Start: 2019-03-08 | End: 2019-03-25

## 2019-03-08 ASSESSMENT — MIFFLIN-ST. JEOR: SCORE: 1466.58

## 2019-03-08 NOTE — NURSING NOTE
Pt brought back to the procedure room for a cystoscopy per Dr. Kaminski  Informed consent obtained, pt prepped in a sterile manner.    Scope Number: N[address]or[address] 70 Rigid: [id number]     Cindi PAREDES MA

## 2019-03-08 NOTE — NURSING NOTE
"Initial /76 (BP Location: Right arm, Patient Position: Sitting, Cuff Size: Adult Large)   Pulse 104   Temp 98.6  F (37  C) (Tympanic)   Ht 1.676 m (5' 6\")   Wt 73.5 kg (162 lb)   LMP  (LMP Unknown)   BMI 26.15 kg/m   Estimated body mass index is 26.15 kg/m  as calculated from the following:    Height as of this encounter: 1.676 m (5' 6\").    Weight as of this encounter: 73.5 kg (162 lb). .    Patricia Murcia MA    "
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
## 2019-03-09 NOTE — TELEPHONE ENCOUNTER
"Sirena calling with concerns that she is having pain and bleeding following a cystoscopy that she had done today. She states, \" I have gone to the bathroom twice now. The first time was painful and the second time was painful and there was blood on the tissue and in the toilet. The doctor never said anything about this.\"    Disposition: Paged on call provider covering for [facility], Dr Bang, through the page  at 8:18 PM to call back Sirena directly at [phone].  Advised the patient to call back if she doesn't hear back from the provider in 20 minutes.She verbalized understanding.     Amaya Cook RN/FNA    "

## 2019-03-11 ENCOUNTER — TELEPHONE (OUTPATIENT)
Dept: UROLOGY | Facility: CLINIC | Age: 34
End: 2019-03-11

## 2019-03-11 NOTE — TELEPHONE ENCOUNTER
Prior Authorization Retail Medication Request    Medication/Dose: Tolterodine 4 mg ER  ICD code (if different than what is on RX):  N32.81  Previously Tried and Failed: NA  Rationale:  History of metal health need medication that does not cross blood brain barrier     Insurance Name:  [facility]   Insurance ID: [id number]  BIN: [id number]  PCN: [id number]       Pharmacy Information (if different than what is on RX)  Name:  [facility]  Phone:  Antonette Benito RN

## 2019-03-14 ENCOUNTER — OFFICE VISIT (OUTPATIENT)
Dept: FAMILY MEDICINE | Facility: CLINIC | Age: 34
End: 2019-03-14
Payer: COMMERCIAL

## 2019-03-14 VITALS
RESPIRATION RATE: 14 BRPM | WEIGHT: 163 LBS | BODY MASS INDEX: 26.2 KG/M2 | TEMPERATURE: 97.3 F | SYSTOLIC BLOOD PRESSURE: 146 MMHG | DIASTOLIC BLOOD PRESSURE: 80 MMHG | OXYGEN SATURATION: 99 % | HEIGHT: 66 IN | HEART RATE: 100 BPM

## 2019-03-14 DIAGNOSIS — N76.0 BACTERIAL VAGINOSIS: ICD-10-CM

## 2019-03-14 DIAGNOSIS — B37.31 YEAST INFECTION OF THE VAGINA: Primary | ICD-10-CM

## 2019-03-14 DIAGNOSIS — B96.89 BACTERIAL VAGINOSIS: ICD-10-CM

## 2019-03-14 DIAGNOSIS — G56.31 RADIAL NEUROPATHY, RIGHT: ICD-10-CM

## 2019-03-14 DIAGNOSIS — G43.109 MIGRAINE WITH AURA AND WITHOUT STATUS MIGRAINOSUS, NOT INTRACTABLE: Chronic | ICD-10-CM

## 2019-03-14 LAB
SPECIMEN SOURCE: ABNORMAL
WET PREP SPEC: ABNORMAL

## 2019-03-14 PROCEDURE — 87210 SMEAR WET MOUNT SALINE/INK: CPT | Performed by: NURSE PRACTITIONER

## 2019-03-14 PROCEDURE — 99214 OFFICE O/P EST MOD 30 MIN: CPT | Performed by: NURSE PRACTITIONER

## 2019-03-14 RX ORDER — GABAPENTIN 600 MG/1
1200 TABLET ORAL 2 TIMES DAILY
Qty: 120 TABLET | Refills: 1 | Status: SHIPPED | OUTPATIENT
Start: 2019-03-14 | End: 2022-11-09

## 2019-03-14 RX ORDER — ONDANSETRON 4 MG/1
4 TABLET, FILM COATED ORAL EVERY 8 HOURS PRN
Qty: 20 TABLET | Refills: 3 | Status: SHIPPED | OUTPATIENT
Start: 2019-03-14 | End: 2022-01-18

## 2019-03-14 RX ORDER — METRONIDAZOLE 500 MG/1
500 TABLET ORAL 2 TIMES DAILY
Qty: 14 TABLET | Refills: 0 | Status: SHIPPED | OUTPATIENT
Start: 2019-03-14 | End: 2019-04-04

## 2019-03-14 ASSESSMENT — MIFFLIN-ST. JEOR: SCORE: 1471.11

## 2019-03-14 NOTE — TELEPHONE ENCOUNTER
PRIOR AUTHORIZATION DENIED    Medication: Tolterodine - DENIED    Denial Date: 3/14/2019    Denial Rational: Patient needs to try and fail tolterodine IR tablets first.    Appeal Information: If provider would like to appeal we will need a detailed letter of medical necessity to start the process. Then re-route this request back to the PA pool.

## 2019-03-14 NOTE — PROGRESS NOTES
"  SUBJECTIVE:   Sirena Dietrich is a 33 year old female who presents to clinic today for the following health issues:      Vaginal Symptoms  Recently treated for a yeast infection  Ongoing vaginal symptoms.  Intermittent right lower quadrant pain.  Recent ultrasound follicle    Duration: RECHECK AFTER INFECTION    Description  itching    Intensity:  Moderate    Ongoing for about 2 weeks     Results for orders placed or performed in visit on 03/14/19   Wet prep   Result Value Ref Range    Specimen Description Vagina     Wet Prep No yeast seen     Wet Prep Clue cells seen (A)     Wet Prep No Trichomonas seen      *Note: Due to a large number of results and/or encounters for the requested time period, some results have not been displayed. A complete set of results can be found in Results Review.     -------------------------------------    Problem list and histories reviewed & adjusted, as indicated.  Additional history: as documented    Labs reviewed in EPIC    Reviewed and updated as needed this visit by clinical staff       Reviewed and updated as needed this visit by Provider         ROS:   ROS: 10 point ROS neg other than the symptoms noted above in the HPI.      OBJECTIVE:                                                    /80   Pulse 100   Temp 97.3  F (36.3  C) (Tympanic)   Resp 14   Ht 1.676 m (5' 6\")   Wt 73.9 kg (163 lb)   LMP  (LMP Unknown)   SpO2 99%   BMI 26.31 kg/m    Body mass index is 26.31 kg/m .   GENERAL: healthy, alert, well nourished, well hydrated, no distress  HENT: ear canals- normal; TMs- normal; Nose- normal; Mouth- no ulcers, no lesions  NECK: no tenderness, no adenopathy, no asymmetry, no masses, no stiffness; thyroid- normal to palpation  RESP: lungs clear to auscultation - no rales, no rhonchi, no wheezes  CV: regular rates and rhythm, normal S1 S2, no S3 or S4 and no murmur, no click or rub -  ABDOMEN: soft, no tenderness, no  hepatosplenomegaly, no masses, normal bowel " sounds    Diagnostic test results:  Results for orders placed or performed in visit on 03/14/19   Wet prep   Result Value Ref Range    Specimen Description Vagina     Wet Prep No yeast seen     Wet Prep Clue cells seen (A)     Wet Prep No Trichomonas seen      *Note: Due to a large number of results and/or encounters for the requested time period, some results have not been displayed. A complete set of results can be found in Results Review.        ASSESSMENT/PLAN:                                                    1. Yeast infection of the vagina  Bacterial vaginosis present.  Yeast resolved.  - Wet prep    2. Migraine with aura and without status migrainosus, not intractable  Refilled Zofran today  - ondansetron (ZOFRAN) 4 MG tablet; Take 1 tablet (4 mg) by mouth every 8 hours as needed for nausea  Dispense: 20 tablet; Refill: 3    3. Bacterial vaginosis  Begin metronidazole.  Repeat Diflucan 4 days  - metroNIDAZOLE (FLAGYL) 500 MG tablet; Take 1 tablet (500 mg) by mouth 2 times daily for 7 days  Dispense: 14 tablet; Refill: 0      Follow up with Provider - Call or return to the clinic with any worsening of symptoms or no resolution. Patient/Parent verbalized understanding and is in agreement. Medication side effects reviewed.   Current Outpatient Medications   Medication Sig Dispense Refill     metroNIDAZOLE (FLAGYL) 500 MG tablet Take 1 tablet (500 mg) by mouth 2 times daily for 7 days 14 tablet 0     ondansetron (ZOFRAN) 4 MG tablet Take 1 tablet (4 mg) by mouth every 8 hours as needed for nausea 20 tablet 3     acetaminophen (TYLENOL) 500 MG tablet Take 2 tablets (1,000 mg) by mouth every 4 hours as needed for other (mild pain) 100 tablet 1     amitriptyline (ELAVIL) 25 MG tablet Take 1 tablet (25 mg) by mouth At Bedtime 90 tablet 3     gabapentin (NEURONTIN) 600 MG tablet Take 2 tablets (1,200 mg) by mouth 2 times daily 120 tablet 1     lamoTRIgine (LAMICTAL) 200 MG tablet Take 1 tablet (200 mg) by mouth  daily 30 tablet 2     lidocaine (LIDODERM) 5 % patch Place 1 patch onto the skin every 24 hours 30 patch 3     LORazepam (ATIVAN) 0.5 MG tablet   0     nicotine (NICODERM CQ) 14 MG/24HR 24 hr patch Place 1 patch onto the skin every 24 hours 30 patch 2     norgestimate-ethinyl estradiol (ORTHO-CYCLEN/SPRINTEC) 0.25-35 MG-MCG tablet Take 1 tablet by mouth daily Use continuously skip placebo pills 84 tablet 3     prochlorperazine (COMPAZINE) 10 MG tablet Take 1 tablet (10 mg) by mouth every 6 hours as needed for nausea or vomiting 30 tablet 0     QUEtiapine (SEROQUEL) 25 MG tablet Take 1 tablet (25 mg) by mouth 2 times daily 60 tablet 1     QUEtiapine (SEROQUEL) 50 MG tablet Take 2-3 tablets (100-150 mg) by mouth At Bedtime Up to 150 mg daily 90 tablet 1     ranitidine (ZANTAC) 300 MG tablet Take 1 tablet (300 mg) by mouth At Bedtime 30 tablet 2     senna-docusate (SENOKOT-S;PERICOLACE) 8.6-50 MG per tablet Take 1-2 tablets by mouth 2 times daily Take while on oral narcotics to prevent or treat constipation. 60 tablet 0     SUMAtriptan (IMITREX) 25 MG tablet Take 1-2 tablets (25-50 mg) by mouth at onset of headache for migraine May repeat in 2 hours. Max 8 tablets/24 hours. 9 tablet 1     tolterodine ER (DETROL LA) 4 MG 24 hr capsule Take 1 capsule (4 mg) by mouth daily 30 capsule 1     valACYclovir (VALTREX) 500 MG tablet Take 1 tablet (500 mg) by mouth daily 31 tablet 11     Chart documentation with Dragon Voice recognition Software. Although reviewed after completion, some words and grammatical errors may remain.     See Patient Instructions    DIPESH Marino [facility]

## 2019-03-14 NOTE — PATIENT INSTRUCTIONS
Patient Education     Bacterial Vaginosis    You have a vaginal infection called bacterial vaginosis (BV). Both good and bad bacteria are present in a healthy vagina. BV occurs when these bacteria get out of balance. The number of bad bacteria increase. And the number of good bacteria decrease. Although BV is associated with sexual activity, it is not a sexually transmitted disease.  BV may or may not cause symptoms. If symptoms do occur, they can include:    Thin, gray, milky-white, or sometimes green discharge    Unpleasant odor or  fishy  smell    Itching, burning, or pain in or around the vagina  It is not known what causes BV, but certain factors can make the problem more likely. This can include:    Douching    Having sex with a new partner    Having sex with more than one partner  BV will sometimes go away on its own. But treatment is usually recommended. This is because untreated BV can increase the risk of more serious health problems such as:    Pelvic inflammatory disease (PID)     delivery (giving birth to a baby early if you re pregnant)    HIV and certain other sexually transmitted diseases (STDs)    Infection after surgery on the reproductive organs  Home care  General care    BV is most often treated with medicines called antibiotics. These may be given as pills or as a vaginal cream. If antibiotics are prescribed, be sure to use them exactly as directed. Also, be sure to complete all of the medicine, even if your symptoms go away.    Don't douche or having sex during treatment.    If you have sex with a female partner, ask your healthcare provider if she should also be treated.  Prevention    Don't douche.    Don't have sex. If you do have sex, then take steps to lower your risk:  ? Use condoms when having sex.  ? Limit the number of sexual partners you have.  Follow-up care  Follow up with your healthcare provider, or as advised.  When to seek medical advice  Call your healthcare provider  right away if:    You have a fever of 100.4 F (38 C) or higher, or as directed by your provider.    Your symptoms worsen, or they don t go away within a few days of starting treatment.    You have new pain in the lower belly or pelvic region.    You have side effects that bother you or a reaction to the pills or cream you re prescribed.    You or any partners you have sex with have new symptoms, such as a rash, joint pain, or sores.  Date Last Reviewed: 10/1/2017    [id number] [employer]. [address] [place] [address]. All rights reserved. This information is not intended as a substitute for professional medical care. Always follow your healthcare professional's instructions.           Patient Education     Preventing Vaginal Infection  These steps can help you stay comfortable during treatment of a vaginal infection. They also help prevent vaginal infections in the future.  Keeping a healthy balance  Factors that change the normal balance in the vagina can lead to a vaginal infection. To help keep the balance normal, try these tips:    Change out of wet bathing suits and damp exercise clothes as soon as possible. Yeast thrive in a warm, moist environment.    Avoid wearing tight pants. Choose cotton underwear and pantyhose that have a cotton crotch. Cotton keeps you cooler and drier than synthetics.    Don't douche unless directed by your healthcare provider. Douching can destroy friendly bacteria and change the vagina's normal balance.    Wipe from front to back after using the toilet. This prevents bacteria from spreading from the anus to the vulva.    Wash the vulva with mild, unscented soap or with plain water.    Wash your diaphragm, spermicide applicators, and sex toys with mild soap and water after use. Dry them thoroughly before putting them away.    Change tampons often (every 2 hours to 4 hours). Leaving a tampon in for too long may disrupt the balance of vaginal  bacteria.    Avoid vaginal sprays, scented toilet paper and soaps, and deodorant tampons or pads, which can cause vaginal irritation  Staying healthy overall  Good overall health can help you resist infection. To be healthier:    Help protect yourself from STIs (sexually transmitted infections) by using latex condoms for intercourse. Ask your healthcare provider for more information about safer sex.    Eat a variety of healthy foods.    Exercise regularly.    Get enough rest and sleep.    Maintain a healthy weight. If you need to lose weight, ask your healthcare provider for advice on how to start.  Date Last Reviewed: 9/1/2017 2000-2018 [employer]. [address] [place] [address], [place] [address]. All rights reserved. This information is not intended as a substitute for professional medical care. Always follow your healthcare professional's instructions.

## 2019-03-14 NOTE — TELEPHONE ENCOUNTER
PA Initiation    Medication: Tolterodine - INITIATED  Insurance Company: [employer] [place] - Phone [phone] Fax [phone]  Pharmacy Filling the Rx: [place], [place] - [address] [place] [address]  Filling Pharmacy Phone:    Filling Pharmacy Fax:    Start Date: 3/14/2019

## 2019-03-14 NOTE — TELEPHONE ENCOUNTER
Sirena requests refill of Gabapentin    Gabapentin 600 mg tab      Last Written Prescription Date:  10/16/18  Last Fill Quantity: 120,   # refills: 1  Last Office Visit: 3/4/19 Aleja Cedeño  Future Office visit:    Next 5 appointments (look out 90 days)    May 07, 2019  1:00 PM CDT  [facility]inski [facility]  [facility] [address] [facility] [address]  [phone]           Routing refill request to provider for review/approval because:  Drug not on the [facility], [facility] or [facility] refill protocol or controlled substance

## 2019-03-15 ENCOUNTER — TELEPHONE (OUTPATIENT)
Dept: FAMILY MEDICINE | Facility: CLINIC | Age: 34
End: 2019-03-15

## 2019-03-15 ENCOUNTER — OFFICE VISIT (OUTPATIENT)
Dept: URGENT CARE | Facility: URGENT CARE | Age: 34
End: 2019-03-15
Payer: COMMERCIAL

## 2019-03-15 VITALS — TEMPERATURE: 98.1 F | HEART RATE: 72 BPM | DIASTOLIC BLOOD PRESSURE: 74 MMHG | SYSTOLIC BLOOD PRESSURE: 132 MMHG

## 2019-03-15 DIAGNOSIS — R10.2 PELVIC PAIN IN FEMALE: Primary | ICD-10-CM

## 2019-03-15 LAB
ALBUMIN UR-MCNC: NEGATIVE MG/DL
APPEARANCE UR: CLEAR
BILIRUB UR QL STRIP: NEGATIVE
COLOR UR AUTO: YELLOW
GLUCOSE UR STRIP-MCNC: NEGATIVE MG/DL
HCG UR QL: NEGATIVE
HGB UR QL STRIP: ABNORMAL
KETONES UR STRIP-MCNC: ABNORMAL MG/DL
LEUKOCYTE ESTERASE UR QL STRIP: NEGATIVE
NITRATE UR QL: NEGATIVE
NON-SQ EPI CELLS #/AREA URNS LPF: NORMAL /LPF
PH UR STRIP: 7 PH (ref 5–7)
RBC #/AREA URNS AUTO: NORMAL /HPF
SOURCE: ABNORMAL
SP GR UR STRIP: 1.02 (ref 1–1.03)
UROBILINOGEN UR STRIP-ACNC: 0.2 EU/DL (ref 0.2–1)
WBC #/AREA URNS AUTO: NORMAL /HPF

## 2019-03-15 PROCEDURE — 81001 URINALYSIS AUTO W/SCOPE: CPT | Performed by: PHYSICIAN ASSISTANT

## 2019-03-15 PROCEDURE — 99213 OFFICE O/P EST LOW 20 MIN: CPT | Performed by: PHYSICIAN ASSISTANT

## 2019-03-15 PROCEDURE — 81025 URINE PREGNANCY TEST: CPT | Performed by: PHYSICIAN ASSISTANT

## 2019-03-15 RX ORDER — TRAMADOL HYDROCHLORIDE 50 MG/1
50 TABLET ORAL EVERY 6 HOURS PRN
Qty: 3 TABLET | Refills: 0 | Status: SHIPPED | OUTPATIENT
Start: 2019-03-15 | End: 2019-04-04

## 2019-03-15 ASSESSMENT — ENCOUNTER SYMPTOMS
FEVER: 0
CHILLS: 0
LIGHT-HEADEDNESS: 0
PALPITATIONS: 0
COUGH: 0
ACTIVITY CHANGE: 0
RHINORRHEA: 0
CONSTITUTIONAL NEGATIVE: 1
WEAKNESS: 0
SHORTNESS OF BREATH: 0
POLYDIPSIA: 0
RESPIRATORY NEGATIVE: 1
ADENOPATHY: 0
HEADACHES: 0
FATIGUE: 0
SORE THROAT: 0
MUSCULOSKELETAL NEGATIVE: 1
DYSURIA: 0
FREQUENCY: 0
DIARRHEA: 0
VOMITING: 0
FLANK PAIN: 0
ABDOMINAL PAIN: 0
DIZZINESS: 0
MYALGIAS: 0
HEMATURIA: 0
NAUSEA: 0
ENDOCRINE NEGATIVE: 1
GASTROINTESTINAL NEGATIVE: 1
NECK PAIN: 0
CARDIOVASCULAR NEGATIVE: 1
NECK STIFFNESS: 0
NEUROLOGICAL NEGATIVE: 1

## 2019-03-15 ASSESSMENT — PAIN SCALES - GENERAL: PAINLEVEL: EXTREME PAIN (9)

## 2019-03-15 NOTE — NURSING NOTE
"Chief Complaint   Patient presents with     Pelvic Pain     Has gotten worse through out day.  Just started antibiotic today.         Initial /74 (BP Location: Right arm, Patient Position: Chair, Cuff Size: Adult Regular)   Pulse 72   Temp 98.1  F (36.7  C) (Tympanic)   LMP  (LMP Unknown)  Estimated body mass index is 26.31 kg/m  as calculated from the following:    Height as of 3/14/19: 1.676 m (5' 6\").    Weight as of 3/14/19: 73.9 kg (163 lb).    Patient presents to the clinic using No DME    Health Maintenance that is potentially due pending provider review:  NONE    n/a    Is there anyone who you would like to be able to receive your results? No  If yes have patient fill out JOSÉ  Josue Tobin M.A.      "

## 2019-03-15 NOTE — TELEPHONE ENCOUNTER
Pt returned call. Pt notified and understood if pain continues to go to UC tonight at  [facility].  Monse [place] Sec

## 2019-03-15 NOTE — TELEPHONE ENCOUNTER
Reason for call:  Patient reporting a symptom    Symptom or request: Pelvic Pain - Pt said she gets this from a Bacteria Infection. 3/14/19 was Diagnosed with this. Pt is wondering if she can get a script Tramadol.     Have you been treated for this before? Yes      Phone Number patient can be reached at:  Home number on file [phone] (home)    Best Time:  Any Time      Can we leave a detailed message on this number:  YES    Call taken on 3/15/2019 at 2:19 PM by Monse Shafer

## 2019-03-15 NOTE — TELEPHONE ENCOUNTER
S-(situation): Sirena is asking for a few Tramadol to get through the weekend.  States has intermittent right lower abdominal pain, pain does not radiate. Heat /ice help.  Pain scale she says is 9/10.  Denies fever, nausea, vomiting or diarrhea    B-(background): in clinic yesterday.  Did not ask because was not having pain    A-(assessment): RLQ abdominal pain    R-(recommendations): I told her I would discuss this with Shi De Oliveira RN

## 2019-03-15 NOTE — PROGRESS NOTES
Chief Complaint:    Chief Complaint   Patient presents with     Pelvic Pain     Has gotten worse through out day.  Just started antibiotic today.         HPI:  Sirena Dietrich is a 33 year old female who has symptoms of abdominal and pelvic pain. Patient was seen in this clinic yesterday for same.  She was positive for BV and started on Flagyl.  She states that her abdominal pain started today.  The pain is sharp in nature on the lower R side and is 9/10 pain.  She states that this is normal for her with these kind of infections.    ROS:      Review of Systems   Constitutional: Negative.  Negative for activity change, chills, fatigue and fever.   HENT: Negative for congestion, ear pain, rhinorrhea and sore throat.    Respiratory: Negative.  Negative for cough and shortness of breath.    Cardiovascular: Negative.  Negative for chest pain and palpitations.   Gastrointestinal: Negative.  Negative for abdominal pain, diarrhea, nausea and vomiting.   Endocrine: Negative.  Negative for polydipsia and polyuria.   Genitourinary: Positive for pelvic pain. Negative for dysuria, flank pain, frequency, hematuria, urgency, vaginal discharge and vaginal pain.   Musculoskeletal: Negative.  Negative for myalgias, neck pain and neck stiffness.   Allergic/Immunologic: Negative for immunocompromised state.   Neurological: Negative.  Negative for dizziness, weakness, light-headedness and headaches.   Hematological: Negative for adenopathy.       Family History   Family History   Problem Relation Age of Onset     Alcohol/Drug Mother         drugs     Depression Mother      Heart Disease Mother         ?     Alcohol/Drug Father         drugs     Depression Father      Hypertension Maternal Grandmother      Cancer Maternal Grandmother         cervical     Depression Maternal Grandmother      Heart Disease Maternal Grandmother      Other - See Comments Maternal Grandmother         ovaries removed for cancer cells     Hypertension Brother       Bipolar Disorder Other      Bipolar Disorder Other      Crohn's Disease Paternal Grandmother      LUNG DISEASE Paternal Grandmother         breathing problems     Heart Disease Other         stents, clogged arteries        Problem history  Patient Active Problem List   Diagnosis     Lumbago     CARDIOVASCULAR SCREENING; LDL GOAL LESS THAN 160     Pelvic pain in female     Urinary incontinence     Migraine headache     Tortuous colon     PTSD (post-traumatic stress disorder)     Severe bipolar I disorder, most recent episode mixed, with psychotic features (H)     Agoraphobia with panic disorder     S/P hysterectomy     Insomnia due to other mental disorder     MTHFR mutation (methylenetetrahydrofolate reductase) (H)     Cyst of left ovary     Herpes simplex vulvovaginitis     Methamphetamine abuse in remission (H)     Chronic abdominal pain     Other chronic pain        Allergies  Allergies   Allergen Reactions     Vicodin [Hydrocodone-Acetaminophen] Other (See Comments)     Severe irritability.  Neither vicodin nor percocet seem to provide relief     Amoxicillin Swelling     As a child--facial swelling and redness     Bactrim Itching and Rash     Erythro [Erythromycin] Other (See Comments) and Swelling     As a child--facial swelling        Social History  Social History     Socioeconomic History     Marital status:      Spouse name: Not on file     Number of children: 2     Years of education: Not on file     Highest education level: Not on file   Occupational History     Employer: UNEMPLOYED   Social Needs     Financial resource strain: Not on file     Food insecurity:     Worry: Not on file     Inability: Not on file     Transportation needs:     Medical: Not on file     Non-medical: Not on file   Tobacco Use     Smoking status: Former Smoker     Packs/day: 0.50     Types: Cigarettes     Last attempt to quit: 2018     Years since quittin.8     Smokeless tobacco: Never Used   Substance and  Sexual Activity     Alcohol use: Yes     Comment: rare      Drug use: No     Sexual activity: Yes     Partners: Male     Birth control/protection: Surgical, Female Surgical     Comment: salpingectomy   Lifestyle     Physical activity:     Days per week: Not on file     Minutes per session: Not on file     Stress: Not on file   Relationships     Social connections:     Talks on phone: Not on file     Gets together: Not on file     Attends Worship service: Not on file     Active member of club or organization: Not on file     Attends meetings of clubs or organizations: Not on file     Relationship status: Not on file     Intimate partner violence:     Fear of current or ex partner: Not on file     Emotionally abused: Not on file     Physically abused: Not on file     Forced sexual activity: Not on file   Other Topics Concern      Service No     Blood Transfusions No     Caffeine Concern No     Occupational Exposure No     Hobby Hazards No     Sleep Concern No     Stress Concern No     Weight Concern No     Special Diet No     Back Care No     Exercise No     Bike Helmet No     Seat Belt Yes     Self-Exams No     Parent/sibling w/ CABG, MI or angioplasty before 65F 55M? No   Social History Narrative     Not on file        Current Meds    Current Outpatient Medications:      acetaminophen (TYLENOL) 500 MG tablet, Take 2 tablets (1,000 mg) by mouth every 4 hours as needed for other (mild pain), Disp: 100 tablet, Rfl: 1     amitriptyline (ELAVIL) 25 MG tablet, Take 1 tablet (25 mg) by mouth At Bedtime, Disp: 90 tablet, Rfl: 3     gabapentin (NEURONTIN) 600 MG tablet, Take 2 tablets (1,200 mg) by mouth 2 times daily, Disp: 120 tablet, Rfl: 1     lamoTRIgine (LAMICTAL) 200 MG tablet, Take 1 tablet (200 mg) by mouth daily, Disp: 30 tablet, Rfl: 2     lidocaine (LIDODERM) 5 % patch, Place 1 patch onto the skin every 24 hours, Disp: 30 patch, Rfl: 3     LORazepam (ATIVAN) 0.5 MG tablet, , Disp: , Rfl: 0      metroNIDAZOLE (FLAGYL) 500 MG tablet, Take 1 tablet (500 mg) by mouth 2 times daily for 7 days, Disp: 14 tablet, Rfl: 0     nicotine (NICODERM CQ) 14 MG/24HR 24 hr patch, Place 1 patch onto the skin every 24 hours, Disp: 30 patch, Rfl: 2     norgestimate-ethinyl estradiol (ORTHO-CYCLEN/SPRINTEC) 0.25-35 MG-MCG tablet, Take 1 tablet by mouth daily Use continuously skip placebo pills, Disp: 84 tablet, Rfl: 3     ondansetron (ZOFRAN) 4 MG tablet, Take 1 tablet (4 mg) by mouth every 8 hours as needed for nausea, Disp: 20 tablet, Rfl: 3     prochlorperazine (COMPAZINE) 10 MG tablet, Take 1 tablet (10 mg) by mouth every 6 hours as needed for nausea or vomiting, Disp: 30 tablet, Rfl: 0     QUEtiapine (SEROQUEL) 25 MG tablet, Take 1 tablet (25 mg) by mouth 2 times daily, Disp: 60 tablet, Rfl: 1     QUEtiapine (SEROQUEL) 50 MG tablet, Take 2-3 tablets (100-150 mg) by mouth At Bedtime Up to 150 mg daily, Disp: 90 tablet, Rfl: 1     ranitidine (ZANTAC) 300 MG tablet, Take 1 tablet (300 mg) by mouth At Bedtime, Disp: 30 tablet, Rfl: 2     senna-docusate (SENOKOT-S;PERICOLACE) 8.6-50 MG per tablet, Take 1-2 tablets by mouth 2 times daily Take while on oral narcotics to prevent or treat constipation., Disp: 60 tablet, Rfl: 0     SUMAtriptan (IMITREX) 25 MG tablet, Take 1-2 tablets (25-50 mg) by mouth at onset of headache for migraine May repeat in 2 hours. Max 8 tablets/24 hours., Disp: 9 tablet, Rfl: 1     tolterodine ER (DETROL LA) 4 MG 24 hr capsule, Take 1 capsule (4 mg) by mouth daily, Disp: 30 capsule, Rfl: 1     traMADol (ULTRAM) 50 MG tablet, Take 1 tablet (50 mg) by mouth every 6 hours as needed for severe pain, Disp: 3 tablet, Rfl: 0     valACYclovir (VALTREX) 500 MG tablet, Take 1 tablet (500 mg) by mouth daily, Disp: 31 tablet, Rfl: 11     OBJECTIVE     Vital signs noted and reviewed by Hank Darling  /74 (BP Location: Right arm, Patient Position: Chair, Cuff Size: Adult Regular)   Pulse 72   Temp 98.1  F  (36.7  C) (Tympanic)   LMP  (LMP Unknown)      Physical Exam   Constitutional: She is oriented to person, place, and time. She appears well-developed and well-nourished. She is cooperative.  Non-toxic appearance. She does not have a sickly appearance. She does not appear ill. No distress.   HENT:   Head: Normocephalic and atraumatic.   Right Ear: Tympanic membrane and external ear normal.   Left Ear: Tympanic membrane and external ear normal.   Mouth/Throat: Oropharynx is clear and moist.   Eyes: EOM are normal. Pupils are equal, round, and reactive to light.   Neck: Normal range of motion. Neck supple.   Cardiovascular: Normal rate, regular rhythm, normal heart sounds and intact distal pulses. Exam reveals no gallop and no friction rub.   No murmur heard.  Pulmonary/Chest: Effort normal and breath sounds normal. No respiratory distress. She has no wheezes. She has no rales. She exhibits no tenderness.   Abdominal: Soft. Normal appearance and bowel sounds are normal. She exhibits no distension and no mass. There is no hepatosplenomegaly. There is no tenderness. There is no rigidity, no rebound, no guarding, no CVA tenderness, no tenderness at McBurney's point and negative Winn's sign.   Lymphadenopathy:     She has no cervical adenopathy.   Neurological: She is alert and oriented to person, place, and time. She has normal reflexes. No cranial nerve deficit.   Skin: Skin is warm and dry. She is not diaphoretic.   Psychiatric: She has a normal mood and affect. Her behavior is normal. Judgment and thought content normal.   Nursing note and vitals reviewed.            Labs:     Results for orders placed or performed in visit on 03/15/19   UA reflex to Microscopic and Culture   Result Value Ref [place]    Color Urine Yellow     Appearance Urine Clear     Glucose Urine Negative NEG^Negative mg/dL    Bilirubin Urine Negative NEG^Negative    Ketones Urine Trace (A) NEG^Negative mg/dL    Specific Gravity Urine 1.025 1.003 -  1.035    Blood Urine Trace (A) NEG^Negative    pH Urine 7.0 5.0 - 7.0 pH    Protein Albumin Urine Negative NEG^Negative mg/dL    Urobilinogen Urine 0.2 0.2 - 1.0 EU/dL    Nitrite Urine Negative NEG^Negative    Leukocyte Esterase Urine Negative NEG^Negative    Source Midstream Urine    HCG Qual, Urine - [facility],  [place], [place]  ([id number])   Result Value Ref [place]    HCG Qual Urine Negative NEG^Negative   Urine Microscopic   Result Value Ref [place]    WBC Urine 0 - 5 OTO5^0 - 5 /HPF    RBC Urine O - 2 OTO2^O - 2 /HPF    Squamous Epithelial /LPF Urine Few FEW^Few /LPF     *Note: Due to a large number of results and/or encounters for the requested time period, some results have not been displayed. A complete set of results can be found in Results Review.           ASSESSMENT     1. Pelvic pain in female           PLAN    Urinalysis discussed with patient.  Urine unremarkable for UTI.  Urine pregnancy is negative.  Treatment currentguidelines - also push fluids, may use Pyridium OTC prn.   Rx for Tramadol #3.   checked.  Last tramadol roughly 4 months ago.  Patient instructed that she is not to take any of her Ativan with this medication.  Follow up with PCP in 2-3 days if symptoms are not improving.  Worrisome symptoms discussed with instructions to go to the ED.  Patient verbalized understanding and agreed with this plan.          Hank Darling  3/15/2019, 5:06 PM

## 2019-03-19 ENCOUNTER — TELEPHONE (OUTPATIENT)
Dept: FAMILY MEDICINE | Facility: CLINIC | Age: 34
End: 2019-03-19

## 2019-03-19 DIAGNOSIS — R10.2 PELVIC PAIN IN FEMALE: Primary | ICD-10-CM

## 2019-03-19 RX ORDER — FLUCONAZOLE 150 MG/1
150 TABLET ORAL DAILY
Qty: 3 TABLET | Refills: 0 | Status: SHIPPED | OUTPATIENT
Start: 2019-03-19 | End: 2019-04-04

## 2019-03-19 NOTE — TELEPHONE ENCOUNTER
No.. She told me she had some at home yet Happy to refill this if needed tho.  Thanks Shi Martinez [facility]

## 2019-03-19 NOTE — TELEPHONE ENCOUNTER
Sirena says she saw Shi Juan last week and she was going to send in #3 Diflucan for her but it was never sent to the pharmacy.  [facility]

## 2019-03-25 ENCOUNTER — TELEPHONE (OUTPATIENT)
Dept: UROLOGY | Facility: CLINIC | Age: 34
End: 2019-03-25

## 2019-03-25 DIAGNOSIS — N32.81 OAB (OVERACTIVE BLADDER): Primary | ICD-10-CM

## 2019-03-25 RX ORDER — OXYBUTYNIN CHLORIDE 5 MG/1
5 TABLET, EXTENDED RELEASE ORAL DAILY
Qty: 30 TABLET | Refills: 1 | Status: SHIPPED | OUTPATIENT
Start: 2019-03-25 | End: 2019-05-08

## 2019-03-25 NOTE — TELEPHONE ENCOUNTER
Reason for Call:  Other     Detailed comments: The PA for tolterodine was denied. Pt wants to know if something is going to be prescribed. Pt states she is having bladder pain again. - Please advise    PHARMACY:  [facility]    Phone Number Patient can be reached at: Home number on file [phone] (home)    Best Time: Any    Can we leave a detailed message on this number? YES    Call taken on 3/25/2019 at 11:00 AM by Denise Behrendt

## 2019-04-03 ENCOUNTER — TELEPHONE (OUTPATIENT)
Dept: FAMILY MEDICINE | Facility: CLINIC | Age: 34
End: 2019-04-03

## 2019-04-03 NOTE — TELEPHONE ENCOUNTER
Happy to see her at 9 am tomorrow morning.  Thanks Shi Martinez [facility]
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
## 2019-04-03 NOTE — TELEPHONE ENCOUNTER
Pt called states her neck had a inflammation flare in neck, states neck is stiff and hard for her to turn. States she also has headache from it. States she has ibuprofen, ice, and lidocaine and states nothing is helping. Pt would like to be seen in clinic if possible Salima Lawrence RN

## 2019-04-04 ENCOUNTER — OFFICE VISIT (OUTPATIENT)
Dept: FAMILY MEDICINE | Facility: CLINIC | Age: 34
End: 2019-04-04
Payer: COMMERCIAL

## 2019-04-04 ENCOUNTER — ANCILLARY PROCEDURE (OUTPATIENT)
Dept: GENERAL RADIOLOGY | Facility: CLINIC | Age: 34
End: 2019-04-04
Attending: NURSE PRACTITIONER
Payer: COMMERCIAL

## 2019-04-04 VITALS
OXYGEN SATURATION: 99 % | BODY MASS INDEX: 26.68 KG/M2 | TEMPERATURE: 97.3 F | HEART RATE: 72 BPM | DIASTOLIC BLOOD PRESSURE: 72 MMHG | RESPIRATION RATE: 14 BRPM | WEIGHT: 166 LBS | HEIGHT: 66 IN | SYSTOLIC BLOOD PRESSURE: 138 MMHG

## 2019-04-04 DIAGNOSIS — G43.909 MIGRAINE WITHOUT STATUS MIGRAINOSUS, NOT INTRACTABLE, UNSPECIFIED MIGRAINE TYPE: ICD-10-CM

## 2019-04-04 DIAGNOSIS — M54.2 CERVICALGIA: Primary | ICD-10-CM

## 2019-04-04 DIAGNOSIS — Z87.42 HISTORY OF OVARIAN CYST: ICD-10-CM

## 2019-04-04 DIAGNOSIS — M54.2 CERVICALGIA: ICD-10-CM

## 2019-04-04 PROCEDURE — 72040 X-RAY EXAM NECK SPINE 2-3 VW: CPT

## 2019-04-04 PROCEDURE — 99214 OFFICE O/P EST MOD 30 MIN: CPT | Performed by: NURSE PRACTITIONER

## 2019-04-04 RX ORDER — CYCLOBENZAPRINE HCL 10 MG
10 TABLET ORAL 2 TIMES DAILY PRN
Qty: 30 TABLET | Refills: 0 | Status: SHIPPED | OUTPATIENT
Start: 2019-04-04 | End: 2019-09-03

## 2019-04-04 RX ORDER — SUMATRIPTAN 25 MG/1
25-50 TABLET, FILM COATED ORAL
Qty: 9 TABLET | Refills: 1 | Status: SHIPPED | OUTPATIENT
Start: 2019-04-04 | End: 2019-05-06

## 2019-04-04 RX ORDER — VALACYCLOVIR HYDROCHLORIDE 500 MG/1
500 TABLET, FILM COATED ORAL DAILY
Qty: 31 TABLET | Refills: 11 | Status: SHIPPED | OUTPATIENT
Start: 2019-04-04 | End: 2023-02-09

## 2019-04-04 RX ORDER — METHYLPREDNISOLONE 4 MG
TABLET, DOSE PACK ORAL
Qty: 21 TABLET | Refills: 0 | Status: SHIPPED | OUTPATIENT
Start: 2019-04-04 | End: 2019-04-25

## 2019-04-04 ASSESSMENT — MIFFLIN-ST. JEOR: SCORE: 1484.72

## 2019-04-04 ASSESSMENT — PAIN SCALES - GENERAL: PAINLEVEL: SEVERE PAIN (7)
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
## 2019-04-04 NOTE — PATIENT INSTRUCTIONS
Patient Education     Reach and Hold Exercise       Do this exercise on your hands and knees. Keep your knees under your hips and your hands under your shoulders. Keep your spine in a neutral position (not arched or sagging). Keep your ears in line with your shoulders. Hold for a few seconds before starting the exercise:  1. Tighten your belly muscles and raise one arm straight in front of you, palm down. Hold for 5 seconds, then lower. Repeat 5 times.  2. Do the exercise again, this time lifting your arm to the side. Repeat 5 times.  3. Do the exercise again, this time lifting your arm backward, palm up. Repeat 5 times.  Switch sides and do each exercise with the other arm.  Date Last Reviewed: 11/1/2017 2000-2018 [employer]. [address] [place] [address]. All rights reserved. This information is not intended as a substitute for professional medical care. Always follow your healthcare professional's instructions.           Patient Education     Shoulder and Upper Back Stretch  To start, stand tall with your ears, shoulders, and hips in line. Your feet should be slightly apart, positioned just under your hips. Focus your eyes directly in front of you.  this position for a few seconds before starting your exercise. This helps increase your awareness of proper posture.          Reach overhead and slightly back with both arms. Keep your shoulders and neck aligned and your elbows behind your shoulders:    With your palms facing the ceiling, turn your fingers inward.    Take a deep breath. Breathe out, and lower your elbows toward your buttocks. Hold for 5 seconds, then return to starting position.    Repeat 3 times.  Date Last Reviewed: 11/1/2017 2000-2018 [employer]. [address] [place] [address]. All rights reserved. This information is not intended as a substitute for professional medical care. Always follow your healthcare professional's  instructions.           Patient Education     Shoulder Shrug Exercise    To start, sit in a chair with your feet flat on the floor. Shift your weight slightly forward to avoid rounding your back. Relax. Keep your ears, shoulders, and hips aligned:    Raise both of your shoulders as high as you can, as if you were trying to touch them to your ears. Keep your head and neck still and relaxed.    Hold for a count of 10. Release.    Repeat 5 times.  For your safety, check with your healthcare provider before starting an exercise program.   Date Last Reviewed: 11/1/2017 2000-2018 [employer]. [address] [place] [address]. All rights reserved. This information is not intended as a substitute for professional medical care. Always follow your healthcare professional's instructions.           Patient Education     Shoulder Squeeze Exercise    To start, sit in a chair with your feet flat on the floor. Shift your weight slightly forward to avoid rounding your back. Relax. Keep your ears, shoulders, and hips aligned:    Raise your arms to shoulder height, elbows bent and palms forward.    Move your arms back, squeezing your shoulder blades together.    Hold for 10 seconds. Return to starting position.     Repeat 5 times.   For your safety, check with your healthcare provider before starting an exercise program.   Date Last Reviewed: 11/1/2017 2000-2018 [employer]. [address] [place] [address]. All rights reserved. This information is not intended as a substitute for professional medical care. Always follow your healthcare professional's instructions.           Patient Education     Neck Exercises: Passive Neck Rotation    To start, lie on your back, knees bent and feet flat on the floor. Keep your ears, shoulders, and hips aligned, but don t press your lower back to the floor. Rest your hands on your pelvis. Breathe deeply and relax.  Here are the steps for passive  neck rotation:     With your neck relaxed, place the palm of one hand on your forehead. Use your hand to turn your head to one side until you feel a stretch in the neck muscles. Do not push through pain.    Hold for 5 seconds. Then turn to the other side.    Repeat 5 times on each side.   Note: Keep your shoulders on the floor. Don t lift your chin as you turn your head.  Date Last Reviewed: 11/1/2017 2000-2018 [employer]. [address] [place], [place] [id number]. All rights reserved. This information is not intended as a substitute for professional medical care. Always follow your healthcare professional's instructions.           Patient Education     Understanding Cervical Strain    There are 7 bones (vertebrae) in the neck that are part of the spine. These are called the cervical spine. Cervical strain is a medical term for neck pain. The neck has several layers of muscles. These are connected with tendons to the cervical spine and other bones. Neck pain is often the result of injury to these muscles and tendons.  Causes of cervical strain  Different types of stress on the neck can damage muscles and tendons (soft tissues) and cause cervical strain. Cervical tissues can be damaged by:    The neck being forced past its normal range of motion, such as in a car accident or sports injury    Constant, low-level stress, such as from poor posture or a poorly set-up workspace  Symptoms of cervical strain  These may include:    Neck pain or stiffness    Pain in the shoulders or upper back    Muscle spasms    Headache, often starting at the base of the neck    Irritability, difficulty concentrating, or sleeplessness  Treatment for cervical strain  This problem often gets better on its own. Treatments aim to reduce pain and inflammation and increase the range of motion of the neck. Possible treatments include:    Over-the-counter or prescription pain medicine. These help relieve pain and  inflammation.    Stretching exercises to decrease neck stiffness.    Massage to decrease neck stiffness.    Cold or heat pack. These help reduce pain and swelling.  Call 911  Call 911 right away if you have any of these:    Face drooping or numbness    Numbness or weakness, especially in the arms or on one side    Slurred speech or difficulty speaking    Blurred vision   When to call your healthcare provider  Call your healthcare provider right away if you have any of these:    Fever of 100.4 F (38 C) or higher, or as directed    Pain or stiffness that gets worse    Symptoms that don t get better, or get worse    Numbness, tingling, weakness or shooting pains into the arms or legs    New symptoms  Date Last Reviewed: 3/10/2016    1218-6155 [employer]. [address] [place] [address], [place] [address]. All rights reserved. This information is not intended as a substitute for professional medical care. Always follow your healthcare professional's instructions.

## 2019-04-05 ASSESSMENT — PATIENT HEALTH QUESTIONNAIRE - PHQ9: SUM OF ALL RESPONSES TO PHQ QUESTIONS 1-9: 15

## 2019-04-07 ENCOUNTER — NURSE TRIAGE (OUTPATIENT)
Dept: NURSING | Facility: CLINIC | Age: 34
End: 2019-04-07

## 2019-04-07 NOTE — TELEPHONE ENCOUNTER
Been taking Zoloft since Wed and now doesn't feel like feel like eating and now has more anxiety and feels blah. Wants to know if she can just stop taking it .  If she wants to stop taking it she should go to urgent care and be seen by a provider today, otherwise call to see her psychologist to discuss how she is feeling and alternatives.  Denies being suicidal or wanting to hurt herself or someone else. Made sure she has the numbers for the help lines for [facility] and [facility].    Raqule Morton RN/ [facility]      Reason for Disposition    Requesting to talk with a counselor (mental health worker, psychiatrist, etc.)    Additional Information    Negative: Patient attempted suicide    Negative: Patient is threatening suicide now    Negative: Patient is threatening to kill a specific person    Negative: [1] Patient is very confused (disoriented, slurred speech) AND [2] no other adult (e.g., friend or family member) available    Negative: [1] Difficult to awaken or acting very confused (disoriented, slurred speech) AND [2] new onset    Negative: Sounds like a life-threatening emergency to the triager    Negative: Bizarre or confused behavior    Negative: Patient sounds very sick or weak to the triager    Negative: Fever > 101 F (38.3 C)    Negative: Sometimes has thoughts of suicide    Negative: Symptoms interfere with work or school    Negative: [1] Depression AND [2] worsening (e.g.,sleeping poorly, less able to do activities of daily living)    Negative: Known or suspected alcohol or drug abuse    Protocols used: DEPRESSION-ADULT-

## 2019-04-13 ENCOUNTER — HOSPITAL ENCOUNTER (EMERGENCY)
Facility: CLINIC | Age: 34
Discharge: HOME | End: 2019-04-13
Attending: PHYSICIAN ASSISTANT | Admitting: PHYSICIAN ASSISTANT
Payer: COMMERCIAL

## 2019-04-13 VITALS
WEIGHT: 163 LBS | DIASTOLIC BLOOD PRESSURE: 87 MMHG | BODY MASS INDEX: 26.2 KG/M2 | TEMPERATURE: 97 F | OXYGEN SATURATION: 97 % | SYSTOLIC BLOOD PRESSURE: 149 MMHG | RESPIRATION RATE: 16 BRPM | HEIGHT: 66 IN

## 2019-04-13 DIAGNOSIS — J01.00 ACUTE NON-RECURRENT MAXILLARY SINUSITIS: ICD-10-CM

## 2019-04-13 DIAGNOSIS — R22.0 SWELLING OF RIGHT SIDE OF FACE: ICD-10-CM

## 2019-04-13 DIAGNOSIS — R51.9 FACIAL PAIN, ACUTE: ICD-10-CM

## 2019-04-13 PROCEDURE — G0463 HOSPITAL OUTPT CLINIC VISIT: HCPCS | Performed by: PHYSICIAN ASSISTANT

## 2019-04-13 PROCEDURE — 99214 OFFICE O/P EST MOD 30 MIN: CPT | Mod: Z6 | Performed by: PHYSICIAN ASSISTANT

## 2019-04-13 RX ORDER — FLUCONAZOLE 150 MG/1
TABLET ORAL
Qty: 2 TABLET | Refills: 0 | Status: SHIPPED | OUTPATIENT
Start: 2019-04-13 | End: 2019-04-25

## 2019-04-13 RX ORDER — DOXYCYCLINE 100 MG/1
100 CAPSULE ORAL 2 TIMES DAILY
Qty: 20 CAPSULE | Refills: 0 | Status: SHIPPED | OUTPATIENT
Start: 2019-04-13 | End: 2019-04-25

## 2019-04-13 ASSESSMENT — MIFFLIN-ST. JEOR: SCORE: 1471.11

## 2019-04-13 NOTE — ED AVS SNAPSHOT
[facility] Emergency Department  [address] [address]  Phone:  [phone]  Fax:  [phone]                                    Sirena Dietrich   MRN: [id number]    Department:  [facility] Emergency Department   Date of Visit:  4/13/2019           After Visit Summary Signature Page    I have received my discharge instructions, and my questions have been answered. I have discussed any challenges I see with this plan with the nurse or doctor.    ..........................................................................................................................................  Patient/Patient Representative Signature      ..........................................................................................................................................  Patient Representative Print Name and Relationship to Patient    ..................................................               ................................................  Date                                   Time    ..........................................................................................................................................  Reviewed by Signature/Title    ...................................................              ..............................................  Date                                               Time          22EPIC Rev 08/18

## 2019-04-14 ASSESSMENT — ENCOUNTER SYMPTOMS
FEVER: 0
WOUND: 0
RHINORRHEA: 1
SINUS PAIN: 1
HEADACHES: 1
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
## 2019-04-14 NOTE — ED TRIAGE NOTES
Patient presents today with sinus pain, and fever. Symptoms started longer than a week ago, but has been getting worse the last 5 days . Arrived to urgent care ambulatory .

## 2019-04-14 NOTE — DISCHARGE INSTRUCTIONS
Use Medication as directed    Patient informed to rest, warm compresses over sinuses as needed, stay hydrated, cool humidifier, salt water gargles, and nasal saline sprays as needed.  Lots of rest and fluids.  Tylenol or ibuprofen as needed.    Return if any worsening symptoms or if not improving.  Follow up with dentist if tooth pain persists.   Follow up with primary care provider in 4-5 days.    Go to Emergency Room if sx worsen or change, worst headache of life, visual changes, persistent or worsening facial swelling or redness, confusion, fevers > 104F occur.     Patient voiced understanding of instructions given.

## 2019-04-18 ENCOUNTER — TELEPHONE (OUTPATIENT)
Dept: FAMILY MEDICINE | Facility: CLINIC | Age: 34
End: 2019-04-18

## 2019-04-18 NOTE — TELEPHONE ENCOUNTER
Reason for call:  Patient reporting a symptom    Symptom or request: Sirena asking to talk to a nurse about leg pain. States about 1 1/2 weeks abo she got a joesph horse in her R calf and it has been sore and tender since. Last night she says it happened in her L leg.      Phone Number patient can be reached at:  Home number on file [phone] (home)    Best Time:  anytime    Can we leave a detailed message on this number:  YES    Call taken on 4/18/2019 at 1:31 PM by Elba Lopez

## 2019-04-18 NOTE — TELEPHONE ENCOUNTER
Pt called. LLL pain, states in the calf. Denies swelling but feel tight. Worse with walking States last week it was the right leg. No SOB. Appointment made for tomorrow. Salima Lawrence RN

## 2019-04-25 ENCOUNTER — OFFICE VISIT (OUTPATIENT)
Dept: FAMILY MEDICINE | Facility: CLINIC | Age: 34
End: 2019-04-25
Payer: COMMERCIAL

## 2019-04-25 ENCOUNTER — TELEPHONE (OUTPATIENT)
Dept: FAMILY MEDICINE | Facility: CLINIC | Age: 34
End: 2019-04-25

## 2019-04-25 VITALS
DIASTOLIC BLOOD PRESSURE: 56 MMHG | OXYGEN SATURATION: 98 % | RESPIRATION RATE: 16 BRPM | SYSTOLIC BLOOD PRESSURE: 108 MMHG | TEMPERATURE: 97.4 F | HEART RATE: 96 BPM

## 2019-04-25 DIAGNOSIS — N76.0 VAGINITIS AND VULVOVAGINITIS: Primary | ICD-10-CM

## 2019-04-25 DIAGNOSIS — F19.20 CHEMICAL DEPENDENCY (H): ICD-10-CM

## 2019-04-25 DIAGNOSIS — N76.0 VAGINITIS AND VULVOVAGINITIS: ICD-10-CM

## 2019-04-25 DIAGNOSIS — N94.89 VAGINAL BURNING: Primary | ICD-10-CM

## 2019-04-25 LAB
SPECIMEN SOURCE: NORMAL
WET PREP SPEC: NORMAL

## 2019-04-25 PROCEDURE — 87210 SMEAR WET MOUNT SALINE/INK: CPT | Performed by: NURSE PRACTITIONER

## 2019-04-25 PROCEDURE — 99214 OFFICE O/P EST MOD 30 MIN: CPT | Performed by: NURSE PRACTITIONER

## 2019-04-25 NOTE — PROGRESS NOTES
SUBJECTIVE:   Sirena Dietrich is a 33 year old female who presents to clinic today for the following   health issues:      Vaginal Symptoms      Duration: 1 week    Description   itching, burning and odor, abdominal discomfort    Intensity:  moderate    Accompanying signs and symptoms (fever/dysuria/abdominal or back pain): None    History  Sexually active: yes, single partner, contraception not required  Possibility of pregnancy: No  Recent antibiotic use: YES    Precipitating or alleviating factors: None    Therapies tried and outcome: Diflucan   Outcome: still symptomatic    Patient is sober per her report  No recent use of alcohol or methamphetamines  Mental health is stable on current medications  Continues ongoing outpatient therapy  PHQ-9 SCORE 5/3/2018 10/9/2018 4/4/2019   PHQ-9 Total Score - - -   PHQ-9 Total Score MyChart - 15 (Moderately severe depression) 15 (Moderately severe depression)   PHQ-9 Total Score 27 15 15     VICTOR M-7 SCORE 3/20/2018 5/3/2018 10/9/2018   Total Score - - -   Total Score - - 14 (moderate anxiety)   Total Score 19 19 14   Total Score - - -         -------------------------------------    Additional history: as documented    Reviewed  and updated as needed this visit by clinical staff         Reviewed and updated as needed this visit by Provider         Labs reviewed in EPIC    ROS:   ROS: 10 point ROS neg other than the symptoms noted above in the HPI.      OBJECTIVE:                                                    /56 (BP Location: Right arm, Patient Position: Chair, Cuff Size: Adult Large)   Pulse 96   Temp 97.4  F (36.3  C)   Resp 16   LMP  (LMP Unknown)   SpO2 98%   There is no height or weight on file to calculate BMI.   GENERAL: healthy, alert, well nourished, well hydrated, no distress  HENT: ear canals- normal; TMs- normal; Nose- normal; Mouth- no ulcers, no lesions  NECK: no tenderness, no adenopathy, no asymmetry, no masses, no stiffness; thyroid- normal to  palpation  RESP: lungs clear to auscultation - no rales, no rhonchi, no wheezes  CV: regular rates and rhythm, normal S1 S2, no S3 or S4 and no murmur, no click or rub -  ABDOMEN: soft, no tenderness, no  hepatosplenomegaly, no masses, normal bowel sounds    Diagnostic test results:  Results for orders placed or performed in visit on 04/25/19   Wet prep   Result Value Ref Range    Specimen Description Vagina     Wet Prep No Trichomonas seen     Wet Prep No clue cells seen     Wet Prep No yeast seen     Wet Prep No WBC's seen      *Note: Due to a large number of results and/or encounters for the requested time period, some results have not been displayed. A complete set of results can be found in Results Review.        ASSESSMENT/PLAN:                                                    1. Vaginal burning  2. Vaginitis and vulvovaginitis  - Wet prep; Standing  No evidence of bacterial vaginosis or yeast infection today  Standing wet prep ordered placed due to ongoing symptoms chronically    3. Chemical dependency (H) patient currently sober  Continue to abstain from drugs and alcohol  Continue to work with mental health providers      Follow up with Provider - Call or return to the clinic with any worsening of symptoms or no resolution. Patient/Parent verbalized understanding and is in agreement. Medication side effects reviewed.   Current Outpatient Medications   Medication Sig Dispense Refill     acetaminophen (TYLENOL) 500 MG tablet Take 2 tablets (1,000 mg) by mouth every 4 hours as needed for other (mild pain) 100 tablet 1     amitriptyline (ELAVIL) 25 MG tablet Take 1 tablet (25 mg) by mouth At Bedtime 90 tablet 3     cyclobenzaprine (FLEXERIL) 10 MG tablet Take 1 tablet (10 mg) by mouth 2 times daily as needed for muscle spasms 30 tablet 0     gabapentin (NEURONTIN) 600 MG tablet Take 2 tablets (1,200 mg) by mouth 2 times daily 120 tablet 1     lamoTRIgine (LAMICTAL) 200 MG tablet Take 1 tablet (200 mg) by  mouth daily 30 tablet 2     lidocaine (LIDODERM) 5 % patch Place 1 patch onto the skin every 24 hours 30 patch 3     LORazepam (ATIVAN) 0.5 MG tablet   0     nicotine (NICODERM CQ) 14 MG/24HR 24 hr patch Place 1 patch onto the skin every 24 hours 30 patch 2     norgestimate-ethinyl estradiol (ORTHO-CYCLEN/SPRINTEC) 0.25-35 MG-MCG tablet Take 1 tablet by mouth daily Use continuously skip placebo pills 84 tablet 3     ondansetron (ZOFRAN) 4 MG tablet Take 1 tablet (4 mg) by mouth every 8 hours as needed for nausea 20 tablet 3     oxybutynin ER (DITROPAN-XL) 5 MG 24 hr tablet Take 1 tablet (5 mg) by mouth daily 30 tablet 1     prochlorperazine (COMPAZINE) 10 MG tablet Take 1 tablet (10 mg) by mouth every 6 hours as needed for nausea or vomiting 30 tablet 0     QUEtiapine (SEROQUEL) 25 MG tablet Take 1 tablet (25 mg) by mouth 2 times daily 60 tablet 1     QUEtiapine (SEROQUEL) 50 MG tablet Take 2-3 tablets (100-150 mg) by mouth At Bedtime Up to 150 mg daily 90 tablet 1     ranitidine (ZANTAC) 300 MG tablet Take 1 tablet (300 mg) by mouth At Bedtime 30 tablet 2     senna-docusate (SENOKOT-S;PERICOLACE) 8.6-50 MG per tablet Take 1-2 tablets by mouth 2 times daily Take while on oral narcotics to prevent or treat constipation. 60 tablet 0     sertraline (ZOLOFT) 50 MG tablet Take 1/2 tablet by mouth at bedtime x3 days, then 1 tablet at bedtime thereafter       SUMAtriptan (IMITREX) 25 MG tablet Take 1-2 tablets (25-50 mg) by mouth at onset of headache for migraine May repeat in 2 hours. Max 8 tablets/24 hours. 9 tablet 1     valACYclovir (VALTREX) 500 MG tablet Take 1 tablet (500 mg) by mouth daily 31 tablet 11     Chart documentation with Dragon Voice recognition Software. Although reviewed after completion, some words and grammatical errors may remain.     See Patient Instructions    DIPESH Marino [facility]

## 2019-04-25 NOTE — NURSING NOTE
"Chief Complaint   Patient presents with     Vaginal Problem       Initial /56 (BP Location: Right arm, Patient Position: Chair, Cuff Size: Adult Large)   Pulse 96   Temp 97.4  F (36.3  C)   Resp 16   LMP  (LMP Unknown)   SpO2 98%  Estimated body mass index is 26.31 kg/m  as calculated from the following:    Height as of 4/13/19: 1.676 m (5' 6\").    Weight as of 4/13/19: 73.9 kg (163 lb).    Patient presents to the clinic using No DME    Health Maintenance that is potentially due pending provider review:  NONE    n/a    Is there anyone who you would like to be able to receive your results? No  If yes have patient fill out JOSÉ    "

## 2019-04-25 NOTE — TELEPHONE ENCOUNTER
Sure we can do that. Just let the CMA know and get her on the schedule.  Go ahead and put her in the 10 oclock spot. Wet prep ordered so she can do this on arrival  Thanks Shi [id number]

## 2019-04-25 NOTE — TELEPHONE ENCOUNTER
Reason for Call:  Same Day Appointment, Requested Provider:  Shi Martinez    PCP: Shi Martinez    Reason for visit: Sirena says she is brining a client in to see Shi at 8:40 and wants to know if she could see her too.    Duration of symptoms: States she may have a yeast infection so wants to do a vaginal swab.       Can we leave a detailed message on this number? YES    Phone number patient can be reached at: Home number on file [phone] (home)    Best Time: anytime soon    Call taken on 4/25/2019 at 8:07 AM by Elba Lopez

## 2019-05-06 ENCOUNTER — OFFICE VISIT (OUTPATIENT)
Dept: FAMILY MEDICINE | Facility: CLINIC | Age: 34
End: 2019-05-06
Payer: COMMERCIAL

## 2019-05-06 VITALS
HEART RATE: 64 BPM | DIASTOLIC BLOOD PRESSURE: 74 MMHG | WEIGHT: 159 LBS | RESPIRATION RATE: 18 BRPM | BODY MASS INDEX: 25.55 KG/M2 | TEMPERATURE: 97.8 F | HEIGHT: 66 IN | SYSTOLIC BLOOD PRESSURE: 108 MMHG

## 2019-05-06 DIAGNOSIS — K59.00 CONSTIPATION, UNSPECIFIED CONSTIPATION TYPE: ICD-10-CM

## 2019-05-06 DIAGNOSIS — Z72.0 TOBACCO ABUSE: ICD-10-CM

## 2019-05-06 DIAGNOSIS — G43.909 MIGRAINE WITHOUT STATUS MIGRAINOSUS, NOT INTRACTABLE, UNSPECIFIED MIGRAINE TYPE: Primary | Chronic | ICD-10-CM

## 2019-05-06 PROBLEM — F19.20 CHEMICAL DEPENDENCY (H): Status: ACTIVE | Noted: 2019-05-06

## 2019-05-06 PROCEDURE — 99214 OFFICE O/P EST MOD 30 MIN: CPT | Performed by: FAMILY MEDICINE

## 2019-05-06 RX ORDER — AMITRIPTYLINE HYDROCHLORIDE 50 MG/1
50 TABLET ORAL AT BEDTIME
Qty: 90 TABLET | Refills: 1 | Status: SHIPPED | OUTPATIENT
Start: 2019-05-06 | End: 2021-07-22

## 2019-05-06 RX ORDER — SUMATRIPTAN 25 MG/1
25-50 TABLET, FILM COATED ORAL
Qty: 9 TABLET | Refills: 1 | Status: SHIPPED | OUTPATIENT
Start: 2019-05-06 | End: 2019-07-29

## 2019-05-06 RX ORDER — NICOTINE 21 MG/24HR
1 PATCH, TRANSDERMAL 24 HOURS TRANSDERMAL EVERY 24 HOURS
Qty: 30 PATCH | Refills: 1 | Status: SHIPPED | OUTPATIENT
Start: 2019-05-06 | End: 2019-05-27

## 2019-05-06 RX ORDER — AMOXICILLIN 250 MG
1-2 CAPSULE ORAL 2 TIMES DAILY
Qty: 60 TABLET | Refills: 0 | Status: SHIPPED | OUTPATIENT
Start: 2019-05-06 | End: 2019-09-03

## 2019-05-06 ASSESSMENT — MIFFLIN-ST. JEOR: SCORE: 1452.97

## 2019-05-06 NOTE — LETTER
May 6, 2019      Sirena Dietrich  [address] [address] [facility] [id number]        To Whom It May Concern:        Sirena Dietrich was seen in our clinic. Kindly excuse her work absence for yesterday.        Sincerely,        Rosales Montaño MD

## 2019-05-06 NOTE — PROGRESS NOTES
SUBJECTIVE:   Sirena Dietrich is a 33 year old female who presents to clinic today for the following   health issues:      Migraine Follow-Up    Headaches symptoms:  Has had a migraine on and off for the last 5 days     Frequency: once or twice a month     Duration of headaches: Can't sleep at night- comes and goes     Able to do normal daily activities/work with migraines: Yes    Rescue/Relief medication:sumatriptan (Imitrex)              Effectiveness: minor relief    Preventative medication: Amitriptyline    Neurologic complications: No new stroke-like symptoms, loss of vision or speech, numbness or weakness    In the past 4 weeks, how often have you gone to Urgent Care or the emergency room because of your headaches?  0     Mother used to have migraine headache    Smoke about 1 pack of cigarettes per day        PROBLEMS TO ADD ON...  Constipation: known to have constipation, using senna-docusate as PRN, would like medication refilled    Additional history: as documented    Reviewed  and updated as needed this visit by clinical staff         Reviewed and updated as needed this visit by Provider         Patient Active Problem List   Diagnosis     Lumbago     CARDIOVASCULAR SCREENING; LDL GOAL LESS THAN 160     Pelvic pain in female     Urinary incontinence     Migraine headache     Tortuous colon     PTSD (post-traumatic stress disorder)     Severe bipolar I disorder, most recent episode mixed, with psychotic features (H)     Agoraphobia with panic disorder     S/P hysterectomy     Insomnia due to other mental disorder     MTHFR mutation (methylenetetrahydrofolate reductase) (H)     Cyst of left ovary     Herpes simplex vulvovaginitis     Methamphetamine abuse in remission (H)     Chronic abdominal pain     Other chronic pain     Chemical dependency (H)     Past Surgical History:   Procedure Laterality Date     ANESTHESIA OUT OF OR MRI N/A 1/12/2015    Procedure: ANESTHESIA OUT OF OR MRI;  Surgeon: Generic  Anesthesia Provider;  Location: [facility]     C INDUCED ABORTN BY D&C       C INDUCED ABORTN BY D&C  3/2009     C INDUCED ABORTN BY D&C  10/2008     CYSTOSCOPY       CYSTOSCOPY N/A 12/3/2014    Procedure: CYSTOSCOPY;  Surgeon: Caroline Grossman MD;  Location: [facility]     CYSTOSCOPY N/A 2018    Procedure: CYSTOSCOPY;;  Surgeon: Debi Luis MD;  Location: [facility]     DILATION AND CURETTAGE N/A 2015    Procedure: DILATION AND CURETTAGE;  Surgeon: Caroline Grossman MD;  Location: [facility]     ESOPHAGOSCOPY, GASTROSCOPY, DUODENOSCOPY (EGD), COMBINED N/A 2016    Procedure: COMBINED ESOPHAGOSCOPY, GASTROSCOPY, DUODENOSCOPY (EGD);  Surgeon: Tommie Clancy MD;  Location: [facility]     EXCISE LESION PERINEAL  2014    Procedure: EXCISE LESION PERINEAL;;  Surgeon: Lisa Helton MD;  [facility]     HYSTERECTOMY, PAP NO LONGER INDICATED       LAPAROSCOPIC HYSTERECTOMY TOTAL N/A 12/3/2014    Procedure: LAPAROSCOPIC HYSTERECTOMY TOTAL;  Surgeon: Caroline Grossman MD;  [facility]     LAPAROSCOPIC SALPINGECTOMY  2014    Procedure: LAPAROSCOPIC SALPINGECTOMY;  Laparoscopic Bilateral Salpingectomy, Removal Of Perineal Skin Tag;  Surgeon: Lisa Helton MD;  [facility]     LAPAROSCOPIC SALPINGO-OOPHORECTOMY N/A 2018    Procedure: LAPAROSCOPIC SALPINGO-OOPHORECTOMY;  Diagnostic Laparoscopy. Left Salpingo-Oopherectomy. lysis of adhesions, cystoscopy;  Surgeon: Debi Luis MD;  [facility]     LAPAROSCOPY DIAGNOSTIC (GYN)  10/16/2012    Procedure: LAPAROSCOPY DIAGNOSTIC (GYN);  Diagnostic Laparoscopy, Excision of Bilateral Paratubal Cysts;  Surgeon: La Guzmán MD;  Location: [facility]     TONSILLECTOMY         Social History     Tobacco Use     Smoking status: Former Smoker     Packs/day: 0.50     Types: Cigarettes     Last attempt to quit: 2018     Years since quittin.0     Smokeless tobacco: Never Used   Substance Use Topics      Alcohol use: Yes     Comment: rare      Family History   Problem Relation Age of Onset     Alcohol/Drug Mother         drugs     Depression Mother      Heart Disease Mother         ?     Alcohol/Drug Father         drugs     Depression Father      Hypertension Maternal Grandmother      Cancer Maternal Grandmother         cervical     Depression Maternal Grandmother      Heart Disease Maternal Grandmother      Other - See Comments Maternal Grandmother         ovaries removed for cancer cells     Hypertension Brother      Bipolar Disorder Other      Bipolar Disorder Other      Crohn's Disease Paternal Grandmother      LUNG DISEASE Paternal Grandmother         breathing problems     Heart Disease Other         stents, clogged arteries         Current Outpatient Medications   Medication Sig Dispense Refill     acetaminophen (TYLENOL) 500 MG tablet Take 2 tablets (1,000 mg) by mouth every 4 hours as needed for other (mild pain) 100 tablet 1     amitriptyline (ELAVIL) 50 MG tablet Take 1 tablet (50 mg) by mouth At Bedtime 90 tablet 1     cyclobenzaprine (FLEXERIL) 10 MG tablet Take 1 tablet (10 mg) by mouth 2 times daily as needed for muscle spasms 30 tablet 0     gabapentin (NEURONTIN) 600 MG tablet Take 2 tablets (1,200 mg) by mouth 2 times daily 120 tablet 1     lamoTRIgine (LAMICTAL) 200 MG tablet Take 1 tablet (200 mg) by mouth daily 30 tablet 2     lidocaine (LIDODERM) 5 % patch Place 1 patch onto the skin every 24 hours 30 patch 3     LORazepam (ATIVAN) 0.5 MG tablet   0     nicotine (NICODERM CQ) 14 MG/24HR 24 hr patch Place 1 patch onto the skin every 24 hours 30 patch 1     nicotine (NICODERM CQ) 14 MG/24HR 24 hr patch Place 1 patch onto the skin every 24 hours 30 patch 2     norgestimate-ethinyl estradiol (ORTHO-CYCLEN/SPRINTEC) 0.25-35 MG-MCG tablet Take 1 tablet by mouth daily Use continuously skip placebo pills 84 tablet 3     ondansetron (ZOFRAN) 4 MG tablet Take 1 tablet (4 mg) by mouth every 8 hours  as needed for nausea 20 tablet 3     oxybutynin ER (DITROPAN-XL) 5 MG 24 hr tablet Take 1 tablet (5 mg) by mouth daily 30 tablet 1     prochlorperazine (COMPAZINE) 10 MG tablet Take 1 tablet (10 mg) by mouth every 6 hours as needed for nausea or vomiting 30 tablet 0     QUEtiapine (SEROQUEL) 25 MG tablet Take 1 tablet (25 mg) by mouth 2 times daily 60 tablet 1     QUEtiapine (SEROQUEL) 50 MG tablet Take 2-3 tablets (100-150 mg) by mouth At Bedtime Up to 150 mg daily 90 tablet 1     ranitidine (ZANTAC) 300 MG tablet Take 1 tablet (300 mg) by mouth At Bedtime 30 tablet 2     senna-docusate (SENOKOT-S/PERICOLACE) 8.6-50 MG tablet Take 1-2 tablets by mouth 2 times daily Take while on oral narcotics to prevent or treat constipation. 60 tablet 0     sertraline (ZOLOFT) 50 MG tablet Take 1/2 tablet by mouth at bedtime x3 days, then 1 tablet at bedtime thereafter       SUMAtriptan (IMITREX) 25 MG tablet Take 1-2 tablets (25-50 mg) by mouth at onset of headache for migraine May repeat in 2 hours. Max 8 tablets/24 hours. 9 tablet 1     valACYclovir (VALTREX) 500 MG tablet Take 1 tablet (500 mg) by mouth daily 31 tablet 11     Allergies   Allergen Reactions     Vicodin [Hydrocodone-Acetaminophen] Other (See Comments)     Severe irritability.  Neither vicodin nor percocet seem to provide relief     Amoxicillin Swelling     As a child--facial swelling and redness     Bactrim Itching and Rash     Erythro [Erythromycin] Other (See Comments) and Swelling     As a child--facial swelling     Recent Labs   Lab Test 10/11/18  1025 10/05/18  2156 09/19/18  1340  01/27/17  1231 01/20/17  1730 12/06/16  0852  06/02/11  1540   LDL  --   --   --   --   --   --  Cannot estimate LDL when triglyceride exceeds 400 mg/dL  --  138*   HDL  --   --   --   --   --   --  38*  --  46*   TRIG  --   --   --   --   --   --  532*  --  312*   ALT 26  --   --   --  57* 57* 18   < >  --    CR 0.67 0.75  --    < > 0.70 0.71 0.68   < >  --    GFRESTIMATED >90  "90  --    < > >90  Non  GFR Calc   >90  Non  GFR Calc   >90  Non  GFR Calc     < >  --    GFRESTBLACK >90 >90  --    < > >90   GFR Calc   >90   GFR Calc   >90   GFR Calc     < >  --    POTASSIUM 3.8 3.8  --    < > 4.2 3.3* 3.6   < >  --    TSH  --   --  1.59  --   --   --  1.65   < > 2.62    < > = values in this interval not displayed.      BP Readings from Last 3 Encounters:   05/06/19 108/74   04/25/19 108/56   04/13/19 149/87    Wt Readings from Last 3 Encounters:   05/06/19 72.1 kg (159 lb)   04/13/19 73.9 kg (163 lb)   04/04/19 75.3 kg (166 lb)                  Labs reviewed in EPIC    ROS:  Constitutional, HEENT, cardiovascular, pulmonary, GI, , musculoskeletal, neuro, skin, endocrine and psych systems are negative, except as otherwise noted.    OBJECTIVE:     /74 (Cuff Size: Adult Regular)   Pulse 64   Temp 97.8  F (36.6  C) (Tympanic)   Resp 18   Ht 1.676 m (5' 6\")   Wt 72.1 kg (159 lb)   LMP  (LMP Unknown)   Breastfeeding? No   BMI 25.66 kg/m    Body mass index is 25.66 kg/m .  GENERAL: healthy, alert and no distress  EYES: Eyes grossly normal to inspection, PERRL and conjunctivae and sclerae normal  HENT: normal cephalic/atraumatic, nose and mouth without ulcers or lesions, oropharynx clear and oral mucous membranes moist  NECK: no adenopathy, no asymmetry, masses, or scars and thyroid normal to palpation  RESP: lungs clear to auscultation - no rales, rhonchi or wheezes  CV: regular rate and rhythm, normal S1 S2, no S3 or S4, no murmur, click or rub, no peripheral edema and peripheral pulses strong  ABDOMEN: soft, nontender, no hepatosplenomegaly, no masses and bowel sounds normal  MS: no gross musculoskeletal defects noted, no edema  NEURO: Normal strength and tone, mentation intact and speech normal      ASSESSMENT/PLAN:       (G43.909) Migraine without status migrainosus, not intractable, " unspecified migraine type  (primary encounter diagnosis)  Comment: Symptoms probably secondary to migraine headache, treatment options discussed, Imitrex refilled amitriptyline increased to 50 mg.  Neurology referral placed for further review recommendations  Plan: amitriptyline (ELAVIL) 50 MG tablet,         SUMAtriptan (IMITREX) 25 MG tablet, NEUROLOGY         ADULT REFERRAL            (K59.00) Constipation, unspecified constipation type  Comment: Known to have constipation, S/Pericolace refilled.  Suggested regular exercise, balanced diet and well hydration  Plan: senna-docusate (SENOKOT-S/PERICOLACE) 8.6-50 MG        tablet            (Z72.0) Tobacco abuse  Comment: Smoking about 1 pack of cigarettes per day, associated health hazards explained in detail, agreeable to try nicotine patches  Plan: nicotine (NICODERM CQ) 14 MG/24HR 24 hr patch              Patient Instructions     Patient Education   About Migraine Headaches  What is a migraine headache?  A migraine is a very painful type of headache. It may last a few hours or days. During a migraine, you may have vision problems and feel sick to your stomach.  Migraines are three times more common in women than in men. Once they start, you may get them for the rest of your life. They may occur less often as you age.  What causes it?  We don't know the exact cause, but many things can trigger a migraine. These include:    Stress and anxiety    Lack of food or sleep    Foods and drinks that contain tyramine, such as:  ? Red jalyn and some beers  ? Aged cheeses (like cheddar or blue cheese)  ? Yeast  ? Aged, dried or cured meats  ? Fermented foods like sauerkraut, soy sauce, miso and kosta chi    Too much light    Chemicals (gasoline, cleaning products, perfume, glue, etc.)    Weather changes    Certain medicines    Hormone changes (in women).  What are symptoms?  Some people can tell when they're about to have a migraine. They may see flashing lights or zigzag lines  in front of their eyes. Or they may lose their vision for a short time.  With a migraine, you may:    Feel pain or pulsing on one side of the head. For some people, the entire head hurts.    Be very sensitive to light and sound.    Feel nauseated (sick to your stomach) and vomit (throw up).  How is it treated?  Your care team may suggest medicine to prevent or relieve your symptoms. Once you start having migraines, you may also need medicine to keep them from getting worse.   Take your medicine at the first sign of a migraine. It may take several tries to find a medicine that works for you.   When a migraine comes:    Lie down in a quiet, dark room. Try not to bend over, as this may cause more pain.    Put a cold pack on your head. Try a bag of frozen vegetables, wrapped with a thin cloth.    Drink extra fluids. If you can't drink, suck on ice chips.  How can I prevent migraines?  It will help to keep a migraine diary. By keeping a diary, you may find the cause of your headaches. Once you know the cause, you can take steps to prevent migraines in the future.  It also helps to live a healthy lifestyle. This means:    Get regular exercise. (If exercise triggers your headaches, tell your doctor.)    Drink plenty of water.    Eat healthy meals at regular times.    Try to go to bed and get up and regular times.    Don't smoke. Avoid second-hand smoke.    Avoid caffeine. Coffee, tea and soda may help a migraine. But if you drink them too often, they can cause migraines.    Find ways to relax, have fun and reduce stress in your life.    Try complementary therapies (yoga, acupuncture, massage, biofeedback, etc.).  When should I call my clinic?  Call your clinic at once if you have new or unusual symptoms, such as:     Pain that gets worse or lasts more than 24 hours.    Slurred speech or problems talking.    A weak arm or leg that you can't move normally.    A fever over 100 F (37.8 C), taken under the tongue.    Stiff  neck.    Vomiting (throwing up) for several hours.  For more information about migraines  Contact the [employer] [place] [employer] [place] for Headache Education (ACHE) at 1-524.620.2668 or www.headaches.org.  Local providers of complementary therapies  These services may not be covered by insurance. Check your insurance plan.  [facility]  [phone]   Includes pain education, behavioral therapy,   trigger point injections and more.  [facility]   [phone]   Includes acupuncture, massage, myofascial release.  [facility]  [phone]  [email or web address].[facility]  Includes mindfulness, meditation, yoga.  Community Education     [place]: commed.mpls.[facility].mn.[facility]: commedprograms.[facility].org  Look for programs on yoga, mindfulness, etc.  [facility]: A [facility] Crisis Resource Center   [phone]  www.[facility].org  Includes mindfulness, yoga, body-mind skills.  For informational purposes only. Not to replace the advice of your health care provider.   Copyright   2011 [facility]. All rights reserved. JRKICKZ [id number] - 11/15.       Patient Education     Amitriptyline Hydrochloride Oral tablet  What is this medicine?  AMITRIPTYLINE (a evie TRIP ti yasmani) is used to treat depression.  This medicine may be used for other purposes; ask your health care provider or pharmacist if you have questions.  What should I tell my health care provider before I take this medicine?  They need to know if you have any of these conditions:    an alcohol problem    asthma, difficulty breathing    bipolar disorder or schizophrenia    difficulty passing urine, prostate trouble    glaucoma    heart disease or previous heart attack    liver disease    over active thyroid    seizures    thoughts or plans of suicide, a previous suicide attempt, or family history of suicide attempt    an unusual or allergic reaction to amitriptyline,  other medicines, foods, dyes, or preservatives    pregnant or trying to get pregnant    breast-feeding  How should I use this medicine?  Take this medicine by mouth with a drink of water. Follow the directions on the prescription label. You can take the tablets with or without food. Take your medicine at regular intervals. Do not take it more often than directed. Do not stop taking this medicine suddenly except upon the advice of your doctor. Stopping this medicine too quickly may cause serious side effects or your condition may worsen.  A special MedGuide will be given to you by the pharmacist with each prescription and refill. Be sure to read this information carefully each time.  Talk to your pediatrician regarding the use of this medicine in children. Special care may be needed.  Overdosage: If you think you have taken too much of this medicine contact a poison control center or emergency room at once.  NOTE: This medicine is only for you. Do not share this medicine with others.  What if I miss a dose?  If you miss a dose, take it as soon as you can. If it is almost time for your next dose, take only that dose. Do not take double or extra doses.  What may interact with this medicine?  Do not take this medicine with any of the following medications:    arsenic trioxide    certain medicines used to regulate abnormal heartbeat or to treat other heart conditions    cisapride    droperidol    halofantrine    linezolid    MAOIs like Carbex, Eldepryl, Marplan, Nardil, and Parnate    methylene blue    other medicines for mental depression    phenothiazines like perphenazine, thioridazine and chlorpromazine    pimozide    probucol    procarbazine    sparfloxacin    Hernandez's Wort    ziprasidone  This medicine may also interact with the following medications:    atropine and related drugs like hyoscyamine, scopolamine, tolterodine and others    barbiturate medicines for inducing sleep or treating seizures, like  phenobarbital    cimetidine    disulfiram    ethchlorvynol    thyroid hormones such as levothyroxine  This list may not describe all possible interactions. Give your health care provider a list of all the medicines, herbs, non-prescription drugs, or dietary supplements you use. Also tell them if you smoke, drink alcohol, or use illegal drugs. Some items may interact with your medicine.  What should I watch for while using this medicine?  Tell your doctor if your symptoms do not get better or if they get worse. Visit your doctor or health care professional for regular checks on your progress. Because it may take several weeks to see the full effects of this medicine, it is important to continue your treatment as prescribed by your doctor.  Patients and their families should watch out for new or worsening thoughts of suicide or depression. Also watch out for sudden changes in feelings such as feeling anxious, agitated, panicky, irritable, hostile, aggressive, impulsive, severely restless, overly excited and hyperactive, or not being able to sleep. If this happens, especially at the beginning of treatment or after a change in dose, call your health care professional.  You may get drowsy or dizzy. Do not drive, use machinery, or do anything that needs mental alertness until you know how this medicine affects you. Do not stand or sit up quickly, especially if you are an older patient. This reduces the risk of dizzy or fainting spells. Alcohol may interfere with the effect of this medicine. Avoid alcoholic drinks.  Do not treat yourself for coughs, colds, or allergies without asking your doctor or health care professional for advice. Some ingredients can increase possible side effects.  Your mouth may get dry. Chewing sugarless gum or sucking hard candy, and drinking plenty of water will help. Contact your doctor if the problem does not go away or is severe.  This medicine may cause dry eyes and blurred vision. If you  wear contact lenses you may feel some discomfort. Lubricating drops may help. See your eye doctor if the problem does not go away or is severe.  This medicine can cause constipation. Try to have a bowel movement at least every 2 to 3 days. If you do not have a bowel movement for 3 days, call your doctor or health care professional.  This medicine can make you more sensitive to the sun. Keep out of the sun. If you cannot avoid being in the sun, wear protective clothing and use sunscreen. Do not use sun lamps or tanning beds/booths.  What side effects may I notice from receiving this medicine?  Side effects that you should report to your doctor or health care professional as soon as possible:    allergic reactions like skin rash, itching or hives, swelling of the face, lips, or tongue    abnormal production of milk in females    breast enlargement in both males and females    breathing problems    confusion, hallucinations    fast, irregular heartbeat    fever with increased sweating    muscle stiffness, or spasms    pain or difficulty passing urine, loss of bladder control    seizures    suicidal thoughts or other mood changes    swelling of the testicles    tingling, pain, or numbness in the feet or hands    yellowing of the eyes or skin  Side effects that usually do not require medical attention (report to your doctor or health care professional if they continue or are bothersome):    change in sex drive or performance    constipation or diarrhea    nausea, vomiting    weight gain or loss  This list may not describe all possible side effects. Call your doctor for medical advice about side effects. You may report side effects to FDA at 5-414-FDA-2556.  Where should I keep my medicine?  Keep out of the reach of children.  Store at room temperature between 20 and 25 degrees C (68 and 77 degrees F). Throw away any unused medicine after the expiration date.  NOTE:This sheet is a summary. It may not cover all possible  information. If you have questions about this medicine, talk to your doctor, pharmacist, or health care provider. Copyright  2016 [employer]               Rosales Montaño MD  [facility]

## 2019-05-06 NOTE — PATIENT INSTRUCTIONS
Patient Education   About Migraine Headaches  What is a migraine headache?  A migraine is a very painful type of headache. It may last a few hours or days. During a migraine, you may have vision problems and feel sick to your stomach.  Migraines are three times more common in women than in men. Once they start, you may get them for the rest of your life. They may occur less often as you age.  What causes it?  We don't know the exact cause, but many things can trigger a migraine. These include:    Stress and anxiety    Lack of food or sleep    Foods and drinks that contain tyramine, such as:  ? Red jalyn and some beers  ? Aged cheeses (like cheddar or blue cheese)  ? Yeast  ? Aged, dried or cured meats  ? Fermented foods like sauerkraut, soy sauce, miso and kosta chi    Too much light    Chemicals (gasoline, cleaning products, perfume, glue, etc.)    Weather changes    Certain medicines    Hormone changes (in women).  What are symptoms?  Some people can tell when they're about to have a migraine. They may see flashing lights or zigzag lines in front of their eyes. Or they may lose their vision for a short time.  With a migraine, you may:    Feel pain or pulsing on one side of the head. For some people, the entire head hurts.    Be very sensitive to light and sound.    Feel nauseated (sick to your stomach) and vomit (throw up).  How is it treated?  Your care team may suggest medicine to prevent or relieve your symptoms. Once you start having migraines, you may also need medicine to keep them from getting worse.   Take your medicine at the first sign of a migraine. It may take several tries to find a medicine that works for you.   When a migraine comes:    Lie down in a quiet, dark room. Try not to bend over, as this may cause more pain.    Put a cold pack on your head. Try a bag of frozen vegetables, wrapped with a thin cloth.    Drink extra fluids. If you can't drink, suck on ice chips.  How can I prevent  migraines?  It will help to keep a migraine diary. By keeping a diary, you may find the cause of your headaches. Once you know the cause, you can take steps to prevent migraines in the future.  It also helps to live a healthy lifestyle. This means:    Get regular exercise. (If exercise triggers your headaches, tell your doctor.)    Drink plenty of water.    Eat healthy meals at regular times.    Try to go to bed and get up and regular times.    Don't smoke. Avoid second-hand smoke.    Avoid caffeine. Coffee, tea and soda may help a migraine. But if you drink them too often, they can cause migraines.    Find ways to relax, have fun and reduce stress in your life.    Try complementary therapies (yoga, acupuncture, massage, biofeedback, etc.).  When should I call my clinic?  Call your clinic at once if you have new or unusual symptoms, such as:     Pain that gets worse or lasts more than 24 hours.    Slurred speech or problems talking.    A weak arm or leg that you can't move normally.    A fever over 100 F (37.8 C), taken under the tongue.    Stiff neck.    Vomiting (throwing up) for several hours.  For more information about migraines  Contact the [employer] (ACHE) at 1-449.979.9740 or www.headaches.org.  Local providers of complementary therapies  These services may not be covered by insurance. Check your insurance plan.  [facility]  [phone]   Includes pain education, behavioral therapy,   trigger point injections and more.  [facility]   [phone]   Includes acupuncture, massage, myofascial release.  [facility]  [phone]  [email or web address]  Includes mindfulness, meditation, yoga.  Community Education     [place]: jay.mpls.[email or web address].mn.    [place]: commedprograms.spps.org  Look for programs on yoga, mindfulness, etc.  [facility]    [phone]  www.pathways[place].org  Includes mindfulness, yoga, body-mind skills.  For informational purposes only. Not to replace the advice of your health care provider.   Copyright   2011 [employer] [facility]. All rights reserved. [employer] 974794 - 11/15.       Patient Education     Amitriptyline Hydrochloride Oral tablet  What is this medicine?  AMITRIPTYLINE (a evie TRIP ti yasmani) is used to treat depression.  This medicine may be used for other purposes; ask your health care provider or pharmacist if you have questions.  What should I tell my health care provider before I take this medicine?  They need to know if you have any of these conditions:    an alcohol problem    asthma, difficulty breathing    bipolar disorder or schizophrenia    difficulty passing urine, prostate trouble    glaucoma    heart disease or previous heart attack    liver disease    over active thyroid    seizures    thoughts or plans of suicide, a previous suicide attempt, or family history of suicide attempt    an unusual or allergic reaction to amitriptyline, other medicines, foods, dyes, or preservatives    pregnant or trying to get pregnant    breast-feeding  How should I use this medicine?  Take this medicine by mouth with a drink of water. Follow the directions on the prescription label. You can take the tablets with or without food. Take your medicine at regular intervals. Do not take it more often than directed. Do not stop taking this medicine suddenly except upon the advice of your doctor. Stopping this medicine too quickly may cause serious side effects or your condition may worsen.  A special MedGuide will be given to you by the pharmacist with each prescription and refill. Be sure to read this information carefully each time.  Talk to your pediatrician regarding the use of this medicine in children. Special care may be needed.  Overdosage: If you think you have taken too much of this medicine contact a poison  control center or emergency room at once.  NOTE: This medicine is only for you. Do not share this medicine with others.  What if I miss a dose?  If you miss a dose, take it as soon as you can. If it is almost time for your next dose, take only that dose. Do not take double or extra doses.  What may interact with this medicine?  Do not take this medicine with any of the following medications:    arsenic trioxide    certain medicines used to regulate abnormal heartbeat or to treat other heart conditions    cisapride    droperidol    halofantrine    linezolid    MAOIs like Carbex, Eldepryl, Marplan, Nardil, and Parnate    methylene blue    other medicines for mental depression    phenothiazines like perphenazine, thioridazine and chlorpromazine    pimozide    probucol    procarbazine    sparfloxacin    Hernandez's Wort    ziprasidone  This medicine may also interact with the following medications:    atropine and related drugs like hyoscyamine, scopolamine, tolterodine and others    barbiturate medicines for inducing sleep or treating seizures, like phenobarbital    cimetidine    disulfiram    ethchlorvynol    thyroid hormones such as levothyroxine  This list may not describe all possible interactions. Give your health care provider a list of all the medicines, herbs, non-prescription drugs, or dietary supplements you use. Also tell them if you smoke, drink alcohol, or use illegal drugs. Some items may interact with your medicine.  What should I watch for while using this medicine?  Tell your doctor if your symptoms do not get better or if they get worse. Visit your doctor or health care professional for regular checks on your progress. Because it may take several weeks to see the full effects of this medicine, it is important to continue your treatment as prescribed by your doctor.  Patients and their families should watch out for new or worsening thoughts of suicide or depression. Also watch out for sudden changes in  feelings such as feeling anxious, agitated, panicky, irritable, hostile, aggressive, impulsive, severely restless, overly excited and hyperactive, or not being able to sleep. If this happens, especially at the beginning of treatment or after a change in dose, call your health care professional.  You may get drowsy or dizzy. Do not drive, use machinery, or do anything that needs mental alertness until you know how this medicine affects you. Do not stand or sit up quickly, especially if you are an older patient. This reduces the risk of dizzy or fainting spells. Alcohol may interfere with the effect of this medicine. Avoid alcoholic drinks.  Do not treat yourself for coughs, colds, or allergies without asking your doctor or health care professional for advice. Some ingredients can increase possible side effects.  Your mouth may get dry. Chewing sugarless gum or sucking hard candy, and drinking plenty of water will help. Contact your doctor if the problem does not go away or is severe.  This medicine may cause dry eyes and blurred vision. If you wear contact lenses you may feel some discomfort. Lubricating drops may help. See your eye doctor if the problem does not go away or is severe.  This medicine can cause constipation. Try to have a bowel movement at least every 2 to 3 days. If you do not have a bowel movement for 3 days, call your doctor or health care professional.  This medicine can make you more sensitive to the sun. Keep out of the sun. If you cannot avoid being in the sun, wear protective clothing and use sunscreen. Do not use sun lamps or tanning beds/booths.  What side effects may I notice from receiving this medicine?  Side effects that you should report to your doctor or health care professional as soon as possible:    allergic reactions like skin rash, itching or hives, swelling of the face, lips, or tongue    abnormal production of milk in females    breast enlargement in both males and  females    breathing problems    confusion, hallucinations    fast, irregular heartbeat    fever with increased sweating    muscle stiffness, or spasms    pain or difficulty passing urine, loss of bladder control    seizures    suicidal thoughts or other mood changes    swelling of the testicles    tingling, pain, or numbness in the feet or hands    yellowing of the eyes or skin  Side effects that usually do not require medical attention (report to your doctor or health care professional if they continue or are bothersome):    change in sex drive or performance    constipation or diarrhea    nausea, vomiting    weight gain or loss  This list may not describe all possible side effects. Call your doctor for medical advice about side effects. You may report side effects to FDA at 5-992-FDA-2727.  Where should I keep my medicine?  Keep out of the reach of children.  Store at room temperature between 20 and 25 degrees C (68 and 77 degrees F). Throw away any unused medicine after the expiration date.  NOTE:This sheet is a summary. It may not cover all possible information. If you have questions about this medicine, talk to your doctor, pharmacist, or health care provider. Copyright  2016 [employer]

## 2019-05-06 NOTE — NURSING NOTE
"Chief Complaint   Patient presents with     Headache       Initial /74 (Cuff Size: Adult Regular)   Pulse 64   Temp 97.8  F (36.6  C) (Tympanic)   Resp 18   Ht 1.676 m (5' 6\")   Wt 72.1 kg (159 lb)   LMP  (LMP Unknown)   Breastfeeding? No   BMI 25.66 kg/m   Estimated body mass index is 25.66 kg/m  as calculated from the following:    Height as of this encounter: 1.676 m (5' 6\").    Weight as of this encounter: 72.1 kg (159 lb).    Patient presents to the clinic using No DME    Health Maintenance that is potentially due pending provider review:  NONE    n/a    Is there anyone who you would like to be able to receive your results? Not Applicable  If yes have patient fill out JOSÉ    "

## 2019-05-08 ENCOUNTER — OFFICE VISIT (OUTPATIENT)
Dept: FAMILY MEDICINE | Facility: CLINIC | Age: 34
End: 2019-05-08
Payer: COMMERCIAL

## 2019-05-08 VITALS
HEIGHT: 66 IN | BODY MASS INDEX: 26.52 KG/M2 | SYSTOLIC BLOOD PRESSURE: 122 MMHG | HEART RATE: 80 BPM | WEIGHT: 165 LBS | TEMPERATURE: 97.3 F | RESPIRATION RATE: 25 BRPM | DIASTOLIC BLOOD PRESSURE: 62 MMHG

## 2019-05-08 DIAGNOSIS — R87.622 LGSIL PAP SMEAR OF VAGINA: ICD-10-CM

## 2019-05-08 DIAGNOSIS — R87.612 LGSIL OF CERVIX OF UNDETERMINED SIGNIFICANCE: ICD-10-CM

## 2019-05-08 DIAGNOSIS — N39.46 MIXED INCONTINENCE URGE AND STRESS (MALE)(FEMALE): Primary | ICD-10-CM

## 2019-05-08 PROCEDURE — G0145 SCR C/V CYTO,THINLAYER,RESCR: HCPCS | Performed by: NURSE PRACTITIONER

## 2019-05-08 PROCEDURE — 99395 PREV VISIT EST AGE 18-39: CPT | Performed by: NURSE PRACTITIONER

## 2019-05-08 PROCEDURE — 87624 HPV HI-RISK TYP POOLED RSLT: CPT | Performed by: NURSE PRACTITIONER

## 2019-05-08 PROCEDURE — G0476 HPV COMBO ASSAY CA SCREEN: HCPCS | Performed by: NURSE PRACTITIONER

## 2019-05-08 RX ORDER — OXYBUTYNIN CHLORIDE 10 MG/1
10 TABLET, EXTENDED RELEASE ORAL DAILY
Qty: 90 TABLET | Refills: 1 | Status: SHIPPED | OUTPATIENT
Start: 2019-05-08 | End: 2019-09-03

## 2019-05-08 ASSESSMENT — ENCOUNTER SYMPTOMS
HEMATOCHEZIA: 0
CONSTIPATION: 1
WEAKNESS: 0
BREAST MASS: 0
SHORTNESS OF BREATH: 0
COUGH: 0
ABDOMINAL PAIN: 0
NERVOUS/ANXIOUS: 1
PARESTHESIAS: 0
DYSURIA: 0
HEADACHES: 1
HEARTBURN: 1
HEMATURIA: 0
JOINT SWELLING: 0
ARTHRALGIAS: 0
FREQUENCY: 1
NAUSEA: 1
DIARRHEA: 0
SORE THROAT: 0
CHILLS: 0
EYE PAIN: 0
FEVER: 0
MYALGIAS: 0
DIZZINESS: 0
PALPITATIONS: 0

## 2019-05-08 ASSESSMENT — PATIENT HEALTH QUESTIONNAIRE - PHQ9
SUM OF ALL RESPONSES TO PHQ QUESTIONS 1-9: 12
10. IF YOU CHECKED OFF ANY PROBLEMS, HOW DIFFICULT HAVE THESE PROBLEMS MADE IT FOR YOU TO DO YOUR WORK, TAKE CARE OF THINGS AT HOME, OR GET ALONG WITH OTHER PEOPLE: SOMEWHAT DIFFICULT
SUM OF ALL RESPONSES TO PHQ QUESTIONS 1-9: 12

## 2019-05-08 ASSESSMENT — MIFFLIN-ST. JEOR: SCORE: 1480.19
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
## 2019-05-08 NOTE — PROGRESS NOTES
Answers for HPI/ROS submitted by the patient on 5/8/2019   Annual Exam:  Frequency of exercise:: 1 day/week  Getting at least 3 servings of Calcium per day:: NO  Diet:: Regular (no restrictions)  Taking medications regularly:: Yes  Medication side effects:: None  Bi-annual eye exam:: NO  Dental care twice a year:: Yes  Sleep apnea or symptoms of sleep apnea:: None  abdominal pain: No  Blood in stool: No  Blood in urine: No  chest pain: No  chills: No  congestion: No  constipation: Yes  cough: No  diarrhea: No  dizziness: No  ear pain: No  eye pain: No  nervous/anxious: Yes  fever: No  frequency: Yes  genital sores: No  headaches: Yes  hearing loss: No  heartburn: Yes  arthralgias: No  joint swelling: No  peripheral edema: No  mood changes: No  myalgias: No  nausea: Yes  dysuria: No  palpitations: No  Skin sensation changes: No  sore throat: No  urgency: Yes  rash: No  shortness of breath: No  visual disturbance: No  weakness: No  pelvic pain: No  vaginal bleeding: No  vaginal discharge: No  tenderness: No  breast mass: No  breast discharge: No  Additional concerns today:: No  Duration of exercise:: 30-45 minutes  If you checked off any problems, how difficult have these problems made it for you to do your work, take care of things at home, or get along with other people?: Somewhat difficult  PHQ9 TOTAL SCORE: 12

## 2019-05-09 ASSESSMENT — PATIENT HEALTH QUESTIONNAIRE - PHQ9: SUM OF ALL RESPONSES TO PHQ QUESTIONS 1-9: 12

## 2019-05-10 LAB
COPATH REPORT: NORMAL
PAP: NORMAL

## 2019-05-14 LAB
FINAL DIAGNOSIS: NORMAL
HPV HR 12 DNA CVX QL NAA+PROBE: NEGATIVE
HPV16 DNA SPEC QL NAA+PROBE: NEGATIVE
HPV18 DNA SPEC QL NAA+PROBE: NEGATIVE
SPECIMEN DESCRIPTION: NORMAL
SPECIMEN SOURCE CVX/VAG CYTO: NORMAL

## 2019-05-31 PROBLEM — F15.11 METHAMPHETAMINE ABUSE IN REMISSION (H): Chronic | Status: ACTIVE | Noted: 2018-10-11

## 2019-06-13 ENCOUNTER — HOSPITAL ENCOUNTER (EMERGENCY)
Facility: CLINIC | Age: 34
Discharge: HOME | End: 2019-06-13
Attending: EMERGENCY MEDICINE | Admitting: EMERGENCY MEDICINE
Payer: COMMERCIAL

## 2019-06-13 VITALS
HEART RATE: 104 BPM | WEIGHT: 160 LBS | BODY MASS INDEX: 25.82 KG/M2 | DIASTOLIC BLOOD PRESSURE: 102 MMHG | SYSTOLIC BLOOD PRESSURE: 133 MMHG | TEMPERATURE: 98.2 F | OXYGEN SATURATION: 98 % | RESPIRATION RATE: 18 BRPM

## 2019-06-13 DIAGNOSIS — N76.0 BACTERIAL VAGINOSIS: ICD-10-CM

## 2019-06-13 DIAGNOSIS — B96.89 BACTERIAL VAGINOSIS: ICD-10-CM

## 2019-06-13 DIAGNOSIS — K62.5 BRBPR (BRIGHT RED BLOOD PER RECTUM): ICD-10-CM

## 2019-06-13 DIAGNOSIS — R10.32 LLQ ABDOMINAL PAIN: ICD-10-CM

## 2019-06-13 LAB
ALBUMIN UR-MCNC: NEGATIVE MG/DL
ANION GAP SERPL CALCULATED.3IONS-SCNC: 4 MMOL/L (ref 3–14)
APPEARANCE UR: ABNORMAL
BACTERIA #/AREA URNS HPF: ABNORMAL /HPF
BASOPHILS # BLD AUTO: 0.1 10E9/L (ref 0–0.2)
BASOPHILS NFR BLD AUTO: 0.7 %
BILIRUB UR QL STRIP: NEGATIVE
BUN SERPL-MCNC: 9 MG/DL (ref 7–30)
CALCIUM SERPL-MCNC: 8.6 MG/DL (ref 8.5–10.1)
CHLORIDE SERPL-SCNC: 108 MMOL/L (ref 94–109)
CO2 SERPL-SCNC: 27 MMOL/L (ref 20–32)
COLOR UR AUTO: YELLOW
CREAT SERPL-MCNC: 0.7 MG/DL (ref 0.52–1.04)
DIFFERENTIAL METHOD BLD: NORMAL
EOSINOPHIL # BLD AUTO: 0.4 10E9/L (ref 0–0.7)
EOSINOPHIL NFR BLD AUTO: 4.7 %
ERYTHROCYTE [DISTWIDTH] IN BLOOD BY AUTOMATED COUNT: 11.9 % (ref 10–15)
GFR SERPL CREATININE-BSD FRML MDRD: >90 ML/MIN/{1.73_M2}
GLUCOSE SERPL-MCNC: 89 MG/DL (ref 70–99)
GLUCOSE UR STRIP-MCNC: NEGATIVE MG/DL
HCT VFR BLD AUTO: 42.4 % (ref 35–47)
HGB BLD-MCNC: 14 G/DL (ref 11.7–15.7)
HGB UR QL STRIP: ABNORMAL
IMM GRANULOCYTES # BLD: 0 10E9/L (ref 0–0.4)
IMM GRANULOCYTES NFR BLD: 0.5 %
KETONES UR STRIP-MCNC: NEGATIVE MG/DL
LEUKOCYTE ESTERASE UR QL STRIP: NEGATIVE
LYMPHOCYTES # BLD AUTO: 2.9 10E9/L (ref 0.8–5.3)
LYMPHOCYTES NFR BLD AUTO: 33.8 %
MCH RBC QN AUTO: 30.2 PG (ref 26.5–33)
MCHC RBC AUTO-ENTMCNC: 33 G/DL (ref 31.5–36.5)
MCV RBC AUTO: 91 FL (ref 78–100)
MONOCYTES # BLD AUTO: 0.5 10E9/L (ref 0–1.3)
MONOCYTES NFR BLD AUTO: 5.6 %
MUCOUS THREADS #/AREA URNS LPF: PRESENT /LPF
NEUTROPHILS # BLD AUTO: 4.8 10E9/L (ref 1.6–8.3)
NEUTROPHILS NFR BLD AUTO: 54.7 %
NITRATE UR QL: NEGATIVE
NRBC # BLD AUTO: 0 10*3/UL
NRBC BLD AUTO-RTO: 0 /100
PH UR STRIP: 5 PH (ref 5–7)
PLATELET # BLD AUTO: 310 10E9/L (ref 150–450)
POTASSIUM SERPL-SCNC: 3.7 MMOL/L (ref 3.4–5.3)
RBC # BLD AUTO: 4.64 10E12/L (ref 3.8–5.2)
RBC #/AREA URNS AUTO: 3 /HPF (ref 0–2)
SODIUM SERPL-SCNC: 139 MMOL/L (ref 133–144)
SOURCE: ABNORMAL
SP GR UR STRIP: 1.03 (ref 1–1.03)
SPECIMEN SOURCE: ABNORMAL
SQUAMOUS #/AREA URNS AUTO: 10 /HPF (ref 0–1)
UROBILINOGEN UR STRIP-MCNC: 0 MG/DL (ref 0–2)
WBC # BLD AUTO: 8.7 10E9/L (ref 4–11)
WBC #/AREA URNS AUTO: 3 /HPF (ref 0–5)
WET PREP SPEC: ABNORMAL

## 2019-06-13 PROCEDURE — 81001 URINALYSIS AUTO W/SCOPE: CPT | Performed by: EMERGENCY MEDICINE

## 2019-06-13 PROCEDURE — 25000132 ZZH RX MED GY IP 250 OP 250 PS 637: Performed by: EMERGENCY MEDICINE

## 2019-06-13 PROCEDURE — 87210 SMEAR WET MOUNT SALINE/INK: CPT | Performed by: EMERGENCY MEDICINE

## 2019-06-13 PROCEDURE — 99284 EMERGENCY DEPT VISIT MOD MDM: CPT | Mod: 25 | Performed by: EMERGENCY MEDICINE

## 2019-06-13 PROCEDURE — 96374 THER/PROPH/DIAG INJ IV PUSH: CPT | Performed by: EMERGENCY MEDICINE

## 2019-06-13 PROCEDURE — 80048 BASIC METABOLIC PNL TOTAL CA: CPT | Performed by: EMERGENCY MEDICINE

## 2019-06-13 PROCEDURE — 25000128 H RX IP 250 OP 636: Performed by: EMERGENCY MEDICINE

## 2019-06-13 PROCEDURE — 85025 COMPLETE CBC W/AUTO DIFF WBC: CPT | Performed by: EMERGENCY MEDICINE

## 2019-06-13 RX ORDER — METRONIDAZOLE 7.5 MG/G
1 GEL VAGINAL DAILY
Qty: 25 G | Refills: 0 | Status: SHIPPED | OUTPATIENT
Start: 2019-06-13 | End: 2019-09-18

## 2019-06-13 RX ORDER — POLYETHYLENE GLYCOL 3350 17 G/17G
1 POWDER, FOR SOLUTION ORAL DAILY
Qty: 527 G | Refills: 0 | Status: SHIPPED | OUTPATIENT
Start: 2019-06-13 | End: 2019-09-18

## 2019-06-13 RX ORDER — ACETAMINOPHEN 500 MG
1000 TABLET ORAL ONCE
Status: COMPLETED | OUTPATIENT
Start: 2019-06-13 | End: 2019-06-13

## 2019-06-13 RX ORDER — ONDANSETRON 2 MG/ML
4 INJECTION INTRAMUSCULAR; INTRAVENOUS ONCE
Status: COMPLETED | OUTPATIENT
Start: 2019-06-13 | End: 2019-06-13

## 2019-06-13 RX ADMIN — ONDANSETRON 4 MG: 2 INJECTION INTRAMUSCULAR; INTRAVENOUS at 17:03

## 2019-06-13 RX ADMIN — ACETAMINOPHEN 1000 MG: 500 TABLET, FILM COATED ORAL at 17:03
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
## 2019-06-13 NOTE — ED AVS SNAPSHOT
[facility] Emergency Department  [address] [address]  Phone:  [phone]  Fax:  [phone]                                    Sirena Dietrich   MRN: [id number]    Department:  [facility] Emergency Department   Date of Visit:  6/13/2019           After Visit Summary Signature Page    I have received my discharge instructions, and my questions have been answered. I have discussed any challenges I see with this plan with the nurse or doctor.    ..........................................................................................................................................  Patient/Patient Representative Signature      ..........................................................................................................................................  Patient Representative Print Name and Relationship to Patient    ..................................................               ................................................  Date                                   Time    ..........................................................................................................................................  Reviewed by Signature/Title    ...................................................              ..............................................  Date                                               Time          22EPIC Rev 08/18

## 2019-06-13 NOTE — DISCHARGE INSTRUCTIONS
We have not found a good cause for your abdominal pain, as all tests so far have not shown us the reason you're in pain.      Therefore, it is very important for you to follow up here or at your regular doctor's office tomorrow, in 24 hours, so that we can re-check your pain and see how you are doing.      Please come back sooner if you have worsening abdominal pain, intractable vomiting, fevers/chills, increasing malaise, or for any other worsening of your condition as you see fit.     Take the antibiotic cream as directed for the pectoral vaginosis.     Most commonly the rectal bleeding is from hemorrhoids. Take the Miralax powder to help make your stools softer.  Start with 1-2 capful mixed in 6-8 oz of any liquid once a day.  If that does not help her have softer stools, try increasing it to 3-4 capful in 6-8 oz once a day or 1-2 capful in 6-8 oz twice a day.  You can increase or decrease the amount as needed to achieve one to two soft stools per day.

## 2019-06-13 NOTE — ED NOTES
"Pt reports having rectal bleeding for over a week, started Tuesday of last week. Bleeding is bright red and more consistent then she has had in the past. Pt reports, \"its like my period but coming from my rectum.\" Pt reports reports she does have a hx of hemorrhoids, pt reports she has been using suppositories and ointment for hemorrhoids.  "

## 2019-06-14 ENCOUNTER — PATIENT OUTREACH (OUTPATIENT)
Dept: CARE COORDINATION | Facility: CLINIC | Age: 34
End: 2019-06-14

## 2019-06-14 ASSESSMENT — ACTIVITIES OF DAILY LIVING (ADL): DEPENDENT_IADLS:: INDEPENDENT

## 2019-06-14 NOTE — PROGRESS NOTES
Clinic Care Coordination Contact    Clinic Care Coordination Contact  OUTREACH    Referral Information:  Referral Source: ED Follow-Up    Primary Diagnosis: Gynecological disorders    Chief Complaint   Patient presents with     ER F/U     Nurse: ER f/u 6/13/19        Universal Utilization: Pt has had 6 ED/Hospital visits in past 90 days;  Pt did trigger high for risk of readmission-ED/hospital utilization.  Pt sees the following providers/specialties outside of the [facility] network:  Patient sees Puja Peguero at [facility] in [place] for psychiatric medication management.    Clinic Utilization  Difficulty keeping appointments:: Yes  Compliance Concerns: Yes  No-Show Concerns: Yes  No PCP office visit in Past Year: No  Utilization    Last refreshed: 6/14/2019  9:00 AM:  Hospital Admissions 0           Last refreshed: 6/14/2019  9:00 AM:  ED Visits 6           Last refreshed: 6/14/2019  9:00 AM:  No Show Count (past year) 5              Current as of: 6/14/2019  9:00 AM              Clinical Concerns:  Care Coordinator RN briefly spoke with patient regarding her ER yesterday, she states she is still sore. She states she is just now going and getting her prescriptions from the pharmacy. She agreed to call the clinic on Monday if not better. She then proceeded to hang up.    Current Medical Concerns:    Patient Active Problem List   Diagnosis     LGSIL on Pap Smear     Lumbago     CARDIOVASCULAR SCREENING; LDL GOAL LESS THAN 160     Pelvic pain in female     Urinary incontinence     Migraine headache     Tortuous colon     PTSD (post-traumatic stress disorder)     Severe bipolar I disorder, most recent episode mixed, with psychotic features (H)     Agoraphobia with panic disorder     S/P hysterectomy     Insomnia due to other mental disorder     MTHFR mutation (methylenetetrahydrofolate reductase) (H)     Cyst of left ovary     Herpes simplex vulvovaginitis     Methamphetamine abuse in remission (H)     Chronic abdominal  pain     Other chronic pain     Chemical dependency (H)         Current Behavioral Concerns: n/a      Education Provided to patient: RN CC educated about Care Coordination Services, discharge instructions, medications reviewed and follow up very briefly.      Pain  Pain (GOAL):: Yes  Location of chronic pain:: Left lower abdominal   Progression: Unchanged  Health Maintenance Reviewed: Due/Overdue   Health Maintenance Due   Topic Date Due     URINE DRUG SCREEN  02/20/2019       Clinical Pathway: None    Medication Management:  Patient independent in medication management and verbalizes adherence and understanding of medication regimen.        Functional Status:  Dependent ADLs:: Independent  Dependent IADLs:: Independent  Bed or wheelchair confined:: No  Mobility Status: Independent  Fallen 2 or more times in the past year?: No  Any fall with injury in the past year?: No    Living Situation:  Current living arrangement:: I live in a private home with family    Diet/Exercise/Sleep:  Diet:: Regular  Inadequate nutrition (GOAL):: No  Food Insecurity: No  Tube Feeding: No  Exercise:: Currently not exercising  Inadequate activity/exercise (GOAL):: No  Significant changes in sleep pattern (GOAL): No    Transportation:  Transportation concerns (GOAL):: No  Transportation means:: Family, Friend, Regular car     Psychosocial:  Jewish or spiritual beliefs that impact treatment:: No  Mental health DX:: Yes  Mental health DX how managed:: Medication, Outpatient Counseling, Psychiatrist, Mental Health Targeted Care Manager  Mental health management concern (GOAL):: No  Informal Support system:: Children, Family, Friends, Parent     Financial/Insurance:   Financial/Insurance concerns (GOAL):: No       Resources and Interventions:  Current Resources: n/a  Community Resources: [facility], [facility], [facility]  Supplies used at home:: None  Equipment Currently Used at Home: none    Advance Care Plan/Directive  Advanced  Care Plans/Directives on file:: No    Referrals Placed: None      Plan:   No further Care Coordination needs identified at this time. Patient may be referred to Care Coordination in the future if additional needs arise.  Pt encouraged to contact Care Coordinator through the clinic if situation changes and assistance is needed. No follow-up planned.    Harmony BARAHONA RN, PHN, [facility]  Primary Care Clinic RN Care Coordinator  [facility]   [email or web address]  Office:  [phone]

## 2019-06-18 ENCOUNTER — TELEPHONE (OUTPATIENT)
Dept: UROLOGY | Facility: CLINIC | Age: 34
End: 2019-06-18

## 2019-06-18 NOTE — TELEPHONE ENCOUNTER
Patient returned the call and was scheduled a f/u apt for 7/16/19.    James Stephens RN....6/18/2019 12:31 PM

## 2019-06-18 NOTE — TELEPHONE ENCOUNTER
Refill request received for oxybutynin cl 5 mg tablets  Patient was last seen on 3/8/19 and was to f/u in 2 mo.  Left message for patient to return call to [phone].    James Stephens RN....6/18/2019 9:14 AM

## 2019-07-10 NOTE — PROGRESS NOTES
SUBJECTIVE   Sirena Dietrich is a  female who presents to clinic today for the following health issue(s):       Vaginal Symptoms      Duration: 1 month    Description  vaginal discharge - blood, itching and burning    Intensity:  Moderate 8/10    Accompanying signs and symptoms (fever/dysuria/abdominal or back pain): None    History  Sexually active: yes, single partner, contraception - hysterectomy  Possibility of pregnancy: No  Recent antibiotic use: no     Precipitating or alleviating factors: None    Therapies tried and outcome: cranberry juice   Outcome: didn't help,probiotic seems to be helping    Patient still complaining of rectal discomfort as well and blood in stool. Was seen in ED in June for same symptoms. Noted for possible internal hemorrhoids, did have while pregnant. Patient also discloses that when she was doing meth, she would take rectally towards the end. Not sure if this has caused some damage.     PCP   Shi Martinez, APRN [facility] [phone]    Health Maintenance        Health Maintenance Due   Topic Date Due     URINE DRUG SCREEN  02/20/2019       HPI        Patient Active Problem List   Diagnosis     LGSIL on Pap Smear     Lumbago     CARDIOVASCULAR SCREENING; LDL GOAL LESS THAN 160     Pelvic pain in female     Urinary incontinence     Migraine headache     Tortuous colon     PTSD (post-traumatic stress disorder)     Severe bipolar I disorder, most recent episode mixed, with psychotic features (H)     Agoraphobia with panic disorder     S/P hysterectomy     Insomnia due to other mental disorder     MTHFR mutation (methylenetetrahydrofolate reductase) (H)     Cyst of left ovary     Herpes simplex vulvovaginitis     Methamphetamine abuse in remission (H)     Chronic abdominal pain     Other chronic pain     Chemical dependency (H)     Current Outpatient Medications   Medication     acetaminophen (TYLENOL) 500 MG tablet     amitriptyline (ELAVIL) 50 MG tablet     cyclobenzaprine  (FLEXERIL) 10 MG tablet     gabapentin (NEURONTIN) 600 MG tablet     lamoTRIgine (LAMICTAL) 200 MG tablet     lidocaine (LIDODERM) 5 % patch     LORazepam (ATIVAN) 0.5 MG tablet     norgestimate-ethinyl estradiol (ORTHO-CYCLEN/SPRINTEC) 0.25-35 MG-MCG tablet     ondansetron (ZOFRAN) 4 MG tablet     oxybutynin ER (DITROPAN-XL) 10 MG 24 hr tablet     prochlorperazine (COMPAZINE) 10 MG tablet     QUEtiapine (SEROQUEL) 25 MG tablet     QUEtiapine (SEROQUEL) 50 MG tablet     ranitidine (ZANTAC) 300 MG tablet     senna-docusate (SENOKOT-S/PERICOLACE) 8.6-50 MG tablet     sertraline (ZOLOFT) 50 MG tablet     SUMAtriptan (IMITREX) 25 MG tablet     valACYclovir (VALTREX) 500 MG tablet     No current facility-administered medications for this visit.        Reviewed and updated as needed this visit by Provider:  Tobacco  Allergies  Meds  Med Hx  Surg Hx  Fam Hx  Soc Hx     ROS:  Constitutional, HEENT, cardiovascular, pulmonary, gi and gu systems are negative, except as otherwise noted.    PHYSICAL EXAM   /62   Pulse 111   Temp 97.9  F (36.6  C) (Tympanic)   Resp 18   Wt 77.2 kg (170 lb 3.2 oz)   LMP  (LMP Unknown)   SpO2 97%   BMI 27.47 kg/m    Body mass index is 27.47 kg/m .  GENERAL APPEARANCE: healthy, alert and no distress  RESP: lungs clear to auscultation - no rales, rhonchi or wheezes  CV: regular rates and rhythm, normal S1 S2, no S3 or S4 and no murmur, click or rub  ABDOMEN: soft, generalized tenderness, without hepatosplenomegaly or masses and bowel sounds normal  MS: extremities normal- no gross deformities noted  SKIN: no suspicious lesions or rashes      Diagnostic Test Results:  Results for orders placed or performed in visit on 07/15/19   Wet prep   Result Value Ref Range    Specimen Description Vagina     Wet Prep No Trichomonas seen     Wet Prep No clue cells seen     Wet Prep No yeast seen     Wet Prep Few  WBC'S seen        *Note: Due to a large number of results and/or encounters for the  "requested time period, some results have not been displayed. A complete set of results can be found in Results Review.       ASSESSMENT & PLAN   Assessment & Plan     1. Female genital symptoms  Patient continues to have vaginal discomfort, discharge and itching. Had bacterial vaginosis approximately 1 month ago. Wet prep negative for bacterial vaginosis or yeast today. Patient also notes some continued rectal bleeding as below, maybe causing secondary symptoms. Needs further workup.   - Wet prep    2. H/O hemorrhoids    - GASTROENTEROLOGY ADULT REF CONSULT ONLY    3. Rectal bleeding  Patient reports continued rectal bleeding, mostly bright red blood with still some generalized abdominal pain. Would like patient to see gastroenterology for further workup.  - GASTROENTEROLOGY ADULT REF CONSULT ONLY     Tobacco Cessation:   reports that she has been smoking cigarettes.  She has been smoking about 0.50 packs per day. She has never used smokeless tobacco.  Tobacco Cessation Action Plan: Information offered: Patient not interested at this time      BMI:   Estimated body mass index is 27.47 kg/m  as calculated from the following:    Height as of 5/8/19: 1.676 m (5' 6\").    Weight as of this encounter: 77.2 kg (170 lb 3.2 oz).   Weight management plan: Discussed healthy diet and exercise guidelines        Patient Instructions   Wet prep negative     Follow good vaginal hygiene     Follow-up if symptoms persist    Schedule with gastroenterology for rectal bleeding       Return in about 2 weeks (around 7/29/2019), or if symptoms worsen or fail to improve.    Risks, benefits, side effects and rationale for treatment plan fully discussed with the patient and understanding expressed.    DIPESH Rodriguez CNP   [facility]      "

## 2019-07-15 ENCOUNTER — OFFICE VISIT (OUTPATIENT)
Dept: FAMILY MEDICINE | Facility: CLINIC | Age: 34
End: 2019-07-15
Payer: COMMERCIAL

## 2019-07-15 VITALS
TEMPERATURE: 97.9 F | HEART RATE: 111 BPM | RESPIRATION RATE: 18 BRPM | BODY MASS INDEX: 27.47 KG/M2 | WEIGHT: 170.2 LBS | DIASTOLIC BLOOD PRESSURE: 62 MMHG | SYSTOLIC BLOOD PRESSURE: 120 MMHG | OXYGEN SATURATION: 97 %

## 2019-07-15 DIAGNOSIS — Z87.19 H/O HEMORRHOIDS: ICD-10-CM

## 2019-07-15 DIAGNOSIS — K62.5 RECTAL BLEEDING: ICD-10-CM

## 2019-07-15 DIAGNOSIS — N94.9 FEMALE GENITAL SYMPTOMS: Primary | ICD-10-CM

## 2019-07-15 LAB
SPECIMEN SOURCE: NORMAL
WET PREP SPEC: NORMAL

## 2019-07-15 PROCEDURE — 99213 OFFICE O/P EST LOW 20 MIN: CPT | Performed by: NURSE PRACTITIONER

## 2019-07-15 PROCEDURE — 87210 SMEAR WET MOUNT SALINE/INK: CPT | Performed by: NURSE PRACTITIONER

## 2019-07-15 NOTE — PATIENT INSTRUCTIONS
Wet prep negative     Follow good vaginal hygiene     Follow-up if symptoms persist    Schedule with gastroenterology for rectal bleeding

## 2019-07-24 ENCOUNTER — TRANSFERRED RECORDS (OUTPATIENT)
Dept: HEALTH INFORMATION MANAGEMENT | Facility: CLINIC | Age: 34
End: 2019-07-24

## 2019-07-29 ENCOUNTER — OFFICE VISIT (OUTPATIENT)
Dept: FAMILY MEDICINE | Facility: CLINIC | Age: 34
End: 2019-07-29
Payer: COMMERCIAL

## 2019-07-29 VITALS
RESPIRATION RATE: 12 BRPM | BODY MASS INDEX: 28.08 KG/M2 | OXYGEN SATURATION: 97 % | WEIGHT: 174 LBS | HEART RATE: 101 BPM | DIASTOLIC BLOOD PRESSURE: 80 MMHG | TEMPERATURE: 99.1 F | SYSTOLIC BLOOD PRESSURE: 122 MMHG

## 2019-07-29 DIAGNOSIS — Z72.0 TOBACCO ABUSE: Primary | ICD-10-CM

## 2019-07-29 DIAGNOSIS — G43.909 MIGRAINE WITHOUT STATUS MIGRAINOSUS, NOT INTRACTABLE, UNSPECIFIED MIGRAINE TYPE: Chronic | ICD-10-CM

## 2019-07-29 DIAGNOSIS — J02.0 STREP THROAT: ICD-10-CM

## 2019-07-29 DIAGNOSIS — B37.31 YEAST INFECTION OF THE VAGINA: ICD-10-CM

## 2019-07-29 PROCEDURE — 99213 OFFICE O/P EST LOW 20 MIN: CPT | Performed by: NURSE PRACTITIONER

## 2019-07-29 RX ORDER — FLUCONAZOLE 150 MG/1
150 TABLET ORAL DAILY
Qty: 2 TABLET | Refills: 0 | Status: SHIPPED | OUTPATIENT
Start: 2019-07-29 | End: 2019-09-03

## 2019-07-29 RX ORDER — CEPHALEXIN 500 MG/1
500 CAPSULE ORAL
COMMUNITY
Start: 2019-07-28 | End: 2019-09-03

## 2019-07-29 RX ORDER — NICOTINE 21 MG/24HR
1 PATCH, TRANSDERMAL 24 HOURS TRANSDERMAL EVERY 24 HOURS
Qty: 30 PATCH | Refills: 1 | Status: SHIPPED | OUTPATIENT
Start: 2019-07-29 | End: 2021-07-22

## 2019-07-29 RX ORDER — SUMATRIPTAN 25 MG/1
25-50 TABLET, FILM COATED ORAL
Qty: 9 TABLET | Refills: 1 | Status: SHIPPED | OUTPATIENT
Start: 2019-07-29 | End: 2019-09-18

## 2019-07-29 ASSESSMENT — PAIN SCALES - GENERAL: PAINLEVEL: NO PAIN (0)

## 2019-07-29 NOTE — PATIENT INSTRUCTIONS
Refilled the imitrex   Nicotine patches sent the the pharmacy     Diflucan for yeast may repeat in 3 days

## 2019-07-29 NOTE — NURSING NOTE
"Chief Complaint   Patient presents with     Vaginal Problem     currently on antibiotics would like diflucan     Headache     refills     Smoking Cessation     would like patches     /80   Pulse 101   Temp 99.1  F (37.3  C) (Tympanic)   Resp 12   Wt 78.9 kg (174 lb)   LMP  (LMP Unknown)   SpO2 97%   BMI 28.08 kg/m   Estimated body mass index is 28.08 kg/m  as calculated from the following:    Height as of 5/8/19: 1.676 m (5' 6\").    Weight as of this encounter: 78.9 kg (174 lb).  Patient presents to the clinic using No DME      Health Maintenance that is potentially due pending provider review:    Health Maintenance Due   Topic Date Due     URINE DRUG SCREEN  02/20/2019        n/a        "

## 2019-07-29 NOTE — PROGRESS NOTES
SUBJECTIVE   Sirena Dietrich is a 33 year old female who presents with     Migraine     Since your last clinic visit, how have your headaches changed?  Worsened    How often are you getting headaches or migraines? Daily, middle of night     Are you able to do normal daily activities when you have a migraine? Yes barely    Are you taking rescue/relief medications? (Select all that apply) sumatriptan (Imitrex)    How helpful is your rescue/relief medication?  Currently out of imitrex - usually effective    Are you taking any medications to prevent migraines? (Select all that apply)  Amitriptyline    In the past 4 weeks, how often have you gone to urgent care or the emergency room because of your headaches?  0    Would like Diflucan - currently on Keflex for Strep throat- gets frequent yeast infections       Would like nicotine patches to quit smoking        PCP   Shi Martinez, APRN [facility] [phone]    Health Maintenance        Health Maintenance Due   Topic Date Due     URINE DRUG SCREEN  02/20/2019       HPI        Patient Active Problem List   Diagnosis     LGSIL on Pap Smear     Lumbago     CARDIOVASCULAR SCREENING; LDL GOAL LESS THAN 160     Pelvic pain in female     Urinary incontinence     Migraine headache     Tortuous colon     PTSD (post-traumatic stress disorder)     Severe bipolar I disorder, most recent episode mixed, with psychotic features (H)     Agoraphobia with panic disorder     S/P hysterectomy     Insomnia due to other mental disorder     MTHFR mutation (methylenetetrahydrofolate reductase) (H)     Cyst of left ovary     Herpes simplex vulvovaginitis     Methamphetamine abuse in remission (H)     Chronic abdominal pain     Other chronic pain     Chemical dependency (H)     Current Outpatient Medications   Medication     acetaminophen (TYLENOL) 500 MG tablet     amitriptyline (ELAVIL) 50 MG tablet     cephALEXin (KEFLEX) 500 MG capsule     cyclobenzaprine (FLEXERIL) 10 MG tablet      gabapentin (NEURONTIN) 600 MG tablet     lamoTRIgine (LAMICTAL) 200 MG tablet     lidocaine (LIDODERM) 5 % patch     LORazepam (ATIVAN) 0.5 MG tablet     norgestimate-ethinyl estradiol (ORTHO-CYCLEN/SPRINTEC) 0.25-35 MG-MCG tablet     ondansetron (ZOFRAN) 4 MG tablet     oxybutynin ER (DITROPAN-XL) 10 MG 24 hr tablet     prochlorperazine (COMPAZINE) 10 MG tablet     QUEtiapine (SEROQUEL) 25 MG tablet     QUEtiapine (SEROQUEL) 50 MG tablet     ranitidine (ZANTAC) 300 MG tablet     senna-docusate (SENOKOT-S/PERICOLACE) 8.6-50 MG tablet     sertraline (ZOLOFT) 50 MG tablet     SUMAtriptan (IMITREX) 25 MG tablet     valACYclovir (VALTREX) 500 MG tablet     No current facility-administered medications for this visit.        Patient Active Problem List   Diagnosis     LGSIL on Pap Smear     Lumbago     CARDIOVASCULAR SCREENING; LDL GOAL LESS THAN 160     Pelvic pain in female     Urinary incontinence     Migraine headache     Tortuous colon     PTSD (post-traumatic stress disorder)     Severe bipolar I disorder, most recent episode mixed, with psychotic features (H)     Agoraphobia with panic disorder     S/P hysterectomy     Insomnia due to other mental disorder     MTHFR mutation (methylenetetrahydrofolate reductase) (H)     Cyst of left ovary     Herpes simplex vulvovaginitis     Methamphetamine abuse in remission (H)     Chronic abdominal pain     Other chronic pain     Chemical dependency (H)     Past Surgical History:   Procedure Laterality Date     ANESTHESIA OUT OF OR MRI N/A 1/12/2015    Procedure: ANESTHESIA OUT OF OR MRI;  Surgeon: Generic Anesthesia Provider;  Location: [facility]     C INDUCED ABORTN BY D&C  2007     C INDUCED ABORTN BY D&C  3/2009     C INDUCED ABORTN BY D&C  10/2008     CYSTOSCOPY       CYSTOSCOPY N/A 12/3/2014    Procedure: CYSTOSCOPY;  Surgeon: Caroline Grossman MD;  Location: [facility] OR     CYSTOSCOPY N/A 9/21/2018    Procedure: CYSTOSCOPY;;  Surgeon: Debi Luis MD;  Location: [facility]  OR     DILATION AND CURETTAGE N/A 2015    Procedure: DILATION AND CURETTAGE;  Surgeon: Caroline Grossman MD;  Location: [facility]     ESOPHAGOSCOPY, GASTROSCOPY, DUODENOSCOPY (EGD), COMBINED N/A 2016    Procedure: COMBINED ESOPHAGOSCOPY, GASTROSCOPY, DUODENOSCOPY (EGD);  Surgeon: Tommie Clancy MD;  Location: [facility] GI     EXCISE LESION PERINEAL  2014    Procedure: EXCISE LESION PERINEAL;;  Surgeon: Lisa Helton MD;  Location: [facility]     HYSTERECTOMY, PAP NO LONGER INDICATED       LAPAROSCOPIC HYSTERECTOMY TOTAL N/A 12/3/2014    Procedure: LAPAROSCOPIC HYSTERECTOMY TOTAL;  Surgeon: Caroline Grossman MD;  Location: [facility]     LAPAROSCOPIC SALPINGECTOMY  2014    Procedure: LAPAROSCOPIC SALPINGECTOMY;  Laparoscopic Bilateral Salpingectomy, Removal Of Perineal Skin Tag;  Surgeon: Lisa Helton MD;  Location: [facility]     LAPAROSCOPIC SALPINGO-OOPHORECTOMY N/A 2018    Procedure: LAPAROSCOPIC SALPINGO-OOPHORECTOMY;  Diagnostic Laparoscopy. Left Salpingo-Oopherectomy. lysis of adhesions, cystoscopy;  Surgeon: Debi Luis MD;  Location: [facility]     LAPAROSCOPY DIAGNOSTIC (GYN)  10/16/2012    Procedure: LAPAROSCOPY DIAGNOSTIC (GYN);  Diagnostic Laparoscopy, Excision of Bilateral Paratubal Cysts;  Surgeon: La Gumzán MD;  Location: [facility] OR     TONSILLECTOMY         Social History     Tobacco Use     Smoking status: Current Some Day Smoker     Packs/day: 0.50     Types: Cigarettes     Last attempt to quit: 2018     Years since quittin.2     Smokeless tobacco: Never Used   Substance Use Topics     Alcohol use: Yes     Comment: rare      Family History   Problem Relation Age of Onset     Alcohol/Drug Mother         drugs     Depression Mother      Heart Disease Mother         ?     Alcohol/Drug Father         drugs     Depression Father      Hypertension Maternal Grandmother      Cancer Maternal Grandmother         cervical     Depression Maternal  Grandmother      Heart Disease Maternal Grandmother      Other - See Comments Maternal Grandmother         ovaries removed for cancer cells     Hypertension Brother      Bipolar Disorder Other      Bipolar Disorder Other      Crohn's Disease Paternal Grandmother      LUNG DISEASE Paternal Grandmother         breathing problems     Heart Disease Other         stents, clogged arteries           Reviewed and updated:  Tobacco  Allergies  Meds  Med Hx  Surg Hx  Fam Hx  Soc Hx     ROS:  Constitutional, HEENT, cardiovascular, pulmonary, gi and gu systems are negative, except as otherwise noted.    PHYSICAL EXAM   LMP  (LMP Unknown)   There is no height or weight on file to calculate BMI.  GENERAL: healthy, alert and no distress  EYES: Eyes grossly normal to inspection, PERRL and conjunctivae and sclerae normal  HENT: ear canals and TM's normal, nose and mouth without ulcers or lesions  NECK: no adenopathy, no asymmetry, masses, or scars and thyroid normal to palpation  RESP: lungs clear to auscultation - no rales, rhonchi or wheezes  CV: regular rate and rhythm, normal S1 S2, no S3 or S4, no murmur, click or rub, no peripheral edema and peripheral pulses strong  ABDOMEN: soft, nontender, no hepatosplenomegaly, no masses and bowel sounds normal  MS: no gross musculoskeletal defects noted, no edema  SKIN: no suspicious lesions or rashes  NEURO: Normal strength and tone, mentation intact and speech normal  PSYCH: mentation appears normal, affect normal/bright    Assessment & Plan     1. Migraine without status migrainosus, not intractable, unspecified migraine type  Refilled imitrex   - SUMAtriptan (IMITREX) 25 MG tablet; Take 1-2 tablets (25-50 mg) by mouth at onset of headache for migraine May repeat in 2 hours. Max 8 tablets/24 hours.  Dispense: 9 tablet; Refill: 1    2. Tobacco abuse  Increase nicotine patch to 21 mcg as 14 mcg patch is not covering her cravings   Smoking 1 1/2 PPD   - nicotine (NICODERM CQ) 21  MG/24HR 24 hr patch; Place 1 patch onto the skin every 24 hours  Dispense: 30 patch; Refill: 1    3. Yeast infection of the vagina  On keflex   History of recurrent yeast infection   Will send RX for diflucan   - fluconazole (DIFLUCAN) 150 MG tablet; Take 1 tablet (150 mg) by mouth daily for 3 days  Dispense: 2 tablet; Refill: 0    4. Strep throat  Currently treated with keflex        Tobacco Cessation:   reports that she has been smoking cigarettes.  She has been smoking about 0.50 packs per day. She has never used smokeless tobacco.  Tobacco Cessation Action Plan: Pharmacotherapies : Nicotine patch          Patient Instructions   Refilled the imitrex   Nicotine patches sent the the pharmacy     Diflucan for yeast may repeat in 3 days           Return in about 2 weeks (around 8/12/2019), or if symptoms worsen or fail to improve.    Harmony Valdez NP  [facility]      Risks, benefits, side effects and rationale for treatment plan fully discussed with the patient and understanding expressed.

## 2019-08-08 ENCOUNTER — HOSPITAL ENCOUNTER (EMERGENCY)
Facility: CLINIC | Age: 34
Discharge: HOME | End: 2019-08-08
Attending: PHYSICIAN ASSISTANT | Admitting: PHYSICIAN ASSISTANT
Payer: COMMERCIAL

## 2019-08-08 VITALS
SYSTOLIC BLOOD PRESSURE: 143 MMHG | BODY MASS INDEX: 27.32 KG/M2 | HEIGHT: 66 IN | OXYGEN SATURATION: 98 % | WEIGHT: 170 LBS | RESPIRATION RATE: 16 BRPM | TEMPERATURE: 97.7 F | DIASTOLIC BLOOD PRESSURE: 89 MMHG

## 2019-08-08 DIAGNOSIS — M54.41 ACUTE RIGHT-SIDED LOW BACK PAIN WITH RIGHT-SIDED SCIATICA: ICD-10-CM

## 2019-08-08 DIAGNOSIS — S39.012A BACK STRAIN, INITIAL ENCOUNTER: ICD-10-CM

## 2019-08-08 PROCEDURE — 99213 OFFICE O/P EST LOW 20 MIN: CPT | Mod: Z6 | Performed by: PHYSICIAN ASSISTANT

## 2019-08-08 PROCEDURE — G0463 HOSPITAL OUTPT CLINIC VISIT: HCPCS

## 2019-08-08 RX ORDER — CYCLOBENZAPRINE HCL 10 MG
10 TABLET ORAL 3 TIMES DAILY PRN
Qty: 20 TABLET | Refills: 0 | Status: SHIPPED | OUTPATIENT
Start: 2019-08-08 | End: 2019-09-03

## 2019-08-08 ASSESSMENT — MIFFLIN-ST. JEOR: SCORE: 1502.86

## 2019-08-08 NOTE — ED AVS SNAPSHOT
[facility] Emergency Department  [address] [address]  Phone:  [phone]  Fax:  [phone]                                    Sirena Dietrich   MRN: [id number]    Department:  [facility] Emergency Department   Date of Visit:  8/8/2019           After Visit Summary Signature Page    I have received my discharge instructions, and my questions have been answered. I have discussed any challenges I see with this plan with the nurse or doctor.    ..........................................................................................................................................  Patient/Patient Representative Signature      ..........................................................................................................................................  Patient Representative Print Name and Relationship to Patient    ..................................................               ................................................  Date                                   Time    ..........................................................................................................................................  Reviewed by Signature/Title    ...................................................              ..............................................  Date                                               Time          22EPIC Rev 08/18

## 2019-08-09 ENCOUNTER — TELEPHONE (OUTPATIENT)
Dept: FAMILY MEDICINE | Facility: CLINIC | Age: 34
End: 2019-08-09

## 2019-08-09 NOTE — ED NOTES
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 Patient is ambulatory and able to change positions easily independently.
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
## 2019-08-09 NOTE — TELEPHONE ENCOUNTER
Please send copy of [facility] testing from 8/25/2016 in media tab to Psychiatry Team.   Puja JIMÉNEZ for continuity of care.  Puja Peguero, CNS    [address]    [place] [id number]    [phone] (Fax)   Medication Management (refills)   Thanks Shi [facility]

## 2019-08-10 ASSESSMENT — ENCOUNTER SYMPTOMS
CONSTITUTIONAL NEGATIVE: 1
GASTROINTESTINAL NEGATIVE: 1
BACK PAIN: 1

## 2019-08-22 ENCOUNTER — HOSPITAL ENCOUNTER (EMERGENCY)
Facility: CLINIC | Age: 34
Discharge: HOME | End: 2019-08-22
Attending: FAMILY MEDICINE | Admitting: FAMILY MEDICINE
Payer: COMMERCIAL

## 2019-08-22 VITALS
DIASTOLIC BLOOD PRESSURE: 78 MMHG | SYSTOLIC BLOOD PRESSURE: 121 MMHG | WEIGHT: 175 LBS | OXYGEN SATURATION: 99 % | BODY MASS INDEX: 28.25 KG/M2 | HEART RATE: 105 BPM | TEMPERATURE: 98 F | RESPIRATION RATE: 16 BRPM

## 2019-08-22 DIAGNOSIS — K62.89 RECTAL PAIN: ICD-10-CM

## 2019-08-22 PROCEDURE — 99284 EMERGENCY DEPT VISIT MOD MDM: CPT | Mod: Z6 | Performed by: FAMILY MEDICINE

## 2019-08-22 PROCEDURE — 99282 EMERGENCY DEPT VISIT SF MDM: CPT | Performed by: FAMILY MEDICINE

## 2019-08-22 NOTE — DISCHARGE INSTRUCTIONS
Apply a small amount of nifedipine ointment into the rectum.  Place it in as deeply as you can.  Placed an amount about equal to the size of 2 or 3 peas.  Do this twice daily.  Follow-up as planned for colonoscopy.  Let them know that they need to check for an anal fissure.  Return to be seen if pain is increasing, fevers, increased bleeding, or other new concerning symptoms.

## 2019-08-22 NOTE — ED NOTES
Pt has had rectal pain with any bowel movement - has been over 3 months of pain off and on. Denies any abdominal pain or nausea/vomiting or diarrhea.
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
## 2019-08-22 NOTE — ED AVS SNAPSHOT
[facility] Emergency Department  [address] [address]  Phone:  [phone]  Fax:  [phone]                                    Sirena Dietrich   MRN: [id number]    Department:  [facility] Emergency Department   Date of Visit:  8/22/2019           After Visit Summary Signature Page    I have received my discharge instructions, and my questions have been answered. I have discussed any challenges I see with this plan with the nurse or doctor.    ..........................................................................................................................................  Patient/Patient Representative Signature      ..........................................................................................................................................  Patient Representative Print Name and Relationship to Patient    ..................................................               ................................................  Date                                   Time    ..........................................................................................................................................  Reviewed by Signature/Title    ...................................................              ..............................................  Date                                               Time          22EPIC Rev 08/18
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
## 2019-09-03 ENCOUNTER — OFFICE VISIT (OUTPATIENT)
Dept: FAMILY MEDICINE | Facility: CLINIC | Age: 34
End: 2019-09-03

## 2019-09-03 VITALS
WEIGHT: 177.4 LBS | RESPIRATION RATE: 18 BRPM | HEART RATE: 87 BPM | DIASTOLIC BLOOD PRESSURE: 60 MMHG | OXYGEN SATURATION: 97 % | BODY MASS INDEX: 28.63 KG/M2 | SYSTOLIC BLOOD PRESSURE: 110 MMHG | TEMPERATURE: 98.1 F

## 2019-09-03 DIAGNOSIS — R11.0 NAUSEA: ICD-10-CM

## 2019-09-03 DIAGNOSIS — M54.41 ACUTE RIGHT-SIDED LOW BACK PAIN WITH RIGHT-SIDED SCIATICA: Primary | ICD-10-CM

## 2019-09-03 PROCEDURE — 99213 OFFICE O/P EST LOW 20 MIN: CPT | Performed by: NURSE PRACTITIONER

## 2019-09-03 RX ORDER — CYCLOBENZAPRINE HCL 5 MG
5-10 TABLET ORAL 3 TIMES DAILY PRN
Qty: 42 TABLET | Refills: 0 | Status: SHIPPED | OUTPATIENT
Start: 2019-09-03 | End: 2019-09-18

## 2019-09-03 RX ORDER — PROCHLORPERAZINE MALEATE 10 MG
10 TABLET ORAL EVERY 6 HOURS PRN
Qty: 30 TABLET | Refills: 0 | Status: SHIPPED | OUTPATIENT
Start: 2019-09-03 | End: 2022-01-18

## 2019-09-03 RX ORDER — METHYLPREDNISOLONE 4 MG
TABLET, DOSE PACK ORAL
Qty: 21 TABLET | Refills: 0 | Status: SHIPPED | OUTPATIENT
Start: 2019-09-03 | End: 2021-07-22

## 2019-09-03 ASSESSMENT — PAIN SCALES - GENERAL: PAINLEVEL: SEVERE PAIN (7)

## 2019-09-03 NOTE — LETTER
[facility]  [address] [facility] [address]  [phone]          September 3, 2019    Sirena Dietrich                                                                                                                     [id number] [facility] [address]        To Whom it May Concern,    Sirena was seen in clinic today 9/3/2019 and is under my general care. She will need to be off for the remainder of today due to symptoms.         Sincerely,       DIPESH Rodriguez CNP

## 2019-09-03 NOTE — PROGRESS NOTES
SUBJECTIVE   Sirena Dietrich is a  female who presents to clinic today for the following health issue(s):     Medication Followup of Compazine    Taking Medication as prescribed: yes    Side Effects:  None    Medication Helping Symptoms:  yes     Back Pain       Duration: 2 weeks        Specific cause: work-related  8/8/2019 low back injury while lifting/holding     Description:   Location of pain: low back right  Character of pain: sharp  Pain radiation:radiates into the right leg into thigh area   New numbness or weakness in legs, not attributed to pain:  no tingling last night - tingling went away by this morning     Intensity: Currently 7/10    History:   Pain interferes with job: YES  History of back problems: no prior back problems  Any previous MRI or X-rays: None  Sees a specialist for back pain:  No  Therapies tried without relief: bath, cold and heat    Alleviating factors:   Improved by: acetaminophen (Tylenol), muscle relaxants and NSAIDs      Precipitating factors:  Worsened by: Bending and Standing      Accompanying Signs & Symptoms:  Risk of Fracture:  None  Risk of Cauda Equina:  None  Risk of Infection:  None  Risk of Cancer:  None  Risk of Ankylosing Spondylitis:  Onset at age <35, male, AND morning back stiffness. no         Seen in ED ON 8/08/2019 - discharged with Flexeril    Happened when bending to hold a patient   Yesterday overdid with doing laundry otherwise pain is intermittent on/off - notes when overdoes things  Took Tylenol last night   Did have relief from muscle relaxers   Was advised by [facility] not to take NSAIDs, had recent Colonoscopy- last week. Patient reports something bleeding. No report scanned yet.         PCP   Shi Martinez, APRN [phone]    Health Maintenance        Health Maintenance Due   Topic Date Due     URINE DRUG SCREEN  02/20/2019     INFLUENZA VACCINE (1) 09/01/2019     TSH W/FREE T4 REFLEX  09/19/2019       HPI        Patient Active Problem List    Diagnosis     LGSIL on Pap Smear     Lumbago     CARDIOVASCULAR SCREENING; LDL GOAL LESS THAN 160     Pelvic pain in female     Urinary incontinence     Migraine headache     Tortuous colon     PTSD (post-traumatic stress disorder)     Severe bipolar I disorder, most recent episode mixed, with psychotic features (H)     Agoraphobia with panic disorder     S/P hysterectomy     Insomnia due to other mental disorder     MTHFR mutation (methylenetetrahydrofolate reductase) (H)     Cyst of left ovary     Herpes simplex vulvovaginitis     Methamphetamine abuse in remission (H)     Chronic abdominal pain     Other chronic pain     Chemical dependency (H)     Current Outpatient Medications   Medication     acetaminophen (TYLENOL) 500 MG tablet     amitriptyline (ELAVIL) 50 MG tablet     cyclobenzaprine (FLEXERIL) 5 MG tablet     gabapentin (NEURONTIN) 600 MG tablet     lamoTRIgine (LAMICTAL) 200 MG tablet     lidocaine (LIDODERM) 5 % patch     LORazepam (ATIVAN) 0.5 MG tablet     methylPREDNISolone (MEDROL DOSEPAK) 4 MG tablet therapy pack     nicotine (NICODERM CQ) 21 MG/24HR 24 hr patch     nifedipine 0.2% in white petrolatum 0.2 % OINT ointment     norgestimate-ethinyl estradiol (ORTHO-CYCLEN/SPRINTEC) 0.25-35 MG-MCG tablet     ondansetron (ZOFRAN) 4 MG tablet     prochlorperazine (COMPAZINE) 10 MG tablet     QUEtiapine (SEROQUEL) 25 MG tablet     QUEtiapine (SEROQUEL) 50 MG tablet     ranitidine (ZANTAC) 300 MG tablet     sertraline (ZOLOFT) 50 MG tablet     SUMAtriptan (IMITREX) 25 MG tablet     valACYclovir (VALTREX) 500 MG tablet     No current facility-administered medications for this visit.        Reviewed and updated as needed this visit by Provider:  Tobacco  Allergies  Meds  Med Hx  Surg Hx  Fam Hx  Soc Hx     ROS:  Constitutional, HEENT, cardiovascular, pulmonary, gi and gu systems are negative, except as otherwise noted.    PHYSICAL EXAM   /60   Pulse 87   Temp 98.1  F (36.7  C)   Resp 18    Wt 80.5 kg (177 lb 6.4 oz)   LMP  (LMP Unknown)   SpO2 97%   Breastfeeding? No   BMI 28.63 kg/m    Body mass index is 28.63 kg/m .  GENERAL APPEARANCE: healthy, alert and no distress  RESP: lungs clear to auscultation - no rales, rhonchi or wheezes  CV: regular rates and rhythm, normal S1 S2, no S3 or S4 and no murmur, click or rub  MS: extremities normal- no gross deformities noted and peripheral pulses normal, normal gait including tandem heel/toe  ORTHO: Lumber/Thoracic Spine Exam: Tender:  right para lumbar muscles, right sciatic notch  Non-tender:  Remainder lumbar spine non-tender  Range of Motion:  lumbar flexion  full, pain-free, lumbar extension  full, pain-free, left lateral lumbar bending  full, pain-free, right lateral lumbar bending  full, pain-free, left lateral lumbar rotation  full, pain-free, right lateral lumbar rotation  full, pain-free  Strength:  able to heel walk, able to toe walk, gastrocsoleus   5/5, hamstrings  5/5, quadriceps  5/5, tibialis anterior  5/5, peroneals  5/5  Special tests:  negative straight leg raises  SKIN: no suspicious lesions or rashes  NEURO: Normal strength and tone, mentation intact and speech normal      Diagnostic Test Results:  none     ASSESSMENT & PLAN   Assessment & Plan     1. Acute right-sided low back pain with right-sided sciatica  Acute, 3 weeks of intermittent symptoms since incident of lifting/holding patient at work. Flexeril was helpful and Tylenol. Right sided paraspinal lumbar muscle tenderness, likely flare of strain. Discussed course of physical therapy to help strengthen, medrol dose pack as patient is unable to take ibuprofen at this time and flexeril. Other cares such as ice and stretching stressed. Patient agreeable and advised to return to clinic if symptoms not improving after physical therapy.   - PHYSICAL THERAPY REFERRAL; Future  - methylPREDNISolone (MEDROL DOSEPAK) 4 MG tablet therapy pack; Follow package instructions  Dispense: 21  "tablet; Refill: 0  - cyclobenzaprine (FLEXERIL) 5 MG tablet; Take 1-2 tablets (5-10 mg) by mouth 3 times daily as needed for muscle spasms  Dispense: 42 tablet; Refill: 0    2. Nausea  Chronic, no change.   - prochlorperazine (COMPAZINE) 10 MG tablet; Take 1 tablet (10 mg) by mouth every 6 hours as needed for nausea or vomiting  Dispense: 30 tablet; Refill: 0     Tobacco Cessation:   reports that she has been smoking cigarettes.  She has been smoking about 0.50 packs per day. She has never used smokeless tobacco.  Tobacco Cessation Action Plan: Information offered: Patient not interested at this time      BMI:   Estimated body mass index is 28.63 kg/m  as calculated from the following:    Height as of 8/8/19: 1.676 m (5' 6\").    Weight as of this encounter: 80.5 kg (177 lb 6.4 oz).   Weight management plan: Patient was referred to their PCP to discuss a diet and exercise plan.        Patient Instructions   Start physical therapy for your back - we need to strengthen the core     Start Medrol dose alfredo - follow package directions for inflammation    Okay to take Tylenol as well     Flexeril three times daily as needed for spasms    No Ibuprofen until results of Colonoscopy come in     Use ice to area as often as able - 20 minutes on/20 minutes off    Seeing as you are not taking full prescribed dose of Neurontin - would encourage you to go up to 1200 mg at night and stay at the 600 mg in the morning, if needing to can still go up to 1200 mg in the am as well after a few days     Use good body mechanics at work     Could use a back brace while working/lifting     If things not improved after course of physical therapy - recheck in office       Return in about 1 month (around 10/3/2019), or if symptoms worsen or fail to improve.    DIPESH [facility]        Risks, benefits, side effects and rationale for treatment plan fully discussed with the patient and understanding " expressed.

## 2019-09-03 NOTE — PATIENT INSTRUCTIONS
Start physical therapy for your back - we need to strengthen the core     Start Medrol dose alfredo - follow package directions for inflammation    Okay to take Tylenol as well     Flexeril three times daily as needed for spasms    No Ibuprofen until results of Colonoscopy come in     Use ice to area as often as able - 20 minutes on/20 minutes off    Seeing as you are not taking full prescribed dose of Neurontin - would encourage you to go up to 1200 mg at night and stay at the 600 mg in the morning, if needing to can still go up to 1200 mg in the am as well after a few days     Use good body mechanics at work     Could use a back brace while working/lifting     If things not improved after course of physical therapy - recheck in office

## 2019-09-18 ENCOUNTER — OFFICE VISIT (OUTPATIENT)
Dept: FAMILY MEDICINE | Facility: CLINIC | Age: 34
End: 2019-09-18
Payer: COMMERCIAL

## 2019-09-18 VITALS
RESPIRATION RATE: 14 BRPM | SYSTOLIC BLOOD PRESSURE: 118 MMHG | OXYGEN SATURATION: 99 % | WEIGHT: 176 LBS | DIASTOLIC BLOOD PRESSURE: 62 MMHG | BODY MASS INDEX: 28.41 KG/M2 | HEART RATE: 90 BPM

## 2019-09-18 DIAGNOSIS — M54.41 ACUTE RIGHT-SIDED LOW BACK PAIN WITH RIGHT-SIDED SCIATICA: ICD-10-CM

## 2019-09-18 DIAGNOSIS — K60.2 ANAL FISSURE: ICD-10-CM

## 2019-09-18 DIAGNOSIS — K29.00 OTHER ACUTE GASTRITIS WITHOUT HEMORRHAGE: Primary | ICD-10-CM

## 2019-09-18 DIAGNOSIS — K64.9 HEMORRHOIDS, UNSPECIFIED HEMORRHOID TYPE: ICD-10-CM

## 2019-09-18 DIAGNOSIS — G43.909 MIGRAINE WITHOUT STATUS MIGRAINOSUS, NOT INTRACTABLE, UNSPECIFIED MIGRAINE TYPE: Chronic | ICD-10-CM

## 2019-09-18 PROCEDURE — 99214 OFFICE O/P EST MOD 30 MIN: CPT | Performed by: NURSE PRACTITIONER

## 2019-09-18 RX ORDER — CYCLOBENZAPRINE HCL 5 MG
5-10 TABLET ORAL 3 TIMES DAILY PRN
Qty: 42 TABLET | Refills: 2 | Status: SHIPPED | OUTPATIENT
Start: 2019-09-18 | End: 2023-02-09

## 2019-09-18 RX ORDER — SUMATRIPTAN 25 MG/1
25-50 TABLET, FILM COATED ORAL
Qty: 9 TABLET | Refills: 5 | Status: SHIPPED | OUTPATIENT
Start: 2019-09-18 | End: 2022-01-18

## 2019-09-18 RX ORDER — POLYETHYLENE GLYCOL 3350 17 G/17G
1 POWDER, FOR SOLUTION ORAL DAILY
Qty: 850 G | Refills: 11 | Status: SHIPPED | OUTPATIENT
Start: 2019-09-18 | End: 2021-07-22

## 2019-09-18 ASSESSMENT — PAIN SCALES - GENERAL: PAINLEVEL: NO PAIN (0)

## 2019-09-18 NOTE — PROGRESS NOTES
Subjective     Sirena Dietrich is a 33 year old female who presents to clinic today for the following health issues:    HPI   MED REFILLS      (K29.00) Other acute gastritis without hemorrhage  (primary encounter diagnosis)    Comment: Omeprazole helpful                      (K60.2) Anal fissure    Comment: Chronic and ongoing            Needs refills of her MiraLAX today        (K64.9) Hemorrhoids, unspecified hemorrhoid type    Comment: Chronic and ongoing            Needs refills on her MiraLAX today        (G43.909) Migraine without status migrainosus, not intractable, unspecified migraine type    Comment: Intermittent in nature            Imitrex very helpful for her migraines.  Desires refill today        (M54.41) Acute right-sided low back pain with right-sided sciatica    Comment: Intermittent muscle spasms with sciatica              Medication refills are needed today    Flexeril helpful         -------------------------------------    Patient Active Problem List   Diagnosis     LGSIL on Pap Smear     Lumbago     CARDIOVASCULAR SCREENING; LDL GOAL LESS THAN 160     Pelvic pain in female     Urinary incontinence     Migraine headache     Tortuous colon     PTSD (post-traumatic stress disorder)     Severe bipolar I disorder, most recent episode mixed, with psychotic features (H)     Agoraphobia with panic disorder     S/P hysterectomy     Insomnia due to other mental disorder     MTHFR mutation (methylenetetrahydrofolate reductase) (H)     Cyst of left ovary     Herpes simplex vulvovaginitis     Methamphetamine abuse in remission (H)     Chronic abdominal pain     Other chronic pain     Chemical dependency (H)     Past Surgical History:   Procedure Laterality Date     ANESTHESIA OUT OF OR MRI N/A 1/12/2015    Procedure: ANESTHESIA OUT OF OR MRI;  Surgeon: Generic Anesthesia Provider;  Location: [facility]     C INDUCED ABORTN BY D&C  2007     C INDUCED ABORTN BY D&C  3/2009     C INDUCED ABORTN BY D&C  10/2008      CYSTOSCOPY       CYSTOSCOPY N/A 12/3/2014    Procedure: CYSTOSCOPY;  Surgeon: Caroline Grossman MD;  Location: [facility]     CYSTOSCOPY N/A 2018    Procedure: CYSTOSCOPY;;  Surgeon: Debi Luis MD;  Location: [facility]     DILATION AND CURETTAGE N/A 2015    Procedure: DILATION AND CURETTAGE;  Surgeon: Caroline Grossman MD;  Location: [facility]     ESOPHAGOSCOPY, GASTROSCOPY, DUODENOSCOPY (EGD), COMBINED N/A 2016    Procedure: COMBINED ESOPHAGOSCOPY, GASTROSCOPY, DUODENOSCOPY (EGD);  Surgeon: Tommie Clancy MD;  Location: [facility]     EXCISE LESION PERINEAL  2014    Procedure: EXCISE LESION PERINEAL;;  Surgeon: Lisa Helton MD;  [facility]     HYSTERECTOMY, PAP NO LONGER INDICATED       LAPAROSCOPIC HYSTERECTOMY TOTAL N/A 12/3/2014    Procedure: LAPAROSCOPIC HYSTERECTOMY TOTAL;  Surgeon: Caroline Grossman MD;  Location: [facility]     LAPAROSCOPIC SALPINGECTOMY  2014    Procedure: LAPAROSCOPIC SALPINGECTOMY;  Laparoscopic Bilateral Salpingectomy, Removal Of Perineal Skin Tag;  Surgeon: Lisa Helton MD;  [facility]     LAPAROSCOPIC SALPINGO-OOPHORECTOMY N/A 2018    Procedure: LAPAROSCOPIC SALPINGO-OOPHORECTOMY;  Diagnostic Laparoscopy. Left Salpingo-Oopherectomy. lysis of adhesions, cystoscopy;  Surgeon: Debi Luis MD;  Location: [facility]     LAPAROSCOPY DIAGNOSTIC (GYN)  10/16/2012    Procedure: LAPAROSCOPY DIAGNOSTIC (GYN);  Diagnostic Laparoscopy, Excision of Bilateral Paratubal Cysts;  Surgeon: La Guzmán MD;  Location: [facility]     TONSILLECTOMY         Social History     Tobacco Use     Smoking status: Current Some Day Smoker     Packs/day: 0.50     Types: Cigarettes     Last attempt to quit: 2018     Years since quittin.3     Smokeless tobacco: Never Used   Substance Use Topics     Alcohol use: Yes     Comment: rare      Family History   Problem Relation Age of Onset     Alcohol/Drug Mother         drugs      Depression Mother      Heart Disease Mother         ?     Alcohol/Drug Father         drugs     Depression Father      Hypertension Maternal Grandmother      Cancer Maternal Grandmother         cervical     Depression Maternal Grandmother      Heart Disease Maternal Grandmother      Other - See Comments Maternal Grandmother         ovaries removed for cancer cells     Hypertension Brother      Bipolar Disorder Other      Bipolar Disorder Other      Crohn's Disease Paternal Grandmother      LUNG DISEASE Paternal Grandmother         breathing problems     Heart Disease Other         stents, clogged arteries           -------------------------------------  Reviewed and updated as needed this visit by Provider         Review of Systems   ROS COMP: Constitutional, HEENT, cardiovascular, pulmonary, GI, , musculoskeletal, neuro, skin, endocrine and psych systems are negative, except as otherwise noted.      Objective    /62   Pulse 90   Resp 14   Wt 79.8 kg (176 lb)   LMP  (LMP Unknown)   SpO2 99%   BMI 28.41 kg/m    Body mass index is 28.41 kg/m .  Physical Exam   GENERAL: healthy, alert and no distress  EYES: Eyes grossly normal to inspection, PERRL and conjunctivae and sclerae normal  HENT: ear canals and TM's normal, nose and mouth without ulcers or lesions  NECK: no adenopathy, no asymmetry, masses, or scars and thyroid normal to palpation  RESP: lungs clear to auscultation - no rales, rhonchi or wheezes  CV: regular rate and rhythm, normal S1 S2, no S3 or S4, no murmur, click or rub, no peripheral edema and peripheral pulses strong  ABDOMEN: soft, nontender, no hepatosplenomegaly, no masses and bowel sounds normal  MS: no gross musculoskeletal defects noted, no edema  SKIN: no suspicious lesions or rashes  NEURO: Normal strength and tone, mentation intact and speech normal  PSYCH: mentation appears normal, affect normal/bright    Diagnostic Test Results:  Results for orders placed or performed in visit  "on 07/15/19   Wet prep   Result Value Ref Range    Specimen Description Vagina     Wet Prep No Trichomonas seen     Wet Prep No clue cells seen     Wet Prep No yeast seen     Wet Prep Few  WBC'S seen        *Note: Due to a large number of results and/or encounters for the requested time period, some results have not been displayed. A complete set of results can be found in Results Review.           Assessment & Plan   Problem List Items Addressed This Visit        Nervous and Auditory    Migraine headache (Chronic)    Relevant Medications    SUMAtriptan (IMITREX) 25 MG tablet    cyclobenzaprine (FLEXERIL) 5 MG tablet    sertraline (ZOLOFT) 50 MG tablet    Lumbago    Relevant Medications    cyclobenzaprine (FLEXERIL) 5 MG tablet      Other Visit Diagnoses     Other acute gastritis without hemorrhage    -  Primary    Relevant Medications    omeprazole (PRILOSEC) 20 MG DR capsule    Anal fissure        Relevant Medications    polyethylene glycol (MIRALAX/GLYCOLAX) powder    Hemorrhoids, unspecified hemorrhoid type        Relevant Medications    polyethylene glycol (MIRALAX/GLYCOLAX) powder           Medications refilled today   Colorectal surgical consult with ongoing problems with hemorrhoids and anal fissures   For the back pain continue home physical therapy program.  Topical lidocaine.  Topical Aspercreme.  Flexeril at at bedtime as needed  Ongoing GERD-like symptoms.  Yearly EGD with long-term use of PPIs.  Continue omeprazole     tobacco Cessation:   reports that she has been smoking cigarettes.  She has been smoking about 0.50 packs per day. She has never used smokeless tobacco.  Tobacco Cessation Action Plan: Information offered: Patient not interested at this time      BMI:   Estimated body mass index is 28.41 kg/m  as calculated from the following:    Height as of 8/8/19: 1.676 m (5' 6\").    Weight as of this encounter: 79.8 kg (176 lb).   Weight management plan: Discussed healthy diet and exercise " guidelines        Call or return to the clinic with any worsening of symptoms or no resolution. Patient/Parent verbalized understanding and is in agreement. Medication side effects reviewed.   Current Outpatient Medications   Medication Sig Dispense Refill     acetaminophen (TYLENOL) 500 MG tablet Take 2 tablets (1,000 mg) by mouth every 4 hours as needed for other (mild pain) 100 tablet 1     amitriptyline (ELAVIL) 50 MG tablet Take 1 tablet (50 mg) by mouth At Bedtime 90 tablet 1     cyclobenzaprine (FLEXERIL) 5 MG tablet Take 1-2 tablets (5-10 mg) by mouth 3 times daily as needed for muscle spasms 42 tablet 2     gabapentin (NEURONTIN) 600 MG tablet Take 2 tablets (1,200 mg) by mouth 2 times daily 120 tablet 1     lamoTRIgine (LAMICTAL) 200 MG tablet Take 1 tablet (200 mg) by mouth daily 30 tablet 2     lidocaine (LIDODERM) 5 % patch Place 1 patch onto the skin every 24 hours 30 patch 3     LORazepam (ATIVAN) 0.5 MG tablet   0     methylPREDNISolone (MEDROL DOSEPAK) 4 MG tablet therapy pack Follow package instructions 21 tablet 0     nicotine (NICODERM CQ) 21 MG/24HR 24 hr patch Place 1 patch onto the skin every 24 hours 30 patch 1     nifedipine 0.2% in white petrolatum 0.2 % OINT ointment Place 1 g rectally 2 times daily 100 g 0     norgestimate-ethinyl estradiol (ORTHO-CYCLEN/SPRINTEC) 0.25-35 MG-MCG tablet Take 1 tablet by mouth daily Use continuously skip placebo pills 84 tablet 3     omeprazole (PRILOSEC) 20 MG DR capsule Take 1 capsule (20 mg) by mouth daily 31 capsule 2     ondansetron (ZOFRAN) 4 MG tablet Take 1 tablet (4 mg) by mouth every 8 hours as needed for nausea 20 tablet 3     polyethylene glycol (MIRALAX/GLYCOLAX) powder Take 17 g (1 capful) by mouth daily 850 g 11     prochlorperazine (COMPAZINE) 10 MG tablet Take 1 tablet (10 mg) by mouth every 6 hours as needed for nausea or vomiting 30 tablet 0     QUEtiapine (SEROQUEL) 25 MG tablet Take 1 tablet (25 mg) by mouth 2 times daily 60 tablet 1      QUEtiapine (SEROQUEL) 50 MG tablet Take 2-3 tablets (100-150 mg) by mouth At Bedtime Up to 150 mg daily 90 tablet 1     ranitidine (ZANTAC) 300 MG tablet Take 1 tablet (300 mg) by mouth At Bedtime 30 tablet 2     sertraline (ZOLOFT) 50 MG tablet 1 tablet at bedtime 90 tablet 0     SUMAtriptan (IMITREX) 25 MG tablet Take 1-2 tablets (25-50 mg) by mouth at onset of headache for migraine May repeat in 2 hours. Max 8 tablets/24 hours. 9 tablet 5     valACYclovir (VALTREX) 500 MG tablet Take 1 tablet (500 mg) by mouth daily 31 tablet 11     Chart documentation with Dragon Voice recognition Software. Although reviewed after completion, some words and grammatical errors may remain.    See Patient Instructions  Return in about 6 months (around 3/18/2020) for As needed for pain.    DIPESH Marino [facility]

## 2019-09-18 NOTE — PATIENT INSTRUCTIONS
Patient Education     Possible Causes of Low Back or Leg Pain    BIG: The symptoms in your back or leg may be due to pressure on a nerve. This pressure may be caused by a damaged disk or by abnormal bone growth. Either way, you may feel pain, burning, tingling, or numbness. If you have pressure on a nerve that connects to the sciatic nerve, pain may shoot down your leg.    Pressure from the disk  Constant wear and tear can weaken a disk over time and cause back pain. The disk can then be damaged by a sudden movement or injury. If its soft center starts to bulge, the disk may press on a nerve. Or the outside of the disk may tear, and the soft center may squeeze through and pinch a nerve.    Pressure from bone  As a disk wears out, the vertebrae right above and below the disk start to touch. This can put pressure on a nerve. Often, abnormal bone (called bone spurs) grows where the vertebrae rub against each other. This can cause the foramen or the spinal canal to narrow (called stenosis) and press against a nerve.  Date Last Reviewed: 3/1/2018    6890-2693 [employer]. [address] [place] [address]. All rights reserved. This information is not intended as a substitute for professional medical care. Always follow your healthcare professional's instructions.           Patient Education     Caring for Your Back Throughout the Day    Take care of your back throughout the day. You will likely have fewer back problems if you do. Try to warm up before you move. Shift positions often. Also do your best to form healthy habits.  Warm up for the day  Do a few slow, catlike stretches before starting your day. This simple warmup can soften your disks, stretch your back muscles, and help prevent injuries.  Shift positions often  At work and at home, change positions often. This helps keep your body from getting stiff. Stand up or lean back while you sit. If you can, get up and move every 1/2 hour.  Form  healthy habits  Here are some suggestions:     Keep a healthy weight. When you weigh too much, your back is under excess strain. But losing just a few extra pounds can help a lot.    Try not to overeat. Learn about serving sizes. The size of a serving depends on the food and the food group. Many foods list serving sizes on the labels.    Handle minor aches with cold and heat. Apply cold the first 24 to 48 hours. Use heat after that. Always place a thin cloth between your skin and the source of cold or heat.    Take medicines as directed. This helps keep pain under control. Always read labels, and call your healthcare provider or pharmacist if you have any questions.  Walk each day  A daily walk keeps your back and thigh muscles stretched and strong. This gives your back better support. Be sure to walk with your spine s three curves aligned, by keeping your head, hips, and toes connected by a vertical line.  Date Last Reviewed: 5/1/2018 2000-2018 [employer]. [address] [place] [address]. All rights reserved. This information is not intended as a substitute for professional medical care. Always follow your healthcare professional's instructions.           Patient Education     How Your Back Works  A healthy back lets you bend and stretch without pain. The spine has 3 natural curves. These keep your body balanced. Strong, flexible muscles support your spine. Soft, cushioning disks separate the hard bones of your spine. The disks let your spine bend and move.    The parts of the spine    The vertebrae are the 24 bones that make up the spine.    The spinous process is the part of each vertebra you can feel through your skin.    Each of these bones has a central hole that runs top to bottom. Together these holes form a tunnel called the spinal canal.    The lamina of each vertebra forms the back of the spinal canal.    Running through the canal are nerves. They are attached in a bundle  called the spinal cord for most of the canal.    A foramen is a small opening where a spinal nerve root leaves the spinal canal.    Disks serve as cushions between vertebrae. A disk s soft center absorbs shock during movement.     Two vertebrae and a disk     The supporting muscles  Strong, flexible muscles help maintain your 3 natural curves. They hold your spine in correct alignment. This helps support your upper body. Strong core muscles help take the strain off your back. These include the stomach, buttock, and thigh muscles.  Date Last Reviewed: 1/1/2018 2000-2018 [employer]. [address] [place] [address]. All rights reserved. This information is not intended as a substitute for professional medical care. Always follow your healthcare professional's instructions.

## 2019-09-19 ASSESSMENT — ANXIETY QUESTIONNAIRES
GAD7 TOTAL SCORE: 10
1. FEELING NERVOUS, ANXIOUS, OR ON EDGE: MORE THAN HALF THE DAYS
3. WORRYING TOO MUCH ABOUT DIFFERENT THINGS: SEVERAL DAYS
6. BECOMING EASILY ANNOYED OR IRRITABLE: SEVERAL DAYS
2. NOT BEING ABLE TO STOP OR CONTROL WORRYING: MORE THAN HALF THE DAYS
5. BEING SO RESTLESS THAT IT IS HARD TO SIT STILL: MORE THAN HALF THE DAYS
7. FEELING AFRAID AS IF SOMETHING AWFUL MIGHT HAPPEN: SEVERAL DAYS
IF YOU CHECKED OFF ANY PROBLEMS ON THIS QUESTIONNAIRE, HOW DIFFICULT HAVE THESE PROBLEMS MADE IT FOR YOU TO DO YOUR WORK, TAKE CARE OF THINGS AT HOME, OR GET ALONG WITH OTHER PEOPLE: NOT DIFFICULT AT ALL

## 2019-09-19 ASSESSMENT — PATIENT HEALTH QUESTIONNAIRE - PHQ9
5. POOR APPETITE OR OVEREATING: SEVERAL DAYS
SUM OF ALL RESPONSES TO PHQ QUESTIONS 1-9: 12

## 2019-09-20 ASSESSMENT — ANXIETY QUESTIONNAIRES: GAD7 TOTAL SCORE: 10

## 2019-10-01 ENCOUNTER — TELEPHONE (OUTPATIENT)
Dept: FAMILY MEDICINE | Facility: CLINIC | Age: 34
End: 2019-10-01

## 2019-10-01 DIAGNOSIS — F43.10 PTSD (POST-TRAUMATIC STRESS DISORDER): Primary | Chronic | ICD-10-CM

## 2019-10-01 NOTE — TELEPHONE ENCOUNTER
I can refill the sertraline today.   If we need to increase the dose I would like her to make an appointment to be seen.  Thanks Shi RODRIGUEZ-BC

## 2019-10-16 DIAGNOSIS — F31.64 SEVERE BIPOLAR I DISORDER, MOST RECENT EPISODE MIXED, WITH PSYCHOTIC FEATURES (H): ICD-10-CM

## 2019-10-16 RX ORDER — QUETIAPINE FUMARATE 25 MG/1
TABLET, FILM COATED ORAL
Qty: 60 TABLET | Refills: 1 | Status: SHIPPED | OUTPATIENT
Start: 2019-10-16

## 2019-10-16 RX ORDER — QUETIAPINE FUMARATE 50 MG/1
TABLET, FILM COATED ORAL
Qty: 120 TABLET | Refills: 0 | Status: SHIPPED | OUTPATIENT
Start: 2019-10-16 | End: 2024-02-21

## 2019-10-16 NOTE — TELEPHONE ENCOUNTER
Sirena states she won't see Puja Peguero for another 2 weeks in her new clinic so she will need a refill please. Thank you!    Delmis Machado RN

## 2019-10-16 NOTE — TELEPHONE ENCOUNTER
**This refill requires provider completion and is not appropriate for RN review per RN refill guidelines.**  PH-Q9 needs to be less than 5 to approve medication on RN Refill protocol pt's score is 12.  Delmis Machado RN

## 2019-10-16 NOTE — TELEPHONE ENCOUNTER
"Requested Prescriptions   Pending Prescriptions Disp Refills     QUEtiapine (SEROQUEL) 50 MG tablet [Pharmacy Med Name: QUETIAPINE FUMARATE 50 MG TABLET] 120 tablet      Sig: TAKE 1-4 tablets BY MOUTH EVERY NIGHT AT BEDTIME AS NEEDED       Antipsychotic Medications Failed - 10/16/2019  9:48 AM        Failed - Lipid panel on file within the past 12 months     Recent Labs   Lab Test 12/06/16  0852   CHOL 223*   TRIG 532*   HDL 38*   LDL Cannot estimate LDL when triglyceride exceeds 400 mg/dL   NHDL 185*               Passed - Blood pressure under 140/90 in past 12 months     BP Readings from Last 3 Encounters:   09/18/19 118/62   09/03/19 110/60   08/22/19 121/78                 Passed - Patient is 12 years of age or older        Passed - CBC on file in past 12 months     Recent Labs   Lab Test 06/13/19  1703   WBC 8.7   RBC 4.64   HGB 14.0   HCT 42.4                    Passed - Heart Rate on file within past 12 months     Pulse Readings from Last 3 Encounters:   09/18/19 90   09/03/19 87   08/22/19 105               Passed - A1c or Glucose on file in past 12 months     Recent Labs   Lab Test 06/13/19  1703   GLC 89       Please review patients last 3 weights. If a weight gain of >10 lbs exists, you may refill the prescription once after instructing the patient to schedule an appointment within the next 30 days.    Wt Readings from Last 3 Encounters:   09/18/19 79.8 kg (176 lb)   09/03/19 80.5 kg (177 lb 6.4 oz)   08/22/19 79.4 kg (175 lb)             Passed - Medication is active on med list        Passed - Patient is not pregnant        Passed - No positve pregnancy test on file in past 12 months        Passed - Recent (6 mo) or future (30 days) visit within the authorizing provider's specialty     Patient had office visit in the last 6 months or has a visit in the next 30 days with authorizing provider or within the authorizing provider's specialty.  See \"Patient Info\" tab in inbasket, or \"Choose Columns\" in " Meds & Orders section of the refill encounter.      Last Written Prescription Date:  1/25/19  Last Fill Quantity: 90,  # refills: 1   Last office visit: 9/18/2019 with prescribing provider:     Future Office Visit:              QUEtiapine (SEROQUEL) 25 MG tablet [Pharmacy Med Name: QUETIAPINE FUMARATE 25 MG TABLET] 60 tablet 1     Sig: TAKE 1 TABLET BY MOUTH TWICE DAILY IF NEEDED       Antipsychotic Medications Failed - 10/16/2019  9:48 AM        Failed - Lipid panel on file within the past 12 months     Recent Labs   Lab Test 12/06/16  0852   CHOL 223*   TRIG 532*   HDL 38*   LDL Cannot estimate LDL when triglyceride exceeds 400 mg/dL   NHDL 185*               Passed - Blood pressure under 140/90 in past 12 months     BP Readings from Last 3 Encounters:   09/18/19 118/62   09/03/19 110/60   08/22/19 121/78                 Passed - Patient is 12 years of age or older        Passed - CBC on file in past 12 months     Recent Labs   Lab Test 06/13/19  1703   WBC 8.7   RBC 4.64   HGB 14.0   HCT 42.4                    Passed - Heart Rate on file within past 12 months     Pulse Readings from Last 3 Encounters:   09/18/19 90   09/03/19 87   08/22/19 105               Passed - A1c or Glucose on file in past 12 months     Recent Labs   Lab Test 06/13/19  1703   GLC 89       Please review patients last 3 weights. If a weight gain of >10 lbs exists, you may refill the prescription once after instructing the patient to schedule an appointment within the next 30 days.    Wt Readings from Last 3 Encounters:   09/18/19 79.8 kg (176 lb)   09/03/19 80.5 kg (177 lb 6.4 oz)   08/22/19 79.4 kg (175 lb)             Passed - Medication is active on med list        Passed - Patient is not pregnant        Passed - No positve pregnancy test on file in past 12 months        Passed - Recent (6 mo) or future (30 days) visit within the authorizing provider's specialty     Patient had office visit in the last 6 months or has a visit in  "the next 30 days with authorizing provider or within the authorizing provider's specialty.  See \"Patient Info\" tab in inbasket, or \"Choose Columns\" in Meds & Orders section of the refill encounter.      Last Written Prescription Date:  1/25/19  Last Fill Quantity: 60,  # refills: 1   Last office visit: 9/18/2019 with prescribing provider:     Future Office Visit:                "

## 2019-10-16 NOTE — TELEPHONE ENCOUNTER
Please call Sirena and find out if she needs us to fill these  She has a MH provider Puja Peguero who typically manages this.     Thanks Shi Martinez P-BC

## 2019-11-03 ENCOUNTER — HEALTH MAINTENANCE LETTER (OUTPATIENT)
Age: 34
End: 2019-11-03

## 2020-01-11 ENCOUNTER — OFFICE VISIT (OUTPATIENT)
Dept: URGENT CARE | Facility: URGENT CARE | Age: 35
End: 2020-01-11
Payer: COMMERCIAL

## 2020-01-11 ENCOUNTER — ANCILLARY PROCEDURE (OUTPATIENT)
Dept: GENERAL RADIOLOGY | Facility: CLINIC | Age: 35
End: 2020-01-11
Attending: NURSE PRACTITIONER
Payer: COMMERCIAL

## 2020-01-11 VITALS
WEIGHT: 194.5 LBS | HEIGHT: 66 IN | DIASTOLIC BLOOD PRESSURE: 70 MMHG | BODY MASS INDEX: 31.26 KG/M2 | TEMPERATURE: 97 F | SYSTOLIC BLOOD PRESSURE: 124 MMHG | HEART RATE: 99 BPM | OXYGEN SATURATION: 97 % | RESPIRATION RATE: 16 BRPM

## 2020-01-11 DIAGNOSIS — R06.02 SOB (SHORTNESS OF BREATH): ICD-10-CM

## 2020-01-11 DIAGNOSIS — R05.9 COUGH: ICD-10-CM

## 2020-01-11 DIAGNOSIS — J20.9 ACUTE BRONCHITIS WITH SYMPTOMS > 10 DAYS: Primary | ICD-10-CM

## 2020-01-11 DIAGNOSIS — R07.89 CHEST DISCOMFORT: ICD-10-CM

## 2020-01-11 DIAGNOSIS — Z87.01 HISTORY OF PNEUMONIA: ICD-10-CM

## 2020-01-11 PROCEDURE — 99214 OFFICE O/P EST MOD 30 MIN: CPT | Performed by: NURSE PRACTITIONER

## 2020-01-11 PROCEDURE — 71046 X-RAY EXAM CHEST 2 VIEWS: CPT

## 2020-01-11 RX ORDER — PREDNISONE 20 MG/1
40 TABLET ORAL DAILY
Qty: 10 TABLET | Refills: 0 | Status: SHIPPED | OUTPATIENT
Start: 2020-01-11 | End: 2020-01-16

## 2020-01-11 RX ORDER — BENZONATATE 200 MG/1
200 CAPSULE ORAL 3 TIMES DAILY PRN
Qty: 30 CAPSULE | Refills: 0 | Status: SHIPPED | OUTPATIENT
Start: 2020-01-11 | End: 2021-07-22

## 2020-01-11 RX ORDER — ALBUTEROL SULFATE 90 UG/1
2 AEROSOL, METERED RESPIRATORY (INHALATION) EVERY 6 HOURS PRN
Qty: 1 INHALER | Refills: 0 | Status: SHIPPED | OUTPATIENT
Start: 2020-01-11 | End: 2023-02-09

## 2020-01-11 ASSESSMENT — MIFFLIN-ST. JEOR: SCORE: 1609

## 2020-01-11 NOTE — PROGRESS NOTES
Subjective     Sirena Dietrich is a 34 year old female who presents to clinic today for the following health issues:    HPI     Chief Complaint   Patient presents with     URI     1.5 weeks coughing and chest pain constant hard to breath and goes into her back had pneumonia a few weeks ago, ear pain, sinus pain.          Patient Active Problem List   Diagnosis     LGSIL on Pap Smear     Lumbago     CARDIOVASCULAR SCREENING; LDL GOAL LESS THAN 160     Pelvic pain in female     Urinary incontinence     Migraine headache     Tortuous colon     PTSD (post-traumatic stress disorder)     Severe bipolar I disorder, most recent episode mixed, with psychotic features (H)     Agoraphobia with panic disorder     S/P hysterectomy     Insomnia due to other mental disorder     MTHFR mutation (methylenetetrahydrofolate reductase) (H)     Cyst of left ovary     Herpes simplex vulvovaginitis     Methamphetamine abuse in remission (H)     Chronic abdominal pain     Other chronic pain     Chemical dependency (H)     Past Surgical History:   Procedure Laterality Date     ANESTHESIA OUT OF OR MRI N/A 1/12/2015    Procedure: ANESTHESIA OUT OF OR MRI;  Surgeon: Generic Anesthesia Provider;  Location: [facility]     C INDUCED ABORTN BY D&C  2007     C INDUCED ABORTN BY D&C  3/2009     C INDUCED ABORTN BY D&C  10/2008     CYSTOSCOPY       CYSTOSCOPY N/A 12/3/2014    Procedure: CYSTOSCOPY;  Surgeon: Caroline Grossman MD;  Location: [facility]     CYSTOSCOPY N/A 9/21/2018    Procedure: CYSTOSCOPY;;  Surgeon: Debi Luis MD;  Location: [facility]     DILATION AND CURETTAGE N/A 1/5/2015    Procedure: DILATION AND CURETTAGE;  Surgeon: Caroline Grossman MD;  Location: [facility]     ESOPHAGOSCOPY, GASTROSCOPY, DUODENOSCOPY (EGD), COMBINED N/A 8/25/2016    Procedure: COMBINED ESOPHAGOSCOPY, GASTROSCOPY, DUODENOSCOPY (EGD);  Surgeon: Tommie Clancy MD;  Location: [facility]     EXCISE LESION PERINEAL  4/9/2014    Procedure: EXCISE LESION PERINEAL;;   Surgeon: Lisa Helton MD;  [facility]     HYSTERECTOMY, PAP NO LONGER INDICATED       LAPAROSCOPIC HYSTERECTOMY TOTAL N/A 12/3/2014    Procedure: LAPAROSCOPIC HYSTERECTOMY TOTAL;  Surgeon: Caroline Grossman MD;  [facility]     LAPAROSCOPIC SALPINGECTOMY  2014    Procedure: LAPAROSCOPIC SALPINGECTOMY;  Laparoscopic Bilateral Salpingectomy, Removal Of Perineal Skin Tag;  Surgeon: Lisa Helton MD;  [facility]     LAPAROSCOPIC SALPINGO-OOPHORECTOMY N/A 2018    Procedure: LAPAROSCOPIC SALPINGO-OOPHORECTOMY;  Diagnostic Laparoscopy. Left Salpingo-Oopherectomy. lysis of adhesions, cystoscopy;  Surgeon: Debi Luis MD;  [facility]     LAPAROSCOPY DIAGNOSTIC (GYN)  10/16/2012    Procedure: LAPAROSCOPY DIAGNOSTIC (GYN);  Diagnostic Laparoscopy, Excision of Bilateral Paratubal Cysts;  Surgeon: La Guzmán MD;  Location: [facility]     TONSILLECTOMY         Social History     Tobacco Use     Smoking status: Current Some Day Smoker     Packs/day: 0.50     Types: Cigarettes     Last attempt to quit: 2018     Years since quittin.6     Smokeless tobacco: Never Used   Substance Use Topics     Alcohol use: Yes     Comment: rare      Family History   Problem Relation Age of Onset     Alcohol/Drug Mother         drugs     Depression Mother      Heart Disease Mother         ?     Alcohol/Drug Father         drugs     Depression Father      Hypertension Maternal Grandmother      Cancer Maternal Grandmother         cervical     Depression Maternal Grandmother      Heart Disease Maternal Grandmother      Other - See Comments Maternal Grandmother         ovaries removed for cancer cells     Hypertension Brother      Bipolar Disorder Other      Bipolar Disorder Other      Crohn's Disease Paternal Grandmother      LUNG DISEASE Paternal Grandmother         breathing problems     Heart Disease Other         stents, clogged arteries         Current  Outpatient Medications   Medication Sig Dispense Refill     albuterol (PROAIR HFA/PROVENTIL HFA/VENTOLIN HFA) 108 (90 Base) MCG/ACT inhaler Inhale 2 puffs into the lungs every 6 hours as needed for shortness of breath / dyspnea or wheezing 1 Inhaler 0     benzonatate (TESSALON) 200 MG capsule Take 1 capsule (200 mg) by mouth 3 times daily as needed for cough 30 capsule 0     gabapentin (NEURONTIN) 600 MG tablet Take 2 tablets (1,200 mg) by mouth 2 times daily 120 tablet 1     lamoTRIgine (LAMICTAL) 200 MG tablet Take 1 tablet (200 mg) by mouth daily 30 tablet 2     omeprazole (PRILOSEC) 20 MG DR capsule Take 1 capsule (20 mg) by mouth daily 31 capsule 2     ondansetron (ZOFRAN) 4 MG tablet Take 1 tablet (4 mg) by mouth every 8 hours as needed for nausea 20 tablet 3     polyethylene glycol (MIRALAX/GLYCOLAX) powder Take 17 g (1 capful) by mouth daily 850 g 11     predniSONE (DELTASONE) 20 MG tablet Take 2 tablets (40 mg) by mouth daily for 5 days 10 tablet 0     prochlorperazine (COMPAZINE) 10 MG tablet Take 1 tablet (10 mg) by mouth every 6 hours as needed for nausea or vomiting 30 tablet 0     QUEtiapine (SEROQUEL) 50 MG tablet TAKE 1-4 tablets BY MOUTH EVERY NIGHT AT BEDTIME AS NEEDED 120 tablet 0     ranitidine (ZANTAC) 300 MG tablet Take 1 tablet (300 mg) by mouth At Bedtime 30 tablet 2     sertraline (ZOLOFT) 50 MG tablet 1 tablet at bedtime 90 tablet 0     valACYclovir (VALTREX) 500 MG tablet Take 1 tablet (500 mg) by mouth daily 31 tablet 11     amitriptyline (ELAVIL) 50 MG tablet Take 1 tablet (50 mg) by mouth At Bedtime (Patient not taking: Reported on 1/11/2020) 90 tablet 1     cyclobenzaprine (FLEXERIL) 5 MG tablet Take 1-2 tablets (5-10 mg) by mouth 3 times daily as needed for muscle spasms (Patient not taking: Reported on 1/11/2020) 42 tablet 2     lidocaine (LIDODERM) 5 % patch Place 1 patch onto the skin every 24 hours (Patient not taking: Reported on 1/11/2020) 30 patch 3     LORazepam (ATIVAN) 0.5  "MG tablet   0     methylPREDNISolone (MEDROL DOSEPAK) 4 MG tablet therapy pack Follow package instructions (Patient not taking: Reported on 1/11/2020) 21 tablet 0     nicotine (NICODERM CQ) 21 MG/24HR 24 hr patch Place 1 patch onto the skin every 24 hours (Patient not taking: Reported on 1/11/2020) 30 patch 1     nifedipine 0.2% in white petrolatum 0.2 % OINT ointment Place 1 g rectally 2 times daily (Patient not taking: Reported on 1/11/2020) 100 g 0     norgestimate-ethinyl estradiol (ORTHO-CYCLEN/SPRINTEC) 0.25-35 MG-MCG tablet Take 1 tablet by mouth daily Use continuously skip placebo pills (Patient not taking: Reported on 1/11/2020) 84 tablet 3     QUEtiapine (SEROQUEL) 25 MG tablet TAKE 1 TABLET BY MOUTH TWICE DAILY IF NEEDED 60 tablet 1     SUMAtriptan (IMITREX) 25 MG tablet Take 1-2 tablets (25-50 mg) by mouth at onset of headache for migraine May repeat in 2 hours. Max 8 tablets/24 hours. (Patient not taking: Reported on 1/11/2020) 9 tablet 5     Allergies   Allergen Reactions     Vicodin [Hydrocodone-Acetaminophen] Other (See Comments)     Severe irritability.  Neither vicodin nor percocet seem to provide relief     Amoxicillin Swelling     As a child--facial swelling and redness     Bactrim Itching and Rash     Erythro [Erythromycin] Other (See Comments) and Swelling     As a child--facial swelling         Reviewed and updated as needed this visit by Provider  Tobacco  Allergies  Meds  Problems  Med Hx  Surg Hx  Fam Hx         Review of Systems   ROS COMP: Constitutional, HEENT, cardiovascular, pulmonary, GI, , musculoskeletal, neuro, skin, endocrine and psych systems are negative, except as otherwise noted.      Objective    /70 (BP Location: Right arm, Patient Position: Sitting, Cuff Size: Adult Large)   Pulse 99   Temp 97  F (36.1  C) (Tympanic)   Resp 16   Ht 1.676 m (5' 6\")   Wt 88.2 kg (194 lb 8 oz)   LMP  (LMP Unknown)   SpO2 97%   BMI 31.39 kg/m    Body mass index is 31.39 " kg/m .  Physical Exam   GENERAL: healthy, alert and no distress, nontoxic in appearance  EYES: Eyes grossly normal to inspection, PERRL and conjunctivae and sclerae normal  HENT: ear canals and TM's normal, nose and mouth without ulcers or lesions  NECK: no adenopathy, supple with full ROM  RESP: lungs clear to auscultation - no rales, rhonchi or wheezes  CV: regular rate and rhythm, normal S1 S2, no S3 or S4, no murmur, click or rub, no peripheral edema   ABDOMEN: soft, nontender, no hepatosplenomegaly, no masses and bowel sounds normal  MS: no gross musculoskeletal defects noted, no edema  No rash    Diagnostic Test Results: Clear chest film - will await overread and follow up as indicated.      CHEST TWO VIEWS  1/11/2020 9:49 AM      HISTORY: 34-year-old woman with cough and shortness of breath.                                                                       IMPRESSION: Since 1/10/2015, heart size is normal. No pleural  effusion, pneumothorax, or abnormal area of consolidation      Labs reviewed in Epic  No results found. However, due to the size of the patient record, not all encounters were searched. Please check Results Review for a complete set of results.        Assessment & Plan   Problem List Items Addressed This Visit     None      Visit Diagnoses     Acute bronchitis with symptoms > 10 days    -  Primary    Relevant Medications    predniSONE (DELTASONE) 20 MG tablet    benzonatate (TESSALON) 200 MG capsule    albuterol (PROAIR HFA/PROVENTIL HFA/VENTOLIN HFA) 108 (90 Base) MCG/ACT inhaler    Cough        Relevant Medications    predniSONE (DELTASONE) 20 MG tablet    benzonatate (TESSALON) 200 MG capsule    albuterol (PROAIR HFA/PROVENTIL HFA/VENTOLIN HFA) 108 (90 Base) MCG/ACT inhaler    Other Relevant Orders    XR Chest 2 Views (Completed)    SOB (shortness of breath)        Relevant Medications    predniSONE (DELTASONE) 20 MG tablet    benzonatate (TESSALON) 200 MG capsule    albuterol (PROAIR  HFA/PROVENTIL HFA/VENTOLIN HFA) 108 (90 Base) MCG/ACT inhaler    Other Relevant Orders    XR Chest 2 Views (Completed)    Chest discomfort        Relevant Medications    predniSONE (DELTASONE) 20 MG tablet    benzonatate (TESSALON) 200 MG capsule    albuterol (PROAIR HFA/PROVENTIL HFA/VENTOLIN HFA) 108 (90 Base) MCG/ACT inhaler    Other Relevant Orders    XR Chest 2 Views (Completed)    History of pneumonia        Relevant Medications    predniSONE (DELTASONE) 20 MG tablet    benzonatate (TESSALON) 200 MG capsule    albuterol (PROAIR HFA/PROVENTIL HFA/VENTOLIN HFA) 108 (90 Base) MCG/ACT inhaler    Other Relevant Orders    XR Chest 2 Views (Completed)               Patient Instructions   Increase rest and fluids. Tylenol and/or Ibuprofen for comfort. Cool mist vaporizer. If your symptoms worsen or do not resolve follow up with your primary care provider in 1 week and sooner if needed.      Mucinex 600 mg 12 hour formula for ear, head and chest congestion.  It can also thin post nasal drip which can cause a cough and sore throat.    Indications for emergent return to emergency department discussed with patient, who verbalized good understanding and agreement.  Patient understands the limitations of today's evaluation.           Patient Education     Viral Bronchitis (Adult)    You have a viral bronchitis. Bronchitis is inflammation and swelling of the lining of the lungs. This is often caused by an infection. Symptoms include a dry, hacking cough that is worse at night. The cough may bring up yellow-green mucus. You may also feel short of breath or wheeze. Other symptoms may include tiredness, chest discomfort, and chills.  Bronchitis that is caused by a virus is not treated with antibiotics. Instead, medicines may be given to help relieve symptoms. Symptoms can last up to 2 weeks, although the cough may last much longer.  This illness is contagious during the first few days and is spread through the air by coughing  and sneezing, or by direct contact (touching the sick person and then touching your own eyes, nose, or mouth).  Most viral illnesses resolve within 10 to 14 days with rest and simple home remedies, although they may sometimes last for several weeks.  Home care    If symptoms are severe, rest at home for the first 2 to 3 days. When you go back to your usual activities, don't let yourself get too tired.    Do not smoke. Also avoid being exposed to secondhand smoke.    You may use over-the-counter medicine to control fever or pain, unless another pain medicine was prescribed. If you have chronic liver or kidney disease or have ever had a stomach ulcer or gastrointestinal bleeding, talk with your healthcare provider before using these medicines. Also talk to your provider if you are taking medicine to prevent blood clots. Aspirin should never be given to anyone younger than 18 years of age who is ill with a viral infection or fever. It may cause severe liver or brain damage.    Your appetite may be poor, so a light diet is fine. Avoid dehydration by drinking 6 to 8 glasses of fluids per day (such as water, soft drinks, sports drinks, juices, tea, or soup). Extra fluids will help loosen secretions in the nose and lungs.    Over-the-counter cough, cold, and sore-throat medicines will not shorten the length of the illness, but they may help to reduce symptoms. Don't use decongestants if you have high blood pressure.  Follow-up care  Follow up with your healthcare provider, or as advised. If you had an X-ray or ECG (electrocardiogram), a specialist will review it. You will be notified of any new findings that may affect your care.  If you are age 65 or older, or if you have a chronic lung disease or condition that affects your immune system, or you smoke, ask your healthcare provider about getting a pneumococcal vaccine and a yearly flu shot (influenza vaccine).  When to seek medical advice  Call your healthcare provider  right away if any of these occur:    Fever of 100.4 F (38 C) or higher, or as directed by your healthcare provider    Coughing up increased amounts of colored sputum    Weakness, drowsiness, headache, facial pain, ear pain, or a stiff neck  Call 911  Call 911 if any of these occur:    Coughing up blood    Worsening weakness, drowsiness, headache, or stiff neck    Trouble breathing, wheezing, or pain with breathing  Date Last Reviewed: 6/1/2018 2000-2019 [employer]. [address] [place], [place] [id number]. All rights reserved. This information is not intended as a substitute for professional medical care. Always follow your healthcare professional's instructions.             Return in about 1 week (around 1/18/2020) for Follow up with your primary care provider.    DIPESH Guadarrama [facility]

## 2020-01-11 NOTE — PATIENT INSTRUCTIONS
Increase rest and fluids. Tylenol and/or Ibuprofen for comfort. Cool mist vaporizer. If your symptoms worsen or do not resolve follow up with your primary care provider in 1 week and sooner if needed.      Mucinex 600 mg 12 hour formula for ear, head and chest congestion.  It can also thin post nasal drip which can cause a cough and sore throat.    Indications for emergent return to emergency department discussed with patient, who verbalized good understanding and agreement.  Patient understands the limitations of today's evaluation.           Patient Education     Viral Bronchitis (Adult)    You have a viral bronchitis. Bronchitis is inflammation and swelling of the lining of the lungs. This is often caused by an infection. Symptoms include a dry, hacking cough that is worse at night. The cough may bring up yellow-green mucus. You may also feel short of breath or wheeze. Other symptoms may include tiredness, chest discomfort, and chills.  Bronchitis that is caused by a virus is not treated with antibiotics. Instead, medicines may be given to help relieve symptoms. Symptoms can last up to 2 weeks, although the cough may last much longer.  This illness is contagious during the first few days and is spread through the air by coughing and sneezing, or by direct contact (touching the sick person and then touching your own eyes, nose, or mouth).  Most viral illnesses resolve within 10 to 14 days with rest and simple home remedies, although they may sometimes last for several weeks.  Home care    If symptoms are severe, rest at home for the first 2 to 3 days. When you go back to your usual activities, don't let yourself get too tired.    Do not smoke. Also avoid being exposed to secondhand smoke.    You may use over-the-counter medicine to control fever or pain, unless another pain medicine was prescribed. If you have chronic liver or kidney disease or have ever had a stomach ulcer or gastrointestinal bleeding, talk with  your healthcare provider before using these medicines. Also talk to your provider if you are taking medicine to prevent blood clots. Aspirin should never be given to anyone younger than 18 years of age who is ill with a viral infection or fever. It may cause severe liver or brain damage.    Your appetite may be poor, so a light diet is fine. Avoid dehydration by drinking 6 to 8 glasses of fluids per day (such as water, soft drinks, sports drinks, juices, tea, or soup). Extra fluids will help loosen secretions in the nose and lungs.    Over-the-counter cough, cold, and sore-throat medicines will not shorten the length of the illness, but they may help to reduce symptoms. Don't use decongestants if you have high blood pressure.  Follow-up care  Follow up with your healthcare provider, or as advised. If you had an X-ray or ECG (electrocardiogram), a specialist will review it. You will be notified of any new findings that may affect your care.  If you are age 65 or older, or if you have a chronic lung disease or condition that affects your immune system, or you smoke, ask your healthcare provider about getting a pneumococcal vaccine and a yearly flu shot (influenza vaccine).  When to seek medical advice  Call your healthcare provider right away if any of these occur:    Fever of 100.4 F (38 C) or higher, or as directed by your healthcare provider    Coughing up increased amounts of colored sputum    Weakness, drowsiness, headache, facial pain, ear pain, or a stiff neck  Call 911  Call 911 if any of these occur:    Coughing up blood    Worsening weakness, drowsiness, headache, or stiff neck    Trouble breathing, wheezing, or pain with breathing  Date Last Reviewed: 6/1/2018 2000-2019 [employer]. [address] [place] [address]. All rights reserved. This information is not intended as a substitute for professional medical care. Always follow your healthcare professional's instructions.

## 2020-09-03 NOTE — TELEPHONE ENCOUNTER
----- Message from Kasi Cain MD sent at 9/3/2020  1:03 PM CDT -----  Kidney function  improving   Reason for call:  Patient reporting a symptom    Symptom or request: Pt says she has not been on her Zoloft for a month. Puja Sudhir had been prescribing this but says she is currently switching areas. Sirena says she needs to go back on the Zoloft and says she had discussed this with Shi. Pt feels the dose needs to be increased from what she had been on.She had been on 50 mg at HS. States she really needs this bad right now.     Phone Number patient can be reached at:  Home number on file [phone] (home)    Best Time:  anytime    Can we leave a detailed message on this number:  YES    Call taken on 10/1/2019 at 7:50 AM by Elba Lopez
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
## 2020-11-12 ENCOUNTER — HOSPITAL ENCOUNTER (EMERGENCY)
Facility: CLINIC | Age: 35
Discharge: HOME | End: 2020-11-12
Attending: FAMILY MEDICINE | Admitting: FAMILY MEDICINE
Payer: COMMERCIAL

## 2020-11-12 ENCOUNTER — APPOINTMENT (OUTPATIENT)
Dept: CT IMAGING | Facility: CLINIC | Age: 35
End: 2020-11-12
Attending: FAMILY MEDICINE
Payer: COMMERCIAL

## 2020-11-12 VITALS
DIASTOLIC BLOOD PRESSURE: 105 MMHG | HEART RATE: 109 BPM | WEIGHT: 195 LBS | SYSTOLIC BLOOD PRESSURE: 143 MMHG | BODY MASS INDEX: 31.34 KG/M2 | TEMPERATURE: 98.5 F | RESPIRATION RATE: 22 BRPM | HEIGHT: 66 IN | OXYGEN SATURATION: 98 %

## 2020-11-12 DIAGNOSIS — R93.89 ABNORMAL CT SCAN, CHEST: ICD-10-CM

## 2020-11-12 DIAGNOSIS — S29.011A CHEST WALL MUSCLE STRAIN, INITIAL ENCOUNTER: ICD-10-CM

## 2020-11-12 LAB
ALBUMIN SERPL-MCNC: 4.3 G/DL (ref 3.4–5)
ALP SERPL-CCNC: 114 U/L (ref 40–150)
ALT SERPL W P-5'-P-CCNC: 35 U/L (ref 0–50)
ANION GAP SERPL CALCULATED.3IONS-SCNC: 5 MMOL/L (ref 3–14)
AST SERPL W P-5'-P-CCNC: 16 U/L (ref 0–45)
BASOPHILS # BLD AUTO: 0.1 10E9/L (ref 0–0.2)
BASOPHILS NFR BLD AUTO: 0.6 %
BILIRUB SERPL-MCNC: 0.4 MG/DL (ref 0.2–1.3)
BUN SERPL-MCNC: 12 MG/DL (ref 7–30)
CALCIUM SERPL-MCNC: 9.2 MG/DL (ref 8.5–10.1)
CHLORIDE SERPL-SCNC: 108 MMOL/L (ref 94–109)
CO2 SERPL-SCNC: 28 MMOL/L (ref 20–32)
CREAT SERPL-MCNC: 0.81 MG/DL (ref 0.52–1.04)
DIFFERENTIAL METHOD BLD: ABNORMAL
EOSINOPHIL # BLD AUTO: 0.8 10E9/L (ref 0–0.7)
EOSINOPHIL NFR BLD AUTO: 5.1 %
ERYTHROCYTE [DISTWIDTH] IN BLOOD BY AUTOMATED COUNT: 11.6 % (ref 10–15)
GFR SERPL CREATININE-BSD FRML MDRD: >90 ML/MIN/{1.73_M2}
GLUCOSE SERPL-MCNC: 90 MG/DL (ref 70–99)
HCT VFR BLD AUTO: 40.1 % (ref 35–47)
HGB BLD-MCNC: 13.7 G/DL (ref 11.7–15.7)
IMM GRANULOCYTES # BLD: 0.1 10E9/L (ref 0–0.4)
IMM GRANULOCYTES NFR BLD: 0.4 %
LYMPHOCYTES # BLD AUTO: 3.7 10E9/L (ref 0.8–5.3)
LYMPHOCYTES NFR BLD AUTO: 24.2 %
MCH RBC QN AUTO: 30.9 PG (ref 26.5–33)
MCHC RBC AUTO-ENTMCNC: 34.2 G/DL (ref 31.5–36.5)
MCV RBC AUTO: 90 FL (ref 78–100)
MONOCYTES # BLD AUTO: 1 10E9/L (ref 0–1.3)
MONOCYTES NFR BLD AUTO: 6.6 %
NEUTROPHILS # BLD AUTO: 9.7 10E9/L (ref 1.6–8.3)
NEUTROPHILS NFR BLD AUTO: 63.1 %
NRBC # BLD AUTO: 0 10*3/UL
NRBC BLD AUTO-RTO: 0 /100
PLATELET # BLD AUTO: 329 10E9/L (ref 150–450)
POTASSIUM SERPL-SCNC: 3.6 MMOL/L (ref 3.4–5.3)
PROT SERPL-MCNC: 7.4 G/DL (ref 6.8–8.8)
RBC # BLD AUTO: 4.44 10E12/L (ref 3.8–5.2)
SODIUM SERPL-SCNC: 141 MMOL/L (ref 133–144)
TROPONIN I SERPL-MCNC: <0.015 UG/L (ref 0–0.04)
WBC # BLD AUTO: 15.3 10E9/L (ref 4–11)

## 2020-11-12 PROCEDURE — 250N000009 HC RX 250: Performed by: FAMILY MEDICINE

## 2020-11-12 PROCEDURE — 84484 ASSAY OF TROPONIN QUANT: CPT | Performed by: FAMILY MEDICINE

## 2020-11-12 PROCEDURE — 250N000011 HC RX IP 250 OP 636: Performed by: FAMILY MEDICINE

## 2020-11-12 PROCEDURE — 99285 EMERGENCY DEPT VISIT HI MDM: CPT | Mod: 25 | Performed by: FAMILY MEDICINE

## 2020-11-12 PROCEDURE — 85025 COMPLETE CBC W/AUTO DIFF WBC: CPT | Performed by: FAMILY MEDICINE

## 2020-11-12 PROCEDURE — 93010 ELECTROCARDIOGRAM REPORT: CPT | Mod: 76 | Performed by: FAMILY MEDICINE

## 2020-11-12 PROCEDURE — U0003 INFECTIOUS AGENT DETECTION BY NUCLEIC ACID (DNA OR RNA); SEVERE ACUTE RESPIRATORY SYNDROME CORONAVIRUS 2 (SARS-COV-2) (CORONAVIRUS DISEASE [COVID-19]), AMPLIFIED PROBE TECHNIQUE, MAKING USE OF HIGH THROUGHPUT TECHNOLOGIES AS DESCRIBED BY CMS-2020-01-R: HCPCS | Performed by: FAMILY MEDICINE

## 2020-11-12 PROCEDURE — C9803 HOPD COVID-19 SPEC COLLECT: HCPCS | Performed by: FAMILY MEDICINE

## 2020-11-12 PROCEDURE — 71275 CT ANGIOGRAPHY CHEST: CPT

## 2020-11-12 PROCEDURE — 96374 THER/PROPH/DIAG INJ IV PUSH: CPT | Mod: 59 | Performed by: FAMILY MEDICINE

## 2020-11-12 PROCEDURE — 93005 ELECTROCARDIOGRAM TRACING: CPT | Performed by: FAMILY MEDICINE

## 2020-11-12 PROCEDURE — 80053 COMPREHEN METABOLIC PANEL: CPT | Performed by: FAMILY MEDICINE

## 2020-11-12 RX ORDER — KETOROLAC TROMETHAMINE 30 MG/ML
30 INJECTION, SOLUTION INTRAMUSCULAR; INTRAVENOUS ONCE
Status: COMPLETED | OUTPATIENT
Start: 2020-11-12 | End: 2020-11-12

## 2020-11-12 RX ORDER — IOPAMIDOL 755 MG/ML
82 INJECTION, SOLUTION INTRAVASCULAR ONCE
Status: COMPLETED | OUTPATIENT
Start: 2020-11-12 | End: 2020-11-12

## 2020-11-12 RX ADMIN — KETOROLAC TROMETHAMINE 30 MG: 30 INJECTION, SOLUTION INTRAMUSCULAR at 13:30

## 2020-11-12 RX ADMIN — IOPAMIDOL 82 ML: 755 INJECTION, SOLUTION INTRAVENOUS at 13:46

## 2020-11-12 RX ADMIN — SODIUM CHLORIDE 100 ML: 9 INJECTION, SOLUTION INTRAVENOUS at 13:46

## 2020-11-12 ASSESSMENT — MIFFLIN-ST. JEOR: SCORE: 1611.26
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
## 2020-11-12 NOTE — ED AVS SNAPSHOT
[facility]  [address] [address]  Phone: [phone]  Fax: [phone]                                    Sirena Dietrich   MRN: [id number]    Department: [facility]   Date of Visit: 11/12/2020           After Visit Summary Signature Page    I have received my discharge instructions, and my questions have been answered. I have discussed any challenges I see with this plan with the nurse or doctor.    ..........................................................................................................................................  Patient/Patient Representative Signature      ..........................................................................................................................................  Patient Representative Print Name and Relationship to Patient    ..................................................               ................................................  Date                                   Time    ..........................................................................................................................................  Reviewed by Signature/Title    ...................................................              ..............................................  Date                                               Time          22EPIC Rev 08/18

## 2020-11-12 NOTE — ED NOTES
"Pt refusing to keep monitoring equipment on despite RN asking and replacing it several times pt states \"im not keeping that shit on\"  "

## 2020-11-12 NOTE — LETTER
[facility] EMERGENCY DEPT  [address] [address]  [phone]      2020    Sirena Dietrich  [address] [address]  [phone] (home)     : 1985      To Whom it may concern:    Sirena Dietrich was seen in our Emergency Department today, 2020 for an injury that was reported to be work related.  I am recommending no pushing, pulling, carrying anything greater then 10 pounds over the next 3 days.    Sincerely,    Mukesh Laurent MD

## 2020-11-13 LAB
SARS-COV-2 RNA SPEC QL NAA+PROBE: NOT DETECTED
SPECIMEN SOURCE: NORMAL

## 2020-11-16 ENCOUNTER — HEALTH MAINTENANCE LETTER (OUTPATIENT)
Age: 35
End: 2020-11-16

## 2021-07-22 ENCOUNTER — OFFICE VISIT (OUTPATIENT)
Dept: FAMILY MEDICINE | Facility: CLINIC | Age: 36
End: 2021-07-22
Payer: COMMERCIAL

## 2021-07-22 VITALS
SYSTOLIC BLOOD PRESSURE: 132 MMHG | OXYGEN SATURATION: 99 % | DIASTOLIC BLOOD PRESSURE: 78 MMHG | HEIGHT: 66 IN | BODY MASS INDEX: 30.37 KG/M2 | HEART RATE: 118 BPM | WEIGHT: 189 LBS | RESPIRATION RATE: 14 BRPM

## 2021-07-22 DIAGNOSIS — N76.0 BACTERIAL VAGINITIS: ICD-10-CM

## 2021-07-22 DIAGNOSIS — B96.89 BACTERIAL VAGINITIS: ICD-10-CM

## 2021-07-22 DIAGNOSIS — B37.31 YEAST INFECTION OF THE VAGINA: Primary | ICD-10-CM

## 2021-07-22 DIAGNOSIS — M72.2 PLANTAR FASCIITIS: ICD-10-CM

## 2021-07-22 DIAGNOSIS — M76.61 ACHILLES TENDINITIS OF RIGHT LOWER EXTREMITY: ICD-10-CM

## 2021-07-22 DIAGNOSIS — N63.20 LEFT BREAST LUMP: ICD-10-CM

## 2021-07-22 LAB
ALBUMIN UR-MCNC: NEGATIVE MG/DL
APPEARANCE UR: CLEAR
BACTERIA #/AREA URNS HPF: ABNORMAL /HPF
BILIRUB UR QL STRIP: NEGATIVE
CLUE CELLS: PRESENT
COLOR UR AUTO: YELLOW
GLUCOSE UR STRIP-MCNC: NEGATIVE MG/DL
HGB UR QL STRIP: ABNORMAL
KETONES UR STRIP-MCNC: NEGATIVE MG/DL
LEUKOCYTE ESTERASE UR QL STRIP: NEGATIVE
MUCOUS THREADS #/AREA URNS LPF: PRESENT /LPF
NITRATE UR QL: NEGATIVE
PH UR STRIP: 5 [PH] (ref 5–7)
RBC #/AREA URNS AUTO: ABNORMAL /HPF
SP GR UR STRIP: >=1.03 (ref 1–1.03)
SQUAMOUS #/AREA URNS AUTO: ABNORMAL /LPF
TRICHOMONAS, WET PREP: ABNORMAL
UROBILINOGEN UR STRIP-ACNC: 0.2 E.U./DL
WBC #/AREA URNS AUTO: ABNORMAL /HPF
WBC'S/HIGH POWER FIELD, WET PREP: ABNORMAL
YEAST, WET PREP: ABNORMAL

## 2021-07-22 PROCEDURE — 90471 IMMUNIZATION ADMIN: CPT | Performed by: NURSE PRACTITIONER

## 2021-07-22 PROCEDURE — 90715 TDAP VACCINE 7 YRS/> IM: CPT | Performed by: NURSE PRACTITIONER

## 2021-07-22 PROCEDURE — 99214 OFFICE O/P EST MOD 30 MIN: CPT | Mod: 25 | Performed by: NURSE PRACTITIONER

## 2021-07-22 PROCEDURE — 81001 URINALYSIS AUTO W/SCOPE: CPT | Performed by: NURSE PRACTITIONER

## 2021-07-22 PROCEDURE — 87210 SMEAR WET MOUNT SALINE/INK: CPT | Performed by: NURSE PRACTITIONER

## 2021-07-22 RX ORDER — HYDROXYZINE HYDROCHLORIDE 50 MG/1
TABLET, FILM COATED ORAL
COMMUNITY
Start: 2021-06-29

## 2021-07-22 RX ORDER — PROPRANOLOL HYDROCHLORIDE 20 MG/1
TABLET ORAL
COMMUNITY
Start: 2021-06-29 | End: 2024-02-21

## 2021-07-22 RX ORDER — METRONIDAZOLE 7.5 MG/G
1 GEL VAGINAL DAILY
Qty: 70 G | Refills: 1 | Status: SHIPPED | OUTPATIENT
Start: 2021-07-22 | End: 2021-08-05

## 2021-07-22 RX ORDER — ATORVASTATIN CALCIUM 20 MG/1
20 TABLET, FILM COATED ORAL
COMMUNITY
Start: 2021-06-30 | End: 2022-01-18

## 2021-07-22 RX ORDER — FAMOTIDINE 20 MG/1
20 TABLET, FILM COATED ORAL
COMMUNITY
Start: 2021-04-17 | End: 2023-09-19

## 2021-07-22 ASSESSMENT — ANXIETY QUESTIONNAIRES
5. BEING SO RESTLESS THAT IT IS HARD TO SIT STILL: SEVERAL DAYS
GAD7 TOTAL SCORE: 13
6. BECOMING EASILY ANNOYED OR IRRITABLE: NEARLY EVERY DAY
4. TROUBLE RELAXING: MORE THAN HALF THE DAYS
8. IF YOU CHECKED OFF ANY PROBLEMS, HOW DIFFICULT HAVE THESE MADE IT FOR YOU TO DO YOUR WORK, TAKE CARE OF THINGS AT HOME, OR GET ALONG WITH OTHER PEOPLE?: VERY DIFFICULT
7. FEELING AFRAID AS IF SOMETHING AWFUL MIGHT HAPPEN: SEVERAL DAYS
GAD7 TOTAL SCORE: 13
7. FEELING AFRAID AS IF SOMETHING AWFUL MIGHT HAPPEN: SEVERAL DAYS
1. FEELING NERVOUS, ANXIOUS, OR ON EDGE: MORE THAN HALF THE DAYS
GAD7 TOTAL SCORE: 13
2. NOT BEING ABLE TO STOP OR CONTROL WORRYING: MORE THAN HALF THE DAYS
3. WORRYING TOO MUCH ABOUT DIFFERENT THINGS: MORE THAN HALF THE DAYS

## 2021-07-22 ASSESSMENT — PATIENT HEALTH QUESTIONNAIRE - PHQ9
10. IF YOU CHECKED OFF ANY PROBLEMS, HOW DIFFICULT HAVE THESE PROBLEMS MADE IT FOR YOU TO DO YOUR WORK, TAKE CARE OF THINGS AT HOME, OR GET ALONG WITH OTHER PEOPLE: VERY DIFFICULT
SUM OF ALL RESPONSES TO PHQ QUESTIONS 1-9: 16
SUM OF ALL RESPONSES TO PHQ QUESTIONS 1-9: 16

## 2021-07-22 ASSESSMENT — MIFFLIN-ST. JEOR: SCORE: 1579.05

## 2021-07-22 ASSESSMENT — PAIN SCALES - GENERAL: PAINLEVEL: NO PAIN (0)

## 2021-07-22 NOTE — PROGRESS NOTES
"    Assessment & Plan     Yeast infection of the vagina  Labs today to look for cause.  No yeast.  Recurrent bacterial vaginosis  - Wet prep - Clinic Collect  - UA macro with reflex to Microscopic and Culture - Clinc Collect  - Urine Microscopic    Plantar fasciitis  Stretching exercises recommended.  Ice 20 minutes 4 times daily for comfort.  Tape for comfort.  Podiatry consultation Dr. Guilherme Covarrubias  - Orthopedic  Referral    Achilles tendinitis of right lower extremity  Gentle stretching exercises reviewed.  Follow-up with Dr. Vanegas for discussion about shoes, insoles, taping,  - Orthopedic  Referral    Left breast lump  New problem.  2 o'clock position.  Diagnostic mammogram and ultrasound left breast recommended  - MA Diagnostic Digital Bilateral  - US Breast Left Complete 4 Quadrants    Bacterial vaginitis  Recurrent.  Begin treatment with  - metroNIDAZOLE (METROGEL) 0.75 % vaginal gel  Dispense: 70 g; Refill: 1      Tobacco Cessation:   reports that she has been smoking cigarettes. She has been smoking about 0.50 packs per day. She has never used smokeless tobacco.  Tobacco Cessation Action Plan: Information offered: Patient not interested at this time    BMI:   Estimated body mass index is 30.51 kg/m  as calculated from the following:    Height as of this encounter: 1.676 m (5' 6\").    Weight as of this encounter: 85.7 kg (189 lb).   Weight management plan: Discussed healthy diet and exercise guidelines    Depression Screening Follow Up    PHQ 7/22/2021   PHQ-9 Total Score 16   Q9: Thoughts of better off dead/self-harm past 2 weeks Several days   F/U: Thoughts of suicide or self-harm No   F/U: Safety concerns No       Call or return to the clinic with any worsening of symptoms or no resolution. Patient/Parent verbalized understanding and is in agreement. Medication side effects reviewed.   Current Outpatient Medications   Medication Sig Dispense Refill     albuterol (PROAIR HFA/PROVENTIL " HFA/VENTOLIN HFA) 108 (90 Base) MCG/ACT inhaler Inhale 2 puffs into the lungs every 6 hours as needed for shortness of breath / dyspnea or wheezing 1 Inhaler 0     atorvastatin (LIPITOR) 20 MG tablet Take 20 mg by mouth       cyclobenzaprine (FLEXERIL) 5 MG tablet Take 1-2 tablets (5-10 mg) by mouth 3 times daily as needed for muscle spasms 42 tablet 2     famotidine (PEPCID) 20 MG tablet Take 20 mg by mouth       gabapentin (NEURONTIN) 600 MG tablet Take 2 tablets (1,200 mg) by mouth 2 times daily 120 tablet 1     lamoTRIgine (LAMICTAL) 200 MG tablet Take 1 tablet (200 mg) by mouth daily 30 tablet 2     metroNIDAZOLE (METROGEL) 0.75 % vaginal gel Place 1 applicator (5 g) vaginally daily 70 g 1     omeprazole (PRILOSEC) 20 MG DR capsule Take 1 capsule (20 mg) by mouth daily 31 capsule 2     ondansetron (ZOFRAN) 4 MG tablet Take 1 tablet (4 mg) by mouth every 8 hours as needed for nausea 20 tablet 3     prochlorperazine (COMPAZINE) 10 MG tablet Take 1 tablet (10 mg) by mouth every 6 hours as needed for nausea or vomiting 30 tablet 0     QUEtiapine (SEROQUEL) 25 MG tablet TAKE 1 TABLET BY MOUTH TWICE DAILY IF NEEDED 60 tablet 1     QUEtiapine (SEROQUEL) 50 MG tablet TAKE 1-4 tablets BY MOUTH EVERY NIGHT AT BEDTIME AS NEEDED 120 tablet 0     ranitidine (ZANTAC) 300 MG tablet Take 1 tablet (300 mg) by mouth At Bedtime 30 tablet 2     sertraline (ZOLOFT) 50 MG tablet 1 tablet at bedtime 90 tablet 0     valACYclovir (VALTREX) 500 MG tablet Take 1 tablet (500 mg) by mouth daily 31 tablet 11     hydrOXYzine (ATARAX) 50 MG tablet Take 1 tablet by mouth three times daily as needed for anxiety.       propranolol (INDERAL) 20 MG tablet Take 1 tablet by mouth three times daily as needed for anxiety, restlessness.       SUMAtriptan (IMITREX) 25 MG tablet Take 1-2 tablets (25-50 mg) by mouth at onset of headache for migraine May repeat in 2 hours. Max 8 tablets/24 hours. (Patient not taking: Reported on 1/11/2020) 9 tablet 5      Chart documentation with Dragon Voice recognition Software. Although reviewed after completion, some words and grammatical errors may remain.    Patient denies any suicidal or homicidal ideation.  Verbal no harm contract generated is present.  Moods are stable per patient report.  DIPESH Marino CNP  [facility]    Elaine Pack is a 35 year old who presents for the following health issues     HPI   Severe right foot pain bottom of the foot and heel  Worse over the past several months.  Working as a CMA.  Standing and lifting    Some recent vaginal discharge.  Longstanding history of recurrent bacterial vaginosis  Wondering if she may have a yeast infection  Some pain with urination    Depression and Anxiety Follow-Up    How are you doing with your depression since your last visit? Worsened     How are you doing with your anxiety since your last visit?  Worsened     Are you having other symptoms that might be associated with depression or anxiety? No    Have you had a significant life event? No     Do you have any concerns with your use of alcohol or other drugs? No    Social History     Tobacco Use     Smoking status: Current Some Day Smoker     Packs/day: 0.50     Types: Cigarettes     Last attempt to quit: 5/2/2018     Years since quitting: 3.2     Smokeless tobacco: Never Used   Substance Use Topics     Alcohol use: Yes     Comment: rare      Drug use: No     PHQ 5/8/2019 9/19/2019 7/22/2021   PHQ-9 Total Score 12 12 16   Q9: Thoughts of better off dead/self-harm past 2 weeks Not at all Not at all Several days   F/U: Thoughts of suicide or self-harm - - No   F/U: Safety concerns - - No     VICTOR M-7 SCORE 10/9/2018 9/19/2019 7/22/2021   Total Score - - -   Total Score 14 (moderate anxiety) - 13 (moderate anxiety)   Total Score 14 10 13   Total Score - - -     Last PHQ-9 7/22/2021   1.  Little interest or pleasure in doing things 2   2.  Feeling down, depressed, or  hopeless 1   3.  Trouble falling or staying asleep, or sleeping too much 2   4.  Feeling tired or having little energy 3   5.  Poor appetite or overeating 3   6.  Feeling bad about yourself 1   7.  Trouble concentrating 1   8.  Moving slowly or restless 2   Q9: Thoughts of better off dead/self-harm past 2 weeks 1   PHQ-9 Total Score 16   Difficulty at work, home, or with people -   In the past two weeks have you had thoughts of suicide or self harm? No   Do you have concerns about your personal safety or the safety of others? No     VICTOR M-7  7/22/2021   1. Feeling nervous, anxious, or on edge 2   2. Not being able to stop or control worrying 2   3. Worrying too much about different things 2   4. Trouble relaxing 2   5. Being so restless that it is hard to sit still 1   6. Becoming easily annoyed or irritable 3   7. Feeling afraid, as if something awful might happen 1   VICTOR M-7 Total Score 13   If you checked any problems, how difficult have they made it for you to do your work, take care of things at home, or get along with other people? -     Vaignal Itch   2 weeks   BV in the past same partner for 3 years  1 night condoms not used symptoms started after that  Results for orders placed or performed in visit on 07/22/21   UA macro with reflex to Microscopic and Culture - Clin Collect     Status: Abnormal    Specimen: Urine, Clean Catch   Result Value Ref Range    Color Urine Yellow Colorless, Straw, Light Yellow, Yellow    Appearance Urine Clear Clear    Glucose Urine Negative Negative mg/dL    Bilirubin Urine Negative Negative    Ketones Urine Negative Negative mg/dL    Specific Gravity Urine >=1.030 1.003 - 1.035    Blood Urine Small (A) Negative    pH Urine 5.0 5.0 - 7.0    Protein Albumin Urine Negative Negative mg/dL    Urobilinogen Urine 0.2 0.2, 1.0 E.U./dL    Nitrite Urine Negative Negative    Leukocyte Esterase Urine Negative Negative   Urine Microscopic     Status: Abnormal   Result Value Ref Range     "Bacteria Urine Few (A) None Seen /HPF    RBC Urine 5-10 (A) 0-2 /HPF /HPF    WBC Urine 0-5 0-5 /HPF /HPF    Squamous Epithelials Urine Few (A) None Seen /LPF    Mucus Urine Present (A) None Seen /LPF    Narrative    Urine Culture not indicated   Wet prep - Clinic Collect     Status: Abnormal    Specimen: Vagina; Swab   Result Value Ref Range    Trichomonas Absent Absent    Yeast Absent Absent    Clue Cells Present (A) Absent    WBCs/high power field None None         Leg cramps- started after she started the lipator  But quit due to side effect     LIPIDS FOLLOWUP needed  Not taking meds   Wondering what else to take for this.  Did not tolerate statins due to severe muscle aches  Triglycerides were very high through Dr. Wolfe's office    New left breast lump and pain  No personal or family history of breast cancer        Review of Systems   Constitutional, HEENT, cardiovascular, pulmonary, GI, , musculoskeletal, neuro, skin, endocrine and psych systems are negative, except as otherwise noted.      Objective    /78   Pulse 118   Resp 14   Ht 1.676 m (5' 6\")   Wt 85.7 kg (189 lb)   LMP  (LMP Unknown)   SpO2 99%   BMI 30.51 kg/m    Body mass index is 30.51 kg/m .  Physical Exam   GENERAL: healthy, alert and no distress  EYES: Eyes grossly normal to inspection, PERRL and conjunctivae and sclerae normal  HENT: ear canals and TM's normal, nose and mouth without ulcers or lesions  NECK: no adenopathy, no asymmetry, masses, or scars and thyroid normal to palpation  RESP: lungs clear to auscultation - no rales, rhonchi or wheezes  BREAST: tenderness left breast 2:00 and cystic-like mass freely mobile no dimpling no nipple drainage  CV: regular rate and rhythm, normal S1 S2, no S3 or S4, no murmur, click or rub, no peripheral edema and peripheral pulses strong  ABDOMEN: soft, nontender, no hepatosplenomegaly, no masses and bowel sounds normal  MS: no gross musculoskeletal defects noted, no edema  Significant " pain over the right plantar fascia and right Achilles tendon with tightness.  Good range of motion to the ankle  SKIN: no suspicious lesions or rashes  NEURO: Normal strength and tone, mentation intact and speech normal  PSYCH: mentation appears normal, affect normal/bright    Results for orders placed or performed in visit on 07/22/21 (from the past 24 hour(s))   Wet prep - Clinic Collect    Specimen: Vagina; Swab   Result Value Ref Range    Trichomonas Absent Absent    Yeast Absent Absent    Clue Cells Present (A) Absent    WBCs/high power field None None   UA macro with reflex to Microscopic and Culture - Clinc Collect    Specimen: Urine, Clean Catch   Result Value Ref Range    Color Urine Yellow Colorless, Straw, Light Yellow, Yellow    Appearance Urine Clear Clear    Glucose Urine Negative Negative mg/dL    Bilirubin Urine Negative Negative    Ketones Urine Negative Negative mg/dL    Specific Gravity Urine >=1.030 1.003 - 1.035    Blood Urine Small (A) Negative    pH Urine 5.0 5.0 - 7.0    Protein Albumin Urine Negative Negative mg/dL    Urobilinogen Urine 0.2 0.2, 1.0 E.U./dL    Nitrite Urine Negative Negative    Leukocyte Esterase Urine Negative Negative   Urine Microscopic   Result Value Ref Range    Bacteria Urine Few (A) None Seen /HPF    RBC Urine 5-10 (A) 0-2 /HPF /HPF    WBC Urine 0-5 0-5 /HPF /HPF    Squamous Epithelials Urine Few (A) None Seen /LPF    Mucus Urine Present (A) None Seen /LPF    Narrative    Urine Culture not indicated     *Note: Due to a large number of results and/or encounters for the requested time period, some results have not been displayed. A complete set of results can be found in Results Review.               Answers for HPI/ROS submitted by the patient on 7/22/2021  If you checked off any problems, how difficult have these problems made it for you to do your work, take care of things at home, or get along with other people?: Very difficult  PHQ9 TOTAL SCORE: 16  VICTOR M 7 TOTAL  SCORE: 13

## 2021-07-23 ASSESSMENT — ANXIETY QUESTIONNAIRES: GAD7 TOTAL SCORE: 13

## 2021-07-30 DIAGNOSIS — N76.0 BACTERIAL VAGINITIS: ICD-10-CM

## 2021-07-30 DIAGNOSIS — B96.89 BACTERIAL VAGINITIS: ICD-10-CM

## 2021-08-04 NOTE — TELEPHONE ENCOUNTER
Pending Prescriptions:                       Disp   Refills    metroNIDAZOLE (METROGEL) 0.75 % vaginal ge*70 g   0        Sig: IN PLACE 1 applicatorful VAGINALLY ONCE DAILY      Routing refill request to provider for review/approval because:  Drug not on the [facility] refill protocol     Pt seen one week ago and prescribed.    Nida Villegas RN

## 2021-08-05 RX ORDER — METRONIDAZOLE 7.5 MG/G
GEL VAGINAL
Qty: 0.01 G | Refills: 0 | Status: SHIPPED | OUTPATIENT
Start: 2021-08-05 | End: 2022-01-18

## 2021-09-03 ENCOUNTER — HOSPITAL ENCOUNTER (OUTPATIENT)
Dept: MAMMOGRAPHY | Facility: CLINIC | Age: 36
End: 2021-09-03
Attending: NURSE PRACTITIONER
Payer: COMMERCIAL

## 2021-09-03 ENCOUNTER — HOSPITAL ENCOUNTER (OUTPATIENT)
Dept: ULTRASOUND IMAGING | Facility: CLINIC | Age: 36
End: 2021-09-03
Attending: NURSE PRACTITIONER
Payer: COMMERCIAL

## 2021-09-03 DIAGNOSIS — N63.20 LEFT BREAST LUMP: ICD-10-CM

## 2021-09-03 DIAGNOSIS — N63.21 BREAST LUMP ON LEFT SIDE AT 2 O'CLOCK POSITION: ICD-10-CM

## 2021-09-03 PROCEDURE — 77062 BREAST TOMOSYNTHESIS BI: CPT

## 2021-09-03 PROCEDURE — 76642 ULTRASOUND BREAST LIMITED: CPT | Mod: LT

## 2021-09-18 ENCOUNTER — HEALTH MAINTENANCE LETTER (OUTPATIENT)
Age: 36
End: 2021-09-18

## 2022-01-08 ENCOUNTER — HEALTH MAINTENANCE LETTER (OUTPATIENT)
Age: 37
End: 2022-01-08

## 2022-01-18 ENCOUNTER — OFFICE VISIT (OUTPATIENT)
Dept: FAMILY MEDICINE | Facility: CLINIC | Age: 37
End: 2022-01-18
Payer: COMMERCIAL

## 2022-01-18 VITALS
WEIGHT: 192 LBS | OXYGEN SATURATION: 95 % | RESPIRATION RATE: 14 BRPM | HEART RATE: 113 BPM | DIASTOLIC BLOOD PRESSURE: 76 MMHG | BODY MASS INDEX: 30.99 KG/M2 | SYSTOLIC BLOOD PRESSURE: 128 MMHG | TEMPERATURE: 97.3 F

## 2022-01-18 DIAGNOSIS — B96.89 BV (BACTERIAL VAGINOSIS): ICD-10-CM

## 2022-01-18 DIAGNOSIS — U09.9 POST-COVID CHRONIC HEADACHE: ICD-10-CM

## 2022-01-18 DIAGNOSIS — R51.9 POST-COVID CHRONIC HEADACHE: ICD-10-CM

## 2022-01-18 DIAGNOSIS — N89.8 VAGINAL DISCHARGE: Primary | ICD-10-CM

## 2022-01-18 DIAGNOSIS — G43.109 MIGRAINE WITH AURA AND WITHOUT STATUS MIGRAINOSUS, NOT INTRACTABLE: Chronic | ICD-10-CM

## 2022-01-18 DIAGNOSIS — G89.29 POST-COVID CHRONIC HEADACHE: ICD-10-CM

## 2022-01-18 DIAGNOSIS — N76.0 BV (BACTERIAL VAGINOSIS): ICD-10-CM

## 2022-01-18 DIAGNOSIS — G43.909 MIGRAINE WITHOUT STATUS MIGRAINOSUS, NOT INTRACTABLE, UNSPECIFIED MIGRAINE TYPE: Chronic | ICD-10-CM

## 2022-01-18 LAB
CLUE CELLS: PRESENT
TRICHOMONAS, WET PREP: ABNORMAL
WBC'S/HIGH POWER FIELD, WET PREP: ABNORMAL
YEAST, WET PREP: ABNORMAL

## 2022-01-18 PROCEDURE — 99214 OFFICE O/P EST MOD 30 MIN: CPT | Performed by: NURSE PRACTITIONER

## 2022-01-18 PROCEDURE — 87210 SMEAR WET MOUNT SALINE/INK: CPT | Performed by: NURSE PRACTITIONER

## 2022-01-18 RX ORDER — PROPRANOLOL HYDROCHLORIDE 10 MG/1
10 TABLET ORAL 3 TIMES DAILY PRN
Qty: 90 TABLET | Refills: 1 | Status: SHIPPED | OUTPATIENT
Start: 2022-01-18 | End: 2022-11-09

## 2022-01-18 RX ORDER — METRONIDAZOLE 500 MG/1
500 TABLET ORAL 2 TIMES DAILY
Qty: 14 TABLET | Refills: 0 | Status: SHIPPED | OUTPATIENT
Start: 2022-01-18 | End: 2022-01-25

## 2022-01-18 RX ORDER — PREDNISONE 20 MG/1
40 TABLET ORAL DAILY
Qty: 10 TABLET | Refills: 0 | Status: SHIPPED | OUTPATIENT
Start: 2022-01-18 | End: 2022-01-23

## 2022-01-18 RX ORDER — ONDANSETRON 4 MG/1
4 TABLET, FILM COATED ORAL EVERY 8 HOURS PRN
Qty: 20 TABLET | Refills: 3 | Status: SHIPPED | OUTPATIENT
Start: 2022-01-18 | End: 2022-11-09

## 2022-01-18 RX ORDER — SUMATRIPTAN 25 MG/1
25-50 TABLET, FILM COATED ORAL
Qty: 9 TABLET | Refills: 5 | Status: SHIPPED | OUTPATIENT
Start: 2022-01-18 | End: 2023-02-09

## 2022-01-18 ASSESSMENT — PAIN SCALES - GENERAL: PAINLEVEL: NO PAIN (0)

## 2022-01-18 NOTE — PATIENT INSTRUCTIONS
Patient Education     Bacterial Vaginosis    You have a vaginal infection called bacterial vaginosis (BV). Both good and bad bacteria are present in a healthy vagina. BV occurs when these bacteria get out of balance. The number of bad bacteria increase. And the number of good bacteria decrease. BV is linked with sexual activity, but it's not a sexually transmitted infection (STI).   BV may or may not cause symptoms. If symptoms do occur, they can include:     Thin, gray, milky-white, or sometimes green discharge    Unpleasant odor or  fishy  smell    Itching, burning, or pain in or around the vagina  It is not known what causes BV, but certain factors can make the problem more likely. These can include:     Douching    Spermicides    Use of antibiotics    Change in hormone levels with pregnancy, breastfeeding, or menopause    Having sex with a new partner    Having sex with more than one partner  BV will sometimes go away on its own. But treatment is often advised. This is because untreated BV can raise the risk of more serious health problems such as:     Pelvic inflammatory disease (PID)     delivery (giving birth to a baby early if you re pregnant)    HIV and some other sexually transmitted infections (STIs)    Infection after surgery on the reproductive organs  Home care  General care    BV is most often treated with medicines called antibiotics. These may be given as pills or as a vaginal cream. If antibiotics are prescribed, be sure to use them exactly as directed. And complete all of the medicine, even if your symptoms go away.    Don't douche or having sex during treatment.    If you have sex with a female partner, ask your healthcare provider if she should also be treated.  Prevention    Don't douche.    Don't have sex. If you do have sex, then take steps to lower your risk:  ? Use condoms when having sex.  ? Limit the number of sex partners you have.    Follow-up care  Follow up with your  "healthcare provider, or as advised.   When to get medical advice  Call your healthcare provider right away if:     You have a fever of 100.4 F (38 C) or higher, or as directed by your provider.    Your symptoms get worse, or they don t go away within a few days of starting treatment.    You have new pain in the lower belly or pelvic region.    You have side effects that bother you or a reaction to the pills or cream you re prescribed.    You or any of your sex partners have new symptoms, such as a rash, joint pain, or sores.  [employer] [facility] last reviewed this educational content on 6/1/2020 2000-2021 [employer]. All rights reserved. This information is not intended as a substitute for professional medical care. Always follow your healthcare professional's instructions.           Patient Education     Self-Care for Headaches  Most headaches aren't serious and can be relieved with self-care. But some headaches may be a sign of another health problem like eye trouble or high blood pressure. To find the best treatment, learn what kind of headaches you get. For tension headaches, self-care will usually help. To treat migraines, ask your healthcare provider for advice. It's also possible to get both tension and migraine headaches. Self-care involves relieving the pain and avoiding headache \"triggers\" if you can.     Ways to reduce pain and tension  Try these steps:    Apply a cold compress or ice pack to the pain site.    Drink fluids. If nausea makes it hard to drink, try sucking on ice.    Rest. Protect yourself from bright light and loud noises.    Calm your emotions by imagining a peaceful scene.    Massage tight neck, shoulder, and head muscles.    To relax muscles, soak in a hot bath or use a hot shower.  Use medicines  Aspirin or other over-the-counter (OTC) pain medicines such as ibuprofen and acetaminophen can relieve headache. Remember: Never give aspirin to anyone 18 years old or younger because of " "the risk of getting Reye syndrome. Use pain medicines only when needed. Certain prescription and OTC medicines can lead to rebound headaches if they are taken too often. Check with your healthcare provider or pharmacist about your medicines.   Track your headaches  Keeping a headache diary can help you and your healthcare provider identify what's causing your headaches:     Note when each headache happens.    Identify your activities and the foods you've eaten 6 to 8 hours before the headache began.    Look for any trends or \"triggers.\"    Bring the information to your next medical appointment.  Signs of tension headache  Any of the following can be signs:     Dull pain or feeling of pressure in a tight band around your head    Pain in your neck or shoulders    Headache without a definite beginning or end    Headache after an activity such as driving or working on a computer  Signs of migraine  Any of the following can be signs:     Throbbing pain on one or both sides of your head    Nausea or vomiting    Extreme sensitivity to light, sound, and smells    Bright spots, flashes, or other visual changes    Pain or nausea so severe that you can't continue your daily activities  When to call your healthcare provider   Call your healthcare provider if any of these occur:     A headache that lingers after a recent injury or bump to the head    A headache that lasts for more than 3 days    Frequent headaches, especially in the morning  Get medical care right away if you have:    A headache along with slurred speech, changes in your vision, or numbness or weakness in your arms or legs    Headaches with seizures    A fever with a stiff neck or pain when you bend your head toward your chest    A headache that you would call \"the worst headache you have ever had\" or a severe, persistent headache that gets worse or doesn't get better with treatment  Ann last reviewed this educational content on 7/1/2020 2000-2021 The " [employer]. All rights reserved. This information is not intended as a substitute for professional medical care. Always follow your healthcare professional's instructions.           Patient Education     Migraines and Cluster Headaches  Migraines and cluster headaches cause intense, throbbing pain on one side of the head. With a migraine, you may have nausea and vomiting and be sensitive to light and sound. You may also have warning signs, such as flashing lights or loss of parts of your vision, before the pain starts. This is called an aura. Migraines are 3 times more common in women than men. This may be due to hormonal influences. Typical migraines may last for 4 to 72 hours untreated.  Cluster headaches recur in groups for days, weeks, or months. The pain is centered around or behind one eye. The eye may also become red or teary, or the eyelid may droop. Migraines and cluster headaches can have many triggers.    Preventing migraines and cluster headaches  Try the following steps:    Don't eat aged cheeses, nuts, beans, chocolate, red wine, or foods that contain caffeine, alcohol, tobacco, nitrates, and MSG.    Try not to skip meals.    Don t work in poor lighting.    Reduce stress as much as you can.    Get plenty of sleep each night.    Exercise regularly under your doctor s guidance.    Don't take certain prescription medicines that are known to cause rebound headaches.  Relieving the pain  Try these suggestions:    Stay quiet and rest.    Use cold to numb the pain. Wrap ice or a cold can of soda in a cloth. Hold it against the site of pain for 10 minutes. Repeat every 20 minutes.    Stay out of the light. Wear dark glasses, turn out lights, and close the curtains. When outdoors, wear a brimmed hat.    Drink lots of fluids. Sip caffeine-free flat soda to help relieve nausea.    See your doctor if you get migraines or cluster headaches often. There are effective medicines to help treat or prevent  them.    Hormone therapy. This may help women whose migraines are linked to hormonal changes during menstruation.  Ann last reviewed this educational content on 8/1/2020 2000-2021 [employer]. All rights reserved. This information is not intended as a substitute for professional medical care. Always follow your healthcare professional's instructions.

## 2022-01-18 NOTE — PROGRESS NOTES
Assessment & Plan     Vaginal discharge  Bacterial vaginosis history  - Wet prep - Clinic Collect    Migraine without status migrainosus, not intractable, unspecified migraine type  Start propranolol 3 times daily as needed for prevention  Imitrex with next exacerbation  Zofran as needed for nausea  - SUMAtriptan (IMITREX) 25 MG tablet  Dispense: 9 tablet; Refill: 5  - propranolol (INDERAL) 10 MG tablet  Dispense: 90 tablet; Refill: 1  - ondansetron (ZOFRAN) 4 MG tablet  Dispense: 20 tablet; Refill: 3    Post-COVID chronic headache  Trial  - predniSONE (DELTASONE) 20 MG tablet  Dispense: 10 tablet; Refill: 0    BV (bacterial vaginosis)  Recurrent.  Begin  - metroNIDAZOLE (FLAGYL) 500 MG tablet  Dispense: 14 tablet; Refill: 0    Call or return to the clinic with any worsening of symptoms or no resolution. Patient/Parent verbalized understanding and is in agreement. Medication side effects reviewed.   Current Outpatient Medications   Medication Sig Dispense Refill     albuterol (PROAIR HFA/PROVENTIL HFA/VENTOLIN HFA) 108 (90 Base) MCG/ACT inhaler Inhale 2 puffs into the lungs every 6 hours as needed for shortness of breath / dyspnea or wheezing 1 Inhaler 0     cyclobenzaprine (FLEXERIL) 5 MG tablet Take 1-2 tablets (5-10 mg) by mouth 3 times daily as needed for muscle spasms 42 tablet 2     famotidine (PEPCID) 20 MG tablet Take 20 mg by mouth       gabapentin (NEURONTIN) 600 MG tablet Take 2 tablets (1,200 mg) by mouth 2 times daily 120 tablet 1     hydrOXYzine (ATARAX) 50 MG tablet Take 1 tablet by mouth three times daily as needed for anxiety.       lamoTRIgine (LAMICTAL) 200 MG tablet Take 1 tablet (200 mg) by mouth daily 30 tablet 2     metroNIDAZOLE (FLAGYL) 500 MG tablet Take 1 tablet (500 mg) by mouth 2 times daily for 7 days 14 tablet 0     omeprazole (PRILOSEC) 20 MG DR capsule Take 1 capsule (20 mg) by mouth daily 31 capsule 2     ondansetron (ZOFRAN) 4 MG tablet Take 1 tablet (4 mg) by mouth every 8 hours  as needed for nausea 20 tablet 3     predniSONE (DELTASONE) 20 MG tablet Take 2 tablets (40 mg) by mouth daily for 5 days 10 tablet 0     propranolol (INDERAL) 10 MG tablet Take 1 tablet (10 mg) by mouth 3 times daily as needed 90 tablet 1     propranolol (INDERAL) 20 MG tablet Take 1 tablet by mouth three times daily as needed for anxiety, restlessness.       QUEtiapine (SEROQUEL) 25 MG tablet TAKE 1 TABLET BY MOUTH TWICE DAILY IF NEEDED 60 tablet 1     QUEtiapine (SEROQUEL) 50 MG tablet TAKE 1-4 tablets BY MOUTH EVERY NIGHT AT BEDTIME AS NEEDED 120 tablet 0     sertraline (ZOLOFT) 50 MG tablet 1 tablet at bedtime 90 tablet 0     SUMAtriptan (IMITREX) 25 MG tablet Take 1-2 tablets (25-50 mg) by mouth at onset of headache for migraine May repeat in 2 hours. Max 8 tablets/24 hours. 9 tablet 5     valACYclovir (VALTREX) 500 MG tablet Take 1 tablet (500 mg) by mouth daily 31 tablet 11     ranitidine (ZANTAC) 300 MG tablet Take 1 tablet (300 mg) by mouth At Bedtime 30 tablet 2     Chart documentation with Dragon Voice recognition Software. Although reviewed after completion, some words and grammatical errors may remain.    Consider post-COVID clinic follow-up with no resolution/ongoing symptoms  DIPESH Marino CNP  [facility]   Sirena is a 36 year old who presents for the following health issues     HPI     COVID FOLLOW Up   Tested positive 11/28/2021   Still having issues   Dizziness, eye sight has changed, headaches   History of migraines.  No recent use of Zofran or Imitrex.  Patient states she does not have any and so would need that refilled    HEADACHE for 5 days straight.   Ended Saturday.  Missing some work due to ongoing headaches   ice right-sided temple region seems somewhat helpful  No use of imtrex       Bacterial vaginosis follow up    Some discharge   Not able to find her boric acid vaginal inserts.  Not able to afford others.          Review of Systems    Constitutional, HEENT, cardiovascular, pulmonary, GI, , musculoskeletal, neuro, skin, endocrine and psych systems are negative, except as otherwise noted.      Objective    /76   Pulse 113   Temp 97.3  F (36.3  C) (Tympanic)   Resp 14   Wt 87.1 kg (192 lb)   LMP  (LMP Unknown)   SpO2 95%   BMI 30.99 kg/m    Body mass index is 30.99 kg/m .  Physical Exam   GENERAL: healthy, alert and no distress  EYES: Eyes grossly normal to inspection, PERRL and conjunctivae and sclerae normal  HENT: ear canals and TM's normal, nose and mouth without ulcers or lesions  NECK: no adenopathy, no asymmetry, masses, or scars and thyroid normal to palpation  RESP: lungs clear to auscultation - no rales, rhonchi or wheezes  CV: regular rate and rhythm, normal S1 S2, no S3 or S4, no murmur, click or rub, no peripheral edema and peripheral pulses strong  ABDOMEN: soft, nontender, no hepatosplenomegaly, no masses and bowel sounds normal  MS: no gross musculoskeletal defects noted, no edema  SKIN: no suspicious lesions or rashes  NEURO: Normal strength and tone, mentation intact and speech normal  PSYCH: mentation appears normal, affect normal/bright    Office Visit on 07/22/2021   Component Date Value Ref Range Status     Trichomonas 07/22/2021 Absent  Absent Final     Yeast 07/22/2021 Absent  Absent Final     Clue Cells 07/22/2021 Present* Absent Final     WBCs/high power field 07/22/2021 None  None Final     Color Urine 07/22/2021 Yellow  Colorless, Straw, Light Yellow, Yellow Final     Appearance Urine 07/22/2021 Clear  Clear Final     Glucose Urine 07/22/2021 Negative  Negative mg/dL Final     Bilirubin Urine 07/22/2021 Negative  Negative Final     Ketones Urine 07/22/2021 Negative  Negative mg/dL Final     Specific Gravity Urine 07/22/2021 >=1.030  1.003 - 1.035 Final     Blood Urine 07/22/2021 Small* Negative Final     pH Urine 07/22/2021 5.0  5.0 - 7.0 Final     Protein Albumin Urine 07/22/2021 Negative  Negative mg/dL  Final     Urobilinogen Urine 07/22/2021 0.2  0.2, 1.0 E.U./dL Final     Nitrite Urine 07/22/2021 Negative  Negative Final     Leukocyte Esterase Urine 07/22/2021 Negative  Negative Final     Bacteria Urine 07/22/2021 Few* None Seen /HPF Final     RBC Urine 07/22/2021 5-10* 0-2 /HPF /HPF Final     WBC Urine 07/22/2021 0-5  0-5 /HPF /HPF Final     Squamous Epithelials Urine 07/22/2021 Few* None Seen /LPF Final     Mucus Urine 07/22/2021 Present* None Seen /LPF Final

## 2022-02-17 PROBLEM — G89.29 OTHER CHRONIC PAIN: Chronic | Status: ACTIVE | Noted: 2018-11-12

## 2022-02-28 ENCOUNTER — PRE VISIT (OUTPATIENT)
Dept: UROLOGY | Facility: CLINIC | Age: 37
End: 2022-02-28
Payer: COMMERCIAL

## 2022-02-28 NOTE — CONFIDENTIAL NOTE
Reason for visit: consult     Relevant information: bladder pain    Records/imaging/labs/orders: in epic    Pt called: n/a    At Rooming: standard, set-up for possible pelvic exam

## 2022-05-11 ENCOUNTER — HOSPITAL ENCOUNTER (EMERGENCY)
Facility: CLINIC | Age: 37
Discharge: HOME | End: 2022-05-11
Attending: FAMILY MEDICINE | Admitting: FAMILY MEDICINE
Payer: COMMERCIAL

## 2022-05-11 VITALS
BODY MASS INDEX: 30.53 KG/M2 | HEIGHT: 66 IN | TEMPERATURE: 97.2 F | SYSTOLIC BLOOD PRESSURE: 136 MMHG | OXYGEN SATURATION: 98 % | DIASTOLIC BLOOD PRESSURE: 91 MMHG | WEIGHT: 190 LBS | HEART RATE: 112 BPM | RESPIRATION RATE: 18 BRPM

## 2022-05-11 DIAGNOSIS — L02.818 CUTANEOUS ABSCESS OF OTHER SITE: ICD-10-CM

## 2022-05-11 PROCEDURE — 99284 EMERGENCY DEPT VISIT MOD MDM: CPT | Mod: 25 | Performed by: FAMILY MEDICINE

## 2022-05-11 PROCEDURE — 76882 US LMTD JT/FCL EVL NVASC XTR: CPT | Mod: 26 | Performed by: FAMILY MEDICINE

## 2022-05-11 PROCEDURE — 10060 I&D ABSCESS SIMPLE/SINGLE: CPT | Performed by: FAMILY MEDICINE

## 2022-05-11 PROCEDURE — 76882 US LMTD JT/FCL EVL NVASC XTR: CPT | Mod: LT | Performed by: FAMILY MEDICINE

## 2022-05-11 RX ORDER — CLINDAMYCIN HCL 300 MG
300 CAPSULE ORAL 3 TIMES DAILY
Qty: 15 CAPSULE | Refills: 0 | Status: SHIPPED | OUTPATIENT
Start: 2022-05-11 | End: 2022-05-16

## 2022-05-11 NOTE — ED TRIAGE NOTES
Ingrown hair to left inner thigh      Triage Assessment     Row Name 05/11/22 0711       Triage Assessment (Adult)    Airway WDL WDL       Respiratory WDL    Respiratory WDL WDL       Skin Circulation/Temperature WDL    Skin Circulation/Temperature WDL WDL       Cognitive/Neuro/Behavioral WDL    Cognitive/Neuro/Behavioral WDL WDL

## 2022-05-11 NOTE — DISCHARGE INSTRUCTIONS
RETURN TO THE EMERGENCY ROOM FOR THE FOLLOWING:    Severely worsened pain, severely worsened swelling and expanding redness, fever greater than 101, vomiting and dehydration, fainting, or at anytime for any concern.    FOLLOW UP:    With your primary clinic as needed or if not improved over the next 5 days, or back to the ED for reevaluation.    TREATMENT RECOMMENDATIONS:    Clindamycin 3 times a day over the next 5 days.  Ibuprofen 600 mg every six hours for pain (7 days duration).  Tylenol 1000 mg every six hours for pain (7 days duration).  Therefore, you can alternate these every three hours and do it safely.    NURSE ADVICE LINE:  [phone] or [phone]

## 2022-09-13 ENCOUNTER — TELEPHONE (OUTPATIENT)
Dept: FAMILY MEDICINE | Facility: CLINIC | Age: 37
End: 2022-09-13

## 2022-09-13 ASSESSMENT — PATIENT HEALTH QUESTIONNAIRE - PHQ9: SUM OF ALL RESPONSES TO PHQ QUESTIONS 1-9: 4

## 2022-09-13 NOTE — TELEPHONE ENCOUNTER
Patient Quality Outreach    Patient is due for the following:   Depression  -  PHQ-9 needed    Next Steps:   Patient declined follow up at this time. going to [facility]     Type of outreach:    Chart review performed, no outreach needed.      Questions for provider review:    None     Salima Dow, CMA

## 2022-09-20 ENCOUNTER — HOSPITAL ENCOUNTER (EMERGENCY)
Facility: CLINIC | Age: 37
Discharge: HOME | End: 2022-09-20
Attending: FAMILY MEDICINE | Admitting: FAMILY MEDICINE
Payer: COMMERCIAL

## 2022-09-20 ENCOUNTER — APPOINTMENT (OUTPATIENT)
Dept: CT IMAGING | Facility: CLINIC | Age: 37
End: 2022-09-20
Attending: FAMILY MEDICINE
Payer: COMMERCIAL

## 2022-09-20 VITALS
OXYGEN SATURATION: 96 % | WEIGHT: 185 LBS | DIASTOLIC BLOOD PRESSURE: 83 MMHG | TEMPERATURE: 98.2 F | BODY MASS INDEX: 29.73 KG/M2 | RESPIRATION RATE: 18 BRPM | HEART RATE: 67 BPM | HEIGHT: 66 IN | SYSTOLIC BLOOD PRESSURE: 149 MMHG

## 2022-09-20 DIAGNOSIS — R10.9 ABDOMINAL PAIN, UNSPECIFIED ABDOMINAL LOCATION: ICD-10-CM

## 2022-09-20 LAB
ALBUMIN SERPL BCG-MCNC: 4.8 G/DL (ref 3.5–5.2)
ALBUMIN UR-MCNC: NEGATIVE MG/DL
ALP SERPL-CCNC: 76 U/L (ref 35–104)
ALT SERPL W P-5'-P-CCNC: 30 U/L (ref 10–35)
ANION GAP SERPL CALCULATED.3IONS-SCNC: 11 MMOL/L (ref 7–15)
APPEARANCE UR: CLEAR
AST SERPL W P-5'-P-CCNC: 21 U/L (ref 10–35)
BASOPHILS # BLD MANUAL: 0 10E3/UL (ref 0–0.2)
BASOPHILS NFR BLD MANUAL: 0 %
BILIRUB DIRECT SERPL-MCNC: <0.2 MG/DL (ref 0–0.3)
BILIRUB SERPL-MCNC: 0.3 MG/DL
BILIRUB UR QL STRIP: NEGATIVE
BUN SERPL-MCNC: 20 MG/DL (ref 6–20)
CALCIUM SERPL-MCNC: 9.5 MG/DL (ref 8.6–10)
CHLORIDE SERPL-SCNC: 104 MMOL/L (ref 98–107)
COLOR UR AUTO: YELLOW
CREAT SERPL-MCNC: 0.81 MG/DL (ref 0.51–0.95)
DEPRECATED HCO3 PLAS-SCNC: 23 MMOL/L (ref 22–29)
EOSINOPHIL # BLD MANUAL: 0.6 10E3/UL (ref 0–0.7)
EOSINOPHIL NFR BLD MANUAL: 5 %
ERYTHROCYTE [DISTWIDTH] IN BLOOD BY AUTOMATED COUNT: 11.9 % (ref 10–15)
GFR SERPL CREATININE-BSD FRML MDRD: >90 ML/MIN/1.73M2
GLUCOSE SERPL-MCNC: 89 MG/DL (ref 70–99)
GLUCOSE UR STRIP-MCNC: NEGATIVE MG/DL
HCG UR QL: NEGATIVE
HCT VFR BLD AUTO: 40.3 % (ref 35–47)
HGB BLD-MCNC: 13.9 G/DL (ref 11.7–15.7)
HGB UR QL STRIP: NEGATIVE
HOLD SPECIMEN: NORMAL
KETONES UR STRIP-MCNC: 20 MG/DL
LEUKOCYTE ESTERASE UR QL STRIP: NEGATIVE
LYMPHOCYTES # BLD MANUAL: 6.2 10E3/UL (ref 0.8–5.3)
LYMPHOCYTES NFR BLD MANUAL: 50 %
MCH RBC QN AUTO: 30.2 PG (ref 26.5–33)
MCHC RBC AUTO-ENTMCNC: 34.5 G/DL (ref 31.5–36.5)
MCV RBC AUTO: 87 FL (ref 78–100)
MONOCYTES # BLD MANUAL: 0.4 10E3/UL (ref 0–1.3)
MONOCYTES NFR BLD MANUAL: 3 %
MUCOUS THREADS #/AREA URNS LPF: PRESENT /LPF
NEUTROPHILS # BLD MANUAL: 5.2 10E3/UL (ref 1.6–8.3)
NEUTROPHILS NFR BLD MANUAL: 42 %
NITRATE UR QL: NEGATIVE
PH UR STRIP: 6 [PH] (ref 5–7)
PLAT MORPH BLD: ABNORMAL
PLATELET # BLD AUTO: 317 10E3/UL (ref 150–450)
POTASSIUM SERPL-SCNC: 4.1 MMOL/L (ref 3.4–5.3)
PROT SERPL-MCNC: 6.9 G/DL (ref 6.4–8.3)
RBC # BLD AUTO: 4.61 10E6/UL (ref 3.8–5.2)
RBC MORPH BLD: ABNORMAL
RBC URINE: 4 /HPF
SODIUM SERPL-SCNC: 138 MMOL/L (ref 136–145)
SP GR UR STRIP: 1.03 (ref 1–1.03)
SQUAMOUS EPITHELIAL: 14 /HPF
UROBILINOGEN UR STRIP-MCNC: NORMAL MG/DL
VARIANT LYMPHS BLD QL SMEAR: PRESENT
WBC # BLD AUTO: 12.3 10E3/UL (ref 4–11)
WBC URINE: 1 /HPF

## 2022-09-20 PROCEDURE — 250N000011 HC RX IP 250 OP 636: Performed by: FAMILY MEDICINE

## 2022-09-20 PROCEDURE — 96375 TX/PRO/DX INJ NEW DRUG ADDON: CPT | Mod: 59 | Performed by: FAMILY MEDICINE

## 2022-09-20 PROCEDURE — 85007 BL SMEAR W/DIFF WBC COUNT: CPT | Performed by: FAMILY MEDICINE

## 2022-09-20 PROCEDURE — 81001 URINALYSIS AUTO W/SCOPE: CPT | Performed by: FAMILY MEDICINE

## 2022-09-20 PROCEDURE — 99284 EMERGENCY DEPT VISIT MOD MDM: CPT | Performed by: FAMILY MEDICINE

## 2022-09-20 PROCEDURE — 99285 EMERGENCY DEPT VISIT HI MDM: CPT | Mod: 25 | Performed by: FAMILY MEDICINE

## 2022-09-20 PROCEDURE — 258N000003 HC RX IP 258 OP 636: Performed by: FAMILY MEDICINE

## 2022-09-20 PROCEDURE — 250N000009 HC RX 250: Performed by: FAMILY MEDICINE

## 2022-09-20 PROCEDURE — 81025 URINE PREGNANCY TEST: CPT | Performed by: FAMILY MEDICINE

## 2022-09-20 PROCEDURE — 74177 CT ABD & PELVIS W/CONTRAST: CPT

## 2022-09-20 PROCEDURE — 82248 BILIRUBIN DIRECT: CPT | Performed by: FAMILY MEDICINE

## 2022-09-20 PROCEDURE — 96361 HYDRATE IV INFUSION ADD-ON: CPT | Performed by: FAMILY MEDICINE

## 2022-09-20 PROCEDURE — 85027 COMPLETE CBC AUTOMATED: CPT | Performed by: FAMILY MEDICINE

## 2022-09-20 PROCEDURE — 96374 THER/PROPH/DIAG INJ IV PUSH: CPT | Performed by: FAMILY MEDICINE

## 2022-09-20 PROCEDURE — 36415 COLL VENOUS BLD VENIPUNCTURE: CPT | Performed by: FAMILY MEDICINE

## 2022-09-20 RX ORDER — IOPAMIDOL 755 MG/ML
81 INJECTION, SOLUTION INTRAVASCULAR ONCE
Status: COMPLETED | OUTPATIENT
Start: 2022-09-20 | End: 2022-09-20

## 2022-09-20 RX ORDER — KETOROLAC TROMETHAMINE 15 MG/ML
15 INJECTION, SOLUTION INTRAMUSCULAR; INTRAVENOUS ONCE
Status: COMPLETED | OUTPATIENT
Start: 2022-09-20 | End: 2022-09-20

## 2022-09-20 RX ORDER — ONDANSETRON 2 MG/ML
4 INJECTION INTRAMUSCULAR; INTRAVENOUS ONCE
Status: COMPLETED | OUTPATIENT
Start: 2022-09-20 | End: 2022-09-20

## 2022-09-20 RX ADMIN — IOPAMIDOL 81 ML: 755 INJECTION, SOLUTION INTRAVENOUS at 07:08

## 2022-09-20 RX ADMIN — KETOROLAC TROMETHAMINE 15 MG: 15 INJECTION, SOLUTION INTRAMUSCULAR; INTRAVENOUS at 06:34

## 2022-09-20 RX ADMIN — SODIUM CHLORIDE, POTASSIUM CHLORIDE, SODIUM LACTATE AND CALCIUM CHLORIDE 1000 ML: 600; 310; 30; 20 INJECTION, SOLUTION INTRAVENOUS at 06:33

## 2022-09-20 RX ADMIN — ONDANSETRON 4 MG: 2 INJECTION INTRAMUSCULAR; INTRAVENOUS at 06:33

## 2022-09-20 RX ADMIN — SODIUM CHLORIDE 80 ML: 9 INJECTION, SOLUTION INTRAVENOUS at 07:08

## 2022-09-20 ASSESSMENT — ACTIVITIES OF DAILY LIVING (ADL)
ADLS_ACUITY_SCORE: 35
ADLS_ACUITY_SCORE: 35
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
## 2022-09-20 NOTE — DISCHARGE INSTRUCTIONS
Tylenol/ibuprofen as needed for pain.  Alternating hot cold to the area of concern may also be helpful.  Return to the emergency department if worse or changes.

## 2022-10-10 ENCOUNTER — OFFICE VISIT (OUTPATIENT)
Dept: URGENT CARE | Facility: URGENT CARE | Age: 37
End: 2022-10-10
Payer: COMMERCIAL

## 2022-10-10 VITALS
DIASTOLIC BLOOD PRESSURE: 81 MMHG | TEMPERATURE: 97.8 F | HEART RATE: 100 BPM | OXYGEN SATURATION: 98 % | SYSTOLIC BLOOD PRESSURE: 128 MMHG | WEIGHT: 192 LBS | BODY MASS INDEX: 30.99 KG/M2

## 2022-10-10 DIAGNOSIS — R19.7 DIARRHEA, UNSPECIFIED TYPE: Primary | ICD-10-CM

## 2022-10-10 DIAGNOSIS — R11.0 NAUSEA: ICD-10-CM

## 2022-10-10 DIAGNOSIS — R51.9 HEADACHE, UNSPECIFIED HEADACHE TYPE: ICD-10-CM

## 2022-10-10 PROCEDURE — 99213 OFFICE O/P EST LOW 20 MIN: CPT | Mod: 25 | Performed by: PHYSICIAN ASSISTANT

## 2022-10-10 PROCEDURE — 96372 THER/PROPH/DIAG INJ SC/IM: CPT | Performed by: PHYSICIAN ASSISTANT

## 2022-10-10 RX ORDER — KETOROLAC TROMETHAMINE 30 MG/ML
30 INJECTION, SOLUTION INTRAMUSCULAR; INTRAVENOUS ONCE
Status: COMPLETED | OUTPATIENT
Start: 2022-10-10 | End: 2022-10-10

## 2022-10-10 RX ORDER — ONDANSETRON 4 MG/1
4 TABLET, ORALLY DISINTEGRATING ORAL EVERY 8 HOURS PRN
Qty: 20 TABLET | Refills: 0 | Status: SHIPPED | OUTPATIENT
Start: 2022-10-10 | End: 2022-11-09

## 2022-10-10 RX ADMIN — KETOROLAC TROMETHAMINE 30 MG: 30 INJECTION, SOLUTION INTRAMUSCULAR; INTRAVENOUS at 17:48

## 2022-10-10 NOTE — PROGRESS NOTES
Assessment & Plan     1. Diarrhea, unspecified type  Stool workup pending. Will treat based on results. Push fluids. Return to clinic if symptoms worsen or do not improve; otherwise follow up as needed      - Clostridium difficile Toxin B PCR; Future  - Enteric Bacteria and Virus Panel by [facility] Stool; Future  - Ova and Parasite Exam Routine; Future  - Cryptosporidium/Giardia Immunoassay; Future  - Clostridium difficile Toxin B PCR  - Enteric Bacteria and Virus Panel by [facility] Stool  - Ova and Parasite Exam Routine  - Cryptosporidium/Giardia Immunoassay    2. Nausea    - ondansetron (ZOFRAN ODT) 4 MG ODT tab; Take 1 tablet (4 mg) by mouth every 8 hours as needed for nausea or vomiting  Dispense: 20 tablet; Refill: 0    3. Headache, unspecified headache type    - ketorolac (TORADOL) injection 30 mg                Return in about 1 week (around 10/17/2022), or if symptoms worsen or fail to improve.    PINO Hannon [facility]              Subjective   Chief Complaint   Patient presents with     Diarrhea     7 days ago began having diarrhea, now the last couple days a bad headache,makes her cry.  Nausea and vomited a couple times.  Rapid covid at work today, negative.  Took tylenol today.         HPI     Gastro     Onset of symptoms was 1 week(s) ago.  Course of illness is same.    Severity moderate  Current and Associated symptoms: diarrhea, headache, vomiting about once   Treatment measures tried include imodium .  Predisposing factors include None.                Review of Systems   Constitutional, HEENT, cardiovascular, pulmonary, gi and gu systems are negative, except as otherwise noted.      Objective    /81   Pulse 100   Temp 97.8  F (36.6  C) (Tympanic)   Wt 87.1 kg (192 lb)   LMP  (LMP Unknown)   SpO2 98%   BMI 30.99 kg/m    Body mass index is 30.99 kg/m .  Physical Exam  Constitutional:       Appearance: She is well-developed.   HENT:      Head: Normocephalic.       Right Ear: Tympanic membrane and ear canal normal.      Left Ear: Tympanic membrane and ear canal normal.   Eyes:      Conjunctiva/sclera: Conjunctivae normal.   Cardiovascular:      Rate and Rhythm: Normal rate.      Heart sounds: Normal heart sounds.   Pulmonary:      Effort: Pulmonary effort is normal.      Breath sounds: Normal breath sounds.   Skin:     General: Skin is warm and dry.      Findings: No rash.   Psychiatric:         Behavior: Behavior normal.

## 2022-10-10 NOTE — LETTER
[facility] [place]  [address] [address]  Phone: [phone]  Fax: [phone]    October 10, 2022        Sirena Dietrich  [address] [address] [facility] [address]          To whom it may concern:    RE: Sirena Dietrich    Patient was seen and treated today at our clinic and missed work 10/9/22 and 10/10/22    Please contact me for questions or concerns.      Sincerely,        Brit Forbes PA-C

## 2022-11-02 NOTE — ED NOTES
"Pt very anxious and demanding medication, upset that shes been \"in pain for 7 fucking hours why did I even come here if you arent going to do anything\" tried to calm pt and let her know she had just gotten here and we would work as quickly as we could to help her. Pt still angry and told RN to get out. Labs sent, EKG ordered  " Patient requests all Lab, Cardiology, and Radiology Results on their Discharge Instructions

## 2022-11-09 ENCOUNTER — OFFICE VISIT (OUTPATIENT)
Dept: FAMILY MEDICINE | Facility: CLINIC | Age: 37
End: 2022-11-09
Payer: COMMERCIAL

## 2022-11-09 VITALS
BODY MASS INDEX: 28.08 KG/M2 | TEMPERATURE: 97.3 F | OXYGEN SATURATION: 97 % | HEART RATE: 102 BPM | SYSTOLIC BLOOD PRESSURE: 126 MMHG | DIASTOLIC BLOOD PRESSURE: 72 MMHG | WEIGHT: 174 LBS | RESPIRATION RATE: 14 BRPM

## 2022-11-09 DIAGNOSIS — B00.9 HSV (HERPES SIMPLEX VIRUS) INFECTION: ICD-10-CM

## 2022-11-09 DIAGNOSIS — F19.20 CHEMICAL DEPENDENCY (H): ICD-10-CM

## 2022-11-09 DIAGNOSIS — R41.89 BRAIN FOG: ICD-10-CM

## 2022-11-09 DIAGNOSIS — F31.4 BIPOLAR DISORDER, CURRENT EPISODE DEPRESSED, SEVERE, WITHOUT PSYCHOTIC FEATURES (H): ICD-10-CM

## 2022-11-09 DIAGNOSIS — K64.4 EXTERNAL HEMORRHOIDS: ICD-10-CM

## 2022-11-09 DIAGNOSIS — G89.29 CHRONIC NONINTRACTABLE HEADACHE, UNSPECIFIED HEADACHE TYPE: Primary | ICD-10-CM

## 2022-11-09 DIAGNOSIS — Z79.899 ENCOUNTER FOR LONG-TERM (CURRENT) USE OF MEDICATIONS: ICD-10-CM

## 2022-11-09 DIAGNOSIS — R11.0 NAUSEA: ICD-10-CM

## 2022-11-09 DIAGNOSIS — R51.9 CHRONIC NONINTRACTABLE HEADACHE, UNSPECIFIED HEADACHE TYPE: Primary | ICD-10-CM

## 2022-11-09 PROCEDURE — 99214 OFFICE O/P EST MOD 30 MIN: CPT | Performed by: NURSE PRACTITIONER

## 2022-11-09 RX ORDER — ONDANSETRON 4 MG/1
4 TABLET, ORALLY DISINTEGRATING ORAL EVERY 8 HOURS PRN
Qty: 20 TABLET | Refills: 0 | Status: SHIPPED | OUTPATIENT
Start: 2022-11-09 | End: 2023-02-09

## 2022-11-09 RX ORDER — TIZANIDINE 2 MG/1
TABLET ORAL
COMMUNITY
Start: 2022-08-11 | End: 2023-02-09

## 2022-11-09 RX ORDER — LORAZEPAM 0.5 MG/1
0.5 TABLET ORAL 2 TIMES DAILY PRN
COMMUNITY
Start: 2022-10-20 | End: 2023-10-31

## 2022-11-09 ASSESSMENT — PAIN SCALES - GENERAL: PAINLEVEL: NO PAIN (0)

## 2022-11-09 ASSESSMENT — PATIENT HEALTH QUESTIONNAIRE - PHQ9
10. IF YOU CHECKED OFF ANY PROBLEMS, HOW DIFFICULT HAVE THESE PROBLEMS MADE IT FOR YOU TO DO YOUR WORK, TAKE CARE OF THINGS AT HOME, OR GET ALONG WITH OTHER PEOPLE: VERY DIFFICULT
SUM OF ALL RESPONSES TO PHQ QUESTIONS 1-9: 21
SUM OF ALL RESPONSES TO PHQ QUESTIONS 1-9: 21

## 2022-11-09 ASSESSMENT — ENCOUNTER SYMPTOMS: HEADACHES: 1

## 2022-11-09 NOTE — PROGRESS NOTES
Assessment & Plan     Chronic nonintractable headache, unspecified headache type    - MR Brain w/o & w Contrast    Bipolar disorder, current episode depressed, severe, without psychotic features (H)  Ongoing and chronic  Call psychiatry team to discuss medication SE     Chemical dependency (H)  Sober. Continue to maintaine     Nausea  Ongoing with migraines. zofran refilled   - ondansetron (ZOFRAN ODT) 4 MG ODT tab  Dispense: 20 tablet; Refill: 0    Brain fog  MRI and neuropsych consultation recommended  - MR Brain w/o & w Contrast    HSV (herpes simplex virus) infection  Recurrent. Daily medication declined.       External hemorrhoids  Begin preparation H   - phenylephrine-shark liver oil-mineral oil-petrolatum (PREPARATION H) 0.25-3-14-71.9 % rectal ointment  Dispense: 57 g; Refill: 1      Call or return to the clinic with any worsening of symptoms or no resolution. Patient/Parent verbalized understanding and is in agreement. Medication side effects reviewed.   Current Outpatient Medications   Medication Sig Dispense Refill     albuterol (PROAIR HFA/PROVENTIL HFA/VENTOLIN HFA) 108 (90 Base) MCG/ACT inhaler Inhale 2 puffs into the lungs every 6 hours as needed for shortness of breath / dyspnea or wheezing 1 Inhaler 0     cyclobenzaprine (FLEXERIL) 5 MG tablet Take 1-2 tablets (5-10 mg) by mouth 3 times daily as needed for muscle spasms 42 tablet 2     famotidine (PEPCID) 20 MG tablet Take 20 mg by mouth       hydrOXYzine (ATARAX) 50 MG tablet Take 1 tablet by mouth three times daily as needed for anxiety.       lamoTRIgine (LAMICTAL) 200 MG tablet Take 1 tablet (200 mg) by mouth daily 30 tablet 2     omeprazole (PRILOSEC) 20 MG DR capsule Take 1 capsule (20 mg) by mouth daily 31 capsule 2     ondansetron (ZOFRAN ODT) 4 MG ODT tab Take 1 tablet (4 mg) by mouth every 8 hours as needed for nausea or vomiting 20 tablet 0     phenylephrine-shark liver oil-mineral oil-petrolatum (PREPARATION H) 0.25-3-14-71.9 % rectal  ointment Place rectally 2 times daily as needed for hemorrhoids 57 g 1     propranolol (INDERAL) 20 MG tablet Take 1 tablet by mouth three times daily as needed for anxiety, restlessness.       QUEtiapine (SEROQUEL) 25 MG tablet TAKE 1 TABLET BY MOUTH TWICE DAILY IF NEEDED 60 tablet 1     QUEtiapine (SEROQUEL) 50 MG tablet TAKE 1-4 tablets BY MOUTH EVERY NIGHT AT BEDTIME AS NEEDED 120 tablet 0     ranitidine (ZANTAC) 300 MG tablet Take 1 tablet (300 mg) by mouth At Bedtime 30 tablet 2     SUMAtriptan (IMITREX) 25 MG tablet Take 1-2 tablets (25-50 mg) by mouth at onset of headache for migraine May repeat in 2 hours. Max 8 tablets/24 hours. 9 tablet 5     valACYclovir (VALTREX) 500 MG tablet Take 1 tablet (500 mg) by mouth daily 31 tablet 11     LORazepam (ATIVAN) 0.5 MG tablet Take 0.5 mg by mouth 2 times daily as needed       tiZANidine (ZANAFLEX) 2 MG tablet TAKE 1 TABLET BY MOUTH EVERY 8 HOURS IF NEEDED FOR MUSCLE SPASMS       Chart documentation with Dragon Voice recognition Software. Although reviewed after completion, some words and grammatical errors may remain.  Follow up with Primary Care Provider with ongoing symptoms.      DIPESH Marino [facility]    Elaine Pack is a 36 year old, presenting for the following health issues:  Headache and Gyn Exam      Headache     History of Present Illness       Reason for visit:  Looked at  Symptom onset:  More than a month  Symptoms include:  Forgetfulness cant remember words  Symptom intensity:  Moderate  Symptom progression:  Worsening  Had these symptoms before:  No  What makes it worse:  No  What makes it better:  No    She eats 2-3 servings of fruits and vegetables daily.She consumes 1 sweetened beverage(s) daily.She exercises with enough effort to increase her heart rate 60 or more minutes per day.  She exercises with enough effort to increase her heart rate 5 days per week.   She is taking medications  regularly.    Today's PHQ-9         PHQ-9 Total Score: 21    PHQ-9 Q9 Thoughts of better off dead/self-harm past 2 weeks :   Not at all    How difficult have these problems made it for you to do your work, take care of things at home, or get along with other people: Very difficult     Hemorrhoids x 1 week  Painful bottom since last HSV break out.     Working at [employer] put in 80 hours this last week due to short staffing  A lot of word loss, chronic HEADACHE different from her typical migraines  This change in mentation happened after COVID last fall.   Requesting MRI for headaches   Not getting words out right  imitrex and zofran and tylenol  has been effective for the migraines  3 weeks straight of migraine ended last week  Post covid HEADACHE is different that her regular migraines.   Having problems at times with finding words and speech not coming out right per her friends and coworkers.     Bipolar disorder working with Puja Weathers  Meds gabapentin and lorazepam  Not taking Gabepentin due to MH breakdown and feafult of   SE -didn't like so stopped      COVID a year ago 11/2021  Denies any further drug or ETOH use.   Primary Care Provider in [place] Jodie Wolfe NP  She has moved to [place] now and may come here to clinic for care.  S/p hysterectomy due to endometriosis and recurrent ovaraian cysts with chronic pelvic pain. one ovary remains.     Review of Systems   Neurological: Positive for headaches.      Constitutional, HEENT, cardiovascular, pulmonary, GI, , musculoskeletal, neuro, skin, endocrine and psych systems are negative, except as otherwise noted.      Objective    /72   Pulse 102   Wt 78.9 kg (174 lb)   LMP  (LMP Unknown)   SpO2 97%   BMI 28.08 kg/m    Body mass index is 28.08 kg/m .  Physical Exam   GENERAL: healthy, alert and no distress  EYES: Eyes grossly normal to inspection, PERRL and conjunctivae and sclerae normal  HENT: ear canals and TM's normal, nose and mouth without ulcers or  lesions  NECK: no adenopathy, no asymmetry, masses, or scars and thyroid normal to palpation  RESP: lungs clear to auscultation - no rales, rhonchi or wheezes  CV: regular rate and rhythm, normal S1 S2, no S3 or S4, no murmur, click or rub, no peripheral edema and peripheral pulses strong  ABDOMEN: soft, nontender, no hepatosplenomegaly, no masses and bowel sounds normal  MS: no gross musculoskeletal defects noted, no edema  SKIN: no suspicious lesions or rashes  NEURO: Normal strength and tone, mentation intact and speech normal  PSYCH: mentation appears normal, affect normal/bright    Admission on 09/20/2022, Discharged on 09/20/2022   Component Date Value Ref Range Status     Sodium 09/20/2022 138  136 - 145 mmol/L Final     Potassium 09/20/2022 4.1  3.4 - 5.3 mmol/L Final     Chloride 09/20/2022 104  98 - 107 mmol/L Final     Carbon Dioxide (CO2) 09/20/2022 23  22 - 29 mmol/L Final     Anion Gap 09/20/2022 11  7 - 15 mmol/L Final     Urea Nitrogen 09/20/2022 20.0  6.0 - 20.0 mg/dL Final     Creatinine 09/20/2022 0.81  0.51 - 0.95 mg/dL Final     Calcium 09/20/2022 9.5  8.6 - 10.0 mg/dL Final     Glucose 09/20/2022 89  70 - 99 mg/dL Final     GFR Estimate 09/20/2022 >90  >60 mL/min/1.73m2 Final    Effective December 21, 2021 eGFRcr in adults is calculated using the 2021 CKD-EPI creatinine equation which includes age and gender (Stephanie et al., NEJ, DOI: 10.1056/JLCTyd2155996)     Protein Total 09/20/2022 6.9  6.4 - 8.3 g/dL Final     Albumin 09/20/2022 4.8  3.5 - 5.2 g/dL Final     Bilirubin Total 09/20/2022 0.3  <=1.2 mg/dL Final     Alkaline Phosphatase 09/20/2022 76  35 - 104 U/L Final     AST 09/20/2022 21  10 - 35 U/L Final     ALT 09/20/2022 30  10 - 35 U/L Final     Bilirubin Direct 09/20/2022 <0.20  0.00 - 0.30 mg/dL Final     hCG Urine Qualitative 09/20/2022 Negative  Negative Final    This test is for screening purposes.  Results should be interpreted along with the clinical picture.  Confirmation  testing is available if warranted by ordering [id number], HCG Quantitative Pregnancy.     Color Urine 09/20/2022 Yellow  Colorless, Straw, Light Yellow, Yellow Final     Appearance Urine 09/20/2022 Clear  Clear Final     Glucose Urine 09/20/2022 Negative  Negative mg/dL Final     Bilirubin Urine 09/20/2022 Negative  Negative Final     Ketones Urine 09/20/2022 20 (A)  Negative mg/dL Final     Specific Gravity Urine 09/20/2022 1.030  1.003 - 1.035 Final     Blood Urine 09/20/2022 Negative  Negative Final     pH Urine 09/20/2022 6.0  5.0 - 7.0 Final     Protein Albumin Urine 09/20/2022 Negative  Negative mg/dL Final     Urobilinogen Urine 09/20/2022 Normal  Normal, 2.0 mg/dL Final     Nitrite Urine 09/20/2022 Negative  Negative Final     Leukocyte Esterase Urine 09/20/2022 Negative  Negative Final     Mucus Urine 09/20/2022 Present (A)  None Seen /LPF Final     RBC Urine 09/20/2022 4 (H)  <=2 /HPF Final     WBC Urine 09/20/2022 1  <=5 /HPF Final     Squamous Epithelials Urine 09/20/2022 14 (H)  <=1 /HPF Final     Hold Specimen 09/20/2022 JIC   Final     WBC Count 09/20/2022 12.3 (H)  4.0 - 11.0 10e3/uL Final     RBC Count 09/20/2022 4.61  3.80 - 5.20 10e6/uL Final     Hemoglobin 09/20/2022 13.9  11.7 - 15.7 g/dL Final     Hematocrit 09/20/2022 40.3  35.0 - 47.0 % Final     MCV 09/20/2022 87  78 - 100 fL Final     MCH 09/20/2022 30.2  26.5 - 33.0 pg Final     MCHC 09/20/2022 34.5  31.5 - 36.5 g/dL Final     RDW 09/20/2022 11.9  10.0 - 15.0 % Final     Platelet Count 09/20/2022 317  150 - 450 10e3/uL Final     % Neutrophils 09/20/2022 42  % Final     % Lymphocytes 09/20/2022 50  % Final     % Monocytes 09/20/2022 3  % Final     % Eosinophils 09/20/2022 5  % Final     % Basophils 09/20/2022 0  % Final     Absolute Neutrophils 09/20/2022 5.2  1.6 - 8.3 10e3/uL Final     Absolute Lymphocytes 09/20/2022 6.2 (H)  0.8 - 5.3 10e3/uL Final     Absolute Monocytes 09/20/2022 0.4  0.0 - 1.3 10e3/uL Final     Absolute Eosinophils  09/20/2022 0.6  0.0 - 0.7 10e3/uL Final     Absolute Basophils 09/20/2022 0.0  0.0 - 0.2 10e3/uL Final     RBC Morphology 09/20/2022 Confirmed RBC Indices   Final     Platelet Assessment 09/20/2022 Automated Count Confirmed. Platelet morphology is normal.  Automated Count Confirmed. Platelet morphology is normal. Final     Reactive Lymphocytes 09/20/2022 Present (A)  None Seen Final

## 2022-11-19 ENCOUNTER — HEALTH MAINTENANCE LETTER (OUTPATIENT)
Age: 37
End: 2022-11-19

## 2023-02-04 DIAGNOSIS — G43.109 MIGRAINE WITH AURA AND WITHOUT STATUS MIGRAINOSUS, NOT INTRACTABLE: Chronic | ICD-10-CM

## 2023-02-07 RX ORDER — ONDANSETRON 4 MG/1
4 TABLET, FILM COATED ORAL EVERY 8 HOURS PRN
Qty: 20 TABLET | Refills: 3 | Status: SHIPPED | OUTPATIENT
Start: 2023-02-07 | End: 2023-09-19

## 2023-02-09 ENCOUNTER — OFFICE VISIT (OUTPATIENT)
Dept: FAMILY MEDICINE | Facility: CLINIC | Age: 38
End: 2023-02-09
Payer: COMMERCIAL

## 2023-02-09 VITALS
BODY MASS INDEX: 29.57 KG/M2 | TEMPERATURE: 97.3 F | RESPIRATION RATE: 18 BRPM | HEIGHT: 66 IN | WEIGHT: 184 LBS | DIASTOLIC BLOOD PRESSURE: 81 MMHG | HEART RATE: 83 BPM | SYSTOLIC BLOOD PRESSURE: 136 MMHG | OXYGEN SATURATION: 98 %

## 2023-02-09 DIAGNOSIS — G43.909 MIGRAINE WITHOUT STATUS MIGRAINOSUS, NOT INTRACTABLE, UNSPECIFIED MIGRAINE TYPE: Chronic | ICD-10-CM

## 2023-02-09 DIAGNOSIS — B00.9 HSV (HERPES SIMPLEX VIRUS) INFECTION: ICD-10-CM

## 2023-02-09 DIAGNOSIS — K29.00 OTHER ACUTE GASTRITIS WITHOUT HEMORRHAGE: ICD-10-CM

## 2023-02-09 DIAGNOSIS — M54.41 ACUTE RIGHT-SIDED LOW BACK PAIN WITH RIGHT-SIDED SCIATICA: ICD-10-CM

## 2023-02-09 DIAGNOSIS — F19.20 CHEMICAL DEPENDENCY (H): ICD-10-CM

## 2023-02-09 DIAGNOSIS — B96.89 BACTERIAL VAGINITIS: Primary | ICD-10-CM

## 2023-02-09 DIAGNOSIS — R05.3 CHRONIC COUGH: ICD-10-CM

## 2023-02-09 DIAGNOSIS — F31.4 BIPOLAR DISORDER, CURRENT EPISODE DEPRESSED, SEVERE, WITHOUT PSYCHOTIC FEATURES (H): ICD-10-CM

## 2023-02-09 DIAGNOSIS — R11.0 NAUSEA: ICD-10-CM

## 2023-02-09 DIAGNOSIS — N76.0 BACTERIAL VAGINITIS: Primary | ICD-10-CM

## 2023-02-09 PROCEDURE — 99214 OFFICE O/P EST MOD 30 MIN: CPT | Performed by: NURSE PRACTITIONER

## 2023-02-09 PROCEDURE — 87210 SMEAR WET MOUNT SALINE/INK: CPT | Performed by: NURSE PRACTITIONER

## 2023-02-09 RX ORDER — VALACYCLOVIR HYDROCHLORIDE 500 MG/1
500 TABLET, FILM COATED ORAL DAILY
Qty: 31 TABLET | Refills: 11 | Status: SHIPPED | OUTPATIENT
Start: 2023-02-09 | End: 2024-01-04

## 2023-02-09 RX ORDER — ALBUTEROL SULFATE 90 UG/1
2 AEROSOL, METERED RESPIRATORY (INHALATION) EVERY 6 HOURS PRN
Qty: 18 G | Refills: 1 | Status: SHIPPED | OUTPATIENT
Start: 2023-02-09 | End: 2023-05-04

## 2023-02-09 RX ORDER — ONDANSETRON 4 MG/1
4 TABLET, ORALLY DISINTEGRATING ORAL EVERY 8 HOURS PRN
Qty: 20 TABLET | Refills: 1 | Status: SHIPPED | OUTPATIENT
Start: 2023-02-09 | End: 2023-09-19

## 2023-02-09 RX ORDER — TIZANIDINE 2 MG/1
TABLET ORAL
Qty: 30 TABLET | Refills: 1 | Status: SHIPPED | OUTPATIENT
Start: 2023-02-09 | End: 2023-09-19

## 2023-02-09 RX ORDER — METRONIDAZOLE 7.5 MG/G
1 GEL VAGINAL DAILY
Qty: 70 G | Refills: 1 | Status: SHIPPED | OUTPATIENT
Start: 2023-02-09 | End: 2023-09-19

## 2023-02-09 RX ORDER — SUMATRIPTAN 25 MG/1
25-50 TABLET, FILM COATED ORAL
Qty: 9 TABLET | Refills: 5 | Status: SHIPPED | OUTPATIENT
Start: 2023-02-09 | End: 2023-09-19

## 2023-02-09 RX ORDER — CYCLOBENZAPRINE HCL 5 MG
5-10 TABLET ORAL 3 TIMES DAILY PRN
Qty: 30 TABLET | Refills: 1 | Status: SHIPPED | OUTPATIENT
Start: 2023-02-09 | End: 2023-09-19

## 2023-02-09 ASSESSMENT — ANXIETY QUESTIONNAIRES
6. BECOMING EASILY ANNOYED OR IRRITABLE: MORE THAN HALF THE DAYS
5. BEING SO RESTLESS THAT IT IS HARD TO SIT STILL: NEARLY EVERY DAY
GAD7 TOTAL SCORE: 16
1. FEELING NERVOUS, ANXIOUS, OR ON EDGE: NEARLY EVERY DAY
7. FEELING AFRAID AS IF SOMETHING AWFUL MIGHT HAPPEN: SEVERAL DAYS
7. FEELING AFRAID AS IF SOMETHING AWFUL MIGHT HAPPEN: SEVERAL DAYS
4. TROUBLE RELAXING: NEARLY EVERY DAY
8. IF YOU CHECKED OFF ANY PROBLEMS, HOW DIFFICULT HAVE THESE MADE IT FOR YOU TO DO YOUR WORK, TAKE CARE OF THINGS AT HOME, OR GET ALONG WITH OTHER PEOPLE?: EXTREMELY DIFFICULT
IF YOU CHECKED OFF ANY PROBLEMS ON THIS QUESTIONNAIRE, HOW DIFFICULT HAVE THESE PROBLEMS MADE IT FOR YOU TO DO YOUR WORK, TAKE CARE OF THINGS AT HOME, OR GET ALONG WITH OTHER PEOPLE: EXTREMELY DIFFICULT
GAD7 TOTAL SCORE: 16
2. NOT BEING ABLE TO STOP OR CONTROL WORRYING: MORE THAN HALF THE DAYS
GAD7 TOTAL SCORE: 16
3. WORRYING TOO MUCH ABOUT DIFFERENT THINGS: MORE THAN HALF THE DAYS

## 2023-02-09 ASSESSMENT — PATIENT HEALTH QUESTIONNAIRE - PHQ9
SUM OF ALL RESPONSES TO PHQ QUESTIONS 1-9: 17
SUM OF ALL RESPONSES TO PHQ QUESTIONS 1-9: 17
10. IF YOU CHECKED OFF ANY PROBLEMS, HOW DIFFICULT HAVE THESE PROBLEMS MADE IT FOR YOU TO DO YOUR WORK, TAKE CARE OF THINGS AT HOME, OR GET ALONG WITH OTHER PEOPLE: VERY DIFFICULT

## 2023-02-09 ASSESSMENT — PAIN SCALES - GENERAL: PAINLEVEL: NO PAIN (0)

## 2023-02-09 ASSESSMENT — ENCOUNTER SYMPTOMS: HEADACHES: 1

## 2023-02-13 ENCOUNTER — TELEPHONE (OUTPATIENT)
Dept: FAMILY MEDICINE | Facility: CLINIC | Age: 38
End: 2023-02-13
Payer: COMMERCIAL

## 2023-02-13 DIAGNOSIS — B96.89 BV (BACTERIAL VAGINOSIS): Primary | ICD-10-CM

## 2023-02-13 DIAGNOSIS — N76.0 BV (BACTERIAL VAGINOSIS): Primary | ICD-10-CM

## 2023-02-13 NOTE — TELEPHONE ENCOUNTER
Patient is calling and asking for a Oral medication instead of the Metronidazole gel. States she is unable to use gel as she works nights.     If oral medication is prescribed, patient is also asking for oral meds for a yeast infection. States she always gets a yeast infection after medications.       Preferred Pharmacy:    [facility] - [address] [address]  Phone: [phone] Fax: [phone]      Could we send this information to you in [employer] or would you prefer to receive a phone call?:   Patient would prefer a phone call   Okay to leave a detailed message?: Yes at Home number on file [phone] (home)

## 2023-02-14 RX ORDER — METRONIDAZOLE 500 MG/1
500 TABLET ORAL 2 TIMES DAILY
Qty: 14 TABLET | Refills: 0 | Status: SHIPPED | OUTPATIENT
Start: 2023-02-14 | End: 2023-02-21

## 2023-02-14 RX ORDER — FLUCONAZOLE 150 MG/1
150 TABLET ORAL ONCE
Qty: 1 TABLET | Refills: 0 | Status: SHIPPED | OUTPATIENT
Start: 2023-02-14 | End: 2023-02-14

## 2023-03-07 ENCOUNTER — PRE VISIT (OUTPATIENT)
Dept: UROLOGY | Facility: CLINIC | Age: 38
End: 2023-03-07
Payer: COMMERCIAL

## 2023-04-09 ENCOUNTER — HEALTH MAINTENANCE LETTER (OUTPATIENT)
Age: 38
End: 2023-04-09

## 2023-04-16 ENCOUNTER — OFFICE VISIT (OUTPATIENT)
Dept: URGENT CARE | Facility: URGENT CARE | Age: 38
End: 2023-04-16
Payer: COMMERCIAL

## 2023-04-16 VITALS
SYSTOLIC BLOOD PRESSURE: 126 MMHG | OXYGEN SATURATION: 97 % | HEART RATE: 91 BPM | WEIGHT: 179 LBS | TEMPERATURE: 97.6 F | DIASTOLIC BLOOD PRESSURE: 67 MMHG | BODY MASS INDEX: 28.89 KG/M2

## 2023-04-16 DIAGNOSIS — B96.89 BACTERIAL VAGINOSIS: Primary | ICD-10-CM

## 2023-04-16 DIAGNOSIS — B37.9 CANDIDA INFECTION: ICD-10-CM

## 2023-04-16 DIAGNOSIS — N76.0 BACTERIAL VAGINOSIS: Primary | ICD-10-CM

## 2023-04-16 PROCEDURE — 87210 SMEAR WET MOUNT SALINE/INK: CPT

## 2023-04-16 PROCEDURE — 99213 OFFICE O/P EST LOW 20 MIN: CPT

## 2023-04-16 RX ORDER — FAMOTIDINE 20 MG/1
1 TABLET, FILM COATED ORAL 2 TIMES DAILY
COMMUNITY
Start: 2021-11-03 | End: 2023-09-19

## 2023-04-16 RX ORDER — LAMOTRIGINE 200 MG/1
1 TABLET ORAL AT BEDTIME
COMMUNITY
Start: 2021-05-21 | End: 2023-10-31

## 2023-04-16 RX ORDER — MINERAL OIL, PETROLATUM, PHENYLEPHRINE HCL 14; 74.9; .25 G/100G; G/100G; G/100G
OINTMENT RECTAL 2 TIMES DAILY PRN
COMMUNITY
Start: 2022-11-09

## 2023-04-16 RX ORDER — VALACYCLOVIR HYDROCHLORIDE 1 G/1
TABLET, FILM COATED ORAL
COMMUNITY
Start: 2022-06-03 | End: 2024-05-02

## 2023-04-16 RX ORDER — FLUCONAZOLE 150 MG/1
150 TABLET ORAL
Qty: 3 TABLET | Refills: 0 | Status: SHIPPED | OUTPATIENT
Start: 2023-04-16 | End: 2023-04-23

## 2023-04-16 RX ORDER — GABAPENTIN 300 MG/1
300 CAPSULE ORAL 2 TIMES DAILY
COMMUNITY
Start: 2022-06-06 | End: 2023-09-19

## 2023-04-16 RX ORDER — LIDOCAINE/PRILOCAINE 2.5 %-2.5%
CREAM (GRAM) TOPICAL
COMMUNITY
Start: 2022-01-18 | End: 2023-09-19

## 2023-04-16 RX ORDER — ONDANSETRON 4 MG/1
4 TABLET, ORALLY DISINTEGRATING ORAL
COMMUNITY
Start: 2021-10-12 | End: 2023-11-09

## 2023-04-16 RX ORDER — TIZANIDINE 2 MG/1
TABLET ORAL
COMMUNITY
Start: 2022-08-11 | End: 2023-09-19

## 2023-04-16 RX ORDER — METRONIDAZOLE 500 MG/1
500 TABLET ORAL 2 TIMES DAILY
Qty: 14 TABLET | Refills: 0 | Status: SHIPPED | OUTPATIENT
Start: 2023-04-16 | End: 2023-04-23

## 2023-04-16 NOTE — PATIENT INSTRUCTIONS
Diagnosis: vaginitis bacterial vaginosis}   Plan:   BV: antibiotic medications  Metronidazole /clindamycin}  Follow up with your pcp for persistent or recurrence and to discuss maintained /prevention therapy   No need to treat sexual partners   Prevention: avoid douching and irritants such as strong soaps   Avoid bubble baths  Can try to add probiotics to diet     Monitor for:   You have a fever of 101F  or higher, or as directed by your provider.  Your symptoms get worse, or they don't go away within a few days of starting treatment.  You have new pain in the lower belly or pelvic region.  You or any of your sex partners have new symptoms, such as a rash, joint pain, or sores.        Bacterial Vaginosis  You have a vaginal infection called bacterial vaginosis (BV).  Both good and bad bacteria are present in a healthy vagina.   Bacterial vaginosis (BV) occurs when these bacteria get out of balance.   The numbers of good bacteria decrease.   This allows the numbers of bad bacteria to increase and cause BV. In most cases, BV is not a serious problem.  BV is linked with sexual activity, but it's not a sexually transmitted infection (STI/D)   The cause of BV is not clear. Douching may lead to it. Having sex with a new partner or more than 1 partner makes it more likely.  Symptoms of BV vary for each woman. Some women have few symptoms or none at all. If symptoms are present, they can include:  Thin, milky white or gray or sometimes green discharge  Unpleasant  fishy  odor  Irritation, itching, and burning at opening of vagina. This may mean it's caused by more than one type of bacteria.   Burning or irritation with sex or urination. This may mean it's caused by more than one type of bacteria.  Risks if not treated   Increased risk for  delivery if you're pregnant  Increased risk for complications to your reproductive organs  Possible increased risk for pelvic inflammatory disease (PID)

## 2023-04-16 NOTE — PROGRESS NOTES
URGENT CARE  Assessment & Plan   Assessment:   Sirena Dietrich is a 37 year old female who's clinical presentation today is consistent with:   1. Bacterial vaginosis  - Wet prep - Clinic Collect  - metroNIDAZOLE (FLAGYL) 500 MG tablet; Take 1 tablet (500 mg) by mouth 2 times daily     2. Candida infection  - fluconazole (DIFLUCAN) 150 MG tablet; Take 1 tablet (150 mg) by mouth every 3 days     No alarm signs or symptoms present   Differential Diagnoses for this patient's CC include     Vulvovaginitis, atrophic    vaginitis, contact vs allergic vaginitis   STD: gonorrhea, chlamydia, trichomoniasis; PID    pregnancy, UTI, Vaginitis (inflammatory, irritative), cervicitis, lichen planus,   Malignancy (vaginal, ovarian, cervical)    Plan:  Will treat patient for vaginitis today as BV positive on wet prep; prescription sent and side effects of medication reviewed.   Given patient's history of candidiasis post antibiotic use will also prescribe diflucan at this time}    Additionally educated patient on persistent / recurrent infections that she should follow up with her pcp for possible prophylactic treatment options, or a longer regimen of treatment for suppression.}  Additionally we discussed if symptoms do not improve after starting today's treatment (or if symptoms worsen) to follow up in 5-7 days.    Patient is agreeable to treatment plan and state they will follow-up if symptoms do not improve and/or if symptoms worsen (see patient's AVS 'monitor for' section for specific patient instructions given and discussed regarding what to watch for and when to follow up)    Medications ordered are listed above, please see AVS for patient's specific and personalized discharge instructions given     DIPESH [facility] [place]      ______________________________________________________________________        Subjective  Subjective     HPI: Sirena Dietrich  is a 37 year old  female who presents  today for evaluation the following concerns:   Patient presents today with complains of: foul smelling increased vaginal discharge and itching. Patient denies urinary symptoms and refused a UA today   Patient denies} vaginal skin/vulvar redness and irritation. patient denies any blisters, bumps, lesions to labia or vulvar aspects}   Patient reports these symptoms started 2 days ago on 4/14/23    Patient denies any abnormal vaginal bleeding}   patient denies any dyspareunia or post coital bleeding}   Denies any significant pelvic pain, flank pain or fevers.   Denies history of known exposure to STD or concern today.      Allergies   Allergen Reactions     Vicodin [Hydrocodone-Acetaminophen] Other (See Comments)     Severe irritability.  Neither vicodin nor percocet seem to provide relief     Amoxicillin Swelling     As a child--facial swelling and redness     Atorvastatin Muscle Pain (Myalgia)     Bactrim Itching and Rash     Erythro [Erythromycin] Other (See Comments) and Swelling     As a child--facial swelling     Patient Active Problem List   Diagnosis     LGSIL on Pap Smear     Lumbago     CARDIOVASCULAR SCREENING; LDL GOAL LESS THAN 160     Pelvic pain in female     Urinary incontinence     Migraine headache     Tortuous colon     PTSD (post-traumatic stress disorder)     Severe bipolar I disorder, most recent episode mixed, with psychotic features (H)     Agoraphobia with panic disorder     S/P hysterectomy     Insomnia due to other mental disorder     MTHFR mutation (methylenetetrahydrofolate reductase)     Cyst of left ovary     Herpes simplex vulvovaginitis     Methamphetamine abuse in remission (H)     Chronic abdominal pain     Other chronic pain     Chemical dependency (H)       Review of Systems:  Pertinent review of systems as reflected in HPI, otherwise negative.     Objective  Objective    Physical Exam:  Vitals:    04/16/23 1259   BP: 126/67   Pulse: 91   Temp: 97.6  F (36.4  C)   TempSrc: Tympanic    SpO2: 97%   Weight: 81.2 kg (179 lb)      General: Alert and oriented, no acute distress, afebrile, normotensive  Psy/mental status: Nonanxious, cooperative  SKIN: Intact, no open areas  ABDOMEN:  soft, non-tender, non-distended    No flank pain or CVA tenderness to palpation bilaterally  Pelvic/ :   Deferred per patient       LABS:   Results for orders placed or performed in visit on 04/16/23   Wet prep - Clinic Collect     Status: Abnormal    Specimen: Vagina; Swab   Result Value Ref Range    Trichomonas Absent Absent    Yeast Absent Absent    Clue Cells Present (A) Absent    WBCs/high power field None None           I explained my diagnostic considerations and recommendations to the patient, who voiced understanding and agreement with the treatment plan.   All questions were answered.   We discussed potential side effects, risks and benefits of any prescribed or recommended therapies, as well as expectations for response to treatments.  Please see AVS for any patient instructions & handouts given.   Patient was advised to contact the Nurse Care Line, their Primary Care provider, Urgent Care, or the Emergency Department if there are new or worsening symptoms, or call 911 for emergencies.        ______________________________________________________________________          Patient Instructions   Diagnosis: vaginitis bacterial vaginosis}   Plan:   1. BV: antibiotic medications  o Metronidazole /clindamycin}  2. Follow up with your pcp for persistent or recurrence and to discuss maintained /prevention therapy   3. No need to treat sexual partners   4. Prevention: avoid douching and irritants such as strong soaps   o Avoid bubble baths  o Can try to add probiotics to diet     Monitor for:     You have a fever of 101F  or higher, or as directed by your provider.    Your symptoms get worse, or they don't go away within a few days of starting treatment.    You have new pain in the lower belly or pelvic  region.    You or any of your sex partners have new symptoms, such as a rash, joint pain, or sores.        Bacterial Vaginosis  You have a vaginal infection called bacterial vaginosis (BV).  Both good and bad bacteria are present in a healthy vagina.   Bacterial vaginosis (BV) occurs when these bacteria get out of balance.   The numbers of good bacteria decrease.   This allows the numbers of bad bacteria to increase and cause BV. In most cases, BV is not a serious problem.  BV is linked with sexual activity, but it's not a sexually transmitted infection (STI/D)   The cause of BV is not clear. Douching may lead to it. Having sex with a new partner or more than 1 partner makes it more likely.  Symptoms of BV vary for each woman. Some women have few symptoms or none at all. If symptoms are present, they can include:    Thin, milky white or gray or sometimes green discharge    Unpleasant  fishy  odor    Irritation, itching, and burning at opening of vagina. This may mean it's caused by more than one type of bacteria.     Burning or irritation with sex or urination. This may mean it's caused by more than one type of bacteria.  Risks if not treated   1. Increased risk for  delivery if you're pregnant  2. Increased risk for complications to your reproductive organs  3. Possible increased risk for pelvic inflammatory disease (PID)

## 2023-04-18 ENCOUNTER — MYC MEDICAL ADVICE (OUTPATIENT)
Dept: FAMILY MEDICINE | Facility: CLINIC | Age: 38
End: 2023-04-18
Payer: COMMERCIAL

## 2023-04-18 DIAGNOSIS — N63.20 MASS OF LEFT BREAST, UNSPECIFIED QUADRANT: Primary | ICD-10-CM

## 2023-04-25 PROBLEM — G89.29 OTHER CHRONIC PAIN: Chronic | Status: RESOLVED | Noted: 2018-11-12 | Resolved: 2023-04-25

## 2023-05-04 DIAGNOSIS — R05.3 CHRONIC COUGH: ICD-10-CM

## 2023-05-04 RX ORDER — ALBUTEROL SULFATE 90 UG/1
AEROSOL, METERED RESPIRATORY (INHALATION)
Qty: 18 G | Refills: 1 | Status: SHIPPED | OUTPATIENT
Start: 2023-05-04 | End: 2023-09-19

## 2023-05-25 ENCOUNTER — OFFICE VISIT (OUTPATIENT)
Dept: URGENT CARE | Facility: URGENT CARE | Age: 38
End: 2023-05-25
Payer: COMMERCIAL

## 2023-05-25 VITALS
SYSTOLIC BLOOD PRESSURE: 123 MMHG | BODY MASS INDEX: 28.89 KG/M2 | RESPIRATION RATE: 16 BRPM | HEART RATE: 81 BPM | OXYGEN SATURATION: 98 % | TEMPERATURE: 97.6 F | DIASTOLIC BLOOD PRESSURE: 78 MMHG | WEIGHT: 179 LBS

## 2023-05-25 DIAGNOSIS — N76.0 ACUTE VAGINITIS: Primary | ICD-10-CM

## 2023-05-25 LAB
CLUE CELLS: ABNORMAL
TRICHOMONAS, WET PREP: ABNORMAL
WBC'S/HIGH POWER FIELD, WET PREP: ABNORMAL
YEAST, WET PREP: ABNORMAL

## 2023-05-25 PROCEDURE — 87210 SMEAR WET MOUNT SALINE/INK: CPT

## 2023-05-25 PROCEDURE — 99213 OFFICE O/P EST LOW 20 MIN: CPT

## 2023-05-25 NOTE — PROGRESS NOTES
URGENT CARE  Assessment & Plan   Assessment:   Sirena Dietrich is a 37 year old female who's clinical presentation today is consistent with:   1. Acute vaginitis  - Wet prep - Clinic Collect    No alarm signs or symptoms present   Differential Diagnoses for this patient's CC include   Bacterial vaginosis, candidiasis, Vulvovaginitis, atrophic vaginitis, contact vs allergic vaginitis  STD: gonorrhea, chlamydia, trichomoniasis; PID   pregnancy, UTI, Vaginitis (inflammatory, irritative), cervicitis, lichen planus,   Malignancy (vaginal, ovarian, cervical)    Plan:  Will treat patient's vaginal symptoms today as a noninfective/ irritative vaginitis as there's no evidence of: candidiasis, BV on wet prep and patient did not want a UA today to assess for a  urinary tract infection, thus will encourage symptomatic and supportive measures at this time which include, Increasing fluid intake, Performing timed voiding - have her urinate every 2 hours, Widely separate the labia while voiding to avoid pooling of urine in the vestibule, No bubble baths or chlorine pools until asymptomatic for approximately 3 months, wash underwear/clothes in Dreft (avoid bleach) and/or double rinse, Bathe with fragrance free soaps or better yet only water without soap,  Avoid tight fitting pants or occlusive clothing such as swimsuits, leotards, Spandex, or silk/nylon underwear,  and encouraging patient wear cotton underwear, nightgowns instead of pajama pants, sleep without underwear, and quickly removing wet swimsuits.   Educated patient  to follow up should symptoms persist or not improve in the next 1 to 2 weeks, sooner if symptoms worsen     Patient is agreeable to treatment plan and state they will follow-up if symptoms do not improve and/or if symptoms worsen (see patient's AVS 'monitor for' section for specific patient instructions given and discussed regarding what to watch for and when to follow up)    Medications ordered are listed  above, please see AVS for patient's specific and personalized discharge instructions given     DIPESH Goel CNP  [facility]      ______________________________________________________________________        Subjective  Subjective     HPI: Sirena Dietrich  is a 37 year old  female who presents today for evaluation the following concerns:   Patient presents today with complains of: foul smelling, Increased} vaginal discharge.   Patient endorses  vaginal skin/vulvar redness and irritation. patient denies any blisters, bumps, lesions to labia or vulvar aspects}   Patient reports these symptoms started today   Patient denies any urinary symptoms such as: dysuria, urgency, frequency or burning with urination. - patient refused a Urine analysis today to assess for a UTI   Patient denies any abnormal vaginal bleeding}   patient denies any dyspareunia or post coital bleeding}   Denies any significant pelvic pain, flank pain or fevers.   Denies history of known exposure to STD or concern today.      Allergies   Allergen Reactions     Vicodin [Hydrocodone-Acetaminophen] Other (See Comments)     Severe irritability.  Neither vicodin nor percocet seem to provide relief     Amoxicillin Swelling     As a child--facial swelling and redness     Atorvastatin Muscle Pain (Myalgia)     Bactrim Itching and Rash     Erythro [Erythromycin] Other (See Comments) and Swelling     As a child--facial swelling     Patient Active Problem List   Diagnosis     LGSIL on Pap Smear     Lumbago     CARDIOVASCULAR SCREENING; LDL GOAL LESS THAN 160     Pelvic pain in female     Urinary incontinence     Migraine headache     Tortuous colon     PTSD (post-traumatic stress disorder)     Severe bipolar I disorder, most recent episode mixed, with psychotic features (H)     Agoraphobia with panic disorder     S/P hysterectomy     Insomnia due to other mental disorder     MTHFR mutation (methylenetetrahydrofolate reductase)      Cyst of left ovary     Herpes simplex vulvovaginitis     Methamphetamine abuse in remission (H)     Chronic abdominal pain     Chemical dependency (H)       Review of Systems:  Pertinent review of systems as reflected in HPI, otherwise negative.     Objective  Objective    Physical Exam:  Vitals:    05/25/23 1046   BP: 123/78   Pulse: 81   Resp: 16   Temp: 97.6  F (36.4  C)   TempSrc: Tympanic   SpO2: 98%   Weight: 81.2 kg (179 lb)      General: Alert and oriented, no acute distress, afebrile, normotensive  Psy/mental status: Nonanxious, cooperative  SKIN: Intact, no open areas  ABDOMEN:  soft, non-tender, non-distended    No flank pain or CVA tenderness to palpation bilaterally  Pelvic/ :  Deferred per patient       LABS:   Results for orders placed or performed in visit on 05/25/23   Wet prep - Clinic Collect     Status: Abnormal    Specimen: Vagina; Swab   Result Value Ref Range    Trichomonas Absent Absent    Yeast Absent Absent    Clue Cells Absent Absent    WBCs/high power field 1+ (A) None     I explained my diagnostic considerations and recommendations to the patient, who voiced understanding and agreement with the treatment plan.   All questions were answered.   We discussed potential side effects, risks and benefits of any prescribed or recommended therapies, as well as expectations for response to treatments.  Please see AVS for any patient instructions & handouts given.   Patient was advised to contact the Nurse Care Line, their Primary Care provider, Urgent Care, or the Emergency Department if there are new or worsening symptoms, or call 911 for emergencies.        ______________________________________________________________________          Patient Instructions   Diagnosis: vulvovaginitis, irritant vaginitis   Today we did:  Labs: wet prep negative today      Plan:     No infection noted today     Increase fluids    Timed voiding,  widely spread labia, use A&D cream barrier cream on irritated  skin     Clean gently but daily w/ warm wash cloth     Avoidance: bubble baths, pools until symptoms resolve     Wash clothes w/ delicate soaps    Avoid fragrances or harsh soaps     Avoid tight fitting clothing - swim suits, leotards, spandex     Cotton underwear     No douching, empty bladder after intercourse}     Wipe from front to back   Monitor for:     Increased pain or irritation that does not improve     Bleeding     Abnormal discharge     New burning or pain     Fevers, chills     Foul odor from vagina         vulvovaginitis, irritant vaginitis   Vaginitis is an inflammation of the vagina,   but there can also be inflammation noted to the skin surrounding the urethra (where you pee) or the skin around the opening of the vagina, vulva or labia.   It can be caused by many things but doesn't always mean you have an infection   Causes include: changes to the vaginal environment internally or externally, from irritants, infections or hormone changes.   Prevention is the mainstay of treatment

## 2023-05-25 NOTE — PATIENT INSTRUCTIONS
Diagnosis: vulvovaginitis, irritant vaginitis   Today we did:  Labs: wet prep negative today      Plan:   No infection noted today   Increase fluids  Timed voiding,  widely spread labia, use A&D cream barrier cream on irritated skin   Clean gently but daily w/ warm wash cloth   Avoidance: bubble baths, pools until symptoms resolve   Wash clothes w/ delicate soaps  Avoid fragrances or harsh soaps   Avoid tight fitting clothing - swim suits, leotards, spandex   Cotton underwear   No douching, empty bladder after intercourse}   Wipe from front to back   Monitor for:   Increased pain or irritation that does not improve   Bleeding   Abnormal discharge   New burning or pain   Fevers, chills   Foul odor from vagina         vulvovaginitis, irritant vaginitis   Vaginitis is an inflammation of the vagina,   but there can also be inflammation noted to the skin surrounding the urethra (where you pee) or the skin around the opening of the vagina, vulva or labia.   It can be caused by many things but doesn't always mean you have an infection   Causes include: changes to the vaginal environment internally or externally, from irritants, infections or hormone changes.   Prevention is the mainstay of treatment

## 2023-07-19 NOTE — DISCHARGE INSTRUCTIONS
Bacterial Vaginosis    You have a vaginal infection called bacterial vaginosis (BV). Both good and bad bacteria are present in a healthy vagina. BV occurs when these bacteria get out of balance. The number of bad bacteria increase. And the number of good bacteria decrease.  BV may or may not cause symptoms. If symptoms do occur, they can include:    Thin, gray, milky-white, or sometimes green discharge    Unpleasant odor or  fishy  smell    Itching, burning, or pain in or around the vagina  It is not known what causes BV, but certain factors can make the problem more likely. This can include:    Douching    Having sex with a new partner    Having sex with more than one partner  BV will sometimes go away on its own. But treatment is usually recommended. This is because untreated BV can increase the risk of more serious health problems such as:    Pelvic inflammatory disease (PID)     delivery (giving birth to a baby early if you re pregnant)    HIV and certain other sexually transmitted diseases (STDs)    Infection after surgery on the reproductive organs  Home care  General care    BV is most often treated with medicines called antibiotics. These may be given as pills or as a vaginal cream. If antibiotics are prescribed, be sure to use them exactly as directed. Also, be sure to complete all of the medicine, even if your symptoms go away.    Avoid douching or having sex during treatment.    If you have sex with a female partner, ask your healthcare provider if she should also be treated.  Prevention    Limit or avoid douching.    Avoid having sex. If you do have sex, then take steps to lower your risk:    Use condoms when having sex.    Limit the number of partners you have sex with.  Follow-up care  Follow up with your healthcare provider, or as advised.  When to seek medical advice  Call your healthcare provider right away if:    You have a fever of 100.4 F (38 C) or higher, or as directed by your  provider.    Your symptoms worsen, or they don t go away within a few days of starting treatment.    You have new pain in the lower belly or pelvic region.    You have side effects that bother you or a reaction to the pills or cream you re prescribed.    You or any partners you have sex with have new symptoms, such as a rash, joint pain, or sores.  Date Last Reviewed: 7/30/2015 2000-2017 [employer]. [address] [place] [address], [place] [address]. All rights reserved. This information is not intended as a substitute for professional medical care. Always follow your healthcare professional's instructions.      Flagyl 500 mg 2 times a day ×7 days.  Diclofenac 75 mg 2 times daily as needed for pain.  Doxycycline 100 mg 2 times daily ×7 days.  Follow up in clinic in 7-10 days for recheck with her primary care provider.  If any of the swabs still pending are positive you will be contacted.   Improved

## 2023-09-19 ENCOUNTER — ANCILLARY PROCEDURE (OUTPATIENT)
Dept: GENERAL RADIOLOGY | Facility: CLINIC | Age: 38
End: 2023-09-19
Attending: NURSE PRACTITIONER
Payer: COMMERCIAL

## 2023-09-19 ENCOUNTER — OFFICE VISIT (OUTPATIENT)
Dept: FAMILY MEDICINE | Facility: CLINIC | Age: 38
End: 2023-09-19
Payer: COMMERCIAL

## 2023-09-19 VITALS
TEMPERATURE: 97.3 F | DIASTOLIC BLOOD PRESSURE: 84 MMHG | OXYGEN SATURATION: 99 % | SYSTOLIC BLOOD PRESSURE: 134 MMHG | WEIGHT: 174 LBS | HEART RATE: 99 BPM | RESPIRATION RATE: 14 BRPM | BODY MASS INDEX: 28.08 KG/M2

## 2023-09-19 DIAGNOSIS — R11.0 NAUSEA: ICD-10-CM

## 2023-09-19 DIAGNOSIS — U09.9 POST-COVID CHRONIC HEADACHE: ICD-10-CM

## 2023-09-19 DIAGNOSIS — R51.9 CHRONIC DAILY HEADACHE: ICD-10-CM

## 2023-09-19 DIAGNOSIS — R51.9 POST-COVID CHRONIC HEADACHE: ICD-10-CM

## 2023-09-19 DIAGNOSIS — G43.109 MIGRAINE WITH AURA AND WITHOUT STATUS MIGRAINOSUS, NOT INTRACTABLE: Primary | Chronic | ICD-10-CM

## 2023-09-19 DIAGNOSIS — L65.9 HAIR LOSS: ICD-10-CM

## 2023-09-19 DIAGNOSIS — G89.29 POST-COVID CHRONIC HEADACHE: ICD-10-CM

## 2023-09-19 DIAGNOSIS — R05.3 CHRONIC COUGH: ICD-10-CM

## 2023-09-19 DIAGNOSIS — M54.41 ACUTE RIGHT-SIDED LOW BACK PAIN WITH RIGHT-SIDED SCIATICA: ICD-10-CM

## 2023-09-19 DIAGNOSIS — K29.00 OTHER ACUTE GASTRITIS WITHOUT HEMORRHAGE: ICD-10-CM

## 2023-09-19 DIAGNOSIS — K29.00 ACUTE GASTRITIS WITHOUT HEMORRHAGE, UNSPECIFIED GASTRITIS TYPE: ICD-10-CM

## 2023-09-19 DIAGNOSIS — L98.9 CHANGING SKIN LESION: ICD-10-CM

## 2023-09-19 DIAGNOSIS — Z13.220 SCREENING CHOLESTEROL LEVEL: ICD-10-CM

## 2023-09-19 PROCEDURE — 99215 OFFICE O/P EST HI 40 MIN: CPT | Performed by: NURSE PRACTITIONER

## 2023-09-19 PROCEDURE — 71046 X-RAY EXAM CHEST 2 VIEWS: CPT | Mod: TC | Performed by: RADIOLOGY

## 2023-09-19 RX ORDER — TIZANIDINE 2 MG/1
TABLET ORAL
Qty: 30 TABLET | Refills: 1 | Status: SHIPPED | OUTPATIENT
Start: 2023-09-19 | End: 2024-01-04

## 2023-09-19 RX ORDER — CYCLOBENZAPRINE HCL 5 MG
5-10 TABLET ORAL 3 TIMES DAILY PRN
Qty: 30 TABLET | Refills: 1 | Status: SHIPPED | OUTPATIENT
Start: 2023-09-19 | End: 2024-01-04

## 2023-09-19 RX ORDER — ALBUTEROL SULFATE 90 UG/1
2 AEROSOL, METERED RESPIRATORY (INHALATION) EVERY 6 HOURS PRN
Qty: 18 G | Refills: 1 | Status: SHIPPED | OUTPATIENT
Start: 2023-09-19 | End: 2024-05-02

## 2023-09-19 RX ORDER — FAMOTIDINE 20 MG/1
20 TABLET, FILM COATED ORAL 2 TIMES DAILY
Qty: 50 TABLET | Refills: 3 | Status: SHIPPED | OUTPATIENT
Start: 2023-09-19 | End: 2024-05-02

## 2023-09-19 RX ORDER — ONDANSETRON 4 MG/1
4 TABLET, FILM COATED ORAL EVERY 8 HOURS PRN
Qty: 20 TABLET | Refills: 3 | Status: SHIPPED | OUTPATIENT
Start: 2023-09-19 | End: 2023-10-31

## 2023-09-19 RX ORDER — SUMATRIPTAN 25 MG/1
25-50 TABLET, FILM COATED ORAL
Qty: 9 TABLET | Refills: 5 | Status: SHIPPED | OUTPATIENT
Start: 2023-09-19 | End: 2024-02-21

## 2023-09-19 RX ORDER — ONDANSETRON 4 MG/1
4 TABLET, ORALLY DISINTEGRATING ORAL EVERY 8 HOURS PRN
Qty: 20 TABLET | Refills: 1 | Status: SHIPPED | OUTPATIENT
Start: 2023-09-19 | End: 2023-10-31

## 2023-09-19 ASSESSMENT — ENCOUNTER SYMPTOMS
HEADACHES: 1
BACK PAIN: 1

## 2023-09-19 ASSESSMENT — PAIN SCALES - GENERAL: PAINLEVEL: NO PAIN (0)

## 2023-09-19 NOTE — LETTER
September 19, 2023      Sirena Dietrich  [address] [address]        To Whom It May Concern:    Sirena Dietrich  was seen on September 19, 2023.  Please excuse her  until 9.20.2023 due to illness.        Sincerely,        DIPESH Marino CNP

## 2023-09-19 NOTE — PROGRESS NOTES
Assessment & Plan     Migraine with aura and without status migrainosus, not intractable  Nausea  Chronic daily headache  - ondansetron (ZOFRAN ODT) 4 MG ODT tab  Dispense: 20 tablet; Refill: 1  - ondansetron (ZOFRAN) 4 MG tablet  Dispense: 20 tablet; Refill: 3  - CT Head w/o & w Contrast    Post-COVID chronic headache  Hair loss  - TSH WITH FREE T4 REFLEX      Chronic cough  - albuterol (VENTOLIN HFA) 108 (90 Base) MCG/ACT inhaler  Dispense: 18 g; Refill: 1  - XR Chest 2 Views  - CBC with platelets and differential    Acute right-sided low back pain with right-sided sciatica  - tiZANidine (ZANAFLEX) 2 MG tablet  Dispense: 30 tablet; Refill: 1  - cyclobenzaprine (FLEXERIL) 5 MG tablet  Dispense: 30 tablet; Refill: 1  - Comprehensive metabolic panel (BMP + Alb, Alk Phos, ALT, AST, Total. Bili, TP)    Acute gastritis without hemorrhage, unspecified gastritis type  Continue   - famotidine (PEPCID) 20 MG tablet  Dispense: 50 tablet; Refill: 3  EGD with ongoing symptoms and long term use of PPI         Other acute gastritis without hemorrhage  - omeprazole (PRILOSEC) 20 MG DR capsule  Dispense: 31 capsule; Refill: 2    Screening cholesterol level  - Lipid panel reflex to direct LDL Fasting    Changing skin lesion  Left nare   - Adult Dermatology Referral      Call or return to the clinic with any worsening of symptoms or no resolution. Patient/Parent verbalized understanding and is in agreement. Medication side effects reviewed.   Current Outpatient Medications   Medication Sig Dispense Refill    albuterol (VENTOLIN HFA) 108 (90 Base) MCG/ACT inhaler Inhale 2 puffs into the lungs every 6 hours as needed for shortness of breath or wheezing 18 g 1    BIOTIN 5000 5 MG CAPS TAKE 1 TABLET BY MOUTH ONCE DAILY BEFORE A MEAL      cyclobenzaprine (FLEXERIL) 5 MG tablet Take 1-2 tablets (5-10 mg) by mouth 3 times daily as needed for muscle spasms 30 tablet 1    famotidine (PEPCID) 20 MG tablet Take 1 tablet (20 mg) by mouth 2  times daily 50 tablet 3    famotidine (PEPCID) 20 MG tablet Take 20 mg by mouth      HEMORRHOIDAL 0.25-14-74.9 % rectal ointment Place rectally 2 times daily as needed      hydrOXYzine (ATARAX) 50 MG tablet Take 1 tablet by mouth three times daily as needed for anxiety.      lamoTRIgine (LAMICTAL) 200 MG tablet Take 1 tablet by mouth At Bedtime      lamoTRIgine (LAMICTAL) 200 MG tablet Take 1 tablet (200 mg) by mouth daily 30 tablet 2    LORazepam (ATIVAN) 0.5 MG tablet Take 0.5 mg by mouth 2 times daily as needed      omeprazole (PRILOSEC) 20 MG DR capsule Take 1 capsule (20 mg) by mouth daily 31 capsule 2    ondansetron (ZOFRAN ODT) 4 MG ODT tab Take 1 tablet (4 mg) by mouth every 8 hours as needed for nausea or vomiting 20 tablet 1    ondansetron (ZOFRAN ODT) 4 MG ODT tab Place 4 mg under the tongue      ondansetron (ZOFRAN) 4 MG tablet Take 1 tablet (4 mg) by mouth every 8 hours as needed for nausea 20 tablet 3    phenylephrine-shark liver oil-mineral oil-petrolatum (PREPARATION H) 0.25-3-14-71.9 % rectal ointment Place rectally 2 times daily as needed for hemorrhoids 57 g 1    propranolol (INDERAL) 20 MG tablet Take 1 tablet by mouth three times daily as needed for anxiety, restlessness.      QUEtiapine (SEROQUEL) 25 MG tablet TAKE 1 TABLET BY MOUTH TWICE DAILY IF NEEDED 60 tablet 1    QUEtiapine (SEROQUEL) 50 MG tablet TAKE 1-4 tablets BY MOUTH EVERY NIGHT AT BEDTIME AS NEEDED 120 tablet 0    ranitidine (ZANTAC) 300 MG tablet Take 1 tablet (300 mg) by mouth At Bedtime 30 tablet 2    sertraline (ZOLOFT) 50 MG tablet Take 50 mg by mouth daily      SUMAtriptan (IMITREX) 25 MG tablet Take 1-2 tablets (25-50 mg) by mouth at onset of headache for migraine May repeat in 2 hours. Max 8 tablets/24 hours. 9 tablet 5    tiZANidine (ZANAFLEX) 2 MG tablet TAKE 1 TABLET BY MOUTH EVERY 8 HOURS IF NEEDED FOR MUSCLE SPASMS Strength: 2 mg 30 tablet 1    valACYclovir (VALTREX) 1000 mg tablet Take 1 Tablet (1 g) by mouth 3 times  "daily for 7 days. For herpes outbreak and then return to daily preventative.      valACYclovir (VALTREX) 500 MG tablet Take 1 tablet (500 mg) by mouth daily 31 tablet 11   40 minutes spent on chart review,education, exam and documentation    Chart documentation with Dragon Voice recognition Software. Although reviewed after completion, some words and grammatical errors may remain.      DIPESH Marino CNP  [facility]    Elaine Pack is a 37 year old, presenting for the following health issues:  Hypertension, Back Pain, and Headache        9/19/2023    12:13 PM   Additional Questions   Roomed by Salima       History of Present Illness       Back Pain:  She presents for follow up of back pain. Patient's back pain is a recurring problem.  Location of back pain:  Right lower back, left lower back, right middle of back, left middle of back, right upper back, right side of neck, left side of neck, right buttock, left buttock, right hip and left hip  Description of back pain: burning, sharp, shooting and stabbing  Back pain spreads: right thigh, right knee and right foot    Since patient first noticed back pain, pain is: always present, but gets better and worse  Does back pain interfere with her job:  Yes       Hyperlipidemia:  She presents for follow up of hyperlipidemia.   She is not taking medication to lower cholesterol. She is not having myalgia or other side effects to statin medications.    Headaches:   Since the patient's last clinic visit, headaches are: worsened  The patient is getting headaches:  24  She is not able to do normal daily activities when she has a migraine.  The patient is taking the following rescue/relief medications:  Tylenol and sumatriptan (Imitrex)   Patient states \"I get only a small amount of relief\" from the rescue/relief medications.   The patient is taking the following medications to prevent migraines:  No medications to prevent migraines  In " the past 4 weeks, the patient has gone to an Urgent Care or Emergency Room 0 times times due to headaches.    She eats 2-3 servings of fruits and vegetables daily.She consumes 1 sweetened beverage(s) daily.She exercises with enough effort to increase her heart rate 60 or more minutes per day.  She exercises with enough effort to increase her heart rate 4 days per week. She is missing 1 dose(s) of medications per week.     New lesion left side of nasal tip  Growing in size    Chronic daily HEADACHE did not do the MRI as previously directed  Migraines cause Nausea/Vomitting    Acute Illness  Acute illness concerns: 3 days   Onset/Duration: cough   Symptoms:  Fever: YES  Chills/Sweats: YES  Headache (location?): YES  Sinus Pressure: YES  Conjunctivitis:  No  Ear Pain: YES: bilateral  Rhinorrhea: YES  Congestion: YES  Sore Throat: YES  Cough: YES-non-productive  Wheeze: No  Decreased Appetite: No  Nausea: YES  Vomiting: No  Diarrhea: No  Dysuria/Freq.: No  Dysuria or Hematuria: No  Fatigue/Achiness: YES  Sick/Strep Exposure: YES  Therapies tried and outcome: Tylenol     Felt rattled in the chest since May   Ever since COVID in the past feels a rattle  When she cough pain right lower back and under the ribs on the front on the left    Hair loss           Review of Systems   Musculoskeletal:  Positive for back pain.   Neurological:  Positive for headaches.            Objective   /84   Pulse 99   Temp 97.3  F (36.3  C) (Tympanic)   Resp 14   Wt 78.9 kg (174 lb)   LMP  (LMP Unknown)   SpO2 99%   BMI 28.08 kg/m    Body mass index is 28.08 kg/m .  Physical Exam   GENERAL: healthy, alert and no distress  EYES: Eyes grossly normal to inspection, PERRL and conjunctivae and sclerae normal  HENT: ear canals and TM's normal, nose and mouth without ulcers or lesions  NECK: no adenopathy, no asymmetry, masses, or scars and thyroid normal to palpation  RESP: lungs clear to auscultation - no rales, rhonchi or wheezes  CV:  regular rate and rhythm, normal S1 S2, no S3 or S4, no murmur, click or rub, no peripheral edema and peripheral pulses strong  ABDOMEN: soft, nontender, no hepatosplenomegaly, no masses and bowel sounds normal  MS: no gross musculoskeletal defects noted, no edema  MS: tenderness to palpation RIGHT LOW BACK AND RIGHT SI   SKIN: no suspicious lesions or rashes  NEURO: Normal strength and tone, mentation intact and speech normal  PSYCH: mentation appears normal, affect normal/bright    No results found. However, due to the size of the patient record, not all encounters were searched. Please check Results Review for a complete set of results.

## 2023-09-22 ENCOUNTER — LAB (OUTPATIENT)
Dept: LAB | Facility: CLINIC | Age: 38
End: 2023-09-22
Payer: COMMERCIAL

## 2023-09-22 DIAGNOSIS — R51.9 POST-COVID CHRONIC HEADACHE: ICD-10-CM

## 2023-09-22 DIAGNOSIS — U09.9 POST-COVID CHRONIC HEADACHE: ICD-10-CM

## 2023-09-22 DIAGNOSIS — R05.3 CHRONIC COUGH: ICD-10-CM

## 2023-09-22 DIAGNOSIS — G89.29 POST-COVID CHRONIC HEADACHE: ICD-10-CM

## 2023-09-22 DIAGNOSIS — M54.41 ACUTE RIGHT-SIDED LOW BACK PAIN WITH RIGHT-SIDED SCIATICA: ICD-10-CM

## 2023-09-22 DIAGNOSIS — Z13.220 SCREENING CHOLESTEROL LEVEL: ICD-10-CM

## 2023-09-22 LAB
ALBUMIN SERPL BCG-MCNC: 4.6 G/DL (ref 3.5–5.2)
ALP SERPL-CCNC: 69 U/L (ref 35–104)
ALT SERPL W P-5'-P-CCNC: 19 U/L (ref 0–50)
ANION GAP SERPL CALCULATED.3IONS-SCNC: 11 MMOL/L (ref 7–15)
AST SERPL W P-5'-P-CCNC: 19 U/L (ref 0–45)
BASOPHILS # BLD AUTO: 0 10E3/UL (ref 0–0.2)
BASOPHILS NFR BLD AUTO: 0 %
BILIRUB SERPL-MCNC: 0.2 MG/DL
BUN SERPL-MCNC: 13.8 MG/DL (ref 6–20)
CALCIUM SERPL-MCNC: 9.5 MG/DL (ref 8.6–10)
CHLORIDE SERPL-SCNC: 101 MMOL/L (ref 98–107)
CHOLEST SERPL-MCNC: 306 MG/DL
CREAT SERPL-MCNC: 0.64 MG/DL (ref 0.51–0.95)
DEPRECATED HCO3 PLAS-SCNC: 25 MMOL/L (ref 22–29)
EGFRCR SERPLBLD CKD-EPI 2021: >90 ML/MIN/1.73M2
EOSINOPHIL # BLD AUTO: 0.4 10E3/UL (ref 0–0.7)
EOSINOPHIL NFR BLD AUTO: 4 %
ERYTHROCYTE [DISTWIDTH] IN BLOOD BY AUTOMATED COUNT: 12.3 % (ref 10–15)
GLUCOSE SERPL-MCNC: 88 MG/DL (ref 70–99)
HCT VFR BLD AUTO: 41.3 % (ref 35–47)
HDLC SERPL-MCNC: 36 MG/DL
HGB BLD-MCNC: 14.2 G/DL (ref 11.7–15.7)
IMM GRANULOCYTES # BLD: 0 10E3/UL
IMM GRANULOCYTES NFR BLD: 0 %
LDLC SERPL CALC-MCNC: ABNORMAL MG/DL
LYMPHOCYTES # BLD AUTO: 4.5 10E3/UL (ref 0.8–5.3)
LYMPHOCYTES NFR BLD AUTO: 39 %
MCH RBC QN AUTO: 29.6 PG (ref 26.5–33)
MCHC RBC AUTO-ENTMCNC: 34.4 G/DL (ref 31.5–36.5)
MCV RBC AUTO: 86 FL (ref 78–100)
MONOCYTES # BLD AUTO: 0.7 10E3/UL (ref 0–1.3)
MONOCYTES NFR BLD AUTO: 6 %
NEUTROPHILS # BLD AUTO: 5.8 10E3/UL (ref 1.6–8.3)
NEUTROPHILS NFR BLD AUTO: 51 %
NONHDLC SERPL-MCNC: 270 MG/DL
PLATELET # BLD AUTO: 316 10E3/UL (ref 150–450)
POTASSIUM SERPL-SCNC: 4.2 MMOL/L (ref 3.4–5.3)
PROT SERPL-MCNC: 7.1 G/DL (ref 6.4–8.3)
RBC # BLD AUTO: 4.8 10E6/UL (ref 3.8–5.2)
SODIUM SERPL-SCNC: 137 MMOL/L (ref 136–145)
TRIGL SERPL-MCNC: 1171 MG/DL
TSH SERPL DL<=0.005 MIU/L-ACNC: 2.05 UIU/ML (ref 0.3–4.2)
WBC # BLD AUTO: 11.4 10E3/UL (ref 4–11)

## 2023-09-22 PROCEDURE — 80053 COMPREHEN METABOLIC PANEL: CPT

## 2023-09-22 PROCEDURE — 80061 LIPID PANEL: CPT

## 2023-09-22 PROCEDURE — 36415 COLL VENOUS BLD VENIPUNCTURE: CPT

## 2023-09-22 PROCEDURE — 83721 ASSAY OF BLOOD LIPOPROTEIN: CPT

## 2023-09-22 PROCEDURE — 84443 ASSAY THYROID STIM HORMONE: CPT

## 2023-09-22 PROCEDURE — 85025 COMPLETE CBC W/AUTO DIFF WBC: CPT

## 2023-09-23 LAB — LDLC SERPL DIRECT ASSAY-MCNC: 111 MG/DL

## 2023-10-11 NOTE — PROGRESS NOTES
Subjective       Naseem Peñaloza  is a 67 year old  y/o male   that returns to clinic for Follow-up (6 month f/u )    and ongoing management of hischronic diagnoses.         Patient Active Problem List   Diagnosis   • Essential (primary) hypertension   • ASCVD (arteriosclerotic cardiovascular disease)   • Aortic valve disease     Patient returns to clinic today and is doing well. He is retired now and staying active. No episodes of lightheadedness or syncope. No changes in sensorum. No episodes of angina or dyspnea. He was not on anticoagulation despite notes to the contrary. He followed with PCP who also made BP med changes, but he reports good home BP.     Review of Systems   Constitutional: Negative for diaphoresis, fever and night sweats.   HENT: Negative for congestion, ear discharge, ear pain, nosebleeds and sore throat.    Eyes: Negative for blurred vision, discharge, pain and photophobia.   Cardiovascular: Negative for chest pain, claudication and dyspnea on exertion.   Respiratory: Negative for cough and hemoptysis.    Endocrine: Negative for polydipsia and polyuria.   Hematologic/Lymphatic: Negative for bleeding problem.   Skin: Negative for color change, suspicious lesions and unusual hair distribution.   Musculoskeletal: Negative for falls, gout and muscle weakness.   Gastrointestinal: Negative for bloating, anorexia, melena, nausea and vomiting.   Genitourinary: Negative for frequency, hematuria, non-menstrual bleeding and urgency.   Neurological: Negative for difficulty with concentration, loss of balance, seizures and vertigo.   Psychiatric/Behavioral: Negative for hallucinations, suicidal ideas and thoughts of violence.   Allergic/Immunologic: Negative for hives.       Medications/ Allergies:    Current Medications    AMLODIPINE (NORVASC) 5 MG TABLET    TAKE 1 TABLET BY MOUTH ONCE DAILY FOR 90 DAYS    ASCORBIC ACID (VITAMIN C PO)        ASPIRIN 81 PO        ATORVASTATIN (LIPITOR) 20 MG TABLET       Assessment & Plan     Acute right-sided low back pain with right-sided sciatica  Acute exacerbation  Symptomatic care strategies reviewed  - cyclobenzaprine (FLEXERIL) 5 MG tablet  Dispense: 30 tablet; Refill: 1  - tiZANidine (ZANAFLEX) 2 MG tablet  Dispense: 30 tablet; Refill: 1    History of HSV  Refilled meds today  - valACYclovir (VALTREX) 500 MG tablet  Dispense: 31 tablet; Refill: 11    Migraine without status migrainosus, not intractable, unspecified migraine type  Refilled  - SUMAtriptan (IMITREX) 25 MG tablet  Dispense: 9 tablet; Refill: 5    Nausea  Refill  - ondansetron (ZOFRAN ODT) 4 MG ODT tab  Dispense: 20 tablet; Refill: 1    Other acute gastritis without hemorrhage  As needed  - omeprazole (PRILOSEC) 20 MG DR capsule  Dispense: 31 capsule; Refill: 2  EGD with ongoing symptoms  Avoid NSAIDs and aspirin products      Chronic cough  Smoking cessation encouraged  - albuterol (PROAIR HFA/PROVENTIL HFA/VENTOLIN HFA) 108 (90 Base) MCG/ACT inhaler  Dispense: 18 g; Refill: 1    Bacterial vaginitis  Recurrent  - Wet prep - Clinic Collect  - metroNIDAZOLE (METROGEL) 0.75 % vaginal gel  Dispense: 70 g; Refill: 1    Chemical dependency (H)  Sober    Bipolar disorder, current episode depressed, severe, without psychotic features (H)  Continue psychotherapy  Follow-up with psychiatry as planned  No medication changes made today      Follow-up with PCP with ongoing symptoms  Call or return to the clinic with any worsening of symptoms or no resolution. Patient/Parent verbalized understanding and is in agreement. Medication side effects reviewed.   Current Outpatient Medications   Medication Sig Dispense Refill     albuterol (PROAIR HFA/PROVENTIL HFA/VENTOLIN HFA) 108 (90 Base) MCG/ACT inhaler Inhale 2 puffs into the lungs every 6 hours as needed for shortness of breath or wheezing 18 g 1     cyclobenzaprine (FLEXERIL) 5 MG tablet Take 1-2 tablets (5-10 mg) by mouth 3 times daily as needed for muscle spasms 30  Take 20 mg by mouth daily.    BLOOD PRESSURE KIT    Take blood pressure daily    HYDROCHLOROTHIAZIDE (HYDRODIURIL) 12.5 MG TABLET    TAKE 1 TABLET BY MOUTH ONCE DAILY FOR 90 DAYS    LISINOPRIL (ZESTRIL) 40 MG TABLET    TAKE 1 TABLET BY MOUTH ONCE DAILY FOR 90 DAYS    METHYLCOBALAMIN (B12-ACTIVE PO)        METOPROLOL SUCCINATE (KAPSPARGO SPRINKLE) 25 MG CAPSULE ER 24 HOUR SPRINKLE        OMEGA 3-6-9 FATTY ACIDS (OMEGA-3-6-9 PO)           ALLERGIES:  No Known Allergies      Objective   BP (!) 147/82 (BP Location: LUE - Left upper extremity, Patient Position: Sitting, Cuff Size: Large Adult)   Pulse 62   Ht 5' 7\" (1.702 m)   Wt 96.5 kg (212 lb 11.9 oz)   BMI 33.32 kg/m²   BSA 2.08 m²    Physical Exam   Constitutional: No distress.   HENT:   Mouth/Throat: Oropharynx is clear.   Eyes: Conjunctivae are normal.   Cardiovascular: Normal rate and normal pulses.   Murmur heard.  Pulmonary/Chest: No stridor. He has no wheezes. He exhibits no tenderness.   Abdominal: Bowel sounds are normal. There is no abdominal tenderness.   Musculoskeletal:         General: No tenderness or deformity.   Neurological: He is alert.   Skin: No plethora. Nails show no clubbing.           MEDICAL DECISION MAKING    Laboratory Data:  Recent Labs   Lab 04/01/23  0708   Cholesterol 112   HDL 45   Triglycerides 45   CALCLDL 58   Non-HDL Cholesterol 67         Recent Labs   Lab 04/11/23  0636   Sodium 131*   Chloride 95*   BUN 9   Potassium 4.0   Glucose 109*   Creatinine 0.84   Calcium 8.9          No results found for: \"HGBA1C\".    Recent Labs   Lab 04/11/23  0636 04/04/23  1422   WBC 6.7 7.1   RBC 4.88 4.90   HGB 14.4 14.5   HCT 41.5 41.7   MCV 85.0 85.1   MCHC 34.7 34.8   RDW-CV 12.8 13.0    264   Lymphocytes, Percent 24 31       FUNGAL CULTURE, BLOOD, BONE MARROW  Order: [id number]  Status: Preliminary result     Visible to patient: No (not released)     Specimen Information: Blood   1 Result Note  Fungal Culture, Blood or Bone  tablet 1     famotidine (PEPCID) 20 MG tablet Take 20 mg by mouth       hydrOXYzine (ATARAX) 50 MG tablet Take 1 tablet by mouth three times daily as needed for anxiety.       lamoTRIgine (LAMICTAL) 200 MG tablet Take 1 tablet (200 mg) by mouth daily 30 tablet 2     LORazepam (ATIVAN) 0.5 MG tablet Take 0.5 mg by mouth 2 times daily as needed       metroNIDAZOLE (METROGEL) 0.75 % vaginal gel Place 1 applicator (5 g) vaginally daily 70 g 1     omeprazole (PRILOSEC) 20 MG DR capsule Take 1 capsule (20 mg) by mouth daily 31 capsule 2     ondansetron (ZOFRAN ODT) 4 MG ODT tab Take 1 tablet (4 mg) by mouth every 8 hours as needed for nausea or vomiting 20 tablet 1     ondansetron (ZOFRAN) 4 MG tablet Take 1 tablet (4 mg) by mouth every 8 hours as needed for nausea 20 tablet 3     phenylephrine-shark liver oil-mineral oil-petrolatum (PREPARATION H) 0.25-3-14-71.9 % rectal ointment Place rectally 2 times daily as needed for hemorrhoids 57 g 1     propranolol (INDERAL) 20 MG tablet Take 1 tablet by mouth three times daily as needed for anxiety, restlessness.       QUEtiapine (SEROQUEL) 25 MG tablet TAKE 1 TABLET BY MOUTH TWICE DAILY IF NEEDED 60 tablet 1     QUEtiapine (SEROQUEL) 50 MG tablet TAKE 1-4 tablets BY MOUTH EVERY NIGHT AT BEDTIME AS NEEDED 120 tablet 0     ranitidine (ZANTAC) 300 MG tablet Take 1 tablet (300 mg) by mouth At Bedtime 30 tablet 2     SUMAtriptan (IMITREX) 25 MG tablet Take 1-2 tablets (25-50 mg) by mouth at onset of headache for migraine May repeat in 2 hours. Max 8 tablets/24 hours. 9 tablet 5     tiZANidine (ZANAFLEX) 2 MG tablet TAKE 1 TABLET BY MOUTH EVERY 8 HOURS IF NEEDED FOR MUSCLE SPASMS Strength: 2 mg 30 tablet 1     valACYclovir (VALTREX) 500 MG tablet Take 1 tablet (500 mg) by mouth daily 31 tablet 11     Chart documentation with Dragon Voice recognition Software. Although reviewed after completion, some words and grammatical errors may remain.    DIPESH Marino [facility]  "[facility]    Elaine Pack is a 37 year old, presenting for the following health issues:  Headache      Headache     History of Present Illness       Back Pain:  She presents for follow up of back pain. Patient's back pain is a recurring problem.  Location of back pain:  Right lower back, right buttock and right hip  Description of back pain: burning, cramping, sharp, shooting and stabbing  Back pain spreads: right buttocks, right thigh, right knee and right foot    Since patient first noticed back pain, pain is: always present, but gets better and worse  Does back pain interfere with her job:  Yes      Mental Health Follow-up:  Patient presents to follow-up on Depression & Anxiety.Patient's depression since last visit has been:  No change  The patient is not having other symptoms associated with depression.  Patient's anxiety since last visit has been:  Worse  The patient is having other symptoms associated with anxiety.  Any significant life events: relationship concerns and grief or loss  Patient is feeling anxious or having panic attacks.  Patient has no concerns about alcohol or drug use.    Headaches:   Since the patient's last clinic visit, headaches are: worsened  The patient is getting headaches:  Three or four times a day  She is not able to do normal daily activities when she has a migraine.  The patient is taking the following rescue/relief medications:  Tylenol, Excedrin and sumatriptan (Imitrex)   Patient states \"I get some relief\" from the rescue/relief medications.   The patient is taking the following medications to prevent migraines:  No medications to prevent migraines  In the past 4 weeks, the patient has gone to an Urgent Care or Emergency Room 0 times times due to headaches.    She eats 2-3 servings of fruits and vegetables daily.She consumes 1 sweetened beverage(s) daily.She exercises with enough effort to increase her heart rate 30 to 60 minutes per day.  She exercises " Marrow No Growth 7 Days.            Resulting Agency: [place]           Specimen Collected: 04/14/                 Imaging:    Blood Culture  Order: [id number]  Status: Final result     Visible to patient: Yes (seen)     Specimen Information: Blood, Peripheral   1 Result Note  Culture, Blood or Bone Marrow No Growth 5 Days.              Blood Culture  Order: [id number]  Status: Final result     Visible to patient: Yes (seen)     Specimen Information: Blood   0 Result Notes     important suggestion  Newer results are available. Click to view them now.     Culture, Blood or Bone Marrow No Growth 5 Days.              CTA CORONARY ARTERIES    Result Date: 4/5/2023  Narrative: EXAM: CTA CORONARY ARTERIES CLINICAL INDICATION:  67-year-old male with chest pain COMPARISON:  None. TECHNIQUE: Prospective gated technique was used to scan the chest from the aortic arch through the cardiac apex after administration of a total of 95 of Omnipaque 350 using a triphasic bolus technique.  3-D reconstructed imaging was performed on the independent workstation by [employer].  The cardiac rate is 54 bpm with  a regular rhythm. Patient received 100 mg oral Metoprolol. A single nitroglycerin tablet was administered sublingually for coronary vasodilation. Multiplanar reconstructed imaging was performed on the [id number] workstation. FINDINGS: There is mild patient motion on this exam, however the overall exam quality is good. Left main: Mild calcific atherosclerotic plaque at the origin.  No significant stenosis. Left anterior descending: Mild calcific atherosclerotic plaque in the proximal and mid portions with up to 30-40% associated narrowing. Left circumflex: No significant stenosis. Right: Minimal calcific atherosclerotic plaque.  No significant stenosis. Other: Evaluation of the acute marginal branches somewhat suboptimal due to motion.  Obtuse marginal, PDA, and ADRIANA branches do not demonstrate significant stenosis.  There is mild calcific  "with enough effort to increase her heart rate 4 days per week. She is missing 1 dose(s) of medications per week.    Today's PHQ-9         PHQ-9 Total Score: 17    PHQ-9 Q9 Thoughts of better off dead/self-harm past 2 weeks :   Not at all    How difficult have these problems made it for you to do your work, take care of things at home, or get along with other people: Very difficult  Today's VICTOR M-7 Score: 16     RECHECK BACTERIAL VAGINOSIS   Doesn't think it ever went away          Review of Systems   Neurological: Positive for headaches.      Constitutional, HEENT, cardiovascular, pulmonary, GI, , musculoskeletal, neuro, skin, endocrine and psych systems are negative, except as otherwise noted.      Objective    /81   Pulse 83   Temp 97.3  F (36.3  C) (Tympanic)   Resp 18   Ht 1.676 m (5' 6\")   Wt 83.5 kg (184 lb)   LMP  (LMP Unknown)   SpO2 98%   BMI 29.70 kg/m    Body mass index is 29.7 kg/m .  Physical Exam   GENERAL: healthy, alert and no distress  EYES: Eyes grossly normal to inspection, PERRL and conjunctivae and sclerae normal  HENT: ear canals and TM's normal, nose and mouth without ulcers or lesions  NECK: no adenopathy, no asymmetry, masses, or scars and thyroid normal to palpation  RESP: lungs clear to auscultation - no rales, rhonchi or wheezes  CV: regular rate and rhythm, normal S1 S2, no S3 or S4, no murmur, click or rub, no peripheral edema and peripheral pulses strong  ABDOMEN: soft, nontender, no hepatosplenomegaly, no masses and bowel sounds normal  MS: no gross musculoskeletal defects noted, no edema  SKIN: no suspicious lesions or rashes  NEURO: Normal strength and tone, mentation intact and speech normal  PSYCH: mentation appears normal, affect normal/bright    Admission on 09/20/2022, Discharged on 09/20/2022   Component Date Value Ref Range Status     Sodium 09/20/2022 138  136 - 145 mmol/L Final     Potassium 09/20/2022 4.1  3.4 - 5.3 mmol/L Final     Chloride 09/20/2022 104  " atherosclerotic plaque in the proximal diagonal artery without significant stenosis. There is no anatomic variation. Cardiac chambers and ventricular walls are normal in size. There is thickening of the aortic valve leaflets with a possible 9 x 5 x 4 mm thrombus, vegetation, or mass. The visualized lungs appear clear.  Remainder of the extracardiac structures show no significant abnormality. There is mild to moderate degenerative spurring visualized spine.     Impression: 1.   There is mild calcific atherosclerotic plaque in the left anterior descending artery with up to 30 of 40% associated narrowing. 2.   There is otherwise mild scattered calcific plaque without significant stenosis. 3.   Abnormal appearance of the aortic valve with possible thrombus, vegetation, or mass.  Additional clinical and possibly imaging evaluation is needed. The patient was sent to the emergency department and impression point #3 was communicated to Dr. Arce by phone and perfect serve text at approximately 1:40 PM on 04/04/2023.  Impression point #3 was reported verbally to Dr. Aidan Baum at 2:40 PM on 04/04/2023. FOR PHYSICIAN USE ONLY - Please note that this report was generated using voice recognition software.  If you require clarification or feel that there has been an error in this report please contact me through [employer].  Thank you very much for allowing me to participate in the care of your patient.  Electronically Signed by: POLI LUCERO M.D. Signed on: 4/5/2023 8:41 AM Workstation ID: [id number]      Cardiac data(including contrasted studies):  No results found for this or any previous visit (from the past 4464 hour(s)).  No valid procedures specified.  Results for orders placed during the hospital encounter of 02/17/23    Transthoracic Echo (TTE) Complete    Patient: Naseem Peñaloza     Study Date/Time:      Feb 17 2023  11:02AM    ------------------------------------------------------------------------------  STUDY CONCLUSIONS  SUMMARY:    1. Left ventricle: The cavity size is normal. Wall thickness is normal.  Systolic function is normal. The ejection fraction was measured by visual  estimation. Grade I diastolic dysfunction. The ejection fraction is 65%.  2. Left atrium: The atrium is mildly dilated.  3. Right ventricle: The cavity size is normal. Systolic function is normal.    ------------------------------------------------------------------------------  Transesophageal Echocardiogram (RUBA)   Study Date/Time:       Apr 14 2023 1:00PM    ------------------------------------------------------------------------------  INDICATIONS:   Aortic valve disease, possible vegetation.     ------------------------------------------------------------------------------  STUDY CONCLUSIONS  SUMMARY:     1. Left ventricle: The ejection fraction was measured by single plane method     of disks. The ejection fraction is 55%.  2. Aortic valve: Aortic valve has 2 cm nodularity on the ventricular side of     the non coronary cusp with fibrinous tips on the right coronary cusp.     Overall leaflet structure intact without any regurgitation. Non thrombotic     or mass-like in appearance.  3. Right ventricle: The cavity size is normal. Wall thickness is normal.     Systolic function is normal.     ------------------------------------------------------------------------------      Stress Test with Myocardial Perfusion   Study Date/Time:       Mar 8 2023 7:35AM  -----------------------------------  Study Conclusions    Summary:    1. Myocardial perfusion imaging: Left ventricular size is normal. There is  transient ischemic dilation of the left ventricle during stress. The TID  ratio is 1.23. Myocardial perfusion study is normal.  2. Gated SPECT: Normal: no left ventricular regional motion abnormality.  3. Rest: The calculated EF is 64%.  4. Stress ECG  98 - 107 mmol/L Final     Carbon Dioxide (CO2) 09/20/2022 23  22 - 29 mmol/L Final     Anion Gap 09/20/2022 11  7 - 15 mmol/L Final     Urea Nitrogen 09/20/2022 20.0  6.0 - 20.0 mg/dL Final     Creatinine 09/20/2022 0.81  0.51 - 0.95 mg/dL Final     Calcium 09/20/2022 9.5  8.6 - 10.0 mg/dL Final     Glucose 09/20/2022 89  70 - 99 mg/dL Final     GFR Estimate 09/20/2022 >90  >60 mL/min/1.73m2 Final    Effective December 21, 2021 eGFRcr in adults is calculated using the 2021 CKD-EPI creatinine equation which includes age and gender (Stephanie et al., [place], DOI: 10.1056/CSNEsy7706082)     Protein Total 09/20/2022 6.9  6.4 - 8.3 g/dL Final     Albumin 09/20/2022 4.8  3.5 - 5.2 g/dL Final     Bilirubin Total 09/20/2022 0.3  <=1.2 mg/dL Final     Alkaline Phosphatase 09/20/2022 76  35 - 104 U/L Final     AST 09/20/2022 21  10 - 35 U/L Final     ALT 09/20/2022 30  10 - 35 U/L Final     Bilirubin Direct 09/20/2022 <0.20  0.00 - 0.30 mg/dL Final     hCG Urine Qualitative 09/20/2022 Negative  Negative Final    This test is for screening purposes.  Results should be interpreted along with the clinical picture.  Confirmation testing is available if warranted by ordering [id number], HCG Quantitative Pregnancy.     Color Urine 09/20/2022 Yellow  Colorless, Straw, Light Yellow, Yellow Final     Appearance Urine 09/20/2022 Clear  Clear Final     Glucose Urine 09/20/2022 Negative  Negative mg/dL Final     Bilirubin Urine 09/20/2022 Negative  Negative Final     Ketones Urine 09/20/2022 20 (A)  Negative mg/dL Final     Specific Gravity Urine 09/20/2022 1.030  1.003 - 1.035 Final     Blood Urine 09/20/2022 Negative  Negative Final     pH Urine 09/20/2022 6.0  5.0 - 7.0 Final     Protein Albumin Urine 09/20/2022 Negative  Negative mg/dL Final     Urobilinogen Urine 09/20/2022 Normal  Normal, 2.0 mg/dL Final     Nitrite Urine 09/20/2022 Negative  Negative Final     Leukocyte Esterase Urine 09/20/2022 Negative  Negative Final     Mucus Urine  09/20/2022 Present (A)  None Seen /LPF Final     RBC Urine 09/20/2022 4 (H)  <=2 /HPF Final     WBC Urine 09/20/2022 1  <=5 /HPF Final     Squamous Epithelials Urine 09/20/2022 14 (H)  <=1 /HPF Final     Hold Specimen 09/20/2022 [facility]   Final     WBC Count 09/20/2022 12.3 (H)  4.0 - 11.0 10e3/uL Final     RBC Count 09/20/2022 4.61  3.80 - 5.20 10e6/uL Final     Hemoglobin 09/20/2022 13.9  11.7 - 15.7 g/dL Final     Hematocrit 09/20/2022 40.3  35.0 - 47.0 % Final     MCV 09/20/2022 87  78 - 100 fL Final     MCH 09/20/2022 30.2  26.5 - 33.0 pg Final     MCHC 09/20/2022 34.5  31.5 - 36.5 g/dL Final     RDW 09/20/2022 11.9  10.0 - 15.0 % Final     Platelet Count 09/20/2022 317  150 - 450 10e3/uL Final     % Neutrophils 09/20/2022 42  % Final     % Lymphocytes 09/20/2022 50  % Final     % Monocytes 09/20/2022 3  % Final     % Eosinophils 09/20/2022 5  % Final     % Basophils 09/20/2022 0  % Final     Absolute Neutrophils 09/20/2022 5.2  1.6 - 8.3 10e3/uL Final     Absolute Lymphocytes 09/20/2022 6.2 (H)  0.8 - 5.3 10e3/uL Final     Absolute Monocytes 09/20/2022 0.4  0.0 - 1.3 10e3/uL Final     Absolute Eosinophils 09/20/2022 0.6  0.0 - 0.7 10e3/uL Final     Absolute Basophils 09/20/2022 0.0  0.0 - 0.2 10e3/uL Final     RBC Morphology 09/20/2022 Confirmed RBC Indices   Final     Platelet Assessment 09/20/2022 Automated Count Confirmed. Platelet morphology is normal.  Automated Count Confirmed. Platelet morphology is normal. Final     Reactive Lymphocytes 09/20/2022 Present (A)  None Seen Final       Results for orders placed or performed in visit on 02/09/23   Wet prep - Clinic Collect     Status: Abnormal    Specimen: Vagina; Swab   Result Value Ref Range    Trichomonas Absent Absent    Yeast Absent Absent    Clue Cells Present (A) Absent    WBCs/high power field 1+ (A) None                  conclusions: There are no stress arrhythmias or conduction  abnormalities. Normal sinus rhythm. The stress ECG is negative for  ischemia.  5. Baseline ECG: Normal sinus rhythm.  Impressions:  High risk for ischemia.    ------------------------------------------------------------------------------    No results found for this or any previous visit.      Assessment     Problem List Items Addressed This Visit        Cardiology Problems    Essential (primary) hypertension - Primary      Hypertension is present.  On amlodipine 5mg  and Metoprolol ([place]sp[place])  PCP stopped lisinopril and hctz due to PCP follow up  Will have monitor daily BP         ASCVD (arteriosclerotic cardiovascular disease)     -atherosclerotic disease is identified on carotid ultrasound and mild LAD CAD (30-40%)  -Recommend aggressive risk factor reduction  -Recommend Blood pressure goal < 130/80  -recommend lipid therapy per patient 10 year ASCVD risk  -Recommend Aspirin 81mg   - coronary CTA without significant obstructive disease, mild disease           Aortic valve disease     -aortic valve with globular adherent structure  -blood cultures negative  -does not appear thrombus in nature  -prophylactically started on anticoagualtion, but no record of outpatient anticoagulation.  -will continue 6 months and re-evaluate with TTE and follow up RUBA if still abnormal             Relevant Orders    TRANSTHORACIC ECHO(TTE) COMPLETE W/ W/O IMAGING AGENT            Time spent with patient: 45 minutes.  Return in about 6 months (around 4/11/2024).             Aidan Baum MD    Orders Placed This Encounter   • TRANSTHORACIC ECHO(TTE) COMPLETE W/ W/O IMAGING AGENT        Future Appointments   Date Time Provider Department Center   11/9/2023  7:15 AM [facility]

## 2023-10-13 ENCOUNTER — HOSPITAL ENCOUNTER (OUTPATIENT)
Dept: CT IMAGING | Facility: CLINIC | Age: 38
Discharge: HOME | End: 2023-10-13
Attending: NURSE PRACTITIONER | Admitting: NURSE PRACTITIONER
Payer: COMMERCIAL

## 2023-10-13 DIAGNOSIS — R51.9 CHRONIC DAILY HEADACHE: ICD-10-CM

## 2023-10-13 PROCEDURE — 70450 CT HEAD/BRAIN W/O DYE: CPT

## 2023-10-31 ENCOUNTER — OFFICE VISIT (OUTPATIENT)
Dept: URGENT CARE | Facility: URGENT CARE | Age: 38
End: 2023-10-31
Payer: COMMERCIAL

## 2023-10-31 VITALS
OXYGEN SATURATION: 97 % | TEMPERATURE: 97.7 F | SYSTOLIC BLOOD PRESSURE: 130 MMHG | DIASTOLIC BLOOD PRESSURE: 80 MMHG | WEIGHT: 177.8 LBS | BODY MASS INDEX: 28.7 KG/M2 | RESPIRATION RATE: 16 BRPM | HEART RATE: 96 BPM

## 2023-10-31 DIAGNOSIS — G43.109 MIGRAINE WITH AURA AND WITHOUT STATUS MIGRAINOSUS, NOT INTRACTABLE: Primary | Chronic | ICD-10-CM

## 2023-10-31 PROCEDURE — 96372 THER/PROPH/DIAG INJ SC/IM: CPT | Performed by: NURSE PRACTITIONER

## 2023-10-31 PROCEDURE — 99214 OFFICE O/P EST MOD 30 MIN: CPT | Mod: 25 | Performed by: NURSE PRACTITIONER

## 2023-10-31 RX ORDER — KETOROLAC TROMETHAMINE 30 MG/ML
30 INJECTION, SOLUTION INTRAMUSCULAR; INTRAVENOUS ONCE
Status: COMPLETED | OUTPATIENT
Start: 2023-10-31 | End: 2023-10-31

## 2023-10-31 RX ADMIN — KETOROLAC TROMETHAMINE 30 MG: 30 INJECTION, SOLUTION INTRAMUSCULAR; INTRAVENOUS at 18:25

## 2023-10-31 RX ADMIN — KETOROLAC TROMETHAMINE 30 MG: 30 INJECTION, SOLUTION INTRAMUSCULAR; INTRAVENOUS at 18:51

## 2023-10-31 NOTE — NURSING NOTE
PLEASE NOTE:  ketorolac 30 mg, IM injection was given once, mistakenly documented twice.  One shot given in the RUQ gluteus.    Eli Lara LPN

## 2023-10-31 NOTE — PROGRESS NOTES
Assessment & Plan      Diagnosis Comments   1. Migraine with aura and without status migrainosus, not intractable  ketorolac (TORADOL) injection 30 mg   Patient reports constant migraine for 2 hours with no improvement after taking Zofran and Imitrex.    Patient will follow up with primary in 1 week.  She is agreeable with going to the ED with worsening symptoms.   Discussed side effects of the Toradol injection. Patient verbalizes understanding of these instructions.           Kaila Patten NP Student  I saw and evaluated the patient and agree with the findings and plan of care as documented in the note.  Kimberlee Rivera [facility] [place]    Elaine Pack is a 37 year old female who presents to clinic today for the following health issues:  Chief Complaint   Patient presents with    Headache     Migraines x3 times a day for the past week,feels nauseas and needing to be in complete dark to help with sx. Per patient migraine medication prescribed is not helping.      HPI    37 year old with a history of migraine headaches presents with constant migraine headache for 2 hours. Headache not relieved with Imitrex and Zofran. Patient reports taking these medications about 2 hours before coming into the urgent care. She endorses photosensitivity and disturbed sleep due to migraine. She denies symptoms of cough, new onset of aura, sinus congestion, and vomiting.     Patient was recently seen through her primary care provider for increasing frequency of her migraine headaches.  Recent CT head negative.  She has not seen neurology.      Review of Systems  Constitutional, HEENT, cardiovascular, pulmonary, gi and gu systems are negative, except as otherwise noted.      Objective    /80   Pulse 96   Temp 97.7  F (36.5  C) (Tympanic)   Resp 16   Wt 80.6 kg (177 lb 12.8 oz)   LMP  (LMP Unknown)   SpO2 97%   BMI 28.70 kg/m      Physical Exam   GENERAL: healthy, alert and in no  distress  EYES: Patient reports photosensitivity, PERRL and conjunctivae and sclerae normal  HENT: ear canals and TM's normal, nose and mouth without ulcers or lesions  NECK: no adenopathy, no asymmetry, masses, or scars and thyroid normal to palpation  RESP: lungs clear to auscultation - no rales, rhonchi or wheezes  CV: regular rate and rhythm, normal S1 S2, no S3 or S4, no murmur, click or rub, no peripheral edema and peripheral pulses strong  NEURO: Normal strength and tone, mentation intact and speech normal  PSYCH: mentation appears normal, affect normal/bright

## 2023-11-07 ENCOUNTER — LAB (OUTPATIENT)
Dept: LAB | Facility: CLINIC | Age: 38
End: 2023-11-07
Payer: COMMERCIAL

## 2023-11-07 ENCOUNTER — E-VISIT (OUTPATIENT)
Dept: URGENT CARE | Facility: CLINIC | Age: 38
End: 2023-11-07
Payer: COMMERCIAL

## 2023-11-07 DIAGNOSIS — N76.0 ACUTE VAGINITIS: ICD-10-CM

## 2023-11-07 DIAGNOSIS — B96.89 BV (BACTERIAL VAGINOSIS): Primary | ICD-10-CM

## 2023-11-07 DIAGNOSIS — N76.0 ACUTE VAGINITIS: Primary | ICD-10-CM

## 2023-11-07 DIAGNOSIS — N76.0 BV (BACTERIAL VAGINOSIS): Primary | ICD-10-CM

## 2023-11-07 LAB
C TRACH DNA SPEC QL NAA+PROBE: NEGATIVE
CLUE CELLS: PRESENT
N GONORRHOEA DNA SPEC QL NAA+PROBE: NEGATIVE
TRICHOMONAS, WET PREP: ABNORMAL
WBC'S/HIGH POWER FIELD, WET PREP: ABNORMAL
YEAST, WET PREP: ABNORMAL

## 2023-11-07 PROCEDURE — 87210 SMEAR WET MOUNT SALINE/INK: CPT

## 2023-11-07 PROCEDURE — 99207 PR NON-BILLABLE SERV PER CHARTING: CPT | Performed by: PREVENTIVE MEDICINE

## 2023-11-07 PROCEDURE — 87491 CHLMYD TRACH DNA AMP PROBE: CPT

## 2023-11-07 PROCEDURE — 87591 N.GONORRHOEAE DNA AMP PROB: CPT

## 2023-11-07 RX ORDER — METRONIDAZOLE 500 MG/1
500 TABLET ORAL 2 TIMES DAILY
Qty: 14 TABLET | Refills: 0 | Status: SHIPPED | OUTPATIENT
Start: 2023-11-07 | End: 2023-11-14

## 2023-11-07 NOTE — PATIENT INSTRUCTIONS
Thank you for choosing us for your care. Given your symptoms, I would like you to do a lab-only visit to determine what is causing them.  I have placed the orders.  Please schedule an appointment with the lab right here in [facility], or call [phone].  I will let you know when the results are back and next steps to take.  Vaginitis: Care Instructions  Overview     Vaginitis is soreness or infection of the vagina. This common problem can cause itching and burning. And it can cause a change in vaginal discharge. Sometimes it can cause pain during sex. Vaginitis may be caused by bacteria, yeast, or other germs. Some infections that cause it are caught from a sexual partner. Bath products, spermicides, and douches can irritate the vagina too.  This problem can also happen during and after menopause. A drop in estrogen levels during this time can cause dryness, soreness, and pain during sex.  Your doctor can give you medicine to treat vaginitis. And home care may help you feel better. For certain types of infections, your sex partner(s) must be treated too.  Follow-up care is a key part of your treatment and safety. Be sure to make and go to all appointments, and call your doctor if you are having problems. It's also a good idea to know your test results and keep a list of the medicines you take.  How can you care for yourself at home?  Take your medicines exactly as prescribed. Call your doctor if you think you are having a problem with your medicine.  Ask your doctor if your sex partner(s) also needs treatment.  Do not eat or drink anything that has alcohol if you are taking metronidazole (Flagyl).  Wash your vulva daily with water. You also can use a mild, unscented soap if you want.  Do not use scented bath products. And do not use vaginal sprays or douches.  Change out of wet or damp clothes as soon as possible. Wear cotton underwear. And avoid tight clothing that could increase moisture.  Put a washcloth soaked  "in cool water on the area to relieve itching. Or you can take cool baths.  If you have dryness because of menopause, use estrogen cream or pills that your doctor prescribes.  Ask your doctor about when it is okay to have sex.  Use a personal lubricant before sex if you have dryness. Examples are Astroglide, K-Y Jelly, and Wet Lubricant Gel.  When should you call for help?   Call your doctor now or seek immediate medical care if:    You have a fever.     You have new or increased pain in your vagina or pelvis.     You have new or worse vaginal itching or discharge.   Watch closely for changes in your health, and be sure to contact your doctor if:    You have bleeding other than your period.     You do not get better as expected.   Where can you learn more?  Go to https://www.L99.com.net/patiented  Enter F219 in the search box to learn more about \"Vaginitis: Care Instructions.\"  Current as of: April 19, 2023               Content Version: 13.8    7009-2354 [employer].   Care instructions adapted under license by your healthcare professional. If you have questions about a medical condition or this instruction, always ask your healthcare professional. [employer] disclaims any warranty or liability for your use of this information.      "

## 2023-11-09 ENCOUNTER — E-VISIT (OUTPATIENT)
Dept: URGENT CARE | Facility: CLINIC | Age: 38
End: 2023-11-09
Payer: COMMERCIAL

## 2023-11-09 ENCOUNTER — TELEPHONE (OUTPATIENT)
Dept: FAMILY MEDICINE | Facility: CLINIC | Age: 38
End: 2023-11-09

## 2023-11-09 DIAGNOSIS — G43.109 MIGRAINE WITH AURA AND WITHOUT STATUS MIGRAINOSUS, NOT INTRACTABLE: Primary | ICD-10-CM

## 2023-11-09 DIAGNOSIS — N76.0 BACTERIAL VAGINOSIS: Primary | ICD-10-CM

## 2023-11-09 DIAGNOSIS — R11.0 NAUSEA: ICD-10-CM

## 2023-11-09 DIAGNOSIS — B96.89 BACTERIAL VAGINOSIS: Primary | ICD-10-CM

## 2023-11-09 PROCEDURE — 99207 PR NON-BILLABLE SERV PER CHARTING: CPT | Performed by: NURSE PRACTITIONER

## 2023-11-09 RX ORDER — METRONIDAZOLE 7.5 MG/G
1 GEL VAGINAL DAILY
Qty: 70 G | Refills: 0 | Status: SHIPPED | OUTPATIENT
Start: 2023-11-09 | End: 2023-11-14

## 2023-11-09 RX ORDER — ONDANSETRON 4 MG/1
4 TABLET, ORALLY DISINTEGRATING ORAL EVERY 8 HOURS PRN
Qty: 30 TABLET | Refills: 3 | Status: SHIPPED | OUTPATIENT
Start: 2023-11-09 | End: 2024-02-21

## 2023-11-09 NOTE — TELEPHONE ENCOUNTER
Sirena is requesting a refill on her Ondansetron,      Thank You,  Willa Contreras, [facility]

## 2023-11-09 NOTE — PATIENT INSTRUCTIONS
"Thank you for choosing us for your care. I have placed an order for a prescription so that you can start treatment. View your full visit summary for details by clicking on the link below. Your pharmacist will able to address any questions you may have about the medication.     If you re not feeling better within 2-3 days, please schedule an appointment.  You can schedule an appointment right here in [facility], or call [phone]  If the visit is for the same symptoms as your eVisit, we ll refund the cost of your eVisit if seen within seven days.    Bacterial Vaginosis: Care Instructions  Overview     Bacterial vaginosis is a condition in which there is excess growth of certain bacteria that are normally found in the vagina. Symptoms often include abnormal gray or yellow discharge with a \"fishy\" odor. It is not considered an infection that is spread through sexual contact.  Symptoms can be annoying and uncomfortable. But bacterial vaginosis does not usually cause other health problems. However, in some cases it can lead to more serious issues.  While bacterial vaginosis may go away on its own, most doctors use antibiotics to treat it. You may have been prescribed pills or vaginal cream. With treatment, bacterial vaginosis usually clears up in 5 to 7 days.  Follow-up care is a key part of your treatment and safety. Be sure to make and go to all appointments, and call your doctor if you are having problems. It's also a good idea to know your test results and keep a list of the medicines you take.  How can you care for yourself at home?  Take your antibiotics as directed. Do not stop taking them just because you feel better. You need to take the full course of antibiotics.  Do not eat or drink anything that contains alcohol if you are taking metronidazole or tinidazole.  Keep using your medicine if you start your period. Use pads instead of tampons while using a vaginal cream or suppository. Tampons can absorb the " "medicine.  Wear loose cotton clothing. Do not wear nylon and other materials that hold body heat and moisture close to the skin.  Do not scratch. Relieve itching with a cold pack or a cool bath.  Do not wash your vulva more than once a day. Use plain water or a mild, unscented soap. Do not douche.  When should you call for help?   Call your doctor now or seek immediate medical care if:    You have a fever.     You have new or worse pain in your vagina or pelvis.   Watch closely for changes in your health, and be sure to contact your doctor if:    You have new or worse vaginal itching or discharge.     You have unexpected vaginal bleeding.     You are not getting better as expected.     Your symptoms return after you finish the course of your medicine.   Where can you learn more?  Go to https://www.WatchGuard.net/patiented  Enter X360 in the search box to learn more about \"Bacterial Vaginosis: Care Instructions.\"  Current as of: April 19, 2023               Content Version: 13.8    7017-2315 [employer].   Care instructions adapted under license by your healthcare professional. If you have questions about a medical condition or this instruction, always ask your healthcare professional. [employer] disclaims any warranty or liability for your use of this information.      "

## 2023-11-09 NOTE — TELEPHONE ENCOUNTER
Takes Ondanestron for nausea associated with migraines.  Routing refill request to provider for review/approval because:  Medication is reported/historical  AVE Rodriguez RN

## 2023-11-17 ENCOUNTER — MYC MEDICAL ADVICE (OUTPATIENT)
Dept: FAMILY MEDICINE | Facility: CLINIC | Age: 38
End: 2023-11-17
Payer: COMMERCIAL

## 2023-11-17 DIAGNOSIS — G43.109 MIGRAINE WITH AURA AND WITHOUT STATUS MIGRAINOSUS, NOT INTRACTABLE: Primary | ICD-10-CM

## 2023-11-17 NOTE — TELEPHONE ENCOUNTER
Pls see [employer] message below.     Referral pended, message routed to provider for consideration.  Julie Behrendt RN

## 2023-11-21 ENCOUNTER — LAB (OUTPATIENT)
Dept: LAB | Facility: CLINIC | Age: 38
End: 2023-11-21
Payer: COMMERCIAL

## 2023-11-21 ENCOUNTER — E-VISIT (OUTPATIENT)
Dept: URGENT CARE | Facility: CLINIC | Age: 38
End: 2023-11-21
Payer: COMMERCIAL

## 2023-11-21 DIAGNOSIS — N89.8 VAGINAL DISCHARGE: ICD-10-CM

## 2023-11-21 DIAGNOSIS — N89.8 VAGINAL DISCHARGE: Primary | ICD-10-CM

## 2023-11-21 PROCEDURE — 99207 PR NON-BILLABLE SERV PER CHARTING: CPT | Performed by: PHYSICIAN ASSISTANT

## 2023-11-21 PROCEDURE — 87210 SMEAR WET MOUNT SALINE/INK: CPT

## 2023-11-21 NOTE — PATIENT INSTRUCTIONS
Thank you for choosing us for your care. Given your symptoms, I would like you to do a lab-only visit to determine what is causing them.  I have placed the orders.  Please schedule an appointment with the lab right here in [facility], or call [phone].  I will let you know when the results are back and next steps to take.  Vaginitis: Care Instructions  Overview     Vaginitis is soreness or infection of the vagina. This common problem can cause itching and burning. And it can cause a change in vaginal discharge. Sometimes it can cause pain during sex. Vaginitis may be caused by bacteria, yeast, or other germs. Some infections that cause it are caught from a sexual partner. Bath products, spermicides, and douches can irritate the vagina too.  This problem can also happen during and after menopause. A drop in estrogen levels during this time can cause dryness, soreness, and pain during sex.  Your doctor can give you medicine to treat vaginitis. And home care may help you feel better. For certain types of infections, your sex partner(s) must be treated too.  Follow-up care is a key part of your treatment and safety. Be sure to make and go to all appointments, and call your doctor if you are having problems. It's also a good idea to know your test results and keep a list of the medicines you take.  How can you care for yourself at home?  Take your medicines exactly as prescribed. Call your doctor if you think you are having a problem with your medicine.  Ask your doctor if your sex partner(s) also needs treatment.  Do not eat or drink anything that has alcohol if you are taking metronidazole (Flagyl).  Wash your vulva daily with water. You also can use a mild, unscented soap if you want.  Do not use scented bath products. And do not use vaginal sprays or douches.  Change out of wet or damp clothes as soon as possible. Wear cotton underwear. And avoid tight clothing that could increase moisture.  Put a washcloth soaked  "in cool water on the area to relieve itching. Or you can take cool baths.  If you have dryness because of menopause, use estrogen cream or pills that your doctor prescribes.  Ask your doctor about when it is okay to have sex.  Use a personal lubricant before sex if you have dryness. Examples are Astroglide, K-Y Jelly, and Wet Lubricant Gel.  When should you call for help?   Call your doctor now or seek immediate medical care if:    You have a fever.     You have new or increased pain in your vagina or pelvis.     You have new or worse vaginal itching or discharge.   Watch closely for changes in your health, and be sure to contact your doctor if:    You have bleeding other than your period.     You do not get better as expected.   Where can you learn more?  Go to https://www.wali.net/patiented  Enter F219 in the search box to learn more about \"Vaginitis: Care Instructions.\"  Current as of: April 19, 2023               Content Version: 13.8    1199-9591 [employer].   Care instructions adapted under license by your healthcare professional. If you have questions about a medical condition or this instruction, always ask your healthcare professional. [employer] disclaims any warranty or liability for your use of this information.      "

## 2023-12-28 ENCOUNTER — E-VISIT (OUTPATIENT)
Dept: URGENT CARE | Facility: CLINIC | Age: 38
End: 2023-12-28
Payer: COMMERCIAL

## 2023-12-28 DIAGNOSIS — R10.13 ABDOMINAL PAIN, EPIGASTRIC: Primary | ICD-10-CM

## 2023-12-28 PROCEDURE — 99207 PR NON-BILLABLE SERV PER CHARTING: CPT | Performed by: PHYSICIAN ASSISTANT

## 2023-12-28 NOTE — PATIENT INSTRUCTIONS
Thank you for choosing us for your care. Based on the information provided, I believe you need to be seen immediately.  Please go to urgent care as soon as possible.     You will not be charged for this eVisit.    Patti Hawley PA-C  [facility]

## 2024-01-04 ENCOUNTER — OFFICE VISIT (OUTPATIENT)
Dept: FAMILY MEDICINE | Facility: CLINIC | Age: 39
End: 2024-01-04
Payer: COMMERCIAL

## 2024-01-04 VITALS
HEART RATE: 102 BPM | HEIGHT: 66 IN | WEIGHT: 186 LBS | SYSTOLIC BLOOD PRESSURE: 139 MMHG | BODY MASS INDEX: 29.89 KG/M2 | RESPIRATION RATE: 16 BRPM | OXYGEN SATURATION: 97 % | DIASTOLIC BLOOD PRESSURE: 89 MMHG | TEMPERATURE: 98.6 F

## 2024-01-04 DIAGNOSIS — Z71.6 ENCOUNTER FOR SMOKING CESSATION COUNSELING: ICD-10-CM

## 2024-01-04 DIAGNOSIS — M54.41 ACUTE RIGHT-SIDED LOW BACK PAIN WITH RIGHT-SIDED SCIATICA: ICD-10-CM

## 2024-01-04 DIAGNOSIS — K21.01 GASTROESOPHAGEAL REFLUX DISEASE WITH ESOPHAGITIS AND HEMORRHAGE: Primary | ICD-10-CM

## 2024-01-04 DIAGNOSIS — B00.9 HSV (HERPES SIMPLEX VIRUS) INFECTION: ICD-10-CM

## 2024-01-04 PROCEDURE — 99214 OFFICE O/P EST MOD 30 MIN: CPT | Performed by: NURSE PRACTITIONER

## 2024-01-04 RX ORDER — VALACYCLOVIR HYDROCHLORIDE 500 MG/1
500 TABLET, FILM COATED ORAL DAILY
Qty: 31 TABLET | Refills: 11 | Status: SHIPPED | OUTPATIENT
Start: 2024-01-04

## 2024-01-04 RX ORDER — FAMOTIDINE 40 MG/1
40 TABLET, FILM COATED ORAL DAILY
Qty: 90 TABLET | Refills: 0 | Status: SHIPPED | OUTPATIENT
Start: 2024-01-04 | End: 2024-05-02

## 2024-01-04 RX ORDER — OMEPRAZOLE 40 MG/1
40 CAPSULE, DELAYED RELEASE ORAL DAILY
Qty: 90 CAPSULE | Refills: 0 | Status: SHIPPED | OUTPATIENT
Start: 2024-01-04 | End: 2024-02-21

## 2024-01-04 RX ORDER — CYCLOBENZAPRINE HCL 5 MG
5-10 TABLET ORAL 3 TIMES DAILY PRN
Qty: 30 TABLET | Refills: 1 | Status: SHIPPED | OUTPATIENT
Start: 2024-01-04 | End: 2024-05-02

## 2024-01-04 RX ORDER — TIZANIDINE 2 MG/1
TABLET ORAL
Qty: 30 TABLET | Refills: 1 | Status: SHIPPED | OUTPATIENT
Start: 2024-01-04 | End: 2024-02-21

## 2024-01-04 ASSESSMENT — PAIN SCALES - GENERAL: PAINLEVEL: MODERATE PAIN (4)

## 2024-01-04 NOTE — PROGRESS NOTES
Assessment & Plan     Gastroesophageal reflux disease with esophagitis and hemorrhage  Symptomatic care strategies reviewed    - Adult GI  Referral - Procedure Only  - omeprazole (PRILOSEC) 40 MG DR capsule  Dispense: 90 capsule; Refill: 0  - famotidine (PEPCID) 40 MG tablet  Dispense: 90 tablet; Refill: 0    Acute right-sided low back pain with right-sided sciatica  Symptomatic care strategies reviewed    - tiZANidine (ZANAFLEX) 2 MG tablet  Dispense: 30 tablet; Refill: 1  - cyclobenzaprine (FLEXERIL) 5 MG tablet  Dispense: 30 tablet; Refill: 1    Encounter for smoking cessation counseling    - nicotine (NICODERM CQ) 7 MG/24HR 24 hr patch  Dispense: 28 patch; Refill: 2    HSV (herpes simplex virus) infection    - valACYclovir (VALTREX) 500 MG tablet  Dispense: 31 tablet; Refill: 11      Call or return to the clinic with any worsening of symptoms or no resolution. Patient/Parent verbalized understanding and is in agreement. Medication side effects reviewed.   Current Outpatient Medications   Medication Sig Dispense Refill    cyclobenzaprine (FLEXERIL) 5 MG tablet Take 1-2 tablets (5-10 mg) by mouth 3 times daily as needed for muscle spasms 30 tablet 1    famotidine (PEPCID) 20 MG tablet Take 1 tablet (20 mg) by mouth 2 times daily 50 tablet 3    famotidine (PEPCID) 40 MG tablet Take 1 tablet (40 mg) by mouth daily 90 tablet 0    HEMORRHOIDAL 0.25-14-74.9 % rectal ointment Place rectally 2 times daily as needed      hydrOXYzine (ATARAX) 50 MG tablet Take 1 tablet by mouth three times daily as needed for anxiety.      lamoTRIgine (LAMICTAL) 200 MG tablet Take 1 tablet (200 mg) by mouth daily 30 tablet 2    nicotine (NICODERM CQ) 7 MG/24HR 24 hr patch Place 1 patch onto the skin every 24 hours 28 patch 2    omeprazole (PRILOSEC) 40 MG DR capsule Take 1 capsule (40 mg) by mouth daily 90 capsule 0    ondansetron (ZOFRAN ODT) 4 MG ODT tab Place 1 tablet (4 mg) under the tongue every 8 hours as needed for nausea  30 tablet 3    propranolol (INDERAL) 20 MG tablet Take 1 tablet by mouth three times daily as needed for anxiety, restlessness.      QUEtiapine (SEROQUEL) 25 MG tablet TAKE 1 TABLET BY MOUTH TWICE DAILY IF NEEDED 60 tablet 1    sertraline (ZOLOFT) 50 MG tablet Take 50 mg by mouth daily      SUMAtriptan (IMITREX) 25 MG tablet Take 1-2 tablets (25-50 mg) by mouth at onset of headache for migraine May repeat in 2 hours. Max 8 tablets/24 hours. 9 tablet 5    tiZANidine (ZANAFLEX) 2 MG tablet TAKE 1 TABLET BY MOUTH EVERY 8 HOURS IF NEEDED FOR MUSCLE SPASMS Strength: 2 mg 30 tablet 1    valACYclovir (VALTREX) 500 MG tablet Take 1 tablet (500 mg) by mouth daily 31 tablet 11    albuterol (VENTOLIN HFA) 108 (90 Base) MCG/ACT inhaler Inhale 2 puffs into the lungs every 6 hours as needed for shortness of breath or wheezing (Patient not taking: Reported on 1/4/2024) 18 g 1    BIOTIN 5000 5 MG CAPS TAKE 1 TABLET BY MOUTH ONCE DAILY BEFORE A MEAL (Patient not taking: Reported on 10/31/2023)      omeprazole (PRILOSEC) 20 MG DR capsule Take 1 capsule (20 mg) by mouth daily (Patient not taking: Reported on 1/4/2024) 31 capsule 2    phenylephrine-shark liver oil-mineral oil-petrolatum (PREPARATION H) 0.25-3-14-71.9 % rectal ointment Place rectally 2 times daily as needed for hemorrhoids (Patient not taking: Reported on 1/4/2024) 57 g 1    QUEtiapine (SEROQUEL) 50 MG tablet TAKE 1-4 tablets BY MOUTH EVERY NIGHT AT BEDTIME AS NEEDED (Patient not taking: Reported on 1/4/2024) 120 tablet 0    valACYclovir (VALTREX) 1000 mg tablet Take 1 Tablet (1 g) by mouth 3 times daily for 7 days. For herpes outbreak and then return to daily preventative. (Patient not taking: Reported on 10/31/2023)       Chart documentation with Dragon Voice recognition Software. Although reviewed after completion, some words and grammatical errors may remain.  Shi Martinez MSN,FNP-BC  [address]  [address]  [phone]          Subjective   Sirena is a 38 year old, presenting for the following health issues:  Abdominal Pain      History of Present Illness       Reason for visit:  Heartburn stomach pains so bad i can t function or work  Symptom onset:  1-2 weeks ago  Symptoms include:  Heartburn,  stomach pain  Symptom intensity:  Severe  Symptom progression:  Worsening  Had these symptoms before:  No  What makes it worse:  No  What makes it better:  No    She eats 2-3 servings of fruits and vegetables daily.She consumes 1 sweetened beverage(s) daily.She exercises with enough effort to increase her heart rate 60 or more minutes per day.  She exercises with enough effort to increase her heart rate 5 days per week. She is missing 1 dose(s) of medications per week.       She has tried mylantaand tums to help with symptoms, normal bowel movements, no fever, bloating, having night sweats, pain radiates to her back       GERD/Heartburn  Onset/Duration: 1.5 days ago   Description: severe burning. Hiccups. Gas   Intensity: severe  Progression of Symptoms: same  Accompanying Signs & Symptoms:  Does it feel like food gets stuck or trouble swallowing: No  Nausea: No  Vomiting (bloody?): No  Abdominal Pain: No  Black-Tarry stools: No  Bloody stools: YES started again a week ago  History:  Previous similar episodes: YES  Previous ulcers: No  Precipitating factors:   Caffeine use: No  Alcohol use: No  NSAID/Aspirin use: No  Tobacco use: YES 1 ppd   Worse with no particular food or drink.  Alleviating factors: mylanta and heating pad   Therapies tried and outcome:             Lifestyle changes: None            Medications: Pepcid (famotidine), antacids, and mylanta minimally helpful          Review of Systems   Constitutional, HEENT, cardiovascular, pulmonary, GI, , musculoskeletal, neuro, skin, endocrine and psych systems are negative, except as otherwise noted.      Objective    BP (!) 150/90   Pulse 102   Temp 98.6  F  "(37  C) (Oral)   Resp 16   Ht 1.676 m (5' 6\")   Wt 84.4 kg (186 lb)   LMP  (LMP Unknown)   SpO2 97%   BMI 30.02 kg/m    Body mass index is 30.02 kg/m .  Physical Exam   GENERAL: alert and no distress  EYES: Eyes grossly normal to inspection, PERRL and conjunctivae and sclerae normal  HENT: ear canals and TM's normal, nose and mouth without ulcers or lesions  NECK: no adenopathy, no asymmetry, masses, or scars  RESP: lungs clear to auscultation - no rales, rhonchi or wheezes  CV: regular rate and rhythm, normal S1 S2, no S3 or S4, no murmur, click or rub, no peripheral edema  ABDOMEN: soft, epigastric tenderness, no hepatosplenomegaly, no masses and bowel sounds normal  MS: tenderness to palpation right SI joint   SKIN: no suspicious lesions or rashes  NEURO: Normal strength and tone, mentation intact and speech normal  PSYCH: mentation appears normal, affect normal/bright  LYMPH: no cervical, supraclavicular, axillary, or inguinal adenopathy    Lab on 11/21/2023   Component Date Value Ref Range Status    Trichomonas 11/21/2023 Absent  Absent Final    Yeast 11/21/2023 Absent  Absent Final    Clue Cells 11/21/2023 Absent  Absent Final    WBCs/high power field 11/21/2023 1+ (A)  None Final                     "

## 2024-01-09 ENCOUNTER — APPOINTMENT (OUTPATIENT)
Dept: CT IMAGING | Facility: CLINIC | Age: 39
End: 2024-01-09
Attending: FAMILY MEDICINE
Payer: COMMERCIAL

## 2024-01-09 ENCOUNTER — HOSPITAL ENCOUNTER (EMERGENCY)
Facility: CLINIC | Age: 39
Discharge: HOME | End: 2024-01-09
Attending: FAMILY MEDICINE | Admitting: FAMILY MEDICINE
Payer: COMMERCIAL

## 2024-01-09 VITALS
TEMPERATURE: 98.6 F | WEIGHT: 187 LBS | RESPIRATION RATE: 16 BRPM | SYSTOLIC BLOOD PRESSURE: 148 MMHG | OXYGEN SATURATION: 97 % | BODY MASS INDEX: 30.05 KG/M2 | HEART RATE: 103 BPM | HEIGHT: 66 IN | DIASTOLIC BLOOD PRESSURE: 87 MMHG

## 2024-01-09 DIAGNOSIS — K76.0 FATTY LIVER: ICD-10-CM

## 2024-01-09 DIAGNOSIS — R10.9 ABDOMINAL PAIN, UNSPECIFIED ABDOMINAL LOCATION: ICD-10-CM

## 2024-01-09 DIAGNOSIS — R19.7 DIARRHEA, UNSPECIFIED TYPE: ICD-10-CM

## 2024-01-09 LAB
ALBUMIN SERPL BCG-MCNC: 4.6 G/DL (ref 3.5–5.2)
ALBUMIN UR-MCNC: NEGATIVE MG/DL
ALP SERPL-CCNC: 67 U/L (ref 40–150)
ALT SERPL W P-5'-P-CCNC: 30 U/L (ref 0–50)
ANION GAP SERPL CALCULATED.3IONS-SCNC: 11 MMOL/L (ref 7–15)
APPEARANCE UR: CLEAR
AST SERPL W P-5'-P-CCNC: 21 U/L (ref 0–45)
BACTERIA #/AREA URNS HPF: ABNORMAL /HPF
BASOPHILS # BLD AUTO: 0.1 10E3/UL (ref 0–0.2)
BASOPHILS NFR BLD AUTO: 1 %
BILIRUB SERPL-MCNC: 0.2 MG/DL
BILIRUB UR QL STRIP: NEGATIVE
BUN SERPL-MCNC: 9.7 MG/DL (ref 6–20)
CALCIUM SERPL-MCNC: 9.2 MG/DL (ref 8.6–10)
CHLORIDE SERPL-SCNC: 104 MMOL/L (ref 98–107)
COLOR UR AUTO: YELLOW
CREAT SERPL-MCNC: 0.56 MG/DL (ref 0.51–0.95)
DEPRECATED HCO3 PLAS-SCNC: 21 MMOL/L (ref 22–29)
EGFRCR SERPLBLD CKD-EPI 2021: >90 ML/MIN/1.73M2
EOSINOPHIL # BLD AUTO: 0.5 10E3/UL (ref 0–0.7)
EOSINOPHIL NFR BLD AUTO: 5 %
ERYTHROCYTE [DISTWIDTH] IN BLOOD BY AUTOMATED COUNT: 12.2 % (ref 10–15)
GLUCOSE SERPL-MCNC: 77 MG/DL (ref 70–99)
GLUCOSE UR STRIP-MCNC: NEGATIVE MG/DL
HCT VFR BLD AUTO: 44.6 % (ref 35–47)
HGB BLD-MCNC: 15.8 G/DL (ref 11.7–15.7)
HGB UR QL STRIP: NEGATIVE
IMM GRANULOCYTES # BLD: 0 10E3/UL
IMM GRANULOCYTES NFR BLD: 0 %
KETONES UR STRIP-MCNC: NEGATIVE MG/DL
LEUKOCYTE ESTERASE UR QL STRIP: NEGATIVE
LIPASE SERPL-CCNC: 28 U/L (ref 13–60)
LYMPHOCYTES # BLD AUTO: 4 10E3/UL (ref 0.8–5.3)
LYMPHOCYTES NFR BLD AUTO: 38 %
MCH RBC QN AUTO: 31.3 PG (ref 26.5–33)
MCHC RBC AUTO-ENTMCNC: 35.4 G/DL (ref 31.5–36.5)
MCV RBC AUTO: 88 FL (ref 78–100)
MONOCYTES # BLD AUTO: 0.7 10E3/UL (ref 0–1.3)
MONOCYTES NFR BLD AUTO: 7 %
MUCOUS THREADS #/AREA URNS LPF: PRESENT /LPF
NEUTROPHILS # BLD AUTO: 5.2 10E3/UL (ref 1.6–8.3)
NEUTROPHILS NFR BLD AUTO: 49 %
NITRATE UR QL: NEGATIVE
NRBC # BLD AUTO: 0 10E3/UL
NRBC BLD AUTO-RTO: 0 /100
PH UR STRIP: 7 [PH] (ref 5–7)
PLATELET # BLD AUTO: 330 10E3/UL (ref 150–450)
POTASSIUM SERPL-SCNC: 3.9 MMOL/L (ref 3.4–5.3)
PROT SERPL-MCNC: 7 G/DL (ref 6.4–8.3)
RBC # BLD AUTO: 5.05 10E6/UL (ref 3.8–5.2)
RBC URINE: 8 /HPF
SODIUM SERPL-SCNC: 136 MMOL/L (ref 135–145)
SP GR UR STRIP: 1.02 (ref 1–1.03)
SQUAMOUS EPITHELIAL: 4 /HPF
TSH SERPL DL<=0.005 MIU/L-ACNC: 1.35 UIU/ML (ref 0.3–4.2)
UROBILINOGEN UR STRIP-MCNC: NORMAL MG/DL
WBC # BLD AUTO: 10.6 10E3/UL (ref 4–11)
WBC URINE: 1 /HPF

## 2024-01-09 PROCEDURE — 99284 EMERGENCY DEPT VISIT MOD MDM: CPT | Mod: 25 | Performed by: FAMILY MEDICINE

## 2024-01-09 PROCEDURE — 99285 EMERGENCY DEPT VISIT HI MDM: CPT | Mod: 25 | Performed by: FAMILY MEDICINE

## 2024-01-09 PROCEDURE — 93010 ELECTROCARDIOGRAM REPORT: CPT | Performed by: FAMILY MEDICINE

## 2024-01-09 PROCEDURE — 84443 ASSAY THYROID STIM HORMONE: CPT | Performed by: FAMILY MEDICINE

## 2024-01-09 PROCEDURE — 258N000003 HC RX IP 258 OP 636: Performed by: FAMILY MEDICINE

## 2024-01-09 PROCEDURE — 96361 HYDRATE IV INFUSION ADD-ON: CPT | Performed by: FAMILY MEDICINE

## 2024-01-09 PROCEDURE — 93005 ELECTROCARDIOGRAM TRACING: CPT | Performed by: FAMILY MEDICINE

## 2024-01-09 PROCEDURE — 250N000011 HC RX IP 250 OP 636: Performed by: FAMILY MEDICINE

## 2024-01-09 PROCEDURE — 83690 ASSAY OF LIPASE: CPT | Performed by: FAMILY MEDICINE

## 2024-01-09 PROCEDURE — 36415 COLL VENOUS BLD VENIPUNCTURE: CPT | Performed by: FAMILY MEDICINE

## 2024-01-09 PROCEDURE — 250N000009 HC RX 250: Performed by: FAMILY MEDICINE

## 2024-01-09 PROCEDURE — 80053 COMPREHEN METABOLIC PANEL: CPT | Performed by: FAMILY MEDICINE

## 2024-01-09 PROCEDURE — 74177 CT ABD & PELVIS W/CONTRAST: CPT

## 2024-01-09 PROCEDURE — 85025 COMPLETE CBC W/AUTO DIFF WBC: CPT | Performed by: FAMILY MEDICINE

## 2024-01-09 PROCEDURE — 96375 TX/PRO/DX INJ NEW DRUG ADDON: CPT | Performed by: FAMILY MEDICINE

## 2024-01-09 PROCEDURE — 81001 URINALYSIS AUTO W/SCOPE: CPT | Performed by: FAMILY MEDICINE

## 2024-01-09 PROCEDURE — 96374 THER/PROPH/DIAG INJ IV PUSH: CPT | Mod: 59 | Performed by: FAMILY MEDICINE

## 2024-01-09 RX ORDER — ONDANSETRON 2 MG/ML
4 INJECTION INTRAMUSCULAR; INTRAVENOUS ONCE
Status: COMPLETED | OUTPATIENT
Start: 2024-01-09 | End: 2024-01-09

## 2024-01-09 RX ORDER — KETOROLAC TROMETHAMINE 15 MG/ML
15 INJECTION, SOLUTION INTRAMUSCULAR; INTRAVENOUS ONCE
Status: COMPLETED | OUTPATIENT
Start: 2024-01-09 | End: 2024-01-09

## 2024-01-09 RX ORDER — SODIUM CHLORIDE 9 MG/ML
1000 INJECTION, SOLUTION INTRAVENOUS CONTINUOUS
Status: DISCONTINUED | OUTPATIENT
Start: 2024-01-09 | End: 2024-01-09 | Stop reason: HOSPADM

## 2024-01-09 RX ORDER — IOPAMIDOL 755 MG/ML
92 INJECTION, SOLUTION INTRAVASCULAR ONCE
Status: COMPLETED | OUTPATIENT
Start: 2024-01-09 | End: 2024-01-09

## 2024-01-09 RX ADMIN — KETOROLAC TROMETHAMINE 15 MG: 15 INJECTION, SOLUTION INTRAMUSCULAR; INTRAVENOUS at 12:29

## 2024-01-09 RX ADMIN — ONDANSETRON 4 MG: 2 INJECTION INTRAMUSCULAR; INTRAVENOUS at 12:29

## 2024-01-09 RX ADMIN — SODIUM CHLORIDE 1000 ML: 9 INJECTION, SOLUTION INTRAVENOUS at 12:29

## 2024-01-09 RX ADMIN — SODIUM CHLORIDE 63 ML: 9 INJECTION, SOLUTION INTRAVENOUS at 13:01

## 2024-01-09 RX ADMIN — IOPAMIDOL 92 ML: 755 INJECTION, SOLUTION INTRAVENOUS at 13:00

## 2024-01-09 ASSESSMENT — ENCOUNTER SYMPTOMS
DIAPHORESIS: 0
DIARRHEA: 1
FEVER: 0
SHORTNESS OF BREATH: 0
CHILLS: 0
SINUS PRESSURE: 0
HEADACHES: 0
PALPITATIONS: 0
NAUSEA: 0
CONSTIPATION: 0
ABDOMINAL PAIN: 1
DYSURIA: 0
COUGH: 0
BLOOD IN STOOL: 0
WHEEZING: 0
VOMITING: 0
SORE THROAT: 0
FREQUENCY: 0

## 2024-01-09 ASSESSMENT — ACTIVITIES OF DAILY LIVING (ADL): ADLS_ACUITY_SCORE: 35

## 2024-01-09 NOTE — ED TRIAGE NOTES
"Pt presents with upper abdominal pain for the last 3-4 weeks. Pt reports nausea and heartburn. Takes Zofran and heartburn meds. Pain has been constant for the last 24 hours. Pt is able to eat and drink. Gets \"an icky feeling afterwards\". Has a colonoscopy and endoscopy scheduled for 1/23.      Triage Assessment (Adult)       Row Name 01/09/24 1108          Triage Assessment    Airway WDL WDL        Respiratory WDL    Respiratory WDL WDL        Skin Circulation/Temperature WDL    Skin Circulation/Temperature WDL WDL        Cardiac WDL    Cardiac WDL WDL        Peripheral/Neurovascular WDL    Peripheral Neurovascular WDL WDL        Cognitive/Neuro/Behavioral WDL    Cognitive/Neuro/Behavioral WDL WDL                     "

## 2024-01-09 NOTE — DISCHARGE INSTRUCTIONS
ICD-10-CM    1. Diarrhea, unspecified type  R19.7 Enteric Bacteria and Virus Panel by CE Stool     C. difficile Toxin B PCR with reflex to C. difficile Antigen and Toxins A/B EIA      2. Abdominal pain, unspecified abdominal location  R10.9 Enteric Bacteria and Virus Panel by CE Stool     C. difficile Toxin B PCR with reflex to C. difficile Antigen and Toxins A/B EIA    no serious findings on imaging. please follow-up primary provider and obtain colonosopscy as well as stool samples      3. Fatty liver  K76.0     follow-up clinic. optimize blood sugar and lipd control.
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
## 2024-01-09 NOTE — ED NOTES
Pt provided with stool sample collection materials and instructions on how to collect. All questions answered.

## 2024-01-10 ENCOUNTER — TELEPHONE (OUTPATIENT)
Dept: FAMILY MEDICINE | Facility: CLINIC | Age: 39
End: 2024-01-10
Payer: COMMERCIAL

## 2024-01-10 NOTE — TELEPHONE ENCOUNTER
"ED/Discharge Protocol    \"Hi, my name is Julie Behrendt, RN, a registered nurse, and I am calling on behalf of Shi Martinez NP's office at [facility].  I am calling to follow up and see how things are going for you after your recent visit.\"    \"I see that you were in the (ER) on 1/9/24.    How are you doing now that you are home?\" Worse, I am on the verge of tears I can barely move the pain is sharp, stabbing and severe in my ABD. Denies any fever, chills, N/V and/or black/blood stools. Patient rates pain level at a 9/10 per numeric pain scale is taking ibuprofen and APAP with no relief. Has colonoscopy and EGD scheduled 1/23/24    Is patient experiencing symptoms that may require a hospital visit?  Yes, advised patient to return to ED for evaluation, patient in agreement with this plan.    Discharge Instructions    \"Let's review your discharge instructions.  What is/are the follow-up recommendations?  Pt. Response: no serious findings on imaging. please follow-up primary provider and obtain colonosopscy as well as stool  samples    \"Were you instructed to make a follow-up appointment?\"  Pt. Response: No.       \"When you see the provider, I would recommend that you bring your discharge instructions with you.    Medications    \"How many new medications are you on since your hospitalization/ED visit?\"    0-1  \"How many of your current medicines changed (dose, timing, name, etc.) while you were in the hospital/ED visit?\"   0-1  \"Do you have questions about your medications?\"   No  \"Were you newly diagnosed with heart failure, COPD, diabetes or did you have a heart attack?\"   No  For patients on insulin: \"Did you start on insulin in the hospital or did you have your insulin dose changed?\"   No  Post Discharge Medication Reconciliation Status: discharge medications reconciled, continue medications without change.    Was MTM referral placed (*Make sure to put transitions as reason for referral)?   No    Call " "Summary    \"Do you have any questions or concerns about your condition or care plan at the moment?\"    No  Triage nurse advice given: Patient will return to ED for evaluation due to increase ABD pain.    Patient was in ER x 3 in the past year (assess appropriateness of ER visits.)      \"If you have questions or things don't continue to improve, we encourage you contact us through the main clinic number,  [phone].  Even if the clinic is not open, triage nurses are available 24/7 to help you.     We would like you to know that our clinic has extended hours (provide information).  We also have urgent care (provide details on closest location and hours/contact info)\"      \"Thank you for your time and take care!\"     "

## 2024-01-28 ENCOUNTER — E-VISIT (OUTPATIENT)
Dept: URGENT CARE | Facility: CLINIC | Age: 39
End: 2024-01-28
Payer: COMMERCIAL

## 2024-01-28 DIAGNOSIS — B37.31 CANDIDAL VULVOVAGINITIS: Primary | ICD-10-CM

## 2024-01-28 PROCEDURE — 99207 PR NON-BILLABLE SERV PER CHARTING: CPT | Performed by: PHYSICIAN ASSISTANT

## 2024-01-28 RX ORDER — FLUCONAZOLE 150 MG/1
150 TABLET ORAL ONCE
Qty: 1 TABLET | Refills: 1 | Status: SHIPPED | OUTPATIENT
Start: 2024-01-28 | End: 2024-01-28

## 2024-01-28 NOTE — PATIENT INSTRUCTIONS
Thank you for choosing us for your care. I have placed an order for a prescription so that you can start treatment. View your full visit summary for details by clicking on the link below. Your pharmacist will able to address any questions you may have about the medication.     If you re not feeling better within 2-3 days, please schedule an appointment.  You can schedule an appointment right here in [facility], or call [phone]  If the visit is for the same symptoms as your eVisit, we ll refund the cost of your eVisit if seen within seven days.    Vaginal Yeast Infection: Care Instructions  Overview     A vaginal yeast infection is the growth of too many yeast cells in the vagina. This is a common problem. Itching, vaginal discharge and irritation, and other symptoms can bother you. But yeast infections don't often cause other health problems.  Some medicines can increase your risk of getting a yeast infection. These include antibiotics, hormones, and steroids. You may also be more likely to get a yeast infection if you are pregnant, have diabetes, douche, or wear tight clothes.  With treatment, most yeast infections get better in a few days.  Follow-up care is a key part of your treatment and safety. Be sure to make and go to all appointments, and call your doctor if you are having problems. It's also a good idea to know your test results and keep a list of the medicines you take.  How can you care for yourself at home?    Take your medicines exactly as prescribed. Call your doctor if you think you are having a problem with your medicine.    Ask your doctor about over-the-counter (OTC) medicines for yeast infections. If you use an OTC treatment, read and follow all instructions on the label.    Don't use tampons while using a vaginal cream or suppository. The tampons can absorb the medicine. Use pads instead.    Wear loose cotton clothing. Don't wear nylon or other fabric that holds body heat and moisture close  "to the skin.    Try sleeping without underwear.    Don't scratch. Relieve itching with a cold pack or a cool bath.    Don't wash your vulva more than once a day. Use plain water or a mild, unscented soap. Air-dry the vulva.    Change out of wet or damp clothes as soon as possible.    If you are using a vaginal medicine, don't have sex until you have finished your treatment. But if you do have sex, don't depend on a condom or diaphragm for birth control. The oil in some vaginal medicines weakens latex.    Don't douche or use powders, sprays, or perfumes in your vagina or on your vulva. These items can change the normal balance of organisms in your vagina.  When should you call for help?   Call your doctor now or seek immediate medical care if:      You have new or increased pain in your vagina or pelvis.   Watch closely for changes in your health, and be sure to contact your doctor if:      You have unexpected vaginal bleeding.       You have a fever.       You are not getting better after 2 days.       Your symptoms come back after you finish your medicines.   Where can you learn more?  Go to https://[email or web address].net/patiented  Enter F639 in the search box to learn more about \"Vaginal Yeast Infection: Care Instructions.\"  Current as of: April 19, 2023               Content Version: 13.8    6160-7294 [facility].   Care instructions adapted under license by your healthcare professional. If you have questions about a medical condition or this instruction, always ask your healthcare professional. [facility] disclaims any warranty or liability for your use of this information.      "
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
## 2024-02-02 ENCOUNTER — OFFICE VISIT (OUTPATIENT)
Dept: NEUROLOGY | Facility: CLINIC | Age: 39
End: 2024-02-02
Attending: NURSE PRACTITIONER
Payer: COMMERCIAL

## 2024-02-02 VITALS
SYSTOLIC BLOOD PRESSURE: 120 MMHG | HEART RATE: 90 BPM | WEIGHT: 187 LBS | DIASTOLIC BLOOD PRESSURE: 81 MMHG | BODY MASS INDEX: 30.18 KG/M2

## 2024-02-02 DIAGNOSIS — G43.109 MIGRAINE WITH AURA AND WITHOUT STATUS MIGRAINOSUS, NOT INTRACTABLE: ICD-10-CM

## 2024-02-02 DIAGNOSIS — B00.9 HSV (HERPES SIMPLEX VIRUS) INFECTION: ICD-10-CM

## 2024-02-02 PROCEDURE — 99204 OFFICE O/P NEW MOD 45 MIN: CPT

## 2024-02-02 RX ORDER — TOPIRAMATE 25 MG/1
50 TABLET, FILM COATED ORAL 2 TIMES DAILY
Qty: 120 TABLET | Refills: 4 | Status: SHIPPED | OUTPATIENT
Start: 2024-02-02 | End: 2024-05-02

## 2024-02-02 RX ORDER — VALACYCLOVIR HYDROCHLORIDE 500 MG/1
500 TABLET, FILM COATED ORAL DAILY
Qty: 31 TABLET | Refills: 11 | OUTPATIENT
Start: 2024-02-02

## 2024-02-02 NOTE — NURSING NOTE
"Sirena Dietrich is a 38 year old female who presents for:  Chief Complaint   Patient presents with    Headache     Still taking sumatriptan but still having ongoing migraine        Initial Vitals:  /81   Pulse 90   Wt 84.8 kg (187 lb)   LMP  (LMP Unknown)   BMI 30.18 kg/m   Estimated body mass index is 30.18 kg/m  as calculated from the following:    Height as of 1/9/24: 1.676 m (5' 6\").    Weight as of this encounter: 84.8 kg (187 lb).. Body surface area is 1.99 meters squared. BP completed using cuff size: wrist cuff    Daniel VRojas Naveed  "
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
## 2024-02-02 NOTE — PATIENT INSTRUCTIONS
Plan:  --- Start Preventive therapy: Topamax 25 mg tablet: Follow taper schedule.  Topamax taper schedule: 25 mg orally once daily in the evening for first week, 25 mg twice daily for second week, 25 mg in the morning and 50 mg in the evening for third week, and then 50 mg twice daily.  -- If Topamax is ineffective in helping reduce symptoms, we can consider Emgality  --- Continue Abortive therapy: Imitrex as prescribed  --- If migraines are not fully treated with your abortive therapy alone you can add an over the counter medication like Aleve,Tylenol or Ibuprofen. Take at the same time as the first dose of your abortive therapy. Do not take over-the-counter medications more than 14 days a month to avoid rebound headaches.   --- Try Nonpharmacological interventions like heat or cold, massage, or rest  --- Practice good headache hygiene: Avoid known triggers, get adequate sleep, manage stress levels, stay hydrated and eat nutritious meals  --- Plan on follow up in the Neurology Clinic in 3 months.  --- Please feel free to reach out if you have any further questions or concerns.  --- Seek immediate medical attention if an emergency arises or if your health becomes progressively worse.     It was a pleasure meeting you today!     INSTRUCTIONS FOR USE                                                               EMGALITY  (em-GAL-it-?)   (galcanezumab-gnlm)   injection, for subcutaneous use   Prefilled Pen           This Instructions for Use is for patients with migraine.   For subcutaneous injection only.      Before you use the EMGALITY prefilled pen (Pen), read and carefully follow all the step-by-step instructions.      Important Information      Your healthcare provider or nurse should show you how to prepare and inject EMGALITY using the Pen. Do not inject yourself or someone else until you have been shown how to inject EMGALITY.   Keep this Instructions for Use and refer to it as needed.   Each EMGALITY Pen is  for one-time use only. Do not share or reuse your EMGALITY Pen. You may give or get an infection.   The Pen contains glass parts. Handle it carefully. If you drop it on a hard surface, do not use it. Use a new Pen for your injection.   Your healthcare provider may help you decide where on your body to inject your dose. You can also read the  Choose your injection site  section of these instructions to help you choose which area can work best for you.   If you have vision or hearing problems, do not use EMGALITY Pen without help from a caregiver.   See  Storage and Handling Information  for important storage information.     INSTRUCTIONS FOR USE   Before you use the EMGALITY Pen, read and carefully follow all the step-by-step instructions.   Parts of the EMGALITY Pen             Before You Get Started   Take the Pen from the refrigerator Check your prescription.  EMGALITY comes as a single-dose prefilled Pen.  You will need 2 Pens for your first dose (1-time loading dose). You will need 1 Pen for your monthly dose.    Put the original package with any unused Pens back in the refrigerator.    Leave the base cap on until you are ready to inject.    Leave the Pen at room temperature for 30 minutes before injecting.    Do not microwave the Pen, run hot water over it, or leave it in direct sunlight.    Do not shake.   Gather Supplies For each injection you will need:  1 alcohol wipe  1 cotton ball or piece of gauze  1 sharps disposal container. See  After You Inject Your Medicine.    Inspect the Pen and the medicine Make sure you have the right medicine. The medicine inside should be clear. Its color may be colorless to slightly yellow to slightly brown.    Do not use the Pen, and throw away (dispose of) as directed by your healthcare provider or pharmacist if:  it looks damaged  the medicine is cloudy, is discolored, or has small particles  the Expiration Date (Exp.) printed on the label has passed  the medicine is  frozen                 Prepare for injection Wash your hands with soap and water before you inject your EMGALITY. Make sure a sharps disposal container is close by.   Choose your injection site Your healthcare provider can help you choose the injection site that is best for you.      You may inject the medicine into your stomach area (abdomen). Do not inject within 2 inches of the belly button (navel).    You may inject the medicine into the front of your thighs. This area should be at least 2 inches above the knee and 2 inches below the groin.    Another person may give you the injection in the back of your upper arm, or buttocks.    Do not inject in the exact same spot. For example, if you are giving 2 injections for your first dose (1-time loading dose) and want to use the same body site for the two separate injections, choose a different injection spot. If your first injection was in your abdomen, your next injection could be in another area of your abdomen.    Do not inject into areas where the skin is tender, bruised, red, or hard.    Clean your injection site with an alcohol wipe. Let the injection site dry before you inject.     1 Uncap the Pen            Make sure the Pen is locked. Leave the base cap on until you are ready to inject.     Twist off the base cap and throw it away in your household trash.     Do not put the base cap back on - this could damage the needle.     Do not touch the needle.         2 Place and Unlock       Place and hold the clear base flat and firmly against your skin.          Turn the lock ring to the unlock position.         3 Press and Hold for 10 Seconds       Press and hold the teal injection button; you will hear a loud click.     Keep holding the clear base firmly against your skin. You will hear a second click in about 10 seconds after the first one. This second click tells you that your injection is complete.     Remove the Pen from your skin.    If you have bleeding at  the injection site, press a cotton ball or gauze over the injection site. Do not rub the injection site.         After You Inject Your Medicine      Throw away the used Pen   Put the used EMGALITY Pen in an FDA-cleared sharps disposal container right away after use. Do not throw away (dispose of) the EMGALITY Pen in your household trash.      If you do not have an FDA-cleared sharps disposal container, you may use a household container that is:            - made of a heavy-duty plastic,            - can be closed with a tight-fitting, puncture-resistant lid, without sharps being able to come out,            - upright and stable during use,            - leak-resistant, and            - properly labeled to warn of hazardous waste inside the container.      When your sharps disposal container is almost full, you will need to follow your community guidelines for the right way to dispose of your sharps disposal container. There may be state or local laws about how you should throw away needles and syringes. For more information about safe sharps disposal, and for specific information about sharps disposal in the state you live in, go to the FDA's website at: http://www.fda.gov/safesharpsdisposal.    Do not recycle your used sharps disposal container.          Commonly Asked Questions       Q. What if I see bubbles in the Pen?   A. It is normal to have air bubbles in the Pen. EMGALITY is injected under your skin (subcutaneous injection), so these air bubbles will not harm you.       Q. What if there is a drop of liquid on the tip of the needle when I remove the base cap?   A. It is okay to see a drop of liquid on the tip of the needle.       Q. What if I unlocked the Pen and pressed the teal injection button before I twisted off the base cap?   A. Do not remove the base cap. Dispose of the Pen and get a new one.       Q. Do I need to hold the injection button down until the injection is complete?   A. This is not  necessary, but it may help you keep the Pen steady and firm against your skin.       Q. What if the needle did not retract after my injection?   A. Do not touch the needle or replace the base cap. Store in a safe place to avoid an accidental needlestick and contact [phone] for instructions on how to return the Pen.       Q. What if there is a drop of liquid or blood on my skin after my injection?   A. This is normal. Press a cotton ball or gauze over the injection site. Do not rub the injection site.       Q. What if I heard more than 2 clicks during my injection - 2 loud clicks and a soft one. Did I get my complete injection?   A. Some patients may hear a soft click right before the second loud click. That is the normal operation of the Pen. Do not remove the Pen from your skin until you hear the second loud click.       Q. How can I tell if my injection is complete?   A. After you press the teal injection button, you will hear 2 loud clicks. The second click tells you that your injection is complete. You will also see the gray plunger at the top of the clear base.        If you have more questions about how to use the EMGALITY Pen:    Call your healthcare provider         Call [phone] ([phone])     Visit [email or web address]      Storage and Handling Information    Store your Pen in the refrigerator between 36 F to 46 F (2 C to 8 C).   Your Pen may be stored out of the refrigerator in the original carton at temperatures up to 86 F (30 C) for up to 7 days. After storing out of the refrigerator, do not place EMGALITY back in the refrigerator.   Do not freeze your Pen.   Keep your Pen in the carton it comes in to protect it from light until time of use.   Do not shake your Pen.   Throw away your Pen if any of the above conditions are not followed.   Keep your Pen and all medicines out of the reach of children.      Read the full Prescribing Information and Patient Information for EMGALITY inside this  box to learn more about your medicine.       This Instructions for Use has been approved by the U.S. Food and Drug Administration.   [employer] [place] [address], [place]

## 2024-02-21 ENCOUNTER — OFFICE VISIT (OUTPATIENT)
Dept: FAMILY MEDICINE | Facility: CLINIC | Age: 39
End: 2024-02-21
Payer: COMMERCIAL

## 2024-02-21 VITALS
SYSTOLIC BLOOD PRESSURE: 120 MMHG | HEIGHT: 66 IN | BODY MASS INDEX: 29.73 KG/M2 | DIASTOLIC BLOOD PRESSURE: 68 MMHG | TEMPERATURE: 97.3 F | WEIGHT: 185 LBS | OXYGEN SATURATION: 95 % | HEART RATE: 106 BPM | RESPIRATION RATE: 14 BRPM

## 2024-02-21 DIAGNOSIS — E78.5 HYPERLIPIDEMIA LDL GOAL <130: Primary | ICD-10-CM

## 2024-02-21 DIAGNOSIS — N89.8 VAGINAL DISCHARGE: ICD-10-CM

## 2024-02-21 DIAGNOSIS — K21.01 GASTROESOPHAGEAL REFLUX DISEASE WITH ESOPHAGITIS AND HEMORRHAGE: ICD-10-CM

## 2024-02-21 DIAGNOSIS — R11.0 NAUSEA: ICD-10-CM

## 2024-02-21 DIAGNOSIS — G43.109 MIGRAINE WITH AURA AND WITHOUT STATUS MIGRAINOSUS, NOT INTRACTABLE: ICD-10-CM

## 2024-02-21 DIAGNOSIS — M54.41 ACUTE RIGHT-SIDED LOW BACK PAIN WITH RIGHT-SIDED SCIATICA: ICD-10-CM

## 2024-02-21 DIAGNOSIS — F43.23 SITUATIONAL MIXED ANXIETY AND DEPRESSIVE DISORDER: ICD-10-CM

## 2024-02-21 DIAGNOSIS — F31.4 BIPOLAR DISORDER, CURRENT EPISODE DEPRESSED, SEVERE, WITHOUT PSYCHOTIC FEATURES (H): ICD-10-CM

## 2024-02-21 PROCEDURE — 99214 OFFICE O/P EST MOD 30 MIN: CPT | Performed by: NURSE PRACTITIONER

## 2024-02-21 PROCEDURE — 87210 SMEAR WET MOUNT SALINE/INK: CPT | Performed by: NURSE PRACTITIONER

## 2024-02-21 RX ORDER — SUMATRIPTAN 25 MG/1
25-50 TABLET, FILM COATED ORAL
Qty: 9 TABLET | Refills: 5 | Status: SHIPPED | OUTPATIENT
Start: 2024-02-21

## 2024-02-21 RX ORDER — OMEPRAZOLE 40 MG/1
40 CAPSULE, DELAYED RELEASE ORAL DAILY
Qty: 90 CAPSULE | Refills: 0 | Status: SHIPPED | OUTPATIENT
Start: 2024-02-21 | End: 2024-05-02

## 2024-02-21 RX ORDER — PROPRANOLOL HYDROCHLORIDE 20 MG/1
TABLET ORAL
Qty: 90 TABLET | Refills: 2 | Status: SHIPPED | OUTPATIENT
Start: 2024-02-21

## 2024-02-21 RX ORDER — TIZANIDINE 2 MG/1
TABLET ORAL
Qty: 30 TABLET | Refills: 2 | Status: SHIPPED | OUTPATIENT
Start: 2024-02-21 | End: 2024-08-26

## 2024-02-21 RX ORDER — ROSUVASTATIN CALCIUM 5 MG/1
5 TABLET, COATED ORAL DAILY
Qty: 90 TABLET | Refills: 3 | Status: SHIPPED | OUTPATIENT
Start: 2024-02-21 | End: 2024-08-27

## 2024-02-21 RX ORDER — ONDANSETRON 4 MG/1
4 TABLET, ORALLY DISINTEGRATING ORAL EVERY 8 HOURS PRN
Qty: 30 TABLET | Refills: 3 | Status: SHIPPED | OUTPATIENT
Start: 2024-02-21

## 2024-02-21 ASSESSMENT — ENCOUNTER SYMPTOMS: DIZZINESS: 1

## 2024-02-21 ASSESSMENT — PAIN SCALES - GENERAL: PAINLEVEL: NO PAIN (0)

## 2024-02-21 NOTE — PROGRESS NOTES
Assessment & Plan     Hyperlipidemia LDL goal <130  BEGIN NEW MEDICATION   - rosuvastatin (CRESTOR) 5 MG tablet  Dispense: 90 tablet; Refill: 3  Follow up labs 2 months   - Lipid panel reflex to direct LDL Fasting  - **AST FUTURE 2mo  Lifestyle changes reviewed in detail      Vaginal discharge  Recurrent.   - Wet prep - Clinic Collect    Situational mixed anxiety and depressive disorder  Family stressors.   Formal referral placed   - [facility]    Migraine with aura and without status migrainosus, not intractable  Ongoing and chronic  Meds refilled  Follow up with neurology with no change in HEADACHE with topirimate  - SUMAtriptan (IMITREX) 25 MG tablet  Dispense: 9 tablet; Refill: 5  - propranolol (INDERAL) 20 MG tablet  Dispense: 90 tablet; Refill: 2  - ondansetron (ZOFRAN ODT) 4 MG ODT tab  Dispense: 30 tablet; Refill: 3    Nausea  With migraines   - ondansetron (ZOFRAN ODT) 4 MG ODT tab  Dispense: 30 tablet; Refill: 3    Acute right-sided low back pain with right-sided sciatica  Intermittent use. Refilled today   - tiZANidine (ZANAFLEX) 2 MG tablet  Dispense: 30 tablet; Refill: 2    Gastroesophageal reflux disease with esophagitis and hemorrhage  Ongoing ..consider EGD with ongoing symptoms  - omeprazole (PRILOSEC) 40 MG DR capsule  Dispense: 90 capsule; Refill: 0    Bipolar disorder, current episode depressed, severe, without psychotic features (H)  Recent exacerbation of anxiety depression with son being in treatment at age 16  Patient needs support  - [facility] Referral      Call or return to the clinic with any worsening of symptoms or no resolution. Patient/Parent verbalized understanding and is in agreement. Medication side effects reviewed.   Current Outpatient Medications   Medication Sig Dispense Refill    albuterol (VENTOLIN HFA) 108 (90 Base) MCG/ACT inhaler Inhale 2 puffs into the lungs every 6 hours as needed for shortness of breath or wheezing 18 g  1    BIOTIN 5000 5 MG CAPS       cyclobenzaprine (FLEXERIL) 5 MG tablet Take 1-2 tablets (5-10 mg) by mouth 3 times daily as needed for muscle spasms 30 tablet 1    famotidine (PEPCID) 20 MG tablet Take 1 tablet (20 mg) by mouth 2 times daily 50 tablet 3    famotidine (PEPCID) 40 MG tablet Take 1 tablet (40 mg) by mouth daily 90 tablet 0    HEMORRHOIDAL 0.25-14-74.9 % rectal ointment Place rectally 2 times daily as needed      hydrOXYzine (ATARAX) 50 MG tablet Take 1 tablet by mouth three times daily as needed for anxiety.      lamoTRIgine (LAMICTAL) 200 MG tablet Take 1 tablet (200 mg) by mouth daily 30 tablet 2    nicotine (NICODERM CQ) 7 MG/24HR 24 hr patch Place 1 patch onto the skin every 24 hours 28 patch 2    omeprazole (PRILOSEC) 20 MG DR capsule Take 1 capsule (20 mg) by mouth daily 31 capsule 2    omeprazole (PRILOSEC) 40 MG DR capsule Take 1 capsule (40 mg) by mouth daily 90 capsule 0    ondansetron (ZOFRAN ODT) 4 MG ODT tab Place 1 tablet (4 mg) under the tongue every 8 hours as needed for nausea 30 tablet 3    phenylephrine-shark liver oil-mineral oil-petrolatum (PREPARATION H) 0.25-3-14-71.9 % rectal ointment Place rectally 2 times daily as needed for hemorrhoids 57 g 1    propranolol (INDERAL) 20 MG tablet Take 1 tablet by mouth three times daily as needed for anxiety, restlessness. 90 tablet 2    QUEtiapine (SEROQUEL) 25 MG tablet TAKE 1 TABLET BY MOUTH TWICE DAILY IF NEEDED 60 tablet 1    rosuvastatin (CRESTOR) 5 MG tablet Take 1 tablet (5 mg) by mouth daily 90 tablet 3    sertraline (ZOLOFT) 50 MG tablet Take 50 mg by mouth daily      SUMAtriptan (IMITREX) 25 MG tablet Take 1-2 tablets (25-50 mg) by mouth at onset of headache for migraine May repeat in 2 hours. Max 8 tablets/24 hours. 9 tablet 5    tiZANidine (ZANAFLEX) 2 MG tablet TAKE 1 TABLET BY MOUTH EVERY 8 HOURS IF NEEDED FOR MUSCLE SPASMS Strength: 2 mg 30 tablet 2    topiramate (TOPAMAX) 25 MG tablet Take 2 tablets (50 mg) by mouth 2 times  daily Topamax taper schedule: 25 mg orally once daily in the evening for first week, 25 mg twice daily for second week, 25 mg in the morning and 50 mg in the evening for third week, and then 50 mg twice daily. 120 tablet 4    valACYclovir (VALTREX) 1000 mg tablet       valACYclovir (VALTREX) 500 MG tablet Take 1 tablet (500 mg) by mouth daily 31 tablet 11    QUEtiapine (SEROQUEL) 50 MG tablet TAKE 1-4 tablets BY MOUTH EVERY NIGHT AT BEDTIME AS NEEDED (Patient not taking: Reported on 1/4/2024) 120 tablet 0     Chart documentation with Dragon Voice recognition Software. Although reviewed after completion, some words and grammatical errors may remain.  Shi Martinez MSN,[facility] [address] [phone]      See patient instruction.  Information given to her from the [employer] on cholesterol-lowering lifestyle changes recommended    Subjective   Sirena is a 38 year old, presenting for the following health issues:  Vaginal Problem, Lipids, and Mental Health Problem        2/21/2024     9:11 AM   Additional Questions   Roomed by Salima   Accompanied by self     History of Present Illness       Hyperlipidemia:  She presents for follow up of hyperlipidemia.   She is not taking medication to lower cholesterol. She is not having myalgia or other side effects to statin medications.    She eats 2-3 servings of fruits and vegetables daily.She consumes 1 sweetened beverage(s) daily.She exercises with enough effort to increase her heart rate 30 to 60 minutes per day.  She exercises with enough effort to increase her heart rate 4 days per week. She is missing 1 dose(s) of medications per week.  She is not taking prescribed medications regularly due to remembering to take.      Not on any lipid meds at this time previous use of pravastatin caused severe myalgia  Hyperlipidemia Follow-Up    Are you regularly taking any medication or supplement to  "lower your cholesterol?   No  Are you having muscle aches or other side effects that you think could be caused by your cholesterol lowering medication?  No    Vaginal Symptoms  Onset/Duration: ongoing   Description:  Vaginal Discharge: white   Itching (Pruritis): YES  Burning sensation:  YES  Odor: YES  History of BV and yeast       Review of Systems  Constitutional, neuro, ENT, endocrine, pulmonary, cardiac, gastrointestinal, genitourinary, musculoskeletal, integument and psychiatric systems are negative, except as otherwise noted.      Objective    /68   Pulse 106   Temp 97.3  F (36.3  C) (Tympanic)   Resp 14   Ht 1.676 m (5' 6\")   Wt 83.9 kg (185 lb)   LMP 01/18/2019   SpO2 95%   BMI 29.86 kg/m    Body mass index is 29.86 kg/m .  Physical Exam   GENERAL: alert and crying today during visit when discussing sons   EYES: Eyes grossly normal to inspection, PERRL and conjunctivae and sclerae normal  HENT: ear canals and TM's normal, nose and mouth without ulcers or lesions  NECK: no adenopathy, no asymmetry, masses, or scars  RESP: lungs clear to auscultation - no rales, rhonchi or wheezes  CV: regular rate and rhythm, normal S1 S2, no S3 or S4, no murmur, click or rub, no peripheral edema  ABDOMEN: soft, nontender, no hepatosplenomegaly, no masses and bowel sounds normal  MS: no gross musculoskeletal defects noted, no edema  SKIN: no suspicious lesions or rashes  NEURO: Normal strength and tone, mentation intact and speech normal  PSYCH: tearful and appearance well groomed    Admission on 01/09/2024, Discharged on 01/09/2024   Component Date Value Ref Range Status    Sodium 01/09/2024 136  135 - 145 mmol/L Final    Reference intervals for this test were updated on 09/26/2023 to more accurately reflect our healthy population. There may be differences in the flagging of prior results with similar values performed with this method. Interpretation of those prior results can be made in the context of the " updated reference intervals.     Potassium 01/09/2024 3.9  3.4 - 5.3 mmol/L Final    Carbon Dioxide (CO2) 01/09/2024 21 (L)  22 - 29 mmol/L Final    Anion Gap 01/09/2024 11  7 - 15 mmol/L Final    Urea Nitrogen 01/09/2024 9.7  6.0 - 20.0 mg/dL Final    Creatinine 01/09/2024 0.56  0.51 - 0.95 mg/dL Final    GFR Estimate 01/09/2024 >90  >60 mL/min/1.73m2 Final    Calcium 01/09/2024 9.2  8.6 - 10.0 mg/dL Final    Chloride 01/09/2024 104  98 - 107 mmol/L Final    Glucose 01/09/2024 77  70 - 99 mg/dL Final    Alkaline Phosphatase 01/09/2024 67  40 - 150 U/L Final    Reference intervals for this test were updated on 11/14/2023 to more accurately reflect our healthy population. There may be differences in the flagging of prior results with similar values performed with this method. Interpretation of those prior results can be made in the context of the updated reference intervals.    AST 01/09/2024 21  0 - 45 U/L Final    Reference intervals for this test were updated on 6/12/2023 to more accurately reflect our healthy population. There may be differences in the flagging of prior results with similar values performed with this method. Interpretation of those prior results can be made in the context of the updated reference intervals.    ALT 01/09/2024 30  0 - 50 U/L Final    Reference intervals for this test were updated on 6/12/2023 to more accurately reflect our healthy population. There may be differences in the flagging of prior results with similar values performed with this method. Interpretation of those prior results can be made in the context of the updated reference intervals.      Protein Total 01/09/2024 7.0  6.4 - 8.3 g/dL Final    Albumin 01/09/2024 4.6  3.5 - 5.2 g/dL Final    Bilirubin Total 01/09/2024 0.2  <=1.2 mg/dL Final    Lipase 01/09/2024 28  13 - 60 U/L Final    WBC Count 01/09/2024 10.6  4.0 - 11.0 10e3/uL Final    RBC Count 01/09/2024 5.05  3.80 - 5.20 10e6/uL Final    Hemoglobin 01/09/2024 15.8  (H)  11.7 - 15.7 g/dL Final    Hematocrit 01/09/2024 44.6  35.0 - 47.0 % Final    MCV 01/09/2024 88  78 - 100 fL Final    MCH 01/09/2024 31.3  26.5 - 33.0 pg Final    MCHC 01/09/2024 35.4  31.5 - 36.5 g/dL Final    RDW 01/09/2024 12.2  10.0 - 15.0 % Final    Platelet Count 01/09/2024 330  150 - 450 10e3/uL Final    % Neutrophils 01/09/2024 49  % Final    % Lymphocytes 01/09/2024 38  % Final    % Monocytes 01/09/2024 7  % Final    % Eosinophils 01/09/2024 5  % Final    % Basophils 01/09/2024 1  % Final    % Immature Granulocytes 01/09/2024 0  % Final    NRBCs per 100 WBC 01/09/2024 0  <1 /100 Final    Absolute Neutrophils 01/09/2024 5.2  1.6 - 8.3 10e3/uL Final    Absolute Lymphocytes 01/09/2024 4.0  0.8 - 5.3 10e3/uL Final    Absolute Monocytes 01/09/2024 0.7  0.0 - 1.3 10e3/uL Final    Absolute Eosinophils 01/09/2024 0.5  0.0 - 0.7 10e3/uL Final    Absolute Basophils 01/09/2024 0.1  0.0 - 0.2 10e3/uL Final    Absolute Immature Granulocytes 01/09/2024 0.0  <=0.4 10e3/uL Final    Absolute NRBCs 01/09/2024 0.0  10e3/uL Final    Color Urine 01/09/2024 Yellow  Colorless, Straw, Light Yellow, Yellow Final    Appearance Urine 01/09/2024 Clear  Clear Final    Glucose Urine 01/09/2024 Negative  Negative mg/dL Final    Bilirubin Urine 01/09/2024 Negative  Negative Final    Ketones Urine 01/09/2024 Negative  Negative mg/dL Final    Specific Gravity Urine 01/09/2024 1.020  1.003 - 1.035 Final    Blood Urine 01/09/2024 Negative  Negative Final    pH Urine 01/09/2024 7.0  5.0 - 7.0 Final    Protein Albumin Urine 01/09/2024 Negative  Negative mg/dL Final    Urobilinogen Urine 01/09/2024 Normal  Normal, 2.0 mg/dL Final    Nitrite Urine 01/09/2024 Negative  Negative Final    Leukocyte Esterase Urine 01/09/2024 Negative  Negative Final    Bacteria Urine 01/09/2024 Few (A)  None Seen /HPF Final    Mucus Urine 01/09/2024 Present (A)  None Seen /LPF Final    RBC Urine 01/09/2024 8 (H)  <=2 /HPF Final    WBC Urine 01/09/2024 1  <=5 /HPF  Final    Squamous Epithelials Urine 01/09/2024 4 (H)  <=1 /HPF Final    TSH 01/09/2024 1.35  0.30 - 4.20 uIU/mL Final           Signed Electronically by: DIPESH Marino CNP

## 2024-03-09 ENCOUNTER — OFFICE VISIT (OUTPATIENT)
Dept: URGENT CARE | Facility: URGENT CARE | Age: 39
End: 2024-03-09
Payer: COMMERCIAL

## 2024-03-09 VITALS
RESPIRATION RATE: 14 BRPM | BODY MASS INDEX: 30.67 KG/M2 | TEMPERATURE: 100.1 F | OXYGEN SATURATION: 99 % | HEART RATE: 94 BPM | DIASTOLIC BLOOD PRESSURE: 78 MMHG | WEIGHT: 190 LBS | SYSTOLIC BLOOD PRESSURE: 123 MMHG

## 2024-03-09 DIAGNOSIS — R10.9 ABDOMINAL PAIN, UNSPECIFIED ABDOMINAL LOCATION: Primary | ICD-10-CM

## 2024-03-09 PROCEDURE — 99207 PR NO CHARGE LOS: CPT | Performed by: FAMILY MEDICINE

## 2024-03-09 NOTE — PROGRESS NOTES
38-year-old female presented with right sided abdominal pain, woke up around midnight, has been coughing as well, almost fell down some steps yesterday.  Physical examination remarkable for distress appearing, tearful due to pain, right-sided tenderness and low-grade fever.  Differentials discussed in detail including but not limited to acute cholecystitis, pyelonephritis,, nephrolithiasis, pneumonia and musculoskeletal etiology.  Recommended to go ER for further evaluation considering severity of symptoms.  Patient, significant other understood and in agreement with above plan.  All questions answered.    Dr Montaño

## 2024-03-12 ENCOUNTER — TELEPHONE (OUTPATIENT)
Dept: FAMILY MEDICINE | Facility: CLINIC | Age: 39
End: 2024-03-12
Payer: COMMERCIAL

## 2024-03-12 NOTE — TELEPHONE ENCOUNTER
Sirena was seen in urgent care 3/9/24 for abd pain, low grade fever and cough. She was assessed and advised to go to the ED for a more extensive work up. Sirena did not go to the Ed because she was cold and just wanted to go home and lay under a bunch of blankets.     She is calling today because she still has chills and cough and very mild side pain she thinks if from her cough. She does not know her temp because she does not have a thermometer.     Advised she needs to come back to urgent care if she still has a temp and cough and side/chest pain. She agrees and will call back with further concerns today and or come to urgent care

## 2024-04-02 DIAGNOSIS — B00.9 HSV (HERPES SIMPLEX VIRUS) INFECTION: ICD-10-CM

## 2024-04-02 RX ORDER — VALACYCLOVIR HYDROCHLORIDE 500 MG/1
500 TABLET, FILM COATED ORAL DAILY
Qty: 31 TABLET | Refills: 11 | OUTPATIENT
Start: 2024-04-02

## 2024-04-25 ENCOUNTER — MYC MEDICAL ADVICE (OUTPATIENT)
Dept: FAMILY MEDICINE | Facility: CLINIC | Age: 39
End: 2024-04-25
Payer: COMMERCIAL

## 2024-04-27 ENCOUNTER — E-VISIT (OUTPATIENT)
Dept: URGENT CARE | Facility: CLINIC | Age: 39
End: 2024-04-27
Payer: COMMERCIAL

## 2024-04-27 DIAGNOSIS — J06.9 VIRAL URI WITH COUGH: Primary | ICD-10-CM

## 2024-04-27 PROCEDURE — 99207 PR NON-BILLABLE SERV PER CHARTING: CPT | Performed by: PHYSICIAN ASSISTANT

## 2024-04-27 RX ORDER — BENZONATATE 100 MG/1
100 CAPSULE ORAL 3 TIMES DAILY PRN
Qty: 30 CAPSULE | Refills: 0 | Status: SHIPPED | OUTPATIENT
Start: 2024-04-27 | End: 2024-05-02

## 2024-04-27 NOTE — PATIENT INSTRUCTIONS
Thank you for choosing us for your care. I have placed an order for a prescription so that you can start treatment. View your full visit summary for details by clicking on the link below. Your pharmacist will able to address any questions you may have about the medication.     If you're not feeling better within 5-7 days, please schedule an appointment.  You can schedule an appointment right here in [facility], or call [phone]  If the visit is for the same symptoms as your eVisit, we'll refund the cost of your eVisit if seen within seven days.

## 2024-04-28 ENCOUNTER — HOSPITAL ENCOUNTER (EMERGENCY)
Facility: CLINIC | Age: 39
Discharge: HOME | End: 2024-04-28
Attending: FAMILY MEDICINE | Admitting: FAMILY MEDICINE
Payer: COMMERCIAL

## 2024-04-28 VITALS
BODY MASS INDEX: 30.67 KG/M2 | RESPIRATION RATE: 20 BRPM | HEART RATE: 108 BPM | TEMPERATURE: 97.6 F | OXYGEN SATURATION: 92 % | SYSTOLIC BLOOD PRESSURE: 125 MMHG | WEIGHT: 190 LBS | DIASTOLIC BLOOD PRESSURE: 80 MMHG

## 2024-04-28 DIAGNOSIS — J98.8 RESPIRATORY INFECTION: ICD-10-CM

## 2024-04-28 LAB
FLUAV RNA SPEC QL NAA+PROBE: NEGATIVE
FLUBV RNA RESP QL NAA+PROBE: NEGATIVE
RSV RNA SPEC NAA+PROBE: NEGATIVE
SARS-COV-2 RNA RESP QL NAA+PROBE: NEGATIVE

## 2024-04-28 PROCEDURE — 250N000012 HC RX MED GY IP 250 OP 636 PS 637: Performed by: FAMILY MEDICINE

## 2024-04-28 PROCEDURE — 99284 EMERGENCY DEPT VISIT MOD MDM: CPT

## 2024-04-28 PROCEDURE — 99284 EMERGENCY DEPT VISIT MOD MDM: CPT | Performed by: FAMILY MEDICINE

## 2024-04-28 PROCEDURE — 87637 SARSCOV2&INF A&B&RSV AMP PRB: CPT | Performed by: FAMILY MEDICINE

## 2024-04-28 RX ORDER — DOXYCYCLINE HYCLATE 100 MG
100 TABLET ORAL 2 TIMES DAILY
Qty: 10 TABLET | Refills: 0 | Status: SHIPPED | OUTPATIENT
Start: 2024-04-28 | End: 2024-05-03

## 2024-04-28 RX ORDER — PREDNISONE 20 MG/1
TABLET ORAL
Qty: 12 TABLET | Refills: 0 | Status: SHIPPED | OUTPATIENT
Start: 2024-04-28 | End: 2024-09-16

## 2024-04-28 RX ORDER — PREDNISONE 20 MG/1
40 TABLET ORAL ONCE
Status: COMPLETED | OUTPATIENT
Start: 2024-04-28 | End: 2024-04-28

## 2024-04-28 RX ADMIN — PREDNISONE 40 MG: 20 TABLET ORAL at 18:49

## 2024-04-28 ASSESSMENT — COLUMBIA-SUICIDE SEVERITY RATING SCALE - C-SSRS
6. HAVE YOU EVER DONE ANYTHING, STARTED TO DO ANYTHING, OR PREPARED TO DO ANYTHING TO END YOUR LIFE?: NO
2. HAVE YOU ACTUALLY HAD ANY THOUGHTS OF KILLING YOURSELF IN THE PAST MONTH?: NO
1. IN THE PAST MONTH, HAVE YOU WISHED YOU WERE DEAD OR WISHED YOU COULD GO TO SLEEP AND NOT WAKE UP?: NO

## 2024-04-28 ASSESSMENT — ACTIVITIES OF DAILY LIVING (ADL): ADLS_ACUITY_SCORE: 33
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
## 2024-04-28 NOTE — ED TRIAGE NOTES
Cold and flu like symptoms since Thursday and today pt is coming to ED because sob and wheezing is worsening. Pt has inhaler prescribed and hasn't used it today. Body aches and fever at home, pt does not know how high fever got.      Triage Assessment (Adult)       Row Name 04/28/24 4984          Triage Assessment    Airway WDL WDL        Respiratory WDL    Respiratory WDL X;cough  sob        Skin Circulation/Temperature WDL    Skin Circulation/Temperature WDL WDL        Cardiac WDL    Cardiac WDL WDL        Peripheral/Neurovascular WDL    Peripheral Neurovascular WDL WDL        Cognitive/Neuro/Behavioral WDL    Cognitive/Neuro/Behavioral WDL WDL

## 2024-04-28 NOTE — DISCHARGE INSTRUCTIONS
Take the prescribed medication as directed.    Follow-up in the clinic if you have persistent symptoms or if worsening, return to the ER.

## 2024-04-28 NOTE — LETTER
April 28, 2024      To Whom It May Concern:      Sirena Dietrich was seen in our Emergency Department today, 04/28/24.      Unable to work today and April 29.          Sincerely,        Blayne Garcia MD

## 2024-04-30 ENCOUNTER — TELEPHONE (OUTPATIENT)
Dept: FAMILY MEDICINE | Facility: CLINIC | Age: 39
End: 2024-04-30
Payer: COMMERCIAL

## 2024-04-30 NOTE — TELEPHONE ENCOUNTER
Symptoms    Describe your symptoms: cough, seen in ED on 4/28--treated with antibiotic and prednisone reports pain in chest-rattles, hurts with coughing spell.     Any pain: yes chest pains    How long have you been having symptoms:   Thursday until now.     Have you been seen for this:  Yes: [place]        Preferred Pharmacy:     [facility], [place] [id number] [place] [id number]  Phone: [phone] Fax: [phone]        Could we send this information to you in [employer] or would you prefer to receive a phone call?:   Patient would prefer a phone call   Okay to leave a detailed message?: Yes at Cell number on file:    Telephone Information:   Mobile [phone]

## 2024-04-30 NOTE — TELEPHONE ENCOUNTER
"S-(situation): Spoke with Sirena. Symptoms are not worse but no better.     B-(background): Evaluated in Er 04/28/24, started prednisone and   Doxycycline-is on day 2 of treatment.     A-(assessment): Sirena said she did 3 nebs today which is starting to help. Her chest still feels tight but no worse than when in Er.   She has not had a fever for 2 days. Has a non-productive cough and \"stuffy face\". She is drinking lots of water and having some urinary incontinence because of her cough. Otc cough medication and \"daytime stuff\" has not helped. History of pneumonia, chest x-ray not done in Er.     R-(recommendations): Continue with prednisone and antibiotic, otc cough and cold medication. Warm compress to face, hot tea with honey and lemon may be helpful. Scheduled Er follow up appt with PCP for 05/02/24. If symptoms worsen, has increased difficulty breathing or worsening chest pain, needs to return to Er. Jenelle verbalized understanding.   Amanda OCOK RN      "

## 2024-05-02 ENCOUNTER — OFFICE VISIT (OUTPATIENT)
Dept: FAMILY MEDICINE | Facility: CLINIC | Age: 39
End: 2024-05-02
Payer: COMMERCIAL

## 2024-05-02 ENCOUNTER — ANCILLARY PROCEDURE (OUTPATIENT)
Dept: GENERAL RADIOLOGY | Facility: CLINIC | Age: 39
End: 2024-05-02
Attending: NURSE PRACTITIONER
Payer: COMMERCIAL

## 2024-05-02 VITALS
BODY MASS INDEX: 31.18 KG/M2 | HEIGHT: 66 IN | SYSTOLIC BLOOD PRESSURE: 116 MMHG | OXYGEN SATURATION: 99 % | TEMPERATURE: 98 F | RESPIRATION RATE: 14 BRPM | DIASTOLIC BLOOD PRESSURE: 76 MMHG | WEIGHT: 194 LBS | HEART RATE: 64 BPM

## 2024-05-02 DIAGNOSIS — R05.3 CHRONIC COUGH: ICD-10-CM

## 2024-05-02 DIAGNOSIS — R30.0 DYSURIA: ICD-10-CM

## 2024-05-02 DIAGNOSIS — K21.01 GASTROESOPHAGEAL REFLUX DISEASE WITH ESOPHAGITIS AND HEMORRHAGE: ICD-10-CM

## 2024-05-02 DIAGNOSIS — J31.0 CHRONIC RHINITIS: ICD-10-CM

## 2024-05-02 DIAGNOSIS — Z90.710 S/P HYSTERECTOMY: ICD-10-CM

## 2024-05-02 DIAGNOSIS — J21.9 BRONCHIOLITIS: ICD-10-CM

## 2024-05-02 DIAGNOSIS — F31.64 SEVERE BIPOLAR I DISORDER, MOST RECENT EPISODE MIXED, WITH PSYCHOTIC FEATURES (H): ICD-10-CM

## 2024-05-02 DIAGNOSIS — M54.41 ACUTE RIGHT-SIDED LOW BACK PAIN WITH RIGHT-SIDED SCIATICA: ICD-10-CM

## 2024-05-02 DIAGNOSIS — R23.2 HOT FLASHES: Primary | ICD-10-CM

## 2024-05-02 PROBLEM — F19.20 CHEMICAL DEPENDENCY (H): Status: RESOLVED | Noted: 2019-05-06 | Resolved: 2024-05-02

## 2024-05-02 PROBLEM — F15.11 METHAMPHETAMINE ABUSE IN REMISSION (H): Chronic | Status: RESOLVED | Noted: 2018-10-11 | Resolved: 2024-05-02

## 2024-05-02 LAB
ALBUMIN SERPL BCG-MCNC: 4.3 G/DL (ref 3.5–5.2)
ALBUMIN UR-MCNC: NEGATIVE MG/DL
ALP SERPL-CCNC: 58 U/L (ref 40–150)
ALT SERPL W P-5'-P-CCNC: 34 U/L (ref 0–50)
ANION GAP SERPL CALCULATED.3IONS-SCNC: 10 MMOL/L (ref 7–15)
APPEARANCE UR: CLEAR
AST SERPL W P-5'-P-CCNC: 17 U/L (ref 0–45)
BILIRUB SERPL-MCNC: 0.3 MG/DL
BILIRUB UR QL STRIP: NEGATIVE
BUN SERPL-MCNC: 15.5 MG/DL (ref 6–20)
CALCIUM SERPL-MCNC: 9.4 MG/DL (ref 8.6–10)
CHLORIDE SERPL-SCNC: 104 MMOL/L (ref 98–107)
COLOR UR AUTO: YELLOW
CREAT SERPL-MCNC: 0.76 MG/DL (ref 0.51–0.95)
DEPRECATED HCO3 PLAS-SCNC: 28 MMOL/L (ref 22–29)
EGFRCR SERPLBLD CKD-EPI 2021: >90 ML/MIN/1.73M2
GLUCOSE SERPL-MCNC: 85 MG/DL (ref 70–99)
GLUCOSE UR STRIP-MCNC: NEGATIVE MG/DL
HGB UR QL STRIP: ABNORMAL
KETONES UR STRIP-MCNC: NEGATIVE MG/DL
LEUKOCYTE ESTERASE UR QL STRIP: NEGATIVE
NITRATE UR QL: NEGATIVE
PH UR STRIP: 6 [PH] (ref 5–7)
POTASSIUM SERPL-SCNC: 3.5 MMOL/L (ref 3.4–5.3)
PROT SERPL-MCNC: 6.9 G/DL (ref 6.4–8.3)
RBC #/AREA URNS AUTO: ABNORMAL /HPF
SODIUM SERPL-SCNC: 142 MMOL/L (ref 135–145)
SP GR UR STRIP: 1.02 (ref 1–1.03)
SQUAMOUS #/AREA URNS AUTO: ABNORMAL /LPF
UROBILINOGEN UR STRIP-ACNC: 0.2 E.U./DL
WBC #/AREA URNS AUTO: ABNORMAL /HPF

## 2024-05-02 PROCEDURE — 85007 BL SMEAR W/DIFF WBC COUNT: CPT | Performed by: NURSE PRACTITIONER

## 2024-05-02 PROCEDURE — 99214 OFFICE O/P EST MOD 30 MIN: CPT | Performed by: NURSE PRACTITIONER

## 2024-05-02 PROCEDURE — 83001 ASSAY OF GONADOTROPIN (FSH): CPT | Performed by: NURSE PRACTITIONER

## 2024-05-02 PROCEDURE — G2211 COMPLEX E/M VISIT ADD ON: HCPCS | Performed by: NURSE PRACTITIONER

## 2024-05-02 PROCEDURE — 36415 COLL VENOUS BLD VENIPUNCTURE: CPT | Performed by: NURSE PRACTITIONER

## 2024-05-02 PROCEDURE — 81001 URINALYSIS AUTO W/SCOPE: CPT | Performed by: NURSE PRACTITIONER

## 2024-05-02 PROCEDURE — 80053 COMPREHEN METABOLIC PANEL: CPT | Performed by: NURSE PRACTITIONER

## 2024-05-02 PROCEDURE — 71046 X-RAY EXAM CHEST 2 VIEWS: CPT | Mod: TC | Performed by: RADIOLOGY

## 2024-05-02 RX ORDER — CYCLOBENZAPRINE HCL 5 MG
5-10 TABLET ORAL 3 TIMES DAILY PRN
Qty: 30 TABLET | Refills: 1 | Status: SHIPPED | OUTPATIENT
Start: 2024-05-02 | End: 2024-08-26

## 2024-05-02 RX ORDER — ALBUTEROL SULFATE 90 UG/1
2 AEROSOL, METERED RESPIRATORY (INHALATION) EVERY 6 HOURS PRN
Qty: 18 G | Refills: 1 | Status: SHIPPED | OUTPATIENT
Start: 2024-05-02

## 2024-05-02 RX ORDER — FAMOTIDINE 40 MG/1
40 TABLET, FILM COATED ORAL DAILY
Qty: 90 TABLET | Refills: 0 | Status: SHIPPED | OUTPATIENT
Start: 2024-05-02

## 2024-05-02 RX ORDER — BUDESONIDE 0.5 MG/2ML
0.5 INHALANT ORAL 2 TIMES DAILY
Qty: 120 ML | Refills: 0 | Status: SHIPPED | OUTPATIENT
Start: 2024-05-02

## 2024-05-02 NOTE — LETTER
May 2, 2024      Sirena Dietrich  [address] [address] [facility] [address]        To Whom It May Concern:    Sirena Dietrich was seen in our clinic. She may return to work with restrictions.  No lifting > 10 lbs x 2 weeks.       Sincerely,        DIPESH Marino CNP

## 2024-05-02 NOTE — PROGRESS NOTES
"  Assessment & Plan     Bronchiolitis  Symptomatic care strategies reviewed  - XR Chest 2 Views  - budesonide (PULMICORT) 0.5 MG/2ML neb solution  Dispense: 120 mL; Refill: 0  - CBC with platelets and differential  - Comprehensive metabolic panel (BMP + Alb, Alk Phos, ALT, AST, Total. Bili, TP)  - CBC with platelets and differential  - Comprehensive metabolic panel (BMP + Alb, Alk Phos, ALT, AST, Total. Bili, TP)    Chronic cough  .  - albuterol (VENTOLIN HFA) 108 (90 Base) MCG/ACT inhaler  Dispense: 18 g; Refill: 1  - XR Chest 2 Views  - loratadine-pseudoePHEDrine (CLARITIN-D 24-HOUR)  MG 24 hr tablet  Dispense: 30 tablet; Refill: 0    Acute right-sided low back pain with right-sided sciatica    - cyclobenzaprine (FLEXERIL) 5 MG tablet  Dispense: 30 tablet; Refill: 1    Gastroesophageal reflux disease with esophagitis and hemorrhage    - famotidine (PEPCID) 40 MG tablet  Dispense: 90 tablet; Refill: 0    Hot flashes  S/P hysterectomy    - Follicle stimulating hormone      Severe bipolar I disorder, most recent episode mixed, with psychotic features (H)  No recent mental health follow-up.  Denies further referral at this time  Continue current meds  Continue sobriety       Chronic rhinitis    - loratadine-pseudoePHEDrine (CLARITIN-D 24-HOUR)  MG 24 hr tablet  Dispense: 30 tablet; Refill: 0    Dysuria    - UA Macroscopic with reflex to Microscopic and Culture - Lab Collect  - UA Macroscopic with reflex to Microscopic and Culture - Lab Collect  - UA Microscopic with Reflex to Culture          MED REC REQUIRED  Post Medication Reconciliation Status: discharge medications reconciled and changed, per note/orders  BMI  Estimated body mass index is 31.31 kg/m  as calculated from the following:    Height as of this encounter: 1.676 m (5' 6\").    Weight as of this encounter: 88 kg (194 lb).   Weight management plan: Discussed healthy diet and exercise guidelines      Call or return to the clinic with any " worsening of symptoms or no resolution. Patient/Parent verbalized understanding and is in agreement. Medication side effects reviewed.   Current Outpatient Medications   Medication Sig Dispense Refill    albuterol (VENTOLIN HFA) 108 (90 Base) MCG/ACT inhaler Inhale 2 puffs into the lungs every 6 hours as needed for shortness of breath or wheezing 18 g 1    BIOTIN 5000 5 MG CAPS       budesonide (PULMICORT) 0.5 MG/2ML neb solution Take 2 mLs (0.5 mg) by nebulization 2 times daily 120 mL 0    cyclobenzaprine (FLEXERIL) 5 MG tablet Take 1-2 tablets (5-10 mg) by mouth 3 times daily as needed for muscle spasms 30 tablet 1    doxycycline hyclate (VIBRA-TABS) 100 MG tablet Take 1 tablet (100 mg) by mouth 2 times daily for 5 days 10 tablet 0    famotidine (PEPCID) 40 MG tablet Take 1 tablet (40 mg) by mouth daily 90 tablet 0    HEMORRHOIDAL 0.25-14-74.9 % rectal ointment Place rectally 2 times daily as needed      hydrOXYzine (ATARAX) 50 MG tablet Take 1 tablet by mouth three times daily as needed for anxiety.      lamoTRIgine (LAMICTAL) 200 MG tablet Take 1 tablet (200 mg) by mouth daily 30 tablet 2    loratadine-pseudoePHEDrine (CLARITIN-D 24-HOUR)  MG 24 hr tablet Take 1 tablet by mouth daily 30 tablet 0    ondansetron (ZOFRAN ODT) 4 MG ODT tab Place 1 tablet (4 mg) under the tongue every 8 hours as needed for nausea 30 tablet 3    phenylephrine-shark liver oil-mineral oil-petrolatum (PREPARATION H) 0.25-3-14-71.9 % rectal ointment Place rectally 2 times daily as needed for hemorrhoids 57 g 1    predniSONE (DELTASONE) 20 MG tablet Take 2 daily for 4 days, then 1 daily for 4 days. 12 tablet 0    propranolol (INDERAL) 20 MG tablet Take 1 tablet by mouth three times daily as needed for anxiety, restlessness. 90 tablet 2    QUEtiapine (SEROQUEL) 25 MG tablet TAKE 1 TABLET BY MOUTH TWICE DAILY IF NEEDED 60 tablet 1    rosuvastatin (CRESTOR) 5 MG tablet Take 1 tablet (5 mg) by mouth daily 90 tablet 3    sertraline  "(ZOLOFT) 50 MG tablet Take 50 mg by mouth daily      SUMAtriptan (IMITREX) 25 MG tablet Take 1-2 tablets (25-50 mg) by mouth at onset of headache for migraine May repeat in 2 hours. Max 8 tablets/24 hours. 9 tablet 5    tiZANidine (ZANAFLEX) 2 MG tablet TAKE 1 TABLET BY MOUTH EVERY 8 HOURS IF NEEDED FOR MUSCLE SPASMS Strength: 2 mg 30 tablet 2    valACYclovir (VALTREX) 500 MG tablet Take 1 tablet (500 mg) by mouth daily 31 tablet 11     Chart documentation with Dragon Voice recognition Software. Although reviewed after completion, some words and grammatical errors may remain.  Shi Martinez MSN,FNP-BC  [address] [address] [phone]        See Patient Instructions    Subjective   Sirena is a 38 year old, presenting for the following health issues:  ER F/U        5/2/2024     9:30 AM   Additional Questions   Roomed by Salima BARBER       ED/UC Followup:    Facility:  [facility]   Date of visit: 04/28/2024  Reason for visit: URI   Current Status: not feeling better     On doxycycline and prednisone from ED  Cough since Thursday  Wheezing and productive cough  Usually has wheezing with illness  630 am last neb.   Quit smoking a week ago    Left anterior chest pain with coughing  Having problems with urine incontinence with coughing and lifting at work  Would like to work with no restrictions    See emergency department records        Review of Systems  Constitutional, neuro, ENT, endocrine, pulmonary, cardiac, gastrointestinal, genitourinary, musculoskeletal, integument and psychiatric systems are negative, except as otherwise noted.      Objective    /76   Pulse 64   Temp 98  F (36.7  C) (Tympanic)   Resp 14   Ht 1.676 m (5' 6\")   Wt 88 kg (194 lb)   LMP 01/18/2019   SpO2 99%   BMI 31.31 kg/m    Body mass index is 31.31 kg/m .  Physical Exam   GENERAL: alert and no distress  EYES: Eyes grossly normal to inspection, PERRL and conjunctivae and " sclerae normal  HENT: ear canals and TM's normal, nose and mouth without ulcers or lesions  NECK: no adenopathy, no asymmetry, masses, or scars  RESP: rhonchi L mid posterior  CV: regular rate and rhythm, normal S1 S2, no S3 or S4, no murmur, click or rub, no peripheral edema  ABDOMEN: soft, nontender, no hepatosplenomegaly, no masses and bowel sounds normal  MS: no gross musculoskeletal defects noted, no edema  SKIN: no suspicious lesions or rashes  NEURO: Normal strength and tone, mentation intact and speech normal  PSYCH: mentation appears normal, affect normal/bright    Admission on 04/28/2024, Discharged on 04/28/2024   Component Date Value Ref Range Status    Influenza A PCR 04/28/2024 Negative  Negative Final    Influenza B PCR 04/28/2024 Negative  Negative Final    RSV PCR 04/28/2024 Negative  Negative Final    SARS CoV2 PCR 04/28/2024 Negative  Negative Final    NEGATIVE: SARS-CoV-2 (COVID-19) RNA not detected, presumed negative.           Signed Electronically by: DIPESH Marino CNP

## 2024-05-03 LAB
ACANTHOCYTES BLD QL SMEAR: NORMAL
AUER BODIES BLD QL SMEAR: NORMAL
BASO STIPL BLD QL SMEAR: NORMAL
BASOPHILS # BLD MANUAL: 0.1 10E3/UL (ref 0–0.2)
BASOPHILS NFR BLD MANUAL: 1 %
BITE CELLS BLD QL SMEAR: NORMAL
BLISTER CELLS BLD QL SMEAR: NORMAL
BURR CELLS BLD QL SMEAR: NORMAL
DACRYOCYTES BLD QL SMEAR: NORMAL
ELLIPTOCYTES BLD QL SMEAR: NORMAL
EOSINOPHIL # BLD MANUAL: 0 10E3/UL (ref 0–0.7)
EOSINOPHIL NFR BLD MANUAL: 0 %
ERYTHROCYTE [DISTWIDTH] IN BLOOD BY AUTOMATED COUNT: 11.6 % (ref 10–15)
FRAGMENTS BLD QL SMEAR: NORMAL
FSH SERPL IRP2-ACNC: 3.8 MIU/ML
GIANT PLATELETS BLD QL SMEAR: NORMAL
HCT VFR BLD AUTO: 42.1 % (ref 35–47)
HGB BLD-MCNC: 14.3 G/DL (ref 11.7–15.7)
HGB C CRYSTALS: NORMAL
HOWELL-JOLLY BOD BLD QL SMEAR: NORMAL
LYMPHOCYTES # BLD MANUAL: 6 10E3/UL (ref 0.8–5.3)
LYMPHOCYTES NFR BLD MANUAL: 40 %
MCH RBC QN AUTO: 30.3 PG (ref 26.5–33)
MCHC RBC AUTO-ENTMCNC: 34 G/DL (ref 31.5–36.5)
MCV RBC AUTO: 89 FL (ref 78–100)
MONOCYTES # BLD MANUAL: 1.3 10E3/UL (ref 0–1.3)
MONOCYTES NFR BLD MANUAL: 9 %
NEUTROPHILS # BLD MANUAL: 7.5 10E3/UL (ref 1.6–8.3)
NEUTROPHILS NFR BLD MANUAL: 50 %
NEUTS HYPERSEG BLD QL SMEAR: NORMAL
NRBC # BLD AUTO: 0 10E3/UL
NRBC BLD AUTO-RTO: 0 /100
PATH REV: ABNORMAL
PATH REV: NORMAL
PLAT MORPH BLD: ABNORMAL
PLAT MORPH BLD: NORMAL
PLATELET # BLD AUTO: 401 10E3/UL (ref 150–450)
POLYCHROMASIA BLD QL SMEAR: NORMAL
RBC # BLD AUTO: 4.72 10E6/UL (ref 3.8–5.2)
RBC AGGLUT BLD QL: NORMAL
RBC MORPH BLD: ABNORMAL
RBC MORPH BLD: NORMAL
ROULEAUX BLD QL SMEAR: NORMAL
SICKLE CELLS BLD QL SMEAR: NORMAL
SMUDGE CELLS BLD QL SMEAR: NORMAL
SPHEROCYTES BLD QL SMEAR: NORMAL
STOMATOCYTES BLD QL SMEAR: NORMAL
TARGETS BLD QL SMEAR: NORMAL
TOXIC GRANULES BLD QL SMEAR: NORMAL
VARIANT LYMPHS BLD QL SMEAR: NORMAL
WBC # BLD AUTO: 14.9 10E3/UL (ref 4–11)

## 2024-06-16 ENCOUNTER — HEALTH MAINTENANCE LETTER (OUTPATIENT)
Age: 39
End: 2024-06-16

## 2024-06-20 DIAGNOSIS — Z13.220 SCREENING FOR LIPOID DISORDERS: ICD-10-CM

## 2024-06-20 DIAGNOSIS — Z13.228 SCREENING FOR METABOLIC DISORDER: ICD-10-CM

## 2024-06-20 DIAGNOSIS — Z13.89 SCREENING FOR NEPHROPATHY: Primary | ICD-10-CM

## 2024-08-16 ENCOUNTER — MYC MEDICAL ADVICE (OUTPATIENT)
Dept: FAMILY MEDICINE | Facility: CLINIC | Age: 39
End: 2024-08-16
Payer: COMMERCIAL

## 2024-08-26 ENCOUNTER — OFFICE VISIT (OUTPATIENT)
Dept: FAMILY MEDICINE | Facility: CLINIC | Age: 39
End: 2024-08-26
Payer: COMMERCIAL

## 2024-08-26 VITALS
DIASTOLIC BLOOD PRESSURE: 80 MMHG | OXYGEN SATURATION: 96 % | BODY MASS INDEX: 31.18 KG/M2 | WEIGHT: 194 LBS | HEIGHT: 66 IN | TEMPERATURE: 98.2 F | RESPIRATION RATE: 20 BRPM | SYSTOLIC BLOOD PRESSURE: 130 MMHG | HEART RATE: 100 BPM

## 2024-08-26 DIAGNOSIS — Z13.228 SCREENING FOR METABOLIC DISORDER: ICD-10-CM

## 2024-08-26 DIAGNOSIS — K92.1 MELENA: ICD-10-CM

## 2024-08-26 DIAGNOSIS — F15.21 METHAMPHETAMINE USE DISORDER, MODERATE, IN SUSTAINED REMISSION (H): ICD-10-CM

## 2024-08-26 DIAGNOSIS — K92.1 HEMATOCHEZIA: ICD-10-CM

## 2024-08-26 DIAGNOSIS — M54.41 ACUTE RIGHT-SIDED LOW BACK PAIN WITH RIGHT-SIDED SCIATICA: ICD-10-CM

## 2024-08-26 DIAGNOSIS — E78.5 HYPERLIPIDEMIA LDL GOAL <130: ICD-10-CM

## 2024-08-26 DIAGNOSIS — R06.02 SOB (SHORTNESS OF BREATH): ICD-10-CM

## 2024-08-26 DIAGNOSIS — R01.1 HEART MURMUR: ICD-10-CM

## 2024-08-26 DIAGNOSIS — R55 SYNCOPE, UNSPECIFIED SYNCOPE TYPE: Primary | ICD-10-CM

## 2024-08-26 DIAGNOSIS — R76.8 LOW SERUM IGA FOR AGE: ICD-10-CM

## 2024-08-26 DIAGNOSIS — Z13.89 SCREENING FOR NEPHROPATHY: ICD-10-CM

## 2024-08-26 DIAGNOSIS — Z72.0 SMOKING TRYING TO QUIT: ICD-10-CM

## 2024-08-26 DIAGNOSIS — G89.29 CHRONIC ABDOMINAL PAIN: ICD-10-CM

## 2024-08-26 DIAGNOSIS — Z13.220 SCREENING FOR LIPOID DISORDERS: ICD-10-CM

## 2024-08-26 DIAGNOSIS — R10.9 CHRONIC ABDOMINAL PAIN: ICD-10-CM

## 2024-08-26 DIAGNOSIS — J31.0 CHRONIC RHINITIS: ICD-10-CM

## 2024-08-26 LAB
ALBUMIN SERPL BCG-MCNC: 5 G/DL (ref 3.5–5.2)
ALP SERPL-CCNC: 77 U/L (ref 40–150)
ALT SERPL W P-5'-P-CCNC: 38 U/L (ref 0–50)
ANION GAP SERPL CALCULATED.3IONS-SCNC: 10 MMOL/L (ref 7–15)
AST SERPL W P-5'-P-CCNC: 25 U/L (ref 0–45)
BILIRUB SERPL-MCNC: 0.2 MG/DL
BUN SERPL-MCNC: 10.7 MG/DL (ref 6–20)
CALCIUM SERPL-MCNC: 9.7 MG/DL (ref 8.8–10.4)
CHLORIDE SERPL-SCNC: 103 MMOL/L (ref 98–107)
CHOLEST SERPL-MCNC: 259 MG/DL
CREAT SERPL-MCNC: 0.72 MG/DL (ref 0.51–0.95)
EGFRCR SERPLBLD CKD-EPI 2021: >90 ML/MIN/1.73M2
ERYTHROCYTE [DISTWIDTH] IN BLOOD BY AUTOMATED COUNT: 11.5 % (ref 10–15)
FASTING STATUS PATIENT QL REPORTED: NO
FERRITIN SERPL-MCNC: 259 NG/ML (ref 6–175)
GLUCOSE SERPL-MCNC: 82 MG/DL (ref 70–99)
GLUCOSE SERPL-MCNC: 82 MG/DL (ref 70–99)
HBA1C MFR BLD: 5.3 % (ref 0–5.6)
HCO3 SERPL-SCNC: 24 MMOL/L (ref 22–29)
HCT VFR BLD AUTO: 45 % (ref 35–47)
HDLC SERPL-MCNC: 43 MG/DL
HGB BLD-MCNC: 15.4 G/DL (ref 11.7–15.7)
LDLC SERPL CALC-MCNC: ABNORMAL MG/DL
MCH RBC QN AUTO: 30.1 PG (ref 26.5–33)
MCHC RBC AUTO-ENTMCNC: 34.2 G/DL (ref 31.5–36.5)
MCV RBC AUTO: 88 FL (ref 78–100)
NONHDLC SERPL-MCNC: 216 MG/DL
PLATELET # BLD AUTO: 328 10E3/UL (ref 150–450)
POTASSIUM SERPL-SCNC: 4.2 MMOL/L (ref 3.4–5.3)
PROT SERPL-MCNC: 7.3 G/DL (ref 6.4–8.3)
RBC # BLD AUTO: 5.12 10E6/UL (ref 3.8–5.2)
SODIUM SERPL-SCNC: 137 MMOL/L (ref 135–145)
TRIGL SERPL-MCNC: 621 MG/DL
TSH SERPL DL<=0.005 MIU/L-ACNC: 2.29 UIU/ML (ref 0.3–4.2)
WBC # BLD AUTO: 10 10E3/UL (ref 4–11)

## 2024-08-26 PROCEDURE — 86364 TISS TRNSGLTMNASE EA IG CLAS: CPT | Performed by: FAMILY MEDICINE

## 2024-08-26 PROCEDURE — 83036 HEMOGLOBIN GLYCOSYLATED A1C: CPT | Performed by: FAMILY MEDICINE

## 2024-08-26 PROCEDURE — 93000 ELECTROCARDIOGRAM COMPLETE: CPT | Performed by: FAMILY MEDICINE

## 2024-08-26 PROCEDURE — 83721 ASSAY OF BLOOD LIPOPROTEIN: CPT | Mod: 59 | Performed by: FAMILY MEDICINE

## 2024-08-26 PROCEDURE — 84443 ASSAY THYROID STIM HORMONE: CPT | Performed by: FAMILY MEDICINE

## 2024-08-26 PROCEDURE — 99214 OFFICE O/P EST MOD 30 MIN: CPT | Performed by: FAMILY MEDICINE

## 2024-08-26 PROCEDURE — 85027 COMPLETE CBC AUTOMATED: CPT | Performed by: FAMILY MEDICINE

## 2024-08-26 PROCEDURE — 83993 ASSAY FOR CALPROTECTIN FECAL: CPT | Performed by: FAMILY MEDICINE

## 2024-08-26 PROCEDURE — 36415 COLL VENOUS BLD VENIPUNCTURE: CPT | Performed by: FAMILY MEDICINE

## 2024-08-26 PROCEDURE — 86258 DGP ANTIBODY EACH IG CLASS: CPT | Performed by: FAMILY MEDICINE

## 2024-08-26 PROCEDURE — 82728 ASSAY OF FERRITIN: CPT | Performed by: FAMILY MEDICINE

## 2024-08-26 PROCEDURE — 80053 COMPREHEN METABOLIC PANEL: CPT | Performed by: FAMILY MEDICINE

## 2024-08-26 PROCEDURE — 80061 LIPID PANEL: CPT | Performed by: FAMILY MEDICINE

## 2024-08-26 PROCEDURE — 82784 ASSAY IGA/IGD/IGG/IGM EACH: CPT | Performed by: FAMILY MEDICINE

## 2024-08-26 RX ORDER — CYCLOBENZAPRINE HCL 5 MG
5-10 TABLET ORAL 3 TIMES DAILY PRN
Qty: 30 TABLET | Refills: 1 | Status: SHIPPED | OUTPATIENT
Start: 2024-08-26

## 2024-08-26 RX ORDER — LORATADINE 10 MG/1
10 TABLET ORAL DAILY
Qty: 90 TABLET | Refills: 0 | Status: SHIPPED | OUTPATIENT
Start: 2024-08-26 | End: 2024-11-24

## 2024-08-26 RX ORDER — NICOTINE 21 MG/24HR
1 PATCH, TRANSDERMAL 24 HOURS TRANSDERMAL EVERY 24 HOURS
Qty: 30 PATCH | Refills: 0 | Status: SHIPPED | OUTPATIENT
Start: 2024-08-26 | End: 2024-10-03

## 2024-08-26 RX ORDER — TIZANIDINE 2 MG/1
TABLET ORAL
Qty: 30 TABLET | Refills: 2 | Status: SHIPPED | OUTPATIENT
Start: 2024-08-26

## 2024-08-26 ASSESSMENT — PAIN SCALES - GENERAL: PAINLEVEL: SEVERE PAIN (6)

## 2024-08-26 NOTE — PROGRESS NOTES
"  Assessment & Plan     Syncope, unspecified syncope type  37 yo female with sob with exertion, syncopal episodes, murmur on exam, previous meth use in remission. Denies previous IV drug use. Cardiology note 2017 \"She has a history of bipolar disorder and chronic pelvic pain, for which she was placed on Lupron in 2014. She subsequently developed chest discomfort and palpitations. Holter monitor showed average heart rate of 106 with sinus tachycardia. Stress test was ordered, but never performed.\" She also notes that she had an echocardiogram as a child due to a murmur heard during routine exam.    Orders as below  - Echocardiogram Exercise Stress; Future  - EKG 12-lead complete w/read - Clinics  - Hemoglobin A1c; Future  - CBC with platelets; Future  - Ferritin; Future  - Comprehensive metabolic panel (BMP + Alb, Alk Phos, ALT, AST, Total. Bili, TP); Future    Heart murmur  As above  - Echocardiogram Exercise Stress; Future  - EKG 12-lead complete w/read - Clinics    SOB (shortness of breath)  As above  - Echocardiogram Exercise Stress; Future    Smoking trying to quit  Smokes 1 pack a day of cigarettes, wanting to do nicotine patches to quit  - nicotine (NICODERM CQ) 14 MG/24HR 24 hr patch; Place 1 patch over 24 hours onto the skin every 24 hours.    Chronic abdominal pain  Patient endorses chronic sharp shooting abdominal pain, generalized, belching, burping, bloating, constipation, bright red blood per rectum and black stool with foul odor, nausea, vomiting. She has had upper and lower endoscopy in the past. Most recently she had one scheduled for January, however, she got evicted and could not attend. Grandmother with crohns and brother with irritable bowel syndrome. She has history of hemorrhoids.  - Adult GI  Referral - Consult Only; Future  - IgA [LAB73]; Future  - Deamidated Giladin Peptide Coral IgA IgG [CLL8865]; Future  - Tissue transglutaminase coral IgA and IgG [VTQ1438]; Future  - Calprotectin " Feces; Future    Hematochezia  As above  - Adult GI  Referral - Consult Only; Future    Melena  As above  - Adult GI  Referral - Consult Only; Future    Chronic rhinitis  refilled  - loratadine (CLARITIN) 10 MG tablet; Take 1 tablet (10 mg) by mouth daily.    Acute right-sided low back pain with right-sided sciatica  She alternates them  - tiZANidine (ZANAFLEX) 2 MG tablet; TAKE 1 TABLET BY MOUTH EVERY 8 HOURS IF NEEDED FOR MUSCLE SPASMS Strength: 2 mg  - cyclobenzaprine (FLEXERIL) 5 MG tablet; Take 1-2 tablets (5-10 mg) by mouth 3 times daily as needed for muscle spasms.          Nicotine/Tobacco Cessation  She reports that she has been smoking cigarettes. She has never used smokeless tobacco.  Nicotine/Tobacco Cessation Plan  Pharmacotherapies : Nicotine patch            Subjective   Sirena is a 38 year old, presenting for the following health issues:  Gastrointestinal Problem and Smoking Cessation (Patches at 14 mg or gum)        8/26/2024     1:53 PM   Additional Questions   Roomed by Angela HERNANDEZ   Accompanied by self     History of Present Illness       Reason for visit:  Stomach pain changes in bowels, body aches and joint pain that s getting worse weight issues  Symptom onset:  More than a month  Symptoms include:  Stomach pains stabbing and bloating and pressure stool changes, and body pains on joints and muscles getting worse over two months  Symptom intensity:  Severe  Symptom progression:  Worsening  Had these symptoms before:  No  What makes it worse:  I don t know  What makes it better:  Nothing   She is taking medications regularly.    December was in ER  Got better and then got worse  Has tortous colon, constipation, diarrhea  Sharp shooting pain generalized  Sometimes bowel is really dark  Has hematochezia and melena at times - a couple of times a month  Bloating  Family: grandmother with crohn's, brother with IBS  Nausea, vomiting    In around 2018 had a colonoscopy with  "[facility]    Syncopal episode 3 months ago: 3 months ago breaks out in a sweat, feels like \"puking and icky\"   \"I faint every couple of months\"  \"Getting up fast can do it too\"        Chronic/Recurring Back Pain Follow Up    Where is your back pain located? (Select all that apply) All through her back   How would you describe your back pain?  burning, cramping, dull ache, sharp, shooting, stabbing, and stiffness  Where does your back pain spread? Down into legs can be both  Since your last clinic visit for back pain, how has your pain changed? gradually worsening  Does your back pain interfere with your job? YES  S          Objective    /80   Pulse 100   Temp 98.2  F (36.8  C) (Tympanic)   Resp 20   Ht 1.676 m (5' 6\")   Wt 88 kg (194 lb)   LMP 01/18/2019   SpO2 96%   BMI 31.31 kg/m    Body mass index is 31.31 kg/m .  Physical Exam   GENERAL: alert and no distress  NECK: no adenopathy, no asymmetry, masses, or scars  RESP: lungs clear to auscultation - no rales, rhonchi or wheezes  CV: regular rate and rhythm, normal S1 S2, no S3 or S4, no murmur, click or rub, no peripheral edema  ABDOMEN: soft, tenderness generalized, no rebound  MS: no gross musculoskeletal defects noted, no edema            Signed Electronically by: Lisa Chisholm MD    "

## 2024-08-26 NOTE — PROGRESS NOTES
The longitudinal plan of care for the diagnosis(es)/condition(s) as documented were addressed during this visit. Due to the added complexity in care, I will continue to support Sirena in the subsequent management and with ongoing continuity of care.

## 2024-08-26 NOTE — NURSING NOTE
"Chief Complaint   Patient presents with    Gastrointestinal Problem    Smoking Cessation     Patches at 14 mg or gum       Initial /80   Pulse 100   Temp 98.2  F (36.8  C) (Tympanic)   Resp 20   Ht 1.676 m (5' 6\")   Wt 88 kg (194 lb)   LMP 01/18/2019   SpO2 96%   BMI 31.31 kg/m   Estimated body mass index is 31.31 kg/m  as calculated from the following:    Height as of this encounter: 1.676 m (5' 6\").    Weight as of this encounter: 88 kg (194 lb).    Patient presents to the clinic using No DME    Is there anyone who you would like to be able to receive your results? No  If yes have patient fill out JOSÉ      "

## 2024-08-27 ENCOUNTER — TELEPHONE (OUTPATIENT)
Dept: FAMILY MEDICINE | Facility: CLINIC | Age: 39
End: 2024-08-27

## 2024-08-27 DIAGNOSIS — E78.5 HYPERLIPIDEMIA LDL GOAL <130: ICD-10-CM

## 2024-08-27 LAB
GLIADIN IGA SER-ACNC: 2.7 U/ML
GLIADIN IGG SER-ACNC: <0.6 U/ML
IGA SERPL-MCNC: 71 MG/DL (ref 84–499)
LDLC SERPL DIRECT ASSAY-MCNC: 113 MG/DL
TTG IGA SER-ACNC: <0.2 U/ML
TTG IGG SER-ACNC: <0.6 U/ML

## 2024-08-27 RX ORDER — ROSUVASTATIN CALCIUM 10 MG/1
10 TABLET, COATED ORAL DAILY
Qty: 90 TABLET | Refills: 1 | Status: SHIPPED | OUTPATIENT
Start: 2024-08-27

## 2024-08-27 NOTE — TELEPHONE ENCOUNTER
Test Results        Who ordered the test:  lab test    Type of test: Lab    Date of test:  08/26/2024    Where was the test performed:  multiple    What are your questions/concerns?:  concerned about the ferritin    Could we send this information to you in [employer] or would you prefer to receive a phone call?:   Patient would prefer a phone call   Okay to leave a detailed message?: Yes at Cell number on file:    Telephone Information:   Mobile [phone]

## 2024-08-27 NOTE — TELEPHONE ENCOUNTER
REFERRAL INFORMATION:  Referring Provider:  Dr. Lisa Chisholm  Referring Clinic:  [facility]  Reason for Visit/Diagnosis:   K92.1 (ICD-10-CM) - Hematochezia   K92.1 (ICD-10-CM) - Melena   R10.9, G89.29 (ICD-10-CM) - Chronic abdominal pain        FUTURE VISIT INFORMATION:  Appointment Date: 9/11/24  Appointment Time:      NOTES STATUS DETAILS   OFFICE NOTE from Referring Provider Internal 8/26/24   OFFICE NOTE from Other Specialist Internal/Care Everywhere 3/9/24-Dr. Montaño-[facility]  1/4/24-Shi Martinez APRN CNP  7/24/19-Dr. Carreon[facility]   HOSPITAL DISCHARGE SUMMARY/  ED VISITS Internal ED 1/9/24-Dr. Santos  ED 8/10/23-[facility] Dr. Hawkins  ED 9/20/22-Dr. Aguilar   OPERATIVE REPORT Internal 8/25/1621-[facility]   MEDICATION LIST Internal         ENDOSCOPY  Internal 8/25/16   COLONOSCOPY Internal 10/29/12   ERCP N/A    EUS N/A    STOOL TESTING Internal 8/27/24-Calprotectin feces   PERTINENT LABS Internal    PATHOLOGY REPORTS (RELATED) Internal Colonoscopy 10/29/12   IMAGING (CT, MRI, EGD, MRCP, Small Bowel Follow Through/SBT, MR/CT Enterography) Internal XR chest 5/2/24 + more in Epic  CT abdomen pelvis 1/9/24 + more in Epic

## 2024-08-28 LAB — CALPROTECTIN STL-MCNT: 66.1 MG/KG (ref 0–49.9)

## 2024-08-29 ENCOUNTER — TELEPHONE (OUTPATIENT)
Dept: FAMILY MEDICINE | Facility: CLINIC | Age: 39
End: 2024-08-29
Payer: COMMERCIAL

## 2024-08-29 NOTE — TELEPHONE ENCOUNTER
Symptoms    Describe your symptoms: patient called stating that she had a visit with Dr Chisholm on Monday, she reports she has inflammation in her body, that is causing problems sleeping and therefore affecting her bipolar condition.     Patient reports she has not been able to work.     Patient requesting a prescription for prednisone, she reports her joints are painful over the last month. Shoulder, arms and legs and feels like arthritis.         Preferred Pharmacy:   [address] [address]  Phone: [phone] Fax: [phone]        Could we send this information to you in [employer] or would you prefer to receive a phone call?:   Patient would prefer a phone call   Okay to leave a detailed message?: Yes atCell number on file:    Telephone Information:   Mobile [phone]

## 2024-09-03 NOTE — TELEPHONE ENCOUNTER
Hello -     Here are my comments about your recent results:  -Normal red blood cell (hgb) levels, normal white blood cell count and normal platelet levels.  -Liver and gallbladder tests are normal (ALT,AST, Alk phos, bilirubin), kidney function is normal (Cr, GFR), sodium is normal, potassium is normal, calcium is normal, glucose is normal.  -A1C (diabetic test) is normal and indicates that your blood sugar has been in a normal range the last 3 months.  -Calprotectin feces - test used to help us guide whether or not you potentially have inflammatory bowel disease is inconclusive, need to repeat  -Tests for celiac disease that is IgA based cannot be interpreted appropriately because your IgA is slightly low. Your IgG are normal. 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  -Your ferritin is elevated - this is mainly used to detect iron levels, however, it is also an inflammatory marker. You if you have some inflammation in your body, which you likely do since you are in pain, it may be elevated.  -recommend repeating fecal calprotectin, IgA, and obtaining iron levels in 1-2 months, the lab orders are placed  For additional lab test information, labtestsonline.org is an excellent reference..     Please let us know if you have any questions or concerns.     Regards,  Lisa Chisholm MD

## 2024-09-05 NOTE — TELEPHONE ENCOUNTER
Patient did view results on [employer], and has follow up appt set up in 1 1/2 weeks. Bailee Kahler on 9/5/2024 at 1:31 PM

## 2024-09-11 ENCOUNTER — PRE VISIT (OUTPATIENT)
Dept: GASTROENTEROLOGY | Facility: CLINIC | Age: 39
End: 2024-09-11

## 2024-09-16 ENCOUNTER — OFFICE VISIT (OUTPATIENT)
Dept: URGENT CARE | Facility: URGENT CARE | Age: 39
End: 2024-09-16
Payer: COMMERCIAL

## 2024-09-16 ENCOUNTER — ANCILLARY PROCEDURE (OUTPATIENT)
Dept: GENERAL RADIOLOGY | Facility: CLINIC | Age: 39
End: 2024-09-16
Attending: PHYSICIAN ASSISTANT
Payer: COMMERCIAL

## 2024-09-16 VITALS
RESPIRATION RATE: 18 BRPM | HEART RATE: 109 BPM | DIASTOLIC BLOOD PRESSURE: 94 MMHG | WEIGHT: 194 LBS | OXYGEN SATURATION: 96 % | BODY MASS INDEX: 31.31 KG/M2 | SYSTOLIC BLOOD PRESSURE: 134 MMHG | TEMPERATURE: 97.6 F

## 2024-09-16 DIAGNOSIS — R05.1 ACUTE COUGH: Primary | ICD-10-CM

## 2024-09-16 DIAGNOSIS — R05.1 ACUTE COUGH: ICD-10-CM

## 2024-09-16 PROCEDURE — 71046 X-RAY EXAM CHEST 2 VIEWS: CPT | Mod: TC | Performed by: RADIOLOGY

## 2024-09-16 PROCEDURE — 99214 OFFICE O/P EST MOD 30 MIN: CPT | Performed by: PHYSICIAN ASSISTANT

## 2024-09-16 RX ORDER — CARIPRAZINE 1.5 MG/1
1.5 CAPSULE, GELATIN COATED ORAL DAILY
COMMUNITY
Start: 2024-09-10

## 2024-09-16 RX ORDER — ESZOPICLONE 1 MG/1
1 TABLET, FILM COATED ORAL AT BEDTIME
COMMUNITY
Start: 2024-09-10 | End: 2024-10-04

## 2024-09-16 RX ORDER — PREDNISONE 20 MG/1
40 TABLET ORAL DAILY
Qty: 10 TABLET | Refills: 0 | Status: SHIPPED | OUTPATIENT
Start: 2024-09-16 | End: 2024-09-21

## 2024-09-16 RX ORDER — BENZONATATE 200 MG/1
200 CAPSULE ORAL 3 TIMES DAILY PRN
Qty: 30 CAPSULE | Refills: 0 | Status: SHIPPED | OUTPATIENT
Start: 2024-09-16 | End: 2024-10-04

## 2024-09-16 NOTE — LETTER
September 16, 2024      Sirena Dietrich  [address] [facility] [id number]        To Whom It May Concern:    Sirena Dietrich  was seen and treated in clinic. Please excuse from work 9/15/24.          Sincerely,            Brit Forbes PA-C

## 2024-09-16 NOTE — PROGRESS NOTES
Assessment & Plan     Acute cough  Reassurance, chest x-ray is clear. Will treat with prednisone burst. Continue with albuterol every 4-6 hrs as needed. Continue with supportive care. Return to clinic if symptoms worsen or do not improve; otherwise follow up as needed       - XR Chest 2 Views; Future  - predniSONE (DELTASONE) 20 MG tablet; Take 2 tablets (40 mg) by mouth daily for 5 days.  - benzonatate (TESSALON) 200 MG capsule; Take 1 capsule (200 mg) by mouth 3 times daily as needed for cough.                No follow-ups on file.            Subjective   Chief Complaint   Patient presents with    URI     Pt c/o cough, stuffy nose, chest congestion x 5 days, son was sick with same thing a couple days before       HPI     URI Adult    Onset of symptoms was 5 day(s) ago.  Course of illness is worsening.    Severity moderate  Current and Associated symptoms: cough, tightness in chest   Treatment measures tried include nebulizer.  Predisposing factors include None.                      Objective    BP (!) 134/94   Pulse 109   Temp 97.6  F (36.4  C) (Temporal)   Resp 18   Wt 88 kg (194 lb)   LMP 01/18/2019   SpO2 96%   BMI 31.31 kg/m    Body mass index is 31.31 kg/m .  Physical Exam  Constitutional:       Appearance: She is well-developed.   HENT:      Head: Normocephalic.      Right Ear: Tympanic membrane and ear canal normal.      Left Ear: Tympanic membrane and ear canal normal.   Eyes:      Conjunctiva/sclera: Conjunctivae normal.   Cardiovascular:      Rate and Rhythm: Normal rate.      Heart sounds: Normal heart sounds.   Pulmonary:      Effort: Pulmonary effort is normal.      Breath sounds: Normal breath sounds.      Comments: Frequent dry reactive cough   Skin:     General: Skin is warm and dry.      Findings: No rash.   Psychiatric:         Behavior: Behavior normal.            Chest Xray - Reviewed and interpreted by me.  No acute infiltrate         Signed Electronically by: Brit Forbes  PINO

## 2024-10-03 DIAGNOSIS — Z72.0 SMOKING TRYING TO QUIT: ICD-10-CM

## 2024-10-03 RX ORDER — NICOTINE 21 MG/24HR
PATCH, TRANSDERMAL 24 HOURS TRANSDERMAL
Qty: 28 PATCH | Refills: 0 | Status: SHIPPED | OUTPATIENT
Start: 2024-10-03

## 2024-10-04 ENCOUNTER — VIRTUAL VISIT (OUTPATIENT)
Dept: FAMILY MEDICINE | Facility: CLINIC | Age: 39
End: 2024-10-04
Payer: COMMERCIAL

## 2024-10-04 DIAGNOSIS — J01.01 ACUTE RECURRENT MAXILLARY SINUSITIS: ICD-10-CM

## 2024-10-04 DIAGNOSIS — J20.9 ACUTE BRONCHITIS WITH SYMPTOMS > 10 DAYS: Primary | ICD-10-CM

## 2024-10-04 DIAGNOSIS — F17.200 SMOKER: ICD-10-CM

## 2024-10-04 DIAGNOSIS — B37.31 CANDIDIASIS OF VAGINA: ICD-10-CM

## 2024-10-04 PROCEDURE — 99214 OFFICE O/P EST MOD 30 MIN: CPT | Mod: 95 | Performed by: NURSE PRACTITIONER

## 2024-10-04 RX ORDER — FLUCONAZOLE 150 MG/1
150 TABLET ORAL
Qty: 3 TABLET | Refills: 0 | Status: SHIPPED | OUTPATIENT
Start: 2024-10-04 | End: 2024-10-11

## 2024-10-04 RX ORDER — PREDNISONE 20 MG/1
40 TABLET ORAL DAILY
Qty: 10 TABLET | Refills: 0 | Status: SHIPPED | OUTPATIENT
Start: 2024-10-04 | End: 2024-10-09

## 2024-10-04 RX ORDER — DOXYCYCLINE 100 MG/1
100 CAPSULE ORAL 2 TIMES DAILY
Qty: 20 CAPSULE | Refills: 0 | Status: SHIPPED | OUTPATIENT
Start: 2024-10-04 | End: 2024-10-14

## 2024-10-04 NOTE — PROGRESS NOTES
Sirena is a 38 year old who is being evaluated via a billable video visit.    How would you like to obtain your AVS? MyChart  If the video visit is dropped, the invitation should be resent by: Text to cell phone: [phone]  Will anyone else be joining your video visit? No      Assessment & Plan     Acute bronchitis with symptoms > 10 days  Symptomatic care strategies reviewed  Begin   - doxycycline hyclate (VIBRAMYCIN) 100 MG capsule  Dispense: 20 capsule; Refill: 0  - predniSONE (DELTASONE) 20 MG tablet  Dispense: 10 tablet; Refill: 0    Acute recurrent maxillary sinusitis  Saline nasal irrigation  Over the counter sudafed. Continue claritin   Begin   - doxycycline hyclate (VIBRAMYCIN) 100 MG capsule  Dispense: 20 capsule; Refill: 0  - predniSONE (DELTASONE) 20 MG tablet  Dispense: 10 tablet; Refill: 0    Smoker  Stop   - nicotine (NICORETTE) 2 MG gum  Dispense: 220 each; Refill: 1    Candidiasis of vagina  For recurrent yeast when on antibiotics   - fluconazole (DIFLUCAN) 150 MG tablet  Dispense: 3 tablet; Refill: 0          FOLLOW UP  2 weeks   Call or return to the clinic with any worsening of symptoms or no resolution. Patient/Parent verbalized understanding and is in agreement. Medication side effects reviewed.   Current Outpatient Medications   Medication Sig Dispense Refill    albuterol (VENTOLIN HFA) 108 (90 Base) MCG/ACT inhaler Inhale 2 puffs into the lungs every 6 hours as needed for shortness of breath or wheezing 18 g 1    BIOTIN 5000 5 MG CAPS       budesonide (PULMICORT) 0.5 MG/2ML neb solution Take 2 mLs (0.5 mg) by nebulization 2 times daily 120 mL 0    cyclobenzaprine (FLEXERIL) 5 MG tablet Take 1-2 tablets (5-10 mg) by mouth 3 times daily as needed for muscle spasms. 30 tablet 1    doxycycline hyclate (VIBRAMYCIN) 100 MG capsule Take 1 capsule (100 mg) by mouth 2 times daily for 10 days. 20 capsule 0    famotidine (PEPCID) 40 MG tablet Take 1 tablet (40 mg) by mouth daily 90 tablet 0     fluconazole (DIFLUCAN) 150 MG tablet Take 1 tablet (150 mg) by mouth every 3 days for 3 doses. 3 tablet 0    HEMORRHOIDAL 0.25-14-74.9 % rectal ointment Place rectally 2 times daily as needed      hydrOXYzine (ATARAX) 50 MG tablet Take 1 tablet by mouth three times daily as needed for anxiety.      lamoTRIgine (LAMICTAL) 200 MG tablet Take 1 tablet (200 mg) by mouth daily 30 tablet 2    loratadine (CLARITIN) 10 MG tablet Take 1 tablet (10 mg) by mouth daily. 90 tablet 0    nicotine (NICODERM CQ) 14 MG/24HR 24 hr patch PLACE ONE PATCH ONTO THE SKIN EVERY 24 HOURS AFTER REMOVING OLD PATCH 28 patch 0    nicotine (NICORETTE) 2 MG gum Place 1 each (2 mg) inside cheek every hour as needed for nicotine withdrawal symptoms. 220 each 1    ondansetron (ZOFRAN ODT) 4 MG ODT tab Place 1 tablet (4 mg) under the tongue every 8 hours as needed for nausea 30 tablet 3    phenylephrine-shark liver oil-mineral oil-petrolatum (PREPARATION H) 0.25-3-14-71.9 % rectal ointment Place rectally 2 times daily as needed for hemorrhoids 57 g 1    predniSONE (DELTASONE) 20 MG tablet Take 2 tablets (40 mg) by mouth daily for 5 days. 10 tablet 0    propranolol (INDERAL) 20 MG tablet Take 1 tablet by mouth three times daily as needed for anxiety, restlessness. 90 tablet 2    QUEtiapine (SEROQUEL) 25 MG tablet TAKE 1 TABLET BY MOUTH TWICE DAILY IF NEEDED 60 tablet 1    rosuvastatin (CRESTOR) 10 MG tablet Take 1 tablet (10 mg) by mouth daily. 90 tablet 1    SUMAtriptan (IMITREX) 25 MG tablet Take 1-2 tablets (25-50 mg) by mouth at onset of headache for migraine May repeat in 2 hours. Max 8 tablets/24 hours. 9 tablet 5    tiZANidine (ZANAFLEX) 2 MG tablet TAKE 1 TABLET BY MOUTH EVERY 8 HOURS IF NEEDED FOR MUSCLE SPASMS Strength: 2 mg 30 tablet 2    valACYclovir (VALTREX) 500 MG tablet Take 1 tablet (500 mg) by mouth daily 31 tablet 11    VRAYLAR 1.5 MG capsule Take 1.5 mg by mouth daily.       Chart documentation with Dragon Voice recognition  Software. Although reviewed after completion, some words and grammatical errors may remain.  As the provider for this telephone/video service, I attest that I introduced myself to the patient, provided my credentials, disclosed my location, and determined that, based on a review of the patient's chart and/or a discussion with members of the patient's treatment team, a telephone/video visit is an appropriate and effective means of providing this service. The patient and I mutually agree that this visit is appropriate for telephone/video visit as well.  Shi Juan [facility]    Elaine Pack is a 38 year old, presenting for the following health issues:  Cough        10/4/2024     7:34 AM   Additional Questions   Roomed by donnie   Accompanied by self     History of Present Illness     Asthma:  She presents for follow up of asthma.  She has some cough, some wheezing, and no shortness of breath.  She is using a relief medication every 4 hours. She does not miss any doses of her controller medication throughout the week. Patient is aware of the following triggers: upper respiratory infections. The patient has had a visit to the Emergency Room, Urgent Care or Hospital due to asthma since the last clinic visit. She has been to the Emergency Room or Urgent Care 1 time.She has had a Hospitalization 0 times.    Reason for visit:  Coughing still continues and puking up phlegm and my bosses are thinking after a month that need antibiotics or something else because i do neb and inhalers and DayQuil and I ve done Sudafed    She eats 2-3 servings of fruits and vegetables daily.She consumes 1 sweetened beverage(s) daily.She exercises with enough effort to increase her heart rate 30 to 60 minutes per day.  She exercises with enough effort to increase her heart rate 5 days per week.   She is taking medications regularly.       ED/UC Followup:    Facility:  [facility] urgent care   Date of visit: 9/16/2024  Reason for  visit: cough  Current Status: using a neb and ran out of tessalon, still has chest tightness, wheezing, and a cough   Productive cough  Blood tinged nasal discharge  No chest pain  Intermittent abdominal pain  Has not scheduled follow up yet due to $$ with GI or cardiology  No chest pain. Some wheezing yet  Recurrent yeast infections when on antibiotics  Needs nicotine gum using patch. Trying to stop smoking       Review of Systems  Constitutional, neuro, ENT, endocrine, pulmonary, cardiac, gastrointestinal, genitourinary, musculoskeletal, integument and psychiatric systems are negative, except as otherwise noted.      Objective           Vitals:  No vitals were obtained today due to virtual visit.    Physical Exam   GENERAL: alert, mild distress when coughing , and fatigued  EYES: Eyes grossly normal to inspection.  No discharge or erythema, or obvious scleral/conjunctival abnormalities.  HENT: sinuses: maxillary tenderness on bilateral and nasal congestion heard   RESP: wet cough   SKIN: Visible skin clear. No significant rash, abnormal pigmentation or lesions.  NEURO: Cranial nerves grossly intact.  Mentation and speech appropriate for age.  PSYCH: Appropriate affect, tone, and pace of words    Office Visit on 08/26/2024   Component Date Value Ref Range Status    Cholesterol 08/26/2024 259 (H)  <200 mg/dL Final    Triglycerides 08/26/2024 621 (H)  <150 mg/dL Final    Direct Measure HDL 08/26/2024 43 (L)  >=50 mg/dL Final    LDL Cholesterol Calculated 08/26/2024    Final    Cannot estimate LDL when triglyceride exceeds 400 mg/dL    Non HDL Cholesterol 08/26/2024 216 (H)  <130 mg/dL Final    Patient Fasting > 8hrs? 08/26/2024 No   Final    Glucose 08/26/2024 82  70 - 99 mg/dL Final    Patient Fasting > 8hrs? 08/26/2024 No   Final    TSH 08/26/2024 2.29  0.30 - 4.20 uIU/mL Final    Immunoglobulin A 08/26/2024 71 (L)  84 - 499 mg/dL Final    Deamidated Gliadin Antibody IgA 08/26/2024 2.7  <7.0 U/mL Final    Negative     Deamidated Gliadin Antibody IgG 08/26/2024 <0.6  <7.0 U/mL Final    Negative    Tissue Transglutaminase Antibody I* 08/26/2024 <0.2  <7.0 U/mL Final    Negative- The tTG-IgA assay has limited utility for patients with decreased levels of IgA. Screening for celiac disease should include IgA testing to rule out selective IgA deficiency and to guide selection and interpretation of serological testing. tTG-IgG testing may be positive in celiac disease patients with IgA deficiency.    Tissue Transglutaminase Antibody I* 08/26/2024 <0.6  <7.0 U/mL Final    Negative    Calprotectin Feces 08/27/2024 66.1 (H)  0.0 - 49.9 mg/kg Final    Borderline result, please re-evaluate and recollect a new sample in 4-6 weeks.    Hemoglobin A1C 08/26/2024 5.3  0.0 - 5.6 % Final    Normal <5.7%   Prediabetes 5.7-6.4%    Diabetes 6.5% or higher     Note: Adopted from ADA consensus guidelines.    WBC Count 08/26/2024 10.0  4.0 - 11.0 10e3/uL Final    RBC Count 08/26/2024 5.12  3.80 - 5.20 10e6/uL Final    Hemoglobin 08/26/2024 15.4  11.7 - 15.7 g/dL Final    Hematocrit 08/26/2024 45.0  35.0 - 47.0 % Final    MCV 08/26/2024 88  78 - 100 fL Final    MCH 08/26/2024 30.1  26.5 - 33.0 pg Final    MCHC 08/26/2024 34.2  31.5 - 36.5 g/dL Final    RDW 08/26/2024 11.5  10.0 - 15.0 % Final    Platelet Count 08/26/2024 328  150 - 450 10e3/uL Final    Ferritin 08/26/2024 259 (H)  6 - 175 ng/mL Final    Sodium 08/26/2024 137  135 - 145 mmol/L Final    Potassium 08/26/2024 4.2  3.4 - 5.3 mmol/L Final    Carbon Dioxide (CO2) 08/26/2024 24  22 - 29 mmol/L Final    Anion Gap 08/26/2024 10  7 - 15 mmol/L Final    Urea Nitrogen 08/26/2024 10.7  6.0 - 20.0 mg/dL Final    Creatinine 08/26/2024 0.72  0.51 - 0.95 mg/dL Final    GFR Estimate 08/26/2024 >90  >60 mL/min/1.73m2 Final    eGFR calculated using 2021 CKD-EPI equation.    Calcium 08/26/2024 9.7  8.8 - 10.4 mg/dL Final    Reference intervals for this test were updated on 7/16/2024 to reflect our healthy  population more accurately. There may be differences in the flagging of prior results with similar values performed with this method. Those prior results can be interpreted in the context of the updated reference intervals.    Chloride 08/26/2024 103  98 - 107 mmol/L Final    Glucose 08/26/2024 82  70 - 99 mg/dL Final    Alkaline Phosphatase 08/26/2024 77  40 - 150 U/L Final    AST 08/26/2024 25  0 - 45 U/L Final    ALT 08/26/2024 38  0 - 50 U/L Final    Protein Total 08/26/2024 7.3  6.4 - 8.3 g/dL Final    Albumin 08/26/2024 5.0  3.5 - 5.2 g/dL Final    Bilirubin Total 08/26/2024 0.2  <=1.2 mg/dL Final    Patient Fasting > 8hrs? 08/26/2024 No   Final    LDL Cholesterol Direct 08/26/2024 113 (H)  <100 mg/dL Final    Age 2-19 years:  Desirable: 0-110 mg/dL   Borderline high: 110-129 mg/dL   High: >= 130 mg/dL    Age 20 years and older:  Desirable: <100mg/dL  Above desirable: 100-129 mg/dL   Borderline high: 130-159 mg/dL   High: 160-189 mg/dL   Very high: >= 190 mg/dL         Video-Visit Details    Type of service:  Video Visit   Originating Location (pt. Location): Home    Distant Location (provider location):  On-site  Platform used for Video Visit: Rajesh  Signed Electronically by: DIPESH Marino CNP

## 2024-10-11 ENCOUNTER — HOSPITAL ENCOUNTER (EMERGENCY)
Facility: CLINIC | Age: 39
Discharge: HOME | End: 2024-10-11
Admitting: EMERGENCY MEDICINE
Payer: COMMERCIAL

## 2024-10-11 VITALS
RESPIRATION RATE: 18 BRPM | SYSTOLIC BLOOD PRESSURE: 138 MMHG | DIASTOLIC BLOOD PRESSURE: 89 MMHG | WEIGHT: 190 LBS | OXYGEN SATURATION: 98 % | HEART RATE: 95 BPM | BODY MASS INDEX: 30.53 KG/M2 | HEIGHT: 66 IN | TEMPERATURE: 97.2 F

## 2024-10-11 PROCEDURE — 99281 EMR DPT VST MAYX REQ PHY/QHP: CPT | Performed by: EMERGENCY MEDICINE

## 2024-10-11 PROCEDURE — 93005 ELECTROCARDIOGRAM TRACING: CPT | Performed by: EMERGENCY MEDICINE

## 2024-10-11 ASSESSMENT — COLUMBIA-SUICIDE SEVERITY RATING SCALE - C-SSRS
2. HAVE YOU ACTUALLY HAD ANY THOUGHTS OF KILLING YOURSELF IN THE PAST MONTH?: NO
1. IN THE PAST MONTH, HAVE YOU WISHED YOU WERE DEAD OR WISHED YOU COULD GO TO SLEEP AND NOT WAKE UP?: NO
6. HAVE YOU EVER DONE ANYTHING, STARTED TO DO ANYTHING, OR PREPARED TO DO ANYTHING TO END YOUR LIFE?: NO

## 2024-10-11 NOTE — ED TRIAGE NOTES
Pt reports heartburn/chest pain since last night, she cannot get it to go away. Pt said this has happened before and she had a GI cocktail that helped.      Triage Assessment (Adult)       Row Name 10/11/24 1429          Triage Assessment    Airway WDL WDL        Respiratory WDL    Respiratory WDL WDL        Cardiac WDL    Cardiac WDL X;chest pain        Chest Pain Assessment    Chest Pain Location epigastric     Character heart burn        Cognitive/Neuro/Behavioral WDL    Cognitive/Neuro/Behavioral WDL WDL

## 2024-10-14 ENCOUNTER — PATIENT OUTREACH (OUTPATIENT)
Dept: FAMILY MEDICINE | Facility: CLINIC | Age: 39
End: 2024-10-14
Payer: COMMERCIAL

## 2024-10-17 ENCOUNTER — TELEPHONE (OUTPATIENT)
Dept: FAMILY MEDICINE | Facility: CLINIC | Age: 39
End: 2024-10-17
Payer: COMMERCIAL

## 2024-10-17 DIAGNOSIS — B37.31 CANDIDIASIS OF VAGINA: ICD-10-CM

## 2024-10-18 RX ORDER — FLUCONAZOLE 150 MG/1
TABLET ORAL
Qty: 3 TABLET | Refills: 0 | OUTPATIENT
Start: 2024-10-18

## 2024-10-18 NOTE — TELEPHONE ENCOUNTER
Called and spoke with pt. Appt scheduled for 11/21 with Shi Martinez. Pt wondering if this medication can still be refilled.    Name from pharmacy: FLUCONAZOLE 150MG TABS

## 2024-10-18 NOTE — TELEPHONE ENCOUNTER
Overdue for needed care. Please call to schedule in person clinic appointment. Once appt is scheduled, route back to the pool.    Julie Behrendt RN

## 2024-10-18 NOTE — TELEPHONE ENCOUNTER
Attempted to reach patient to discuss current symptoms. Did they improve after last treatment on 10/04/24?   Amanda COOK RN

## 2024-10-21 NOTE — TELEPHONE ENCOUNTER
Patient Contact    Attempt # 2    Was call answered?  No.  Left message on voicemail with information to call me back. See message below that needs triaging for refill of diflucan.    Julie Behrendt RN

## 2024-10-22 NOTE — TELEPHONE ENCOUNTER
ED / Discharge Outreach Protocol    Patient Contact    Attempt # 3    Was call answered?  No.  Left message on voicemail with information to call me back. Telephone encounter closed per protocol. GIVTED message sent to patient requesting she call care team re: refill request for dilfucan on 10/18/24 needs RN triaging.    Julie Behrendt RN

## 2024-11-08 DIAGNOSIS — G43.109 MIGRAINE WITH AURA AND WITHOUT STATUS MIGRAINOSUS, NOT INTRACTABLE: ICD-10-CM

## 2024-11-08 DIAGNOSIS — R05.3 CHRONIC COUGH: ICD-10-CM

## 2024-11-08 RX ORDER — ALBUTEROL SULFATE 90 UG/1
INHALANT RESPIRATORY (INHALATION)
Qty: 8.5 G | Refills: 1 | Status: SHIPPED | OUTPATIENT
Start: 2024-11-08

## 2024-11-08 RX ORDER — SUMATRIPTAN SUCCINATE 25 MG/1
25-50 TABLET ORAL
Qty: 9 TABLET | Refills: 5 | Status: SHIPPED | OUTPATIENT
Start: 2024-11-08

## 2024-11-08 NOTE — TELEPHONE ENCOUNTER
Has appointment scheduled for 11/21/24.      Requested Prescriptions   Pending Prescriptions Disp Refills    SUMAtriptan (IMITREX) 25 MG tablet [Pharmacy Med Name: SUMATRIPTAN SUCCINATE 25MG TABS] 9 tablet 5     Sig: TAKE 1-2 TABLETS (25-50 MG) BY MOUTH AT ONSET OF HEADACHE FOR MIGRAINE MAY REPEAT IN 2 HOURS. MAX 8 TABLETS/24 HOURS.       Serotonin Agonists Failed - 11/8/2024  1:00 PM        Failed - Serotonin Agonist request needs review.     If the patient has used less than or equal to nine (9) tablets or injections for migraine a month the RN may authorize the refill request.          Passed - Most recent blood pressure under 140/90 in past 12 months     BP Readings from Last 3 Encounters:   10/11/24 138/89   09/16/24 (!) 134/94   08/26/24 130/80       No data recorded            Passed - Medication is active on med list        Passed - Recent (12 mo) or future (90 days) visit within the authorizing provider's specialty     The patient must have completed an in-person or virtual visit within the past 12 months or has a future visit scheduled within the next 90 days with the authorizing provider s specialty.  Urgent care and e-visits do not quality as an office visit for this protocol.          Passed - Medication indicated for associated diagnosis     Medication is associated with one or more of the following diagnoses:     Cluster headache   Headache   Migraine          Passed - Patient is age 18 or older        Passed - No active pregnancy on record        Passed - No positive pregnancy test in past 12 months          albuterol (PROAIR HFA/PROVENTIL HFA/VENTOLIN HFA) 108 (90 Base) MCG/ACT inhaler [Pharmacy Med Name: ALBUTEROL SULFATE  AERS] 8.5 g 1     Sig: INHALE TWO PUFFS BY MOUTH INTO THE LUNGS EVERY 6 HOURS AS NEEDED FOR SHORTNESS OF BREATH OR WHEEZING       Short-Acting Beta Agonist Inhalers Protocol  Passed - 11/8/2024  1:00 PM        Passed - Patient is age 12 or older        Passed - Recent (12  mo) or future (30 days) visit within the authorizing provider's specialty     The patient must have completed an in-person or virtual visit within the past 12 months or has a future visit scheduled within the next 90 days with the authorizing provider s specialty.  Urgent care and e-visits do not quality as an office visit for this protocol.          Passed - Medication is active on med list

## 2025-02-09 ENCOUNTER — E-VISIT (OUTPATIENT)
Dept: URGENT CARE | Facility: CLINIC | Age: 40
End: 2025-02-09
Payer: COMMERCIAL

## 2025-02-09 DIAGNOSIS — N39.0 ACUTE UTI (URINARY TRACT INFECTION): Primary | ICD-10-CM

## 2025-02-09 RX ORDER — NITROFURANTOIN 25; 75 MG/1; MG/1
100 CAPSULE ORAL 2 TIMES DAILY
Qty: 10 CAPSULE | Refills: 0 | Status: SHIPPED | OUTPATIENT
Start: 2025-02-09 | End: 2025-02-14

## 2025-02-09 NOTE — PATIENT INSTRUCTIONS
Dear Sirena Dietrich    After reviewing your responses, I've been able to diagnose you with a urinary tract infection, which is a common infection of the bladder with bacteria.  This is not a sexually transmitted infection, though urinating immediately after intercourse can help prevent infections.  Drinking lots of fluids is also helpful to clear your current infection and prevent the next one.      I have sent a prescription for antibiotics to your pharmacy to treat this infection.    It is important that you take all of your prescribed medication even if your symptoms are improving after a few doses.  Taking all of your medicine helps prevent the symptoms from returning.     If your symptoms worsen, you develop pain in your back or stomach, develop fevers, or are not improving in 5 days, please contact your primary care provider for an appointment or visit any of our convenient Walk-in or Urgent Care Centers to be seen, which can be found on our website here.    Thanks again for choosing us as your health care partner,    DIPESH LAGUNAS CNP  Urinary Tract Infection (UTI) in Women: Care Instructions  Overview     A urinary tract infection (UTI) is an infection caused by bacteria. It can happen anywhere in the urinary tract. A UTI can happen in the:  Kidneys.  Ureters, the tubes that connect the kidneys to the bladder.  Bladder.  Urethra, where the urine comes out.  Most UTIs are bladder infections. They often cause pain or burning when you urinate.  Most UTIs can be cured with antibiotics. If you are prescribed antibiotics, be sure to complete your treatment so that the infection does not get worse.  Follow-up care is a key part of your treatment and safety. Be sure to make and go to all appointments, and call your doctor if you are having problems. It's also a good idea to know your test results and keep a list of the medicines you take.  How can you care for yourself at home?  Take your antibiotics as  "directed. Do not stop taking them just because you feel better. You need to take the full course of antibiotics.  Drink extra water and other fluids for the next day or two. This will help make the urine less concentrated and help wash out the bacteria that are causing the infection. (If you have kidney, heart, or liver disease and have to limit fluids, talk with your doctor before you increase the amount of fluids you drink.)  Avoid drinks that are carbonated or have caffeine. They can irritate the bladder.  Urinate often. Try to empty your bladder each time.  To relieve pain, take a hot bath or lay a heating pad set on low over your lower belly or genital area. Never go to sleep with a heating pad in place.  To prevent UTIs  Drink plenty of water each day. This helps you urinate often, which clears bacteria from your system. (If you have kidney, heart, or liver disease and have to limit fluids, talk with your doctor before you increase the amount of fluids you drink.)  Urinate when you need to.  If you are sexually active, urinate right after you have sex.  Change sanitary pads often.  Avoid douches, bubble baths, feminine hygiene sprays, and other feminine hygiene products that have deodorants.  After going to the bathroom, wipe from front to back.  When should you call for help?   Call your doctor now or seek immediate medical care if:    You have new or worse fever, chills, nausea, or vomiting.     You have new pain in your back just below your rib cage. This is called flank pain.     There is new blood or pus in your urine.     You have any problems with your antibiotic medicine.   Watch closely for changes in your health, and be sure to contact your doctor if:    You are not getting better after taking an antibiotic for 2 days.     Your symptoms go away but then come back.   Where can you learn more?  Go to https://www.healthwise.net/patiented  Enter K848 in the search box to learn more about \"Urinary Tract " "Infection (UTI) in Women: Care Instructions.\"  Current as of: April 30, 2024  Content Version: 14.3    2024 [employer].   Care instructions adapted under license by your healthcare professional. If you have questions about a medical condition or this instruction, always ask your healthcare professional. [employer] disclaims any warranty or liability for your use of this information.    "

## 2025-02-25 ENCOUNTER — MYC MEDICAL ADVICE (OUTPATIENT)
Dept: FAMILY MEDICINE | Facility: CLINIC | Age: 40
End: 2025-02-25
Payer: COMMERCIAL

## 2025-02-26 NOTE — TELEPHONE ENCOUNTER
Attempted to reach Sirena regarding her message. PCP had an opening tomorrow at 3:10. I did schedule her for that time and informed her if I do not hear confirmation back by the time I leave at 5 PM today I will cancel that appt.    Amanda UMAÑA RN

## 2025-02-27 ENCOUNTER — OFFICE VISIT (OUTPATIENT)
Dept: FAMILY MEDICINE | Facility: CLINIC | Age: 40
End: 2025-02-27
Payer: COMMERCIAL

## 2025-02-27 VITALS
RESPIRATION RATE: 16 BRPM | BODY MASS INDEX: 29.41 KG/M2 | SYSTOLIC BLOOD PRESSURE: 118 MMHG | WEIGHT: 183 LBS | TEMPERATURE: 97.9 F | OXYGEN SATURATION: 98 % | DIASTOLIC BLOOD PRESSURE: 82 MMHG | HEIGHT: 66 IN | HEART RATE: 95 BPM

## 2025-02-27 DIAGNOSIS — R76.8 LOW SERUM IGA FOR AGE: ICD-10-CM

## 2025-02-27 DIAGNOSIS — R30.0 DYSURIA: ICD-10-CM

## 2025-02-27 DIAGNOSIS — M54.41 ACUTE RIGHT-SIDED LOW BACK PAIN WITH RIGHT-SIDED SCIATICA: ICD-10-CM

## 2025-02-27 DIAGNOSIS — K92.1 MELENA: ICD-10-CM

## 2025-02-27 DIAGNOSIS — E78.5 HYPERLIPIDEMIA LDL GOAL <130: ICD-10-CM

## 2025-02-27 DIAGNOSIS — B00.9 HSV (HERPES SIMPLEX VIRUS) INFECTION: ICD-10-CM

## 2025-02-27 DIAGNOSIS — G43.109 MIGRAINE WITH AURA AND WITHOUT STATUS MIGRAINOSUS, NOT INTRACTABLE: ICD-10-CM

## 2025-02-27 DIAGNOSIS — N63.10 MASS OF RIGHT BREAST, UNSPECIFIED QUADRANT: Primary | ICD-10-CM

## 2025-02-27 DIAGNOSIS — G89.29 CHRONIC ABDOMINAL PAIN: ICD-10-CM

## 2025-02-27 DIAGNOSIS — R11.0 NAUSEA: ICD-10-CM

## 2025-02-27 DIAGNOSIS — R10.9 CHRONIC ABDOMINAL PAIN: ICD-10-CM

## 2025-02-27 LAB
ALBUMIN UR-MCNC: NEGATIVE MG/DL
AMORPH CRY #/AREA URNS HPF: ABNORMAL /HPF
APPEARANCE UR: CLEAR
BACTERIA #/AREA URNS HPF: ABNORMAL /HPF
BILIRUB UR QL STRIP: NEGATIVE
CLUE CELLS: PRESENT
COLOR UR AUTO: YELLOW
GLUCOSE UR STRIP-MCNC: NEGATIVE MG/DL
HGB UR QL STRIP: ABNORMAL
IRON BINDING CAPACITY (ROCHE): 297 UG/DL (ref 240–430)
IRON SATN MFR SERPL: 28 % (ref 15–46)
IRON SERPL-MCNC: 82 UG/DL (ref 37–145)
KETONES UR STRIP-MCNC: NEGATIVE MG/DL
LEUKOCYTE ESTERASE UR QL STRIP: NEGATIVE
MUCOUS THREADS #/AREA URNS LPF: PRESENT /LPF
NITRATE UR QL: NEGATIVE
PH UR STRIP: 5.5 [PH] (ref 5–7)
RBC #/AREA URNS AUTO: ABNORMAL /HPF
SP GR UR STRIP: >=1.03 (ref 1–1.03)
SQUAMOUS #/AREA URNS AUTO: ABNORMAL /LPF
TRICHOMONAS, WET PREP: ABNORMAL
UROBILINOGEN UR STRIP-ACNC: 0.2 E.U./DL
WBC #/AREA URNS AUTO: ABNORMAL /HPF
WBC'S/HIGH POWER FIELD, WET PREP: ABNORMAL
YEAST, WET PREP: ABNORMAL

## 2025-02-27 RX ORDER — VALACYCLOVIR HYDROCHLORIDE 500 MG/1
500 TABLET, FILM COATED ORAL DAILY
Qty: 31 TABLET | Refills: 11 | Status: SHIPPED | OUTPATIENT
Start: 2025-02-27

## 2025-02-27 RX ORDER — SUMATRIPTAN SUCCINATE 25 MG/1
25-50 TABLET ORAL
Qty: 9 TABLET | Refills: 5 | Status: SHIPPED | OUTPATIENT
Start: 2025-02-27

## 2025-02-27 RX ORDER — ONDANSETRON 4 MG/1
4 TABLET, ORALLY DISINTEGRATING ORAL EVERY 8 HOURS PRN
Qty: 30 TABLET | Refills: 3 | Status: SHIPPED | OUTPATIENT
Start: 2025-02-27

## 2025-02-27 RX ORDER — PROPRANOLOL HCL 20 MG
TABLET ORAL
Qty: 90 TABLET | Refills: 2 | Status: SHIPPED | OUTPATIENT
Start: 2025-02-27

## 2025-02-27 RX ORDER — ROSUVASTATIN CALCIUM 10 MG/1
10 TABLET, COATED ORAL DAILY
Qty: 90 TABLET | Refills: 1 | Status: SHIPPED | OUTPATIENT
Start: 2025-02-27

## 2025-02-27 RX ORDER — TIZANIDINE 2 MG/1
TABLET ORAL
Qty: 30 TABLET | Refills: 2 | Status: SHIPPED | OUTPATIENT
Start: 2025-02-27

## 2025-02-27 ASSESSMENT — PAIN SCALES - GENERAL: PAINLEVEL_OUTOF10: NO PAIN (0)

## 2025-02-27 NOTE — PATIENT INSTRUCTIONS
Thank you for coming to see me today! Here are a couple of pieces of information about messages and lab communication practices.     If lab work was done today as part of your evaluation you will generally be contacted via Measy, mail, or phone with the results within 7-10 days.  If there is an alarming/concerning result we will contact you by phone. Lab results come back at varying times, I generally wait until all labs are resulted before making comments on results. Please note, labs are automatically released to Measy once available, but it may take a couple of days for my interpretation note to appear.     I try very hard to respond to medical messages with 2 business days of receiving them. Occasionally it takes me longer if I am trying to figure out the best way to respond and need to seek guidance, do some research or dig deeper into your medical history to come up with a helpful response.     If you need refills please contact your pharmacist. They will send a refill request to me to review. Please allow 3-5 business days for us to respond to all refill requests.     Please call or send a medical message with any questions or concerns. Thank you for trusting me to be part of your healthcare team!

## 2025-02-27 NOTE — PROGRESS NOTES
"  Assessment & Plan     Mass of right breast, unspecified quadrant    - US Breast Right Limited 1-3 Quadrants; Future  - MA Diagnostic Right w/ Satya; Future    Migraine with aura and without status migrainosus, not intractable    - ondansetron (ZOFRAN ODT) 4 MG ODT tab; Place 1 tablet (4 mg) under the tongue every 8 hours as needed for nausea.  - propranolol (INDERAL) 20 MG tablet; Take 1 tablet by mouth three times daily as needed for anxiety, restlessness.  - SUMAtriptan (IMITREX) 25 MG tablet; Take 1-2 tablets (25-50 mg) by mouth at onset of headache for migraine. May repeat in 2 hours. Max 8 tablets/24 hours.    Nausea    - ondansetron (ZOFRAN ODT) 4 MG ODT tab; Place 1 tablet (4 mg) under the tongue every 8 hours as needed for nausea.    Hyperlipidemia LDL goal <130    - rosuvastatin (CRESTOR) 10 MG tablet; Take 1 tablet (10 mg) by mouth daily.    Acute right-sided low back pain with right-sided sciatica    - tiZANidine (ZANAFLEX) 2 MG tablet; TAKE 1 TABLET BY MOUTH EVERY 8 HOURS IF NEEDED FOR MUSCLE SPASMS Strength: 2 mg    HSV (herpes simplex virus) infection    - valACYclovir (VALTREX) 500 MG tablet; Take 1 tablet (500 mg) by mouth daily.    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    Weight as of this encounter: 83 kg (183 lb).     Call or return to the clinic with any worsening of symptoms or no resolution. Patient/Parent verbalized understanding and is in agreement. Medication side effects reviewed.   Current Outpatient Medications "   Medication Sig Dispense Refill    albuterol (PROAIR HFA/PROVENTIL HFA/VENTOLIN HFA) 108 (90 Base) MCG/ACT inhaler INHALE TWO PUFFS BY MOUTH INTO THE LUNGS EVERY 6 HOURS AS NEEDED FOR SHORTNESS OF BREATH OR WHEEZING 8.5 g 1    BIOTIN 5000 5 MG CAPS       budesonide (PULMICORT) 0.5 MG/2ML neb solution Take 2 mLs (0.5 mg) by nebulization 2 times daily 120 mL 0    cyclobenzaprine (FLEXERIL) 5 MG tablet Take 1-2 tablets (5-10 mg) by mouth 3 times daily as needed for muscle spasms. 30 tablet 1    famotidine (PEPCID) 40 MG tablet Take 1 tablet (40 mg) by mouth daily 90 tablet 0    HEMORRHOIDAL 0.25-14-74.9 % rectal ointment Place rectally 2 times daily as needed      hydrOXYzine (ATARAX) 50 MG tablet Take 1 tablet by mouth three times daily as needed for anxiety.      lamoTRIgine (LAMICTAL) 200 MG tablet Take 1 tablet (200 mg) by mouth daily 30 tablet 2    ondansetron (ZOFRAN ODT) 4 MG ODT tab Place 1 tablet (4 mg) under the tongue every 8 hours as needed for nausea. 30 tablet 3    phenylephrine-shark liver oil-mineral oil-petrolatum (PREPARATION H) 0.25-3-14-71.9 % rectal ointment Place rectally 2 times daily as needed for hemorrhoids 57 g 1    propranolol (INDERAL) 20 MG tablet Take 1 tablet by mouth three times daily as needed for anxiety, restlessness. 90 tablet 2    QUEtiapine (SEROQUEL) 25 MG tablet TAKE 1 TABLET BY MOUTH TWICE DAILY IF NEEDED 60 tablet 1    rosuvastatin (CRESTOR) 10 MG tablet Take 1 tablet (10 mg) by mouth daily. 90 tablet 1    SUMAtriptan (IMITREX) 25 MG tablet Take 1-2 tablets (25-50 mg) by mouth at onset of headache for migraine. May repeat in 2 hours. Max 8 tablets/24 hours. 9 tablet 5    tiZANidine (ZANAFLEX) 2 MG tablet TAKE 1 TABLET BY MOUTH EVERY 8 HOURS IF NEEDED FOR MUSCLE SPASMS Strength: 2 mg 30 tablet 2    valACYclovir (VALTREX) 500 MG tablet Take 1 tablet (500 mg) by mouth daily. 31 tablet 11    nicotine (NICODERM CQ) 14 MG/24HR 24 hr patch PLACE ONE PATCH ONTO THE SKIN EVERY 24  HOURS AFTER REMOVING OLD PATCH (Patient not taking: Reported on 2025) 28 patch 0    nicotine (NICORETTE) 2 MG gum Place 1 each (2 mg) inside cheek every hour as needed for nicotine withdrawal symptoms. (Patient not taking: Reported on 2025) 220 each 1    VRAYLAR 1.5 MG capsule Take 1.5 mg by mouth daily. (Patient not taking: Reported on 2025)       Chart documentation with Dragon Voice recognition Software. Although reviewed after completion, some words and grammatical errors may remain.  Shi Martinez MSN,[facility]  [id number] [place] [id number]  [phone]                Subjective   Sirena is a 39 year old, presenting for the following health issues:  Breast Mass        2025     1:29 PM   Additional Questions   Roomed by ibeth   Accompanied by self     History of Present Illness       Reason for visit:  Breast lump  Symptom onset:  More than a month She is missing 1 dose(s) of medications per week.      Lump on the top of her right breast, tender to the touch, has been there for a few months   Grief response. Previous SO Michael  sad and grieving          Migraine   Since your last clinic visit, how have your headaches changed?  No change  How often are you getting headaches or migraines? Using once a week    Are you able to do normal daily activities when you have a migraine? Yes  Are you taking rescue/relief medications? (Select all that apply) sumatriptan (Imitrex)  How helpful is your rescue/relief medication?  I get some relief  Are you taking any medications to prevent migraines? (Select all that apply)  No  In the past 4 weeks, how often have you gone to urgent care or the emergency room because of your headaches?  0         ROS: 10 point ROS neg other than the symptoms noted above in the HPI.        Objective    LMP 2019   There is no height or weight on file to calculate BMI.  Physical Exam   GENERAL: alert and no  distress  EYES: Eyes grossly normal to inspection, PERRL and conjunctivae and sclerae normal  HENT: ear canals and TM's normal, nose and mouth without ulcers or lesions  NECK: no adenopathy, no asymmetry, masses, or scars  RESP: lungs clear to auscultation - no rales, rhonchi or wheezes  BREAST:left  normal without masses, tenderness or nipple discharge and mass right breast tender ridge at 10-11 oclock position 1 inch from nipple  CV: regular rate and rhythm, normal S1 S2, no S3 or S4, no murmur, click or rub, no peripheral edema  ABDOMEN: soft, nontender, no hepatosplenomegaly, no masses and bowel sounds normal  MS: no gross musculoskeletal defects noted, no edema  SKIN: no suspicious lesions or rashes  NEURO: Normal strength and tone, mentation intact and speech normal  PSYCH: mentation appears normal, affect normal/bright    Office Visit on 08/26/2024   Component Date Value Ref Range Status    Cholesterol 08/26/2024 259 (H)  <200 mg/dL Final    Triglycerides 08/26/2024 621 (H)  <150 mg/dL Final    Direct Measure HDL 08/26/2024 43 (L)  >=50 mg/dL Final    LDL Cholesterol Calculated 08/26/2024    Final    Cannot estimate LDL when triglyceride exceeds 400 mg/dL    Non HDL Cholesterol 08/26/2024 216 (H)  <130 mg/dL Final    Patient Fasting > 8hrs? 08/26/2024 No   Final    Glucose 08/26/2024 82  70 - 99 mg/dL Final    Patient Fasting > 8hrs? 08/26/2024 No   Final    TSH 08/26/2024 2.29  0.30 - 4.20 uIU/mL Final    Immunoglobulin A 08/26/2024 71 (L)  84 - 499 mg/dL Final    Deamidated Gliadin Antibody IgA 08/26/2024 2.7  <7.0 U/mL Final    Negative    Deamidated Gliadin Antibody IgG 08/26/2024 <0.6  <7.0 U/mL Final    Negative    Tissue Transglutaminase Antibody I* 08/26/2024 <0.2  <7.0 U/mL Final    Negative- The tTG-IgA assay has limited utility for patients with decreased levels of IgA. Screening for celiac disease should include IgA testing to rule out selective IgA deficiency and to guide selection and  interpretation of serological testing. tTG-IgG testing may be positive in celiac disease patients with IgA deficiency.    Tissue Transglutaminase Antibody I* 08/26/2024 <0.6  <7.0 U/mL Final    Negative    Calprotectin Feces 08/27/2024 66.1 (H)  0.0 - 49.9 mg/kg Final    Borderline result, please re-evaluate and recollect a new sample in 4-6 weeks.    Hemoglobin A1C 08/26/2024 5.3  0.0 - 5.6 % Final    Normal <5.7%   Prediabetes 5.7-6.4%    Diabetes 6.5% or higher     Note: Adopted from ADA consensus guidelines.    WBC Count 08/26/2024 10.0  4.0 - 11.0 10e3/uL Final    RBC Count 08/26/2024 5.12  3.80 - 5.20 10e6/uL Final    Hemoglobin 08/26/2024 15.4  11.7 - 15.7 g/dL Final    Hematocrit 08/26/2024 45.0  35.0 - 47.0 % Final    MCV 08/26/2024 88  78 - 100 fL Final    MCH 08/26/2024 30.1  26.5 - 33.0 pg Final    MCHC 08/26/2024 34.2  31.5 - 36.5 g/dL Final    RDW 08/26/2024 11.5  10.0 - 15.0 % Final    Platelet Count 08/26/2024 328  150 - 450 10e3/uL Final    Ferritin 08/26/2024 259 (H)  6 - 175 ng/mL Final    Sodium 08/26/2024 137  135 - 145 mmol/L Final    Potassium 08/26/2024 4.2  3.4 - 5.3 mmol/L Final    Carbon Dioxide (CO2) 08/26/2024 24  22 - 29 mmol/L Final    Anion Gap 08/26/2024 10  7 - 15 mmol/L Final    Urea Nitrogen 08/26/2024 10.7  6.0 - 20.0 mg/dL Final    Creatinine 08/26/2024 0.72  0.51 - 0.95 mg/dL Final    GFR Estimate 08/26/2024 >90  >60 mL/min/1.73m2 Final    eGFR calculated using 2021 CKD-EPI equation.    Calcium 08/26/2024 9.7  8.8 - 10.4 mg/dL Final    Reference intervals for this test were updated on 7/16/2024 to reflect our healthy population more accurately. There may be differences in the flagging of prior results with similar values performed with this method. Those prior results can be interpreted in the context of the updated reference intervals.    Chloride 08/26/2024 103  98 - 107 mmol/L Final    Glucose 08/26/2024 82  70 - 99 mg/dL Final    Alkaline Phosphatase 08/26/2024 77  40 - 150  U/L Final    AST 08/26/2024 25  0 - 45 U/L Final    ALT 08/26/2024 38  0 - 50 U/L Final    Protein Total 08/26/2024 7.3  6.4 - 8.3 g/dL Final    Albumin 08/26/2024 5.0  3.5 - 5.2 g/dL Final    Bilirubin Total 08/26/2024 0.2  <=1.2 mg/dL Final    Patient Fasting > 8hrs? 08/26/2024 No   Final    LDL Cholesterol Direct 08/26/2024 113 (H)  <100 mg/dL Final    Age 2-19 years:  Desirable: 0-110 mg/dL   Borderline high: 110-129 mg/dL   High: >= 130 mg/dL    Age 20 years and older:  Desirable: <100mg/dL  Above desirable: 100-129 mg/dL   Borderline high: 130-159 mg/dL   High: 160-189 mg/dL   Very high: >= 190 mg/dL           Signed Electronically by: DIPESH Marino CNP

## 2025-03-04 ENCOUNTER — TELEPHONE (OUTPATIENT)
Dept: GASTROENTEROLOGY | Facility: CLINIC | Age: 40
End: 2025-03-04
Payer: COMMERCIAL

## 2025-03-04 NOTE — TELEPHONE ENCOUNTER
REFERRAL INFORMATION:  Referring Provider:  Lisa Chisholm MD   Referring Clinic:  [facility]   Reason for Visit/Diagnosis:   K92.1 (ICD-10-CM) - Hematochezia   K92.1 (ICD-10-CM) - Melena   R10.9, G89.29 (ICD-10-CM) - Chronic abdominal pain        FUTURE VISIT INFORMATION:  Appointment Date: 4/11/25     NOTES STATUS DETAILS   OFFICE NOTE from Referring Provider Internal 8/26/24   OFFICE NOTE from Other Specialist Internal 3/9/24-Dr. Montaño-[facility]  1/4/24-Shi Martinez APRN CNP  7/24/19-Dr. Carreon-[facility]   HOSPITAL DISCHARGE SUMMARY/  ED VISITS Internal ED 1/9/24-Dr. Santos  ED 8/10/23-[facility] Dr. Hawkins  ED 9/20/22-Dr. Aguilar   OPERATIVE REPORT Internal 8/25/1676-[facility]-Si. Cain    MEDICATION LIST Internal    PROCEDURES     ENDOSCOPY  Internal 8/25/16    COLONOSCOPY Internal 10/29/12   STOOL TESTING     LABS     PERTINENT LABS Internal    PATHOLOGY REPORTS (RELATED) Internal Colonoscopy 10/29/12   IMAGES     CT Internal 1/9/24, 9/20/22-CT abd pel   XRAY Internal 9/16/24, 5/2/24-XR chest

## 2025-03-04 NOTE — TELEPHONE ENCOUNTER
[facility] Call Center    Phone Message    May a detailed message be left on voicemail: Yes    Reason for Call: Other: Patient is currently scheduled on 04/11, as visit type New GI Urgent. This is outside the expected timeline for this referral. Patient has been added to the waitlist.      Action Taken: Message routed to:  Other: GI REFERRAL TRIAGE POOL     Travel Screening: Not Applicable

## 2025-03-06 NOTE — TELEPHONE ENCOUNTER
Writer called and left voice message for Pt. Writer called to help get Pt scheduled for a sooner GI appointment. Writer left call back number.    Writer will send Pt a Wir3shart message.

## 2025-03-15 ENCOUNTER — E-VISIT (OUTPATIENT)
Dept: URGENT CARE | Facility: CLINIC | Age: 40
End: 2025-03-15
Payer: COMMERCIAL

## 2025-03-15 DIAGNOSIS — N89.8 VAGINAL DISCHARGE: Primary | ICD-10-CM

## 2025-03-15 RX ORDER — FLUCONAZOLE 150 MG/1
150 TABLET ORAL ONCE
Qty: 2 TABLET | Refills: 0 | Status: SHIPPED | OUTPATIENT
Start: 2025-03-15 | End: 2025-03-15

## 2025-03-15 NOTE — PATIENT INSTRUCTIONS
Thank you for choosing us for your care. As you recently took antibiotics, you likely have a yeast vaginal infection.  I have placed an order for a prescription for fluconazole so that you can start treatment. View your full visit summary for details by clicking on the link below. Your pharmacist will able to address any questions you may have about the medication.     If you re not feeling better within 2-3 days, please schedule an appointment.  You can schedule an appointment right here in [employer], or call [phone]    Vaginal Yeast Infection: Care Instructions  Overview     A vaginal yeast infection is the growth of too many yeast cells in the vagina. This is a common problem. Itching, vaginal discharge and irritation, and other symptoms can bother you. But yeast infections don't often cause other health problems.  Some medicines can increase your risk of getting a yeast infection. These include antibiotics, hormones, and steroids. You may also be more likely to get a yeast infection if you are pregnant, have diabetes, douche, or wear tight clothes.  With treatment, most yeast infections get better in a few days.  Follow-up care is a key part of your treatment and safety. Be sure to make and go to all appointments, and call your doctor if you are having problems. It's also a good idea to know your test results and keep a list of the medicines you take.  How can you care for yourself at home?  Take your medicines exactly as prescribed. Call your doctor if you think you are having a problem with your medicine.  Ask your doctor about over-the-counter (OTC) medicines for yeast infections. If you use an OTC treatment, read and follow all instructions on the label.  Don't use tampons while using a vaginal cream or suppository. The tampons can absorb the medicine. Use pads instead.  Wear loose cotton clothing. Don't wear nylon or other fabric that holds body heat and moisture close to the skin.  Try sleeping without  "underwear.  Don't scratch. Relieve itching with a cold pack or a cool bath.  Don't wash your vulva more than once a day. Use plain water or a mild, unscented soap. Air-dry the vulva.  Change out of wet or damp clothes as soon as possible.  If you are using a vaginal medicine, don't have sex until you have finished your treatment. But if you do have sex, don't depend on a condom or diaphragm for birth control. The oil in some vaginal medicines weakens latex.  Don't douche or use powders, sprays, or perfumes in your vagina or on your vulva. These items can change the normal balance of organisms in your vagina.  When should you call for help?   Call your doctor now or seek immediate medical care if:    You have new or increased pain in your vagina or pelvis.   Watch closely for changes in your health, and be sure to contact your doctor if:    You have unexpected vaginal bleeding.     You have a fever.     You are not getting better after 2 days.     Your symptoms come back after you finish your medicines.   Where can you learn more?  Go to https://www.[employer].net/patiented  Enter F639 in the search box to learn more about \"Vaginal Yeast Infection: Care Instructions.\"  Current as of: April 30, 2024  Content Version: 14.4    0469-2422 [employer].   Care instructions adapted under license by your healthcare professional. If you have questions about a medical condition or this instruction, always ask your healthcare professional. [employer] disclaims any warranty or liability for your use of this information.    "

## 2025-03-27 ENCOUNTER — E-VISIT (OUTPATIENT)
Dept: URGENT CARE | Facility: CLINIC | Age: 40
End: 2025-03-27
Payer: COMMERCIAL

## 2025-03-27 DIAGNOSIS — N89.8 VAGINAL DISCHARGE: Primary | ICD-10-CM

## 2025-03-27 NOTE — PATIENT INSTRUCTIONS

## 2025-04-11 ENCOUNTER — PRE VISIT (OUTPATIENT)
Dept: GASTROENTEROLOGY | Facility: CLINIC | Age: 40
End: 2025-04-11

## 2025-04-15 ENCOUNTER — TELEPHONE (OUTPATIENT)
Dept: GASTROENTEROLOGY | Facility: CLINIC | Age: 40
End: 2025-04-15
Payer: COMMERCIAL

## 2025-04-15 NOTE — TELEPHONE ENCOUNTER
Patient Contacted NO VM for the patient to call back and schedule the following:    Appointment type: return   Provider: Meghan Negron   Return date: 8/11/2025  Specialty phone number: [phone]   Additional appointment(s) needed:   Additonal Notes:

## 2025-04-16 DIAGNOSIS — R76.8 POSITIVE ANA (ANTINUCLEAR ANTIBODY): ICD-10-CM

## 2025-04-16 DIAGNOSIS — R79.89 ELEVATED FERRITIN: ICD-10-CM

## 2025-04-16 DIAGNOSIS — M79.10 MYALGIA: Primary | ICD-10-CM

## 2025-05-08 ENCOUNTER — E-VISIT (OUTPATIENT)
Dept: URGENT CARE | Facility: CLINIC | Age: 40
End: 2025-05-08
Payer: COMMERCIAL

## 2025-05-08 DIAGNOSIS — N76.0 BACTERIAL VAGINOSIS: Primary | ICD-10-CM

## 2025-05-08 DIAGNOSIS — B96.89 BACTERIAL VAGINOSIS: Primary | ICD-10-CM

## 2025-05-08 RX ORDER — METRONIDAZOLE 500 MG/1
500 TABLET ORAL 2 TIMES DAILY
Qty: 14 TABLET | Refills: 0 | Status: SHIPPED | OUTPATIENT
Start: 2025-05-08 | End: 2025-05-15

## 2025-05-08 RX ORDER — FLUCONAZOLE 150 MG/1
150 TABLET ORAL ONCE
Qty: 1 TABLET | Refills: 0 | Status: SHIPPED | OUTPATIENT
Start: 2025-05-08 | End: 2025-05-08

## 2025-05-08 NOTE — PATIENT INSTRUCTIONS
"Thank you for choosing us for your care. I have placed an order for a prescription so that you can start treatment. View your full visit summary for details by clicking on the link below. Your pharmacist will able to address any questions you may have about the medication.     If you re not feeling better within 2-3 days, please schedule an appointment.  You can schedule an appointment right here in [facility], or call [phone]  If the visit is for the same symptoms as your eVisit, we ll refund the cost of your eVisit if seen within seven days.    Bacterial Vaginosis: Care Instructions  Overview     Bacterial vaginosis is a condition in which there is excess growth of certain bacteria that are normally found in the vagina. Symptoms often include abnormal gray or yellow discharge with a \"fishy\" odor. It is not considered an infection that is spread through sexual contact.  Symptoms can be annoying and uncomfortable. But bacterial vaginosis does not usually cause other health problems. However, in some cases it can lead to more serious issues.  While bacterial vaginosis may go away on its own, most doctors use antibiotics to treat it. You may have been prescribed pills or vaginal cream. With treatment, bacterial vaginosis usually clears up in 5 to 7 days.  Follow-up care is a key part of your treatment and safety. Be sure to make and go to all appointments, and call your doctor if you are having problems. It's also a good idea to know your test results and keep a list of the medicines you take.  How can you care for yourself at home?  Take your antibiotics as directed. Do not stop taking them just because you feel better. You need to take the full course of antibiotics.  Do not eat or drink anything that contains alcohol if you are taking metronidazole or tinidazole.  Keep using your medicine if you start your period. Use pads instead of tampons while using a vaginal cream or suppository. Tampons can absorb the " "medicine.  Wear loose cotton clothing. Do not wear nylon and other materials that hold body heat and moisture close to the skin.  Do not scratch. Relieve itching with a cold pack or a cool bath.  Do not wash your vulva more than once a day. Use plain water or a mild, unscented soap. Do not douche.  When should you call for help?   Call your doctor now or seek immediate medical care if:    You have a fever.     You have new or worse pain in your vagina or pelvis.   Watch closely for changes in your health, and be sure to contact your doctor if:    You have new or worse vaginal itching or discharge.     You have unexpected vaginal bleeding.     You are not getting better as expected.     Your symptoms return after you finish the course of your medicine.   Where can you learn more?  Go to https://www.[employer].net/patiented  Enter X360 in the search box to learn more about \"Bacterial Vaginosis: Care Instructions.\"  Current as of: April 30, 2024  Content Version: 14.4    8810-0560 [employer].   Care instructions adapted under license by your healthcare professional. If you have questions about a medical condition or this instruction, always ask your healthcare professional. [employer] disclaims any warranty or liability for your use of this information.    "

## 2025-06-19 ENCOUNTER — E-VISIT (OUTPATIENT)
Dept: URGENT CARE | Facility: CLINIC | Age: 40
End: 2025-06-19
Payer: COMMERCIAL

## 2025-06-19 DIAGNOSIS — N76.0 BACTERIAL VAGINOSIS: Primary | ICD-10-CM

## 2025-06-19 DIAGNOSIS — B96.89 BACTERIAL VAGINOSIS: Primary | ICD-10-CM

## 2025-06-19 RX ORDER — METRONIDAZOLE 7.5 MG/G
1 GEL VAGINAL DAILY
Qty: 70 G | Refills: 0 | Status: SHIPPED | OUTPATIENT
Start: 2025-06-19 | End: 2025-06-24

## 2025-06-19 NOTE — PATIENT INSTRUCTIONS
"Thank you for choosing us for your care. I have placed an order for a prescription so that you can start treatment. View your full visit summary for details by clicking on the link below. Your pharmacist will able to address any questions you may have about the medication.     If you re not feeling better within 2-3 days, please schedule an appointment.  You can schedule an appointment right here in [facility], or call [phone]  If the visit is for the same symptoms as your eVisit, we ll refund the cost of your eVisit if seen within seven days.    Bacterial Vaginosis: Care Instructions  Overview     Bacterial vaginosis is a condition in which there is excess growth of certain bacteria that are normally found in the vagina. Symptoms often include abnormal gray or yellow discharge with a \"fishy\" odor. It is not considered an infection that is spread through sexual contact.  Symptoms can be annoying and uncomfortable. But bacterial vaginosis does not usually cause other health problems. However, in some cases it can lead to more serious issues.  While bacterial vaginosis may go away on its own, most doctors use antibiotics to treat it. You may have been prescribed pills or vaginal cream. With treatment, bacterial vaginosis usually clears up in 5 to 7 days.  Follow-up care is a key part of your treatment and safety. Be sure to make and go to all appointments, and call your doctor if you are having problems. It's also a good idea to know your test results and keep a list of the medicines you take.  How can you care for yourself at home?  Take your antibiotics as directed. Do not stop taking them just because you feel better. You need to take the full course of antibiotics.  Do not eat or drink anything that contains alcohol if you are taking metronidazole or tinidazole.  Keep using your medicine if you start your period. Use pads instead of tampons while using a vaginal cream or suppository. Tampons can absorb the " "medicine.  Wear loose cotton clothing. Do not wear nylon and other materials that hold body heat and moisture close to the skin.  Do not scratch. Relieve itching with a cold pack or a cool bath.  Do not wash your vulva more than once a day. Use plain water or a mild, unscented soap. Do not douche.  When should you call for help?   Call your doctor now or seek immediate medical care if:    You have a fever.     You have new or worse pain in your vagina or pelvis.   Watch closely for changes in your health, and be sure to contact your doctor if:    You have new or worse vaginal itching or discharge.     You have unexpected vaginal bleeding.     You are not getting better as expected.     Your symptoms return after you finish the course of your medicine.   Where can you learn more?  Go to https://www.[employer].net/patiented  Enter X360 in the search box to learn more about \"Bacterial Vaginosis: Care Instructions.\"  Current as of: April 30, 2024  Content Version: 14.5    1844-6243 [employer].   Care instructions adapted under license by your healthcare professional. If you have questions about a medical condition or this instruction, always ask your healthcare professional. [employer] disclaims any warranty or liability for your use of this information.    "

## 2025-06-21 ENCOUNTER — HEALTH MAINTENANCE LETTER (OUTPATIENT)
Age: 40
End: 2025-06-21

## 2025-06-25 ENCOUNTER — E-VISIT (OUTPATIENT)
Dept: URGENT CARE | Facility: CLINIC | Age: 40
End: 2025-06-25
Payer: COMMERCIAL

## 2025-06-25 DIAGNOSIS — N89.8 VAGINAL SORE: Primary | ICD-10-CM

## 2025-06-25 PROCEDURE — 99207 PR NON-BILLABLE SERV PER CHARTING: CPT | Performed by: FAMILY MEDICINE

## 2025-06-25 NOTE — PATIENT INSTRUCTIONS
Dear Sirena Dietrich,    We are sorry you are not feeling well. Based on the responses you provided, it is recommended that you be seen in-person in urgent care so we can better evaluate your symptoms. Please click here to find the nearest urgent care location to you.   You will not be charged for this Visit. Thank you for trusting us with your care.    DIPESH Mathew CNP

## 2025-07-07 ENCOUNTER — E-VISIT (OUTPATIENT)
Dept: URGENT CARE | Facility: CLINIC | Age: 40
End: 2025-07-07
Payer: COMMERCIAL

## 2025-07-07 DIAGNOSIS — N89.8 VAGINAL DISCHARGE: Primary | ICD-10-CM

## 2025-07-08 NOTE — PATIENT INSTRUCTIONS
Thank you for choosing us for your care. Given your symptoms, I would like you to do a lab-only visit to determine what is causing them.  I have placed the orders.  Please schedule an appointment with the lab right here in [place], or call [phone].  I will let you know when the results are back and next steps to take.    Schedule a Lab Only appointment here.

## 2025-08-10 ENCOUNTER — ANCILLARY PROCEDURE (OUTPATIENT)
Dept: GENERAL RADIOLOGY | Facility: CLINIC | Age: 40
End: 2025-08-10
Attending: PHYSICIAN ASSISTANT
Payer: COMMERCIAL

## 2025-08-10 ENCOUNTER — OFFICE VISIT (OUTPATIENT)
Dept: URGENT CARE | Facility: URGENT CARE | Age: 40
End: 2025-08-10
Payer: COMMERCIAL

## 2025-08-10 VITALS
SYSTOLIC BLOOD PRESSURE: 125 MMHG | HEART RATE: 107 BPM | TEMPERATURE: 98.2 F | BODY MASS INDEX: 29.57 KG/M2 | WEIGHT: 184 LBS | HEIGHT: 66 IN | RESPIRATION RATE: 18 BRPM | DIASTOLIC BLOOD PRESSURE: 83 MMHG | OXYGEN SATURATION: 98 %

## 2025-08-10 DIAGNOSIS — R10.84 ABDOMINAL PAIN, GENERALIZED: ICD-10-CM

## 2025-08-10 DIAGNOSIS — R10.84 ABDOMINAL PAIN, GENERALIZED: Primary | ICD-10-CM

## 2025-08-10 PROCEDURE — 74019 RADEX ABDOMEN 2 VIEWS: CPT | Mod: TC | Performed by: RADIOLOGY

## 2025-08-10 PROCEDURE — 99214 OFFICE O/P EST MOD 30 MIN: CPT | Performed by: PHYSICIAN ASSISTANT

## 2025-08-11 ENCOUNTER — TELEPHONE (OUTPATIENT)
Dept: FAMILY MEDICINE | Facility: CLINIC | Age: 40
End: 2025-08-11
Payer: COMMERCIAL

## (undated) DEVICE — ESU LIGASURE MARYLAND LAPAROSCOPIC SLR/DVDR 5MMX37CM LF1937

## (undated) DEVICE — DILATOR CERVICAL OS FINDER TAPER 2-4MMX21.5CM 1176

## (undated) DEVICE — Device

## (undated) DEVICE — STOCKING SLEEVE COMPRESSION CALF MED

## (undated) DEVICE — BLANKET BAIR HUGGER UPPER BODY 42268

## (undated) DEVICE — SPONGE LAP 18X18" 1515

## (undated) DEVICE — LUBRICATING JELLY 4.25OZ

## (undated) DEVICE — GLOVE PROTEXIS BLUE W/NEU-THERA 7.0  2D73EB70

## (undated) DEVICE — DECANTER VIAL 2006S

## (undated) DEVICE — ESU CORD MONOPOLAR 10'  E0510

## (undated) DEVICE — TUBING C02 INSUFFLATION LAP FILTER HEATER 6198

## (undated) DEVICE — ENDO TROCAR FIRST ENTRY KII FIOS ADV FIX 12X100MM CFF73

## (undated) DEVICE — DEVICE SUTURE GRASPER TROCAR CLOSURE 14GAX15CM PMI-TC-SG

## (undated) DEVICE — DRSG GAUZE 4X4" 3033

## (undated) DEVICE — TUBING CYSTO/BLADDER IRRIG SET 80" 06544-01

## (undated) DEVICE — ESU HOLDER LAP INST DISP PURPLE LONG 330MM H-PRO-330

## (undated) DEVICE — ADHESIVE SWIFTSET 0.8ML OCTYL SS6

## (undated) DEVICE — ENDO TROCAR FIRST ENTRY KII FIOS ADV FIX 05X100MM CFF03

## (undated) DEVICE — SYR 10ML FINGER CONTROL W/O NDL 309695

## (undated) DEVICE — ENDO TROCAR SLEEVE KII ADV FIXATION 05X100MM CFS02

## (undated) DEVICE — SU VICRYL 4-0 FS-2 27" J422-H

## (undated) DEVICE — GOWN XLG DISP 9545

## (undated) DEVICE — SOL WATER IRRIG 1000ML BOTTLE 07139-09

## (undated) DEVICE — BLADE KNIFE SURG 10 371110

## (undated) DEVICE — PACK LAPAROSCOPY/PELVISCOPY STD

## (undated) DEVICE — BLADE KNIFE SURG 11 371111

## (undated) DEVICE — SUCTION CURETTE 3MM ENDOMETRIAL MX140

## (undated) DEVICE — ANTIFOG SOLUTION W/FOAM PAD 31142527

## (undated) DEVICE — SYR BULB IRRIG DOVER 60 ML LATEX FREE 67000

## (undated) DEVICE — BLADE KNIFE SURG 15 371115

## (undated) DEVICE — GLOVE PROTEXIS BLUE W/NEU-THERA 7.5  2D73EB75

## (undated) DEVICE — GLOVE PROTEXIS W/NEU-THERA 7.0  2D73TE70

## (undated) DEVICE — PACK LAPAROTOMY CUSTOM LAKES

## (undated) DEVICE — SU VICRYL 0 UR-6 27" J603H

## (undated) DEVICE — PREP SKIN SCRUB TRAY 4461A

## (undated) DEVICE — SOL NACL 0.9% IRRIG 1000ML BOTTLE 07138-09

## (undated) DEVICE — GLOVE PROTEXIS W/NEU-THERA 7.5  2D73TE75

## (undated) DEVICE — CATH TRAY FOLEY SURESTEP 16FR W/URINE MTR STATLK LF A303416A

## (undated) DEVICE — SUCTION TIP YANKAUER STR K87

## (undated) DEVICE — ENDO POUCH UNIV RETRIEVAL SYSTEM INZII 10MM CD001

## (undated) DEVICE — PAD PERI INDIV WRAP 11" 2022

## (undated) DEVICE — SU VICRYL 0 TIE 54" UND J287G

## (undated) RX ORDER — PHENAZOPYRIDINE HYDROCHLORIDE 200 MG/1
TABLET, FILM COATED ORAL
Status: DISPENSED
Start: 2018-09-21

## (undated) RX ORDER — MEPERIDINE HYDROCHLORIDE 50 MG/ML
INJECTION INTRAMUSCULAR; INTRAVENOUS; SUBCUTANEOUS
Status: DISPENSED
Start: 2018-09-21

## (undated) RX ORDER — CLINDAMYCIN PHOSPHATE 900 MG/50ML
INJECTION, SOLUTION INTRAVENOUS
Status: DISPENSED
Start: 2018-09-21

## (undated) RX ORDER — EPHEDRINE SULFATE 50 MG/ML
INJECTION, SOLUTION INTRAMUSCULAR; INTRAVENOUS; SUBCUTANEOUS
Status: DISPENSED
Start: 2018-09-21

## (undated) RX ORDER — PROPOFOL 10 MG/ML
INJECTION, EMULSION INTRAVENOUS
Status: DISPENSED
Start: 2018-09-21

## (undated) RX ORDER — DEXAMETHASONE SODIUM PHOSPHATE 4 MG/ML
INJECTION, SOLUTION INTRA-ARTICULAR; INTRALESIONAL; INTRAMUSCULAR; INTRAVENOUS; SOFT TISSUE
Status: DISPENSED
Start: 2018-09-21

## (undated) RX ORDER — ONDANSETRON 2 MG/ML
INJECTION INTRAMUSCULAR; INTRAVENOUS
Status: DISPENSED
Start: 2018-09-21

## (undated) RX ORDER — FENTANYL CITRATE 50 UG/ML
INJECTION, SOLUTION INTRAMUSCULAR; INTRAVENOUS
Status: DISPENSED
Start: 2018-09-21

## (undated) RX ORDER — BUPIVACAINE HYDROCHLORIDE 2.5 MG/ML
INJECTION, SOLUTION INFILTRATION; PERINEURAL
Status: DISPENSED
Start: 2018-09-21

## (undated) RX ORDER — KETOROLAC TROMETHAMINE 30 MG/ML
INJECTION, SOLUTION INTRAMUSCULAR; INTRAVENOUS
Status: DISPENSED
Start: 2018-09-21

## (undated) RX ORDER — LIDOCAINE HYDROCHLORIDE 10 MG/ML
INJECTION, SOLUTION EPIDURAL; INFILTRATION; INTRACAUDAL; PERINEURAL
Status: DISPENSED
Start: 2018-09-21

## (undated) RX ORDER — GABAPENTIN 300 MG/1
CAPSULE ORAL
Status: DISPENSED
Start: 2018-09-21

## (undated) RX ORDER — HYDROXYZINE HYDROCHLORIDE 50 MG/1
TABLET, FILM COATED ORAL
Status: DISPENSED
Start: 2018-09-21

## (undated) RX ORDER — HYDROMORPHONE HYDROCHLORIDE 1 MG/ML
INJECTION, SOLUTION INTRAMUSCULAR; INTRAVENOUS; SUBCUTANEOUS
Status: DISPENSED
Start: 2018-09-21

## (undated) RX ORDER — ACETAMINOPHEN 325 MG/1
TABLET ORAL
Status: DISPENSED
Start: 2018-09-21